# Patient Record
Sex: MALE | Race: BLACK OR AFRICAN AMERICAN | NOT HISPANIC OR LATINO | Employment: OTHER | URBAN - METROPOLITAN AREA
[De-identification: names, ages, dates, MRNs, and addresses within clinical notes are randomized per-mention and may not be internally consistent; named-entity substitution may affect disease eponyms.]

---

## 2017-01-16 ENCOUNTER — OFFICE VISIT (OUTPATIENT)
Dept: OPHTHALMOLOGY | Facility: CLINIC | Age: 82
End: 2017-01-16
Payer: MEDICARE

## 2017-01-16 DIAGNOSIS — H21.562 AFFERENT PUPILLARY DEFECT, LEFT: ICD-10-CM

## 2017-01-16 DIAGNOSIS — H25.13 SENILE NUCLEAR SCLEROSIS, BILATERAL: ICD-10-CM

## 2017-01-16 DIAGNOSIS — H40.1133 PRIMARY OPEN ANGLE GLAUCOMA OF BOTH EYES, SEVERE STAGE: Primary | ICD-10-CM

## 2017-01-16 DIAGNOSIS — H02.403 PTOSIS, BILATERAL: ICD-10-CM

## 2017-01-16 PROCEDURE — 92015 DETERMINE REFRACTIVE STATE: CPT | Mod: ,,, | Performed by: OPHTHALMOLOGY

## 2017-01-16 PROCEDURE — 92012 INTRM OPH EXAM EST PATIENT: CPT | Mod: S$PBB,,, | Performed by: OPHTHALMOLOGY

## 2017-01-16 PROCEDURE — 99212 OFFICE O/P EST SF 10 MIN: CPT | Mod: PBBFAC | Performed by: OPHTHALMOLOGY

## 2017-01-16 PROCEDURE — 99999 PR PBB SHADOW E&M-EST. PATIENT-LVL II: CPT | Mod: PBBFAC,,, | Performed by: OPHTHALMOLOGY

## 2017-01-16 RX ORDER — TIMOLOL MALEATE 5 MG/ML
1 SOLUTION/ DROPS OPHTHALMIC 2 TIMES DAILY
Qty: 10 ML | Refills: 12 | Status: SHIPPED | OUTPATIENT
Start: 2017-01-16 | End: 2017-09-18 | Stop reason: SDUPTHER

## 2017-01-16 NOTE — PROGRESS NOTES
Assessment /Plan     For exam results, see Encounter Report.    Primary open angle glaucoma of both eyes, severe stage    Afferent pupillary defect, left    Senile nuclear sclerosis, bilateral    Ptosis, bilateral      Pt is  using timolol bid ou - was intolerant to combigan - too dizzy     1. POAG - adv./severe stage OS>OD      Presented at [facility] 2012 - had been dx with glaucoma by outside doctor years ago       He stopped gtts on his own and never followed up      Excellent response to Rx with only one gtt - xalatan      First HVF 3/22/2012      First photos 3/22/2012           Family history    neg        Glaucoma meds    - timolol bid ou         H/O adverse rxn to glaucoma drops    C/O red/painful eye with latanoprost/travatan gtts /// too dizzy w/ combigan         LASERS    SLT OS 4/16/2015 // SLT od 5/14/2015 (good resp 19/19 --> 13/13)        GLAUCOMA SURGERIES    none        OTHER EYE SURGERIES    none        CDR    0.8/0.9        Tbase    23-26/26-32          Tmax    26/32            Ttarget    16/16             HVF    5 VF '12-'16 -  24-2ss od-gen red in sens- SNS/SAD  // 24-2 stimV os-dense central scotoma        Gonio    +3 ou        CCT    531/546        OCT    2 test 2010 - 2015 -  RNFL - OD:dec. T/I // OS:dec. S/T/I, bord N        HRT    3 test 2012 to 2016 -  MR -  nl od // high std dev os /// CDR 0.54 od // high std dev. os        Disc photos    2010, 2014, 2016  - OIS    - Ttoday  17/17  - Test done today   - IOP check     2.   APD os - 2/2 to #1    3.   Nuclear Sclerosis ou        VA 20/80 od // CF at 1' os - poor VA os 2/2 adv. Glaucoma/VF loss    4.  Ptosis ou -stable and not visually significant    5.   Dermatochalasis ou -stable    Plan  Adv POAG OS>OD  Good initial  resp to slt ou 5/2015 19/19--> 13/13    H/O poor compliance- only used lumigan travatan/latanoprost  half the time- states causes eye pain the following day, so on  visit 5/5/14 switched to timolol ou bid, pt still c/o  pain/buring and with poor compliance  Intolerant to combigan - dizzy   tolerating timolol bid ou (using only q day - asked him to use it bid) (1/16/2017)    Did have Excellent resp to xalatan and doing well 26/32 -> 15/14- will readdress in future if need to lower IOP more and out of options - but he stopped on his own - C/O burning ect     Long discussion with patient about DG and burning from glaucoma gtts     Discussed, at length,  the risk of blindness if the glaucoma is not controlled    Cont to use systane gtts 4 x day and before glaucoma gtts to see if the glaucoma gtts are better tolerated    Doubt CE os will help vision much - lrg central scotoma and APD  Hold off on CE od for now - BCVA 20/50 on refraction  1/16/2017     Rx for new glasses given 1/16/2017 // No real improvement - but lenses are badly scratched     If needs a trab or tube os consider CE at same time - but very limited potential OS  If needs surgery od - consider combined - this is the better eye     F/U  4 months with HVF - 24-2 ss od and 24-2 stim V os /OCT/DFE     I have seen and personally examined the patient.  I agree with the findings, assessment and plan of the resident and/or fellow.     Clau Carvalho MD

## 2017-01-18 ENCOUNTER — OFFICE VISIT (OUTPATIENT)
Dept: FAMILY MEDICINE | Facility: CLINIC | Age: 82
End: 2017-01-18
Payer: MEDICARE

## 2017-01-18 ENCOUNTER — HOSPITAL ENCOUNTER (OUTPATIENT)
Dept: RADIOLOGY | Facility: HOSPITAL | Age: 82
Discharge: HOME | End: 2017-01-18
Attending: FAMILY MEDICINE
Payer: MEDICARE

## 2017-01-18 VITALS
HEART RATE: 73 BPM | DIASTOLIC BLOOD PRESSURE: 78 MMHG | BODY MASS INDEX: 36.16 KG/M2 | WEIGHT: 290.81 LBS | RESPIRATION RATE: 18 BRPM | TEMPERATURE: 97 F | HEIGHT: 75 IN | SYSTOLIC BLOOD PRESSURE: 118 MMHG | OXYGEN SATURATION: 97 %

## 2017-01-18 DIAGNOSIS — R07.89 CHEST WALL PAIN: Primary | ICD-10-CM

## 2017-01-18 DIAGNOSIS — R49.0 HOARSENESS OF VOICE: ICD-10-CM

## 2017-01-18 DIAGNOSIS — R07.89 CHEST WALL PAIN: ICD-10-CM

## 2017-01-18 PROCEDURE — 71020 XR CHEST PA AND LATERAL: CPT | Mod: TC,PO

## 2017-01-18 PROCEDURE — 99999 PR PBB SHADOW E&M-EST. PATIENT-LVL IV: CPT | Mod: PBBFAC,,, | Performed by: FAMILY MEDICINE

## 2017-01-18 PROCEDURE — 99213 OFFICE O/P EST LOW 20 MIN: CPT | Mod: S$PBB,,, | Performed by: FAMILY MEDICINE

## 2017-01-18 PROCEDURE — 71020 XR CHEST PA AND LATERAL: CPT | Mod: 26,,, | Performed by: RADIOLOGY

## 2017-01-18 NOTE — PATIENT INSTRUCTIONS
Stop drinking apple cider vinegar at night   You can take tylenol arthritis for pain as needed   If the hoarseness is not better, let us know , we can get you to see ENT doctor

## 2017-01-18 NOTE — PROGRESS NOTES
"Subjective:       Patient ID: Dominic Cohen is a 81 y.o. male.    Chief Complaint: Mass and Hoarse      HPI   Mr. Cohen presents to clinic today for complaints of lump on his chest. He states he was here last time and felt his collar bone had a lump. He states he had a xray and was told it was his arthritis.   He now states that there is a lump on his chest and he feels that food gets stuck.   He also states his voice is hoarse.   He states the hoarse voice gets better throughout the day.   He states he really does not like taking medication and prefers natural methods.   He reports taking apple cider vinegar everyday.     Review of Systems   Constitutional: Negative for fever.   Respiratory: Negative for cough and shortness of breath.    Cardiovascular: Negative for chest pain.   Gastrointestinal: Negative for abdominal pain.       Medication List with Changes/Refills   Current Medications    ASCORBIC ACID (VITAMIN C) 60 MG LOZG    Take 1 tablet by mouth daily as needed.     ASPIRIN 81 MG CHEW    Take 1 tablet (81 mg total) by mouth once daily.    BLOOD SUGAR DIAGNOSTIC (BLOOD GLUCOSE TEST) STRP    1 strip by Misc.(Non-Drug; Combo Route) route 3 (three) times a week.    CYANOCOBALAMIN, VITAMIN B-12, (VITAMIN B-12) 50 MCG TABLET    Take 50 mcg by mouth once daily.    ERGOCALCIFEROL, VITAMIN D2, (VITAMIN D ORAL)    Take 1 tablet by mouth once daily.     MINERAL OIL (FLEET MINERAL OIL) ENEMA    Place 1 enema rectally once daily.    PAPAVERINE 30 MG/ML INJECTION    Inject 0.3 ml of trimix solution prn ED max once daily  Prepare 10ml of trimix solution containing:    Papaverine 30mg/ml    Phentolamine 1 mg/ml    Alprostadil 10mcg/ml  Dispense 10ml per refill  Qs syringes 1cc/30g/0.5" and alcohol swabs dispense as needed for Intracavernosal injection    POLYETHYLENE GLYCOL (GLYCOLAX) 17 GRAM/DOSE POWDER    Take 17 g by mouth once daily. In 8 ounces of water for up to 2 weeks.    RAMIPRIL (ALTACE) 2.5 MG " CAPSULE    Take 1 capsule (2.5 mg total) by mouth once daily.    TIMOLOL MALEATE 0.5% (TIMOPTIC) 0.5 % DROP    Place 1 drop into both eyes 2 (two) times daily.   Discontinued Medications    NAPROXEN (NAPROSYN) 375 MG TABLET    Take 1 tablet (375 mg total) by mouth 2 (two) times daily with meals.       Patient Active Problem List   Diagnosis    Dermatochalasis    Polyneuropathy in other diseases classified elsewhere    Arthritis    Hypertension    Venous insufficiency    Afferent pupillary defect - Left Eye    Senile nuclear sclerosis - Both Eyes    Ptosis - Both Eyes    Primary open-angle glaucoma, severe stage    B12 deficiency    H/O prostatectomy    Prediabetes    Atherosclerosis of abdominal aorta    Chronic constipation    Vertigo    Tinnitus    Anemia    Obesity (BMI 30-39.9)    Special screening for malignant neoplasms, colon    Fall    Dyslipidemia         Objective:     Physical Exam   Constitutional: He is oriented to person, place, and time. He appears well-developed and well-nourished. No distress.   HENT:   Head: Normocephalic and atraumatic.   Right Ear: External ear normal.   Left Ear: External ear normal.   Mouth/Throat: Oropharynx is clear and moist. No oropharyngeal exudate.   Eyes: EOM are normal. Right eye exhibits no discharge. Left eye exhibits no discharge.   Cardiovascular: Normal rate and regular rhythm.    Pulmonary/Chest: Effort normal. No respiratory distress. He has no wheezes.   Musculoskeletal: He exhibits no edema.   Neurological: He is alert and oriented to person, place, and time.   Skin: Skin is warm and dry. He is not diaphoretic. No erythema.   Raised appearance on anterior chest near sternum  Non mobile   Non tender   No erythema or warmth noted     Psychiatric: He has a normal mood and affect.   Vitals reviewed.    Vitals:    01/18/17 1120   BP: 118/78   Pulse: 73   Resp: 18   Temp: 96.9 °F (36.1 °C)       Assessment/  PLAN     Chest wall pain  -     X-Ray  Chest PA And Lateral; Future; Expected date: 1/18/17  - will follow up xray, likely arthritis    Hoarseness of voice  -- suggested he avoid apple cider vinegar at least in the evenings to see if this makes any difference  - offered PPI, but he refused, likely food stuck feeling is related to GERD           Soni Jeffery MD  [facility]

## 2017-01-18 NOTE — MR AVS SNAPSHOT
[facility]  [address] [place] [address]  Phone: [phone]                  Dominic Cohen   2017 11:20 AM   Office Visit    Description:  Male : 1935   Provider:  Soni Jeffery MD   Department:  [facility]           Reason for Visit     Mass     Hoarse           Diagnoses this Visit        Comments    Chest wall pain    -  Primary            To Do List           Future Appointments        Provider Department Dept Phone    3/14/2017 2:00 PM Sudhakar Liu MD [facility] [phone]    2017 1:00 PM NUNEZ, VISUAL PAYNE [facility] [phone]    2017 1:45 PM Clau Carvalho MD [facility] [phone]      Goals (5 Years of Data)     None      [facility]     [facility] On Call Nurse Care Line -  Assistance  Registered nurses in the [facility] On Call Center provide clinical advisement, health education, appointment booking, and other advisory services.  Call for this free service at [phone].             Medications           Message regarding Medications     Verify the changes and/or additions to your medication regime listed below are the same as discussed with your clinician today.  If any of these changes or additions are incorrect, please notify your healthcare provider.        STOP taking these medications     naproxen (NAPROSYN) 375 MG tablet Take 1 tablet (375 mg total) by mouth 2 (two) times daily with meals.           Verify that the below list of medications is an accurate representation of the medications you are currently taking.  If none reported, the list may be blank. If incorrect, please contact your healthcare provider. Carry this list with you in case of emergency.           Current Medications     ascorbic acid (VITAMIN C) 60 mg Lozg Take 1 tablet by mouth daily as needed.     aspirin 81 MG Chew Take 1 tablet (81 mg total) by  "mouth once daily.    blood sugar diagnostic (BLOOD GLUCOSE TEST) Strp 1 strip by Misc.(Non-Drug; Combo Route) route 3 (three) times a week.    cyanocobalamin, vitamin B-12, (VITAMIN B-12) 50 mcg tablet Take 50 mcg by mouth once daily.    ERGOCALCIFEROL, VITAMIN D2, (VITAMIN D ORAL) Take 1 tablet by mouth once daily.     mineral oil (FLEET MINERAL OIL) enema Place 1 enema rectally once daily.    papaverine 30 mg/mL injection Inject 0.3 ml of trimix solution prn ED max once daily  Prepare 10ml of trimix solution containing:    Papaverine 30mg/ml    Phentolamine 1 mg/ml    Alprostadil 10mcg/ml  Dispense 10ml per refill  Qs syringes 1cc/30g/0.5" and alcohol swabs dispense as needed for Intracavernosal injection    polyethylene glycol (GLYCOLAX) 17 gram/dose powder Take 17 g by mouth once daily. In 8 ounces of water for up to 2 weeks.    ramipril (ALTACE) 2.5 MG capsule Take 1 capsule (2.5 mg total) by mouth once daily.    timolol maleate 0.5% (TIMOPTIC) 0.5 % Drop Place 1 drop into both eyes 2 (two) times daily.           Clinical Reference Information           Vital Signs - Last Recorded  Most recent update: 1/18/2017 11:22 AM by Kaylee Frank MA    BP Pulse Temp Resp    118/78 (BP Location: Right arm, Patient Position: Sitting, BP Method: Manual) 73 96.9 °F (36.1 °C) (Tympanic) 18    Ht Wt SpO2 BMI    6' 3" (1.905 m) 131.9 kg (290 lb 12.6 oz) 97% 36.35 kg/m2      Blood Pressure          Most Recent Value    BP  118/78      Allergies as of 1/18/2017     Penicillins    Sulfa (Sulfonamide Antibiotics)    Protonix [Pantoprazole]      Immunizations Administered on Date of Encounter - 1/18/2017     None      Orders Placed During Today's Visit     Future Labs/Procedures Expected by Expires    X-Ray Chest PA And Lateral  1/18/2017 1/18/2018      [facility] Sign-Up     Activating your [facility] account is as easy as 1-2-3!     1) Visit [email or web address], select Sign Up Now, enter this activation code and your date of birth, " then select Next.  [email or web address]  Expires: 3/4/2017 11:51 AM      2) Create a username and password to use when you visit [facility] in the future and select a security question in case you lose your password and select Next.    3) Enter your e-mail address and click Sign Up!    Additional Information  If you have questions, please e-mail [email or web address] or call [phone] to talk to our [facility] staff. Remember, [facility] is NOT to be used for urgent needs. For medical emergencies, dial 911.         Instructions    Stop drinking apple cider vinegar at night   You can take tylenol arthritis for pain as needed   If the hoarseness is not better, let us know , we can get you to see ENT doctor

## 2017-03-14 ENCOUNTER — OFFICE VISIT (OUTPATIENT)
Dept: FAMILY MEDICINE | Facility: CLINIC | Age: 82
End: 2017-03-14
Payer: MEDICARE

## 2017-03-14 VITALS
BODY MASS INDEX: 36.84 KG/M2 | SYSTOLIC BLOOD PRESSURE: 116 MMHG | HEART RATE: 68 BPM | RESPIRATION RATE: 18 BRPM | DIASTOLIC BLOOD PRESSURE: 70 MMHG | WEIGHT: 296.31 LBS | TEMPERATURE: 97 F | OXYGEN SATURATION: 97 % | HEIGHT: 75 IN

## 2017-03-14 DIAGNOSIS — Z90.79 H/O PROSTATECTOMY: Chronic | ICD-10-CM

## 2017-03-14 DIAGNOSIS — E78.5 DYSLIPIDEMIA: ICD-10-CM

## 2017-03-14 DIAGNOSIS — D64.9 ANEMIA, UNSPECIFIED TYPE: ICD-10-CM

## 2017-03-14 DIAGNOSIS — E53.8 B12 DEFICIENCY: ICD-10-CM

## 2017-03-14 DIAGNOSIS — R49.0 HOARSENESS: ICD-10-CM

## 2017-03-14 DIAGNOSIS — K59.09 CHRONIC CONSTIPATION: ICD-10-CM

## 2017-03-14 DIAGNOSIS — I10 ESSENTIAL HYPERTENSION: Primary | ICD-10-CM

## 2017-03-14 DIAGNOSIS — R19.5 CHANGE IN STOOL: ICD-10-CM

## 2017-03-14 PROCEDURE — 99214 OFFICE O/P EST MOD 30 MIN: CPT | Mod: S$PBB,,, | Performed by: INTERNAL MEDICINE

## 2017-03-14 PROCEDURE — 99999 PR PBB SHADOW E&M-EST. PATIENT-LVL IV: CPT | Mod: PBBFAC,,, | Performed by: INTERNAL MEDICINE

## 2017-03-14 PROCEDURE — 99214 OFFICE O/P EST MOD 30 MIN: CPT | Mod: PBBFAC,PO | Performed by: INTERNAL MEDICINE

## 2017-03-14 NOTE — MR AVS SNAPSHOT
[facility]  [address] [address]  Phone: [phone]                  Dominic Cohen   3/14/2017 2:00 PM   Office Visit    Description:  Male : 1935   Provider:  Sudhakar Liu MD   Department:  [facility]           Reason for Visit     Follow-up     Hyperlipidemia     Hypertension     Anemia           Diagnoses this Visit        Comments    Essential hypertension    -  Primary     H/O prostatectomy         B12 deficiency         Anemia, unspecified type         Dyslipidemia         Chronic constipation         Hoarseness         Change in stool                To Do List           Future Appointments        Provider Department Dept Phone    3/16/2017 8:40 AM Jaimee Armenta NP [facility] [phone]    3/16/2017 10:20 AM Mary Bello PA-C [facility] - [phone]    3/17/2017 7:45 AM [facility] [phone]    2017 1:00 PM NUNEZ, VISUAL PAYNE [facility] [phone]    2017 1:45 PM Clau Carvalho MD [facility] [phone]      Goals (5 Years of Data)     None      Follow-Up and Disposition     Return in about 4 months (around 2017).      [facility]     [facility] Nurse Care [facility] - 24/7 Assistance  Registered nurses in the [facility] On Call Center provide clinical advisement, health education, appointment booking, and other advisory services.  Call for this free service at [phone].             Medications           Message regarding Medications     Verify the changes and/or additions to your medication regime listed below are the same as discussed with your clinician today.  If any of these changes or additions are incorrect, please notify your healthcare provider.             Verify that the below list of medications is an accurate representation of the medications you are currently taking.  If none  "reported, the list may be blank. If incorrect, please contact your healthcare provider. Carry this list with you in case of emergency.           Current Medications     ascorbic acid (VITAMIN C) 60 mg Lozg Take 1 tablet by mouth daily as needed.     aspirin 81 MG Chew Take 1 tablet (81 mg total) by mouth once daily.    blood sugar diagnostic (BLOOD GLUCOSE TEST) Strp 1 strip by Misc.(Non-Drug; Combo Route) route 3 (three) times a week.    cyanocobalamin, vitamin B-12, (VITAMIN B-12) 50 mcg tablet Take 50 mcg by mouth once daily.    ERGOCALCIFEROL, VITAMIN D2, (VITAMIN D ORAL) Take 1 tablet by mouth once daily.     mineral oil (FLEET MINERAL OIL) enema Place 1 enema rectally once daily.    papaverine 30 mg/mL injection Inject 0.3 ml of trimix solution prn ED max once daily  Prepare 10ml of trimix solution containing:    Papaverine 30mg/ml    Phentolamine 1 mg/ml    Alprostadil 10mcg/ml  Dispense 10ml per refill  Qs syringes 1cc/30g/0.5" and alcohol swabs dispense as needed for Intracavernosal injection    polyethylene glycol (GLYCOLAX) 17 gram/dose powder Take 17 g by mouth once daily. In 8 ounces of water for up to 2 weeks.    ramipril (ALTACE) 2.5 MG capsule Take 1 capsule (2.5 mg total) by mouth once daily.    timolol maleate 0.5% (TIMOPTIC) 0.5 % Drop Place 1 drop into both eyes 2 (two) times daily.           Clinical Reference Information           Your Vitals Were     BP Pulse Temp Resp    116/70 (BP Location: Left arm, Patient Position: Sitting, BP Method: Manual) 68 97 °F (36.1 °C) (Tympanic) 18    Height Weight SpO2 BMI    6' 3" (1.905 m) 134.4 kg (296 lb 4.8 oz) 97% 37.03 kg/m2      Blood Pressure          Most Recent Value    BP  116/70      Allergies as of 3/14/2017     Penicillins    Sulfa (Sulfonamide Antibiotics)    Protonix [Pantoprazole]      Immunizations Administered on Date of Encounter - 3/14/2017     None      Orders Placed During Today's Visit      Normal Orders This Visit    Ambulatory " referral to ENT     Ambulatory referral to Gastroenterology     Future Labs/Procedures Expected by Expires    US Abdomen Complete  3/14/2017 3/14/2018      [facility] Sign-Up     Activating your [facility] account is as easy as 1-2-3!     1) Visit [email or web address], select Sign Up Now, enter this activation code and your date of birth, then select Next.  [email or web address]  Expires: 4/28/2017  2:44 PM      2) Create a username and password to use when you visit [facility] in the future and select a security question in case you lose your password and select Next.    3) Enter your e-mail address and click Sign Up!    Additional Information  If you have questions, please e-mail [email or web address] or call [phone] to talk to our [facility] staff. Remember, [facility] is NOT to be used for urgent needs. For medical emergencies, dial 911.         Language Assistance Services     ATTENTION: Language assistance services are available, free of charge. Please call [phone].      ATENCIÓN: Si habla español, tiene a rice disposición servicios gratuitos de asistencia lingüística. Llame al [phone].     JAVIER Ý: N?u b?n nói Ti?ng Vi?t, có các d?ch v? h? tr? ngôn ng? mi?n phí dành cho b?n. G?i s? [phone].         [facility] complies with applicable Federal civil rights laws and does not discriminate on the basis of race, color, national origin, age, disability, or sex.

## 2017-03-15 ENCOUNTER — TELEPHONE (OUTPATIENT)
Dept: OTOLARYNGOLOGY | Facility: CLINIC | Age: 82
End: 2017-03-15

## 2017-03-15 NOTE — TELEPHONE ENCOUNTER
----- Message from Mary Bello PA-C sent at 3/15/2017  7:15 AM CDT -----  He's on my schedule for tomorrow for hoarseness.  He probably needs scope.  Please call him and see if he can see Wood on Friday.  Thanks

## 2017-03-15 NOTE — TELEPHONE ENCOUNTER
Spoke with patient to explain and reschedule appointment to Dr. Murrieta scheduled to Friday 3/15/17. Patient will arrive after his ultrasound at 7:45 am.

## 2017-03-16 ENCOUNTER — INITIAL CONSULT (OUTPATIENT)
Dept: GASTROENTEROLOGY | Facility: CLINIC | Age: 82
End: 2017-03-16
Payer: MEDICARE

## 2017-03-16 ENCOUNTER — TELEPHONE (OUTPATIENT)
Dept: RADIOLOGY | Facility: HOSPITAL | Age: 82
End: 2017-03-16

## 2017-03-16 VITALS
HEART RATE: 72 BPM | DIASTOLIC BLOOD PRESSURE: 88 MMHG | HEIGHT: 76 IN | BODY MASS INDEX: 36.11 KG/M2 | WEIGHT: 296.5 LBS | SYSTOLIC BLOOD PRESSURE: 156 MMHG

## 2017-03-16 DIAGNOSIS — R49.0 HOARSENESS: ICD-10-CM

## 2017-03-16 DIAGNOSIS — K21.9 GASTROESOPHAGEAL REFLUX DISEASE, ESOPHAGITIS PRESENCE NOT SPECIFIED: ICD-10-CM

## 2017-03-16 DIAGNOSIS — R14.0 BLOATING: ICD-10-CM

## 2017-03-16 DIAGNOSIS — K59.00 CONSTIPATION, UNSPECIFIED CONSTIPATION TYPE: Primary | ICD-10-CM

## 2017-03-16 DIAGNOSIS — R10.30 LOWER ABDOMINAL PAIN: ICD-10-CM

## 2017-03-16 DIAGNOSIS — R14.2 BELCHING: ICD-10-CM

## 2017-03-16 PROCEDURE — 99213 OFFICE O/P EST LOW 20 MIN: CPT | Mod: PBBFAC,PO | Performed by: NURSE PRACTITIONER

## 2017-03-16 PROCEDURE — 99214 OFFICE O/P EST MOD 30 MIN: CPT | Mod: S$PBB,,, | Performed by: NURSE PRACTITIONER

## 2017-03-16 PROCEDURE — 99999 PR PBB SHADOW E&M-EST. PATIENT-LVL III: CPT | Mod: PBBFAC,,, | Performed by: NURSE PRACTITIONER

## 2017-03-16 NOTE — PROGRESS NOTES
"Clinic Consult:  [facility] Consultation Note    Reason for Consult:  The primary encounter diagnosis was Constipation, unspecified constipation type. Diagnoses of Belching, Hoarseness, Lower abdominal pain, Bloating, and Gastroesophageal reflux disease, esophagitis presence not specified were also pertinent to this visit.    PCP: Sudhakar Liu   [address] / [place] [address]    HPI:  This is a 81 y.o. male here for evaluation of the above  Patient was referred to me by Dr. Liu for abdominal complaints. It was noted in his chart that he is also having some new onset hoarseness for the last couple of months. He is follow up with ENT for that as well. He complains of having some lower abdominal pain and bloating that worsens after eating. Patient also complains of constipation. He is having bowel movements about every 2-4 days. They are sometimes hard and "pellit-like" in nature. He denies any hematochezia or melena. The abdominal pian and bloating worsens after eating and he feels very full. He feels like his food is just sitting in his stomach. He has tried Miralax as well as other OTC preparations for constipation without any relief. He also reports an increase in belching and flatulence. He does not feel any burning sensation in his chest but does admit to having a bad taste in his mouth when he belches. He had a previous EGD less than a year ago in May 2016 which was completely normal. He reports having these symptoms at that time as well and does not admit to any significant upper GI symptom change since then. He has not been on any treatment for GERD. He has an allergy to PPIs. He denies any nausea or vomiting or weight loss.       Review of Systems   Constitutional: Negative for fever, malaise/fatigue and weight loss.   HENT: Negative for sore throat.    Respiratory: Negative for cough and wheezing.    Cardiovascular: Negative for chest pain and palpitations. "   Gastrointestinal: Positive for constipation. Negative for blood in stool, diarrhea, melena, nausea and vomiting. Abdominal pain: lower.        Belching   Musculoskeletal: Negative for back pain, joint pain, myalgias and neck pain.   Skin: Negative for itching and rash.   Neurological: Negative for dizziness, speech change, seizures, loss of consciousness and headaches.   Psychiatric/Behavioral: Negative for depression and substance abuse. The patient is not nervous/anxious.        Medical History:  has a past medical history of Arthritis; Atherosclerosis of abdominal aorta; Cataract; Chronic constipation; Glaucoma; History of anal fissures; Hypertension; Polyneuropathy in other diseases classified elsewhere; Prediabetes; Venous insufficiency; and Venous insufficiency.    Surgical History:  has a past surgical history that includes Knee surgery; Back surgery; ostate surgery; TONSILLECTOMY, ADENOIDECTOMY; Cataract extraction bilateral w/ anterior vitrectomy; and Colonoscopy (N/A, 5/13/2016).    Family History: family history includes No Known Problems in his daughter, son, and son. There is no history of Diabetes, Heart disease, Cancer, Celiac disease, Cirrhosis, Colon cancer, Colon polyps, Crohn's disease, Cystic fibrosis, Esophageal cancer, Hemochromatosis, Inflammatory bowel disease, Irritable bowel syndrome, Liver cancer, Liver disease, Rectal cancer, Stomach cancer, Ulcerative colitis, Zak's disease, Amblyopia, Blindness, Glaucoma, Hypertension, Macular degeneration, Retinal detachment, or Strabismus..     Social History:  reports that he quit smoking about 8 years ago. His smoking use included Cigarettes. He has a 15.60 pack-year smoking history. He has never used smokeless tobacco. He reports that he drinks alcohol. He reports that he does not use illicit drugs.    Allergies: Reviewed    Home Medications:   Current Outpatient Prescriptions on File Prior to Visit   Medication Sig Dispense Refill     "ascorbic acid (VITAMIN C) 60 mg Lozg Take 1 tablet by mouth daily as needed.       aspirin 81 MG Chew Take 1 tablet (81 mg total) by mouth once daily. (Patient taking differently: Take 81 mg by mouth daily as needed. )  0    blood sugar diagnostic (BLOOD GLUCOSE TEST) Strp 1 strip by Misc.(Non-Drug; Combo Route) route 3 (three) times a week. 32 strip 11    cyanocobalamin, vitamin B-12, (VITAMIN B-12) 50 mcg tablet Take 50 mcg by mouth once daily.      ERGOCALCIFEROL, VITAMIN D2, (VITAMIN D ORAL) Take 1 tablet by mouth once daily.       mineral oil (FLEET MINERAL OIL) enema Place 1 enema rectally once daily. 2 enema 0    papaverine 30 mg/mL injection Inject 0.3 ml of trimix solution prn ED max once daily  Prepare 10ml of trimix solution containing:    Papaverine 30mg/ml    Phentolamine 1 mg/ml    Alprostadil 10mcg/ml  Dispense 10ml per refill  Qs syringes 1cc/30g/0.5" and alcohol swabs dispense as needed for Intracavernosal injection 10 mL 5    polyethylene glycol (GLYCOLAX) 17 gram/dose powder Take 17 g by mouth once daily. In 8 ounces of water for up to 2 weeks. 119 g 0    ramipril (ALTACE) 2.5 MG capsule Take 1 capsule (2.5 mg total) by mouth once daily. 90 capsule 3    timolol maleate 0.5% (TIMOPTIC) 0.5 % Drop Place 1 drop into both eyes 2 (two) times daily. 10 mL 12     No current facility-administered medications on file prior to visit.        Physical Exam:  Vital Signs:  BP (!) 156/88  Pulse 72  Ht 6' 4" (1.93 m)  Wt 134.5 kg (296 lb 8.3 oz)  BMI 36.09 kg/m2  Body mass index is 36.09 kg/(m^2).  Physical Exam   Constitutional: He is oriented to person, place, and time and well-developed, well-nourished, and in no distress. No distress.   HENT:   Head: Normocephalic.   Eyes: Conjunctivae are normal. Pupils are equal, round, and reactive to light.   Cardiovascular: Normal rate, regular rhythm and normal heart sounds.    Pulmonary/Chest: Effort normal and breath sounds normal. No respiratory " distress.   Abdominal: Soft. Bowel sounds are normal. He exhibits no distension. There is no tenderness.   Neurological: He is alert and oriented to person, place, and time. No cranial nerve deficit.   Skin: Skin is warm and dry. No rash noted.   Psychiatric: Mood and affect normal.   Vitals reviewed.      Labs: Pertinent labs reviewed.    Endoscopy:  May 2016. Unremarkable    CRC Screening: Colonoscopy May 2016. Showed diverticulosis, otherwise unremarkable. No need for repeat colonoscopy for screening purposes.     Assessment:  1. Constipation, unspecified constipation type    2. Belching    3. Hoarseness    4. Lower abdominal pain    5. Bloating    6. Gastroesophageal reflux disease, esophagitis presence not specified           Recommendations:  Constipation, unspecified constipation type  Lower abdominal pain  Bloating  - Abdominal pain and hoarseness likely due to constipation.  - No relief with Miralax. Will start a trial of Linzess for constipation  - Discussed increasing water and dietary fiber intake.   -     linaclotide (LINZESS) 145 mcg Cap capsule; Take 1 capsule (145 mcg total) by mouth once daily.  Dispense: 30 capsule; Refill: 5    Hoarseness  Belching   Gastroesophageal reflux disease, esophagitis present not specified  - Pt reports bad taste in mouth when belching. Possible GERD.  - Pt has allergy to PPI. Will try a trial of Zantac for heartburn/GERD  - Constipation my contribute to belching and GERD symptoms  - Possible cause of hoarseness may be due to GERD. Patient will follow up with ENT tomorrow for further evaluation.   - If GERD symptoms persist despite improvement in constipation and taking Zantac, pt will need further evaluation with a repeat EGD to R/O esophagitis vs gastritis vs other etiologies  - If ENT unable to find cause of hoarseness, will need to repeat EGD for further evaluation R/O esophagitis vs gastritis vs other etiologies as a cause.      Return in about 4 weeks (around  4/13/2017).      Thank you so much for allowing me to participate in the care of MaconJAMILA Scott

## 2017-03-16 NOTE — LETTER
March 16, 2017      Sudhakar Liu MD  [address]  [place] [address]           [facility]  [address]  [place] [address]  Phone: [phone]  Fax: [phone]          Patient: Dominic Cohen   MR Number: [id number]   YOB: 1935   Date of Visit: 3/16/2017       Dear Dr. Sudhakar Liu:    Thank you for referring Dominic Cohen to me for evaluation. Attached you will find relevant portions of my assessment and plan of care.    If you have questions, please do not hesitate to call me. I look forward to following Dominic Cohen along with you.    Sincerely,    Jaimee Armenta, NP    Enclosure  CC:  No Recipients    If you would like to receive this communication electronically, please contact [email or web address] or [phone] to request more information on [facility] Link access.    For providers and/or their staff who would like to refer a patient to [facility], please contact us through our one-stop-shop provider referral line, [facility] , at [phone].    If you feel you have received this communication in error or would no longer like to receive these types of communications, please e-mail [email or web address]

## 2017-03-16 NOTE — MR AVS SNAPSHOT
[facility]  [address] [place] [address]  Phone: [phone]  Fax: [phone]                  Dominic Cohen   3/16/2017 8:40 AM   Initial consult    Description:  Male : 1935   Provider:  Jaimee Armenta NP   Department:  [facility]           Reason for Visit     Constipation     Gastroesophageal Reflux           Diagnoses this Visit        Comments    Constipation, unspecified constipation type    -  Primary     Lower abdominal pain         Bloating         Belching                To Do List           Future Appointments        Provider Department Dept Phone    3/17/2017 7:45 AM [facility] [phone]    3/17/2017 2:15 PM Jorge Sandhu MD [facility] [phone]    2017 8:20 AM Jaimee Armenta NP [facility] [phone]    2017 1:00 PM NUNEZ, VISUAL PAYNE [facility] [phone]    2017 1:45 PM Clau Carvalho MD [facility] [phone]      Goals (5 Years of Data)     None      Follow-Up and Disposition     Return in about 4 weeks (around 2017).       These Medications        Disp Refills Start End    linaclotide (LINZESS) 145 mcg Cap capsule 30 capsule 5 3/16/2017     Take 1 capsule (145 mcg total) by mouth once daily. - Oral    Pharmacy: [facility] [place] [place] - [address] [place] [address] #: [phone]         [facility]     [facility] On Call Nurse Care Line -  Assistance  Registered nurses in the [facility] provide clinical advisement, health education, appointment booking, and other advisory services.  Call for this free service at [phone].             Medications           Message regarding Medications     Verify the changes and/or additions to your medication regime listed below are the same as discussed with your clinician today.  If any of these changes or additions are incorrect,  "please notify your healthcare provider.        START taking these NEW medications        Refills    linaclotide (LINZESS) 145 mcg Cap capsule 5    Sig: Take 1 capsule (145 mcg total) by mouth once daily.    Class: Normal    Route: Oral           Verify that the below list of medications is an accurate representation of the medications you are currently taking.  If none reported, the list may be blank. If incorrect, please contact your healthcare provider. Carry this list with you in case of emergency.           Current Medications     ascorbic acid (VITAMIN C) 60 mg Lozg Take 1 tablet by mouth daily as needed.     aspirin 81 MG Chew Take 1 tablet (81 mg total) by mouth once daily.    blood sugar diagnostic (BLOOD GLUCOSE TEST) Strp 1 strip by Misc.(Non-Drug; Combo Route) route 3 (three) times a week.    cyanocobalamin, vitamin B-12, (VITAMIN B-12) 50 mcg tablet Take 50 mcg by mouth once daily.    ERGOCALCIFEROL, VITAMIN D2, (VITAMIN D ORAL) Take 1 tablet by mouth once daily.     linaclotide (LINZESS) 145 mcg Cap capsule Take 1 capsule (145 mcg total) by mouth once daily.    mineral oil (FLEET MINERAL OIL) enema Place 1 enema rectally once daily.    papaverine 30 mg/mL injection Inject 0.3 ml of trimix solution prn ED max once daily  Prepare 10ml of trimix solution containing:    Papaverine 30mg/ml    Phentolamine 1 mg/ml    Alprostadil 10mcg/ml  Dispense 10ml per refill  Qs syringes 1cc/30g/0.5" and alcohol swabs dispense as needed for Intracavernosal injection    polyethylene glycol (GLYCOLAX) 17 gram/dose powder Take 17 g by mouth once daily. In 8 ounces of water for up to 2 weeks.    ramipril (ALTACE) 2.5 MG capsule Take 1 capsule (2.5 mg total) by mouth once daily.    timolol maleate 0.5% (TIMOPTIC) 0.5 % Drop Place 1 drop into both eyes 2 (two) times daily.           Clinical Reference Information           Your Vitals Were     BP Pulse Height Weight BMI    156/88 72 6' 4" (1.93 m) 134.5 kg (296 lb 8.3 oz) " 36.09 kg/m2      Blood Pressure          Most Recent Value    BP  (!)  156/88      Allergies as of 3/16/2017     Penicillins    Sulfa (Sulfonamide Antibiotics)    Protonix [Pantoprazole]      Immunizations Administered on Date of Encounter - 3/16/2017     None      [facility] Sign-Up     Activating your [facility] account is as easy as 1-2-3!     1) Visit [email or web address], select Sign Up Now, enter this activation code and your date of birth, then select Next.  [email or web address]  Expires: 4/28/2017  2:44 PM      2) Create a username and password to use when you visit [facility] in the future and select a security question in case you lose your password and select Next.    3) Enter your e-mail address and click Sign Up!    Additional Information  If you have questions, please e-mail [email or web address] or call [phone] to talk to our [facility] staff. Remember, [facility] is NOT to be used for urgent needs. For medical emergencies, dial 911.         Instructions    Start taking Zantac 150mg up to twice a day as needed for heartburn.        Language Assistance Services     ATTENTION: Language assistance services are available, free of charge. Please call [phone].      ATENCIÓN: Si habla español, tiene a rice disposición servicios gratuitos de asistencia lingüística. Llame al [phone].     CHÚ Ý: N?u b?n nói Ti?ng Vi?t, có các d?ch v? h? tr? ngôn ng? mi?n phí dành cho b?n. G?i s? [phone].         [facility] complies with applicable Federal civil rights laws and does not discriminate on the basis of race, color, national origin, age, disability, or sex.

## 2017-03-17 ENCOUNTER — OFFICE VISIT (OUTPATIENT)
Dept: OTOLARYNGOLOGY | Facility: CLINIC | Age: 82
End: 2017-03-17
Payer: MEDICARE

## 2017-03-17 ENCOUNTER — HOSPITAL ENCOUNTER (OUTPATIENT)
Dept: RADIOLOGY | Facility: HOSPITAL | Age: 82
Discharge: HOME | End: 2017-03-17
Attending: INTERNAL MEDICINE | Admitting: OTOLARYNGOLOGY
Payer: MEDICARE

## 2017-03-17 VITALS
WEIGHT: 292.75 LBS | SYSTOLIC BLOOD PRESSURE: 151 MMHG | HEIGHT: 76 IN | HEART RATE: 62 BPM | BODY MASS INDEX: 35.65 KG/M2 | DIASTOLIC BLOOD PRESSURE: 89 MMHG | TEMPERATURE: 97 F | RESPIRATION RATE: 18 BRPM

## 2017-03-17 DIAGNOSIS — R19.5 CHANGE IN STOOL: ICD-10-CM

## 2017-03-17 DIAGNOSIS — J38.2 VOCAL NODULES IN ADULTS: ICD-10-CM

## 2017-03-17 DIAGNOSIS — K21.9 LPRD (LARYNGOPHARYNGEAL REFLUX DISEASE): ICD-10-CM

## 2017-03-17 DIAGNOSIS — R49.0 HOARSENESS: ICD-10-CM

## 2017-03-17 DIAGNOSIS — R49.0 DYSPHONIA: Primary | ICD-10-CM

## 2017-03-17 PROCEDURE — 99999 PR PBB SHADOW E&M-EST. PATIENT-LVL III: CPT | Mod: PBBFAC,,, | Performed by: OTOLARYNGOLOGY

## 2017-03-17 PROCEDURE — 76700 US EXAM ABDOM COMPLETE: CPT | Mod: 26,,, | Performed by: RADIOLOGY

## 2017-03-17 PROCEDURE — 31575 DIAGNOSTIC LARYNGOSCOPY: CPT | Mod: PBBFAC,PO | Performed by: OTOLARYNGOLOGY

## 2017-03-17 PROCEDURE — 99214 OFFICE O/P EST MOD 30 MIN: CPT | Mod: 25,S$PBB,, | Performed by: OTOLARYNGOLOGY

## 2017-03-17 PROCEDURE — 31575 DIAGNOSTIC LARYNGOSCOPY: CPT | Mod: S$PBB,,, | Performed by: OTOLARYNGOLOGY

## 2017-03-17 PROCEDURE — 99213 OFFICE O/P EST LOW 20 MIN: CPT | Mod: PBBFAC,PO | Performed by: OTOLARYNGOLOGY

## 2017-03-17 NOTE — PROGRESS NOTES
Referring Provider:    Sudhakar Liu Md  [address] [place] [address]  Subjective:   Patient: Dominic Cohen [id number], :1935   Visit date:3/17/2017 2:31 PM    Chief Complaint:  Other (hoarseness)    HPI:  Dominic is a 81 y.o. male who I was asked to see in consultation for evaluation of the following issue(s):    Dominic presents with changes in the voice.      Severity: moderate  Quality: hoarse, raspy  Duration: 6 month(s)  Modifying factors:  None  Associated signs and symptoms:  Chronic belching, bloating, epigastric discomfort.  Bad taste in the back of the mouth: No  Heartburn: Occasional  Number of cups of caffeinated beverages daily: 2  Smoking: No- stopped 5 years ago.  Smoked on and off since 17 years old.  He smoked up to 1/2 pack/day at his peak.   He recently started Linzess        Review of Systems:  Negative unless checked off.  Gen:  []fever   []fatigue  HENT:  []nosebleeds  []dental problem   Eyes:  []photophobia  []visual disturbance  Resp:  []chest tightness []wheezing  Card:  []chest pain  []leg swelling  GI:  []abdominal pain []blood in stool  :  []dysuria  []hematuria  Musc:  []joint swelling  []gait problem  Skin:  []color change  []pallor  Neuro:  []seizures  []numbness  Hem:  []bruise/bleed easily  Psych:  []hallucinations  []behavioral problems  Allergy/Imm: is allergic to penicillins; sulfa (sulfonamide antibiotics); and protonix [pantoprazole].    His meds, allergies, medical, surgical, social & family histories were reviewed & updated:  -     He has a current medication list which includes the following prescription(s): ascorbic acid (vitamin c), aspirin, blood sugar diagnostic, cyanocobalamin (vitamin b-12), ergocalciferol (vitamin d2), linaclotide, mineral oil, papaverine, polyethylene glycol, ramipril, and timolol maleate 0.5%.  -     He  has a past medical history of Arthritis; Atherosclerosis of abdominal aorta; Cataract; Chronic  "constipation; Glaucoma; History of anal fissures; Hypertension; Polyneuropathy in other diseases classified elsewhere; Prediabetes; Venous insufficiency; and Venous insufficiency.   -     He  does not have any pertinent problems on file.   -     He  has a past surgical history that includes Knee surgery; Back surgery; ostate surgery; TONSILLECTOMY, ADENOIDECTOMY; Cataract extraction bilateral w/ anterior vitrectomy; and Colonoscopy (N/A, 5/13/2016).  -     He  reports that he quit smoking about 8 years ago. His smoking use included Cigarettes. He has a 15.60 pack-year smoking history. He has never used smokeless tobacco. He reports that he drinks alcohol. He reports that he does not use illicit drugs.  -     His family history includes No Known Problems in his daughter, son, and son. There is no history of Diabetes, Heart disease, Cancer, Celiac disease, Cirrhosis, Colon cancer, Colon polyps, Crohn's disease, Cystic fibrosis, Esophageal cancer, Hemochromatosis, Inflammatory bowel disease, Irritable bowel syndrome, Liver cancer, Liver disease, Rectal cancer, Stomach cancer, Ulcerative colitis, Zak's disease, Amblyopia, Blindness, Glaucoma, Hypertension, Macular degeneration, Retinal detachment, or Strabismus.  -     He is allergic to penicillins; sulfa (sulfonamide antibiotics); and protonix [pantoprazole].    Objective:     Physical Exam:  Vitals:  BP (!) 151/89  Pulse 62  Temp 97.1 °F (36.2 °C) (Tympanic)   Resp 18  Ht 6' 4" (1.93 m)  Wt 132.8 kg (292 lb 12.3 oz)  BMI 35.64 kg/m2  General appearance:  Well developed, well nourished    Eyes:  Extraocular motions intact, PERRL    Communication:  no hoarseness, mild dysphonia    Ears:  Otoscopy of external auditory canals and tympanic membranes was normal, clinical speech reception thresholds grossly intact, no mass/lesion of auricle.  Nose:  No masses/lesions of external nose, nasal mucosa, septum, and turbinates were within normal limits.  Mouth:  No " mass/lesion of lips, teeth, gums, hard/soft palate, tongue, tonsils, or oropharynx.    Cardiovascular:  No pedal edema; Radial Pulses +2     Neck & Lymphatics:  No cervical lymphadenopathy, no neck mass/crepitus/ asymmetry, trachea is midline, no thyroid enlargement/tenderness/mass.    Psych: Oriented x3,  Alert with normal mood and affect.     Respiration/Chest:  Symmetric expansion during respiration, normal respiratory effort.    Skin:  Warm and intact. No ulcerations of face, scalp, neck.    Assessment & Plan:   Dominic was seen today for other.    Diagnoses and all orders for this visit:    Dysphonia    Hoarseness    LPRD (laryngopharyngeal reflux disease)    Vocal nodules in adults    -     Laryngopharyngeal reflux - Dominic has laryngopharyngeal reflux (LPR).  We recommend and discussed with him anti-reflux measures such as raising the head of the bed, avoiding tight clothing or belts, avoiding eating within 3 hours of laying down, and weight loss. Also he should avoid caffeine, peppermints, alcohol and tobacco. H2 blockers (ie. Pepcid) or antacids (ie. Tums) can help. However, for persisting chronic or daily symptoms, a trial of proton pump inhibitors (ie. Omeprazole) is recommended. Dominic should alert our office if there are persistent symptoms, dysphagia, weight loss or GI bleeding.          We discussed his medical conditions, treatments and plan.  Dominic should return to clinic if any issues arise (symptoms worsen or persist), otherwise we will see him back in the clinic In 2 months.    Thank you for allowing me to participate in the care of Dominic.    Jorge Sandhu MD        Patient: Dominic [place] [id number], :1935  Procedure date:3/17/2017  Patient's medications, allergies, past medical, surgical, social and family histories were reviewed and updated as appropriate.  Chief Complaint:  Other (hoarseness)    HPI:  Dominic is a 81 y.o. male with the history of present illness as  discussed in the clinic note from today.    Procedure: Risks, benefits, and alternatives of the procedure were discussed with the patient, and the patient consented to the fiberoptic examination.  We applied a topical nasal decongestant and analgesic.  After adequate anesthesia was obtained, the flexible fiberoptic scope was passed into each nostril independently.  Each nasal cavity, the entire pharynx (nasopharynx to hypopharynx) and the larynx were visualized. At the end of the examination, the scope was removed. The patient tolerated the procedure well with no complications.     Findings:  -     Laryngeal mucosa is normal  -     Posterior commissure has moderate hypertrophy  -     Lingual tonsils have mild hypertrophy  -     Adenoids have no  hypertrophy  -     Right vocal fold: normal mobility     mass/lesion: small, hemorrhagic nodule at ant 1/3  -     Left vocal fold: normal mobility     mass/lesion: small hemorrhagic nodule at ant 1/3  -     Other findings: none    Assessment & Plan:  - see today's clinic note

## 2017-03-17 NOTE — LETTER
March 19, 2017      Sudhakar Liu MD  [address] [place] [address]           [facility]  [address] [place] [address]  Phone: [phone]  Fax: [phone]          Patient: Dominic Cohen   MR Number: [id number]   YOB: 1935   Date of Visit: 3/17/2017       Dear Dr. Sudhakar Liu:    Thank you for referring Dominic Cohen to me for evaluation. Attached you will find relevant portions of my assessment and plan of care.    If you have questions, please do not hesitate to call me. I look forward to following Dominic Cohen along with you.    Sincerely,    Jorge Sandhu MD    Enclosure  CC:  No Recipients    If you would like to receive this communication electronically, please contact [email or web address] or [phone] to request more information on [address] Link access.    For providers and/or their staff who would like to refer a patient to [facility], please contact us through our one-stop-shop provider referral line, [facility] , at [phone].    If you feel you have received this communication in error or would no longer like to receive these types of communications, please e-mail [email or web address]

## 2017-03-17 NOTE — MR AVS SNAPSHOT
[facility] - ENT  [address] [place] [address]  Phone: [phone]  Fax: [phone]                  Dominic Cohen   3/17/2017 2:15 PM   Office Visit    Description:  Male : 1935   Provider:  Jorge Sandhu MD   Department:  [facility] - ENT           Reason for Visit     Other                To Do List           Future Appointments        Provider Department Dept Phone    3/17/2017 2:15 PM Jorge Sandhu MD [facility] [phone]    2017 8:20 AM Jaimee Armenta NP [facility] Gastroenterology [phone]    2017 1:00 PM NUNEZ, VISUAL PAYNE [facility] Ophthalmology [phone]    2017 1:45 PM MD [facility] Ophthalmology [phone]    2017 10:15 AM Jorge Sandhu MD [facility] [phone]      Goals (5 Years of Data)     None      [facility]     [facility] On Call Nurse [facility] - 24/7 Assistance  Registered nurses in the [facility] On Call Center provide clinical advisement, health education, appointment booking, and other advisory services.  Call for this free service at [phone].             Medications           Message regarding Medications     Verify the changes and/or additions to your medication regime listed below are the same as discussed with your clinician today.  If any of these changes or additions are incorrect, please notify your healthcare provider.             Verify that the below list of medications is an accurate representation of the medications you are currently taking.  If none reported, the list may be blank. If incorrect, please contact your healthcare provider. Carry this list with you in case of emergency.           Current Medications     ascorbic acid (VITAMIN C) 60 mg Lozg Take 1 tablet by mouth daily as needed.     aspirin 81 MG Chew Take 1 tablet (81 mg total) by mouth once daily.    blood sugar diagnostic (BLOOD GLUCOSE TEST) Strp 1 strip by Misc.(Non-Drug; Combo Route) route 3 (three) times a week.     "cyanocobalamin, vitamin B-12, (VITAMIN B-12) 50 mcg tablet Take 50 mcg by mouth once daily.    ERGOCALCIFEROL, VITAMIN D2, (VITAMIN D ORAL) Take 1 tablet by mouth once daily.     linaclotide (LINZESS) 145 mcg Cap capsule Take 1 capsule (145 mcg total) by mouth once daily.    mineral oil (FLEET MINERAL OIL) enema Place 1 enema rectally once daily.    papaverine 30 mg/mL injection Inject 0.3 ml of trimix solution prn ED max once daily  Prepare 10ml of trimix solution containing:    Papaverine 30mg/ml    Phentolamine 1 mg/ml    Alprostadil 10mcg/ml  Dispense 10ml per refill  Qs syringes 1cc/30g/0.5" and alcohol swabs dispense as needed for Intracavernosal injection    polyethylene glycol (GLYCOLAX) 17 gram/dose powder Take 17 g by mouth once daily. In 8 ounces of water for up to 2 weeks.    ramipril (ALTACE) 2.5 MG capsule Take 1 capsule (2.5 mg total) by mouth once daily.    timolol maleate 0.5% (TIMOPTIC) 0.5 % Drop Place 1 drop into both eyes 2 (two) times daily.           Clinical Reference Information           Your Vitals Were     BP Pulse Temp Resp Height Weight    151/89 62 97.1 °F (36.2 °C) (Tympanic) 18 6' 4" (1.93 m) 132.8 kg (292 lb 12.3 oz)    BMI                35.64 kg/m2          Blood Pressure          Most Recent Value    BP  (!)  151/89      Allergies as of 3/17/2017     Penicillins    Sulfa (Sulfonamide Antibiotics)    Protonix [Pantoprazole]      Immunizations Administered on Date of Encounter - 3/17/2017     None      [facility] Sign-Up     Activating your [facility] account is as easy as 1-2-3!     1) Visit [email or web address], select Sign Up Now, enter this activation code and your date of birth, then select Next.  [email or web address]  Expires: 4/28/2017  2:44 PM      2) Create a username and password to use when you visit [facility] in the future and select a security question in case you lose your password and select Next.    3) Enter your e-mail address and click Sign Up!    Additional Information  If " you have questions, please e-mail [email or web address] or call [phone] to talk to our [facility] staff. Remember, [facility] is NOT to be used for urgent needs. For medical emergencies, dial 911.         Language Assistance Services     ATTENTION: Language assistance services are available, free of charge. Please call [phone].      ATENCIÓN: Si habla español, tiene a rice disposición servicios gratuitos de asistencia lingüística. Llame al [phone].     [facility] Ý: N?u b?n nói Ti?ng Vi?t, có các d?ch v? h? tr? ngôn ng? mi?n phí dành cho b?n. G?i s? [phone].         [facility] complies with applicable Federal civil rights laws and does not discriminate on the basis of race, color, national origin, age, disability, or sex.

## 2017-04-13 ENCOUNTER — OFFICE VISIT (OUTPATIENT)
Dept: GASTROENTEROLOGY | Facility: CLINIC | Age: 82
End: 2017-04-13
Payer: MEDICARE

## 2017-04-13 VITALS
DIASTOLIC BLOOD PRESSURE: 80 MMHG | HEART RATE: 64 BPM | BODY MASS INDEX: 35.98 KG/M2 | HEIGHT: 76 IN | SYSTOLIC BLOOD PRESSURE: 140 MMHG | WEIGHT: 295.44 LBS

## 2017-04-13 DIAGNOSIS — K82.8 ADENOMYOSIS, GALLBLADDER: ICD-10-CM

## 2017-04-13 DIAGNOSIS — K21.9 GASTROESOPHAGEAL REFLUX DISEASE, ESOPHAGITIS PRESENCE NOT SPECIFIED: ICD-10-CM

## 2017-04-13 DIAGNOSIS — K76.89 LIVER CYST: Primary | ICD-10-CM

## 2017-04-13 DIAGNOSIS — K59.00 CONSTIPATION, UNSPECIFIED CONSTIPATION TYPE: ICD-10-CM

## 2017-04-13 PROCEDURE — 99999 PR PBB SHADOW E&M-EST. PATIENT-LVL III: CPT | Mod: PBBFAC,,, | Performed by: NURSE PRACTITIONER

## 2017-04-13 PROCEDURE — 99213 OFFICE O/P EST LOW 20 MIN: CPT | Mod: S$PBB,,, | Performed by: NURSE PRACTITIONER

## 2017-04-13 PROCEDURE — 99213 OFFICE O/P EST LOW 20 MIN: CPT | Mod: PBBFAC,PO | Performed by: NURSE PRACTITIONER

## 2017-04-13 NOTE — PROGRESS NOTES
Clinic Follow Up:  [facility] Follow Up Note    Reason for Follow Up:  The primary encounter diagnosis was Liver cyst. Diagnoses of Adenomyosis, gallbladder, Gastroesophageal reflux disease, esophagitis presence not specified, and Constipation, unspecified constipation type were also pertinent to this visit.    PCP: Sudhakar Liu       HPI:  This is a 81 y.o. male here for follow up of the above  Patient reports improvement of symptoms since our last visit. Constipation as well as associated abdominal pain and bloating have improved with Linzess. He is not taking it every day but every few days and is having good results. Patient does report occasional GERD symptoms but has not taken the Zantac as discussed during our last visit. Patient reports the GERD is not significant. He did have an abdominal ultrasound done after our last visit that was ordered by Dr. Liu. I reviewed the report with patient as he had not received the results from it. Ultrasound showed adenomyomatosis localized to the gallbladder fundus. Negative for gallstones. Also noted was a small 1.8 cm simple hepatic cyst. He denies any abdominal pain, nausea or vomiting, hematochezia, melena, or weight loss.     Review of Systems   Constitutional: Negative for activity change and appetite change.        As per interval history above   Respiratory: Negative for cough and shortness of breath.    Cardiovascular: Negative for chest pain.   Gastrointestinal: Negative for abdominal distention, abdominal pain, anal bleeding, blood in stool, constipation, diarrhea, nausea, rectal pain and vomiting.   Skin: Negative for color change and rash.   Neurological: Negative for dizziness.   Psychiatric/Behavioral: Negative for agitation and confusion.       Medical History:  Past Medical History:   Diagnosis Date    Arthritis     Atherosclerosis of abdominal aorta     Cataract     Chronic constipation     Glaucoma     History of  anal fissures     Hypertension     Polyneuropathy in other diseases classified elsewhere     due to folate deficiency    Prediabetes     Venous insufficiency     Venous insufficiency        Surgical History:   Past Surgical History:   Procedure Laterality Date    BACK SURGERY      CATARACT EXTRACTION BILATERAL W/ ANTERIOR VITRECTOMY      COLONOSCOPY N/A 5/13/2016    Procedure: COLONOSCOPY;  Surgeon: Presley Phillips MD;  Location: [address];  Service: Endoscopy;  Laterality: N/A;  EGD with Dr. Jeffery prior to Colonoscopy. EC    KNEE SURGERY      PROSTATE SURGERY      TONSILLECTOMY, ADENOIDECTOMY         Family History:   Family History   Problem Relation Age of Onset    No Known Problems Son     No Known Problems Daughter     No Known Problems Son     Diabetes Neg Hx     Heart disease Neg Hx     Cancer Neg Hx     Celiac disease Neg Hx     Cirrhosis Neg Hx     Colon cancer Neg Hx     Colon polyps Neg Hx     Crohn's disease Neg Hx     Cystic fibrosis Neg Hx     Esophageal cancer Neg Hx     Hemochromatosis Neg Hx     Inflammatory bowel disease Neg Hx     Irritable bowel syndrome Neg Hx     Liver cancer Neg Hx     Liver disease Neg Hx     Rectal cancer Neg Hx     Stomach cancer Neg Hx     Ulcerative colitis Neg Hx     Zak's disease Neg Hx     Amblyopia Neg Hx     Blindness Neg Hx     Glaucoma Neg Hx     Hypertension Neg Hx     Macular degeneration Neg Hx     Retinal detachment Neg Hx     Strabismus Neg Hx        Social History:   Social History   Substance Use Topics    Smoking status: Former Smoker     Packs/day: 0.30     Years: 52.00     Types: Cigarettes     Quit date: 11/13/2008    Smokeless tobacco: Never Used    Alcohol use Yes      Comment: Very rarely       Allergies:   Review of patient's allergies indicates:   Allergen Reactions    Penicillins      Other reaction(s): Hives  Other reaction(s): Swelling    Sulfa (sulfonamide antibiotics)      Other  "reaction(s): Hives    Protonix [pantoprazole] Rash       Home Medications:  Current Outpatient Prescriptions on File Prior to Visit   Medication Sig Dispense Refill    ascorbic acid (VITAMIN C) 60 mg Lozg Take 1 tablet by mouth daily as needed.       aspirin 81 MG Chew Take 1 tablet (81 mg total) by mouth once daily. (Patient taking differently: Take 81 mg by mouth daily as needed. )  0    blood sugar diagnostic (BLOOD GLUCOSE TEST) Strp 1 strip by Misc.(Non-Drug; Combo Route) route 3 (three) times a week. 32 strip 11    cyanocobalamin, vitamin B-12, (VITAMIN B-12) 50 mcg tablet Take 50 mcg by mouth once daily.      ERGOCALCIFEROL, VITAMIN D2, (VITAMIN D ORAL) Take 1 tablet by mouth once daily.       linaclotide (LINZESS) 145 mcg Cap capsule Take 1 capsule (145 mcg total) by mouth once daily. 30 capsule 5    mineral oil (FLEET MINERAL OIL) enema Place 1 enema rectally once daily. 2 enema 0    papaverine 30 mg/mL injection Inject 0.3 ml of trimix solution prn ED max once daily  Prepare 10ml of trimix solution containing:    Papaverine 30mg/ml    Phentolamine 1 mg/ml    Alprostadil 10mcg/ml  Dispense 10ml per refill  Qs syringes 1cc/30g/0.5" and alcohol swabs dispense as needed for Intracavernosal injection 10 mL 5    polyethylene glycol (GLYCOLAX) 17 gram/dose powder Take 17 g by mouth once daily. In 8 ounces of water for up to 2 weeks. 119 g 0    ramipril (ALTACE) 2.5 MG capsule Take 1 capsule (2.5 mg total) by mouth once daily. 90 capsule 3    timolol maleate 0.5% (TIMOPTIC) 0.5 % Drop Place 1 drop into both eyes 2 (two) times daily. 10 mL 12     No current facility-administered medications on file prior to visit.        Physical Exam:  Vital Signs:  BP (!) 140/80  Pulse 64  Ht 6' 4" (1.93 m)  Wt 134 kg (295 lb 6.7 oz)  BMI 35.96 kg/m2  Body mass index is 35.96 kg/(m^2).  Physical Exam   Constitutional: He is oriented to person, place, and time and well-developed, well-nourished, and in no distress. " No distress.   HENT:   Head: Normocephalic.   Eyes: Conjunctivae are normal. Pupils are equal, round, and reactive to light.   Cardiovascular: Normal rate, regular rhythm and normal heart sounds.    Pulmonary/Chest: Effort normal and breath sounds normal. No respiratory distress.   Abdominal: Soft. Bowel sounds are normal. He exhibits no distension. There is no tenderness.   Neurological: He is alert and oriented to person, place, and time. No cranial nerve deficit.   Skin: Skin is warm and dry. No rash noted.   Psychiatric: Mood and affect normal.   Vitals reviewed.      Labs: Pertinent labs reviewed.    Endoscopy:  EGD May 2016. Normal    CRC Screening: Up to date. Last colonoscopy was in May 2016. Does not need to repeat for screening purposes    Assessment:  1. Liver cyst    2. Adenomyosis, gallbladder    3. Gastroesophageal reflux disease, esophagitis presence not specified    4. Constipation, unspecified constipation type        Recommendations:  Liver cyst  Adenomyosis, gallbladder  - Adenomyosis of the gallbladder is unlikely concerning. Patient does not have any biliary colic symptoms. Surgery is not recommended for asymptomatic patients. Will monitor.  - Will schedule a repeat ultrasound for stability and if stable, will monitor yearly.  -     US Abdomen Complete; Future; Expected date: 4/13/17    Gastroesophageal reflux disease, esophagitis presence not specified  - Some improved  - Discussed with patient and wife that Zantac is a good option to take as needed    Constipation, unspecified constipation type  - Improved on Linzess. Continue current treatment.      Return to Clinic:  Return in about 3 months (around 7/13/2017).    Thank you for the opportunity to participate in the care of this patient.  JAMILA Cisneros

## 2017-04-13 NOTE — MR AVS SNAPSHOT
[facility] Gastroenterology  [address] [place] [place] [address]  Phone: [phone]  Fax: [phone]                  Dominic Cohen   2017 8:20 AM   Office Visit    Description:  Male : 1935   Provider:  Jaimee Armenta NP   Department:  [facility] - Gastroenterology           Reason for Visit     Follow-up           Diagnoses this Visit        Comments    Liver cyst    -  Primary     Adenomyosis, gallbladder                To Do List           Future Appointments        Provider Department Dept Phone    2017 3:30 PM [facility] [phone]    2017 4:15 PM Ghanshyam Gutierrez PA-C [facility] Orthopedics [phone]    2017 1:00 PM NUNEZ, VISUAL PAYNE [facility] Ophthalmology [phone]    2017 1:45 PM MD [facility] Ophthalmology [phone]    2017 10:15 AM Jorge Sandhu MD [facility] - [phone]      Goals (5 Years of Data)     None      Follow-Up and Disposition     Return in about 3 months (around 2017).      [facility]     [facility] Care [facility] -  Assistance  Unless otherwise directed by your provider, please contact [facility], our nurse care line that is available for  assistance.     Registered nurses in the [facility] Center provide: appointment scheduling, clinical advisement, health education, and other advisory services.  Call: [phone] (toll free)               Medications           Message regarding Medications     Verify the changes and/or additions to your medication regime listed below are the same as discussed with your clinician today.  If any of these changes or additions are incorrect, please notify your healthcare provider.             Verify that the below list of medications is an accurate representation of the medications you are currently taking.  If none reported, the list may be blank. If incorrect, please contact your healthcare provider. Carry  "this list with you in case of emergency.           Current Medications     ascorbic acid (VITAMIN C) 60 mg Lozg Take 1 tablet by mouth daily as needed.     aspirin 81 MG Chew Take 1 tablet (81 mg total) by mouth once daily.    blood sugar diagnostic (BLOOD GLUCOSE TEST) Strp 1 strip by Misc.(Non-Drug; Combo Route) route 3 (three) times a week.    cyanocobalamin, vitamin B-12, (VITAMIN B-12) 50 mcg tablet Take 50 mcg by mouth once daily.    ERGOCALCIFEROL, VITAMIN D2, (VITAMIN D ORAL) Take 1 tablet by mouth once daily.     linaclotide (LINZESS) 145 mcg Cap capsule Take 1 capsule (145 mcg total) by mouth once daily.    mineral oil (FLEET MINERAL OIL) enema Place 1 enema rectally once daily.    papaverine 30 mg/mL injection Inject 0.3 ml of trimix solution prn ED max once daily  Prepare 10ml of trimix solution containing:    Papaverine 30mg/ml    Phentolamine 1 mg/ml    Alprostadil 10mcg/ml  Dispense 10ml per refill  Qs syringes 1cc/30g/0.5" and alcohol swabs dispense as needed for Intracavernosal injection    polyethylene glycol (GLYCOLAX) 17 gram/dose powder Take 17 g by mouth once daily. In 8 ounces of water for up to 2 weeks.    ramipril (ALTACE) 2.5 MG capsule Take 1 capsule (2.5 mg total) by mouth once daily.    timolol maleate 0.5% (TIMOPTIC) 0.5 % Drop Place 1 drop into both eyes 2 (two) times daily.           Clinical Reference Information           Your Vitals Were     BP Pulse Height Weight BMI    140/80 64 6' 4" (1.93 m) 134 kg (295 lb 6.7 oz) 35.96 kg/m2      Blood Pressure          Most Recent Value    BP  (!)  140/80      Allergies as of 4/13/2017     Penicillins    Sulfa (Sulfonamide Antibiotics)    Protonix [Pantoprazole]      Immunizations Administered on Date of Encounter - 4/13/2017     None      Orders Placed During Today's Visit     Future Labs/Procedures Expected by Expires    US Abdomen Complete  4/13/2017 4/13/2018      [facility] Sign-Up     Activating your [facility] account is as easy as " 1-2-3!     1) Visit [email or web address], select Sign Up Now, enter this activation code and your date of birth, then select Next.  [email or web address]  Expires: 4/28/2017  2:44 PM      2) Create a username and password to use when you visit [facility] in the future and select a security question in case you lose your password and select Next.    3) Enter your e-mail address and click Sign Up!    Additional Information  If you have questions, please e-mail [email or web address] or call [phone] to talk to our [facility] staff. Remember, [facility] is NOT to be used for urgent needs. For medical emergencies, dial 911.         Language Assistance Services     ATTENTION: Language assistance services are available, free of charge. Please call [phone].      ATENCIÓN: Si habla español, tiene a rice disposición servicios gratuitos de asistencia lingüística. Llame al [phone].     CHÚ Ý: N?u b?n nói Ti?ng Vi?t, có các d?ch v? h? tr? ngôn ng? mi?n phí dành cho b?n. G?i s? [phone].         [facility] complies with applicable Federal civil rights laws and does not discriminate on the basis of race, color, national origin, age, disability, or sex.

## 2017-04-19 ENCOUNTER — TELEPHONE (OUTPATIENT)
Dept: ORTHOPEDICS | Facility: CLINIC | Age: 82
End: 2017-04-19

## 2017-04-19 ENCOUNTER — TELEPHONE (OUTPATIENT)
Dept: RADIOLOGY | Facility: HOSPITAL | Age: 82
End: 2017-04-19

## 2017-04-19 NOTE — TELEPHONE ENCOUNTER
Called patient to find out the laterality of the knee for appointment on tomorrow. Patient not available, left message.

## 2017-04-20 ENCOUNTER — OFFICE VISIT (OUTPATIENT)
Dept: ORTHOPEDICS | Facility: CLINIC | Age: 82
End: 2017-04-20
Payer: MEDICARE

## 2017-04-20 ENCOUNTER — HOSPITAL ENCOUNTER (OUTPATIENT)
Dept: RADIOLOGY | Facility: HOSPITAL | Age: 82
Discharge: HOME | End: 2017-04-20
Attending: ORTHOPAEDIC SURGERY
Payer: MEDICARE

## 2017-04-20 ENCOUNTER — HOSPITAL ENCOUNTER (OUTPATIENT)
Dept: RADIOLOGY | Facility: HOSPITAL | Age: 82
Discharge: HOME | End: 2017-04-20
Attending: NURSE PRACTITIONER
Payer: MEDICARE

## 2017-04-20 VITALS
DIASTOLIC BLOOD PRESSURE: 76 MMHG | HEIGHT: 76 IN | SYSTOLIC BLOOD PRESSURE: 141 MMHG | WEIGHT: 289.69 LBS | HEART RATE: 69 BPM | BODY MASS INDEX: 35.28 KG/M2

## 2017-04-20 DIAGNOSIS — M17.12 ARTHRITIS OF KNEE, LEFT: ICD-10-CM

## 2017-04-20 DIAGNOSIS — M25.562 LEFT KNEE PAIN, UNSPECIFIED CHRONICITY: Primary | ICD-10-CM

## 2017-04-20 DIAGNOSIS — K76.89 LIVER CYST: ICD-10-CM

## 2017-04-20 DIAGNOSIS — Z01.818 PRE-OP TESTING: Primary | ICD-10-CM

## 2017-04-20 DIAGNOSIS — T84.093A FAILED TOTAL LEFT KNEE REPLACEMENT, INITIAL ENCOUNTER: ICD-10-CM

## 2017-04-20 DIAGNOSIS — M25.562 LEFT KNEE PAIN, UNSPECIFIED CHRONICITY: ICD-10-CM

## 2017-04-20 DIAGNOSIS — K82.8 ADENOMYOSIS, GALLBLADDER: ICD-10-CM

## 2017-04-20 PROCEDURE — 73562 X-RAY EXAM OF KNEE 3: CPT | Mod: 26,LT,, | Performed by: RADIOLOGY

## 2017-04-20 PROCEDURE — 73560 X-RAY EXAM OF KNEE 1 OR 2: CPT | Mod: 26,59,RT, | Performed by: RADIOLOGY

## 2017-04-20 PROCEDURE — 99213 OFFICE O/P EST LOW 20 MIN: CPT | Mod: PBBFAC,PO | Performed by: PHYSICIAN ASSISTANT

## 2017-04-20 PROCEDURE — 99213 OFFICE O/P EST LOW 20 MIN: CPT | Mod: S$PBB,,, | Performed by: PHYSICIAN ASSISTANT

## 2017-04-20 PROCEDURE — 76700 US EXAM ABDOM COMPLETE: CPT | Mod: 26,,, | Performed by: RADIOLOGY

## 2017-04-20 PROCEDURE — 99999 PR PBB SHADOW E&M-EST. PATIENT-LVL III: CPT | Mod: PBBFAC,,, | Performed by: PHYSICIAN ASSISTANT

## 2017-04-20 NOTE — PROGRESS NOTES
CC: 81 year old male, left knee pain    This consultation has been requested by Dr. Sudhakar Liu .  Please make certain that  he gets a copy of this consultation report.      HPI:Left knee for several years but worst over the past year. S/P total knee 1990's. Knee constantly swells up, feels hot at times, feels loose, locks and catches on him. Pain constantly 7/10, increase with activity, little relief from resting.    PMH:    Past Medical History:   Diagnosis Date    Arthritis     Atherosclerosis of abdominal aorta     Cataract     Chronic constipation     Glaucoma     History of anal fissures     Hypertension     Polyneuropathy in other diseases classified elsewhere     due to folate deficiency    Prediabetes     Venous insufficiency     Venous insufficiency        PSH:    Past Surgical History:   Procedure Laterality Date    BACK SURGERY      CATARACT EXTRACTION BILATERAL W/ ANTERIOR VITRECTOMY      COLONOSCOPY N/A 5/13/2016    Procedure: COLONOSCOPY;  Surgeon: Presley Phillips MD;  Location: [address];  Service: Endoscopy;  Laterality: N/A;  EGD with Dr. Jeffery prior to Colonoscopy. EC    KNEE SURGERY      PROSTATE SURGERY      TONSILLECTOMY, ADENOIDECTOMY         Family Hx:    Family History   Problem Relation Age of Onset    No Known Problems Son     No Known Problems Daughter     No Known Problems Son     Diabetes Neg Hx     Heart disease Neg Hx     Cancer Neg Hx     Celiac disease Neg Hx     Cirrhosis Neg Hx     Colon cancer Neg Hx     Colon polyps Neg Hx     Crohn's disease Neg Hx     Cystic fibrosis Neg Hx     Esophageal cancer Neg Hx     Hemochromatosis Neg Hx     Inflammatory bowel disease Neg Hx     Irritable bowel syndrome Neg Hx     Liver cancer Neg Hx     Liver disease Neg Hx     Rectal cancer Neg Hx     Stomach cancer Neg Hx     Ulcerative colitis Neg Hx     Zak's disease Neg Hx     Amblyopia Neg Hx     Blindness Neg Hx     Glaucoma  Neg Hx     Hypertension Neg Hx     Macular degeneration Neg Hx     Retinal detachment Neg Hx     Strabismus Neg Hx        Allergy:    Review of patient's allergies indicates:   Allergen Reactions    Penicillins      Other reaction(s): Hives  Other reaction(s): Swelling    Sulfa (sulfonamide antibiotics)      Other reaction(s): Hives    Protonix [pantoprazole] Rash       Medication:    Current Outpatient Prescriptions:     ascorbic acid (VITAMIN C) 60 mg Lozg, Take 1 tablet by mouth daily as needed. , Disp: , Rfl:     aspirin 81 MG Chew, Take 1 tablet (81 mg total) by mouth once daily. (Patient taking differently: Take 81 mg by mouth daily as needed. ), Disp: , Rfl: 0    blood sugar diagnostic (BLOOD GLUCOSE TEST) Strp, 1 strip by Misc.(Non-Drug; Combo Route) route 3 (three) times a week., Disp: 32 strip, Rfl: 11    cyanocobalamin, vitamin B-12, (VITAMIN B-12) 50 mcg tablet, Take 50 mcg by mouth once daily., Disp: , Rfl:     ERGOCALCIFEROL, VITAMIN D2, (VITAMIN D ORAL), Take 1 tablet by mouth once daily. , Disp: , Rfl:     linaclotide (LINZESS) 145 mcg Cap capsule, Take 1 capsule (145 mcg total) by mouth once daily., Disp: 30 capsule, Rfl: 5    mineral oil (FLEET MINERAL OIL) enema, Place 1 enema rectally once daily., Disp: 2 enema, Rfl: 0    ramipril (ALTACE) 2.5 MG capsule, Take 1 capsule (2.5 mg total) by mouth once daily., Disp: 90 capsule, Rfl: 3    timolol maleate 0.5% (TIMOPTIC) 0.5 % Drop, Place 1 drop into both eyes 2 (two) times daily., Disp: 10 mL, Rfl: 12    Social History:  Retire from [employer] in [place]  Social History     Social History    Marital status:      Spouse name: N/A    Number of children: 3    Years of education: N/A     Occupational History    Retired      [place], [place]     Social History Main Topics    Smoking status: Former Smoker     Packs/day: 0.30     Years: 52.00     Types: Cigarettes     Quit date: 11/13/2008    Smokeless tobacco: Never Used     "Alcohol use Yes      Comment: Very rarely    Drug use: No    Sexual activity: Yes     Other Topics Concern    Not on file     Social History Narrative       Vitals:   BP (!) 141/76  Pulse 69  Ht 6' 4" (1.93 m)  Wt 131.4 kg (289 lb 11 oz)  BMI 35.26 kg/m2     ROS:  GENERAL: No fever, chills, fatigability or weight loss.  SKIN: No rashes, itching or changes in color or texture of skin.  HEAD: No headaches or recent head trauma.  EYES: Visual acuity fine. No photophobia, ocular pain or diplopia.  EARS: Denies ear pain, discharge or vertigo.  NOSE: No loss of smell, no epistaxis or postnasal drip.  MOUTH & THROAT: No hoarseness or change in voice. No excessive gum bleeding.  NODES: Denies swollen glands.  CHEST: Denies ADAMS, cyanosis, wheezing, cough and sputum production.  CARDIOVASCULAR: Denies chest pain, PND, orthopnea or reduced exercise tolerance.  ABDOMEN: Appetite fine. No weight loss. Denies diarrhea, abdominal pain, hematemesis or blood in stool.  URINARY: No flank pain, dysuria or hematuria.  PERIPHERAL VASCULAR: No claudication or cyanosis.  NEUROLOGIC: No history of seizures, paralysis, alteration of gait or coordination.  MUSCULOSKELETAL: See HPI    PE:  APPEARANCE: Well nourished, well developed, in no acute distress.   HEAD: Normocephalic, atraumatic.  NEUROLOGIC: Cranial Nerves: II-XII grossly intact, also see MUSCULOSKELETAL  MUSCULOSKELETAL: Knee-left  Knee Exam-abnormal  Gait-abnormal  Muscle Appearance:abnormal  Grooming:normal  Spine Alignment-normal  Muscle Atrophy-Positive  Deformities-Positive  Tenderness-Positive  Paresthesias-Positive  Range of Motion         Ext-abnormal         Flex-abnormal  Muscle Strength-abnormal  Sensation-abnormal  Reflexes-abnormal  Crepitus-Positive                                Swelling-Positive  Effusion- Negative                                Edema-Negative and Positive  Lachman-Negative                               Erythema-Negative  [facility]'s-Negative  "                           Apley Grind-Positive  Patellar Comp-Positive                        Alignment-normal/symmetric  Patellar Apprehension-Negative            Synovial fullness-Negative  Passive Patellar Tilt-abnormal  Patellar Tracking-abnormal   Patellar Glide-abnormal  Q-Angle at 90 degrees-normal  Patellar Grind-abnormal  T-Snin-Cnlbepyf  Fatigue-Negative                                     HS Tightness-Negative  Tests on Exam-abnormal  Neurovascular Status-normal+2 DP and PT artery pulses  Skin-normal  Mental Status-normal             Diagnosis:              1. Left knee failed implant   2. Left knee pain                   Diagnostic Studies  MRI-No  X-Ray-Yes agree with the findings of Findings: Patient is status post left total knee arthroplasty.  There is new joint space narrowing medially in the left knee with associated varus angulation. New circumscribed lucency noted in the medial tibial plateau adjacent to the prosthesis with mild associated periosteal reaction.  Findings suggest hardware failure with possible particle disease.  Infection not excluded, correlate clinically.  No definite joint effusion demonstrated on the left.  There is tricompartmental osteoarthritis on the right with moderate lateral tibiofemoral compartment joint space loss.  No acute fractures or dislocations visualized.  EMG/NCV-No  Arthrogram-No    Plan:                                                 1. PT-no                                                 2.OT-no                                          3.NSAID-no                                        4. Narcotics-no                                     5. Wound care-N/A                                 6. Rest-no                                           7. Surgery-yes  Revision, removal of current implant and total left knee arthroplasty.                                       8. TALAT Hose-no                                    9. Anticoagulation therapy-no               10.  Elevation-no                                     11. Crutches-no                                    12. Walker-no             13. Cane no                        14. Referral-no                                     15.Injection-no                            16. Splint   /    Cast   /   Cast Shoe-No              17. RICE            18. Follow up-  All of the patient's questions and concerns were answered by Dr Gill and myself. He accepts the risk and complications of the left knee revision and possibility of infection in his knee. He elects to proceed with a left knee revision for his currently  Failed implant.

## 2017-04-25 ENCOUNTER — TELEPHONE (OUTPATIENT)
Dept: ORTHOPEDICS | Facility: CLINIC | Age: 82
End: 2017-04-25

## 2017-04-25 DIAGNOSIS — Z01.818 PRE-OP TESTING: Primary | ICD-10-CM

## 2017-04-26 ENCOUNTER — TELEPHONE (OUTPATIENT)
Dept: ORTHOPEDICS | Facility: CLINIC | Age: 82
End: 2017-04-26

## 2017-04-26 NOTE — TELEPHONE ENCOUNTER
Returned pt phone call. Pt stated he preferred afternoon appts for his pre op visits. Pt asked for his surgery date. Pt was given 6/13. Pt stated that is an unlucky number and wants a different date. Pt was given 6/27. Pt accepted the date. Verified understanding.

## 2017-04-26 NOTE — TELEPHONE ENCOUNTER
----- Message from Eva Berman sent at 4/26/2017  8:19 AM CDT -----  Contact: wife  Spring, wife [phone], Returning nurse's call. Call to POD. Please advise. Thanks.

## 2017-05-16 ENCOUNTER — OFFICE VISIT (OUTPATIENT)
Dept: OPHTHALMOLOGY | Facility: CLINIC | Age: 82
End: 2017-05-16
Payer: MEDICARE

## 2017-05-16 ENCOUNTER — CLINICAL SUPPORT (OUTPATIENT)
Dept: OPHTHALMOLOGY | Facility: CLINIC | Age: 82
End: 2017-05-16
Payer: MEDICARE

## 2017-05-16 DIAGNOSIS — H02.403 PTOSIS, BILATERAL: ICD-10-CM

## 2017-05-16 DIAGNOSIS — H40.1133 PRIMARY OPEN ANGLE GLAUCOMA OF BOTH EYES, SEVERE STAGE: ICD-10-CM

## 2017-05-16 DIAGNOSIS — H25.13 SENILE NUCLEAR SCLEROSIS, BILATERAL: ICD-10-CM

## 2017-05-16 DIAGNOSIS — H40.1133 PRIMARY OPEN ANGLE GLAUCOMA OF BOTH EYES, SEVERE STAGE: Primary | ICD-10-CM

## 2017-05-16 DIAGNOSIS — H21.562 AFFERENT PUPILLARY DEFECT, LEFT: ICD-10-CM

## 2017-05-16 PROCEDURE — 92133 CPTRZD OPH DX IMG PST SGM ON: CPT | Mod: PBBFAC | Performed by: OPHTHALMOLOGY

## 2017-05-16 PROCEDURE — 92083 EXTENDED VISUAL FIELD XM: CPT | Mod: 26,S$PBB,, | Performed by: OPHTHALMOLOGY

## 2017-05-16 PROCEDURE — 99999 PR PBB SHADOW E&M-EST. PATIENT-LVL II: CPT | Mod: PBBFAC,,, | Performed by: OPHTHALMOLOGY

## 2017-05-16 PROCEDURE — 92014 COMPRE OPH EXAM EST PT 1/>: CPT | Mod: S$PBB,,, | Performed by: OPHTHALMOLOGY

## 2017-05-16 PROCEDURE — 99212 OFFICE O/P EST SF 10 MIN: CPT | Mod: PBBFAC | Performed by: OPHTHALMOLOGY

## 2017-05-16 NOTE — PROGRESS NOTES
HPI     Glaucoma    Additional comments: HVF and OCT review today           Comments   DLS: 1/16/17    1. POAG- OS>OD  2. APD OS  3. NS OU  4. Ptosis  5. Dermatochalasis    MEDS:  Timolol BID OU         Last edited by Maddy Elizondo MA on 5/16/2017  2:27 PM. (History)            Assessment /Plan     For exam results, see Encounter Report.    Afferent pupillary defect, left    Primary open angle glaucoma of both eyes, severe stage  -     Posterior Segment OCT Optic Nerve- Both eyes    Senile nuclear sclerosis, bilateral    Ptosis, bilateral        Pt is  using timolol bid ou - was intolerant to combigan - too dizzy     1. POAG - adv./severe stage OS>OD      Presented at [facility] 2012 - had been dx with glaucoma by outside doctor years ago       He stopped gtts on his own and never followed up      Excellent response to Rx with only one gtt - xalatan      First HVF 3/22/2012      First photos 3/22/2012           Family history    neg        Glaucoma meds    - timolol bid ou         H/O adverse rxn to glaucoma drops    C/O red/painful eye with latanoprost/travatan gtts /// too dizzy w/ combigan         LASERS    SLT OS 4/16/2015 // SLT od 5/14/2015 (good resp 19/19 --> 13/13)        GLAUCOMA SURGERIES    none        OTHER EYE SURGERIES    none        CDR    0.8/0.9        Tbase    23-26/26-32          Tmax    26/32            Ttarget    16/16             HVF    6 VF '12-'16 -  24-2ss od-gen red in sens- SNS/SAD  // 24-2 stimV os-dense central scotoma        Gonio    +3 ou        CCT    531/546        OCT   3 test 2010 - 2017 -  RNFL -  OD:dec T/I // OS:dec S/T/I        HRT    3 test 2012 to 2016 -  MR -  nl od // high std dev os /// CDR 0.54 od // high std dev. os        Disc photos    2010, 2014, 2016  - OIS    - Ttoday  16/16  - Test done today   - HVF/DFE/OCT    2.   APD os - 2/2 to #1    3.   Nuclear Sclerosis ou        VA 20/80 od // CF at 1' os - poor VA os 2/2 adv. Glaucoma/VF loss    4.  Ptosis ou -stable and not  visually significant    5.   Dermatochalasis ou -stable    Plan  Adv POAG OS>OD  Good initial  resp to slt ou 5/2015 19/19--> 13/13    H/O poor compliance- only used lumigan travatan/latanoprost  half the time- states causes eye pain the following day, so on  visit 5/5/14 switched to timolol ou bid, pt still c/o pain/buring and with poor compliance  Intolerant to combigan - dizzy   tolerating timolol bid ou (using only q day - asked him to use it bid) (1/16/2017)    Did have Excellent resp to xalatan and doing well 26/32 -> 15/14- will readdress in future if need to lower IOP more and out of options - but he stopped on his own - C/O burning ect     Long discussion with patient about DG and burning from glaucoma gtts     Discussed, at length,  the risk of blindness if the glaucoma is not controlled    Cont to use systane gtts 4 x day and before glaucoma gtts to see if the glaucoma gtts are better tolerated    Doubt CE os will help vision much - lrg central scotoma and APD  Hold off on CE od for now - BCVA 20/50 on refraction  1/16/2017     Rx for new glasses given 1/16/2017 // No real improvement - but lenses are badly scratched   Re-printed Rx for glasses 5/2017 - pt says the non [facility] shop made them wrong - but the want/need a new Rx printed     If needs a trab or tube os consider CE at same time - but very limited potential OS  If needs surgery od - consider combined - this is the better eye     F/U  4 months with HRT/Gonio     I have seen and personally examined the patient.  I agree with the findings, assessment and plan of the resident and/or fellow.     Clau Carvalho MD

## 2017-06-06 ENCOUNTER — HOSPITAL ENCOUNTER (OUTPATIENT)
Dept: RADIOLOGY | Facility: HOSPITAL | Age: 82
Discharge: HOME | End: 2017-06-06
Attending: ORTHOPAEDIC SURGERY
Payer: MEDICARE

## 2017-06-06 ENCOUNTER — CLINICAL SUPPORT (OUTPATIENT)
Dept: CARDIOLOGY | Facility: CLINIC | Age: 82
End: 2017-06-06
Payer: MEDICARE

## 2017-06-06 DIAGNOSIS — Z01.818 PRE-OP TESTING: ICD-10-CM

## 2017-06-06 DIAGNOSIS — Z01.818 PRE-OP TESTING: Primary | ICD-10-CM

## 2017-06-06 PROCEDURE — 71020 XR CHEST PA AND LATERAL PRE-OP: CPT | Mod: 26,,, | Performed by: RADIOLOGY

## 2017-06-06 PROCEDURE — 93010 ELECTROCARDIOGRAM REPORT: CPT | Mod: S$PBB,,, | Performed by: INTERNAL MEDICINE

## 2017-06-13 ENCOUNTER — OFFICE VISIT (OUTPATIENT)
Dept: CARDIOLOGY | Facility: CLINIC | Age: 82
End: 2017-06-13
Payer: MEDICARE

## 2017-06-13 VITALS
SYSTOLIC BLOOD PRESSURE: 124 MMHG | DIASTOLIC BLOOD PRESSURE: 70 MMHG | BODY MASS INDEX: 35.98 KG/M2 | HEART RATE: 64 BPM | HEIGHT: 76 IN | WEIGHT: 295.44 LBS

## 2017-06-13 DIAGNOSIS — R94.31 ABNORMAL ECG: ICD-10-CM

## 2017-06-13 DIAGNOSIS — I10 ESSENTIAL HYPERTENSION: ICD-10-CM

## 2017-06-13 DIAGNOSIS — Z01.810 PREOP CARDIOVASCULAR EXAM: Primary | ICD-10-CM

## 2017-06-13 DIAGNOSIS — I44.4 LAFB (LEFT ANTERIOR FASCICULAR BLOCK): ICD-10-CM

## 2017-06-13 PROCEDURE — 1159F MED LIST DOCD IN RCRD: CPT | Mod: ,,, | Performed by: INTERNAL MEDICINE

## 2017-06-13 PROCEDURE — 99999 PR PBB SHADOW E&M-EST. PATIENT-LVL III: CPT | Mod: PBBFAC,,, | Performed by: INTERNAL MEDICINE

## 2017-06-13 PROCEDURE — 99214 OFFICE O/P EST MOD 30 MIN: CPT | Mod: S$PBB,,, | Performed by: INTERNAL MEDICINE

## 2017-06-13 PROCEDURE — 99213 OFFICE O/P EST LOW 20 MIN: CPT | Mod: PBBFAC,PO | Performed by: INTERNAL MEDICINE

## 2017-06-13 NOTE — PROGRESS NOTES
"Subjective:    Patient ID:  Dominic Cohen is a 81 y.o. male who presents for evaluation of Pre-op Exam      HPI  Pt presents for preop eval.  Pt followed up with Dr. Espinosa, Cardiology, in past.  This is my first visit with pt.  Old chart reviewed in detail.  Due to have knee replacement surgery this month with Dr. Gill.  He states he has been active for most part.  No typical anginal sxs.  Does not seem bothered by dyspnea.  Cuts his grass.  No syncope.  No presyncope.  preop ECG this month showed NSR, chronic LAFB, incomplete RBBB.  No acute changes noted.  Echo 10/15 showed normal EF, DD.  Stress test 2012, no ischemia but did not achieve THR.    Review of Systems   Constitution: Negative.   HENT: Negative.    Eyes: Negative.    Cardiovascular: Negative.    Respiratory: Negative.    Endocrine: Negative.    Hematologic/Lymphatic: Negative.    Skin: Negative.    Musculoskeletal: Positive for arthritis and joint pain.   Gastrointestinal: Negative.    Genitourinary: Negative.    Neurological: Negative.    Psychiatric/Behavioral: Negative.    Allergic/Immunologic: Negative.        /70   Pulse 64   Ht 6' 4" (1.93 m)   Wt 134 kg (295 lb 6.7 oz)   BMI 35.96 kg/m²     Wt Readings from Last 3 Encounters:   06/13/17 134 kg (295 lb 6.7 oz)   04/20/17 131.4 kg (289 lb 11 oz)   04/13/17 134 kg (295 lb 6.7 oz)     Temp Readings from Last 3 Encounters:   03/17/17 97.1 °F (36.2 °C) (Tympanic)   03/14/17 97 °F (36.1 °C) (Tympanic)   01/18/17 96.9 °F (36.1 °C) (Tympanic)     BP Readings from Last 3 Encounters:   06/13/17 124/70   04/20/17 (!) 141/76   04/13/17 (!) 140/80     Pulse Readings from Last 3 Encounters:   06/13/17 64   04/20/17 69   04/13/17 64            Objective:    Physical Exam   Constitutional: He is oriented to person, place, and time. He appears well-developed and well-nourished.   HENT:   Head: Normocephalic.   Neck: Normal range of motion. Neck supple. Normal carotid pulses, no hepatojugular " reflux and no JVD present. Carotid bruit is not present. No thyromegaly present.   Cardiovascular: Normal rate, regular rhythm, S1 normal and S2 normal.  PMI is not displaced.  Exam reveals no S3, no S4, no distant heart sounds, no friction rub, no midsystolic click and no opening snap.    Murmur heard.   Medium-pitched midsystolic murmur is present with a grade of 1/6  at the upper left sternal border  Pulses:       Radial pulses are 2+ on the right side, and 2+ on the left side.   Pulmonary/Chest: Effort normal and breath sounds normal. He has no wheezes. He has no rales.   Abdominal: Soft. Bowel sounds are normal. He exhibits no distension, no abdominal bruit, no ascites and no mass. There is no tenderness.   Musculoskeletal: He exhibits edema.   Neurological: He is alert and oriented to person, place, and time.   Skin: Skin is warm.   Psychiatric: He has a normal mood and affect. His behavior is normal.   Nursing note and vitals reviewed.      I have reviewed all pertinent labs and cardiac studies.          Chemistry        Component Value Date/Time     06/06/2017 1005    K 4.5 06/06/2017 1005     06/06/2017 1005    CO2 28 06/06/2017 1005    BUN 18 06/06/2017 1005    CREATININE 1.3 06/06/2017 1005     (H) 06/06/2017 1005        Component Value Date/Time    CALCIUM 9.2 06/06/2017 1005    ALKPHOS 70 06/06/2017 1005    AST 19 06/06/2017 1005    ALT 20 06/06/2017 1005    BILITOT 0.7 06/06/2017 1005        Lab Results   Component Value Date    WBC 5.20 06/06/2017    HGB 12.8 (L) 06/06/2017    HCT 39.1 (L) 06/06/2017     (H) 06/06/2017     06/06/2017     Lab Results   Component Value Date    HGBA1C 5.7 03/25/2015     Lab Results   Component Value Date    CHOL 164 11/14/2016    CHOL 160 03/25/2015    CHOL 155 12/06/2013     Lab Results   Component Value Date    HDL 44 11/14/2016    HDL 36 (L) 03/25/2015    HDL 38 (L) 12/06/2013     Lab Results   Component Value Date    LDLCALC 97.2  11/14/2016    LDLCALC 92.8 03/25/2015    LDLCALC 98.2 12/06/2013     Lab Results   Component Value Date    TRIG 114 11/14/2016    TRIG 156 (H) 03/25/2015    TRIG 94 12/06/2013     Lab Results   Component Value Date    CHOLHDL 26.8 11/14/2016    CHOLHDL 22.5 03/25/2015    CHOLHDL 24.5 12/06/2013      55 - 65 55   Diastolic Dysfunction  Yes    Est. PA Systolic Pressure  25.66   Tricuspid Valve Regurgitation   TRIVIAL TO MILD   Resulting Agency  [facility]   Narrative         TEST DESCRIPTION   Technical Quality: This is a technically adequate study.     Aorta: The aortic root is normal in size, measuring 3.5 cm at sinotubular junction and 3.5 cm at Sinuses of Valsalva. The proximal ascending aorta is normal in size, measuring 3.5 cm across.     Left Atrium: The left atrial volume index is normal, measuring 27.65 cc/m2.     Left Ventricle: The left ventricle is normal in size, with an end-diastolic diameter of 5.1 cm, and an end-systolic diameter of 2.9 cm. LV wall thickness is normal, with the septum measuring 1.3 cm and the posterior wall measuring 1.1 cm across. Relative   wall thickness was normal at 0.43, and the LV mass index was 109.2 g/m2 consistent with normal left ventricular mass. Global left ventricular systolic function appears normal. Visually estimated ejection fraction is 55-60%. The LV Doppler derived stroke   volume equals 78.0 ccs.   Left atrial pressures are normal. The E/e'(lat) is 12, consistent with diastolic dysfunction secondary to relaxation abnormality.     Right Atrium: The right atrium is normal in size, measuring 5.1 cm in length and 4.5 cm in width in the apical view.     Right Ventricle: The right ventricle is normal in size measuring 4.4 cm at the base in the apical right ventricle-focused view. Global right ventricular systolic function appears normal. Tricuspid annular plane systolic excursion (TAPSE) is 2.3 cm. The   estimated PA systolic pressure is 26 mmHg.     Aortic Valve:  Aortic  valve is normal in structure with normal leaflet mobility.     Mitral Valve:  Mitral valve is normal in structure with normal leaflet mobility.     Tricuspid Valve:  Tricuspid valve is normal in structure with normal leaflet mobility. The tricuspid valve is normal in structure. There is trivial to mild tricuspid regurgitation.     Pulmonary Valve:  Pulmonary valve is normal in structure with normal leaflet mobility. The pulmonic valve is not well seen. There is trivial pulmonic regurgitation.     IVC: IVC is normal in size and collapses > 50% with a sniff, suggesting normal right atrial pressure of 3 mmHg.     Intracavitary: There is no evidence of pericardial effusion, intracavity mass, thrombi, or vegetation.         CONCLUSIONS     1 - Normal left ventricular systolic function (EF 55-60%).     2 - Left ventricular diastolic dysfunction.     3 - Normal right ventricular systolic function .     4 - The estimated PA systolic pressure is 26 mmHg.     5 - Trivial to mild tricuspid regurgitation.         This document has been electronically    SIGNED BY: Jose Alejandro Marshall MD On: 10/31/2015 08:41              Assessment:       1. Preop cardiovascular exam    2. Abnormal ECG    3. LAFB (left anterior fascicular block)    4. Essential hypertension         Plan:             PT MAY PROCEED WITH ORTHOPEDIC SURGERY AT LOW CV RISK.  HE HAS NO CARDIAC SYMPTOMS, REASONABLE EXERCISE CAPACITY AND CHRONIC STABLE ECG ABNORMALITIES.  CONTINUE CURRENT MEDICAL TX.  CONTINUE RISK FACTOR MODIFICATION WITH PCP.  F/U PRN.

## 2017-06-13 NOTE — LETTER
June 13, 2017      Malcolm Gill Sr., MD  [address] [facility] [place] [address]           [facility] - Cardiology  [address] [place] [address]  Phone: [phone]  Fax: [phone]          Patient: Dominic Cohen   MR Number: [id number]   YOB: 1935   Date of Visit: 6/13/2017       Dear Dr. Malcolm Gill Sr.:    Thank you for referring Dominic Cohen to me for evaluation. Attached you will find relevant portions of my assessment and plan of care.    If you have questions, please do not hesitate to call me. I look forward to following Dominic Cohen along with you.    Sincerely,    Flaquito Camp MD    Enclosure  CC:  No Recipients    If you would like to receive this communication electronically, please contact [email or web address] or [phone] to request more information on [employer] access.    For providers and/or their staff who would like to refer a patient to [facility], please contact us through our one-stop-shop provider referral line, [facility], at [phone].    If you feel you have received this communication in error or would no longer like to receive these types of communications, please e-mail [email or web address]

## 2017-06-21 ENCOUNTER — OFFICE VISIT (OUTPATIENT)
Dept: FAMILY MEDICINE | Facility: CLINIC | Age: 82
End: 2017-06-21
Payer: MEDICARE

## 2017-06-21 VITALS
DIASTOLIC BLOOD PRESSURE: 74 MMHG | OXYGEN SATURATION: 97 % | HEART RATE: 68 BPM | SYSTOLIC BLOOD PRESSURE: 128 MMHG | WEIGHT: 294.56 LBS | RESPIRATION RATE: 18 BRPM | BODY MASS INDEX: 35.87 KG/M2 | TEMPERATURE: 98 F | HEIGHT: 76 IN

## 2017-06-21 DIAGNOSIS — Z01.818 PRE-OP EXAMINATION: Primary | ICD-10-CM

## 2017-06-21 PROCEDURE — 1126F AMNT PAIN NOTED NONE PRSNT: CPT | Mod: ,,, | Performed by: INTERNAL MEDICINE

## 2017-06-21 PROCEDURE — 99213 OFFICE O/P EST LOW 20 MIN: CPT | Mod: S$PBB,,, | Performed by: INTERNAL MEDICINE

## 2017-06-21 PROCEDURE — 1159F MED LIST DOCD IN RCRD: CPT | Mod: ,,, | Performed by: INTERNAL MEDICINE

## 2017-06-21 PROCEDURE — 99999 PR PBB SHADOW E&M-EST. PATIENT-LVL IV: CPT | Mod: PBBFAC,,, | Performed by: INTERNAL MEDICINE

## 2017-06-21 PROCEDURE — 99214 OFFICE O/P EST MOD 30 MIN: CPT | Mod: PBBFAC,PO | Performed by: INTERNAL MEDICINE

## 2017-06-21 NOTE — PROGRESS NOTES
Subjective:       Patient ID: Dominic Cohen is a 81 y.o. male.    Chief Complaint: Pre-op Exam  -------------pre op dr kam---------left knee surgery---------------------no new symptoms today------------  HPI  Review of Systems   Constitutional: Negative for activity change, appetite change, chills, diaphoresis, fatigue, fever and unexpected weight change.   HENT: Negative for drooling, ear discharge, ear pain, facial swelling, mouth sores, nosebleeds, postnasal drip, rhinorrhea, sinus pressure, sneezing, sore throat, tinnitus, trouble swallowing and voice change.    Eyes: Negative for photophobia, redness and visual disturbance.   Respiratory: Negative for apnea, cough, choking, chest tightness, shortness of breath and wheezing.    Cardiovascular: Negative for chest pain and palpitations.   Gastrointestinal: Negative for abdominal distention, abdominal pain, anal bleeding, blood in stool, constipation, diarrhea, nausea and vomiting.   Endocrine: Negative for cold intolerance, heat intolerance, polydipsia, polyphagia and polyuria.   Genitourinary: Negative for difficulty urinating, dysuria, enuresis, flank pain, frequency, genital sores, hematuria and urgency.   Musculoskeletal: Negative for arthralgias, back pain, gait problem, joint swelling, myalgias, neck pain and neck stiffness.   Skin: Negative for color change, pallor, rash and wound.   Allergic/Immunologic: Negative for food allergies and immunocompromised state.   Neurological: Negative for dizziness, tremors, seizures, syncope, facial asymmetry, speech difficulty, weakness, light-headedness, numbness and headaches.   Hematological: Negative for adenopathy. Does not bruise/bleed easily.   Psychiatric/Behavioral: Negative for agitation, behavioral problems, confusion, decreased concentration, dysphoric mood, hallucinations, self-injury, sleep disturbance and suicidal ideas. The patient is not nervous/anxious and is not hyperactive.        Objective:       Physical Exam   Constitutional: He is oriented to person, place, and time. He appears well-developed and well-nourished. No distress.   HENT:   Head: Normocephalic and atraumatic.   Eyes: Pupils are equal, round, and reactive to light.   Neck: Normal range of motion. Neck supple. Carotid bruit is not present.   Cardiovascular: Normal rate, regular rhythm, normal heart sounds and intact distal pulses.    Pulmonary/Chest: Effort normal and breath sounds normal. No respiratory distress. He has no wheezes. He has no rales. He exhibits no tenderness.   Abdominal: Soft. Bowel sounds are normal. He exhibits no distension. There is no tenderness. There is no rebound and no guarding.   Musculoskeletal: Normal range of motion. He exhibits no edema or tenderness.   Neurological: He is alert and oriented to person, place, and time.   Skin: Skin is warm and dry. No rash noted. He is not diaphoretic. No erythema. No pallor.   Psychiatric: He has a normal mood and affect. His behavior is normal. Judgment and thought content normal.   Nursing note and vitals reviewed.      Assessment:       1. Pre-op examination        Plan:         notes/labs reviewed.                         Stable for surgery .                F/u 2 months.

## 2017-06-23 NOTE — PRE-PROCEDURE INSTRUCTIONS
Pre op instructions reviewed with patient per phone:    To confirm, Your surgeon has instructed you:  Surgery is scheduled 6/27/17 at 1115.      Please report to [facility] 1st floor main lobby by 0945 Pre admit nurse will call afternoon prior to surgery for final arrival time.      INSTRUCTIONS IMPORTANT!!!  ¨ Do not eat, drink, or smoke after 12 midnight-including water. OK to brush teeth, no gum, candy or mints!    ¨ Take only these medicines with a small swallow of water-morning of surgery.  Ramipril if Needed (pt states he takes it PRN)    ____  Do not wear makeup, including mascara.  ____  No powder, lotions or creams to surgical area.  ____  Please remove all jewelry, including piercings and leave at home.  ____  No money or valuables needed. Please leave at home.  ____  Please bring identification and insurance information to hospital.  ____  If going home the same day, arrange for a ride home. You will not be able to   drive if Anesthesia was used.  ____  Children, under 12 years old, must remain in the waiting room with an adult.  They are not allowed in patient areas.  ____  Wear loose fitting clothing. Allow for dressings, bandages.  ____  Stop Aspirin, Ibuprofen, Motrin and Aleve at least 5-7 days before surgery, unless otherwise instructed by your doctor, or the nurse.   You MAY use Tylenol/acetaminophen until day of surgery.  ____  If you take diabetic medication, do not take am of surgery unless instructed by   Doctor.  ____ Stop taking any Fish Oil supplement or any Vitamins that contain Vitamin E at least 5 days prior to surgery.          Bathing Instructions-- The night before surgery and the morning prior to coming to the hospital:   -Do not shave the surgical area.   -Shower and wash your hair and body as usual with anti-bacterial  soap and shampoo.   -Rinse your hair and body completely.   -Use one packet of hibiclens to wash the surgical site (using your hand) gently  for 5 minutes.  Do not scrub you skin too hard.   -Do not use hibiclens on your head, face, or genitals.   -Do not wash with anti-bacterial soap after you use the hibiclens.   -Rinse your body thoroughly.   -Dry with clean, soft towel.  Do not use lotion, cream, deodorant, or powders on   the surgical site.    Use antibacterial soap in place of hibiclens if your surgery is on the head, face or genitals.         Surgical Site Infection    Prevention of surgical site infections:     -Keep incisions clean and dry.   -Do not soak/submerge incisions in water until completely healed.   -Do not apply lotions, powders, creams, or deodorants to site.   -Always make sure hands are cleaned with antibacterial soap/ alcohol-based   prior to touching the surgical site.  (This includes doctors, nurses, staff, and yourself.)    Signs and symptoms:   -Redness and pain around the area where you had surgery   -Drainage of cloudy fluid from your surgical wound   -Fever over 100.4  I have read or had read and explained to me, and understand the above information.

## 2017-06-26 ENCOUNTER — ANESTHESIA EVENT (OUTPATIENT)
Dept: SURGERY | Facility: HOSPITAL | Age: 82
DRG: 467 | End: 2017-06-26
Payer: MEDICARE

## 2017-06-27 ENCOUNTER — SURGERY (OUTPATIENT)
Age: 82
End: 2017-06-27

## 2017-06-27 ENCOUNTER — ANESTHESIA (OUTPATIENT)
Dept: SURGERY | Facility: HOSPITAL | Age: 82
DRG: 467 | End: 2017-06-27
Payer: MEDICARE

## 2017-06-27 ENCOUNTER — HOSPITAL ENCOUNTER (INPATIENT)
Facility: HOSPITAL | Age: 82
LOS: 3 days | Discharge: HOME HEALTH | DRG: 467 | End: 2017-06-30
Attending: ORTHOPAEDIC SURGERY | Admitting: ORTHOPAEDIC SURGERY
Payer: MEDICARE

## 2017-06-27 DIAGNOSIS — E66.9 OBESITY (BMI 30-39.9): ICD-10-CM
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
## 2017-06-27 DIAGNOSIS — Z98.890 POST-OPERATIVE STATE: Primary | ICD-10-CM

## 2017-06-27 DIAGNOSIS — T84.093A FAILED TOTAL LEFT KNEE REPLACEMENT, INITIAL ENCOUNTER: ICD-10-CM

## 2017-06-27 DIAGNOSIS — M25.562 LEFT KNEE PAIN: Primary | ICD-10-CM

## 2017-06-27 DIAGNOSIS — M19.90 ARTHRITIS: ICD-10-CM

## 2017-06-27 LAB
CANCELLOUS CHIP 15CC: NORMAL
CANCELLOUS CHIP 30CC: NORMAL
GRAM STN SPEC: NORMAL
HCT VFR BLD AUTO: 36.5 %
HGB BLD-MCNC: 12.4 G/DL
POCT GLUCOSE: 162 MG/DL (ref 70–110)

## 2017-06-27 PROCEDURE — 87102 FUNGUS ISOLATION CULTURE: CPT

## 2017-06-27 PROCEDURE — 25000003 PHARM REV CODE 250: Performed by: ANESTHESIOLOGY

## 2017-06-27 PROCEDURE — C1776 JOINT DEVICE (IMPLANTABLE): HCPCS | Performed by: ORTHOPAEDIC SURGERY

## 2017-06-27 PROCEDURE — 25000003 PHARM REV CODE 250: Performed by: ORTHOPAEDIC SURGERY

## 2017-06-27 PROCEDURE — 0SPD0JZ REMOVAL OF SYNTHETIC SUBSTITUTE FROM LEFT KNEE JOINT, OPEN APPROACH: ICD-10-PCS | Performed by: ORTHOPAEDIC SURGERY

## 2017-06-27 PROCEDURE — 88311 DECALCIFY TISSUE: CPT | Mod: 26,,, | Performed by: PATHOLOGY

## 2017-06-27 PROCEDURE — 88305 TISSUE EXAM BY PATHOLOGIST: CPT | Mod: 26,,, | Performed by: PATHOLOGY

## 2017-06-27 PROCEDURE — 71000039 HC RECOVERY, EACH ADD'L HOUR: Performed by: ORTHOPAEDIC SURGERY

## 2017-06-27 PROCEDURE — 63600175 PHARM REV CODE 636 W HCPCS: Performed by: NURSE ANESTHETIST, CERTIFIED REGISTERED

## 2017-06-27 PROCEDURE — 87070 CULTURE OTHR SPECIMN AEROBIC: CPT

## 2017-06-27 PROCEDURE — 88304 TISSUE EXAM BY PATHOLOGIST: CPT | Mod: 26,,, | Performed by: PATHOLOGY

## 2017-06-27 PROCEDURE — 36000710: Performed by: ORTHOPAEDIC SURGERY

## 2017-06-27 PROCEDURE — 11000001 HC ACUTE MED/SURG PRIVATE ROOM

## 2017-06-27 PROCEDURE — 0QBH0ZZ EXCISION OF LEFT TIBIA, OPEN APPROACH: ICD-10-PCS | Performed by: ORTHOPAEDIC SURGERY

## 2017-06-27 PROCEDURE — 63600175 PHARM REV CODE 636 W HCPCS: Performed by: ORTHOPAEDIC SURGERY

## 2017-06-27 PROCEDURE — 27487 REVISE/REPLACE KNEE JOINT: CPT | Mod: 22,LT,, | Performed by: ORTHOPAEDIC SURGERY

## 2017-06-27 PROCEDURE — 27201423 OPTIME MED/SURG SUP & DEVICES STERILE SUPPLY: Performed by: ORTHOPAEDIC SURGERY

## 2017-06-27 PROCEDURE — 0QRH0JZ REPLACEMENT OF LEFT TIBIA WITH SYNTHETIC SUBSTITUTE, OPEN APPROACH: ICD-10-PCS | Performed by: ORTHOPAEDIC SURGERY

## 2017-06-27 PROCEDURE — 31500 INSERT EMERGENCY AIRWAY: CPT | Performed by: NURSE ANESTHETIST, CERTIFIED REGISTERED

## 2017-06-27 PROCEDURE — 88300 SURGICAL PATH GROSS: CPT | Mod: 26,,, | Performed by: PATHOLOGY

## 2017-06-27 PROCEDURE — 85018 HEMOGLOBIN: CPT

## 2017-06-27 PROCEDURE — 27487 REVISE/REPLACE KNEE JOINT: CPT | Mod: AS,22,LT, | Performed by: PHYSICIAN ASSISTANT

## 2017-06-27 PROCEDURE — 27800903 OPTIME MED/SURG SUP & DEVICES OTHER IMPLANTS: Performed by: ORTHOPAEDIC SURGERY

## 2017-06-27 PROCEDURE — 25000003 PHARM REV CODE 250: Performed by: NURSE ANESTHETIST, CERTIFIED REGISTERED

## 2017-06-27 PROCEDURE — 0SRD0J9 REPLACEMENT OF LEFT KNEE JOINT WITH SYNTHETIC SUBSTITUTE, CEMENTED, OPEN APPROACH: ICD-10-PCS | Performed by: ORTHOPAEDIC SURGERY

## 2017-06-27 PROCEDURE — 87116 MYCOBACTERIA CULTURE: CPT

## 2017-06-27 PROCEDURE — 87205 SMEAR GRAM STAIN: CPT

## 2017-06-27 PROCEDURE — 37000008 HC ANESTHESIA 1ST 15 MINUTES: Performed by: ORTHOPAEDIC SURGERY

## 2017-06-27 PROCEDURE — 82962 GLUCOSE BLOOD TEST: CPT | Performed by: ORTHOPAEDIC SURGERY

## 2017-06-27 PROCEDURE — 37000009 HC ANESTHESIA EA ADD 15 MINS: Performed by: ORTHOPAEDIC SURGERY

## 2017-06-27 PROCEDURE — C1729 CATH, DRAINAGE: HCPCS | Performed by: ORTHOPAEDIC SURGERY

## 2017-06-27 PROCEDURE — 36000711: Performed by: ORTHOPAEDIC SURGERY

## 2017-06-27 PROCEDURE — C1713 ANCHOR/SCREW BN/BN,TIS/BN: HCPCS | Performed by: ORTHOPAEDIC SURGERY

## 2017-06-27 PROCEDURE — 71000033 HC RECOVERY, INTIAL HOUR: Performed by: ORTHOPAEDIC SURGERY

## 2017-06-27 PROCEDURE — 87075 CULTR BACTERIA EXCEPT BLOOD: CPT

## 2017-06-27 PROCEDURE — 85014 HEMATOCRIT: CPT

## 2017-06-27 PROCEDURE — 88305 TISSUE EXAM BY PATHOLOGIST: CPT | Performed by: PATHOLOGY

## 2017-06-27 DEVICE — PUTTY DBX 5CC: Type: IMPLANTABLE DEVICE | Site: KNEE | Status: FUNCTIONAL

## 2017-06-27 DEVICE — PATELLA COMP 3-PEG OVAL D: Type: IMPLANTABLE DEVICE | Site: KNEE | Status: FUNCTIONAL

## 2017-06-27 DEVICE — IMPLANTABLE DEVICE: Type: IMPLANTABLE DEVICE | Site: KNEE | Status: FUNCTIONAL

## 2017-06-27 DEVICE — PUTTY DBX 10CC: Type: IMPLANTABLE DEVICE | Site: KNEE | Status: FUNCTIONAL

## 2017-06-27 DEVICE — CEMENT BONE SIMPLE P INDIVID: Type: IMPLANTABLE DEVICE | Site: KNEE | Status: FUNCTIONAL

## 2017-06-27 DEVICE — BONE CHIP CANC 1.7-10MM 15ML: Type: IMPLANTABLE DEVICE | Site: KNEE | Status: FUNCTIONAL

## 2017-06-27 DEVICE — CHIPS CANCELLOUS 30CC: Type: IMPLANTABLE DEVICE | Site: KNEE | Status: FUNCTIONAL

## 2017-06-27 RX ORDER — RAMIPRIL 1.25 MG/1
2.5 CAPSULE ORAL DAILY
Status: DISCONTINUED | OUTPATIENT
Start: 2017-06-28 | End: 2017-06-28

## 2017-06-27 RX ORDER — TIMOLOL MALEATE 5 MG/ML
1 SOLUTION/ DROPS OPHTHALMIC 2 TIMES DAILY
Status: DISCONTINUED | OUTPATIENT
Start: 2017-06-27 | End: 2017-06-30 | Stop reason: HOSPADM

## 2017-06-27 RX ORDER — ONDANSETRON 8 MG/1
8 TABLET, ORALLY DISINTEGRATING ORAL EVERY 8 HOURS PRN
Status: DISCONTINUED | OUTPATIENT
Start: 2017-06-27 | End: 2017-06-27

## 2017-06-27 RX ORDER — MUPIROCIN 20 MG/G
1 OINTMENT TOPICAL
Status: DISCONTINUED | OUTPATIENT
Start: 2017-06-27 | End: 2017-06-27

## 2017-06-27 RX ORDER — MEPERIDINE HYDROCHLORIDE 50 MG/ML
12.5 INJECTION INTRAMUSCULAR; INTRAVENOUS; SUBCUTANEOUS ONCE AS NEEDED
Status: DISCONTINUED | OUTPATIENT
Start: 2017-06-27 | End: 2017-06-27

## 2017-06-27 RX ORDER — DIPHENHYDRAMINE HYDROCHLORIDE 50 MG/ML
25 INJECTION INTRAMUSCULAR; INTRAVENOUS EVERY 6 HOURS PRN
Status: DISCONTINUED | OUTPATIENT
Start: 2017-06-27 | End: 2017-06-27

## 2017-06-27 RX ORDER — MIDAZOLAM HYDROCHLORIDE 1 MG/ML
INJECTION, SOLUTION INTRAMUSCULAR; INTRAVENOUS
Status: DISCONTINUED | OUTPATIENT
Start: 2017-06-27 | End: 2017-06-27

## 2017-06-27 RX ORDER — DEXAMETHASONE SODIUM PHOSPHATE 4 MG/ML
INJECTION, SOLUTION INTRA-ARTICULAR; INTRALESIONAL; INTRAMUSCULAR; INTRAVENOUS; SOFT TISSUE
Status: DISCONTINUED | OUTPATIENT
Start: 2017-06-27 | End: 2017-06-27

## 2017-06-27 RX ORDER — LABETALOL HCL 20 MG/4 ML
10 SYRINGE (ML) INTRAVENOUS ONCE
Status: COMPLETED | OUTPATIENT
Start: 2017-06-27 | End: 2017-06-27

## 2017-06-27 RX ORDER — SODIUM CHLORIDE, SODIUM LACTATE, POTASSIUM CHLORIDE, CALCIUM CHLORIDE 600; 310; 30; 20 MG/100ML; MG/100ML; MG/100ML; MG/100ML
INJECTION, SOLUTION INTRAVENOUS CONTINUOUS PRN
Status: DISCONTINUED | OUTPATIENT
Start: 2017-06-27 | End: 2017-06-27

## 2017-06-27 RX ORDER — PREGABALIN 25 MG/1
75 CAPSULE ORAL NIGHTLY
Status: DISCONTINUED | OUTPATIENT
Start: 2017-06-27 | End: 2017-06-30 | Stop reason: HOSPADM

## 2017-06-27 RX ORDER — ROCURONIUM BROMIDE 10 MG/ML
INJECTION, SOLUTION INTRAVENOUS
Status: DISCONTINUED | OUTPATIENT
Start: 2017-06-27 | End: 2017-06-27

## 2017-06-27 RX ORDER — IBUPROFEN 200 MG
24 TABLET ORAL
Status: DISCONTINUED | OUTPATIENT
Start: 2017-06-27 | End: 2017-06-30 | Stop reason: HOSPADM

## 2017-06-27 RX ORDER — INSULIN ASPART 100 [IU]/ML
0-5 INJECTION, SOLUTION INTRAVENOUS; SUBCUTANEOUS
Status: DISCONTINUED | OUTPATIENT
Start: 2017-06-27 | End: 2017-06-30 | Stop reason: HOSPADM

## 2017-06-27 RX ORDER — HYDROMORPHONE HYDROCHLORIDE 2 MG/ML
0.2 INJECTION, SOLUTION INTRAMUSCULAR; INTRAVENOUS; SUBCUTANEOUS EVERY 5 MIN PRN
Status: DISCONTINUED | OUTPATIENT
Start: 2017-06-27 | End: 2017-06-27

## 2017-06-27 RX ORDER — LIDOCAINE HCL/PF 100 MG/5ML
SYRINGE (ML) INTRAVENOUS
Status: DISCONTINUED | OUTPATIENT
Start: 2017-06-27 | End: 2017-06-27

## 2017-06-27 RX ORDER — MORPHINE SULFATE 10 MG/ML
2 INJECTION INTRAMUSCULAR; INTRAVENOUS; SUBCUTANEOUS EVERY 4 HOURS PRN
Status: DISCONTINUED | OUTPATIENT
Start: 2017-06-27 | End: 2017-06-28

## 2017-06-27 RX ORDER — FENTANYL CITRATE 50 UG/ML
25 INJECTION, SOLUTION INTRAMUSCULAR; INTRAVENOUS EVERY 5 MIN PRN
Status: DISCONTINUED | OUTPATIENT
Start: 2017-06-27 | End: 2017-06-27

## 2017-06-27 RX ORDER — NEOMYCIN AND POLYMYXIN B SULFATES 40; 200000 MG/ML; [USP'U]/ML
SOLUTION IRRIGATION
Status: DISCONTINUED | OUTPATIENT
Start: 2017-06-27 | End: 2017-06-27 | Stop reason: HOSPADM

## 2017-06-27 RX ORDER — ACETAMINOPHEN 10 MG/ML
1000 INJECTION, SOLUTION INTRAVENOUS EVERY 6 HOURS
Status: DISPENSED | OUTPATIENT
Start: 2017-06-27 | End: 2017-06-28

## 2017-06-27 RX ORDER — ONDANSETRON 2 MG/ML
4 INJECTION INTRAMUSCULAR; INTRAVENOUS DAILY PRN
Status: DISCONTINUED | OUTPATIENT
Start: 2017-06-27 | End: 2017-06-27

## 2017-06-27 RX ORDER — SODIUM CHLORIDE 9 MG/ML
INJECTION, SOLUTION INTRAVENOUS CONTINUOUS
Status: DISCONTINUED | OUTPATIENT
Start: 2017-06-27 | End: 2017-06-29

## 2017-06-27 RX ORDER — PROPOFOL 10 MG/ML
INJECTION, EMULSION INTRAVENOUS
Status: DISCONTINUED | OUTPATIENT
Start: 2017-06-27 | End: 2017-06-27

## 2017-06-27 RX ORDER — RAMELTEON 8 MG/1
8 TABLET ORAL NIGHTLY PRN
Status: DISCONTINUED | OUTPATIENT
Start: 2017-06-27 | End: 2017-06-30 | Stop reason: HOSPADM

## 2017-06-27 RX ORDER — IBUPROFEN 200 MG
16 TABLET ORAL
Status: DISCONTINUED | OUTPATIENT
Start: 2017-06-27 | End: 2017-06-30 | Stop reason: HOSPADM

## 2017-06-27 RX ORDER — IPRATROPIUM BROMIDE AND ALBUTEROL SULFATE 2.5; .5 MG/3ML; MG/3ML
3 SOLUTION RESPIRATORY (INHALATION) EVERY 4 HOURS PRN
Status: DISCONTINUED | OUTPATIENT
Start: 2017-06-27 | End: 2017-06-30 | Stop reason: HOSPADM

## 2017-06-27 RX ORDER — OXYCODONE AND ACETAMINOPHEN 10; 325 MG/1; MG/1
1 TABLET ORAL EVERY 4 HOURS PRN
Qty: 60 TABLET | Refills: 0 | Status: SHIPPED | OUTPATIENT
Start: 2017-06-27 | End: 2017-11-09

## 2017-06-27 RX ORDER — GLUCAGON 1 MG
1 KIT INJECTION
Status: DISCONTINUED | OUTPATIENT
Start: 2017-06-27 | End: 2017-06-30 | Stop reason: HOSPADM

## 2017-06-27 RX ORDER — ONDANSETRON 2 MG/ML
4 INJECTION INTRAMUSCULAR; INTRAVENOUS EVERY 8 HOURS PRN
Status: DISCONTINUED | OUTPATIENT
Start: 2017-06-27 | End: 2017-06-30 | Stop reason: HOSPADM

## 2017-06-27 RX ORDER — SODIUM CHLORIDE 0.9 % (FLUSH) 0.9 %
3 SYRINGE (ML) INJECTION EVERY 8 HOURS
Status: DISCONTINUED | OUTPATIENT
Start: 2017-06-27 | End: 2017-06-27

## 2017-06-27 RX ORDER — VANCOMYCIN HYDROCHLORIDE 1 G/20ML
INJECTION, POWDER, LYOPHILIZED, FOR SOLUTION INTRAVENOUS
Status: DISCONTINUED | OUTPATIENT
Start: 2017-06-27 | End: 2017-06-27 | Stop reason: HOSPADM

## 2017-06-27 RX ORDER — BACITRACIN 50000 [IU]/1
INJECTION, POWDER, FOR SOLUTION INTRAMUSCULAR
Status: DISCONTINUED | OUTPATIENT
Start: 2017-06-27 | End: 2017-06-27 | Stop reason: HOSPADM

## 2017-06-27 RX ORDER — SODIUM CHLORIDE 0.9 % (FLUSH) 0.9 %
3 SYRINGE (ML) INJECTION
Status: DISCONTINUED | OUTPATIENT
Start: 2017-06-27 | End: 2017-06-27

## 2017-06-27 RX ORDER — OXYCODONE HYDROCHLORIDE 5 MG/1
5 TABLET ORAL
Status: DISCONTINUED | OUTPATIENT
Start: 2017-06-27 | End: 2017-06-27

## 2017-06-27 RX ORDER — OXYCODONE HCL 10 MG/1
10 TABLET, FILM COATED, EXTENDED RELEASE ORAL EVERY 12 HOURS
Status: COMPLETED | OUTPATIENT
Start: 2017-06-27 | End: 2017-06-29

## 2017-06-27 RX ORDER — SODIUM CHLORIDE, SODIUM LACTATE, POTASSIUM CHLORIDE, CALCIUM CHLORIDE 600; 310; 30; 20 MG/100ML; MG/100ML; MG/100ML; MG/100ML
INJECTION, SOLUTION INTRAVENOUS CONTINUOUS
Status: DISCONTINUED | OUTPATIENT
Start: 2017-06-27 | End: 2017-06-27

## 2017-06-27 RX ORDER — ONDANSETRON 2 MG/ML
INJECTION INTRAMUSCULAR; INTRAVENOUS
Status: DISCONTINUED | OUTPATIENT
Start: 2017-06-27 | End: 2017-06-27

## 2017-06-27 RX ORDER — HYDRALAZINE HYDROCHLORIDE 20 MG/ML
10 INJECTION INTRAMUSCULAR; INTRAVENOUS EVERY 6 HOURS PRN
Status: DISCONTINUED | OUTPATIENT
Start: 2017-06-27 | End: 2017-06-30 | Stop reason: HOSPADM

## 2017-06-27 RX ORDER — FENTANYL CITRATE 50 UG/ML
INJECTION, SOLUTION INTRAMUSCULAR; INTRAVENOUS
Status: DISCONTINUED | OUTPATIENT
Start: 2017-06-27 | End: 2017-06-27

## 2017-06-27 RX ORDER — LORAZEPAM 2 MG/ML
0.25 INJECTION INTRAMUSCULAR ONCE AS NEEDED
Status: DISCONTINUED | OUTPATIENT
Start: 2017-06-27 | End: 2017-06-27

## 2017-06-27 RX ADMIN — Medication 1000 MG: at 12:06

## 2017-06-27 RX ADMIN — ONDANSETRON 4 MG: 2 INJECTION, SOLUTION INTRAMUSCULAR; INTRAVENOUS at 04:06

## 2017-06-27 RX ADMIN — BUPIVACAINE 266 MG: 13.3 INJECTION, SUSPENSION, LIPOSOMAL INFILTRATION at 01:06

## 2017-06-27 RX ADMIN — SODIUM CHLORIDE, SODIUM LACTATE, POTASSIUM CHLORIDE, AND CALCIUM CHLORIDE: 600; 310; 30; 20 INJECTION, SOLUTION INTRAVENOUS at 02:06

## 2017-06-27 RX ADMIN — FENTANYL CITRATE 100 MCG: 50 INJECTION, SOLUTION INTRAMUSCULAR; INTRAVENOUS at 03:06

## 2017-06-27 RX ADMIN — FENTANYL CITRATE 100 MCG: 50 INJECTION, SOLUTION INTRAMUSCULAR; INTRAVENOUS at 11:06

## 2017-06-27 RX ADMIN — BACITRACIN 50000 UNITS: 5000 INJECTION, POWDER, FOR SOLUTION INTRAMUSCULAR at 01:06

## 2017-06-27 RX ADMIN — PROPOFOL 200 MG: 10 INJECTION, EMULSION INTRAVENOUS at 11:06

## 2017-06-27 RX ADMIN — APIXABAN 2.5 MG: 2.5 TABLET, FILM COATED ORAL at 09:06

## 2017-06-27 RX ADMIN — ROCURONIUM BROMIDE 50 MG: 10 INJECTION, SOLUTION INTRAVENOUS at 11:06

## 2017-06-27 RX ADMIN — SODIUM CHLORIDE: 0.9 INJECTION, SOLUTION INTRAVENOUS at 06:06

## 2017-06-27 RX ADMIN — OXYCODONE HYDROCHLORIDE 10 MG: 10 TABLET, FILM COATED, EXTENDED RELEASE ORAL at 09:06

## 2017-06-27 RX ADMIN — FENTANYL CITRATE 50 MCG: 50 INJECTION, SOLUTION INTRAMUSCULAR; INTRAVENOUS at 12:06

## 2017-06-27 RX ADMIN — NEOMYCIN AND POLYMYXIN B SULFATES 1 ML: 40; 200000 IRRIGANT IRRIGATION at 01:06

## 2017-06-27 RX ADMIN — LABETALOL HYDROCHLORIDE 10 MG: 5 INJECTION, SOLUTION INTRAVENOUS at 05:06

## 2017-06-27 RX ADMIN — TIMOLOL MALEATE 1 DROP: 5 SOLUTION OPHTHALMIC at 09:06

## 2017-06-27 RX ADMIN — MIDAZOLAM HYDROCHLORIDE 2 MG: 1 INJECTION, SOLUTION INTRAMUSCULAR; INTRAVENOUS at 11:06

## 2017-06-27 RX ADMIN — LIDOCAINE HYDROCHLORIDE 80 MG: 20 INJECTION, SOLUTION INTRAVENOUS at 11:06

## 2017-06-27 RX ADMIN — SODIUM CHLORIDE, SODIUM LACTATE, POTASSIUM CHLORIDE, AND CALCIUM CHLORIDE: 600; 310; 30; 20 INJECTION, SOLUTION INTRAVENOUS at 11:06

## 2017-06-27 RX ADMIN — ACETAMINOPHEN 1000 MG: 10 INJECTION, SOLUTION INTRAVENOUS at 09:06

## 2017-06-27 RX ADMIN — FENTANYL CITRATE 50 MCG: 50 INJECTION, SOLUTION INTRAMUSCULAR; INTRAVENOUS at 01:06

## 2017-06-27 RX ADMIN — DEXAMETHASONE SODIUM PHOSPHATE 4 MG: 4 INJECTION, SOLUTION INTRA-ARTICULAR; INTRALESIONAL; INTRAMUSCULAR; INTRAVENOUS; SOFT TISSUE at 11:06

## 2017-06-27 RX ADMIN — VANCOMYCIN HYDROCHLORIDE 2 G: 1 INJECTION, POWDER, LYOPHILIZED, FOR SOLUTION INTRAVENOUS at 03:06

## 2017-06-27 RX ADMIN — PREGABALIN 75 MG: 25 CAPSULE ORAL at 09:06

## 2017-06-27 NOTE — H&P
CC: 81 year old male, left knee pain        HPI:Left knee for several years but worst over the past year. S/P total knee 1990's. Knee constantly swells up, feels hot at times, feels loose, locks and catches on him. Pain constantly 7/10, increase with activity, little relief from resting.     PMH:         Past Medical History:   Diagnosis Date    Arthritis      Atherosclerosis of abdominal aorta      Cataract      Chronic constipation      Glaucoma      History of anal fissures      Hypertension      Polyneuropathy in other diseases classified elsewhere       due to folate deficiency    Prediabetes      Venous insufficiency      Venous insufficiency           PSH:          Past Surgical History:   Procedure Laterality Date    BACK SURGERY        CATARACT EXTRACTION BILATERAL W/ ANTERIOR VITRECTOMY        COLONOSCOPY N/A 5/13/2016     Procedure: COLONOSCOPY;  Surgeon: Presley Phillips MD;  Location: [address]);  Service: Endoscopy;  Laterality: N/A;  EGD with Dr. Jeffery prior to Colonoscopy. EC    KNEE SURGERY        PROSTATE SURGERY        TONSILLECTOMY, ADENOIDECTOMY             Family Hx:          Family History   Problem Relation Age of Onset    No Known Problems Son      No Known Problems Daughter      No Known Problems Son      Diabetes Neg Hx      Heart disease Neg Hx      Cancer Neg Hx      Celiac disease Neg Hx      Cirrhosis Neg Hx      Colon cancer Neg Hx      Colon polyps Neg Hx      Crohn's disease Neg Hx      Cystic fibrosis Neg Hx      Esophageal cancer Neg Hx      Hemochromatosis Neg Hx      Inflammatory bowel disease Neg Hx      Irritable bowel syndrome Neg Hx      Liver cancer Neg Hx      Liver disease Neg Hx      Rectal cancer Neg Hx      Stomach cancer Neg Hx      Ulcerative colitis Neg Hx      Zak's disease Neg Hx      Amblyopia Neg Hx      Blindness Neg Hx      Glaucoma Neg Hx      Hypertension Neg Hx      Macular degeneration Neg Hx       Retinal detachment Neg Hx      Strabismus Neg Hx           Allergy:          Review of patient's allergies indicates:   Allergen Reactions    Penicillins         Other reaction(s): Hives  Other reaction(s): Swelling    Sulfa (sulfonamide antibiotics)         Other reaction(s): Hives    Protonix [pantoprazole] Rash         Medication:    Current Outpatient Prescriptions:     ascorbic acid (VITAMIN C) 60 mg Lozg, Take 1 tablet by mouth daily as needed. , Disp: , Rfl:     aspirin 81 MG Chew, Take 1 tablet (81 mg total) by mouth once daily. (Patient taking differently: Take 81 mg by mouth daily as needed. ), Disp: , Rfl: 0    blood sugar diagnostic (BLOOD GLUCOSE TEST) Strp, 1 strip by Misc.(Non-Drug; Combo Route) route 3 (three) times a week., Disp: 32 strip, Rfl: 11    cyanocobalamin, vitamin B-12, (VITAMIN B-12) 50 mcg tablet, Take 50 mcg by mouth once daily., Disp: , Rfl:     ERGOCALCIFEROL, VITAMIN D2, (VITAMIN D ORAL), Take 1 tablet by mouth once daily. , Disp: , Rfl:     linaclotide (LINZESS) 145 mcg Cap capsule, Take 1 capsule (145 mcg total) by mouth once daily., Disp: 30 capsule, Rfl: 5    mineral oil (FLEET MINERAL OIL) enema, Place 1 enema rectally once daily., Disp: 2 enema, Rfl: 0    ramipril (ALTACE) 2.5 MG capsule, Take 1 capsule (2.5 mg total) by mouth once daily., Disp: 90 capsule, Rfl: 3    timolol maleate 0.5% (TIMOPTIC) 0.5 % Drop, Place 1 drop into both eyes 2 (two) times daily., Disp: 10 mL, Rfl: 12     Social History:  Retire from [employer] in [place]  Social History   Social History            Social History    Marital status:        Spouse name: N/A    Number of children: 3    Years of education: N/A            Occupational History    Retired         [place], [place]              Social History Main Topics    Smoking status: Former Smoker       Packs/day: 0.30       Years: 52.00       Types: Cigarettes       Quit date: 11/13/2008    Smokeless tobacco: Never Used     "Alcohol use Yes          Comment: Very rarely    Drug use: No    Sexual activity: Yes           Other Topics Concern    Not on file      Social History Narrative         Ht 6' 4" (1.93 m)   Wt 133.4 kg (294 lb)   BMI 35.79 kg/m²         ROS:  GENERAL: No fever, chills, fatigability or weight loss.  SKIN: No rashes, itching or changes in color or texture of skin.  HEAD: No headaches or recent head trauma.  EYES: Visual acuity fine. No photophobia, ocular pain or diplopia.  EARS: Denies ear pain, discharge or vertigo.  NOSE: No loss of smell, no epistaxis or postnasal drip.  MOUTH & THROAT: No hoarseness or change in voice. No excessive gum bleeding.  NODES: Denies swollen glands.  CHEST: Denies ADAMS, cyanosis, wheezing, cough and sputum production.  CARDIOVASCULAR: Denies chest pain, PND, orthopnea or reduced exercise tolerance.  ABDOMEN: Appetite fine. No weight loss. Denies diarrhea, abdominal pain, hematemesis or blood in stool.  URINARY: No flank pain, dysuria or hematuria.  PERIPHERAL VASCULAR: No claudication or cyanosis.  NEUROLOGIC: No history of seizures, paralysis, alteration of gait or coordination.  MUSCULOSKELETAL: See HPI     PE:  APPEARANCE: Well nourished, well developed, in no acute distress.   HEAD: Normocephalic, atraumatic.  NEUROLOGIC: Cranial Nerves: II-XII grossly intact, also see MUSCULOSKELETAL  MUSCULOSKELETAL: Knee-left  Knee Exam-abnormal  Gait-abnormal  Muscle Appearance:abnormal  Grooming:normal  Spine Alignment-normal  Muscle Atrophy-Positive  Deformities-Positive  Tenderness-Positive  Paresthesias-Positive  Range of Motion         Ext-abnormal         Flex-abnormal  Muscle Strength-abnormal  Sensation-abnormal  Reflexes-abnormal  Crepitus-Positive                                Swelling-Positive  Effusion- Negative                                Edema-Negative and Positive  Lachman-Negative                               Erythema-Negative  [facility]'s-Negative                         "    Apley Grind-Positive  Patellar Comp-Positive                        Alignment-normal/symmetric  Patellar Apprehension-Negative            Synovial fullness-Negative  Passive Patellar Tilt-abnormal  Patellar Tracking-abnormal   Patellar Glide-abnormal  Q-Angle at 90 degrees-normal  Patellar Grind-abnormal  L-Rijg-Wucvoeru  Fatigue-Negative                                     HS Tightness-Negative  Tests on Exam-abnormal  Neurovascular Status-normal+2 DP and PT artery pulses  Skin-normal  Mental Status-normal               Diagnosis:              1. Left knee failed implant                        2. Left knee pain                    Diagnostic Studies  MRI-No  X-Ray-Yes agree with the findings of Findings: Patient is status post left total knee arthroplasty.  There is new joint space narrowing medially in the left knee with associated varus angulation. New circumscribed lucency noted in the medial tibial plateau adjacent to the prosthesis with mild associated periosteal reaction.  Findings suggest hardware failure with possible particle disease.  Infection not excluded, correlate clinically.  No definite joint effusion demonstrated on the left.  There is tricompartmental osteoarthritis on the right with moderate lateral tibiofemoral compartment joint space loss.  No acute fractures or dislocations visualized.  EMG/NCV-No  Arthrogram-No     Plan:                                                  1. PT-no                                                 2.OT-no                                          3.NSAID-no                                        4. Narcotics-no                                     5. Wound care-N/A                                 6. Rest-no                                           7. Surgery-yes  Revision, removal of current implant and total left knee arthroplasty.                                       8. TALAT Hose-no                                    9. Anticoagulation therapy-no                10. Elevation-no                                     11. Crutches-no                                    12. Walker-no             13. Cane no                        14. Referral-no                                     15.Injection-no                            16. Splint   /    Cast   /   Cast Shoe-No              17. RICE            18. Follow up-  All of the patient's questions and concerns were answered by Dr Gill and myself. He accepts the risk and complications of the left knee revision and possibility of infection in his knee. He elects to proceed with a left knee revision for his currently  Failed implant.

## 2017-06-27 NOTE — ANESTHESIA RELEASE NOTE
"Anesthesia Release from PACU Note    Patient: Dominic Cohen    Procedure(s) Performed: Procedure(s) (LRB):  ARTHROPLASTY-KNEE, left knee- with left knee Revision (Left)  REMOVAL-HARDWARE-left knee (Left)    Anesthesia type: general    Post pain: Adequate analgesia    Post assessment: no apparent anesthetic complications, tolerated procedure well and no evidence of recall    Last Vitals:   Visit Vitals  BP (!) 184/93   Pulse 81   Temp 36.4 °C (97.5 °F) (Temporal)   Resp 13   Ht 6' 4" (1.93 m)   Wt 133.3 kg (293 lb 15.7 oz)   SpO2 (!) 93%   BMI 35.78 kg/m²       Post vital signs: stable    Level of consciousness: awake, alert  and oriented    Nausea/Vomiting: no nausea/no vomiting    Complications: none    Airway Patency: patent    Respiratory: unassisted, spontaneous ventilation, room air    Cardiovascular: stable and blood pressure at baseline    Hydration: euvolemic  "

## 2017-06-27 NOTE — PLAN OF CARE
Pt resting on stretcher s/p lt total knee revision performed under general anesthesia by Dr. Gill. Pt denies pain at present. Respirations even and unlabored on 98% face tent with O2 sats of 96%, will cont to monitor. See flow sheet for detailed assessment. VSS. Neurovascular checks remain intact. Lt knee dsg remains c/d/i. Will cont to monitor.

## 2017-06-27 NOTE — TRANSFER OF CARE
"Anesthesia Transfer of Care Note    Patient: Dominic Cohen    Procedure(s) Performed: Procedure(s) (LRB):  ARTHROPLASTY-KNEE, left knee- with left knee Revision (Left)  REMOVAL-HARDWARE-left knee (Left)    Patient location: PACU    Anesthesia Type: general    Transport from OR: Transported from OR on room air with adequate spontaneous ventilation    Post pain: adequate analgesia    Post assessment: no apparent anesthetic complications and tolerated procedure well    Post vital signs: stable    Level of consciousness: awake and responds to stimulation    Nausea/Vomiting: no nausea/vomiting    Complications: none    Transfer of care protocol was followed      Last vitals:   Visit Vitals  BP (!) 180/107   Pulse 62   Temp 37.2 °C (99 °F) (Tympanic)   Ht 6' 4" (1.93 m)   Wt 133.3 kg (293 lb 15.7 oz)   SpO2 95%   BMI 35.78 kg/m²     "

## 2017-06-27 NOTE — ANESTHESIA PREPROCEDURE EVALUATION
06/27/2017  Dominic Cohen is a 81 y.o., male.    Anesthesia Evaluation    I have reviewed the Patient Summary Reports.        Review of Systems  Cardiovascular:   Hypertension Dysrhythmias ECG has been reviewed. Normal left ventricular systolic function (EF 55-60%).     2 - Left ventricular diastolic dysfunction.     3 - Normal right ventricular systolic function .     4 - The estimated PA systolic pressure is 26 mmHg.     5 - Trivial to mild tricuspid regurgitation.    Musculoskeletal:   H/o lumbago s/p lumbar fusion   Neurological:   Peripheral Neuropathy        Physical Exam  General:  Well nourished    Airway/Jaw/Neck:  Airway Findings: Mouth Opening: Normal Mallampati: II      Dental:  Dental Findings: Edentulous   Chest/Lungs:  Chest/Lungs Findings: Clear to auscultation     Heart/Vascular:  Heart Findings: Rate: Normal             Anesthesia Plan  Type of Anesthesia, risks & benefits discussed:  Anesthesia Type:  general  Patient's Preference:   Intra-op Monitoring Plan:   Intra-op Monitoring Plan Comments:   Post Op Pain Control Plan: IV/PO Opioids PRN  Post Op Pain Control Plan Comments:   Induction:    Beta Blocker:  Patient is not currently on a Beta-Blocker (No further documentation required).       Informed Consent: Patient understands risks and agrees with Anesthesia plan.  Questions answered. Anesthesia consent signed with patient.  ASA Score: 3     Day of Surgery Review of History & Physical:  There are no significant changes.          Ready For Surgery From Anesthesia Perspective.     WBC 3.90 - 12.70 K/uL 5.20   RBC 4.60 - 6.20 M/uL 3.83    Hemoglobin 14.0 - 18.0 g/dL 12.8    Hematocrit 40.0 - 54.0 % 39.1    MCV 82 - 98 fL 102    MCH 27.0 - 31.0 pg 33.4    MCHC 32.0 - 36.0 % 32.7   RDW 11.5 - 14.5 % 12.7   Platelets 150 - 350 K/uL 185     Sodium 136 - 145 mmol/L 142   Potassium 3.5 -  5.1 mmol/L 4.5   Chloride 95 - 110 mmol/L 107   CO2 23 - 29 mmol/L 28   Glucose 70 - 110 mg/dL 121    BUN, Bld 8 - 23 mg/dL 18   Creatinine 0.5 - 1.4 mg/dL 1.3

## 2017-06-27 NOTE — OP NOTE
OPERATIVE DICTATION:    DATE OF SERVICE:06/27/2017      Preoperative Diagnosis:  Left knee orthopedic implant failure, bone cyst    Postoperative Diagnosis:  Left knee orthopedic implant failure, bone defect and bone cyst, synovitis     Procedure: total synovectomy, total knee revision, removal of orthopedic implant, major bone graft of bone cyst    Indication for surgery: left knee pain     Anesthesia:  General     Complications:  None.  The degree of difficulty of this operation was 100% greater than normal due to the complexity of the procedure, the contracture of the knee joint, the arthrofibrosis.  The time required to perform the procedure was twice the normal time required for a total knee replacement.  The assistance of 3 people throughout the procedure was necessary.    Surgeon: Malcolm Gill M.D.     Specimen:bone, implant and synovium    Assistant:MIGDALIA Lu.  His assistance was critical and necessary with positioning of the extremity throughout the surgical procedure. The physician assistant allowed me to access areas of the  that could not be possible without the assistance of a trained orthopedic physician assistant.  His assistance was critical to allowing me to provide the highest level of care to the patient.      Operative procedure:  total synovectomy, total knee revision, removal of orthopedic implant, major bone graft of bone cyst     The patient was brought to operating room after appropriate consent and placed under general anesthesia with intubation.  The left lower extremity was prepped alcohol chlorhexidine and sterilely sheet sterilely draped.  The timeout was performed and the correct extremity identified.  The Esmarch was used for exsanguination and the tourniquet inflated to 330 mmHg pressure.  The patient's old incision was reopened.  Dissection carried through subcutaneous tissue.  They've the Y plasty incision was made over the quadriceps tendon and the patella was  mobilized distally.  The patient noted to have a significant amount of arthrofibrosis tissue surrounding the knee joint.  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  A total synovectomy was performed.  The osteotome was used to remove the polyethylene.  The saw and osteotome was used to loosen the femoral component.  The femoral component was removed with most of the cement attached to the prosthesis.  The osteotome and saw was used to loosen the tibial component as well.  The tibial component was removed.  The patient noted to have a large cyst over the medial tibial plateau involving the proximal metaphysis.  The curette was used to remove the tissue lining the cyst.  The tissue was submitted for pathological specimen.  There was no gross evidence of infection.  The synovial tissue and cyst consistency was more in line with a polyethylene synovitis and inflammatory response.  The osteotome and curettes were used to remove the cement from the tibial and femoral surfaces.  The PCL was noted to be intact.  The synovectomy was performed performed in the popliteal area as well.  The tourniquet was deflated for 15 minutes after the initial 2 hours.  The Esmarch was used for exsanguination and the tourniquet was reinflated to 330 mmHg pressure.  The patella was reamed with a 41 mm reamer.  Drill holes made and the patella button tried.  Vancomycin was mixed with the cement and the cement was placed on the tibial and femoral surfaces.  The cement was also placed on the undersurface of the tibial and femoral components.  The size 41 patella 5 tibia 6 femur were tried seeing a 14 mm x 150 mm tibial stem.  The trial prosthesis noted to fit quite well.  Following the trial the permanent King size 41 patella 5 tibia 6 femur and tibial stem measuring 14 x 150 mm was placed.  The actual compression was applied and the excess  cement was removed.  Prior to placing the bone cement the bone cyst and lateral femur were bone grafted with cancellus bone chips along with bone morphogenic protein.  The cement was allowed to cure and the knee was reexplored.  All excess cement was removed.  Pulse lavage was thoroughly performed.  Solution was used to irrigate the knee prior to placement of the bone cement.  The quadriceps tendon was repaired using interrupted #1 Vicryl sutures and reinforced with #5 Tycron sutures.  A deep drain was placed.  A superficial drain was placed.  The subcutaneous tennis tissue opposed with #1 Vicryl sutures as well as a subcuticular 3-0 Prolene suture.  The overlying skin was reinforced with staples.   The Esmarch was used to re-exsanguinate the knee following the removal of the cement.  Betadine gauze and an Ace wrap was applied to the skin and the tourniquet was deflated.  The patient tolerated the procedure well and left operating room in good condition.                     Malcolm Gill M.D.

## 2017-06-27 NOTE — PLAN OF CARE
X Ray techs x 2 at bedside obtaining post op Xrays. Pt tolerating well. Neurovascular checks remain intact. VSS. See flow sheet for detailed assessment.

## 2017-06-27 NOTE — ANESTHESIA PROCEDURE NOTES
Intubation    Staffing  Resident/CRNA: DANIELLE GRIER  Anesthesiologist was present at the time of the procedure.  Intubation  Indication: airway protection, surgery  Pre-oxygenation. Induction: intravenous, mask ventilation: easy with oral airway.  Intubation: postinduction, laryngoscopy direct, Carrillo 2.  Endotracheal Tube: oral, 7.5 mm ID, cuffed (inflated to minimal occlusive pressure)  Attempts: 1, Grade I - full view of cords  Complicating Factors: none  Tube secured at 22 cm at the lips.  Findings post-intubation: bilateral breath sounds  Position Confirmation: auscultation  Eye Care: taped closed

## 2017-06-28 PROBLEM — D62 ACUTE BLOOD LOSS ANEMIA: Status: ACTIVE | Noted: 2017-06-28

## 2017-06-28 LAB
ANION GAP SERPL CALC-SCNC: 7 MMOL/L
BASOPHILS # BLD AUTO: 0.01 K/UL
BASOPHILS # BLD AUTO: 0.01 K/UL
BASOPHILS # BLD AUTO: 0.02 K/UL
BASOPHILS NFR BLD: 0.1 %
BASOPHILS NFR BLD: 0.1 %
BASOPHILS NFR BLD: 0.2 %
BUN SERPL-MCNC: 16 MG/DL
CALCIUM SERPL-MCNC: 8.1 MG/DL
CHLORIDE SERPL-SCNC: 106 MMOL/L
CO2 SERPL-SCNC: 27 MMOL/L
CREAT SERPL-MCNC: 1.1 MG/DL
DIFFERENTIAL METHOD: ABNORMAL
EOSINOPHIL # BLD AUTO: 0 K/UL
EOSINOPHIL # BLD AUTO: 0 K/UL
EOSINOPHIL # BLD AUTO: 0.1 K/UL
EOSINOPHIL NFR BLD: 0.1 %
EOSINOPHIL NFR BLD: 0.5 %
EOSINOPHIL NFR BLD: 1.2 %
ERYTHROCYTE [DISTWIDTH] IN BLOOD BY AUTOMATED COUNT: 13.2 %
ERYTHROCYTE [DISTWIDTH] IN BLOOD BY AUTOMATED COUNT: 13.4 %
ERYTHROCYTE [DISTWIDTH] IN BLOOD BY AUTOMATED COUNT: 13.7 %
EST. GFR  (AFRICAN AMERICAN): >60 ML/MIN/1.73 M^2
EST. GFR  (NON AFRICAN AMERICAN): >60 ML/MIN/1.73 M^2
GLUCOSE SERPL-MCNC: 123 MG/DL
HCT VFR BLD AUTO: 27.4 %
HCT VFR BLD AUTO: 29.3 %
HCT VFR BLD AUTO: 29.3 %
HCT VFR BLD AUTO: 32.2 %
HGB BLD-MCNC: 10.6 G/DL
HGB BLD-MCNC: 9 G/DL
HGB BLD-MCNC: 9.8 G/DL
HGB BLD-MCNC: 9.8 G/DL
LYMPHOCYTES # BLD AUTO: 1.1 K/UL
LYMPHOCYTES # BLD AUTO: 1.3 K/UL
LYMPHOCYTES # BLD AUTO: 1.6 K/UL
LYMPHOCYTES NFR BLD: 12.6 %
LYMPHOCYTES NFR BLD: 18.1 %
LYMPHOCYTES NFR BLD: 18.3 %
MCH RBC QN AUTO: 33 PG
MCH RBC QN AUTO: 33.3 PG
MCH RBC QN AUTO: 33.4 PG
MCHC RBC AUTO-ENTMCNC: 32.8 %
MCHC RBC AUTO-ENTMCNC: 32.9 %
MCHC RBC AUTO-ENTMCNC: 33.4 %
MCV RBC AUTO: 100 FL
MCV RBC AUTO: 100 FL
MCV RBC AUTO: 102 FL
MONOCYTES # BLD AUTO: 0.9 K/UL
MONOCYTES # BLD AUTO: 1 K/UL
MONOCYTES # BLD AUTO: 1.1 K/UL
MONOCYTES NFR BLD: 10.4 %
MONOCYTES NFR BLD: 12.4 %
MONOCYTES NFR BLD: 12.8 %
NEUTROPHILS # BLD AUTO: 5.1 K/UL
NEUTROPHILS # BLD AUTO: 5.9 K/UL
NEUTROPHILS # BLD AUTO: 6.4 K/UL
NEUTROPHILS NFR BLD: 67.9 %
NEUTROPHILS NFR BLD: 68.5 %
NEUTROPHILS NFR BLD: 76.8 %
PLATELET # BLD AUTO: 117 K/UL
PLATELET # BLD AUTO: 153 K/UL
PLATELET # BLD AUTO: 157 K/UL
PMV BLD AUTO: 11.3 FL
PMV BLD AUTO: 11.4 FL
PMV BLD AUTO: 11.5 FL
POCT GLUCOSE: 124 MG/DL (ref 70–110)
POCT GLUCOSE: 149 MG/DL (ref 70–110)
POTASSIUM SERPL-SCNC: 4.1 MMOL/L
RBC # BLD AUTO: 2.7 M/UL
RBC # BLD AUTO: 2.93 M/UL
RBC # BLD AUTO: 3.21 M/UL
SODIUM SERPL-SCNC: 140 MMOL/L
WBC # BLD AUTO: 7.42 K/UL
WBC # BLD AUTO: 8.36 K/UL
WBC # BLD AUTO: 8.69 K/UL

## 2017-06-28 PROCEDURE — 99900038 HC OT GENERIC THERAPY SCREENING (STAT)

## 2017-06-28 PROCEDURE — G8979 MOBILITY GOAL STATUS: HCPCS | Mod: CJ

## 2017-06-28 PROCEDURE — 80048 BASIC METABOLIC PNL TOTAL CA: CPT

## 2017-06-28 PROCEDURE — 97162 PT EVAL MOD COMPLEX 30 MIN: CPT

## 2017-06-28 PROCEDURE — 97116 GAIT TRAINING THERAPY: CPT

## 2017-06-28 PROCEDURE — 85025 COMPLETE CBC W/AUTO DIFF WBC: CPT | Mod: 91

## 2017-06-28 PROCEDURE — 25000003 PHARM REV CODE 250: Performed by: ORTHOPAEDIC SURGERY

## 2017-06-28 PROCEDURE — G8978 MOBILITY CURRENT STATUS: HCPCS | Mod: CK

## 2017-06-28 PROCEDURE — 85014 HEMATOCRIT: CPT

## 2017-06-28 PROCEDURE — 85018 HEMOGLOBIN: CPT

## 2017-06-28 PROCEDURE — 36415 COLL VENOUS BLD VENIPUNCTURE: CPT

## 2017-06-28 PROCEDURE — 11000001 HC ACUTE MED/SURG PRIVATE ROOM

## 2017-06-28 RX ADMIN — OXYCODONE HYDROCHLORIDE 10 MG: 10 TABLET, FILM COATED, EXTENDED RELEASE ORAL at 08:06

## 2017-06-28 RX ADMIN — APIXABAN 2.5 MG: 2.5 TABLET, FILM COATED ORAL at 08:06

## 2017-06-28 RX ADMIN — SODIUM CHLORIDE: 0.9 INJECTION, SOLUTION INTRAVENOUS at 03:06

## 2017-06-28 RX ADMIN — TIMOLOL MALEATE 1 DROP: 5 SOLUTION OPHTHALMIC at 09:06

## 2017-06-28 RX ADMIN — TIMOLOL MALEATE 1 DROP: 5 SOLUTION OPHTHALMIC at 08:06

## 2017-06-28 RX ADMIN — OXYCODONE HYDROCHLORIDE 10 MG: 10 TABLET, FILM COATED, EXTENDED RELEASE ORAL at 09:06

## 2017-06-28 RX ADMIN — PREGABALIN 75 MG: 25 CAPSULE ORAL at 09:06

## 2017-06-28 NOTE — PT/OT/SLP PROGRESS
Physical Therapy      Dominicdeondre Cohen  MRN: [id number]    11:15 PT MET IN  WITH C/O DIZZINESS. PT REPORTED UNABLE TO LULY TX AT THIS TIME. PT REFUSED TE AS WELL.     Little [place]ak, PT 6/28/2017

## 2017-06-28 NOTE — PLAN OF CARE
Problem: Patient Care Overview  Goal: Plan of Care Review  Pt verbalized understanding of plan of care. Fall precautions maintained.  Bed locked and low.  Call light and personal items within reach. SR up x 2. CPM in place pt encouraged to shift positions. Hemovac output 1400 cc from [id number]

## 2017-06-28 NOTE — ANESTHESIA POSTPROCEDURE EVALUATION
"Anesthesia Post Evaluation    Patient: Dominic Cohen    Procedure(s) Performed: Procedure(s) (LRB):  ARTHROPLASTY-KNEE, left knee- with left knee Revision (Left)  REMOVAL-HARDWARE-left knee (Left)    Final Anesthesia Type: general  Patient location during evaluation: PACU  Patient participation: Yes- Able to Participate  Level of consciousness: awake and alert  Post-procedure vital signs: reviewed and stable  Pain management: adequate  Airway patency: patent  PONV status at discharge: No PONV  Anesthetic complications: no      Cardiovascular status: blood pressure returned to baseline and hemodynamically stable  Respiratory status: unassisted  Hydration status: euvolemic  Follow-up not needed.        Visit Vitals  /63 (BP Location: Left arm, Patient Position: Lying, BP Method: Automatic)   Pulse 69   Temp 36.9 °C (98.4 °F) (Oral)   Resp 18   Ht 6' 4" (1.93 m)   Wt 133.3 kg (293 lb 15.7 oz)   SpO2 (!) 92%   BMI 35.78 kg/m²       Pain/Dutch Score: Pain Assessment Performed: Yes (6/27/2017  6:50 PM)  Presence of Pain: denies (6/27/2017  6:50 PM)  Pain Rating Prior to Med Admin: 1 (6/27/2017  9:20 PM)  Pain Rating Post Med Admin: 0 (6/27/2017 10:00 PM)  Dutch Score: 9 (6/27/2017  5:43 PM)      "

## 2017-06-28 NOTE — SUBJECTIVE & OBJECTIVE
Past Medical History:   Diagnosis Date    Arthritis     Atherosclerosis of abdominal aorta     Cataract     Chronic constipation     Glaucoma     History of anal fissures     Hypertension     Polyneuropathy in other diseases classified elsewhere     due to folate deficiency    Prediabetes     Venous insufficiency     Venous insufficiency        Past Surgical History:   Procedure Laterality Date    BACK SURGERY      CATARACT EXTRACTION BILATERAL W/ ANTERIOR VITRECTOMY      COLONOSCOPY N/A 5/13/2016    Procedure: COLONOSCOPY;  Surgeon: Presley Phillips MD;  Location: [address];  Service: Endoscopy;  Laterality: N/A;  EGD with Dr. Jeffery prior to Colonoscopy. EC    KNEE SURGERY Left     PROSTATE SURGERY      TONSILLECTOMY, ADENOIDECTOMY         Review of patient's allergies indicates:   Allergen Reactions    Penicillins Hives and Swelling    Sulfa (sulfonamide antibiotics) Hives    Protonix [pantoprazole] Rash       No current facility-administered medications on file prior to encounter.      Current Outpatient Prescriptions on File Prior to Encounter   Medication Sig    ascorbic acid (VITAMIN C) 60 mg Lozg Take 1 tablet by mouth daily as needed.     cyanocobalamin, vitamin B-12, (VITAMIN B-12) 50 mcg tablet Take 50 mcg by mouth once daily.    ERGOCALCIFEROL, VITAMIN D2, (VITAMIN D ORAL) Take 1 tablet by mouth once daily.     linaclotide (LINZESS) 145 mcg Cap capsule Take 1 capsule (145 mcg total) by mouth once daily.    mineral oil (FLEET MINERAL OIL) enema Place 1 enema rectally once daily.    ramipril (ALTACE) 2.5 MG capsule Take 1 capsule (2.5 mg total) by mouth once daily. (Patient taking differently: Take 1.25 mg by mouth once daily. )    timolol maleate 0.5% (TIMOPTIC) 0.5 % Drop Place 1 drop into both eyes 2 (two) times daily.    blood sugar diagnostic (BLOOD GLUCOSE TEST) Strp 1 strip by Misc.(Non-Drug; Combo Route) route 3 (three) times a week.    [DISCONTINUED]  aspirin 81 MG Chew Take 1 tablet (81 mg total) by mouth once daily. (Patient taking differently: Take 81 mg by mouth daily as needed. )     Family History     Problem Relation (Age of Onset)    No Known Problems  Reviewed and Not Pertinent Son, Daughter, Son        Social History Main Topics    Smoking status: Former Smoker     Packs/day: 0.30     Years: 52.00     Types: Cigarettes     Quit date: 11/13/2000    Smokeless tobacco: Never Used    Alcohol use Yes      Comment: Very rarely    Drug use: No    Sexual activity: Yes     Review of Systems   Constitutional: Negative.  Negative for appetite change, fatigue and fever.   HENT: Negative.  Negative for congestion, nosebleeds and sore throat.    Eyes: Negative.  Negative for photophobia, redness and visual disturbance.   Respiratory: Negative.  Negative for cough, shortness of breath and wheezing.    Cardiovascular: Negative.  Negative for chest pain, palpitations and leg swelling.   Gastrointestinal: Negative.  Negative for abdominal pain, constipation, diarrhea, nausea and vomiting.   Endocrine: Negative.  Negative for polydipsia, polyphagia and polyuria.   Genitourinary: Negative.  Negative for dysuria, flank pain, frequency and urgency.   Musculoskeletal: Positive for arthralgias (left knee pain, s/p surgery today). Negative for back pain and joint swelling.   Skin: Negative.  Negative for color change, pallor and rash.   Allergic/Immunologic: Negative.  Negative for environmental allergies, food allergies and immunocompromised state.   Neurological: Negative.  Negative for dizziness, syncope, weakness, light-headedness, numbness and headaches.   Hematological: Negative.    Psychiatric/Behavioral: Negative.  Negative for confusion and hallucinations. The patient is not nervous/anxious.    All other systems reviewed and are negative.    Objective:     Vital Signs (Most Recent):  Temp: 98.2 °F (36.8 °C) (06/27/17 2051)  Pulse: 85 (06/27/17 2051)  Resp: 16  (06/27/17 2051)  BP: 132/83 (06/27/17 2051)  SpO2: (!) 94 % (06/27/17 2051) Vital Signs (24h Range):  Temp:  [97.5 °F (36.4 °C)-99 °F (37.2 °C)] 98.2 °F (36.8 °C)  Pulse:  [62-87] 85  Resp:  [13-16] 16  SpO2:  [92 %-96 %] 94 %  BP: (132-203)/() 132/83     Weight: 133.3 kg (293 lb 15.7 oz)  Body mass index is 35.78 kg/m².    Physical Exam   Constitutional: He is oriented to person, place, and time. He appears well-developed and well-nourished. No distress.   HENT:   Head: Normocephalic and atraumatic.   Eyes: Conjunctivae and EOM are normal. Pupils are equal, round, and reactive to light. No scleral icterus.   Neck: Normal range of motion. Neck supple. No thyromegaly present.   Cardiovascular: Normal rate, regular rhythm and normal heart sounds.    No murmur heard.  Pulmonary/Chest: Effort normal and breath sounds normal. No respiratory distress. He has no wheezes. He exhibits no tenderness.   Abdominal: Soft. Bowel sounds are normal. There is no tenderness.   Musculoskeletal: He exhibits no edema or tenderness.   Left knee in ace wrap, with drain in place   Lymphadenopathy:     He has no cervical adenopathy.   Neurological: He is alert and oriented to person, place, and time. No cranial nerve deficit. He exhibits normal muscle tone. Coordination normal.   Skin: Skin is warm and dry. He is not diaphoretic.   Psychiatric: He has a normal mood and affect. His behavior is normal. Thought content normal.   Nursing note and vitals reviewed.      Significant Labs:   BMP: No results for input(s): GLU, NA, K, CL, CO2, BUN, CREATININE, CALCIUM, MG in the last 48 hours.  CBC:   Recent Labs  Lab 06/27/17  1657   HGB 12.4*   HCT 36.5*     CMP: No results for input(s): NA, K, CL, CO2, GLU, BUN, CREATININE, CALCIUM, PROT, ALBUMIN, BILITOT, ALKPHOS, AST, ALT, ANIONGAP, EGFRNONAA in the last 48 hours.    Invalid input(s): ESTYUEAFDEDRA  All pertinent labs within the past 24 hours have been reviewed.    Significant Imaging: I  have reviewed and interpreted all pertinent imaging results/findings within the past 24 hours.     Imaging Results          X-Ray Knee 1 or 2 View Left (Final result)  Result time 06/27/17 17:46:30    Final result by Jerrica Mai III, MD (06/27/17 17:46:30)                 Impression:     Satisfactory postoperative appearance of the left TKA without evidence of acute complication.      Electronically signed by: JERRICA MAI MD  Date:     06/27/17  Time:    17:46              Narrative:    XR KNEE 1 OR 2 VIEW LEFT    Indication: Left total knee arthroplasty    Findings: The total knee arthroplasty is intact and projects in expected position without evidence of periprosthetic lucency, fracture or hardware failure. Joint alignment is within normal limits. Soft tissue gas in the anterior knee is within normal early postop limits. No abnormally retained surgical instruments are identified.  A percutaneous surgical drain is in place.

## 2017-06-28 NOTE — HOSPITAL COURSE
Pt admitted to Medical Surgical Unit post left total knee replacement per Dr. Gill (Orthopedic Surgery).  Hospital Medicine consulted for medical management.  Antihypertensive medications held due to episodes of hypotension.  Increased drain output noted. H/H stable post blood transfusion of 2 units PRBCs.  Pt tolerating PT.  Vital signs stable post IV hydration.  Case management consulted for discharge planning.

## 2017-06-28 NOTE — HPI
Mr. Cohen is a 82 y/o AA male with h/o HTN, left knee arthroplasty, was electively admitted to 's service earlier today and patient successfully underwent total synovectomy, left total knee revision, removal of orthopedic implant, major bone graft of bone cyst. [facility] was consulted for medical management. Patient reports pain is tolerable at this time. Denies any other complaints.

## 2017-06-28 NOTE — NURSING
Left message for Dr Gill on his cell. 1600 vitals 100/53  Gave 500 ml bolus reassessed BP was 90/52. Will give another 250 ml bolus per Melony Fraga NP. Called lab for STAT H&H.

## 2017-06-28 NOTE — ASSESSMENT & PLAN NOTE
-s/p TKR procedure per Orthopedic Surgery on 6/27/17  -H/H stable  -supplemental oxygen prn to keep O2 sat > 92%  -will monitor and transfuse patient if needed

## 2017-06-28 NOTE — SUBJECTIVE & OBJECTIVE
Interval History: pt reports dizziness and left knee pain at surgical site.  Moderate amount of bloody drainage noted.  H/H decline post op with repeat CBC pending.  Antihypertensive medications held due to hypotension.  IVF bolus given.  Will transfuse pt if needed.      Review of Systems   Constitutional: Negative.  Negative for appetite change, fatigue and fever.   HENT: Negative.  Negative for congestion, nosebleeds and sore throat.    Eyes: Negative.  Negative for photophobia, redness and visual disturbance.   Respiratory: Negative.  Negative for cough, shortness of breath and wheezing.    Cardiovascular: Negative.  Negative for chest pain, palpitations and leg swelling.   Gastrointestinal: Negative.  Negative for abdominal pain, constipation, diarrhea, nausea and vomiting.   Endocrine: Negative.  Negative for polydipsia, polyphagia and polyuria.   Genitourinary: Negative.  Negative for dysuria, flank pain, frequency and urgency.   Musculoskeletal: Positive for arthralgias (left knee pain, s/p surgery today). Negative for back pain and joint swelling.   Skin: Negative.  Negative for color change, pallor and rash.   Allergic/Immunologic: Negative.  Negative for environmental allergies, food allergies and immunocompromised state.   Neurological: Negative.  Negative for dizziness, syncope, weakness, light-headedness, numbness and headaches.   Hematological: Negative.    Psychiatric/Behavioral: Negative.  Negative for confusion and hallucinations. The patient is not nervous/anxious.    All other systems reviewed and are negative.    Objective:     Vital Signs (Most Recent):  Temp: 98.7 °F (37.1 °C) (06/28/17 1600)  Pulse: 80 (06/28/17 1600)  Resp: 20 (06/28/17 1600)  BP: (!) 100/55 (06/28/17 1600)  SpO2: (!) 79 % (06/28/17 1600) Vital Signs (24h Range):  Temp:  [97.7 °F (36.5 °C)-98.7 °F (37.1 °C)] 98.7 °F (37.1 °C)  Pulse:  [68-85] 80  Resp:  [15-20] 20  SpO2:  [79 %-96 %] 79 %  BP: (100-189)/() 100/55      Weight: 133.3 kg (293 lb 15.7 oz)  Body mass index is 35.78 kg/m².    Intake/Output Summary (Last 24 hours) at 06/28/17 1653  Last data filed at 06/28/17 1625   Gross per 24 hour   Intake           956.25 ml   Output             3005 ml   Net         -2048.75 ml      Physical Exam   Constitutional: He is oriented to person, place, and time. He appears well-developed and well-nourished. No distress.   HENT:   Head: Normocephalic and atraumatic.   Eyes: Conjunctivae and EOM are normal. Pupils are equal, round, and reactive to light. No scleral icterus.   Neck: Normal range of motion. Neck supple. No thyromegaly present.   Cardiovascular: Normal rate, regular rhythm and normal heart sounds.    No murmur heard.  Pulmonary/Chest: Effort normal and breath sounds normal. No respiratory distress. He has no wheezes. He exhibits no tenderness.   Abdominal: Soft. Bowel sounds are normal. There is no tenderness.   Musculoskeletal: He exhibits no edema or tenderness.   Left knee in ace wrap, with drain in place  Moderate amount of bloody drainage noted.     Lymphadenopathy:     He has no cervical adenopathy.   Neurological: He is alert and oriented to person, place, and time. No cranial nerve deficit. He exhibits normal muscle tone. Coordination normal.   Skin: Skin is warm and dry. Capillary refill takes 2 to 3 seconds. He is not diaphoretic.   Psychiatric: He has a normal mood and affect. His behavior is normal. Thought content normal.   Nursing note and vitals reviewed.      Significant Labs:   CBC:   Recent Labs  Lab 06/27/17  1657 06/28/17  0015 06/28/17  0716   WBC  --  8.36 7.42   HGB 12.4* 10.6* 9.8*  9.8*   HCT 36.5* 32.2* 29.3*  29.3*   PLT  --  157 153     CMP:   Recent Labs  Lab 06/28/17  0716      K 4.1      CO2 27   *   BUN 16   CREATININE 1.1   CALCIUM 8.1*   ANIONGAP 7*   EGFRNONAA >60       Significant Imaging:   Imaging Results          X-Ray Knee 1 or 2 View Left (Final result)  Result  time 06/27/17 17:46:30    Final result by Jerrica Mai III, MD (06/27/17 17:46:30)                 Impression:     Satisfactory postoperative appearance of the left TKA without evidence of acute complication.      Electronically signed by: JERRICA MAI MD  Date:     06/27/17  Time:    17:46              Narrative:    XR KNEE 1 OR 2 VIEW LEFT    Indication: Left total knee arthroplasty    Findings: The total knee arthroplasty is intact and projects in expected position without evidence of periprosthetic lucency, fracture or hardware failure. Joint alignment is within normal limits. Soft tissue gas in the anterior knee is within normal early postop limits. No abnormally retained surgical instruments are identified.  A percutaneous surgical drain is in place.

## 2017-06-28 NOTE — PT/OT/SLP PROGRESS
Occupational Therapy      Dominic Washington  MRN: [id number]    Eval initiated via chart review. Pt c/o light headness. Dr reported low blood count. Pt requested to continue eval on later date.  Erin Adams OT   11:00  6/28/2017

## 2017-06-28 NOTE — PROGRESS NOTES
SUBJECTIVE:  Patient is status post Left total knee revision.  The patient is alert and oriented ×3.  He is in no acute distress.  He does complain of lightheadedness with ointment from a supine to erect position.  Patient complains of 6 /10 pain that is better with the  pain meds and aggravated with movement.               Past Medical History:   Diagnosis Date    Arthritis     Atherosclerosis of abdominal aorta     Cataract     Chronic constipation     Glaucoma     History of anal fissures     Hypertension     Polyneuropathy in other diseases classified elsewhere     due to folate deficiency    Prediabetes     Venous insufficiency     Venous insufficiency      Past Surgical History:   Procedure Laterality Date    BACK SURGERY      CATARACT EXTRACTION BILATERAL W/ ANTERIOR VITRECTOMY      COLONOSCOPY N/A 5/13/2016    Procedure: COLONOSCOPY;  Surgeon: Presley Phillips MD;  Location: [facility] ([address] [place] [address]);  Service: Endoscopy;  Laterality: N/A;  EGD with Dr. Jeffery prior to Colonoscopy. EC    KNEE SURGERY Left     PROSTATE SURGERY      TONSILLECTOMY, ADENOIDECTOMY       Review of patient's allergies indicates:   Allergen Reactions    Penicillins Hives and Swelling    Sulfa (sulfonamide antibiotics) Hives    Protonix [pantoprazole] Rash     No current facility-administered medications on file prior to encounter.      Current Outpatient Prescriptions on File Prior to Encounter   Medication Sig Dispense Refill    ascorbic acid (VITAMIN C) 60 mg Lozg Take 1 tablet by mouth daily as needed.       cyanocobalamin, vitamin B-12, (VITAMIN B-12) 50 mcg tablet Take 50 mcg by mouth once daily.      ERGOCALCIFEROL, VITAMIN D2, (VITAMIN D ORAL) Take 1 tablet by mouth once daily.       linaclotide (LINZESS) 145 mcg Cap capsule Take 1 capsule (145 mcg total) by mouth once daily. 30 capsule 5    mineral oil (FLEET MINERAL OIL) enema Place 1 enema rectally once daily. 2 enema 0    ramipril (ALTACE) 2.5 MG  "capsule Take 1 capsule (2.5 mg total) by mouth once daily. (Patient taking differently: Take 1.25 mg by mouth once daily. ) 90 capsule 3    timolol maleate 0.5% (TIMOPTIC) 0.5 % Drop Place 1 drop into both eyes 2 (two) times daily. 10 mL 12    blood sugar diagnostic (BLOOD GLUCOSE TEST) Strp 1 strip by Misc.(Non-Drug; Combo Route) route 3 (three) times a week. 32 strip 11     BP (!) 105/50 (BP Method: Manual)   Pulse 70   Temp 97.9 °F (36.6 °C) (Oral)   Resp 18   Ht 6' 4" (1.93 m)   Wt 133.3 kg (293 lb 15.7 oz)   SpO2 (!) 94%   BMI 35.78 kg/m²    ROS:  GENERAL: No fever, chills, fatigability or weight loss.  SKIN: No rashes, itching or changes in color or texture of skin.  HEAD: No headaches or recent head trauma.  EYES: Visual acuity fine. No photophobia, ocular pain or diplopia.  EARS: Denies ear pain, discharge or vertigo.  NOSE: No loss of smell, no epistaxis or postnasal drip.  MOUTH & THROAT: No hoarseness or change in voice. No excessive gum bleeding.  NODES: Denies swollen glands.  CHEST: Denies ADAMS, cyanosis, wheezing, cough and sputum production.  CARDIOVASCULAR: Denies chest pain, PND, orthopnea or reduced exercise tolerance.  ABDOMEN: Appetite fine. No weight loss. Denies diarrhea, abdominal pain, hematemesis or blood in stool.  URINARY: No flank pain, dysuria or hematuria.  PERIPHERAL VASCULAR: No claudication or cyanosis.  NEUROLOGIC: No history of seizures, paralysis, alteration of gait or coordination.  MUSCULOSKELETAL: See HPI    PE:  APPEARANCE: Well nourished, well developed, in no acute distress.   HEAD: Normocephalic, atraumatic.  EYES: PERRL. EOMI.   EARS: TM's intact. Light reflex normal. No retraction or perforation.   NOSE: Mucosa pink. Airway clear.  MOUTH & THROAT: No tonsillar enlargement. No pharyngeal erythema or exudate. No stridor.  NECK: Supple.   NODES: No cervical, axillary or inguinal lymph node enlargement.  CHEST: Lungs clear to auscultation.  CARDIOVASCULAR: Normal S1, " "S2. No rubs, murmurs or gallops.  ABDOMEN: Bowel sounds normal. Not distended. Soft. No tenderness or masses.  NEUROLOGIC: Cranial Nerves: II-XII grossly intact, also see MUSCULOSKELETAL  MUSCULOSKELETAL:        Left Knee -dressing intact, 2 plus dorsalis pedis and posterior tibial artery pulses, light touch intact Left lower extremity.  All digits are warm. No erythema, no warmth, no drainage, mild swelling,mild tenderness.  Less than 2 seconds capillary refill all digits.    Intake/Output - Last 3 Shifts       06/26 0700 - 06/27 0659 06/27 0700 - 06/28 0659 06/28 0700 - 06/29 0659    I.V. (mL/kg)  2856.3 (21.4)     IV Piggyback  100     Total Intake(mL/kg)  2956.3 (22.2)     Urine (mL/kg/hr)  1325     Drains  1750     Blood  20     Total Output   3095      Net   -138.8                   BP (!) 105/50 (BP Method: Manual)   Pulse 70   Temp 97.9 °F (36.6 °C) (Oral)   Resp 18   Ht 6' 4" (1.93 m)   Wt 133.3 kg (293 lb 15.7 oz)   SpO2 (!) 94%   BMI 35.78 kg/m²      Hb-9.8    Hct-29.3        ASSESSMENT:    The patient is stable and improving.  The dizziness is likely secondary to orthostatic hypotension.      PLAN:    Continue pain medication  Continue wound care  Continue physical therapy    "

## 2017-06-28 NOTE — PT/OT/SLP EVAL
Physical Therapy  Evaluation    Dominic Cohen   MRN: [id number]   Admitting Diagnosis: Left knee pain    PT Received On: 06/28/17  PT Start Time: 0755     PT Stop Time: 0820    PT Total Time (min): 25 min       Billable Minutes:  Evaluation 15 and Gait [id number]    Diagnosis: Left knee pain  P.T. DX: GT INSTABILITY     Past Medical History:   Diagnosis Date    Arthritis     Atherosclerosis of abdominal aorta     Cataract     Chronic constipation     Glaucoma     History of anal fissures     Hypertension     Polyneuropathy in other diseases classified elsewhere     due to folate deficiency    Prediabetes     Venous insufficiency     Venous insufficiency       Past Surgical History:   Procedure Laterality Date    BACK SURGERY      CATARACT EXTRACTION BILATERAL W/ ANTERIOR VITRECTOMY      COLONOSCOPY N/A 5/13/2016    Procedure: COLONOSCOPY;  Surgeon: Presley Phillips MD;  Location: [facility] ([address] [place] [address]);  Service: Endoscopy;  Laterality: N/A;  EGD with Dr. Jeffery prior to Colonoscopy. EC    KNEE SURGERY Left     PROSTATE SURGERY      TONSILLECTOMY, ADENOIDECTOMY         Referring physician: WATSON  Date referred to PT: 6/28/2017      General Precautions: Standard, fall  Orthopedic Precautions: LLE weight bearing as tolerated   Braces:              Patient History:  Lives With:  (WITH GIRLFRIEND)  Living Arrangements: house  Home Layout: Able to live on 1st floor  Stair Railings at Home: none  Transportation Available: family or friend will provide  Living Environment Comment: PT LIVES AT HOME IND AND IS RETIRED. PT REPORTED HAVING HANDICAPPED ACCESSIBLE  BATH PUT IN ON MONDAY  Equipment Currently Used at Home: none  DME owned (not currently used): none    Previous Level of Function:  Ambulation Skills: independent  Transfer Skills: independent  ADL Skills: independent  Work/Leisure Activity: independent    Subjective:  Communicated with NURSE HARTLEY AND [facility] CHART REVIEW  prior to session.   PT  AGREED TO EVAL AND TX   Chief Complaint: PAIN  Patient goals: WALK    Pain/Comfort  Pain Rating 1: 5/10  Location - Side 1: Left  Location 1: knee  Pain Addressed 1: Reposition      Objective:   Patient found with: peripheral IV, hemovac, lee catheter     Cognitive Exam:  Oriented to: Person, Place, Time and Situation    Follows Commands/attention: Follows multistep  commands  Communication: clear/fluent  Safety awareness/insight to disability: intact    Physical Exam:  Postural examination/scapula alignment: No postural abnormalities identified    Skin integrity: Visible skin intact  Edema: Mild L LE      Sensation:   Intact      Lower Extremity Range of Motion:  Right Lower Extremity:WFL   Left Lower Extremity: LIMITED    Lower Extremity Strength:  Right Lower Extremity: WFL  Left Lower Extremity: LIMITED'      Functional Mobility:  Bed Mobility:  Rolling/Turning Right: Minimum assistance  Supine to Sit: Minimum Assistance    Transfers:  Sit <> Stand Assistance: Minimum Assistance  Sit <> Stand Assistive Device: Rolling Walker  Bed <> Chair Technique: Stand Pivot  Bed <> Chair Assistance: Minimum Assistance  Bed <> Chair Assistive Device: Rolling Walker    Gait:   Gait Distance: PT GT TRAINED WITH RW X 8' WITH UNSTEADY GT AND REPORTS OF DIZZINESS  Assistance 1: Minimum assistance  Gait Assistive Device: Rolling walker  Gait Pattern: swing-to gait  Gait Deviation(s): decreased donta, decreased weight-shifting ability    Therapeutic Activities and Exercises:  PT GT TRAINED AND RETURNED SEATED IN CHAIR FOR REST. PT WITH C/O DIZZINESS AND BP TAKEN AFTER SEATED. PT BP 77/42 AND THEN RECOVERED /49 AFTER 3 MIN. PT LEFT SEATED AS PT REPORTED DIZZINESS PASSING. PT NURSE ODILIA TOBIN AND PT WITH GIRLFRIEND PRESENT IN  EATING BREAKFAST.     AM-PAC 6 CLICK MOBILITY  How much help from another person does this patient currently need?   1 = Unable, Total/Dependent Assistance  2 = A lot, Maximum/Moderate  Assistance  3 = A little, Minimum/Contact Guard/Supervision  4 = None, Modified [place]/Independent    Turning over in bed (including adjusting bedclothes, sheets and blankets)?: 3  Sitting down on and standing up from a chair with arms (e.g., wheelchair, bedside commode, etc.): 3  Moving from lying on back to sitting on the side of the bed?: 3  Moving to and from a bed to a chair (including a wheelchair)?: 3  Need to walk in hospital room?: 3  Climbing 3-5 steps with a railing?: 1  Total Score: 16     AM-PAC Raw Score CMS G-Code Modifier Level of Impairment Assistance   6 % Total / Unable   7 - 9 CM 80 - 100% Maximal Assist   10 - 14 CL 60 - 80% Moderate Assist   15 - 19 CK 40 - 60% Moderate Assist   20 - 22 CJ 20 - 40% Minimal Assist   23 CI 1-20% SBA / CGA   24 CH 0% Independent/ Mod I     Patient left up in chair with call button in reach.    Assessment:   Dominic Cohen is a 81 y.o. male with a medical diagnosis of Left knee pain and presents with S/P TKR AND GT IN STABILITY AND WILL BENEFIT FROM P.T. TO ADDRESS IMPAIRMENTS LISTED. .    Rehab identified problem list/impairments: Rehab identified problem list/impairments: weakness, impaired endurance, impaired self care skills, gait instability, impaired functional mobilty, impaired balance, decreased lower extremity function, pain, decreased ROM    Rehab potential is excellent.    Activity tolerance: Fair    Discharge recommendations: Discharge Facility/Level Of Care Needs: home health PT     Barriers to discharge: Barriers to Discharge: None    Equipment recommendations: Equipment Needed After Discharge: walker, rolling     GOALS:    Physical Therapy Goals        Problem: Physical Therapy Goal    Goal Priority Disciplines Outcome Goal Variances Interventions   Physical Therapy Goal     PT/OT, PT      Description:  PT WILL BE SEEN FOR P.T. FOR A MIN OF 5 OUT OF 7 DAYS A WEEK  LT17  1. PT WILL COMPLETE BED MOBILITY IND  2. PT WILL T/F  TO CHAIR WITH RW AND S.  3. PT WILL GT TRAIN X 150' WITH RW AND S.  4. PT WILL COMPLETE B LE TE X 20 REPS FOR STRENGTHENING.                      PLAN:    Patient to be seen   to address the above listed problems via gait training, therapeutic activities, therapeutic exercises  Plan of Care expires: 07/05/17  Plan of Care reviewed with: patient    Functional Assessment Tool Used: alicja colvin pac  Score: CK  Functional Limitation: Mobility: Walking and moving around  Mobility: Walking and Moving Around Current Status (): CK  Mobility: Walking and Moving Around Goal Status (): AMBROSIO Nowak, PT  06/28/2017

## 2017-06-28 NOTE — NURSING
980 cc hemovac drainage from 2100 - 2340. Marked moderate amount of bloody drainage on ace wrap. Secure chat message sent to Dr. Gill, awaiting response.    1204 STAT CBC complete as ordered, Neurovascular checks increased to q 2 hours. Will continue to monitor.    no

## 2017-06-28 NOTE — NURSING
Messaged Dr. Gill. Pt complained of dizziness with PT and upon standing. BP at 0825 with PT was 101/49. Rechecked at 0840 101/41 and manually 105/50.

## 2017-06-28 NOTE — ASSESSMENT & PLAN NOTE
- s/p left total knee revision today  - Orthopedic Surgery primary team  - Further management per orthopedics  - Pain control

## 2017-06-28 NOTE — PROGRESS NOTES
[facility] Hospital Medicine  Progress Note    Patient Name: Dominic Cohen  MRN: [id number]  Patient Class: IP- Inpatient   Admission Date: 6/27/2017  Length of Stay: 1 days  Attending Physician: Malcolm Gill Sr., MD  Primary Care Provider: Sudhakar Liu MD        Subjective:     Principal Problem:Left knee pain    HPI:  Mr. Cohen is a 82 y/o AA male with h/o HTN, left knee arthroplasty, was electively admitted to 's service earlier today and patient successfully underwent total synovectomy, left total knee revision, removal of orthopedic implant, major bone graft of bone cyst. [facility] was consulted for medical management. Patient reports pain is tolerable at this time. Denies any other complaints.    Hospital Course:  Pt admitted to Medical Surgical Unit post left total knee replacement per Dr. Gill (Orthopedic Surgery).  Hospital Medicine consulted for medical management.  Antihypertensive medications held due to episodes of hypotension.  Increased drain output noted.     Interval History: pt reports dizziness and left knee pain at surgical site.  Moderate amount of bloody drainage noted.  H/H decline post op with repeat CBC pending.  Antihypertensive medications held due to hypotension.  IVF bolus given.  Will transfuse pt if needed.      Review of Systems   Constitutional: Negative.  Negative for appetite change, fatigue and fever.   HENT: Negative.  Negative for congestion, nosebleeds and sore throat.    Eyes: Negative.  Negative for photophobia, redness and visual disturbance.   Respiratory: Negative.  Negative for cough, shortness of breath and wheezing.    Cardiovascular: Negative.  Negative for chest pain, palpitations and leg swelling.   Gastrointestinal: Negative.  Negative for abdominal pain, constipation, diarrhea, nausea and vomiting.   Endocrine: Negative.  Negative for polydipsia, polyphagia and polyuria.   Genitourinary: Negative.  Negative for dysuria,  flank pain, frequency and urgency.   Musculoskeletal: Positive for arthralgias (left knee pain, s/p surgery today). Negative for back pain and joint swelling.   Skin: Negative.  Negative for color change, pallor and rash.   Allergic/Immunologic: Negative.  Negative for environmental allergies, food allergies and immunocompromised state.   Neurological: Negative.  Negative for dizziness, syncope, weakness, light-headedness, numbness and headaches.   Hematological: Negative.    Psychiatric/Behavioral: Negative.  Negative for confusion and hallucinations. The patient is not nervous/anxious.    All other systems reviewed and are negative.    Objective:     Vital Signs (Most Recent):  Temp: 98.7 °F (37.1 °C) (06/28/17 1600)  Pulse: 80 (06/28/17 1600)  Resp: 20 (06/28/17 1600)  BP: (!) 100/55 (06/28/17 1600)  SpO2: (!) 79 % (06/28/17 1600) Vital Signs (24h Range):  Temp:  [97.7 °F (36.5 °C)-98.7 °F (37.1 °C)] 98.7 °F (37.1 °C)  Pulse:  [68-85] 80  Resp:  [15-20] 20  SpO2:  [79 %-96 %] 79 %  BP: (100-189)/() 100/55     Weight: 133.3 kg (293 lb 15.7 oz)  Body mass index is 35.78 kg/m².    Intake/Output Summary (Last 24 hours) at 06/28/17 1653  Last data filed at 06/28/17 1625   Gross per 24 hour   Intake           956.25 ml   Output             3005 ml   Net         -2048.75 ml      Physical Exam   Constitutional: He is oriented to person, place, and time. He appears well-developed and well-nourished. No distress.   HENT:   Head: Normocephalic and atraumatic.   Eyes: Conjunctivae and EOM are normal. Pupils are equal, round, and reactive to light. No scleral icterus.   Neck: Normal range of motion. Neck supple. No thyromegaly present.   Cardiovascular: Normal rate, regular rhythm and normal heart sounds.    No murmur heard.  Pulmonary/Chest: Effort normal and breath sounds normal. No respiratory distress. He has no wheezes. He exhibits no tenderness.   Abdominal: Soft. Bowel sounds are normal. There is no tenderness.    Musculoskeletal: He exhibits no edema or tenderness.   Left knee in ace wrap, with drain in place  Moderate amount of bloody drainage noted.     Lymphadenopathy:     He has no cervical adenopathy.   Neurological: He is alert and oriented to person, place, and time. No cranial nerve deficit. He exhibits normal muscle tone. Coordination normal.   Skin: Skin is warm and dry. Capillary refill takes 2 to 3 seconds. He is not diaphoretic.   Psychiatric: He has a normal mood and affect. His behavior is normal. Thought content normal.   Nursing note and vitals reviewed.      Significant Labs:   CBC:   Recent Labs  Lab 06/27/17  1657 06/28/17  0015 06/28/17  0716   WBC  --  8.36 7.42   HGB 12.4* 10.6* 9.8*  9.8*   HCT 36.5* 32.2* 29.3*  29.3*   PLT  --  157 153     CMP:   Recent Labs  Lab 06/28/17  0716      K 4.1      CO2 27   *   BUN 16   CREATININE 1.1   CALCIUM 8.1*   ANIONGAP 7*   EGFRNONAA >60       Significant Imaging:   Imaging Results          X-Ray Knee 1 or 2 View Left (Final result)  Result time 06/27/17 17:46:30    Final result by Jerrica Mai III, MD (06/27/17 17:46:30)                 Impression:     Satisfactory postoperative appearance of the left TKA without evidence of acute complication.      Electronically signed by: JERRICA MAI MD  Date:     06/27/17  Time:    17:46              Narrative:    XR KNEE 1 OR 2 VIEW LEFT    Indication: Left total knee arthroplasty    Findings: The total knee arthroplasty is intact and projects in expected position without evidence of periprosthetic lucency, fracture or hardware failure. Joint alignment is within normal limits. Soft tissue gas in the anterior knee is within normal early postop limits. No abnormally retained surgical instruments are identified.  A percutaneous surgical drain is in place.                            Assessment/Plan:      Acute blood loss anemia    -s/p TKR procedure per Orthopedic Surgery on 6/27/17  -H/H  stable  -supplemental oxygen prn to keep O2 sat > 92%  -will monitor and transfuse patient if needed          Prediabetes    - Accuchecks q6  - Low dose ISS  - Monitor          Hypertension    - Ramipril held due to hypotension  - Neuro checks  - Hydralazine prn for SBP > 170  - Monitor           * Left knee pain    - s/p left total knee revision today  - Orthopedic Surgery primary team  - Further management per orthopedics  - Pain control            VTE Risk Mitigation         Ordered     apixaban tablet 2.5 mg  2 times daily     Route:  Oral        06/27/17 1749     Place TALAT hose  Until discontinued      06/27/17 1750     Place sequential compression device  Until discontinued      06/27/17 1750     Medium Risk of VTE  Once      06/27/17 1749          Melony Fraga NP  Department of Hospital Medicine   [facility]

## 2017-06-28 NOTE — PLAN OF CARE
Initial assessment completed. Met with patient and family member. Patient stated that he has very good support at home and would like to go home with home health therapy. Sr Resource Guide provided with area listing of home health agencies. Preference letter presented, signed and placed in chart. Natasha Pierce  with [facility] notified of referral. Referral paperwork faxed via [facility] to [facility].Transitional Care Folder, [facility] pamph[facility], Discharge Planning Begins on Admission pamphlet, Advance Directive information along with Arelis Bateman RN Care Manager's contact information given to the patient. Instructed patient to call with any questions or concerns. Patient will need a walker upon discharge. Will secure an order. Update to Arel[facility] MILLER Case Mgr.       06/28/17 5450   Discharge Assessment   Assessment Type Discharge Planning Assessment   Confirmed/corrected address and phone number on facesheet? Yes   Assessment information obtained from? Patient;Caregiver;Medical Record   Expected Length of Stay (days) (TBD)   Communicated expected length of stay with patient/caregiver yes   Type of Healthcare Directive Received Living will;Advance Directive  (information given)   Prior to hospitilization cognitive status: Alert/Oriented   Prior to hospitalization functional status: Assistive Equipment   Current cognitive status: Alert/Oriented   Current Functional Status: Assistive Equipment;Needs Assistance   Arrived From home or self-care   Lives With significant other   Able to Return to Prior Arrangements unable to determine at this time (comments)   Is patient able to care for self after discharge? Unable to determine at this time (comments)   How many people do you have in your home that can help with your care after discharge? 1   Who are your caregiver(s) and their phone number(s)? Cammie Bello ( primary caregiver) [phone]   Patient's perception of discharge  disposition home health   Readmission Within The Last 30 Days no previous admission in last 30 days   Patient currently being followed by outpatient case management? No   Patient currently receives home health services? No   Does the patient currently use HME? Yes   Patient currently receives private duty nursing? No   Patient currently receives any other outside agency services? No   Equipment Currently Used at Home cane, straight   Do you have any problems affording any of your prescribed medications? No   Is the patient taking medications as prescribed? yes   Do you have any financial concerns preventing you from receiving the healthcare you need? No   Does the patient have transportation to healthcare appointments? Yes   Transportation Available car;family or friend will provide   On Dialysis? No   Does the patient receive services at the [facility]mad[facility] Clinic? No   Are there any open cases? No   Discharge Plan A Home;Home with family;Home Health   Patient/Family In Agreement With Plan yes

## 2017-06-28 NOTE — CONSULTS
[facility] Hospital Medicine  Consult Note    Patient Name: Dominic Cohen  MRN: [id number]  Admission Date: 6/27/2017  Hospital Length of Stay: 0 days  Attending Physician: Malcolm Gill Sr., MD   Primary Care Provider: Sudhakar Liu MD           Patient information was obtained from patient, relative(s) and ER records.     Consults  Subjective:     Principal Problem: Left knee pain    Chief Complaint: No chief complaint on file.       HPI: Mr. Cohen is a 82 y/o AA male with h/o HTN, left knee arthroplasty, was electively admitted to 's service earlier today and patient successfully underwent total synovectomy, left total knee revision, removal of orthopedic implant, major bone graft of bone cyst. [facility] was consulted for medical management. Patient reports pain is tolerable at this time. Denies any other complaints.    Past Medical History:   Diagnosis Date    Arthritis     Atherosclerosis of abdominal aorta     Cataract     Chronic constipation     Glaucoma     History of anal fissures     Hypertension     Polyneuropathy in other diseases classified elsewhere     due to folate deficiency    Prediabetes     Venous insufficiency     Venous insufficiency        Past Surgical History:   Procedure Laterality Date    BACK SURGERY      CATARACT EXTRACTION BILATERAL W/ ANTERIOR VITRECTOMY      COLONOSCOPY N/A 5/13/2016    Procedure: COLONOSCOPY;  Surgeon: Presley Phillips MD;  Location: [address]);  Service: Endoscopy;  Laterality: N/A;  EGD with Dr. Jeffery prior to Colonoscopy. EC    KNEE SURGERY Left     PROSTATE SURGERY      TONSILLECTOMY, ADENOIDECTOMY         Review of patient's allergies indicates:   Allergen Reactions    Penicillins Hives and Swelling    Sulfa (sulfonamide antibiotics) Hives    Protonix [pantoprazole] Rash       No current facility-administered medications on file prior to encounter.      Current Outpatient Prescriptions on  File Prior to Encounter   Medication Sig    ascorbic acid (VITAMIN C) 60 mg Lozg Take 1 tablet by mouth daily as needed.     cyanocobalamin, vitamin B-12, (VITAMIN B-12) 50 mcg tablet Take 50 mcg by mouth once daily.    ERGOCALCIFEROL, VITAMIN D2, (VITAMIN D ORAL) Take 1 tablet by mouth once daily.     linaclotide (LINZESS) 145 mcg Cap capsule Take 1 capsule (145 mcg total) by mouth once daily.    mineral oil (FLEET MINERAL OIL) enema Place 1 enema rectally once daily.    ramipril (ALTACE) 2.5 MG capsule Take 1 capsule (2.5 mg total) by mouth once daily. (Patient taking differently: Take 1.25 mg by mouth once daily. )    timolol maleate 0.5% (TIMOPTIC) 0.5 % Drop Place 1 drop into both eyes 2 (two) times daily.    blood sugar diagnostic (BLOOD GLUCOSE TEST) Strp 1 strip by Misc.(Non-Drug; Combo Route) route 3 (three) times a week.    [DISCONTINUED] aspirin 81 MG Chew Take 1 tablet (81 mg total) by mouth once daily. (Patient taking differently: Take 81 mg by mouth daily as needed. )     Family History     Problem Relation (Age of Onset)    No Known Problems  Reviewed and Not Pertinent Son, Daughter, Son        Social History Main Topics    Smoking status: Former Smoker     Packs/day: 0.30     Years: 52.00     Types: Cigarettes     Quit date: 11/13/2000    Smokeless tobacco: Never Used    Alcohol use Yes      Comment: Very rarely    Drug use: No    Sexual activity: Yes     Review of Systems   Constitutional: Negative.  Negative for appetite change, fatigue and fever.   HENT: Negative.  Negative for congestion, nosebleeds and sore throat.    Eyes: Negative.  Negative for photophobia, redness and visual disturbance.   Respiratory: Negative.  Negative for cough, shortness of breath and wheezing.    Cardiovascular: Negative.  Negative for chest pain, palpitations and leg swelling.   Gastrointestinal: Negative.  Negative for abdominal pain, constipation, diarrhea, nausea and vomiting.   Endocrine: Negative.   Negative for polydipsia, polyphagia and polyuria.   Genitourinary: Negative.  Negative for dysuria, flank pain, frequency and urgency.   Musculoskeletal: Positive for arthralgias (left knee pain, s/p surgery today). Negative for back pain and joint swelling.   Skin: Negative.  Negative for color change, pallor and rash.   Allergic/Immunologic: Negative.  Negative for environmental allergies, food allergies and immunocompromised state.   Neurological: Negative.  Negative for dizziness, syncope, weakness, light-headedness, numbness and headaches.   Hematological: Negative.    Psychiatric/Behavioral: Negative.  Negative for confusion and hallucinations. The patient is not nervous/anxious.    All other systems reviewed and are negative.    Objective:     Vital Signs (Most Recent):  Temp: 98.2 °F (36.8 °C) (06/27/17 2051)  Pulse: 85 (06/27/17 2051)  Resp: 16 (06/27/17 2051)  BP: 132/83 (06/27/17 2051)  SpO2: (!) 94 % (06/27/17 2051) Vital Signs (24h Range):  Temp:  [97.5 °F (36.4 °C)-99 °F (37.2 °C)] 98.2 °F (36.8 °C)  Pulse:  [62-87] 85  Resp:  [13-16] 16  SpO2:  [92 %-96 %] 94 %  BP: (132-203)/() 132/83     Weight: 133.3 kg (293 lb 15.7 oz)  Body mass index is 35.78 kg/m².    Physical Exam   Constitutional: He is oriented to person, place, and time. He appears well-developed and well-nourished. No distress.   HENT:   Head: Normocephalic and atraumatic.   Eyes: Conjunctivae and EOM are normal. Pupils are equal, round, and reactive to light. No scleral icterus.   Neck: Normal range of motion. Neck supple. No thyromegaly present.   Cardiovascular: Normal rate, regular rhythm and normal heart sounds.    No murmur heard.  Pulmonary/Chest: Effort normal and breath sounds normal. No respiratory distress. He has no wheezes. He exhibits no tenderness.   Abdominal: Soft. Bowel sounds are normal. There is no tenderness.   Musculoskeletal: He exhibits no edema or tenderness.   Left knee in ace wrap, with drain in place    Lymphadenopathy:     He has no cervical adenopathy.   Neurological: He is alert and oriented to person, place, and time. No cranial nerve deficit. He exhibits normal muscle tone. Coordination normal.   Skin: Skin is warm and dry. He is not diaphoretic.   Psychiatric: He has a normal mood and affect. His behavior is normal. Thought content normal.   Nursing note and vitals reviewed.      Significant Labs:   BMP: No results for input(s): GLU, NA, K, CL, CO2, BUN, CREATININE, CALCIUM, MG in the last 48 hours.  CBC:   Recent Labs  Lab 06/27/17  1657   HGB 12.4*   HCT 36.5*     CMP: No results for input(s): NA, K, CL, CO2, GLU, BUN, CREATININE, CALCIUM, PROT, ALBUMIN, BILITOT, ALKPHOS, AST, ALT, ANIONGAP, EGFRNONAA in the last 48 hours.    Invalid input(s): ESTGFAFR[place]  All pertinent labs within the past 24 hours have been reviewed.    Significant Imaging: I have reviewed and interpreted all pertinent imaging results/findings within the past 24 hours.     Imaging Results          X-Ray Knee 1 or 2 View Left (Final result)  Result time 06/27/17 17:46:30    Final result by Jerrica Mai III, MD (06/27/17 17:46:30)                 Impression:     Satisfactory postoperative appearance of the left TKA without evidence of acute complication.      Electronically signed by: JERRICA MAI MD  Date:     06/27/17  Time:    17:46              Narrative:    XR KNEE 1 OR 2 VIEW LEFT    Indication: Left total knee arthroplasty    Findings: The total knee arthroplasty is intact and projects in expected position without evidence of periprosthetic lucency, fracture or hardware failure. Joint alignment is within normal limits. Soft tissue gas in the anterior knee is within normal early postop limits. No abnormally retained surgical instruments are identified.  A percutaneous surgical drain is in place.                                Assessment/Plan:     * Left knee pain    - s/p left total knee revision today  - Further management per  orthopedics  - Pain control          Hypertension    - Ramipril resumed, monitor  - Hydralazine prn  - Monitor and will adjust meds as needed          Prediabetes    - Accuchecks q6  - Low dose ISS  - Monitor            VTE Risk Mitigation         Ordered     apixaban tablet 2.5 mg  2 times daily     Route:  Oral        06/27/17 1749     Place TALAT hose  Until discontinued      06/27/17 1750     Place sequential compression device  Until discontinued      06/27/17 1750     Medium Risk of VTE  Once      06/27/17 1749        Thank you for your consult. I will follow-up with patient. Please contact us if you have any additional questions.    John Thapa MD  Department of Hospital Medicine   [facility]

## 2017-06-28 NOTE — PLAN OF CARE
Problem: Patient Care Overview  Goal: Plan of Care Review  PT REQUIRES MIN A WITH RW FOR GT X 8'   Outcome: Ongoing (interventions implemented as appropriate)    Reviewed plan of care, including indications and possible side effects of administered medications. Remains free from injury at this time. Respirations even and unlabored. Bed locked and in lowest position. Call light within reach. Side rails up x2.  Patient verbalized understanding and teach back.       12 hour chart check complete.

## 2017-06-29 LAB
ABO + RH BLD: NORMAL
ALBUMIN SERPL BCP-MCNC: 2.7 G/DL
ALP SERPL-CCNC: 41 U/L
ALT SERPL W/O P-5'-P-CCNC: 16 U/L
ANION GAP SERPL CALC-SCNC: 6 MMOL/L
AST SERPL-CCNC: 15 U/L
BASOPHILS # BLD AUTO: 0.02 K/UL
BASOPHILS NFR BLD: 0.2 %
BILIRUB SERPL-MCNC: 0.7 MG/DL
BLD GP AB SCN CELLS X3 SERPL QL: NORMAL
BUN SERPL-MCNC: 16 MG/DL
CALCIUM SERPL-MCNC: 7.9 MG/DL
CHLORIDE SERPL-SCNC: 106 MMOL/L
CO2 SERPL-SCNC: 27 MMOL/L
CREAT SERPL-MCNC: 1.2 MG/DL
DIFFERENTIAL METHOD: ABNORMAL
EOSINOPHIL # BLD AUTO: 0.2 K/UL
EOSINOPHIL NFR BLD: 1.9 %
ERYTHROCYTE [DISTWIDTH] IN BLOOD BY AUTOMATED COUNT: 13.6 %
EST. GFR  (AFRICAN AMERICAN): >60 ML/MIN/1.73 M^2
EST. GFR  (NON AFRICAN AMERICAN): 57 ML/MIN/1.73 M^2
GLUCOSE SERPL-MCNC: 123 MG/DL
HCT VFR BLD AUTO: 24.4 %
HCT VFR BLD AUTO: 24.4 %
HCT VFR BLD AUTO: 25.3 %
HGB BLD-MCNC: 8.1 G/DL
HGB BLD-MCNC: 8.1 G/DL
HGB BLD-MCNC: 8.5 G/DL
LYMPHOCYTES # BLD AUTO: 1.4 K/UL
LYMPHOCYTES NFR BLD: 16.1 %
MCH RBC QN AUTO: 33.5 PG
MCHC RBC AUTO-ENTMCNC: 33.2 %
MCV RBC AUTO: 101 FL
MONOCYTES # BLD AUTO: 1.4 K/UL
MONOCYTES NFR BLD: 15.4 %
NEUTROPHILS # BLD AUTO: 6 K/UL
NEUTROPHILS NFR BLD: 66.4 %
PLATELET # BLD AUTO: 124 K/UL
PMV BLD AUTO: 11.6 FL
POCT GLUCOSE: 142 MG/DL (ref 70–110)
POCT GLUCOSE: 149 MG/DL (ref 70–110)
POCT GLUCOSE: 155 MG/DL (ref 70–110)
POCT GLUCOSE: 158 MG/DL (ref 70–110)
POCT GLUCOSE: 184 MG/DL (ref 70–110)
POTASSIUM SERPL-SCNC: 3.9 MMOL/L
PROT SERPL-MCNC: 5.8 G/DL
RBC # BLD AUTO: 2.42 M/UL
SODIUM SERPL-SCNC: 139 MMOL/L
WBC # BLD AUTO: 8.96 K/UL

## 2017-06-29 PROCEDURE — 36430 TRANSFUSION BLD/BLD COMPNT: CPT

## 2017-06-29 PROCEDURE — 86900 BLOOD TYPING SEROLOGIC ABO: CPT

## 2017-06-29 PROCEDURE — 86901 BLOOD TYPING SEROLOGIC RH(D): CPT

## 2017-06-29 PROCEDURE — 97116 GAIT TRAINING THERAPY: CPT

## 2017-06-29 PROCEDURE — 25000003 PHARM REV CODE 250: Performed by: NURSE PRACTITIONER

## 2017-06-29 PROCEDURE — 11000001 HC ACUTE MED/SURG PRIVATE ROOM

## 2017-06-29 PROCEDURE — 97530 THERAPEUTIC ACTIVITIES: CPT

## 2017-06-29 PROCEDURE — 97166 OT EVAL MOD COMPLEX 45 MIN: CPT

## 2017-06-29 PROCEDURE — 86920 COMPATIBILITY TEST SPIN: CPT

## 2017-06-29 PROCEDURE — 80053 COMPREHEN METABOLIC PANEL: CPT

## 2017-06-29 PROCEDURE — 85025 COMPLETE CBC W/AUTO DIFF WBC: CPT

## 2017-06-29 PROCEDURE — 97110 THERAPEUTIC EXERCISES: CPT

## 2017-06-29 PROCEDURE — 25000003 PHARM REV CODE 250: Performed by: ORTHOPAEDIC SURGERY

## 2017-06-29 PROCEDURE — G8987 SELF CARE CURRENT STATUS: HCPCS | Mod: CL

## 2017-06-29 PROCEDURE — P9016 RBC LEUKOCYTES REDUCED: HCPCS

## 2017-06-29 PROCEDURE — G8988 SELF CARE GOAL STATUS: HCPCS | Mod: CJ

## 2017-06-29 PROCEDURE — 36415 COLL VENOUS BLD VENIPUNCTURE: CPT

## 2017-06-29 RX ORDER — RAMIPRIL 1.25 MG/1
2.5 CAPSULE ORAL DAILY
Status: DISCONTINUED | OUTPATIENT
Start: 2017-06-30 | End: 2017-06-30 | Stop reason: HOSPADM

## 2017-06-29 RX ORDER — FUROSEMIDE 10 MG/ML
20 INJECTION INTRAMUSCULAR; INTRAVENOUS 2 TIMES DAILY PRN
Status: DISCONTINUED | OUTPATIENT
Start: 2017-06-29 | End: 2017-06-30 | Stop reason: HOSPADM

## 2017-06-29 RX ORDER — HYDROCODONE BITARTRATE AND ACETAMINOPHEN 500; 5 MG/1; MG/1
TABLET ORAL
Status: DISCONTINUED | OUTPATIENT
Start: 2017-06-29 | End: 2017-06-30 | Stop reason: HOSPADM

## 2017-06-29 RX ORDER — ACETAMINOPHEN 325 MG/1
650 TABLET ORAL EVERY 6 HOURS PRN
Status: DISCONTINUED | OUTPATIENT
Start: 2017-06-29 | End: 2017-06-30 | Stop reason: HOSPADM

## 2017-06-29 RX ADMIN — APIXABAN 2.5 MG: 2.5 TABLET, FILM COATED ORAL at 04:06

## 2017-06-29 RX ADMIN — TIMOLOL MALEATE 1 DROP: 5 SOLUTION OPHTHALMIC at 10:06

## 2017-06-29 RX ADMIN — ACETAMINOPHEN 650 MG: 325 TABLET ORAL at 06:06

## 2017-06-29 RX ADMIN — SODIUM CHLORIDE: 0.9 INJECTION, SOLUTION INTRAVENOUS at 11:06

## 2017-06-29 RX ADMIN — PREGABALIN 75 MG: 25 CAPSULE ORAL at 10:06

## 2017-06-29 RX ADMIN — OXYCODONE HYDROCHLORIDE 10 MG: 10 TABLET, FILM COATED, EXTENDED RELEASE ORAL at 11:06

## 2017-06-29 NOTE — SUBJECTIVE & OBJECTIVE
Interval History: pt stable overnight.  Pt denies dizziness after IV hydration.  Pt tolerating PT.  H/H trended downward.  2 units of PRBCs given.  Drainage to left knee noted.  Wound vac removed per primary. BP normalized with Eliquis and Ramipril resumed.       Review of Systems   Constitutional: Negative.  Negative for appetite change, fatigue and fever.   HENT: Negative.  Negative for congestion, nosebleeds and sore throat.    Eyes: Negative.  Negative for photophobia, redness and visual disturbance.   Respiratory: Negative.  Negative for cough, shortness of breath and wheezing.    Cardiovascular: Negative.  Negative for chest pain, palpitations and leg swelling.   Gastrointestinal: Negative.  Negative for abdominal pain, constipation, diarrhea, nausea and vomiting.   Endocrine: Negative.  Negative for polydipsia, polyphagia and polyuria.   Genitourinary: Negative.  Negative for dysuria, flank pain, frequency and urgency.   Musculoskeletal: Positive for arthralgias (left knee pain, s/p surgery). Negative for back pain and joint swelling.   Skin: Negative.  Negative for color change, pallor and rash.   Allergic/Immunologic: Negative.  Negative for environmental allergies, food allergies and immunocompromised state.   Neurological: Negative.  Negative for dizziness, syncope, weakness, light-headedness, numbness and headaches.   Hematological: Negative.    Psychiatric/Behavioral: Negative.  Negative for confusion and hallucinations. The patient is not nervous/anxious.    All other systems reviewed and are negative.    Objective:     Vital Signs (Most Recent):  Temp: (!) 100.6 °F (38.1 °C) (06/29/17 1200)  Pulse: 85 (06/29/17 1200)  Resp: 20 (06/29/17 1200)  BP: (!) 148/70 (06/29/17 1200)  SpO2: (!) 90 % (06/29/17 1200) Vital Signs (24h Range):  Temp:  [98.7 °F (37.1 °C)-100.7 °F (38.2 °C)] 100.6 °F (38.1 °C)  Pulse:  [76-85] 85  Resp:  [18-20] 20  SpO2:  [79 %-95 %] 90 %  BP: (100-148)/(55-73) 148/70     Weight:  133.3 kg (293 lb 15.7 oz)  Body mass index is 35.78 kg/m².    Intake/Output Summary (Last 24 hours) at 06/29/17 1431  Last data filed at 06/29/17 0602   Gross per 24 hour   Intake             1820 ml   Output             2230 ml   Net             -410 ml      Physical Exam   Constitutional: He is oriented to person, place, and time. He appears well-developed and well-nourished. No distress.   HENT:   Head: Normocephalic and atraumatic.   Eyes: Conjunctivae and EOM are normal. Pupils are equal, round, and reactive to light. No scleral icterus.   Neck: Normal range of motion. Neck supple. No thyromegaly present.   Cardiovascular: Normal rate, regular rhythm and normal heart sounds.    No murmur heard.  Pulmonary/Chest: Effort normal and breath sounds normal. No respiratory distress. He has no wheezes. He exhibits no tenderness.   Abdominal: Soft. Bowel sounds are normal. There is no tenderness.   Musculoskeletal: He exhibits no edema or tenderness.   Left knee with ace wrap and drainage marked     Lymphadenopathy:     He has no cervical adenopathy.   Neurological: He is alert and oriented to person, place, and time. No cranial nerve deficit. He exhibits normal muscle tone. Coordination normal.   Skin: Skin is warm and dry. Capillary refill takes 2 to 3 seconds. He is not diaphoretic.   Psychiatric: He has a normal mood and affect. His behavior is normal. Thought content normal.   Nursing note and vitals reviewed.      Significant Labs:   CBC:   Recent Labs  Lab 06/28/17  0716 06/28/17  1708 06/28/17  2347 06/29/17  0620   WBC 7.42 8.69  --  8.96   HGB 9.8*  9.8* 9.0* 8.5* 8.1*  8.1*   HCT 29.3*  29.3* 27.4* 25.3* 24.4*  24.4*    117*  --  124*     CMP:   Recent Labs  Lab 06/28/17  0716 06/29/17  0619    139   K 4.1 3.9    106   CO2 27 27   * 123*   BUN 16 16   CREATININE 1.1 1.2   CALCIUM 8.1* 7.9*   PROT  --  5.8*   ALBUMIN  --  2.7*   BILITOT  --  0.7   ALKPHOS  --  41*   AST  --  15    ALT  --  16   ANIONGAP 7* 6*   EGFRNONAA >60 57*       Significant Imaging:   Imaging Results          X-Ray Knee 1 or 2 View Left (Final result)  Result time 06/27/17 17:46:30    Final result by Jerrica aMi III, MD (06/27/17 17:46:30)                 Impression:     Satisfactory postoperative appearance of the left TKA without evidence of acute complication.      Electronically signed by: JERRICA MAI MD  Date:     06/27/17  Time:    17:46              Narrative:    XR KNEE 1 OR 2 VIEW LEFT    Indication: Left total knee arthroplasty    Findings: The total knee arthroplasty is intact and projects in expected position without evidence of periprosthetic lucency, fracture or hardware failure. Joint alignment is within normal limits. Soft tissue gas in the anterior knee is within normal early postop limits. No abnormally retained surgical instruments are identified.  A percutaneous surgical drain is in place.

## 2017-06-29 NOTE — PROGRESS NOTES
Physical therapy working with patient in [facility]. Patient became dizzy and was seated in chair. Pulse ox low and BP 88/34 and 96/54. Patient placed on oxygen and recheck BP within 2 minutes of seating /51 and pulse ox 94 percent. No distress noted. WIll monitor. Will inform Melony Fraga NP

## 2017-06-29 NOTE — PROGRESS NOTES
[facility] Hospital Medicine  Progress Note    Patient Name: Dominic Cohen  MRN: [id number]  Patient Class: IP- Inpatient   Admission Date: 6/27/2017  Length of Stay: 2 days  Attending Physician: Malcolm Gill Sr., MD  Primary Care Provider: Sudhakar Liu MD        Subjective:     Principal Problem:Left knee pain    HPI:  Mr. Cohen is a 80 y/o AA male with h/o HTN, left knee arthroplasty, was electively admitted to 's service earlier today and patient successfully underwent total synovectomy, left total knee revision, removal of orthopedic implant, major bone graft of bone cyst. [facility] was consulted for medical management. Patient reports pain is tolerable at this time. Denies any other complaints.    Hospital Course:  Pt admitted to Medical Surgical Unit post left total knee replacement per Dr. Gill (Orthopedic Surgery).  Hospital Medicine consulted for medical management.  Antihypertensive medications held due to episodes of hypotension.  Increased drain output noted. H/H stable post blood transfusion of 2 units PRBCs.  Pt tolerating PT.  Vital signs stable post IV hydration.      Interval History: pt stable overnight.  Pt denies dizziness after IV hydration.  Pt tolerating PT.  H/H trended downward.  2 units of PRBCs given.  Drainage to left knee noted.  Wound vac removed per primary. BP normalized with Eliquis and Ramipril resumed.       Review of Systems   Constitutional: Negative.  Negative for appetite change, fatigue and fever.   HENT: Negative.  Negative for congestion, nosebleeds and sore throat.    Eyes: Negative.  Negative for photophobia, redness and visual disturbance.   Respiratory: Negative.  Negative for cough, shortness of breath and wheezing.    Cardiovascular: Negative.  Negative for chest pain, palpitations and leg swelling.   Gastrointestinal: Negative.  Negative for abdominal pain, constipation, diarrhea, nausea and vomiting.   Endocrine: Negative.   Negative for polydipsia, polyphagia and polyuria.   Genitourinary: Negative.  Negative for dysuria, flank pain, frequency and urgency.   Musculoskeletal: Positive for arthralgias (left knee pain, s/p surgery). Negative for back pain and joint swelling.   Skin: Negative.  Negative for color change, pallor and rash.   Allergic/Immunologic: Negative.  Negative for environmental allergies, food allergies and immunocompromised state.   Neurological: Negative.  Negative for dizziness, syncope, weakness, light-headedness, numbness and headaches.   Hematological: Negative.    Psychiatric/Behavioral: Negative.  Negative for confusion and hallucinations. The patient is not nervous/anxious.    All other systems reviewed and are negative.    Objective:     Vital Signs (Most Recent):  Temp: (!) 100.6 °F (38.1 °C) (06/29/17 1200)  Pulse: 85 (06/29/17 1200)  Resp: 20 (06/29/17 1200)  BP: (!) 148/70 (06/29/17 1200)  SpO2: (!) 90 % (06/29/17 1200) Vital Signs (24h Range):  Temp:  [98.7 °F (37.1 °C)-100.7 °F (38.2 °C)] 100.6 °F (38.1 °C)  Pulse:  [76-85] 85  Resp:  [18-20] 20  SpO2:  [79 %-95 %] 90 %  BP: (100-148)/(55-73) 148/70     Weight: 133.3 kg (293 lb 15.7 oz)  Body mass index is 35.78 kg/m².    Intake/Output Summary (Last 24 hours) at 06/29/17 1431  Last data filed at 06/29/17 0602   Gross per 24 hour   Intake             1820 ml   Output             2230 ml   Net             -410 ml      Physical Exam   Constitutional: He is oriented to person, place, and time. He appears well-developed and well-nourished. No distress.   HENT:   Head: Normocephalic and atraumatic.   Eyes: Conjunctivae and EOM are normal. Pupils are equal, round, and reactive to light. No scleral icterus.   Neck: Normal range of motion. Neck supple. No thyromegaly present.   Cardiovascular: Normal rate, regular rhythm and normal heart sounds.    No murmur heard.  Pulmonary/Chest: Effort normal and breath sounds normal. No respiratory distress. He has no  wheezes. He exhibits no tenderness.   Abdominal: Soft. Bowel sounds are normal. There is no tenderness.   Musculoskeletal: He exhibits no edema or tenderness.   Left knee with ace wrap and drainage marked     Lymphadenopathy:     He has no cervical adenopathy.   Neurological: He is alert and oriented to person, place, and time. No cranial nerve deficit. He exhibits normal muscle tone. Coordination normal.   Skin: Skin is warm and dry. Capillary refill takes 2 to 3 seconds. He is not diaphoretic.   Psychiatric: He has a normal mood and affect. His behavior is normal. Thought content normal.   Nursing note and vitals reviewed.      Significant Labs:   CBC:   Recent Labs  Lab 06/28/17  0716 06/28/17  1708 06/28/17  2347 06/29/17  0620   WBC 7.42 8.69  --  8.96   HGB 9.8*  9.8* 9.0* 8.5* 8.1*  8.1*   HCT 29.3*  29.3* 27.4* 25.3* 24.4*  24.4*    117*  --  124*     CMP:   Recent Labs  Lab 06/28/17  0716 06/29/17  0619    139   K 4.1 3.9    106   CO2 27 27   * 123*   BUN 16 16   CREATININE 1.1 1.2   CALCIUM 8.1* 7.9*   PROT  --  5.8*   ALBUMIN  --  2.7*   BILITOT  --  0.7   ALKPHOS  --  41*   AST  --  15   ALT  --  16   ANIONGAP 7* 6*   EGFRNONAA >60 57*       Significant Imaging:   Imaging Results          X-Ray Knee 1 or 2 View Left (Final result)  Result time 06/27/17 17:46:30    Final result by Jerrica Mai III, MD (06/27/17 17:46:30)                 Impression:     Satisfactory postoperative appearance of the left TKA without evidence of acute complication.      Electronically signed by: JERRICA MAI MD  Date:     06/27/17  Time:    17:46              Narrative:    XR KNEE 1 OR 2 VIEW LEFT    Indication: Left total knee arthroplasty    Findings: The total knee arthroplasty is intact and projects in expected position without evidence of periprosthetic lucency, fracture or hardware failure. Joint alignment is within normal limits. Soft tissue gas in the anterior knee is within normal  early postop limits. No abnormally retained surgical instruments are identified.  A percutaneous surgical drain is in place.                            Assessment/Plan:      Acute blood loss anemia    -s/p TKR procedure per Orthopedic Surgery on 6/27/17  -H/H trending downward  -2 units of PRBCs transfused  -supplemental oxygen prn to keep O2 sat > 92%  -will monitor           Prediabetes    - Accuchecks q6  - Low dose ISS  - Monitor          Hypertension    - Ramipril resumed  -BP stable post hydration   - Neuro checks  - Hydralazine prn for SBP > 170  - Monitor           * Left knee pain    - s/p left total knee revision today  - Orthopedic Surgery primary team  - Further management per orthopedics  - Pain control  -PT/OT            VTE Risk Mitigation         Ordered     apixaban tablet 2.5 mg  2 times daily     Route:  Oral        06/29/17 1311     Place TALAT hose  Until discontinued      06/27/17 1750     Place sequential compression device  Until discontinued      06/27/17 1750     Medium Risk of VTE  Once      06/27/17 1749          Melony Fraga NP  Department of Hospital Medicine   [facility]

## 2017-06-29 NOTE — PROGRESS NOTES
SUBJECTIVE:  Patient is status post Left total knee revision.  The patient is alert and oriented ×3.  He is in no acute distress.  He does complain of lightheadedness with ointment from a supine to erect position.  Patient complains of 6 /10 pain that is better with the  pain meds and aggravated with movement.         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 Jeffery prior to Colonoscopy. EC    KNEE SURGERY Left      PROSTATE SURGERY        TONSILLECTOMY, ADENOIDECTOMY               Review of patient's allergies indicates:   Allergen Reactions    Penicillins Hives and Swelling    Sulfa (sulfonamide antibiotics) Hives    Protonix [pantoprazole] Rash      No current facility-administered medications on file prior to encounter.              Current Outpatient Prescriptions on File Prior to Encounter   Medication Sig Dispense Refill    ascorbic acid (VITAMIN C) 60 mg Lozg Take 1 tablet by mouth daily as needed.         cyanocobalamin, vitamin B-12, (VITAMIN B-12) 50 mcg tablet Take 50 mcg by mouth once daily.        ERGOCALCIFEROL, VITAMIN D2, (VITAMIN D ORAL) Take 1 tablet by mouth once daily.         linaclotide (LINZESS) 145 mcg Cap capsule Take 1 capsule (145 mcg total) by mouth once daily. 30 capsule 5    mineral oil (FLEET MINERAL OIL) enema Place 1 enema rectally once  "daily. 2 enema 0    ramipril (ALTACE) 2.5 MG capsule Take 1 capsule (2.5 mg total) by mouth once daily. (Patient taking differently: Take 1.25 mg by mouth once daily. ) 90 capsule 3    timolol maleate 0.5% (TIMOPTIC) 0.5 % Drop Place 1 drop into both eyes 2 (two) times daily. 10 mL 12    blood sugar diagnostic (BLOOD GLUCOSE TEST) Strp 1 strip by Misc.(Non-Drug; Combo Route) route 3 (three) times a week. 32 strip 11      BP (!) 105/50 (BP Method: Manual)   Pulse 70   Temp 97.9 °F (36.6 °C) (Oral)   Resp 18   Ht 6' 4" (1.93 m)   Wt 133.3 kg (293 lb 15.7 oz)   SpO2 (!) 94%   BMI 35.78 kg/m²    ROS:  GENERAL: No fever, chills, fatigability or weight loss.  SKIN: No rashes, itching or changes in color or texture of skin.  HEAD: No headaches or recent head trauma.  EYES: Visual acuity fine. No photophobia, ocular pain or diplopia.  EARS: Denies ear pain, discharge or vertigo.  NOSE: No loss of smell, no epistaxis or postnasal drip.  MOUTH & THROAT: No hoarseness or change in voice. No excessive gum bleeding.  NODES: Denies swollen glands.  CHEST: Denies ADAMS, cyanosis, wheezing, cough and sputum production.  CARDIOVASCULAR: Denies chest pain, PND, orthopnea or reduced exercise tolerance.  ABDOMEN: Appetite fine. No weight loss. Denies diarrhea, abdominal pain, hematemesis or blood in stool.  URINARY: No flank pain, dysuria or hematuria.  PERIPHERAL VASCULAR: No claudication or cyanosis.  NEUROLOGIC: No history of seizures, paralysis, alteration of gait or coordination.  MUSCULOSKELETAL: See HPI     PE:  APPEARANCE: Well nourished, well developed, in no acute distress.   HEAD: Normocephalic, atraumatic.  EYES: PERRL. EOMI.   EARS: TM's intact. Light reflex normal. No retraction or perforation.   NOSE: Mucosa pink. Airway clear.  MOUTH & THROAT: No tonsillar enlargement. No pharyngeal erythema or exudate. No stridor.  NECK: Supple.   NODES: No cervical, axillary or inguinal lymph node enlargement.  CHEST: Lungs " "clear to auscultation.  CARDIOVASCULAR: Normal S1, S2. No rubs, murmurs or gallops.  ABDOMEN: Bowel sounds normal. Not distended. Soft. No tenderness or masses.  NEUROLOGIC: Cranial Nerves: II-XII grossly intact, also see MUSCULOSKELETAL  MUSCULOSKELETAL:         Left Knee -dressing intact, 2 plus dorsalis pedis and posterior tibial artery pulses, light touch intact Left lower extremity.  All digits are warm. No erythema, no warmth, no drainage, mild swelling,mild tenderness.  Less than 2 seconds capillary refill all digits.        BP (!) 143/64 (BP Location: Right arm, Patient Position: Lying, BP Method: Automatic)   Pulse 76   Temp 100 °F (37.8 °C) (Oral)   Resp 18   Ht 6' 4" (1.93 m)   Wt 133.3 kg (293 lb 15.7 oz)   SpO2 95%   BMI 35.78 kg/m²     Hb-8.1     Hct-24.4           ASSESSMENT:     The patient is stable and improving.  The dizziness is likely secondary to orthostatic hypotension.        PLAN:   walker assisted ambulation FWB left lower extremity  Continue pain medication  Continue wound care  Continue physical therapy    "

## 2017-06-29 NOTE — PT/OT/SLP PROGRESS
Physical Therapy  Treatment    Dominic Cohen   MRN: [id number]   Admitting Diagnosis: Left knee pain    PT Received On: 06/29/17  PT Start Time: 0816     PT Stop Time: 0900    PT Total Time (min): 44 min       Billable Minutes:  Gait [id number], Therapeutic Activity 15 and Therapeutic Exercise 15    Treatment Type: Treatment  PT/PTA: PT             General Precautions: Standard, fall  Orthopedic Precautions: LLE weight bearing as tolerated   Braces:           Subjective:  Communicated with NURSE AND EPIC CHART REVIEW prior to session.   PT AGREED TO TX     Pain/Comfort  Pain Rating 1: 8/10  Location - Side 1: Left  Location 1: knee  Pain Addressed 1: Nurse notified    Objective:   Patient found with: peripheral IV    Functional Mobility:  Bed Mobility:   Rolling/Turning Right: Minimum assistance  Scooting/Bridging: Minimum Assistance  Supine to Sit: Minimum Assistance    Transfers:  Sit <> Stand Assistance: Minimum Assistance  Sit <> Stand Assistive Device: Rolling Walker  Bed <> Chair Technique: Stand Pivot  Bed <> Chair Assistance: Minimum Assistance  Bed <> Chair Assistive Device: Rolling Walker    Gait:   Gait Distance: PT GT TRAINED X 20' WITH RW AND UNSTEADY GT WITH STEP TO GT   Assistance 1: Minimum assistance  Gait Assistive Device: Rolling walker  Gait Pattern: swing-to gait  Gait Deviation(s): decreased donta, decreased weight-shifting ability    Therapeutic Activities and Exercises:  PT SEATED EOB WITH MIN A AND COMPLETED SEATED AP, TKE, AND STOOD WITH RW FOR GT . PT GT TRAINED WITH STEP TO GT AND CUES FOR SWING PHASE OF GT OF L LE. PT REPORTED DIZZINESS AND T/F TO CHAIR WITH MIN A. PT BP @ 88/34 AND O2 SATS 84% ON RM AIR. PT BLOOD PRESSURE @ 104/51 AFTER RESTING. PT T/F TO WC AND WHEELED TO RM. PT LEFT SEATED IN CHAIR FOR BREAKFAST WITH ALL NEEDS MET .    AM-PAC 6 CLICK MOBILITY  How much help from another person does this patient currently need?   1 = Unable, Total/Dependent Assistance  2 = A lot,  Maximum/Moderate Assistance  3 = A little, Minimum/Contact Guard/Supervision  4 = None, Modified [place]/Independent    Turning over in bed (including adjusting bedclothes, sheets and blankets)?: 3  Sitting down on and standing up from a chair with arms (e.g., wheelchair, bedside commode, etc.): 3  Moving from lying on back to sitting on the side of the bed?: 3  Moving to and from a bed to a chair (including a wheelchair)?: 3  Need to walk in hospital room?: 3  Climbing 3-5 steps with a railing?: 1  Total Score: 16    AM-PAC Raw Score CMS G-Code Modifier Level of Impairment Assistance   6 % Total / Unable   7 - 9 CM 80 - 100% Maximal Assist   10 - 14 CL 60 - 80% Moderate Assist   15 - 19 CK 40 - 60% Moderate Assist   20 - 22 CJ 20 - 40% Minimal Assist   23 CI 1-20% SBA / CGA   24 CH 0% Independent/ Mod I     Patient left up in chair with call button in reach.    Assessment:  PT PROGRESSING WITH GT HOWEVER LIMITED TO DIZZINESS AND LOW BP.     Rehab identified problem list/impairments: Rehab identified problem list/impairments: weakness, impaired endurance, gait instability, decreased lower extremity function, pain, impaired balance, decreased ROM, impaired self care skills, impaired functional mobilty, edema    Rehab potential is good.    Activity tolerance: Fair    Discharge recommendations: Discharge Facility/Level Of Care Needs: home health PT     Barriers to discharge: Barriers to Discharge: None    Equipment recommendations: Equipment Needed After Discharge: walker, rolling     GOALS:    Physical Therapy Goals        Problem: Physical Therapy Goal    Goal Priority Disciplines Outcome Goal Variances Interventions   Physical Therapy Goal     PT/OT, PT      Description:  PT WILL BE SEEN FOR P.T. FOR A MIN OF 5 OUT OF 7 DAYS A WEEK  LT17  1. PT WILL COMPLETE BED MOBILITY IND  2. PT WILL T/F TO CHAIR WITH RW AND S.  3. PT WILL GT TRAIN X 150' WITH RW AND S.  4. PT WILL COMPLETE B LE TE X 20 REPS  FOR STRENGTHENING.                      PLAN:    Patient to be seen    to address the above listed problems via gait training, therapeutic activities, therapeutic exercises  Plan of Care expires: 07/05/17  Plan of Care reviewed with: patient         Little Eliu, PT  06/29/2017

## 2017-06-29 NOTE — ASSESSMENT & PLAN NOTE
- Ramipril resumed  -BP stable post hydration   - Neuro checks  - Hydralazine prn for SBP > 170  - Monitor

## 2017-06-29 NOTE — PLAN OF CARE
Problem: Patient Care Overview  Goal: Plan of Care Review  PT REQUIRES MIN A WITH RW FOR GT X 8'   Outcome: Ongoing (interventions implemented as appropriate)  Free from falls and injuries this shift. Pain controlled. Dressing to the left knee changed per DR Gill today. Hemovac also discontinued per MD this am. Patient to get 2 units of PRBC today. Possible discharge tomorrow. 12 hour chart check completed.

## 2017-06-29 NOTE — PLAN OF CARE
Problem: Patient Care Overview  Goal: Plan of Care Review  PT REQUIRES MIN A WITH RW FOR GT X 8'   Outcome: Ongoing (interventions implemented as appropriate)  Pt remains free of injury,  no s/s of distress. 24 hour chart check completed. Will continue to monitor.

## 2017-06-29 NOTE — PT/OT/SLP EVAL
Occupational Therapy  Evaluation    Dominic Cohen   MRN: [id number]   Admitting Diagnosis: Left knee pain    OT Date of Treatment: 06/29/17   OT Start Time: 1050  OT Stop Time: 1115  OT Total Time (min): 25 min    Billable Minutes:  Evaluation 10 MINUTES  Therapeutic Activity 15 MINUTES    Diagnosis: Left knee pain   IMPAIRED ADL'S SKILLS, IMPAIRED FUNCTIONAL MOBILITY, IMPAIRED T/F'S, DECREASE B UE STRENGTH/ENDURANCE    Past Medical History:   Diagnosis Date    Arthritis     Atherosclerosis of abdominal aorta     Cataract     Chronic constipation     Glaucoma     History of anal fissures     Hypertension     Polyneuropathy in other diseases classified elsewhere     due to folate deficiency    Prediabetes     Venous insufficiency     Venous insufficiency       Past Surgical History:   Procedure Laterality Date    BACK SURGERY      CATARACT EXTRACTION BILATERAL W/ ANTERIOR VITRECTOMY      COLONOSCOPY N/A 5/13/2016    Procedure: COLONOSCOPY;  Surgeon: Presley Phillips MD;  Location: [address];  Service: Endoscopy;  Laterality: N/A;  EGD with Dr. Jeffery prior to Colonoscopy. EC    KNEE SURGERY Left     PROSTATE SURGERY      TONSILLECTOMY, ADENOIDECTOMY         Referring physician: DR. CHAUDHARI  Date referred to OT: 6-27-17    General Precautions: Standard, fall  Orthopedic Precautions: LLE weight bearing as tolerated  Braces:            Patient History:  Living Environment  Lives With: significant other  Living Arrangements: house  Living Environment Comment: PT REPORTS PLOF (I) AND ACCESSIBLE BATH ON MONDAY    Prior level of function:   Bed Mobility/Transfers: independent  Grooming: independent  Bathing: independent  Upper Body Dressing: independent  Lower Body Dressing: independent  Toileting: independent  Driving License: No     Dominant hand: right    Subjective:  Communicated with NURSE MOURA AND EPIC CHART REVIEW prior to session.    Chief Complaint: IMPAIRED ADL'S, IMPAIRED  FUNCTIONAL MOBILITY , IMPAIRED T/F'S, DECREASE B UE STRENGTH/ENDURANCE  Patient/Family stated goals:     Pain/Comfort  Pain Rating 1: 5/10  Location - Side 1: Left  Location - Orientation 1: generalized  Location 1: knee    Objective:  Patient found with: hemovac, lee catheter, peripheral IV    Cognitive Exam:  Oriented to: Person, Place, Time and Situation  Follows Commands/attention: Follows one-step commands  Communication: MUMBLED DUE TO SORE THROAT  Memory:  No Deficits noted  Safety awareness/insight to disability: intact  Coping skills/emotional control: Appropriate to situation    Visual/perceptual:  Intact    Physical Exam:  Postural examination/scapula alignment: Rounded shoulder and Head forward MILDLY  Skin integrity: Visible skin intact  Edema: MOD  L KNEE    Sensation:   Intact    Upper Extremity Range of Motion:  Right Upper Extremity: WFL  Left Upper Extremity: WFL    Upper Extremity Strength:  Right Upper Extremity: MMT: 4/5 GROSSLY  Left Upper Extremity: MMT: 4/5 GROSSLY   Strength: MMT: 4/5 GROSSLY    Fine motor coordination:   Intact    Gross motor coordination: LIMITED L KNEE    Functional Mobility:  Bed Mobility:  Rolling/Turning to Left: Maximum assistance  Rolling/Turning Right: Maximum assistance  Scooting/Bridging: Maximum Assistance  Supine to Sit: Maximum Assistance    Transfers:  Sit <> Stand Assistance: Maximum Assistance  Sit <> Stand Assistive Device: Rolling Walker  Bed <> Chair Technique: Stand Pivot  Bed <> Chair Assistive Device: Rolling Walker  Chair <>Mat Technique: Stand Pivot    Functional Ambulation: PT AMBULATE 72 FEET WITH DAMON RW, SLOW PACE AND CUES FOR POSTURE AND TECHNIQUE    Activities of Daily Living:     Feeding adaptive equipment: NA  UE Dressing Level of Assistance: Minimum assistance  UE adaptive equipment: NA  LE Dressing Level of Assistance: Total assistance  LE adaptive equipment: NA        Toileting Where Assessed: Bed level  Toileting Level of  "Assistance: Total assistance        Bathing adaptive equipment: NA    Balance:   Static Sit: GOOD: Takes MODERATE challenges from all directions  Dynamic Sit: FAIR: Cannot move trunk without losing balance  Static Stand: POOR+: Needs MINIMAL assist to maintain  Dynamic stand: POOR+    Therapeutic Activities and Exercises:      AM-PAC 6 CLICK ADL  How much help from another person does this patient currently need?  1 = Unable, Total/Dependent Assistance  2 = A lot, Maximum/Moderate Assistance  3 = A little, Minimum/Contact Guard/Supervision  4 = None, Modified [place]/Independent    Putting on and taking off regular lower body clothing? : 1  Bathing (including washing, rinsing, drying)?: 2  Toileting, which includes using toilet, bedpan, or urinal? : 2  Putting on and taking off regular upper body clothing?: 2  Taking care of personal grooming such as brushing teeth?: 3  Eating meals?: 3  Total Score: 13    AM-PAC Raw Score CMS "G-Code Modifier Level of Impairment Assistance   6 % Total / Unable   7 - 9 CM 80 - 100% Maximal Assist   10-14 CL 60 - 80% Moderate Assist   15 - 19 CK 40 - 60% Moderate Assist   20 - 22 CJ 20 - 40% Minimal Assist   23 CI 1-20% SBA / CGA   24 CH 0% Independent/ Mod I       Patient left up in chair with all lines intact, call button in reach, NURSE MARTELL notified and GIRLFRIEND present    Assessment:  Dominic Cohen is a 81 y.o. male with a medical diagnosis of Left knee pain and presents with IMPAIRED ADL'S,  IMPAIRED T/F'S, IMPAIRED FUNCTIONAL MOBILITY, DEFICITS WITH B UE STRENGTH/ENDURANCE. PT DISCHARGED FROM SKILLED O.T.    Rehab identified problem list/impairments: Rehab identified problem list/impairments: weakness, impaired self care skills, impaired balance, decreased safety awareness, decreased ROM, impaired endurance, impaired functional mobilty, gait instability, decreased lower extremity function    Rehab potential is good.    Activity tolerance: " Good    Discharge recommendations:       Barriers to discharge:      Equipment recommendations:       GOALS:    Occupational Therapy Goals        Problem: Occupational Therapy Goal    Goal Priority Disciplines Outcome Interventions   Occupational Therapy Goal     OT, PT/OT     Description:  OT GOALS TO BE 7-6-17  1. PT WILL TOLERATE 1 SET X 15 REPS B UE ROM EXERCISE WITH MIN RESISTANCE  2. MIN A WITH LE DRESSING  3. S WITH UE DRESSING  4. S WITH TOILET T/F'S                    PLAN:  Patient to be seen 3 x/week to address the above listed problems via self-care/home management, therapeutic exercises, therapeutic activities  Plan of Care expires: 07/06/17  Plan of Care reviewed with: patient, significant other         Erin Adams OT  06/29/2017

## 2017-06-29 NOTE — PROGRESS NOTES
Patient encouraged to cough and deep breath. I/S given to patient and he repeated 10 times at 1500.

## 2017-06-29 NOTE — PLAN OF CARE
Problem: Patient Care Overview  Goal: Plan of Care Review  PT REQUIRES MIN A WITH RW FOR GT X 8'   Outcome: Ongoing (interventions implemented as appropriate)  PT REQUIRES MIN A FOR GT X 20' WITH RW

## 2017-06-29 NOTE — ASSESSMENT & PLAN NOTE
- s/p left total knee revision today  - Orthopedic Surgery primary team  - Further management per orthopedics  - Pain control  -PT/OT

## 2017-06-29 NOTE — ASSESSMENT & PLAN NOTE
-s/p TKR procedure per Orthopedic Surgery on 6/27/17  -H/H trending downward  -2 units of PRBCs transfused  -supplemental oxygen prn to keep O2 sat > 92%  -will monitor

## 2017-06-29 NOTE — PT/OT/SLP PROGRESS
Physical Therapy  Treatment    Dominic Cohen   MRN: [id number]   Admitting Diagnosis: Left knee pain    PT Received On: 06/29/17  PT Start Time: 1131     PT Stop Time: 1154    PT Total Time (min): 23 min       Billable Minutes:  Gait [id number] and Therapeutic Activity 10    Treatment Type: Treatment  PT/PTA: PT             General Precautions: Standard, fall  Orthopedic Precautions: LLE weight bearing as tolerated   Braces:           Subjective:  Communicated with NURSE MARTELL AND EPIC CHART REVIEW  prior to session.   PT AGREED TX    Pain/Comfort  Pain Rating 1: 8/10  Location - Side 1: Left  Location 1: knee  Pain Addressed 1: Nurse notified  Pain Rating Post-Intervention 1: 8/10    Objective:   Patient found with: peripheral IV    Functional Mobility:  Bed Mobility:   Rolling/Turning Right: Minimum assistance  Scooting/Bridging: Minimum Assistance  Supine to Sit: Minimum Assistance    Transfers:  Sit <> Stand Assistance: Minimum Assistance  Sit <> Stand Assistive Device: Rolling Walker  Bed <> Chair Technique: Stand Pivot  Bed <> Chair Assistance: Minimum Assistance  Bed <> Chair Assistive Device: Rolling Walker    Gait:   Gait Distance: PT GT TRAINED WITH RW X 72 ' WITH WC IN TOW. STEP TO GT  Assistance 1: Minimum assistance  Gait Assistive Device: Rolling walker  Gait Pattern: swing-to gait  Gait Deviation(s): decreased donta, decreased weight-shifting ability    Therapeutic Activities and Exercises:  PT SEATED EOB ANS ASSESSED BP @ 134/58 SEATED. PT STOOD FOR GT AND RETURNED TO RM T/F TO CHAIR WITH RW AND MIN A. PT LEFT SEATED WITH ALL NEEDS MET      AM-PAC 6 CLICK MOBILITY  How much help from another person does this patient currently need?   1 = Unable, Total/Dependent Assistance  2 = A lot, Maximum/Moderate Assistance  3 = A little, Minimum/Contact Guard/Supervision  4 = None, Modified [place]/Independent    Turning over in bed (including adjusting bedclothes, sheets and blankets)?: 3  Sitting  down on and standing up from a chair with arms (e.g., wheelchair, bedside commode, etc.): 3  Moving from lying on back to sitting on the side of the bed?: 3  Moving to and from a bed to a chair (including a wheelchair)?: 3  Need to walk in hospital room?: 3  Climbing 3-5 steps with a railing?: 1  Total Score: 16    AM-PAC Raw Score CMS G-Code Modifier Level of Impairment Assistance   6 % Total / Unable   7 - 9 CM 80 - 100% Maximal Assist   10 - 14 CL 60 - 80% Moderate Assist   15 - 19 CK 40 - 60% Moderate Assist   20 - 22 CJ 20 - 40% Minimal Assist   23 CI 1-20% SBA / CGA   24 CH 0% Independent/ Mod I     Patient left up in chair with call button in reach.    Assessment:  PT PROGRESSING WITH GT.     Rehab identified problem list/impairments: Rehab identified problem list/impairments: weakness, impaired endurance, decreased lower extremity function, impaired balance, gait instability, impaired functional mobilty, pain, decreased ROM    Rehab potential is good.    Activity tolerance: Fair    Discharge recommendations: Discharge Facility/Level Of Care Needs: home health PT     Barriers to discharge: Barriers to Discharge: None    Equipment recommendations: Equipment Needed After Discharge: walker, rolling     GOALS:    Physical Therapy Goals        Problem: Physical Therapy Goal    Goal Priority Disciplines Outcome Goal Variances Interventions   Physical Therapy Goal     PT/OT, PT      Description:  PT WILL BE SEEN FOR P.T. FOR A MIN OF 5 OUT OF 7 DAYS A WEEK  LT17  1. PT WILL COMPLETE BED MOBILITY IND  2. PT WILL T/F TO CHAIR WITH RW AND S.  3. PT WILL GT TRAIN X 150' WITH RW AND S.  4. PT WILL COMPLETE B LE TE X 20 REPS FOR STRENGTHENING.                      PLAN:    Patient to be seen    to address the above listed problems via gait training, therapeutic activities, therapeutic exercises  Plan of Care expires: 17  Plan of Care reviewed with: patient         Little Nowak, PT  2017

## 2017-06-30 VITALS
WEIGHT: 294 LBS | HEART RATE: 76 BPM | OXYGEN SATURATION: 94 % | DIASTOLIC BLOOD PRESSURE: 52 MMHG | RESPIRATION RATE: 16 BRPM | HEIGHT: 76 IN | SYSTOLIC BLOOD PRESSURE: 107 MMHG | TEMPERATURE: 98 F | BODY MASS INDEX: 35.8 KG/M2

## 2017-06-30 LAB
ANION GAP SERPL CALC-SCNC: 6 MMOL/L
BASOPHILS # BLD AUTO: 0.02 K/UL
BASOPHILS NFR BLD: 0.2 %
BLD PROD TYP BPU: NORMAL
BLD PROD TYP BPU: NORMAL
BLOOD UNIT EXPIRATION DATE: NORMAL
BLOOD UNIT EXPIRATION DATE: NORMAL
BLOOD UNIT TYPE CODE: 5100
BLOOD UNIT TYPE CODE: 5100
BLOOD UNIT TYPE: NORMAL
BLOOD UNIT TYPE: NORMAL
BUN SERPL-MCNC: 12 MG/DL
CALCIUM SERPL-MCNC: 8.2 MG/DL
CHLORIDE SERPL-SCNC: 104 MMOL/L
CO2 SERPL-SCNC: 27 MMOL/L
CODING SYSTEM: NORMAL
CODING SYSTEM: NORMAL
CREAT SERPL-MCNC: 1.1 MG/DL
DIFFERENTIAL METHOD: ABNORMAL
DISPENSE STATUS: NORMAL
DISPENSE STATUS: NORMAL
EOSINOPHIL # BLD AUTO: 0.2 K/UL
EOSINOPHIL NFR BLD: 1.8 %
ERYTHROCYTE [DISTWIDTH] IN BLOOD BY AUTOMATED COUNT: 16.3 %
EST. GFR  (AFRICAN AMERICAN): >60 ML/MIN/1.73 M^2
EST. GFR  (NON AFRICAN AMERICAN): >60 ML/MIN/1.73 M^2
GLUCOSE SERPL-MCNC: 173 MG/DL
HCT VFR BLD AUTO: 26.4 %
HGB BLD-MCNC: 8.8 G/DL
LYMPHOCYTES # BLD AUTO: 1.4 K/UL
LYMPHOCYTES NFR BLD: 15.6 %
MCH RBC QN AUTO: 32.4 PG
MCHC RBC AUTO-ENTMCNC: 33.3 %
MCV RBC AUTO: 97 FL
MONOCYTES # BLD AUTO: 1 K/UL
MONOCYTES NFR BLD: 11.4 %
NEUTROPHILS # BLD AUTO: 6.3 K/UL
NEUTROPHILS NFR BLD: 71 %
NUM UNITS TRANS PACKED RBC: NORMAL
NUM UNITS TRANS PACKED RBC: NORMAL
PLATELET # BLD AUTO: 135 K/UL
PMV BLD AUTO: 10.4 FL
POCT GLUCOSE: 140 MG/DL (ref 70–110)
POCT GLUCOSE: 150 MG/DL (ref 70–110)
POTASSIUM SERPL-SCNC: 3.8 MMOL/L
RBC # BLD AUTO: 2.72 M/UL
SODIUM SERPL-SCNC: 137 MMOL/L
WBC # BLD AUTO: 8.85 K/UL

## 2017-06-30 PROCEDURE — 25000003 PHARM REV CODE 250: Performed by: NURSE PRACTITIONER

## 2017-06-30 PROCEDURE — 97530 THERAPEUTIC ACTIVITIES: CPT

## 2017-06-30 PROCEDURE — 36415 COLL VENOUS BLD VENIPUNCTURE: CPT

## 2017-06-30 PROCEDURE — 80048 BASIC METABOLIC PNL TOTAL CA: CPT

## 2017-06-30 PROCEDURE — 86985 SPLIT BLOOD OR PRODUCTS: CPT

## 2017-06-30 PROCEDURE — 85025 COMPLETE CBC W/AUTO DIFF WBC: CPT

## 2017-06-30 PROCEDURE — 36430 TRANSFUSION BLD/BLD COMPNT: CPT

## 2017-06-30 PROCEDURE — P9011 BLOOD SPLIT UNIT: HCPCS

## 2017-06-30 PROCEDURE — 97116 GAIT TRAINING THERAPY: CPT

## 2017-06-30 RX ORDER — THIAMINE HCL 100 MG
100 TABLET ORAL DAILY
Status: DISCONTINUED | OUTPATIENT
Start: 2017-07-01 | End: 2017-06-30 | Stop reason: HOSPADM

## 2017-06-30 RX ORDER — FERROUS SULFATE 325(65) MG
325 TABLET, DELAYED RELEASE (ENTERIC COATED) ORAL DAILY
Status: DISCONTINUED | OUTPATIENT
Start: 2017-07-01 | End: 2017-06-30 | Stop reason: HOSPADM

## 2017-06-30 RX ADMIN — APIXABAN 2.5 MG: 2.5 TABLET, FILM COATED ORAL at 10:06

## 2017-06-30 RX ADMIN — TIMOLOL MALEATE 1 DROP: 5 SOLUTION OPHTHALMIC at 10:06

## 2017-06-30 RX ADMIN — RAMIPRIL 2.5 MG: 1.25 CAPSULE ORAL at 10:06

## 2017-06-30 NOTE — PROGRESS NOTES
[facility] Hospital Medicine  Progress Note    Patient Name: Dominic Cohen  MRN: [id number]  Patient Class: IP- Inpatient   Admission Date: 6/27/2017  Length of Stay: 3 days  Attending Physician: Malcolm Gill Sr., MD  Primary Care Provider: Sudhakar Liu MD        Subjective:     Principal Problem:Left knee pain    HPI:  Mr. Cohen is a 80 y/o AA male with h/o HTN, left knee arthroplasty, was electively admitted to 's service earlier today and patient successfully underwent total synovectomy, left total knee revision, removal of orthopedic implant, major bone graft of bone cyst. [facility] was consulted for medical management. Patient reports pain is tolerable at this time. Denies any other complaints.    Hospital Course:  Pt admitted to Medical Surgical Unit post left total knee replacement per Dr. Gill (Orthopedic Surgery).  Hospital Medicine consulted for medical management.  Antihypertensive medications held due to episodes of hypotension.  Increased drain output noted. H/H stable post blood transfusion of 2 units PRBCs.  Pt tolerating PT.  Vital signs stable post IV hydration.  Case management consulted for discharge planning.      Interval History: pt stable overnight. Pt states that he is feeling much better today and denies dizziness.  Pt tolerated PT/OT with home health recommended.  Case management consulted with ambulatory referral placed.  H/H stable post transfusion of PRBCs x 2 units.  Orthopedic Surgery primary team.      Review of Systems   Constitutional: Negative.  Negative for appetite change, fatigue and fever.   HENT: Negative.  Negative for congestion, nosebleeds and sore throat.    Eyes: Negative.  Negative for photophobia, redness and visual disturbance.   Respiratory: Negative.  Negative for cough, shortness of breath and wheezing.    Cardiovascular: Negative.  Negative for chest pain, palpitations and leg swelling.   Gastrointestinal: Negative.   Negative for abdominal pain, constipation, diarrhea, nausea and vomiting.   Endocrine: Negative.  Negative for polydipsia, polyphagia and polyuria.   Genitourinary: Negative.  Negative for dysuria, flank pain, frequency and urgency.   Musculoskeletal: Positive for arthralgias (left knee pain, s/p surgery). Negative for back pain and joint swelling.   Skin: Negative.  Negative for color change, pallor and rash.   Allergic/Immunologic: Negative.  Negative for environmental allergies, food allergies and immunocompromised state.   Neurological: Negative.  Negative for dizziness, syncope, weakness, light-headedness, numbness and headaches.   Hematological: Negative.    Psychiatric/Behavioral: Negative.  Negative for confusion and hallucinations. The patient is not nervous/anxious.    All other systems reviewed and are negative.    Objective:     Vital Signs (Most Recent):  Temp: 98.4 °F (36.9 °C) (06/30/17 0900)  Pulse: 76 (06/30/17 0900)  Resp: 16 (06/30/17 0900)  BP: (!) 107/52 (06/30/17 0900)  SpO2: (!) 94 % (06/30/17 0900) Vital Signs (24h Range):  Temp:  [97.8 °F (36.6 °C)-99 °F (37.2 °C)] 98.4 °F (36.9 °C)  Pulse:  [72-78] 76  Resp:  [16-20] 16  SpO2:  [92 %-99 %] 94 %  BP: (102-138)/(52-90) 107/52     Weight: 133.3 kg (293 lb 15.7 oz)  Body mass index is 35.78 kg/m².    Intake/Output Summary (Last 24 hours) at 06/30/17 1502  Last data filed at 06/30/17 1257   Gross per 24 hour   Intake          1954.59 ml   Output                0 ml   Net          1954.59 ml      Physical Exam   Constitutional: He is oriented to person, place, and time. He appears well-developed and well-nourished. No distress.   HENT:   Head: Normocephalic and atraumatic.   Eyes: Conjunctivae and EOM are normal. Pupils are equal, round, and reactive to light. No scleral icterus.   Neck: Normal range of motion. Neck supple. No thyromegaly present.   Cardiovascular: Normal rate, regular rhythm and normal heart sounds.    No murmur  heard.  Pulmonary/Chest: Effort normal and breath sounds normal. No respiratory distress. He has no wheezes. He exhibits no tenderness.   Abdominal: Soft. Bowel sounds are normal. There is no tenderness.   Musculoskeletal: He exhibits no edema or tenderness.   Left knee with ace wrap and drainage marked     Lymphadenopathy:     He has no cervical adenopathy.   Neurological: He is alert and oriented to person, place, and time. No cranial nerve deficit. He exhibits normal muscle tone. Coordination normal.   Skin: Skin is warm and dry. Capillary refill takes 2 to 3 seconds. He is not diaphoretic.   Psychiatric: He has a normal mood and affect. His behavior is normal. Thought content normal.   Nursing note and vitals reviewed.      Significant Labs:   CBC:   Recent Labs  Lab 06/28/17  1708 06/28/17  2347 06/29/17  0620 06/30/17  0921   WBC 8.69  --  8.96 8.85   HGB 9.0* 8.5* 8.1*  8.1* 8.8*   HCT 27.4* 25.3* 24.4*  24.4* 26.4*   *  --  124* 135*     CMP:   Recent Labs  Lab 06/29/17  0619 06/30/17  0921    137   K 3.9 3.8    104   CO2 27 27   * 173*   BUN 16 12   CREATININE 1.2 1.1   CALCIUM 7.9* 8.2*   PROT 5.8*  --    ALBUMIN 2.7*  --    BILITOT 0.7  --    ALKPHOS 41*  --    AST 15  --    ALT 16  --    ANIONGAP 6* 6*   EGFRNONAA 57* >60       Significant Imaging:   Imaging Results          X-Ray Knee 1 or 2 View Left (Final result)  Result time 06/27/17 17:46:30    Final result by Jerrica Mai III, MD (06/27/17 17:46:30)                 Impression:     Satisfactory postoperative appearance of the left TKA without evidence of acute complication.      Electronically signed by: JERRICA MAI MD  Date:     06/27/17  Time:    17:46              Narrative:    XR KNEE 1 OR 2 VIEW LEFT    Indication: Left total knee arthroplasty    Findings: The total knee arthroplasty is intact and projects in expected position without evidence of periprosthetic lucency, fracture or hardware failure. Joint  alignment is within normal limits. Soft tissue gas in the anterior knee is within normal early postop limits. No abnormally retained surgical instruments are identified.  A percutaneous surgical drain is in place.                            Assessment/Plan:      Acute blood loss anemia    -s/p TKR procedure per Orthopedic Surgery on 6/27/17  -H/H stable post transfusion  -2 units of PRBCs transfused on 6/30/2017  -supplemental oxygen prn to keep O2 sat > 92%  -will monitor           Prediabetes    - Accuchecks q6  - Low dose ISS  - Monitor          Hypertension    - Ramipril resumed  -BP stable post hydration   - Neuro checks  - Hydralazine prn for SBP > 170  - Monitor           * Left knee pain    - s/p left total knee revision today  - Orthopedic Surgery primary team  - Further management per orthopedics  - Pain control  -PT/OT            VTE Risk Mitigation         Ordered     apixaban tablet 2.5 mg  2 times daily     Route:  Oral        06/29/17 1311     Place TALAT hose  Until discontinued      06/27/17 1750     Place sequential compression device  Until discontinued      06/27/17 1750     Medium Risk of VTE  Once      06/27/17 1749          Melony Fraga NP  Department of Hospital Medicine   [facility]

## 2017-06-30 NOTE — PLAN OF CARE
Problem: Patient Care Overview  Goal: Plan of Care Review  PT REQUIRES MIN A WITH RW FOR GT X 8'   Outcome: Ongoing (interventions implemented as appropriate)  Pt remains free of injury, pain managed adequately, no s/s of distress. 24 hour chart check completed. Will continue to monitor.

## 2017-06-30 NOTE — SUBJECTIVE & OBJECTIVE
Interval History: pt stable overnight. Pt states that he is feeling much better today and denies dizziness.  Pt tolerated PT/OT with home health recommended.  Case management consulted with ambulatory referral placed.  H/H stable post transfusion of PRBCs x 2 units.  Orthopedic Surgery primary team.      Review of Systems   Constitutional: Negative.  Negative for appetite change, fatigue and fever.   HENT: Negative.  Negative for congestion, nosebleeds and sore throat.    Eyes: Negative.  Negative for photophobia, redness and visual disturbance.   Respiratory: Negative.  Negative for cough, shortness of breath and wheezing.    Cardiovascular: Negative.  Negative for chest pain, palpitations and leg swelling.   Gastrointestinal: Negative.  Negative for abdominal pain, constipation, diarrhea, nausea and vomiting.   Endocrine: Negative.  Negative for polydipsia, polyphagia and polyuria.   Genitourinary: Negative.  Negative for dysuria, flank pain, frequency and urgency.   Musculoskeletal: Positive for arthralgias (left knee pain, s/p surgery). Negative for back pain and joint swelling.   Skin: Negative.  Negative for color change, pallor and rash.   Allergic/Immunologic: Negative.  Negative for environmental allergies, food allergies and immunocompromised state.   Neurological: Negative.  Negative for dizziness, syncope, weakness, light-headedness, numbness and headaches.   Hematological: Negative.    Psychiatric/Behavioral: Negative.  Negative for confusion and hallucinations. The patient is not nervous/anxious.    All other systems reviewed and are negative.    Objective:     Vital Signs (Most Recent):  Temp: 98.4 °F (36.9 °C) (06/30/17 0900)  Pulse: 76 (06/30/17 0900)  Resp: 16 (06/30/17 0900)  BP: (!) 107/52 (06/30/17 0900)  SpO2: (!) 94 % (06/30/17 0900) Vital Signs (24h Range):  Temp:  [97.8 °F (36.6 °C)-99 °F (37.2 °C)] 98.4 °F (36.9 °C)  Pulse:  [72-78] 76  Resp:  [16-20] 16  SpO2:  [92 %-99 %] 94 %  BP:  (102-138)/(52-90) 107/52     Weight: 133.3 kg (293 lb 15.7 oz)  Body mass index is 35.78 kg/m².    Intake/Output Summary (Last 24 hours) at 06/30/17 1502  Last data filed at 06/30/17 1257   Gross per 24 hour   Intake          1954.59 ml   Output                0 ml   Net          1954.59 ml      Physical Exam   Constitutional: He is oriented to person, place, and time. He appears well-developed and well-nourished. No distress.   HENT:   Head: Normocephalic and atraumatic.   Eyes: Conjunctivae and EOM are normal. Pupils are equal, round, and reactive to light. No scleral icterus.   Neck: Normal range of motion. Neck supple. No thyromegaly present.   Cardiovascular: Normal rate, regular rhythm and normal heart sounds.    No murmur heard.  Pulmonary/Chest: Effort normal and breath sounds normal. No respiratory distress. He has no wheezes. He exhibits no tenderness.   Abdominal: Soft. Bowel sounds are normal. There is no tenderness.   Musculoskeletal: He exhibits no edema or tenderness.   Left knee with ace wrap and drainage marked     Lymphadenopathy:     He has no cervical adenopathy.   Neurological: He is alert and oriented to person, place, and time. No cranial nerve deficit. He exhibits normal muscle tone. Coordination normal.   Skin: Skin is warm and dry. Capillary refill takes 2 to 3 seconds. He is not diaphoretic.   Psychiatric: He has a normal mood and affect. His behavior is normal. Thought content normal.   Nursing note and vitals reviewed.      Significant Labs:   CBC:   Recent Labs  Lab 06/28/17  1708 06/28/17  2347 06/29/17  0620 06/30/17  0921   WBC 8.69  --  8.96 8.85   HGB 9.0* 8.5* 8.1*  8.1* 8.8*   HCT 27.4* 25.3* 24.4*  24.4* 26.4*   *  --  124* 135*     CMP:   Recent Labs  Lab 06/29/17  0619 06/30/17  0921    137   K 3.9 3.8    104   CO2 27 27   * 173*   BUN 16 12   CREATININE 1.2 1.1   CALCIUM 7.9* 8.2*   PROT 5.8*  --    ALBUMIN 2.7*  --    BILITOT 0.7  --    ALKPHOS  41*  --    AST 15  --    ALT 16  --    ANIONGAP 6* 6*   EGFRNONAA 57* >60       Significant Imaging:   Imaging Results          X-Ray Knee 1 or 2 View Left (Final result)  Result time 06/27/17 17:46:30    Final result by Jerrica Mai III, MD (06/27/17 17:46:30)                 Impression:     Satisfactory postoperative appearance of the left TKA without evidence of acute complication.      Electronically signed by: JERRICA MAI MD  Date:     06/27/17  Time:    17:46              Narrative:    XR KNEE 1 OR 2 VIEW LEFT    Indication: Left total knee arthroplasty    Findings: The total knee arthroplasty is intact and projects in expected position without evidence of periprosthetic lucency, fracture or hardware failure. Joint alignment is within normal limits. Soft tissue gas in the anterior knee is within normal early postop limits. No abnormally retained surgical instruments are identified.  A percutaneous surgical drain is in place.

## 2017-06-30 NOTE — PLAN OF CARE
Problem: Patient Care Overview  Goal: Plan of Care Review  PT REQUIRES MIN A WITH RW FOR GT X 8'   Outcome: Ongoing (interventions implemented as appropriate)  PT REQUIRES CGA>SBA FOR GT X 120' WITH RW

## 2017-06-30 NOTE — PT/OT/SLP PROGRESS
Physical Therapy  Treatment    Dominic Cohen   MRN: [id number]   Admitting Diagnosis: Left knee pain    PT Received On: 06/30/17  PT Start Time: 1046     PT Stop Time: 1110    PT Total Time (min): 24 min       Billable Minutes:  Gait Cctdrygx17 and Therapeutic Activity 10    Treatment Type: Treatment  PT/PTA: PT             General Precautions: Standard, fall  Orthopedic Precautions: LLE weight bearing as tolerated   Braces:           Subjective:  Communicated with NURSE AND EPIC CHART REVIEW prior to session.  PT AGREED TO TX.     Pain/Comfort  Pain Rating 1: 5/10  Location - Side 1: Left  Location 1: knee  Pain Addressed 1: Pre-medicate for activity    Objective:   Patient found with: peripheral IV    Functional Mobility:  Bed Mobility:   Supine to Sit: Minimum Assistance    Transfers:  Sit <> Stand Assistance: Stand By Assistance  Sit <> Stand Assistive Device: Rolling Walker  Bed <> Chair Technique: Stand Pivot  Bed <> Chair Assistance: Stand By Assistance  Bed <> Chair Assistive Device: Rolling Walker    Gait:   Gait Distance: PT GT TRAINED  130' WITH STEP TO GT AND CLOSE SBA   Assistance 1: Stand by Assistance  Gait Assistive Device: Rolling walker  Gait Pattern: swing-to gait  Gait Deviation(s): decreased donta, decreased weight-shifting ability    Therapeutic Activities and Exercises:  PT MET IN RM SEATED IN CHAIR PT STOOD WITH SBA AND GT TRAINED. PT RETURNED TO RM T/F TO EOB FOR REST. PT SCOOTED TO HOB WITH SBA. PT SUP IN BED WITH MIN A OF L LE. PT LEFT WITH ALL NEEDS MET AND CALL BELL IN REACH     AM-PAC 6 CLICK MOBILITY  How much help from another person does this patient currently need?   1 = Unable, Total/Dependent Assistance  2 = A lot, Maximum/Moderate Assistance  3 = A little, Minimum/Contact Guard/Supervision  4 = None, Modified [place]/Independent    Turning over in bed (including adjusting bedclothes, sheets and blankets)?: 3  Sitting down on and standing up from a chair with arms  (e.g., wheelchair, bedside commode, etc.): 4  Moving from lying on back to sitting on the side of the bed?: 3  Moving to and from a bed to a chair (including a wheelchair)?: 4  Need to walk in hospital room?: 4  Climbing 3-5 steps with a railing?: 1 (NT)  Total Score: 19    AM-PAC Raw Score CMS G-Code Modifier Level of Impairment Assistance   6 % Total / Unable   7 - 9 CM 80 - 100% Maximal Assist   10 - 14 CL 60 - 80% Moderate Assist   15 - 19 CK 40 - 60% Moderate Assist   20 - 22 CJ 20 - 40% Minimal Assist   23 CI 1-20% SBA / CGA   24 CH 0% Independent/ Mod I     Patient left supine with call button in reach.    Assessment:  PT PROGRESSING WITH GT TRAINING    Rehab identified problem list/impairments: Rehab identified problem list/impairments: weakness, impaired endurance, impaired functional mobilty, impaired balance, decreased lower extremity function, gait instability, decreased ROM, pain    Rehab potential is good.    Activity tolerance: Good    Discharge recommendations: Discharge Facility/Level Of Care Needs: home health PT     Barriers to discharge: Barriers to Discharge: None    Equipment recommendations: Equipment Needed After Discharge: walker, rolling     GOALS:    Physical Therapy Goals        Problem: Physical Therapy Goal    Goal Priority Disciplines Outcome Goal Variances Interventions   Physical Therapy Goal     PT/OT, PT      Description:  PT WILL BE SEEN FOR P.T. FOR A MIN OF 5 OUT OF 7 DAYS A WEEK  LT17  1. PT WILL COMPLETE BED MOBILITY IND  2. PT WILL T/F TO CHAIR WITH RW AND S.  3. PT WILL GT TRAIN X 150' WITH RW AND S.  4. PT WILL COMPLETE B LE TE X 20 REPS FOR STRENGTHENING.                      PLAN:    Patient to be seen    to address the above listed problems via gait training, therapeutic activities, therapeutic exercises  Plan of Care expires: 17  Plan of Care reviewed with: patient         Little [place]ak, PT  2017

## 2017-06-30 NOTE — PLAN OF CARE
Problem: Patient Care Overview  Goal: Plan of Care Review  PT REQUIRES MIN A WITH RW FOR GT X 8'   Outcome: Ongoing (interventions implemented as appropriate)  Reviewed plan of care with patient.  Patient verbalized understanding and teachback.      12h chart check completed.

## 2017-06-30 NOTE — PT/OT/SLP PROGRESS
Physical Therapy  Treatment    Dominic Cohen   MRN: [id number]   Admitting Diagnosis: Left knee pain    PT Received On: 06/30/17  PT Start Time: 0841     PT Stop Time: 0905    PT Total Time (min): 24 min       Billable Minutes:  Gait [id number] and Therapeutic Activity 10    Treatment Type: Treatment  PT/PTA: PT             General Precautions: Standard, fall  Orthopedic Precautions: LLE weight bearing as tolerated   Braces:           Subjective:  Communicated with NURSE ALDO AND EPIC CHART REVIEW  prior to session.   PT AGREED TO TX     Pain/Comfort  Pain Rating 1: 6/10  Location - Side 1: Left  Location 1: knee  Pain Addressed 1: Pre-medicate for activity    Objective:   Patient found with: peripheral IV    Functional Mobility:  Bed Mobility:   Supine to Sit: Minimum Assistance    Transfers:  Sit <> Stand Assistance: Contact Guard Assistance  Sit <> Stand Assistive Device: Rolling Walker  Bed <> Chair Technique: Stand Pivot  Bed <> Chair Assistance: Contact Guard Assistance  Bed <> Chair Assistive Device: Rolling Walker    Gait:   Gait Distance: PT GT TRAINED X 120' WITH STEP TO GT AND CUES FOR POSTURE WITH DEC WT SHIFT TO  L LE  Assistance 1: Contact Guard Assistance, Stand by Assistance  Gait Assistive Device: Rolling walker  Gait Pattern: swing-to gait  Gait Deviation(s): decreased donta, decreased weight-shifting ability    Therapeutic Activities and Exercises:  PT MET IN RM SUP IN BED. CPM DOFFED AND ASSISTED TO EOB WITH MIN A. PT SEATED FOR B LE AP HOWEVER REPORTED HE COULDN'T CONT TE D/T CRAMPING. PT STOOD WITH RW AND CGA. PT GT TRAINED WITH CGA >SBA X 120' WITH STEP TO GT AND RETURNED TO RM T/F TO CHAIR WITH SBA. PT LEFT SEATED WITH ICE PACK AND INSTRUCTIONS FOR 10-15 MIN ON L LE. PT LEFT WITH ALL NEEDS MET      AM-PAC 6 CLICK MOBILITY  How much help from another person does this patient currently need?   1 = Unable, Total/Dependent Assistance  2 = A lot, Maximum/Moderate Assistance  3 = A  little, Minimum/Contact Guard/Supervision  4 = None, Modified [place]/Independent    Turning over in bed (including adjusting bedclothes, sheets and blankets)?: 3  Sitting down on and standing up from a chair with arms (e.g., wheelchair, bedside commode, etc.): 4  Moving from lying on back to sitting on the side of the bed?: 3  Moving to and from a bed to a chair (including a wheelchair)?: 4  Need to walk in hospital room?: 3  Climbing 3-5 steps with a railing?: 1 (NT)  Total Score: 18    AM-PAC Raw Score CMS G-Code Modifier Level of Impairment Assistance   6 % Total / Unable   7 - 9 CM 80 - 100% Maximal Assist   10 - 14 CL 60 - 80% Moderate Assist   15 - 19 CK 40 - 60% Moderate Assist   20 - 22 CJ 20 - 40% Minimal Assist   23 CI 1-20% SBA / CGA   24 CH 0% Independent/ Mod I     Patient left up in chair with call button in reach.    Assessment:  PT PROGRESSING WITH GT TRAINING HOWEVER CONT TO REQUIRES CUES TO INC ROM     Rehab identified problem list/impairments: Rehab identified problem list/impairments: weakness, impaired endurance, impaired functional mobilty, impaired self care skills, impaired balance, gait instability, decreased lower extremity function, pain, decreased ROM    Rehab potential is good.    Activity tolerance: Good    Discharge recommendations: Discharge Facility/Level Of Care Needs: home health PT     Barriers to discharge: Barriers to Discharge: None    Equipment recommendations: Equipment Needed After Discharge: walker, rolling     GOALS:    Physical Therapy Goals        Problem: Physical Therapy Goal    Goal Priority Disciplines Outcome Goal Variances Interventions   Physical Therapy Goal     PT/OT, PT      Description:  PT WILL BE SEEN FOR P.T. FOR A MIN OF 5 OUT OF 7 DAYS A WEEK  LT17  1. PT WILL COMPLETE BED MOBILITY IND  2. PT WILL T/F TO CHAIR WITH RW AND S.  3. PT WILL GT TRAIN X 150' WITH RW AND S.  4. PT WILL COMPLETE B LE TE X 20 REPS FOR STRENGTHENING.                       PLAN:    Patient to be seen    to address the above listed problems via gait training, therapeutic activities, therapeutic exercises  Plan of Care expires: 07/05/17  Plan of Care reviewed with: patient         Little Eliu, PT  06/30/2017

## 2017-06-30 NOTE — PROGRESS NOTES
SUBJECTIVE:  Patient is status post Left total knee revision.  The patient is alert and oriented ×3.  The patient states that the lightheadedness has resolved.  He feels confident ambulating with a walker.  Patient complains of 4 /10 pain that is better with the  pain meds and aggravated with movement.             Past Medical History:   Diagnosis Date    Arthritis      Atherosclerosis of abdominal aorta      Cataract      Chronic constipation      Glaucoma      History of anal fissures      Hypertension      Polyneuropathy in other diseases classified elsewhere       due to folate deficiency    Prediabetes      Venous insufficiency      Venous insufficiency                  Past Surgical History:   Procedure Laterality Date    BACK SURGERY        CATARACT EXTRACTION BILATERAL W/ ANTERIOR VITRECTOMY        COLONOSCOPY N/A 5/13/2016     Procedure: COLONOSCOPY;  Surgeon: Presley Phillips MD;  Location: [address]);  Service: Endoscopy;  Laterality: N/A;  EGD with Dr. Jeffery prior to Colonoscopy. EC    KNEE SURGERY Left      PROSTATE SURGERY        TONSILLECTOMY, ADENOIDECTOMY                  Review of patient's allergies indicates:   Allergen Reactions    Penicillins Hives and Swelling    Sulfa (sulfonamide antibiotics) Hives    Protonix [pantoprazole] Rash      No current facility-administered medications on file prior to encounter.                   Current Outpatient Prescriptions on File Prior to Encounter   Medication Sig Dispense Refill    ascorbic acid (VITAMIN C) 60 mg Lozg Take 1 tablet by mouth daily as needed.         cyanocobalamin, vitamin B-12, (VITAMIN B-12) 50 mcg tablet Take 50 mcg by mouth once daily.        ERGOCALCIFEROL, VITAMIN D2, (VITAMIN D ORAL) Take 1 tablet by mouth once daily.         linaclotide (LINZESS) 145 mcg Cap capsule Take 1 capsule (145 mcg total) by mouth once daily. 30 capsule 5    mineral oil (FLEET MINERAL OIL) enema Place 1 enema rectally  once daily. 2 enema 0    ramipril (ALTACE) 2.5 MG capsule Take 1 capsule (2.5 mg total) by mouth once daily. (Patient taking differently: Take 1.25 mg by mouth once daily. ) 90 capsule 3    timolol maleate 0.5% (TIMOPTIC) 0.5 % Drop Place 1 drop into both eyes 2 (two) times daily. 10 mL 12    blood sugar diagnostic (BLOOD GLUCOSE TEST) Strp 1 strip by Misc.(Non-Drug; Combo Route) route 3 (three) times a week. 32 strip 11         ROS:  GENERAL: No fever, chills, fatigability or weight loss.  SKIN: No rashes, itching or changes in color or texture of skin.  HEAD: No headaches or recent head trauma.  EYES: Visual acuity fine. No photophobia, ocular pain or diplopia.  EARS: Denies ear pain, discharge or vertigo.  NOSE: No loss of smell, no epistaxis or postnasal drip.  MOUTH & THROAT: No hoarseness or change in voice. No excessive gum bleeding.  NODES: Denies swollen glands.  CHEST: Denies ADAMS, cyanosis, wheezing, cough and sputum production.  CARDIOVASCULAR: Denies chest pain, PND, orthopnea or reduced exercise tolerance.  ABDOMEN: Appetite fine. No weight loss. Denies diarrhea, abdominal pain, hematemesis or blood in stool.  URINARY: No flank pain, dysuria or hematuria.  PERIPHERAL VASCULAR: No claudication or cyanosis.  NEUROLOGIC: No history of seizures, paralysis, alteration of gait or coordination.  MUSCULOSKELETAL: See HPI     PE:  APPEARANCE: Well nourished, well developed, in no acute distress.   HEAD: Normocephalic, atraumatic.  EYES: PERRL. EOMI.   EARS: TM's intact. Light reflex normal. No retraction or perforation.   NOSE: Mucosa pink. Airway clear.  MOUTH & THROAT: No tonsillar enlargement. No pharyngeal erythema or exudate. No stridor.  NECK: Supple.   NODES: No cervical, axillary or inguinal lymph node enlargement.  CHEST: Lungs clear to auscultation.  CARDIOVASCULAR: Normal S1, S2. No rubs, murmurs or gallops.  ABDOMEN: Bowel sounds normal. Not distended. Soft. No tenderness or masses.  NEUROLOGIC:  "Cranial Nerves: II-XII grossly intact, also see MUSCULOSKELETAL  MUSCULOSKELETAL:         Left Knee -dressing intact, 2 plus dorsalis pedis and posterior tibial artery pulses, light touch intact Left lower extremity.  All digits are warm. No erythema, no warmth, no drainage, mild swelling,mild tenderness.  Less than 2 seconds capillary refill all digits.        BP (!) 107/52 (BP Location: Left arm, Patient Position: Lying, BP Method: Automatic)   Pulse 76   Temp 98.4 °F (36.9 °C) (Oral)   Resp 16   Ht 6' 4" (1.93 m)   Wt 133.3 kg (293 lb 15.7 oz)   SpO2 (!) 94%   BMI 35.78 kg/m²        Hb-8.8     Hct-26.4    Intake/Output - Last 3 Shifts       06/28 0700 - 06/29 0659 06/29 0700 - 06/30 0659 06/30 0700 - 07/01 0659    P.O.  480 560    I.V. (mL/kg) 1820 (13.7) 696.7 (5.2)     Blood  697.9     IV Piggyback       Total Intake(mL/kg) 1820 (13.7) 1874.6 (14.1) 560 (4.2)    Urine (mL/kg/hr) 1650 (0.5)      Drains 580 (0.2)      Stool 0 (0)      Blood       Total Output 2230        Net -410 +1874.6 +560           Urine Occurrence 0 x 2 x 1 x    Stool Occurrence 0 x                     ASSESSMENT:     The patient is stable and improving.  The dizziness is likely secondary to orthostatic hypotension.        PLAN:   walker assisted ambulation FWB left lower extremity  Continue pain medication  Continue wound care  Continue physical therapy  Discharge to home.    "

## 2017-06-30 NOTE — DISCHARGE SUMMARY
[facility]  Orthopedics  Discharge Summary      Patient Name: Dominic Cohen  MRN: [id number]  Admission Date: 6/27/2017  Hospital Length of Stay: 3 days  Discharge Date and Time:  06/30/2017 4:13 PM  Attending Physician: Malcolm Gill Sr., MD   Discharging Provider: Malcolm Gill Sr, MD  Primary Care Provider: Sudhakar Liu MD    HPI: This is a 81-year-old man was admitted and underwent a left total knee revision.    Procedure(s) (LRB):  REVISION-ARTHROPLASTY-KNEE (Left)  REMOVAL-IMPLANT (Left)  SYNOVECTOMY-KNEE (Left)  BONE GRAFT (Left)      Hospital Course: The patient admitted and underwent a left total knee revision.  Postoperatively the patient developed anemia that was treated.  On discharge patient is alert and oriented ×3.  He is able to ambulate fairly well and is prepared to and has requested a discharge to home.    Consults         Status Ordering Provider     Inpatient consult to Hospitalist  Once     Provider:  MD Mattie Caldwell THEODORE SR          Significant Diagnostic Studies: Radiology     Pending Diagnostic Studies:     None        Final Active Diagnoses:    Diagnosis Date Noted POA    PRINCIPAL PROBLEM:  Left knee pain [M25.562] 06/27/2017 Yes    Acute blood loss anemia [D62] 06/28/2017 Unknown    Prediabetes [R73.03] 12/06/2013 Yes    Hypertension [I10]  Yes      Problems Resolved During this Admission:    Diagnosis Date Noted Date Resolved POA      Discharged Condition: good    Disposition:  discharge home, resume regular diet, follow-up in 13 days.  Walk assisted ambulation full weight-bear left lower extremity.    Follow Up: Follow-up in 13 days.  Follow-up Information     [facility].    Specialty:  Home Health Services  Contact information:  [address]  [facility] [address]  [phone]             [facility].    Specialty:  Home Health Services  Why:  Walker with wheels  Contact  "information:  [address]  [place] [address]  [phone]             Malcolm Gill Sr, MD.    Specialty:  Orthopedic Surgery  Why:  As needed, If symptoms worsen, For suture removal, For wound re-check  Contact information:  [address] [address]  [phone]                 Patient Instructions:     WALKER FOR HOME USE   Order Specific Question Answer Comments   Type of Walker: Adult (5'4"-6'6")    With wheels? Yes    Height: 6' 4" (1.93 m)    Weight: 133.3 kg (293 lb 15.7 oz)    Length of need (1-99 months): 99    Does patient have medical equipment at home? cane, straight    Please check all that apply: Patient's condition impairs ambulation.    Please check all that apply: Patient is unable to safely ambulate without equipment.    Please check all that apply: Patient needs help to get in and out of chair.    Please check all that apply: Walker will be used for gait training.      Ambulatory referral to Home Health   Referral Priority: Routine Referral Type: Home Health   Referral Reason: Specialty Services Required    Requested Specialty: Home Health Services    Number of Visits Requested: 1      Referral to Home health   Referral Priority: Routine Referral Type: Home Health Care   Referral Reason: Specialty Services Required    Requested Specialty: Home Health Services    Number of Visits Requested: 1      Diet general     Call MD for:  temperature >100.4     Call MD for:  persistent nausea and vomiting     Call MD for:  severe uncontrolled pain     Call MD for:  difficulty breathing, headache or visual disturbances     Call MD for:  redness, tenderness, or signs of infection (pain, swelling, redness, odor or green/yellow discharge around incision site)     Call MD for:  hives     Call MD for:  persistent dizziness or light-headedness     Call MD for:  extreme fatigue     Weight bearing as tolerated   Order Comments: Walk assisted ambulation full weight-bear left lower extremity. "     Remove dressing in 24 hours   Order Comments: Apply Betadine, gauze and an Ace wrap daily.       Medications:  Reconciled Home Medications:   Current Discharge Medication List      CONTINUE these medications which have NOT CHANGED    Details   ascorbic acid (VITAMIN C) 60 mg Lozg Take 1 tablet by mouth daily as needed.       cyanocobalamin, vitamin B-12, (VITAMIN B-12) 50 mcg tablet Take 50 mcg by mouth once daily.      ERGOCALCIFEROL, VITAMIN D2, (VITAMIN D ORAL) Take 1 tablet by mouth once daily.       linaclotide (LINZESS) 145 mcg Cap capsule Take 1 capsule (145 mcg total) by mouth once daily.  Qty: 30 capsule, Refills: 5    Associated Diagnoses: Constipation, unspecified constipation type; Lower abdominal pain; Bloating; Belching      mineral oil (FLEET MINERAL OIL) enema Place 1 enema rectally once daily.  Qty: 2 enema, Refills: 0    Associated Diagnoses: Constipation, unspecified constipation type      ramipril (ALTACE) 2.5 MG capsule Take 1 capsule (2.5 mg total) by mouth once daily.  Qty: 90 capsule, Refills: 3      timolol maleate 0.5% (TIMOPTIC) 0.5 % Drop Place 1 drop into both eyes 2 (two) times daily.  Qty: 10 mL, Refills: 12    Associated Diagnoses: Primary open angle glaucoma of both eyes, severe stage      apixaban (ELIQUIS) 2.5 mg Tab Take 1 tablet (2.5 mg total) by mouth 2 (two) times daily.  Qty: 80 tablet, Refills: 0      blood sugar diagnostic (BLOOD GLUCOSE TEST) Strp 1 strip by Misc.(Non-Drug; Combo Route) route 3 (three) times a week.  Qty: 32 strip, Refills: 11    Associated Diagnoses: Prediabetes      oxycodone-acetaminophen (PERCOCET)  mg per tablet Take 1 tablet by mouth every 4 (four) hours as needed for Pain.  Qty: 60 tablet, Refills: 0         STOP taking these medications       aspirin 81 MG Chew Comments:   Reason for Stopping:               Malcolm Gill Sr, MD  Orthopedics  [facility]

## 2017-06-30 NOTE — PT/OT/SLP PROGRESS
Occupational Therapy  Treatment    Dominic Cohen   MRN: [id number]   Admitting Diagnosis: Left knee pain    OT Date of Treatment: 06/30/17   OT Start Time: 1015  OT Stop Time: 1040  OT Total Time (min): 25 min    Billable Minutes:  Therapeutic Activity 25 minutes    General Precautions: Standard, fall  Orthopedic Precautions: LLE weight bearing as tolerated  Braces:           Subjective:  Communicated with nurse more and epic chart review prior to session.    Pain/Comfort  Pain Rating 1: 5/10  Location - Side 1: Left  Location - Orientation 1: generalized    Objective:  Patient found with: peripheral IV     Functional Mobility:  Bed Mobility:  Rolling/Turning to Left: Minimum assistance  Rolling/Turning Right: Minimum assistance  Scooting/Bridging: Minimum Assistance  Sit to Supine: Minimum Assistance    Transfers:   Sit <> Stand Assistance: Minimum Assistance  Sit <> Stand Assistive Device: Rolling Walker  Bed <> Chair Technique: Stand Pivot  Bed <> Chair Transfer Assistance: Minimum Assistance  Bed <> Chair Assistive Device: Rolling Walker    Functional Ambulation: pt ambulated 130 feet with rw and slow pace    Activities of Daily Living:     Feeding adaptive equipment: na     UE adaptive equipment: na  LE Dressing Level of Assistance: Maximum assistance  LE adaptive equipment: na                    Bathing adaptive equipment: na    Balance:   Static Sit: GOOD+: Takes MAXIMAL challenges from all directions.    Dynamic Sit: GOOD+: Maintains balance through MAXIMAL excursions of active trunk motion  Static Stand: GOOD-: Takes MODERATE challenges from all directions inconsistently  Dynamic stand: GOOD-: Needs SUPERVISION only during gait and able to self right with moderate     Therapeutic Activities and Exercises:      AM-PAC 6 CLICK ADL   How much help from another person does this patient currently need?   1 = Unable, Total/Dependent Assistance  2 = A lot, Maximum/Moderate Assistance  3 = A little,  "Minimum/Contact Guard/Supervision  4 = None, Modified [place]/Independent    Putting on and taking off regular lower body clothing? : 2  Bathing (including washing, rinsing, drying)?: 2  Toileting, which includes using toilet, bedpan, or urinal? : 1  Putting on and taking off regular upper body clothing?: 3  Taking care of personal grooming such as brushing teeth?: 3  Eating meals?: 4  Total Score: 15     AM-PAC Raw Score CMS "G-Code Modifier Level of Impairment Assistance   6 % Total / Unable   7 - 8 CM 80 - 100% Maximal Assist   9-13 CL 60 - 80% Moderate Assist   14 - 19 CK 40 - 60% Moderate Assist   20 - 22 CJ 20 - 40% Minimal Assist   23 CI 1-20% SBA / CGA   24 CH 0% Independent/ Mod I       Patient left HOB elevated with all lines intact, call button in reach, nurse notified and girlfriend present    ASSESSMENT:  Dominic Cohen is a 81 y.o. male with a medical diagnosis of Left knee pain and presents with debility and generalized weakness. Pt will continue to benefit from skilled o.t..    Rehab identified problem list/impairments: Rehab identified problem list/impairments: weakness, impaired self care skills, impaired balance, decreased safety awareness, decreased ROM, impaired skin, impaired endurance, impaired functional mobilty, decreased upper extremity function, pain, gait instability, decreased lower extremity function    Rehab potential is good.    Activity tolerance: Good    Discharge recommendations: Discharge Facility/Level Of Care Needs: home health OT     Barriers to discharge: Barriers to Discharge: None    Equipment recommendations: walker, rolling     GOALS:    Occupational Therapy Goals        Problem: Occupational Therapy Goal    Goal Priority Disciplines Outcome Interventions   Occupational Therapy Goal     OT, PT/OT Ongoing (interventions implemented as appropriate)    Description:  OT GOALS TO BE 7-6-17  1. PT WILL TOLERATE 1 SET X 15 REPS B UE ROM EXERCISE WITH MIN " RESISTANCE  2. MIN A WITH LE DRESSING  3. S WITH UE DRESSING  4. S WITH TOILET T/F'S                    Plan:  Patient to be seen 3 x/week to address the above listed problems via self-care/home management, therapeutic activities, therapeutic exercises  Plan of Care expires: 07/06/17  Plan of Care reviewed with: patient, significant other         Erin Adams, OT  06/30/2017

## 2017-06-30 NOTE — ASSESSMENT & PLAN NOTE
-s/p TKR procedure per Orthopedic Surgery on 6/27/17  -H/H stable post transfusion  -2 units of PRBCs transfused on 6/30/2017  -supplemental oxygen prn to keep O2 sat > 92%  -will monitor

## 2017-07-01 LAB — BACTERIA SPEC AEROBE CULT: NO GROWTH

## 2017-07-03 ENCOUNTER — PATIENT OUTREACH (OUTPATIENT)
Dept: ADMINISTRATIVE | Facility: CLINIC | Age: 82
End: 2017-07-03

## 2017-07-03 NOTE — PLAN OF CARE
7-3-2017 0725 Patient discharged home self care     07/03/17 0725   Final Note   Assessment Type Final Discharge Note   Discharge Disposition Home-Health   Right Care Referral Info   Post Acute Recommendation Home-care   Facility Name [facility]

## 2017-07-03 NOTE — PATIENT INSTRUCTIONS
Fall Prevention  Falls often occur due to slipping, tripping or losing your balance. Millions of people fall every year and injure themselves. Here are ways to reduce your risk of falling again.  · Think about your fall, was there anything that caused your fall that can be fixed, removed, or replaced?  · Make your home safe by keeping walkways clear of objects you may trip over.  · Use non-slip pads under rugs. Do not use area rugs or small throw rugs.  · Use non-slip mats in bathtubs and showers.  · Install handrails and lights on staircases.  · Do not walk in poorly lit areas.  · Do not stand on chairs or wobbly ladders.  · Use caution when reaching overhead or looking upward. This position can cause a loss of balance.  · Be sure your shoes fit properly, have non-slip bottoms and are in good condition.   · Wear shoes both inside and out. Avoid going barefoot or wearing slippers.  · Be cautious when going up and down stairs, curbs, and when walking on uneven sidewalks.  · If your balance is poor, consider using a cane or walker.  · If your fall was related to alcohol use, stop or limit alcohol intake.   · If your fall was related to use of sleeping medicines, talk to your doctor about this. You may need to reduce your dosage at bedtime if you awaken during the night to go to the bathroom.    · To reduce the need for nighttime bathroom trips:  ¨ Avoid drinking fluids for several hours before going to bed  ¨ Empty your bladder before going to bed  ¨ Men can keep a urinal at the bedside  · Stay as active as you can. Balance, flexibility, strength, and endurance all come from exercise. They all play a role in preventing falls. Ask your healthcare provider which types of activity are right for you.  · Get your vision checked on a regular basis.  · If you have pets, know where they are before you stand up or walk so you don't trip over them.  · Use night lights.  Date Last Reviewed: 11/5/2015  © 6547-7631 [employer]. [address] [place] [address], [place] [address]. All rights reserved. This information is not intended as a substitute for professional medical care. Always follow your healthcare professional's instructions.

## 2017-07-05 ENCOUNTER — TELEPHONE (OUTPATIENT)
Dept: ORTHOPEDICS | Facility: CLINIC | Age: 82
End: 2017-07-05

## 2017-07-05 LAB — BACTERIA SPEC ANAEROBE CULT: NORMAL

## 2017-07-05 NOTE — TELEPHONE ENCOUNTER
----- Message from Ghanshyam Gutierrez PA-C sent at 7/5/2017  7:00 AM CDT -----  Please call and have him take a baby ASA every day.  Thanks

## 2017-07-10 DIAGNOSIS — M25.562 ACUTE PAIN OF LEFT KNEE: Primary | ICD-10-CM

## 2017-07-11 ENCOUNTER — OFFICE VISIT (OUTPATIENT)
Dept: ORTHOPEDICS | Facility: CLINIC | Age: 82
End: 2017-07-11
Payer: MEDICARE

## 2017-07-11 ENCOUNTER — HOSPITAL ENCOUNTER (OUTPATIENT)
Dept: RADIOLOGY | Facility: HOSPITAL | Age: 82
Discharge: HOME | End: 2017-07-11
Attending: ORTHOPAEDIC SURGERY
Payer: MEDICARE

## 2017-07-11 VITALS
HEIGHT: 76 IN | BODY MASS INDEX: 34.79 KG/M2 | WEIGHT: 285.69 LBS | SYSTOLIC BLOOD PRESSURE: 116 MMHG | HEART RATE: 83 BPM | DIASTOLIC BLOOD PRESSURE: 72 MMHG

## 2017-07-11 DIAGNOSIS — Z98.890 POSTOPERATIVE STATE: Primary | ICD-10-CM

## 2017-07-11 DIAGNOSIS — M25.562 ACUTE PAIN OF LEFT KNEE: ICD-10-CM

## 2017-07-11 DIAGNOSIS — Z98.890 POST-OPERATIVE STATE: Primary | ICD-10-CM

## 2017-07-11 PROCEDURE — 73560 X-RAY EXAM OF KNEE 1 OR 2: CPT | Mod: 26,59,RT, | Performed by: RADIOLOGY

## 2017-07-11 PROCEDURE — 99024 POSTOP FOLLOW-UP VISIT: CPT | Mod: ,,, | Performed by: ORTHOPAEDIC SURGERY

## 2017-07-11 PROCEDURE — 99999 PR PBB SHADOW E&M-EST. PATIENT-LVL III: CPT | Mod: PBBFAC,,, | Performed by: ORTHOPAEDIC SURGERY

## 2017-07-11 PROCEDURE — 99213 OFFICE O/P EST LOW 20 MIN: CPT | Mod: PBBFAC,25,PO | Performed by: ORTHOPAEDIC SURGERY

## 2017-07-11 PROCEDURE — 73562 X-RAY EXAM OF KNEE 3: CPT | Mod: 26,LT,, | Performed by: RADIOLOGY

## 2017-07-11 NOTE — PATIENT INSTRUCTIONS
ACE Wrap  Minor muscle or joint injuries are often treated with an elastic bandage. The bandage provides support and compression to the injured area. An elastic bandage is a stretchy, rolled bandage. Elastic bandages range in width from 2 to 6 inches. They can be used for a variety of injuries. The bandages are often called ACE bandages, after the most common brand name.  If used correctly, elastic bandages help control swelling and ease pain. An elastic bandage is also a good reminder not to overuse the injured area. However, elastic bandages do not provide a lot of support and will not prevent reinjury.  Home care    To apply an elastic bandage:  · Check the skin before wrapping the injury. It should be clean, dry, and free of drainage.  · Start wrapping below the injury and work your way toward the body. For an ankle sprain, start wrapping around the foot and work up toward the calf. This will help control swelling.  · Overlap the edges of the bandage so it stays snuggly in place.  · Wrap the bandage firmly, but not too tightly. A tight bandage can increase swelling on either end of the bandage. Make sure the bandage is wrinkle free.  · Leave fingers and toes exposed.  · Secure ends of the bandage (even self-sticking ones) with clips or tape.  · Check frequently to ensure adequate circulation, especially in the fingers and toes. Loosen the bandage if there is local swelling, numbness, tingling, discomfort, coldness, or discoloration (skin pale or bluish in color).  · Rewrap the bandage as needed during the day. Reroll the bandage as you unwind it.  Continue using the elastic bandage until the pain and swelling are gone or as your healthcare provider advises.  If you have been told to ice the area, the ice can be secured in place with the elastic bandage. Wrap the ice pack with a thin towel to protect the skin. Do not put ice or an ice pack directly on the skin.  Ice the area for no more than 20 minutes at a  time.    Follow-up care  Follow up with your healthcare provider, as advised.  When to seek medical advice  Call your healthcare provider for any of the following:  · Pain and swelling that doesn't get better or gets worse  · Trouble moving injured area  · Skin discoloration, numbness, or tingling that doesnt go away after bandage is removed  Date Last Reviewed: 9/13/2015  © 4181-9687 [employer]. [address] [place] [address], [place] [address]. All rights reserved. This information is not intended as a substitute for professional medical care. Always follow your healthcare professional's instructions.

## 2017-07-11 NOTE — PROGRESS NOTES
SUBJECTIVE:  Patient is status post Left total knee revision.  Patient complains of 0 /10 pain.             Past Medical History:   Diagnosis Date    Arthritis      Atherosclerosis of abdominal aorta      Cataract      Chronic constipation      Glaucoma      History of anal fissures      Hypertension      Polyneuropathy in other diseases classified elsewhere       due to folate deficiency    Prediabetes      Venous insufficiency      Venous insufficiency                  Past Surgical History:   Procedure Laterality Date    BACK SURGERY        CATARACT EXTRACTION BILATERAL W/ ANTERIOR VITRECTOMY        COLONOSCOPY N/A 5/13/2016     Procedure: COLONOSCOPY;  Surgeon: Presley Phillips MD;  Location: [address];  Service: Endoscopy;  Laterality: N/A;  EGD with Dr. Jeffery prior to Colonoscopy. EC    KNEE SURGERY Left      PROSTATE SURGERY        TONSILLECTOMY, ADENOIDECTOMY                  Review of patient's allergies indicates:   Allergen Reactions    Penicillins Hives and Swelling    Sulfa (sulfonamide antibiotics) Hives    Protonix [pantoprazole] Rash      No current facility-administered medications on file prior to encounter.                   Current Outpatient Prescriptions on File Prior to Encounter   Medication Sig Dispense Refill    ascorbic acid (VITAMIN C) 60 mg Lozg Take 1 tablet by mouth daily as needed.         cyanocobalamin, vitamin B-12, (VITAMIN B-12) 50 mcg tablet Take 50 mcg by mouth once daily.        ERGOCALCIFEROL, VITAMIN D2, (VITAMIN D ORAL) Take 1 tablet by mouth once daily.         linaclotide (LINZESS) 145 mcg Cap capsule Take 1 capsule (145 mcg total) by mouth once daily. 30 capsule 5    mineral oil (FLEET MINERAL OIL) enema Place 1 enema rectally once daily. 2 enema 0    ramipril (ALTACE) 2.5 MG capsule Take 1 capsule (2.5 mg total) by mouth once daily. (Patient taking differently: Take 1.25 mg by mouth once daily. ) 90 capsule 3    timolol maleate  0.5% (TIMOPTIC) 0.5 % Drop Place 1 drop into both eyes 2 (two) times daily. 10 mL 12    blood sugar diagnostic (BLOOD GLUCOSE TEST) Strp 1 strip by Misc.(Non-Drug; Combo Route) route 3 (three) times a week. 32 strip 11          ROS:  GENERAL: No fever, chills, fatigability or weight loss.  SKIN: No rashes, itching or changes in color or texture of skin.  HEAD: No headaches or recent head trauma.  EYES: Visual acuity fine. No photophobia, ocular pain or diplopia.  EARS: Denies ear pain, discharge or vertigo.  NOSE: No loss of smell, no epistaxis or postnasal drip.  MOUTH & THROAT: No hoarseness or change in voice. No excessive gum bleeding.  NODES: Denies swollen glands.  CHEST: Denies ADAMS, cyanosis, wheezing, cough and sputum production.  CARDIOVASCULAR: Denies chest pain, PND, orthopnea or reduced exercise tolerance.  ABDOMEN: Appetite fine. No weight loss. Denies diarrhea, abdominal pain, hematemesis or blood in stool.  URINARY: No flank pain, dysuria or hematuria.  PERIPHERAL VASCULAR: No claudication or cyanosis.  NEUROLOGIC: No history of seizures, paralysis, alteration of gait or coordination.  MUSCULOSKELETAL: See HPI     PE:  APPEARANCE: Well nourished, well developed, in no acute distress.   HEAD: Normocephalic, atraumatic.  EYES: PERRL. EOMI.   EARS: TM's intact. Light reflex normal. No retraction or perforation.   NOSE: Mucosa pink. Airway clear.  MOUTH & THROAT: No tonsillar enlargement. No pharyngeal erythema or exudate. No stridor.  NECK: Supple.   NODES: No cervical, axillary or inguinal lymph node enlargement.  CHEST: Lungs clear to auscultation.  CARDIOVASCULAR: Normal S1, S2. No rubs, murmurs or gallops.  ABDOMEN: Bowel sounds normal. Not distended. Soft. No tenderness or masses.  NEUROLOGIC: Cranial Nerves: II-XII grossly intact, also see MUSCULOSKELETAL  MUSCULOSKELETAL:         Left Knee -dressing intact, 2 plus dorsalis pedis and posterior tibial artery pulses, light touch intact Left lower  "extremity.  All digits are warm. No erythema, no warmth, no drainage,Moderate swelling,mild tenderness.  Less than 2 seconds capillary refill all digits. Mild ecchymosis     /72   Pulse 83   Ht 6' 4" (1.93 m)   Wt 129.6 kg (285 lb 11.5 oz)   BMI 34.78 kg/m²        ASSESSMENT:     The patient is stable and improving.  The dizziness is likely secondary to orthostatic hypotension.        PLAN:  Hold the Eliquis   walker assisted ambulation FWB left lower extremity  Elevate the left lower extremity to 5 cm above the level of the heart  Discontinue the aggressive range of motion.  Follow-up in one week or when necessary for any problems  Continue pain medication  Continue wound care  Continue physical therapy  Discharge to home.       "

## 2017-07-17 DIAGNOSIS — M25.562 ACUTE PAIN OF LEFT KNEE: Primary | ICD-10-CM

## 2017-07-18 ENCOUNTER — OFFICE VISIT (OUTPATIENT)
Dept: ORTHOPEDICS | Facility: CLINIC | Age: 82
End: 2017-07-18
Payer: MEDICARE

## 2017-07-18 ENCOUNTER — HOSPITAL ENCOUNTER (OUTPATIENT)
Dept: RADIOLOGY | Facility: HOSPITAL | Age: 82
Discharge: HOME | End: 2017-07-18
Attending: ORTHOPAEDIC SURGERY
Payer: MEDICARE

## 2017-07-18 VITALS
HEIGHT: 76 IN | BODY MASS INDEX: 34.9 KG/M2 | HEART RATE: 75 BPM | WEIGHT: 286.63 LBS | DIASTOLIC BLOOD PRESSURE: 59 MMHG | SYSTOLIC BLOOD PRESSURE: 114 MMHG | RESPIRATION RATE: 12 BRPM

## 2017-07-18 DIAGNOSIS — M25.562 ACUTE PAIN OF LEFT KNEE: Primary | ICD-10-CM

## 2017-07-18 DIAGNOSIS — M25.562 ACUTE PAIN OF LEFT KNEE: ICD-10-CM

## 2017-07-18 DIAGNOSIS — Z98.890 POSTOPERATIVE STATE: Primary | ICD-10-CM

## 2017-07-18 PROCEDURE — 99213 OFFICE O/P EST LOW 20 MIN: CPT | Mod: PBBFAC,25,PO | Performed by: ORTHOPAEDIC SURGERY

## 2017-07-18 PROCEDURE — 73562 X-RAY EXAM OF KNEE 3: CPT | Mod: 26,LT,, | Performed by: RADIOLOGY

## 2017-07-18 PROCEDURE — 73560 X-RAY EXAM OF KNEE 1 OR 2: CPT | Mod: 26,59,RT, | Performed by: RADIOLOGY

## 2017-07-18 PROCEDURE — 99999 PR PBB SHADOW E&M-EST. PATIENT-LVL III: CPT | Mod: PBBFAC,,, | Performed by: ORTHOPAEDIC SURGERY

## 2017-07-18 PROCEDURE — 99024 POSTOP FOLLOW-UP VISIT: CPT | Mod: ,,, | Performed by: ORTHOPAEDIC SURGERY

## 2017-07-18 NOTE — PATIENT INSTRUCTIONS
ACE Wrap  Minor muscle or joint injuries are often treated with an elastic bandage. The bandage provides support and compression to the injured area. An elastic bandage is a stretchy, rolled bandage. Elastic bandages range in width from 2 to 6 inches. They can be used for a variety of injuries. The bandages are often called ACE bandages, after the most common brand name.  If used correctly, elastic bandages help control swelling and ease pain. An elastic bandage is also a good reminder not to overuse the injured area. However, elastic bandages do not provide a lot of support and will not prevent reinjury.  Home care    To apply an elastic bandage:  · Check the skin before wrapping the injury. It should be clean, dry, and free of drainage.  · Start wrapping below the injury and work your way toward the body. For an ankle sprain, start wrapping around the foot and work up toward the calf. This will help control swelling.  · Overlap the edges of the bandage so it stays snuggly in place.  · Wrap the bandage firmly, but not too tightly. A tight bandage can increase swelling on either end of the bandage. Make sure the bandage is wrinkle free.  · Leave fingers and toes exposed.  · Secure ends of the bandage (even self-sticking ones) with clips or tape.  · Check frequently to ensure adequate circulation, especially in the fingers and toes. Loosen the bandage if there is local swelling, numbness, tingling, discomfort, coldness, or discoloration (skin pale or bluish in color).  · Rewrap the bandage as needed during the day. Reroll the bandage as you unwind it.  Continue using the elastic bandage until the pain and swelling are gone or as your healthcare provider advises.  If you have been told to ice the area, the ice can be secured in place with the elastic bandage. Wrap the ice pack with a thin towel to protect the skin. Do not put ice or an ice pack directly on the skin.  Ice the area for no more than 20 minutes at a  time.    Follow-up care  Follow up with your healthcare provider, as advised.  When to seek medical advice  Call your healthcare provider for any of the following:  · Pain and swelling that doesn't get better or gets worse  · Trouble moving injured area  · Skin discoloration, numbness, or tingling that doesnt go away after bandage is removed  Date Last Reviewed: 9/13/2015  © 0627-5852 [employer]. [address] [place] [address], [place] [address]. All rights reserved. This information is not intended as a substitute for professional medical care. Always follow your healthcare professional's instructions.

## 2017-07-18 NOTE — PROGRESS NOTES
SUBJECTIVE:  Patient is status post Left total knee revision.  Patient complains of 0 /10 pain.             Past Medical History:   Diagnosis Date    Arthritis      Atherosclerosis of abdominal aorta      Cataract      Chronic constipation      Glaucoma      History of anal fissures      Hypertension      Polyneuropathy in other diseases classified elsewhere       due to folate deficiency    Prediabetes      Venous insufficiency      Venous insufficiency                  Past Surgical History:   Procedure Laterality Date    BACK SURGERY        CATARACT EXTRACTION BILATERAL W/ ANTERIOR VITRECTOMY        COLONOSCOPY N/A 5/13/2016     Procedure: COLONOSCOPY;  Surgeon: Presley Phillips MD;  Location: [address];  Service: Endoscopy;  Laterality: N/A;  EGD with Dr. Jeffery prior to Colonoscopy. EC    KNEE SURGERY Left      PROSTATE SURGERY        TONSILLECTOMY, ADENOIDECTOMY                  Review of patient's allergies indicates:   Allergen Reactions    Penicillins Hives and Swelling    Sulfa (sulfonamide antibiotics) Hives    Protonix [pantoprazole] Rash      No current facility-administered medications on file prior to encounter.                   Current Outpatient Prescriptions on File Prior to Encounter   Medication Sig Dispense Refill    ascorbic acid (VITAMIN C) 60 mg Lozg Take 1 tablet by mouth daily as needed.         cyanocobalamin, vitamin B-12, (VITAMIN B-12) 50 mcg tablet Take 50 mcg by mouth once daily.        ERGOCALCIFEROL, VITAMIN D2, (VITAMIN D ORAL) Take 1 tablet by mouth once daily.         linaclotide (LINZESS) 145 mcg Cap capsule Take 1 capsule (145 mcg total) by mouth once daily. 30 capsule 5    mineral oil (FLEET MINERAL OIL) enema Place 1 enema rectally once daily. 2 enema 0    ramipril (ALTACE) 2.5 MG capsule Take 1 capsule (2.5 mg total) by mouth once daily. (Patient taking differently: Take 1.25 mg by mouth once daily. ) 90 capsule 3    timolol maleate  0.5% (TIMOPTIC) 0.5 % Drop Place 1 drop into both eyes 2 (two) times daily. 10 mL 12    blood sugar diagnostic (BLOOD GLUCOSE TEST) Strp 1 strip by Misc.(Non-Drug; Combo Route) route 3 (three) times a week. 32 strip 11          ROS:  GENERAL: No fever, chills, fatigability or weight loss.  SKIN: No rashes, itching or changes in color or texture of skin.  HEAD: No headaches or recent head trauma.  EYES: Visual acuity fine. No photophobia, ocular pain or diplopia.  EARS: Denies ear pain, discharge or vertigo.  NOSE: No loss of smell, no epistaxis or postnasal drip.  MOUTH & THROAT: No hoarseness or change in voice. No excessive gum bleeding.  NODES: Denies swollen glands.  CHEST: Denies ADAMS, cyanosis, wheezing, cough and sputum production.  CARDIOVASCULAR: Denies chest pain, PND, orthopnea or reduced exercise tolerance.  ABDOMEN: Appetite fine. No weight loss. Denies diarrhea, abdominal pain, hematemesis or blood in stool.  URINARY: No flank pain, dysuria or hematuria.  PERIPHERAL VASCULAR: No claudication or cyanosis.  NEUROLOGIC: No history of seizures, paralysis, alteration of gait or coordination.  MUSCULOSKELETAL: See HPI     PE:  APPEARANCE: Well nourished, well developed, in no acute distress.   HEAD: Normocephalic, atraumatic.  EYES: PERRL. EOMI.   EARS: TM's intact. Light reflex normal. No retraction or perforation.   NOSE: Mucosa pink. Airway clear.  MOUTH & THROAT: No tonsillar enlargement. No pharyngeal erythema or exudate. No stridor.  NECK: Supple.   NODES: No cervical, axillary or inguinal lymph node enlargement.  CHEST: Lungs clear to auscultation.  CARDIOVASCULAR: Normal S1, S2. No rubs, murmurs or gallops.  ABDOMEN: Bowel sounds normal. Not distended. Soft. No tenderness or masses.  NEUROLOGIC: Cranial Nerves: II-XII grossly intact, also see MUSCULOSKELETAL  MUSCULOSKELETAL:         Left Knee -dressing intact, 2 plus dorsalis pedis and posterior tibial artery pulses, light touch intact Left lower  "extremity.  All digits are warm. No erythema, no warmth, no drainage,Moderate swelling,mild tenderness.  Less than 2 seconds capillary refill all digits. Mild ecchymosis The swelling has decreased     BP (!) 114/59   Pulse 75   Resp 12   Ht 6' 4" (1.93 m)   Wt 130 kg (286 lb 9.6 oz)   BMI 34.89 kg/m²        ASSESSMENT:     The patient is stable and improving.  The dizziness is likely secondary to orthostatic hypotension.        PLAN:  Remove sutures and staples  Hold the Eliquis   walker assisted ambulation FWB left lower extremity  Elevate the left lower extremity to 5 cm above the level of the heart  Discontinue the aggressive range of motion.  Follow-up in one week or when necessary for any problems  Continue pain medication  Continue wound care  Continue physical therapy  Follow-up in one week.       "

## 2017-07-20 ENCOUNTER — HOSPITAL ENCOUNTER (EMERGENCY)
Facility: HOSPITAL | Age: 82
Discharge: HOME | End: 2017-07-20
Payer: MEDICARE

## 2017-07-20 ENCOUNTER — OFFICE VISIT (OUTPATIENT)
Dept: FAMILY MEDICINE | Facility: CLINIC | Age: 82
End: 2017-07-20
Payer: MEDICARE

## 2017-07-20 VITALS
SYSTOLIC BLOOD PRESSURE: 124 MMHG | HEIGHT: 76 IN | RESPIRATION RATE: 18 BRPM | OXYGEN SATURATION: 96 % | BODY MASS INDEX: 35.17 KG/M2 | DIASTOLIC BLOOD PRESSURE: 64 MMHG | WEIGHT: 288.81 LBS | HEART RATE: 89 BPM

## 2017-07-20 VITALS
HEART RATE: 80 BPM | HEIGHT: 76 IN | BODY MASS INDEX: 35.07 KG/M2 | OXYGEN SATURATION: 100 % | RESPIRATION RATE: 20 BRPM | DIASTOLIC BLOOD PRESSURE: 71 MMHG | SYSTOLIC BLOOD PRESSURE: 146 MMHG | WEIGHT: 288 LBS | TEMPERATURE: 98 F

## 2017-07-20 DIAGNOSIS — M79.89 SWELLING OF LEFT HAND: ICD-10-CM

## 2017-07-20 DIAGNOSIS — H53.8 BLURRED VISION, LEFT EYE: ICD-10-CM

## 2017-07-20 DIAGNOSIS — R20.2 PARESTHESIAS: Primary | ICD-10-CM

## 2017-07-20 DIAGNOSIS — M79.672 LEFT FOOT PAIN: ICD-10-CM

## 2017-07-20 DIAGNOSIS — M79.642 PAIN OF LEFT HAND: Primary | ICD-10-CM

## 2017-07-20 DIAGNOSIS — M79.89 LEG SWELLING: ICD-10-CM

## 2017-07-20 DIAGNOSIS — Z98.890 S/P LEFT KNEE SURGERY: ICD-10-CM

## 2017-07-20 PROCEDURE — 99283 EMERGENCY DEPT VISIT LOW MDM: CPT | Mod: 27

## 2017-07-20 PROCEDURE — 99999 PR PBB SHADOW E&M-EST. PATIENT-LVL III: CPT | Mod: PBBFAC,,, | Performed by: INTERNAL MEDICINE

## 2017-07-20 PROCEDURE — 1159F MED LIST DOCD IN RCRD: CPT | Mod: ,,, | Performed by: INTERNAL MEDICINE

## 2017-07-20 PROCEDURE — 1126F AMNT PAIN NOTED NONE PRSNT: CPT | Mod: ,,, | Performed by: INTERNAL MEDICINE

## 2017-07-20 PROCEDURE — 99213 OFFICE O/P EST LOW 20 MIN: CPT | Mod: S$PBB,,, | Performed by: INTERNAL MEDICINE

## 2017-07-20 RX ORDER — MELOXICAM 7.5 MG/1
7.5 TABLET ORAL DAILY
Qty: 14 TABLET | Refills: 0 | Status: SHIPPED | OUTPATIENT
Start: 2017-07-20 | End: 2017-11-09

## 2017-07-20 NOTE — ED PROVIDER NOTES
"Encounter Date: 7/20/2017       History     Chief Complaint   Patient presents with    Numbness     Pt was sent from Dr. Liu for an ultrasound of his left hand "I had surgery on my left knee in June and ever since then my left HAND has been numb". ??     Patient c/o left hand swelling and numbness since knee surgery in June 2017. Patient reports that since waking up from surgery he has been feeling numbness in left hand.       The history is provided by the patient.   Hand Injury    The incident occurred several weeks ago. There was no injury mechanism. The pain is present in the left fingers. Pertinent negatives include no fever. He has tried nothing for the symptoms.     Review of patient's allergies indicates:   Allergen Reactions    Penicillins Hives and Swelling    Sulfa (sulfonamide antibiotics) Hives    Protonix [pantoprazole] Rash     Past Medical History:   Diagnosis Date    Arthritis     Atherosclerosis of abdominal aorta     Cataract     Chronic constipation     Glaucoma     History of anal fissures     Hypertension     Polyneuropathy in other diseases classified elsewhere     due to folate deficiency    Prediabetes     Venous insufficiency     Venous insufficiency      Past Surgical History:   Procedure Laterality Date    BACK SURGERY      CATARACT EXTRACTION BILATERAL W/ ANTERIOR VITRECTOMY      COLONOSCOPY N/A 5/13/2016    Procedure: COLONOSCOPY;  Surgeon: Presley Phillips MD;  Location: [address]);  Service: Endoscopy;  Laterality: N/A;  EGD with Dr. Jeffery prior to Colonoscopy. EC    JOINT REPLACEMENT Left     x 2    KNEE SURGERY Left     x2. revision 06/27/17    PROSTATE SURGERY      TONSILLECTOMY, ADENOIDECTOMY       Family History   Problem Relation Age of Onset    No Known Problems Son     No Known Problems Daughter     No Known Problems Son     Diabetes Neg Hx     Heart disease Neg Hx     Cancer Neg Hx     Celiac disease Neg Hx     Cirrhosis Neg Hx  "    Colon cancer Neg Hx     Colon polyps Neg Hx     Crohn's disease Neg Hx     Cystic fibrosis Neg Hx     Esophageal cancer Neg Hx     Hemochromatosis Neg Hx     Inflammatory bowel disease Neg Hx     Irritable bowel syndrome Neg Hx     Liver cancer Neg Hx     Liver disease Neg Hx     Rectal cancer Neg Hx     Stomach cancer Neg Hx     Ulcerative colitis Neg Hx     Zak's disease Neg Hx     Amblyopia Neg Hx     Blindness Neg Hx     Glaucoma Neg Hx     Hypertension Neg Hx     Macular degeneration Neg Hx     Retinal detachment Neg Hx     Strabismus Neg Hx      Social History   Substance Use Topics    Smoking status: Former Smoker     Packs/day: 0.30     Years: 52.00     Types: Cigarettes     Quit date: 11/13/2000    Smokeless tobacco: Never Used    Alcohol use Yes      Comment: Very rarely     Review of Systems   Constitutional: Negative for diaphoresis and fever.   HENT: Negative for sore throat.    Eyes: Negative for photophobia and redness.   Respiratory: Negative for shortness of breath.    Cardiovascular: Negative for chest pain.   Gastrointestinal: Negative for abdominal pain, constipation, diarrhea, nausea and vomiting.   Endocrine: Negative for polydipsia and polyphagia.   Genitourinary: Negative for dysuria and frequency.   Musculoskeletal: Positive for arthralgias and joint swelling. Negative for back pain.   Skin: Negative for rash.   Neurological: Positive for numbness. Negative for weakness.   Hematological: Does not bruise/bleed easily.   Psychiatric/Behavioral: The patient is not nervous/anxious.        Physical Exam     Initial Vitals [07/20/17 1110]   BP Pulse Resp Temp SpO2   (!) 146/71 80 20 98.4 °F (36.9 °C) 100 %      MAP       96         Physical Exam    Nursing note and vitals reviewed.  Constitutional: He appears well-developed and well-nourished.   HENT:   Head: Normocephalic and atraumatic.   Right Ear: External ear normal.   Left Ear: External ear normal.   Nose: Nose  normal.   Mouth/Throat: Oropharynx is clear and moist.   Eyes: Conjunctivae and EOM are normal. Pupils are equal, round, and reactive to light.   Neck: Normal range of motion. Neck supple.   Cardiovascular: Normal rate, regular rhythm, normal heart sounds and intact distal pulses.   Pulmonary/Chest: Breath sounds normal. No respiratory distress. He has no wheezes. He has no rhonchi. He has no rales.   Abdominal: Soft. Bowel sounds are normal. He exhibits no distension. There is no tenderness. There is no rebound and no guarding.   Musculoskeletal: Normal range of motion. He exhibits no edema.   Neurological: He is alert and oriented to person, place, and time. He has normal strength.   Skin: Skin is warm and dry. Capillary refill takes less than 2 seconds. No erythema.   No swelling, no erythema, no tenderness, no pain with passive or active extension/flexion of fingers. No evidence of felon, paronychia, or flexor tenosynovitis. Distal pulses intact.    Psychiatric: He has a normal mood and affect. His behavior is normal. Judgment and thought content normal.         ED Course   Procedures  Labs Reviewed - No data to display                            ED Course     Clinical Impression:   The encounter diagnosis was Paresthesias.                           DILSHAD Crabtree  07/20/17 7309

## 2017-07-20 NOTE — ED NOTES
Pt examined by Reagan YUNG  without RN, educated on prescriptions, given discharge instructions and discharged to [facility]. See provider notes for exam.

## 2017-07-25 ENCOUNTER — HOSPITAL ENCOUNTER (OUTPATIENT)
Dept: RADIOLOGY | Facility: HOSPITAL | Age: 82
Discharge: HOME | End: 2017-07-25
Attending: ORTHOPAEDIC SURGERY
Payer: MEDICARE

## 2017-07-25 ENCOUNTER — OFFICE VISIT (OUTPATIENT)
Dept: ORTHOPEDICS | Facility: CLINIC | Age: 82
End: 2017-07-25
Payer: MEDICARE

## 2017-07-25 VITALS
HEART RATE: 90 BPM | SYSTOLIC BLOOD PRESSURE: 83 MMHG | HEIGHT: 76 IN | DIASTOLIC BLOOD PRESSURE: 47 MMHG | BODY MASS INDEX: 34.96 KG/M2 | WEIGHT: 287.06 LBS

## 2017-07-25 DIAGNOSIS — R60.0 LOCALIZED EDEMA: Primary | ICD-10-CM

## 2017-07-25 DIAGNOSIS — G56.02 CARPAL TUNNEL SYNDROME OF LEFT WRIST: ICD-10-CM

## 2017-07-25 DIAGNOSIS — R20.0 BILATERAL HAND NUMBNESS: Primary | ICD-10-CM

## 2017-07-25 DIAGNOSIS — M25.562 ACUTE PAIN OF LEFT KNEE: ICD-10-CM

## 2017-07-25 PROCEDURE — 73560 X-RAY EXAM OF KNEE 1 OR 2: CPT | Mod: 26,59,RT, | Performed by: RADIOLOGY

## 2017-07-25 PROCEDURE — 1159F MED LIST DOCD IN RCRD: CPT | Mod: ,,, | Performed by: ORTHOPAEDIC SURGERY

## 2017-07-25 PROCEDURE — 99213 OFFICE O/P EST LOW 20 MIN: CPT | Mod: PBBFAC,25,PO | Performed by: ORTHOPAEDIC SURGERY

## 2017-07-25 PROCEDURE — 99214 OFFICE O/P EST MOD 30 MIN: CPT | Mod: 24,S$PBB,, | Performed by: ORTHOPAEDIC SURGERY

## 2017-07-25 PROCEDURE — 1125F AMNT PAIN NOTED PAIN PRSNT: CPT | Mod: ,,, | Performed by: ORTHOPAEDIC SURGERY

## 2017-07-25 PROCEDURE — 99999 PR PBB SHADOW E&M-EST. PATIENT-LVL III: CPT | Mod: PBBFAC,,, | Performed by: ORTHOPAEDIC SURGERY

## 2017-07-25 PROCEDURE — 73562 X-RAY EXAM OF KNEE 3: CPT | Mod: 26,LT,, | Performed by: RADIOLOGY

## 2017-07-25 RX ORDER — ASPIRIN 81 MG/1
81 TABLET ORAL
COMMUNITY
End: 2020-02-27 | Stop reason: ALTCHOICE

## 2017-07-25 NOTE — PATIENT INSTRUCTIONS
Lymphedema  The lymphatic system is made up of lymph vessels and lymph nodes, which carry a fluid called lymph. Lymph consists of waste from the cells. This fluid drains through lymph vessels under the skin to nearby lymph nodes. Lymph nodes filter waste products from the cells and kill any bacteria present before returning the lymph fluid to your blood circulation.  When the lymph vessels are damaged, lymph fluid cannot drain from tissues. This causes the lymph fluid to back up leading to swelling. This most often affects the arms or legs. Signs of lymphedema include heaviness, stiffness, or aching in an arm or leg. The limb may swell. The skin might look red. Shoes and rings may feel tight. Ankles and wrists might become less flexible.  The most common cause of damage to the lymph system is surgery or radiation for breast or testicular cancer. Other causes include repeated skin infections (cellulitis), burns, or injury to the arms or legs. It can take many years for symptoms of lymphedema to appear. Once present, lymphedema can become a chronic condition. This means the problem can be managed but not cured.   Treatment often includes use of compression garments, massage, and special exercises. Talk to your healthcare provider about these therapies and best treatment plan for you.  Home care  You can help keep the condition from getting worse. Follow all instructions you have been given. Do your exercises and wear your compression garments as recommended. Also, care for yourself as instructed.   · Small skin injuries like a cut, burn, or insect bite are more likely to cause a skin infection. Take special care to avoid injury. If you have any signs of infection, call your healthcare provider right away.  · Take care of your skin and nails. Moisturize dry skin. Wear protective gloves when doing chores such as gardening.   · Don't wear tight clothing or jewelry on the affected arm or leg. Avoid carrying bags or  other weight on the affected arm.  · Save with an electric razor instead of a razor blade.  · If at all possible, dont have blood pressure taken, get injections, or have blood drawn in the affected arm.  · If a leg is involved, dont cross your legs when sitting. Don't go barefoot.  · Avoid hot tubs, steam rooms, and saunas.  If you are at risk for lymphedema but have not developed it, these tips can help also help prevent it. Follow your healthcare provider's instructions.  Follow-up care  Follow up with your healthcare provider, or as advised.  Lymphedema can change the appearance of your body. This can be emotionally difficult to adjust to. You may benefit from a support group where practical advice and emotional support is offered. Individual counseling is another option.  When to seek medical advice  Call your healthcare provider right away if any of these occur:  · Swelling worsens  · Rash, blistering, or other skin changes on the affected limb  · Area of skin becomes red, painful, or warm to the touch  · A wound increases in pain, becomes warm, drains pus, or radiates red streaks  · Fever of 100.4°F (38°C) or higher, or as directed by your healthcare provider  Date Last Reviewed: 10/1/2016  © 6045-0782 [employer]. [address] [place] [address], [place] [address]. All rights reserved. This information is not intended as a substitute for professional medical care. Always follow your healthcare professional's instructions.

## 2017-07-25 NOTE — PROGRESS NOTES
SUBJECTIVE:  Patient is status post Left total knee revision.  The patient complains of left hand numbness and tingling.  Patient complains of 0 /10 pain.  The patient denies any groin pain chest pain, shortness of breath, dizziness or headaches.             Past Medical History:   Diagnosis Date    Arthritis      Atherosclerosis of abdominal aorta      Cataract      Chronic constipation      Glaucoma      History of anal fissures      Hypertension      Polyneuropathy in other diseases classified elsewhere       due to folate deficiency    Prediabetes      Venous insufficiency      Venous insufficiency                  Past Surgical History:   Procedure Laterality Date    BACK SURGERY        CATARACT EXTRACTION BILATERAL W/ ANTERIOR VITRECTOMY        COLONOSCOPY N/A 5/13/2016     Procedure: COLONOSCOPY;  Surgeon: Presley Phillips MD;  Location: [address]);  Service: Endoscopy;  Laterality: N/A;  EGD with Dr. Jeffery prior to Colonoscopy. EC    KNEE SURGERY Left      PROSTATE SURGERY        TONSILLECTOMY, ADENOIDECTOMY                  Review of patient's allergies indicates:   Allergen Reactions    Penicillins Hives and Swelling    Sulfa (sulfonamide antibiotics) Hives    Protonix [pantoprazole] Rash      No current facility-administered medications on file prior to encounter.                   Current Outpatient Prescriptions on File Prior to Encounter   Medication Sig Dispense Refill    ascorbic acid (VITAMIN C) 60 mg Lozg Take 1 tablet by mouth daily as needed.         cyanocobalamin, vitamin B-12, (VITAMIN B-12) 50 mcg tablet Take 50 mcg by mouth once daily.        ERGOCALCIFEROL, VITAMIN D2, (VITAMIN D ORAL) Take 1 tablet by mouth once daily.         linaclotide (LINZESS) 145 mcg Cap capsule Take 1 capsule (145 mcg total) by mouth once daily. 30 capsule 5    mineral oil (FLEET MINERAL OIL) enema Place 1 enema rectally once daily. 2 enema 0    ramipril (ALTACE) 2.5 MG capsule  Take 1 capsule (2.5 mg total) by mouth once daily. (Patient taking differently: Take 1.25 mg by mouth once daily. ) 90 capsule 3    timolol maleate 0.5% (TIMOPTIC) 0.5 % Drop Place 1 drop into both eyes 2 (two) times daily. 10 mL 12    blood sugar diagnostic (BLOOD GLUCOSE TEST) Strp 1 strip by Misc.(Non-Drug; Combo Route) route 3 (three) times a week. 32 strip 11          ROS:  GENERAL: No fever, chills, fatigability or weight loss.  SKIN: No rashes, itching or changes in color or texture of skin.  HEAD: No headaches or recent head trauma.  EYES: Visual acuity fine. No photophobia, ocular pain or diplopia.  EARS: Denies ear pain, discharge or vertigo.  NOSE: No loss of smell, no epistaxis or postnasal drip.  MOUTH & THROAT: No hoarseness or change in voice. No excessive gum bleeding.  NODES: Denies swollen glands.  CHEST: Denies ADAMS, cyanosis, wheezing, cough and sputum production.  CARDIOVASCULAR: Denies chest pain, PND, orthopnea or reduced exercise tolerance.  ABDOMEN: Appetite fine. No weight loss. Denies diarrhea, abdominal pain, hematemesis or blood in stool.  URINARY: No flank pain, dysuria or hematuria.  PERIPHERAL VASCULAR: No claudication or cyanosis.  NEUROLOGIC: No history of seizures, paralysis, alteration of gait or coordination.  MUSCULOSKELETAL: See HPI     PE:  APPEARANCE: Well nourished, well developed, in no acute distress.   HEAD: Normocephalic, atraumatic.  EYES: PERRL. EOMI.   EARS: TM's intact. Light reflex normal. No retraction or perforation.   NOSE: Mucosa pink. Airway clear.  MOUTH & THROAT: No tonsillar enlargement. No pharyngeal erythema or exudate. No stridor.  NECK: Supple.   NODES: No cervical, axillary or inguinal lymph node enlargement.  CHEST: Lungs clear to auscultation.  CARDIOVASCULAR: Normal S1, S2. No rubs, murmurs or gallops.  ABDOMEN: Bowel sounds normal. Not distended. Soft. No tenderness or masses.  NEUROLOGIC: Cranial Nerves: II-XII grossly intact, also see  MUSCULOSKELETAL  MUSCULOSKELETAL:         Right   wrist/hand-      - Positive- Tinel's over the Median nerve  Positive- Phalen maneuver   Positive- Thenar atrophy   Negative- hypothenar atrophy   Positive- Median Nerve Compression test less than 30 seconds   Negative- Tinel's over Guyon's Canal   Negative- Tinel's over Cubital Tunnel Negative- Tinels over the Median Nerve overlying the antecubital  Fossa   Negative- Tinel's over Radial groove  Negative- Tinel's over sensory branch of Radial nerve at the wrist  Negative- Tinel's over the PIN   Negative- pain in forearm with resistance to             FDP flexion and resisted pronation   Positive- boggy synovium of the wrist   normal- less than 2 seconds capillary all digits  Positive- light touch intact in Median nerve distribution Positive- light touch in the Radial Nerve distribution  Positive- light touch intact in the Ulnar sensory nerve distribution    Positive- Thumb opposition strength symmetrical  Negative- bruit(aneurysm)    Positive- skin color intact(claudication/Raynaud's)  Negative- cold digits(claudication/Raynaud's)  normal - Palomo Test(Vascular Exam)  normal- +2 Radial and Ulnar artery pulses  Negative- anatomical snuff box tenderness(Dequervain's Synovitis)  Negative- finger or thumb triggering(Trigger Thumb or Finger)  Negative- Lipoma  Negative- Ganglion cyst      Sensory exam-C5,C6,C7,C8,T1- normal    Wrist extension for radial nerve- +5/5  Wrist Flexion-+5/5  Wrist Ulnar Deviation-+5/5  Wrist Radial Deviation- +5/5  Resisted opposition of the thumb for the median nerve- +5/5  Digit abduction for the ulnar nerve- +5/5                   Left Knee -  2 plus dorsalis pedis and posterior tibial artery pulses, light touch intact Left lower extremity.  All digits are warm. No erythema, no warmth, no drainage, mild swelling,mild tenderness.  positive edema left lower extremity Less than 2 seconds capillary refill all digits. Mild ecchymosis The swelling  "has decreased significantly compared to the last visit.     BP (!) 83/47   Pulse 90   Ht 6' 4" (1.93 m)   Wt 130.2 kg (287 lb 0.6 oz)   BMI 34.94 kg/m²        ASSESSMENT:     The patient is stable and improving.      diagnosis:  1.  Left lower extremity edema   2.  Left carpal tunnel syndrome    PLAN:    take Eliquis   walker assisted ambulation FWB left lower extremity  Elevate the left lower extremity to 5 cm above the level of the heart  Discontinue the aggressive range of motion.  Follow-up in 6 week or when necessary for any problems  Continue pain medication  Continue physical therapy  Follow-up in one week.  EMG nerve conduction study left upper extremity       "

## 2017-07-27 LAB — FUNGUS SPEC CULT: NORMAL

## 2017-08-02 ENCOUNTER — TELEPHONE (OUTPATIENT)
Dept: ORTHOPEDICS | Facility: CLINIC | Age: 82
End: 2017-08-02

## 2017-08-02 NOTE — TELEPHONE ENCOUNTER
Returned pt phone call. Libia wanted to know if patient should continue inpatient therapy and home health. Advised Libia I would ask provider and get back with her.

## 2017-08-02 NOTE — TELEPHONE ENCOUNTER
----- Message from Kateryna Lombardi sent at 8/2/2017 11:42 AM CDT -----  Contact: [facility] [phone]  Pt needs new home health orders for therapy. Has questions about ending treatment or continuing treatment. Please call back at [phone]. x. lj

## 2017-08-03 DIAGNOSIS — M25.562 ACUTE PAIN OF LEFT KNEE: Primary | ICD-10-CM

## 2017-08-03 RX ORDER — DOXYCYCLINE HYCLATE 100 MG
100 TABLET ORAL EVERY 12 HOURS
Qty: 40 TABLET | Refills: 0 | Status: SHIPPED | OUTPATIENT
Start: 2017-08-03 | End: 2017-11-21

## 2017-08-04 ENCOUNTER — HOSPITAL ENCOUNTER (OUTPATIENT)
Dept: RADIOLOGY | Facility: HOSPITAL | Age: 82
Discharge: HOME | End: 2017-08-04
Attending: ORTHOPAEDIC SURGERY
Payer: MEDICARE

## 2017-08-04 ENCOUNTER — OFFICE VISIT (OUTPATIENT)
Dept: ORTHOPEDICS | Facility: CLINIC | Age: 82
End: 2017-08-04
Payer: MEDICARE

## 2017-08-04 VITALS
WEIGHT: 287.06 LBS | HEART RATE: 76 BPM | TEMPERATURE: 98 F | SYSTOLIC BLOOD PRESSURE: 108 MMHG | BODY MASS INDEX: 34.96 KG/M2 | HEIGHT: 76 IN | DIASTOLIC BLOOD PRESSURE: 66 MMHG

## 2017-08-04 DIAGNOSIS — Z98.890 POSTOPERATIVE STATE: Primary | ICD-10-CM

## 2017-08-04 DIAGNOSIS — M25.562 ACUTE PAIN OF LEFT KNEE: ICD-10-CM

## 2017-08-04 PROCEDURE — 99999 PR PBB SHADOW E&M-EST. PATIENT-LVL IV: CPT | Mod: PBBFAC,,, | Performed by: PHYSICIAN ASSISTANT

## 2017-08-04 PROCEDURE — 73560 X-RAY EXAM OF KNEE 1 OR 2: CPT | Mod: 26,59,RT, | Performed by: RADIOLOGY

## 2017-08-04 PROCEDURE — 99214 OFFICE O/P EST MOD 30 MIN: CPT | Mod: PBBFAC,25,PO | Performed by: PHYSICIAN ASSISTANT

## 2017-08-04 PROCEDURE — 99024 POSTOP FOLLOW-UP VISIT: CPT | Mod: ,,, | Performed by: PHYSICIAN ASSISTANT

## 2017-08-04 PROCEDURE — 73562 X-RAY EXAM OF KNEE 3: CPT | Mod: 26,LT,, | Performed by: RADIOLOGY

## 2017-08-04 RX ORDER — RIFAMPIN 150 MG/1
150 CAPSULE ORAL EVERY 12 HOURS
Qty: 20 CAPSULE | Refills: 0 | Status: SHIPPED | OUTPATIENT
Start: 2017-08-04 | End: 2017-08-14

## 2017-08-04 NOTE — PATIENT INSTRUCTIONS
After Knee Replacement: Right After Surgery  Your healthcare team will monitor your progress after your surgery. You may receive general anesthesia (being put to sleep), an epidural nerve block in your spinal canal (numb from the waist down), or a femoral nerve block in your leg (entire leg is numb). Sometimes the femoral nerve block is added to another anesthesia for better pain control. Your team will use support equipment to help you recover. Be sure to let them know how you feel and how well your pain is controlled. You may also receive medicines, such as antibiotics and blood thinners.    Support equipment  Special tubes and machines help you recover after surgery. They may include:  · An intravenous (IV) line to provide needed fluids and medicines.  · A catheter tube to help drain your bladder.  · A drainage tube in your leg to release excess fluid and reduce swelling.  · An ice machine or ice pack to reduce inflammation. The ice machine tube carries cold water to the knee joint.  · A sequential compression machine (SCM) to prevent blood clots by gently squeezing and then releasing your foot or calf.  · A continual passive motion machine (CPM) to increase flexibility by gently moving your knee.  Managing pain  When pain is controlled, youll walk sooner and recover faster. So be honest about how much pain you feel. And dont be afraid to ask for pain medicine when you need it. Your nurse may give you IV or oral pain medicine. Or, you may have a patient-controlled analgesia (PCA) machine. This machine lets you push a button to give yourself a measured dose of pain medicine. Tell your nurse if the medicines dont reduce pain or if you suddenly feel worse.  Date Last Reviewed: 10/1/2015  © 2534-4022 [employer]. [address] [place] [address]. All rights reserved. This information is not intended as a substitute for professional medical care. Always follow your healthcare professional's  instructions.

## 2017-08-09 ENCOUNTER — OFFICE VISIT (OUTPATIENT)
Dept: PHYSICAL MEDICINE AND REHAB | Facility: CLINIC | Age: 82
End: 2017-08-09
Payer: MEDICARE

## 2017-08-09 VITALS
DIASTOLIC BLOOD PRESSURE: 86 MMHG | WEIGHT: 287 LBS | HEIGHT: 76 IN | RESPIRATION RATE: 15 BRPM | HEART RATE: 81 BPM | BODY MASS INDEX: 34.95 KG/M2 | SYSTOLIC BLOOD PRESSURE: 163 MMHG

## 2017-08-09 DIAGNOSIS — G56.03 CARPAL TUNNEL SYNDROME, BILATERAL: Primary | ICD-10-CM

## 2017-08-09 DIAGNOSIS — M54.12 CERVICAL RADICULOPATHY: ICD-10-CM

## 2017-08-09 PROCEDURE — 3077F SYST BP >= 140 MM HG: CPT | Mod: ,,, | Performed by: PHYSICAL MEDICINE & REHABILITATION

## 2017-08-09 PROCEDURE — 99999 PR PBB SHADOW E&M-EST. PATIENT-LVL III: CPT | Mod: PBBFAC,,, | Performed by: PHYSICAL MEDICINE & REHABILITATION

## 2017-08-09 PROCEDURE — 95911 NRV CNDJ TEST 9-10 STUDIES: CPT | Mod: PBBFAC,PO | Performed by: PHYSICAL MEDICINE & REHABILITATION

## 2017-08-09 PROCEDURE — 1159F MED LIST DOCD IN RCRD: CPT | Mod: ,,, | Performed by: PHYSICAL MEDICINE & REHABILITATION

## 2017-08-09 PROCEDURE — 3079F DIAST BP 80-89 MM HG: CPT | Mod: ,,, | Performed by: PHYSICAL MEDICINE & REHABILITATION

## 2017-08-09 PROCEDURE — 99213 OFFICE O/P EST LOW 20 MIN: CPT | Mod: PBBFAC,PO | Performed by: PHYSICAL MEDICINE & REHABILITATION

## 2017-08-09 PROCEDURE — 99204 OFFICE O/P NEW MOD 45 MIN: CPT | Mod: 25,S$PBB,, | Performed by: PHYSICAL MEDICINE & REHABILITATION

## 2017-08-09 PROCEDURE — 95911 NRV CNDJ TEST 9-10 STUDIES: CPT | Mod: 26,S$PBB,, | Performed by: PHYSICAL MEDICINE & REHABILITATION

## 2017-08-09 PROCEDURE — 1125F AMNT PAIN NOTED PAIN PRSNT: CPT | Mod: ,,, | Performed by: PHYSICAL MEDICINE & REHABILITATION

## 2017-08-09 NOTE — PATIENT INSTRUCTIONS
Carpal Tunnel Syndrome    Carpal tunnel syndrome is a painful condition of the wrist and arm. It is caused by pressure on the median nerve.  The median nerve is one of the nerves that give feeling and movement to the hand. It passes through a tunnel in the wrist called the carpal tunnel. This tunnel is made up of bones and ligaments. Narrowing of this tunnel or swelling of the tissues inside the tunnel puts pressure on the median nerve. This causes numbness, pins and needles, or electric shooting pains in your hand and forearm. Often the pain is worse at night and may wake you when you are asleep.  Carpal tunnel syndrome may occur during pregnancy and with use of birth control pills. It is more common in workers who must often bend their wrists. It is also common in people who work with power tools that cause strong vibrations.  Home care  · Rest the painful wrist. Avoid repeated bending of the wrist back and forth. This puts pressure on the median nerve. Avoid using power tools with strong vibrations.  · If you were given a splint, wear it at night while you sleep. You may also wear it during the day for comfort.  · Move your fingers and wrists often to avoid stiffness.  · Elevate your arms on pillows when you lie down.  · Try using the unaffected hand more.  · Try not to hold your wrists in a bent, downward position.  · Sometimes changes in the work place may ease symptoms. If you type most of the day, it may help to change the position of your keyboard or add a wrist support. Your wrist should be in a neutral position and not bent back when typing.  · You may use over-the-counter pain medicine to treat pain and inflammation, unless another medicine was prescribed. Anti-inflammatory pain medicines, such as ibuprofen or naproxen may be more effective than acetaminophen, which treats pain, but not inflammation. If you have chronic liver or kidney disease or ever had a stomach ulcer or GI bleeding, talk with your  doctor before using these medicines.  · Opioid pain medicine will only give temporary relief and does not treat the problem. If pain continues, you may need a shot of a steroid drug into your wrist.  · If the above methods fail, you may need surgery. This will open the carpal tunnel and release the pressure on the trapped nerve.  Follow-up care  Follow up with your healthcare provider, or as advised, if the pain doesnt begin to improve within the next week.  If X-rays were taken, you will be notified of any new findings that may affect your care.  When to seek medical advice  Call your healthcare provider right away if any of these occur:  · Pain not improving with the above treatment  · Fingers or hand become cold, blue, numb, or tingly  · Your whole arm becomes swollen or weak  Date Last Reviewed: 11/23/2015  © 6867-1592 [employer]. [address] [place] [address]. All rights reserved. This information is not intended as a substitute for professional medical care. Always follow your healthcare professional's instructions.        Carpal Tunnel Syndrome Prevention Tips  Some repetitive hand activities put you at higher risk for carpal tunnel syndrome (CTS). But you can reduce your risk. Learn how to change the way you use your hands. Below are tips for at home and on the job. Be sure to also follow the hand and wrist safety policies at your workplace.      Keep your wrist in a neutral (straight) position when exercising.      Keep your wrist in neutral  Keep a neutral (straight) wrist position as often as you can. Dont use your wrist in a bent (flexed) position for long periods of time. This includes extended or twisted positions.  Watch your   Dont just use your thumb and index finger to grasp or lift. This can put stress on your wrist. When you can, use your whole hand and all its fingers to grasp an object.  Minimize repetition  Dont move your arms or hands or hold an object in  the same way for long periods of time. Even simple, light tasks can cause injury this way. Instead, alternate tasks or switch hands.  Rest your hands  Give your hands a break from time to time with a rest. Even a few minutes once an hour can help.  Reduce speed and force  Slow down the speed in which you do a forceful, repetitive motion. This gives your wrist time to recover from the effort. Use power tools to help reduce the force.  Strengthen the muscles  Weak muscles may lead to a poor wrist or arm position. Exercises will make your hand and arm muscles stronger. This can help you keep a better position.  Date Last Reviewed: 9/11/2015 © 2000-2016 [employer]. [address] [place] [address], [place] [address]. All rights reserved. This information is not intended as a substitute for professional medical care. Always follow your healthcare professional's instructions.        Understanding Carpal Tunnel Syndrome    The carpal tunnel is a narrow space inside the wrist. It is ringed by bone and a band of tough tissue called the transverse carpal ligament. A major nerve called the median nerve runs from the forearm into the hand through the carpal tunnel. Tendons also run through the carpal tunnel.  With carpal tunnel syndrome, the tendons or nearby tissues within the carpal tunnel may swell or thicken. Or the transverse carpal ligament may harden and shorten. This narrows the space in the carpal tunnel and puts pressure on the median nerve. This pressure leads to tingling and numbness of the hand and wrist. In time, the condition can make even simple tasks hard to do.  What causes carpal tunnel syndrome?  Doctors arent entirely clear why the condition occurs. Certain things may make a person more likely to have it. These include:  · Being female  · Being pregnant  · Being overweight  · Having diabetes or rheumatoid arthritis  Symptoms of carpal tunnel syndrome  Symptoms often come and go. At first, symptoms  may occur mainly at night. Later, they may be noticed during the day as well. They may get worse with activities such as driving, reading, typing, or holding a phone. Symptoms can include:  · Tingling and numbness in the hand or wrist  · Sharp pain that shoots up the arm or down to the fingers  · Hand stiffness or cramping, especially in the morning  · Trouble making a fist  · Hand weakness and clumsiness  Treatment for carpal tunnel syndrome  Certain treatments help reduce the pressure on the median nerve and relieve symptoms. Choices for treatment may include one or more of the following:  · Wrist splint. This involves wearing a special brace on the wrist and hand. The splint holds the wrist straight, in a neutral position. This helps keep the carpal tunnel as open as possible.  · Cortisone shots. Cortisone is a medicine that helps reduce swelling. It is injected directly into the wrist. It helps shrink tissues inside the carpal tunnel. This relieves symptoms for a time.  · Pain medicines. You may take over-the-counter or prescription medicines to help reduce swelling and relieve symptoms.  · Surgery. If the condition doesnt respond to other treatments and doesnt go away on its own, you may need surgery. During surgery, the surgeon cuts the transverse carpal ligament to relieve pressure on the median nerve.     When to call your healthcare provider  Call your healthcare provider right away if you have any of these:  · Fever of 100.4°F (38°C) or higher, or as directed  · Symptoms that dont get better, or get worse  · New symptoms   Date Last Reviewed: 3/10/2016  © 0658-9292 [employer]. [address] [place] [address], [place] [address]. All rights reserved. This information is not intended as a substitute for professional medical care. Always follow your healthcare professional's instructions.        Understanding Cervical Radiculopathy    Cervical radiculopathy is irritation or inflammation of a nerve  root in the neck. It causes neck pain and other symptoms that may spread into the chest or down the arm. To understand this condition, it helps to understand the parts of the spine:  · Vertebrae. These are bones that stack to form the spine. The cervical spine contains the 7 vertebrae in the neck.  · Disks. These are soft pads of tissue between the vertebrae. They act as shock absorbers for the spine.  · The spinal canal. This is a tunnel formed within the stacked vertebrae. The spinal cord runs through this canal.  · Nerves. These branch off the spinal cord. As they leave the spinal canal, nerves pass through openings between the vertebrae. The nerve root is the part of the nerve that is closest to the spinal cord.   With cervical radiculopathy, nerve roots in the neck become irritated. This leads to pain and symptoms that can travel to the nerves that go from the spinal cord down the arms and into the torso.  What causes cervical radiculopathy?  Aging, injury, poor posture, and other issues can lead to problems in the neck. These problems may then irritate nerve roots. These include:  · Damage to a disk in the cervical spine. The damaged disk may then press on nearby nerve roots.  · Degeneration from wear and tear, and aging. This can lead to narrowing (stenosis) of the openings between the vertebrae. The narrowed openings press on nerve roots as they leave the spinal canal.  · An unstable spine. This is when a vertebra slips forward. It can then press on a nerve root.  There are other, less common causes of pressure on nerves in the neck. These include infection, cysts, and tumors.  Symptoms of cervical radiculopathy  These include:  · Neck pain  · Pain, numbness, tingling, or weakness that travels down the arm  · Loss of neck movement  · Muscle spasms  Treatment for cervical radiculopathy  In most cases, your healthcare provider will first try treatments that help relieve symptoms. These may  include:  · Prescription or over-the-counter pain medicines. These help relieve pain and swelling.  · Cold packs. These help reduce pain.  · Resting. This involves avoiding positions and activities that increase pain.  · Neck brace (cervical collar). This can help relieve inflammation and pain.  · Physical therapy, including exercises and stretches. This can help decrease pain and increase movement and function.  · Shots of medicinesaround the nerve roots. This is done to help relieve symptoms for a time.  In some cases, your healthcare provider may advise surgery to fix the underlying problem. This depends on the cause, the symptoms, and how long the pain has lasted.  Possible complications  Over time, an irritated and inflamed nerve may become damaged. This may lead to long-lasting (permanent) numbness or weakness. If symptoms change suddenly or get worse, be sure to let your healthcare provider know.     When to call your healthcare provider  Call your healthcare provider right away if you have any of these:  · New pain or pain that gets worse  · New or increasing weakness, numbness, or tingling in your arm or hand  · Bowel or bladder changes   Date Last Reviewed: 3/10/2016  © 2933-8941 [employer]. [address] [place] [address]. All rights reserved. This information is not intended as a substitute for professional medical care. Always follow your healthcare professional's instructions.        Nonsurgical Treatment of Cervical Spine Problems  Cervical spine problems can often be treated without surgery. Choices include rest, medicines, or injections. Your healthcare provider may also suggest physical therapy or certain exercises. These may all help to relieve your symptoms. These treatments are often successful. But if your symptoms dont subside, talk to your healthcare provider. He or she may tell you that surgery is your best choice.  Relieving your symptoms  Your healthcare provider  may recommend:  · Medicines. These help to reduce pain and inflammation.  · Epidural steroid injections. These are injections into the spinal canal near the spinal cord. They may relieve severe pain and reduce inflammation.  · Restricting aggravating activities. This can help to give your cervical spine time to heal.  · A soft cervical collar. This can help to support your head while keeping your cervical spine aligned.  · Traction. This may help relieve pressure on the irritated nerves.  Restoring mobility and strength  Your healthcare provider may recommend that you work with a physical therapist. This can help you regain mobility and strength in your neck. Physical therapy may last for 4 to 8 weeks. It may include:  · Exercises to improve your necks range of motion and strength.  · Evaluation and correction of posture and body movements. This can correct problems that affect your cervical spine.  · Heat, massage, and traction to help relieve your symptoms.  Self-care  Youll take an active role in your own therapy. To protect your neck from further injury:  · Follow any exercise program given to you by your healthcare provider or physical therapist.  · Practice good posture while sitting, standing, or moving.  · Have your workspace evaluated. Rearrange it if needed.  · When lying down, support your neck. You can use a special cervical pillow or rolled-up towel.   Date Last Reviewed: 10/5/2015  © 5316-9084 [employer]. [address] [place] [address], [place] [address]. All rights reserved. This information is not intended as a substitute for professional medical care. Always follow your healthcare professional's instructions.

## 2017-08-09 NOTE — LETTER
August 9, 2017      Malcolm Gill Sr., MD  [address] [facility] [address] [place] [address]           [facility] - Physiatry  [address] [place] [address]  Phone: [phone]  Fax: [phone]          Patient: Dominic Cohen   MR Number: [id number]   YOB: 1935   Date of Visit: 8/9/2017       Dear Dr. Malcolm Gill Sr.:    Thank you for referring Dominic Cohen to me for evaluation. Attached you will find relevant portions of my assessment and plan of care.    If you have questions, please do not hesitate to call me. I look forward to following Dominic Cohen along with you.    Sincerely,    Monique Navarro MD    Enclosure  CC:  No Recipients    If you would like to receive this communication electronically, please contact [email or web address] or [phone] to request more information on [employer] access.    For providers and/or their staff who would like to refer a patient to [facility], please contact us through our one-stop-shop provider referral line, [facility], at [phone].    If you feel you have received this communication in error or would no longer like to receive these types of communications, please e-mail [email or web address]

## 2017-08-09 NOTE — PROGRESS NOTES
[facility] [address] [place] [address]  Phone: [phone]          Full Name: kathleen pickens YOB: 1935  Patient ID: [id number]      Visit Date: 8/9/2017 09:53  Age: 81 Years 7 Months Old  Examining Physician: Monique Navarro M.D.  Referring Physician: madelaine  Reason for Referral: ue numbness        Chief Complaint   Patient presents with    Hand Pain     numbness in left hand       HPI: This is a 81 y.o.  male being seen in clinic today for evaluation of left hand numbness/tingling that began once he woke up from knee surgery 6/27/17.  He has significant discomfort and hypersensitivity in his 1st-4th fingertips.  He has difficulty with fine motor activity and  strength.  Running warm water over his hand provides some relief.     History obtained from patient    Past family, medical, social, and surgical history reviewed in chart    Review of Systems:     General- denies lethargy, weight change, fever, chills  Head/neck- denies swallowing difficulties  ENT- denies hearing changes  Cardiovascular-denies chest pain  Pulmonary- denies shortness of breath  GI- denies constipation or bowel incontinence  - denies bladder incontinence  Skin- denies wounds or rashes  Musculoskeletal- +weakness, +pain  Neurologic- +numbness and tingling  Psychiatric- denies depressive or psychotic features, denies anxiety  Lymphatic-denies swelling  Endocrine- denies hypoglycemic symptoms/DM history  All other pertinent systems negative     Physical Examination:  General: Well developed, well nourished male, NAD  HEENT:NCAT EOMI bilaterally   Pulmonary:Normal respirations    Spinal Examination: CERVICAL  Active ROM is within normal limits.  Inspection: No deformity of spinal alignment.    Spinal Examination: LUMBAR or THORACIC  Active ROM is within normal limits.  Inspection: No deformity of spinal alignment.      Musculoskeletal Tests:  Phalen:   Elbow compression (ulnar):    Tinels at wrist: + at left    Bilateral Upper and Lower Extremities:  Pulses are 2+ at radial bilaterally.  Shoulder/Elbow/Wrist/Hand ROM wnl  Hip/Knee/Ankle ROM   Bilateral Extremities show normal capillary refill.  No signs of cyanosis, rubor, edema, skin changes, or dysvascular changes of appendages.  Nails appear intact.    Neurological Exam:  Cranial Nerves:  II-XII grossly intact    Manual Muscle Testing: (Motor 5=normal)  5/5 strength bilateral upper/lower extremities    No focal atrophy is noted of either upper or lower extremity.    Bilateral Reflexes:1+bic tric br  Victoria's response is absent bilaterally.    Sensation: tested to light touch  - intact in arms except dec at 1st-4th fingertips  Gait: antalgic, using one axillary crutch    Entire procedure explained to patient prior to proceeding.  Verbal consent obtained      SNC      Nerve / Sites Rec. Site Onset Lat Peak Lat Amp Segments Distance Velocity     ms ms µV  mm m/s   L Median - Digit II (Antidromic)      Wrist Dig II NR NR NR Wrist - Dig  NR   R Median - Digit II (Antidromic)      Wrist Dig II 6.3 7.7 3.9 Wrist - Dig  22   L Ulnar - Digit V (Antidromic)      Wrist Dig V 2.8 3.7 5.3 Wrist - Dig V 140 50   R Ulnar - Digit V (Antidromic)      Wrist Dig V 3.5 4.4 5.9 Wrist - Dig V 140 40   L Radial - Anatomical snuff box (Forearm)      Forearm Wrist 1.0 1.4 11.5 Forearm - Wrist 100 96   R Radial - Anatomical snuff box (Forearm)      Forearm Wrist 2.4 3.1 5.5 Forearm - Wrist 100 42       MNC      Nerve / Sites Muscle Latency Amplitude Duration Rel Amp Segments Distance Lat Diff Velocity     ms mV ms %  mm ms m/s   L Median - APB      Wrist APB 5.7 2.8 6.6 100 Wrist - APB 80        Elbow APB 13.2 2.4 6.5 83.7 Elbow - Wrist 300 7.5 40   R Median - APB      Wrist APB 4.6 6.5 6.0 100 Wrist - APB 80        Elbow APB 11.4 5.5 5.7 85.2 Elbow - Wrist 300 6.8 44   L Ulnar - ADM      Wrist ADM 3.5 6.3 6.3 100 Wrist - ADM 80        B.Elbow ADM 9.6  5.9 7.1 93.3 B.Elbow - Wrist 300 6.0 50      A.Elbow ADM 11.8 5.9 7.7 99.6 A.Elbow - B.Elbow 110 2.2 50         A.Elbow - Wrist  8.2    R Ulnar - ADM      Wrist ADM 3.2 4.8 7.0 100 Wrist - ADM 80        B.Elbow ADM 9.4 5.0 7.1 103 B.Elbow - Wrist 310 6.2 50      A.Elbow ADM 12.0 4.4  88.1 A.Elbow - B.Elbow 110 2.6 42         A.Elbow - Wrist  8.8                                     INTERPRETATION  -Bilateral median motor nerve conduction study showed prolonged latency, dec amplitude on left, and dec conduction velocity bilateral   -Left median sensory nerve conduction study showed no response to stimulation  -Right median sensory nerve conduction study showed prolonged peak latency and dec amplitude   -Bilateral ulnar motor nerve conduction study showed prolonged latency on the right, borderline dec amplitude on the right, and dec conduction velocity on the right  -Bilateral ulnar sensory nerve conduction study showed prolonged peak latency on the right and normal amplitude  -Bilateral radial sensory nerve conduction study showed prolonged peak latency on the right and dec amplitude on the right   *Pt declined needle EMG examination.  He had difficulty/discomfort with NCS    IMPRESSION   1. ABNORMAL study  2. 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  There is preliminary evidence of an overlying cervical radiculopathy-multi-level.  However due to patient discomfort, needle EMG testing not performed for definitive diagnoses     PLAN  1. Follow up with referring provider: Dr. Malcolm Gill Sr./ERIS Gutierrez  2. Handouts on CTS and cervical issues provided to patient  3. This study is good for one year. If symptoms worsen or do not improve, please re-consult.    Monique Navarro M.D.  Physical Medicine and Rehab

## 2017-08-11 NOTE — PROGRESS NOTES
SUBJECTIVE:  Patient is status post Left total knee revision.  The patient complains of increase redness and warmth of the left knee.  Also he has noticed leakage at the surgical site.  Patient complains of 5/10 pain.  The patient denies any groin pain chest pain, shortness of breath, dizziness or headaches.    Date of surgery: 6/27/2017-left knee total revision    History of present illness             Past Medical History:   Diagnosis Date    Arthritis      Atherosclerosis of abdominal aorta      Cataract      Chronic constipation      Glaucoma      History of anal fissures      Hypertension      Polyneuropathy in other diseases classified elsewhere       due to folate deficiency    Prediabetes      Venous insufficiency      Venous insufficiency                  Past Surgical History:   Procedure Laterality Date    BACK SURGERY        CATARACT EXTRACTION BILATERAL W/ ANTERIOR VITRECTOMY        COLONOSCOPY N/A 5/13/2016     Procedure: COLONOSCOPY;  Surgeon: Presley Phillips MD;  Location: [address]);  Service: Endoscopy;  Laterality: N/A;  EGD with Dr. Jeffery prior to Colonoscopy. EC    KNEE SURGERY Left      PROSTATE SURGERY        TONSILLECTOMY, ADENOIDECTOMY                  Review of patient's allergies indicates:   Allergen Reactions    Penicillins Hives and Swelling    Sulfa (sulfonamide antibiotics) Hives    Protonix [pantoprazole] Rash      No current facility-administered medications on file prior to encounter.                   Current Outpatient Prescriptions on File Prior to Encounter   Medication Sig Dispense Refill    ascorbic acid (VITAMIN C) 60 mg Lozg Take 1 tablet by mouth daily as needed.         cyanocobalamin, vitamin B-12, (VITAMIN B-12) 50 mcg tablet Take 50 mcg by mouth once daily.        ERGOCALCIFEROL, VITAMIN D2, (VITAMIN D ORAL) Take 1 tablet by mouth once daily.         linaclotide (LINZESS) 145 mcg Cap capsule Take 1 capsule (145 mcg total) by mouth  once daily. 30 capsule 5    mineral oil (FLEET MINERAL OIL) enema Place 1 enema rectally once daily. 2 enema 0    ramipril (ALTACE) 2.5 MG capsule Take 1 capsule (2.5 mg total) by mouth once daily. (Patient taking differently: Take 1.25 mg by mouth once daily. ) 90 capsule 3    timolol maleate 0.5% (TIMOPTIC) 0.5 % Drop Place 1 drop into both eyes 2 (two) times daily. 10 mL 12    blood sugar diagnostic (BLOOD GLUCOSE TEST) Strp 1 strip by Misc.(Non-Drug; Combo Route) route 3 (three) times a week. 32 strip 11          ROS:  GENERAL: No fever, chills, fatigability or weight loss.  SKIN: No rashes, itching or changes in color or texture of skin.  HEAD: No headaches or recent head trauma.  EYES: Visual acuity fine. No photophobia, ocular pain or diplopia.  EARS: Denies ear pain, discharge or vertigo.  NOSE: No loss of smell, no epistaxis or postnasal drip.  MOUTH & THROAT: No hoarseness or change in voice. No excessive gum bleeding.  NODES: Denies swollen glands.  CHEST: Denies ADAMS, cyanosis, wheezing, cough and sputum production.  CARDIOVASCULAR: Denies chest pain, PND, orthopnea or reduced exercise tolerance.  ABDOMEN: Appetite fine. No weight loss. Denies diarrhea, abdominal pain, hematemesis or blood in stool.  URINARY: No flank pain, dysuria or hematuria.  PERIPHERAL VASCULAR: No claudication or cyanosis.  NEUROLOGIC: No history of seizures, paralysis, alteration of gait or coordination.  MUSCULOSKELETAL: See HPI     PE:  APPEARANCE: Well nourished, well developed, in no acute distress.   HEAD: Normocephalic, atraumatic.  EYES: PERRL. EOMI.   EARS: TM's intact. Light reflex normal. No retraction or perforation.   NOSE: Mucosa pink. Airway clear.  MOUTH & THROAT: No tonsillar enlargement. No pharyngeal erythema or exudate. No stridor.  NECK: Supple.   NODES: No cervical, axillary or inguinal lymph node enlargement.  CHEST: Lungs clear to auscultation.  CARDIOVASCULAR: Normal S1, S2. No rubs, murmurs or  "gallops.  ABDOMEN: Bowel sounds normal. Not distended. Soft. No tenderness or masses.  NEUROLOGIC: Cranial Nerves: II-XII grossly intact, also see MUSCULOSKELETAL  MUSCULOSKELETAL:            Left Knee -  2 plus dorsalis pedis and posterior tibial artery pulses, light touch intact Left lower extremity.  All digits are warm.  Mild erythema, mild warmth, scant drainage, mild swelling,mild tenderness.  positive edema left lower extremity Less than 2 seconds capillary refill all digits. Mild ecchymosis The swelling has decreased significantly compared to the last visit.     /66   Pulse 76   Temp 97.7 °F (36.5 °C)   Ht 6' 4" (1.93 m)   Wt 130.2 kg (287 lb 0.6 oz)   BMI 34.94 kg/m²        ASSESSMENT:     The patient is stable and improving.      diagnosis:  1.  Left lower extremity edema   2.  Status post left knee revision limitation    PLAN:    take Eliquis   walker assisted ambulation FWB left lower extremity  Elevate the left lower extremity to 5 cm above the level of the heart  Discontinue the aggressive range of motion.  Follow-up in 6 week or when necessary for any problems  Continue pain medication  Continue physical therapy  Follow-up in one week.  Doxycycline and will add rifampin       "

## 2017-08-14 ENCOUNTER — TELEPHONE (OUTPATIENT)
Dept: ORTHOPEDICS | Facility: CLINIC | Age: 82
End: 2017-08-14

## 2017-08-14 NOTE — TELEPHONE ENCOUNTER
----- Message from Tracy Bello sent at 8/14/2017 11:48 AM CDT -----  Patient states he have questions/concerns regarding physical therapy. Please adv/call [phone].//thanks. cw

## 2017-08-22 ENCOUNTER — LAB VISIT (OUTPATIENT)
Dept: LAB | Facility: HOSPITAL | Age: 82
End: 2017-08-22
Payer: MEDICARE

## 2017-08-22 ENCOUNTER — OFFICE VISIT (OUTPATIENT)
Dept: FAMILY MEDICINE | Facility: CLINIC | Age: 82
End: 2017-08-22
Payer: MEDICARE

## 2017-08-22 VITALS
BODY MASS INDEX: 35.7 KG/M2 | DIASTOLIC BLOOD PRESSURE: 80 MMHG | TEMPERATURE: 96 F | HEIGHT: 76 IN | SYSTOLIC BLOOD PRESSURE: 124 MMHG | WEIGHT: 293.19 LBS | OXYGEN SATURATION: 95 % | HEART RATE: 82 BPM | RESPIRATION RATE: 16 BRPM

## 2017-08-22 DIAGNOSIS — E78.5 DYSLIPIDEMIA: ICD-10-CM

## 2017-08-22 DIAGNOSIS — L97.929 LEG ULCER, LEFT, WITH UNSPECIFIED SEVERITY: ICD-10-CM

## 2017-08-22 DIAGNOSIS — G56.02 CARPAL TUNNEL SYNDROME OF LEFT WRIST: ICD-10-CM

## 2017-08-22 DIAGNOSIS — Z98.890 POSTOPERATIVE STATE: ICD-10-CM

## 2017-08-22 DIAGNOSIS — D64.9 ANEMIA, UNSPECIFIED TYPE: ICD-10-CM

## 2017-08-22 DIAGNOSIS — I10 ESSENTIAL HYPERTENSION: ICD-10-CM

## 2017-08-22 DIAGNOSIS — I87.2 VENOUS INSUFFICIENCY: Primary | ICD-10-CM

## 2017-08-22 DIAGNOSIS — R73.03 PREDIABETES: ICD-10-CM

## 2017-08-22 DIAGNOSIS — M25.562 LEFT KNEE PAIN, UNSPECIFIED CHRONICITY: ICD-10-CM

## 2017-08-22 DIAGNOSIS — Z90.79 H/O PROSTATECTOMY: Chronic | ICD-10-CM

## 2017-08-22 DIAGNOSIS — H02.839 DERMATOCHALASIS, UNSPECIFIED LATERALITY: ICD-10-CM

## 2017-08-22 DIAGNOSIS — R60.0 LOCALIZED EDEMA: ICD-10-CM

## 2017-08-22 DIAGNOSIS — E53.8 B12 DEFICIENCY: ICD-10-CM

## 2017-08-22 DIAGNOSIS — R49.0 HOARSENESS: ICD-10-CM

## 2017-08-22 LAB
ALBUMIN SERPL BCP-MCNC: 3.4 G/DL
ALP SERPL-CCNC: 65 U/L
ALT SERPL W/O P-5'-P-CCNC: 19 U/L
ANION GAP SERPL CALC-SCNC: 9 MMOL/L
AST SERPL-CCNC: 21 U/L
BASOPHILS # BLD AUTO: 0.02 K/UL
BASOPHILS NFR BLD: 0.3 %
BILIRUB SERPL-MCNC: 0.6 MG/DL
BUN SERPL-MCNC: 14 MG/DL
CALCIUM SERPL-MCNC: 9.4 MG/DL
CHLORIDE SERPL-SCNC: 105 MMOL/L
CO2 SERPL-SCNC: 28 MMOL/L
CREAT SERPL-MCNC: 1.4 MG/DL
DIFFERENTIAL METHOD: ABNORMAL
EOSINOPHIL # BLD AUTO: 0.4 K/UL
EOSINOPHIL NFR BLD: 6.4 %
ERYTHROCYTE [DISTWIDTH] IN BLOOD BY AUTOMATED COUNT: 15.5 %
EST. GFR  (AFRICAN AMERICAN): 54.9 ML/MIN/1.73 M^2
EST. GFR  (NON AFRICAN AMERICAN): 47.4 ML/MIN/1.73 M^2
GLUCOSE SERPL-MCNC: 117 MG/DL
HCT VFR BLD AUTO: 36 %
HGB BLD-MCNC: 11.5 G/DL
LYMPHOCYTES # BLD AUTO: 1.4 K/UL
LYMPHOCYTES NFR BLD: 22.9 %
MCH RBC QN AUTO: 30.5 PG
MCHC RBC AUTO-ENTMCNC: 31.9 G/DL
MCV RBC AUTO: 96 FL
MONOCYTES # BLD AUTO: 0.7 K/UL
MONOCYTES NFR BLD: 11.4 %
NEUTROPHILS # BLD AUTO: 3.5 K/UL
NEUTROPHILS NFR BLD: 58.8 %
PLATELET # BLD AUTO: 188 K/UL
PMV BLD AUTO: 12 FL
POTASSIUM SERPL-SCNC: 4.2 MMOL/L
PROT SERPL-MCNC: 8.1 G/DL
RBC # BLD AUTO: 3.77 M/UL
SODIUM SERPL-SCNC: 142 MMOL/L
WBC # BLD AUTO: 5.98 K/UL

## 2017-08-22 PROCEDURE — 99215 OFFICE O/P EST HI 40 MIN: CPT | Mod: S$PBB,,, | Performed by: INTERNAL MEDICINE

## 2017-08-22 PROCEDURE — 85025 COMPLETE CBC W/AUTO DIFF WBC: CPT

## 2017-08-22 PROCEDURE — 36415 COLL VENOUS BLD VENIPUNCTURE: CPT | Mod: PO

## 2017-08-22 PROCEDURE — 3074F SYST BP LT 130 MM HG: CPT | Mod: ,,, | Performed by: INTERNAL MEDICINE

## 2017-08-22 PROCEDURE — 1126F AMNT PAIN NOTED NONE PRSNT: CPT | Mod: ,,, | Performed by: INTERNAL MEDICINE

## 2017-08-22 PROCEDURE — 99999 PR PBB SHADOW E&M-EST. PATIENT-LVL V: CPT | Mod: PBBFAC,,, | Performed by: INTERNAL MEDICINE

## 2017-08-22 PROCEDURE — 3079F DIAST BP 80-89 MM HG: CPT | Mod: ,,, | Performed by: INTERNAL MEDICINE

## 2017-08-22 PROCEDURE — 80053 COMPREHEN METABOLIC PANEL: CPT

## 2017-08-22 PROCEDURE — 83036 HEMOGLOBIN GLYCOSYLATED A1C: CPT

## 2017-08-22 PROCEDURE — 1159F MED LIST DOCD IN RCRD: CPT | Mod: ,,, | Performed by: INTERNAL MEDICINE

## 2017-08-22 RX ORDER — TRIAMCINOLONE ACETONIDE 1 MG/G
CREAM TOPICAL 2 TIMES DAILY PRN
Qty: 454 G | Refills: 1 | Status: SHIPPED | OUTPATIENT
Start: 2017-08-22 | End: 2017-11-21

## 2017-08-22 NOTE — PROGRESS NOTES
Subjective:       Patient ID: Dominic Cohen is a 81 y.o. male.    Chief Complaint: Follow-up; Hypertension; Hyperlipidemia; Anemia; Carpal Tunnel; and Insulin Resistance    Hypertension   The problem is controlled. Pertinent negatives include no chest pain, headaches, neck pain, palpitations or shortness of breath. Past treatments include lifestyle changes.   Hyperlipidemia   Associated symptoms include myalgias. Pertinent negatives include no chest pain or shortness of breath. Current antihyperlipidemic treatment includes diet change and exercise.   Anemia   Presents for follow-up visit. There has been no abdominal pain, bruising/bleeding easily, confusion, fever, light-headedness, pallor or palpitations.   Carpal Tunnel   Associated symptoms include arthralgias and myalgias. Pertinent negatives include no abdominal pain, chest pain, chills, coughing, diaphoresis, fatigue, fever, headaches, joint swelling, nausea, neck pain, numbness, rash, sore throat, vomiting or weakness.     Review of Systems   Constitutional: Negative for activity change, appetite change, chills, diaphoresis, fatigue, fever and unexpected weight change.   HENT: Negative for drooling, ear discharge, ear pain, facial swelling, hearing loss, mouth sores, nosebleeds, postnasal drip, rhinorrhea, sinus pressure, sneezing, sore throat, tinnitus, trouble swallowing and voice change.    Eyes: Negative for photophobia, redness and visual disturbance.   Respiratory: Negative for apnea, cough, choking, chest tightness, shortness of breath and wheezing.    Cardiovascular: Positive for leg swelling. Negative for chest pain and palpitations.   Gastrointestinal: Negative for abdominal distention, abdominal pain, anal bleeding, blood in stool, constipation, diarrhea, nausea and vomiting.   Endocrine: Negative for cold intolerance, heat intolerance, polydipsia, polyphagia and polyuria.   Genitourinary: Negative for difficulty urinating, dysuria,  enuresis, flank pain, frequency, hematuria and urgency.   Musculoskeletal: Positive for arthralgias, gait problem and myalgias. Negative for back pain, joint swelling, neck pain and neck stiffness.   Skin: Negative for color change, pallor, rash and wound.        Oozing skin lesion left of left knee   Allergic/Immunologic: Negative for food allergies and immunocompromised state.   Neurological: Negative for dizziness, tremors, seizures, syncope, facial asymmetry, speech difficulty, weakness, light-headedness, numbness and headaches.   Hematological: Negative for adenopathy. Does not bruise/bleed easily.   Psychiatric/Behavioral: Negative for agitation, behavioral problems, confusion, decreased concentration, dysphoric mood, hallucinations, self-injury, sleep disturbance and suicidal ideas. The patient is not nervous/anxious and is not hyperactive.        Objective:      Physical Exam   Constitutional: He is oriented to person, place, and time. He appears well-developed and well-nourished. No distress.   HENT:   Head: Normocephalic and atraumatic.   Eyes: Pupils are equal, round, and reactive to light.   Neck: Normal range of motion. Neck supple. No JVD present. Carotid bruit is not present. No tracheal deviation present. No thyromegaly present.   Cardiovascular: Normal rate, regular rhythm, normal heart sounds and intact distal pulses.    Pulmonary/Chest: Effort normal and breath sounds normal. No respiratory distress. He has no wheezes. He has no rales. He exhibits no tenderness.   Abdominal: Soft. Bowel sounds are normal. He exhibits no distension. There is no tenderness. There is no rebound and no guarding.   Musculoskeletal: Normal range of motion. He exhibits edema. He exhibits no tenderness.   Lymphadenopathy:     He has no cervical adenopathy.   Neurological: He is alert and oriented to person, place, and time.   Skin: Skin is warm and dry. No rash noted. He is not diaphoretic. No erythema. No pallor.   Skin  ulcer left leg   Psychiatric: He has a normal mood and affect. His behavior is normal. Judgment and thought content normal.   Nursing note and vitals reviewed.      Assessment:       1. Venous insufficiency    2. Prediabetes    3. Postoperative state    4. Localized edema    5. Left knee pain, unspecified chronicity    6. Essential hypertension    7. Hoarseness    8. H/O prostatectomy    9. Dyslipidemia    10. Dermatochalasis, unspecified laterality    11. Carpal tunnel syndrome of left wrist    12. B12 deficiency    13. Anemia, unspecified type    14. Leg ulcer, left, with unspecified severity        Plan:        notes/labs reviewed.              Declined wound care clinic.         --------will see derm consult------           Check cbc,cmp,hga1c.            F/u 1 month.

## 2017-08-23 LAB
ESTIMATED AVG GLUCOSE: 103 MG/DL
HBA1C MFR BLD HPLC: 5.2 %

## 2017-08-24 ENCOUNTER — OFFICE VISIT (OUTPATIENT)
Dept: ORTHOPEDICS | Facility: CLINIC | Age: 82
End: 2017-08-24
Payer: MEDICARE

## 2017-08-24 ENCOUNTER — INITIAL CONSULT (OUTPATIENT)
Dept: DERMATOLOGY | Facility: CLINIC | Age: 82
End: 2017-08-24
Payer: MEDICARE

## 2017-08-24 ENCOUNTER — HOSPITAL ENCOUNTER (OUTPATIENT)
Dept: RADIOLOGY | Facility: HOSPITAL | Age: 82
Discharge: HOME | End: 2017-08-24
Attending: ORTHOPAEDIC SURGERY
Payer: MEDICARE

## 2017-08-24 VITALS
TEMPERATURE: 98 F | SYSTOLIC BLOOD PRESSURE: 133 MMHG | BODY MASS INDEX: 35.7 KG/M2 | DIASTOLIC BLOOD PRESSURE: 64 MMHG | RESPIRATION RATE: 12 BRPM | HEART RATE: 69 BPM | HEIGHT: 76 IN | WEIGHT: 293.19 LBS

## 2017-08-24 DIAGNOSIS — M79.89 LEG SWELLING: Primary | ICD-10-CM

## 2017-08-24 DIAGNOSIS — M25.562 ACUTE PAIN OF LEFT KNEE: ICD-10-CM

## 2017-08-24 DIAGNOSIS — M25.562 ACUTE PAIN OF LEFT KNEE: Primary | ICD-10-CM

## 2017-08-24 DIAGNOSIS — G56.02 LEFT CARPAL TUNNEL SYNDROME: Primary | ICD-10-CM

## 2017-08-24 PROCEDURE — 99213 OFFICE O/P EST LOW 20 MIN: CPT | Mod: PBBFAC,25,27,PO | Performed by: ORTHOPAEDIC SURGERY

## 2017-08-24 PROCEDURE — 3075F SYST BP GE 130 - 139MM HG: CPT | Mod: ,,, | Performed by: ORTHOPAEDIC SURGERY

## 2017-08-24 PROCEDURE — 1126F AMNT PAIN NOTED NONE PRSNT: CPT | Mod: ,,, | Performed by: ORTHOPAEDIC SURGERY

## 2017-08-24 PROCEDURE — 1159F MED LIST DOCD IN RCRD: CPT | Mod: ,,,

## 2017-08-24 PROCEDURE — 3078F DIAST BP <80 MM HG: CPT | Mod: ,,, | Performed by: ORTHOPAEDIC SURGERY

## 2017-08-24 PROCEDURE — 99213 OFFICE O/P EST LOW 20 MIN: CPT | Mod: S$PBB,,,

## 2017-08-24 PROCEDURE — 3074F SYST BP LT 130 MM HG: CPT | Mod: ,,,

## 2017-08-24 PROCEDURE — 3078F DIAST BP <80 MM HG: CPT | Mod: ,,,

## 2017-08-24 PROCEDURE — 99999 PR PBB SHADOW E&M-EST. PATIENT-LVL III: CPT | Mod: PBBFAC,,, | Performed by: ORTHOPAEDIC SURGERY

## 2017-08-24 PROCEDURE — 73560 X-RAY EXAM OF KNEE 1 OR 2: CPT | Mod: 26,59,RT, | Performed by: RADIOLOGY

## 2017-08-24 PROCEDURE — 99999 PR PBB SHADOW E&M-EST. PATIENT-LVL IV: CPT | Mod: PBBFAC,,,

## 2017-08-24 PROCEDURE — 1159F MED LIST DOCD IN RCRD: CPT | Mod: ,,, | Performed by: ORTHOPAEDIC SURGERY

## 2017-08-24 PROCEDURE — 99213 OFFICE O/P EST LOW 20 MIN: CPT | Mod: 24,S$PBB,, | Performed by: ORTHOPAEDIC SURGERY

## 2017-08-24 PROCEDURE — 73562 X-RAY EXAM OF KNEE 3: CPT | Mod: 26,LT,, | Performed by: RADIOLOGY

## 2017-08-24 PROCEDURE — 1126F AMNT PAIN NOTED NONE PRSNT: CPT | Mod: ,,,

## 2017-08-24 RX ORDER — MUPIROCIN 20 MG/G
OINTMENT TOPICAL
Qty: 30 G | Refills: 2 | Status: SHIPPED | OUTPATIENT
Start: 2017-08-24 | End: 2017-11-21

## 2017-08-24 NOTE — PATIENT INSTRUCTIONS
Carpal Tunnel Release Surgery  Surgery may be done if your carpal tunnel syndrome (CTS) symptoms become severe. Or, you may have surgery if no other treatment brings relief. There are 2 types of CTS procedures. You will be told about the one you will have. Youll also be instructed how to prepare for it.      The goals of surgery  Two types of surgery--open and endoscopic--are used to treat CTS.  · With open surgery, your surgeon makes one incision in your palm. Standard surgical tools are used.  · With endoscopic surgery, one or two small incisions may be made in your hand. A scope (with a very small camera attached) and tools are inserted under the carpal ligament. The surgeon then operates while watching images on a video screen. No matter which one you have, the goal remains the same: Your surgeon will relieve pressure on the median nerve. To do this, the transverse carpal ligament is cut (released).  After surgery  If youve had carpal tunnel surgery, you will spend a few hours resting before you go home. The nerve sensation and circulation in your hand will be checked at this time. For the safest healing, keep the following in mind.  · Keep your hand raised above heart level. This will help reduce swelling.  · Limit hand and wrist use as instructed. A wrist brace may be required.  · Take any pain medication as directed.  · Do hand exercises as directed by your surgeon or therapist.  When to call the surgeon  Call your surgeon if you notice any of the following:  · White or pale-blue hand or nails (If you pinch your skin or nail and the color doesnt return)  · Pain that is not relieved by prescribed medicine  · Loss of sensation or excess swelling in hand or fingers  · Fever over 100.4°F (38°C)   Date Last Reviewed: 9/11/2015  © 9501-0843 [employer]. [address] [place] [address], [place] [address]. All rights reserved. This information is not intended as a substitute for professional medical care.  Always follow your healthcare professional's instructions.

## 2017-08-24 NOTE — PROGRESS NOTES
Subjective:       Patient ID:  Dominic Cohen is a 81 y.o. male who presents for   Chief Complaint   Patient presents with    Other     c/o breakout to left leg x several months,,dry, tx with TAC cream     82 yo with complaint of swelling and lesion to left lower leg/knee x 2 months. Pt is s/p knee replacement (6/27/17) and reports symptoms began shortly after surgery. He reports lesion has significantly improved over the past 2 days with the application of TAC 0.1% cream. He reports small amount of drainage but this has recently resolved.         Review of Systems   Constitutional: Negative for fever, chills, weight loss, weight gain and fatigue.   Hematologic/Lymphatic: Does not bruise/bleed easily.        Objective:    Physical Exam   Constitutional: He appears well-developed and well-nourished. No distress.   Neurological: He is alert and oriented to person, place, and time. He is not disoriented.   Psychiatric: He has a normal mood and affect.   Skin:   Areas Examined (abnormalities noted in diagram):   RLE Inspected  LLE Inspection Performed              Diagram Legend     Erythematous scaling macule/papule c/w actinic keratosis       Vascular papule c/w angioma      Pigmented verrucoid papule/plaque c/w seborrheic keratosis      Yellow umbilicated papule c/w sebaceous hyperplasia      Irregularly shaped tan macule c/w lentigo     1-2 mm smooth white papules consistent with Milia      Movable subcutaneous cyst with punctum c/w epidermal inclusion cyst      Subcutaneous movable cyst c/w pilar cyst      Firm pink to brown papule c/w dermatofibroma      Pedunculated fleshy papule(s) c/w skin tag(s)      Evenly pigmented macule c/w junctional nevus     Mildly variegated pigmented, slightly irregular-bordered macule c/w mildly atypical nevus      Flesh colored to evenly pigmented papule c/w intradermal nevus       Pink pearly papule/plaque c/w basal cell carcinoma      Erythematous hyperkeratotic cursted  plaque c/w SCC      Surgical scar with no sign of skin cancer recurrence      Open and closed comedones      Inflammatory papules and pustules      Verrucoid papule consistent consistent with wart     Erythematous eczematous patches and plaques     Dystrophic onycholytic nail with subungual debris c/w onychomycosis     Umbilicated papule    Erythematous-base heme-crusted tan verrucoid plaque consistent with inflamed seborrheic keratosis     Erythematous Silvery Scaling Plaque c/w Psoriasis     See annotation      Assessment / Plan:        Leg swelling  Will refer to orthopedics to evaluate swelling s/p knee replacement.  As rash has significantly improved with TAC 0.1% cream, rash is possibly 2/2 eczematous dermatitis or ACD. Will culture rash to rule out infection and begin mupirocin 2% ointment twice daily. F/u in 4 weeks or sooner if symptoms do not continue to improve. Pt verbalized understanding.   -     mupirocin (BACTROBAN) 2 % ointment; AAA bid  Dispense: 30 g; Refill: 2           Return in about 4 weeks (around 9/21/2017).

## 2017-08-24 NOTE — PROGRESS NOTES
SUBJECTIVE:  Patient is status post Left total knee revision.  The patient complains of increase redness and warmth of the left knee.  Also he has noticed leakage at the surgical site.  Patient complains of 0/10 pain.  The patient denies any groin pain chest pain, shortness of breath, dizziness or headaches.    Date of surgery: 6/27/2017-left knee total revision    History of present illness             Past Medical History:   Diagnosis Date    Arthritis      Atherosclerosis of abdominal aorta      Cataract      Chronic constipation      Glaucoma      History of anal fissures      Hypertension      Polyneuropathy in other diseases classified elsewhere       due to folate deficiency    Prediabetes      Venous insufficiency      Venous insufficiency                  Past Surgical History:   Procedure Laterality Date    BACK SURGERY        CATARACT EXTRACTION BILATERAL W/ ANTERIOR VITRECTOMY        COLONOSCOPY N/A 5/13/2016     Procedure: COLONOSCOPY;  Surgeon: Presley Phillips MD;  Location: [address]);  Service: Endoscopy;  Laterality: N/A;  EGD with Dr. Jeffery prior to Colonoscopy. EC    KNEE SURGERY Left      PROSTATE SURGERY        TONSILLECTOMY, ADENOIDECTOMY                  Review of patient's allergies indicates:   Allergen Reactions    Penicillins Hives and Swelling    Sulfa (sulfonamide antibiotics) Hives    Protonix [pantoprazole] Rash      No current facility-administered medications on file prior to encounter.                   Current Outpatient Prescriptions on File Prior to Encounter   Medication Sig Dispense Refill    ascorbic acid (VITAMIN C) 60 mg Lozg Take 1 tablet by mouth daily as needed.         cyanocobalamin, vitamin B-12, (VITAMIN B-12) 50 mcg tablet Take 50 mcg by mouth once daily.        ERGOCALCIFEROL, VITAMIN D2, (VITAMIN D ORAL) Take 1 tablet by mouth once daily.         linaclotide (LINZESS) 145 mcg Cap capsule Take 1 capsule (145 mcg total) by mouth  once daily. 30 capsule 5    mineral oil (FLEET MINERAL OIL) enema Place 1 enema rectally once daily. 2 enema 0    ramipril (ALTACE) 2.5 MG capsule Take 1 capsule (2.5 mg total) by mouth once daily. (Patient taking differently: Take 1.25 mg by mouth once daily. ) 90 capsule 3    timolol maleate 0.5% (TIMOPTIC) 0.5 % Drop Place 1 drop into both eyes 2 (two) times daily. 10 mL 12    blood sugar diagnostic (BLOOD GLUCOSE TEST) Strp 1 strip by Misc.(Non-Drug; Combo Route) route 3 (three) times a week. 32 strip 11          ROS:  GENERAL: No fever, chills, fatigability or weight loss.  SKIN: No rashes, itching or changes in color or texture of skin.  HEAD: No headaches or recent head trauma.  EYES: Visual acuity fine. No photophobia, ocular pain or diplopia.  EARS: Denies ear pain, discharge or vertigo.  NOSE: No loss of smell, no epistaxis or postnasal drip.  MOUTH & THROAT: No hoarseness or change in voice. No excessive gum bleeding.  NODES: Denies swollen glands.  CHEST: Denies ADAMS, cyanosis, wheezing, cough and sputum production.  CARDIOVASCULAR: Denies chest pain, PND, orthopnea or reduced exercise tolerance.  ABDOMEN: Appetite fine. No weight loss. Denies diarrhea, abdominal pain, hematemesis or blood in stool.  URINARY: No flank pain, dysuria or hematuria.  PERIPHERAL VASCULAR: No claudication or cyanosis.  NEUROLOGIC: No history of seizures, paralysis, alteration of gait or coordination.  MUSCULOSKELETAL: See HPI     PE:  APPEARANCE: Well nourished, well developed, in no acute distress.   HEAD: Normocephalic, atraumatic.  EYES: PERRL. EOMI.   EARS: TM's intact. Light reflex normal. No retraction or perforation.   NOSE: Mucosa pink. Airway clear.  MOUTH & THROAT: No tonsillar enlargement. No pharyngeal erythema or exudate. No stridor.  NECK: Supple.   NODES: No cervical, axillary or inguinal lymph node enlargement.  CHEST: Lungs clear to auscultation.  CARDIOVASCULAR: Normal S1, S2. No rubs, murmurs or  gallops.  ABDOMEN: Bowel sounds normal. Not distended. Soft. No tenderness or masses.  NEUROLOGIC: Cranial Nerves: II-XII grossly intact, also see MUSCULOSKELETAL  MUSCULOSKELETAL:          Right / Left wrist/hand-      - Positive- Tinel's over the Median nerve  Positive- Phalen maneuver   Positive- Thenar atrophy   Negative- hypothenar atrophy   Positive- Median Nerve Compression test less than 30 seconds   Negative- Tinel's over Guyon's Canal   Negative- Tinel's over Cubital Tunnel Negative- Tinels over the Median Nerve overlying the antecubital  Fossa   Negative- Tinel's over Radial groove  Negative- Tinel's over sensory branch of Radial nerve at the wrist  Negative- Tinel's over the PIN   Negative- pain in forearm with resistance to             FDP flexion and resisted pronation   Positive- boggy synovium of the wrist   normal- less than 2 seconds capillary all digits  Positive- light touch intact in Median nerve distribution Positive- light touch in the Radial Nerve distribution  Positive- light touch intact in the Ulnar sensory nerve distribution    Positive- Thumb opposition strength symmetrical  Negative- bruit(aneurysm)    Positive- skin color intact(claudication/Raynaud's)  Negative- cold digits(claudication/Raynaud's)  normal - Palomo Test(Vascular Exam)  normal- +2 Radial and Ulnar artery pulses  Negative- anatomical snuff box tenderness(Dequervain's Synovitis)  Negative- finger or thumb triggering(Trigger Thumb or Finger)  Negative- Lipoma  Negative- Ganglion cyst      Sensory exam-C5,C6,C7,C8,T1- normal    Wrist extension for radial nerve- +5/5  Wrist Flexion-+5/5  Wrist Ulnar Deviation-+5/5  Wrist Radial Deviation- +5/5  Resisted opposition of the thumb for the median nerve- +5/5  Digit abduction for the ulnar nerve- +5/5                      Left Knee -  2 plus dorsalis pedis and posterior tibial artery pulses, light touch intact Left lower extremity.  All digits are warm.  Mild erythema, mild warmth,  "scant drainage, mild swelling,mild tenderness.  positive edema left lower extremity Less than 2 seconds capillary refill all digits. Mild ecchymosis The swelling has decreased significantly compared to the last visit.     /64   Pulse 69   Temp 97.6 °F (36.4 °C)   Resp 12   Ht 6' 4" (1.93 m)   Wt 133 kg (293 lb 3.4 oz)   BMI 35.69 kg/m²        ASSESSMENT:     The patient is stable and improving.      diagnosis:  1.  Left lower extremity edema   2.  Status post left knee revision limitation  3. Left carpal tunnel syndrome    PLAN:  Left carpal tunnel release  take Eliquis   walker assisted ambulation FWB left lower extremity  Elevate the left lower extremity to 5 cm above the level of the heart  Discontinue the aggressive range of motion.  Follow-up in 6 week or when necessary for any problems  Continue pain medication  Continue physical therapy  Follow-up in one week.  Doxycycline and will add rifampin       "

## 2017-08-24 NOTE — LETTER
August 24, 2017      Sudhakar Liu MD  [address]  [place] [address]           [facility]  [address]  [place] [address]  Phone: [phone]  Fax: [phone]          Patient: Dominic Cohen   MR Number: [id number]   YOB: 1935   Date of Visit: 8/24/2017       Dear Dr. Sudhakar Liu:    Thank you for referring Dominic Cohen to me for evaluation. Attached you will find relevant portions of my assessment and plan of care.    If you have questions, please do not hesitate to call me. I look forward to following Dominic Cohen along with you.    Sincerely,    Traci Anna, TOÑO    Enclosure  CC:  No Recipients    If you would like to receive this communication electronically, please contact [email or web address] or [phone] to request more information on [email or web address] Link access.    For providers and/or their staff who would like to refer a patient to [facility], please contact us through our one-stop-shop provider referral line, [facility] , at [phone].    If you feel you have received this communication in error or would no longer like to receive these types of communications, please e-mail [email or web address]

## 2017-08-25 ENCOUNTER — TELEPHONE (OUTPATIENT)
Dept: ORTHOPEDICS | Facility: CLINIC | Age: 82
End: 2017-08-25

## 2017-08-25 DIAGNOSIS — G89.29 CHRONIC PAIN OF LEFT KNEE: Primary | ICD-10-CM

## 2017-08-25 DIAGNOSIS — G56.02 CARPAL TUNNEL SYNDROME OF LEFT WRIST: Primary | ICD-10-CM

## 2017-08-25 DIAGNOSIS — M25.562 CHRONIC PAIN OF LEFT KNEE: Primary | ICD-10-CM

## 2017-08-25 NOTE — TELEPHONE ENCOUNTER
Called Physical Therapy department about pt requesting a call to schedule therapy. Spoke with Tania who stated pt will be called Monday. Verified understadning

## 2017-08-28 DIAGNOSIS — Z01.818 PRE-OP TESTING: Primary | ICD-10-CM

## 2017-08-28 LAB — BACTERIA SPEC AEROBE CULT: NORMAL

## 2017-08-30 LAB
ACID FAST MOD KINY STN SPEC: NORMAL
MYCOBACTERIUM SPEC QL CULT: NORMAL

## 2017-09-01 DIAGNOSIS — M25.562 ACUTE PAIN OF LEFT KNEE: Primary | ICD-10-CM

## 2017-09-05 ENCOUNTER — TELEPHONE (OUTPATIENT)
Dept: ORTHOPEDICS | Facility: CLINIC | Age: 82
End: 2017-09-05

## 2017-09-05 NOTE — TELEPHONE ENCOUNTER
left message on voicemail to call back at earliest convenience to reschedule appt with Dr. Gill today, 9/5/17 as Dr. Gill has an emergent add on surgery case.//lp

## 2017-09-05 NOTE — TELEPHONE ENCOUNTER
Pt contacted to reschedule appointment. Pt did not answer phone call. Message was left stating intention of call.

## 2017-09-07 ENCOUNTER — TELEPHONE (OUTPATIENT)
Dept: ORTHOPEDICS | Facility: CLINIC | Age: 82
End: 2017-09-07

## 2017-09-07 NOTE — TELEPHONE ENCOUNTER
----- Message from Lorenza Melendez sent at 9/7/2017 10:17 AM CDT -----  Contact: Cammie - wife  She would like to reschedule patient's PO appointment which was schedule for September 5 and was canceled.   Call her at [phone] and leave a message.  They will be at [facility] on September 11 at 10:00 for another doctor's appointment and would like to see Dr Gill on that day after that appointment.

## 2017-09-11 ENCOUNTER — HOSPITAL ENCOUNTER (OUTPATIENT)
Dept: RADIOLOGY | Facility: HOSPITAL | Age: 82
Discharge: HOME | End: 2017-09-11
Attending: PODIATRIST
Payer: MEDICARE

## 2017-09-11 ENCOUNTER — OFFICE VISIT (OUTPATIENT)
Dept: PODIATRY | Facility: CLINIC | Age: 82
End: 2017-09-11
Payer: MEDICARE

## 2017-09-11 ENCOUNTER — TELEPHONE (OUTPATIENT)
Dept: PODIATRY | Facility: CLINIC | Age: 82
End: 2017-09-11

## 2017-09-11 VITALS
HEIGHT: 76 IN | HEART RATE: 71 BPM | DIASTOLIC BLOOD PRESSURE: 82 MMHG | SYSTOLIC BLOOD PRESSURE: 145 MMHG | WEIGHT: 295.63 LBS | BODY MASS INDEX: 36 KG/M2

## 2017-09-11 DIAGNOSIS — M25.572 LEFT ANKLE PAIN, UNSPECIFIED CHRONICITY: ICD-10-CM

## 2017-09-11 DIAGNOSIS — G63 POLYNEUROPATHY IN OTHER DISEASES CLASSIFIED ELSEWHERE: ICD-10-CM

## 2017-09-11 DIAGNOSIS — M72.2 PLANTAR FASCIITIS: ICD-10-CM

## 2017-09-11 DIAGNOSIS — E53.8 B12 DEFICIENCY: ICD-10-CM

## 2017-09-11 DIAGNOSIS — M62.461 GASTROCNEMIUS EQUINUS, RIGHT: ICD-10-CM

## 2017-09-11 DIAGNOSIS — I87.2 VENOUS INSUFFICIENCY: ICD-10-CM

## 2017-09-11 DIAGNOSIS — M72.2 PLANTAR FASCIITIS: Primary | ICD-10-CM

## 2017-09-11 DIAGNOSIS — M79.671 ARCH PAIN OF RIGHT FOOT: ICD-10-CM

## 2017-09-11 DIAGNOSIS — K59.09 CHRONIC CONSTIPATION: ICD-10-CM

## 2017-09-11 PROCEDURE — 99213 OFFICE O/P EST LOW 20 MIN: CPT | Mod: PBBFAC,25,PO | Performed by: PODIATRIST

## 2017-09-11 PROCEDURE — 99999 PR PBB SHADOW E&M-EST. PATIENT-LVL III: CPT | Mod: PBBFAC,,, | Performed by: PODIATRIST

## 2017-09-11 PROCEDURE — 11721 DEBRIDE NAIL 6 OR MORE: CPT | Mod: Q8,S$PBB,, | Performed by: PODIATRIST

## 2017-09-11 PROCEDURE — 73630 X-RAY EXAM OF FOOT: CPT | Mod: TC,PO,RT

## 2017-09-11 PROCEDURE — 1125F AMNT PAIN NOTED PAIN PRSNT: CPT | Mod: ,,, | Performed by: PODIATRIST

## 2017-09-11 PROCEDURE — 73630 X-RAY EXAM OF FOOT: CPT | Mod: 26,RT,, | Performed by: RADIOLOGY

## 2017-09-11 PROCEDURE — 3077F SYST BP >= 140 MM HG: CPT | Mod: ,,, | Performed by: PODIATRIST

## 2017-09-11 PROCEDURE — 99204 OFFICE O/P NEW MOD 45 MIN: CPT | Mod: 25,S$PBB,, | Performed by: PODIATRIST

## 2017-09-11 PROCEDURE — 73610 X-RAY EXAM OF ANKLE: CPT | Mod: 26,LT,, | Performed by: RADIOLOGY

## 2017-09-11 PROCEDURE — 3079F DIAST BP 80-89 MM HG: CPT | Mod: ,,, | Performed by: PODIATRIST

## 2017-09-11 PROCEDURE — 11721 DEBRIDE NAIL 6 OR MORE: CPT | Mod: Q8,PBBFAC,PO | Performed by: PODIATRIST

## 2017-09-11 PROCEDURE — 1159F MED LIST DOCD IN RCRD: CPT | Mod: ,,, | Performed by: PODIATRIST

## 2017-09-11 PROCEDURE — 73610 X-RAY EXAM OF ANKLE: CPT | Mod: TC,PO,LT

## 2017-09-11 RX ORDER — UREA 40 %
CREAM (GRAM) TOPICAL 2 TIMES DAILY
Qty: 1 TUBE | Refills: 3 | Status: SHIPPED | OUTPATIENT
Start: 2017-09-11 | End: 2018-06-07 | Stop reason: ALTCHOICE

## 2017-09-11 NOTE — PROGRESS NOTES
[facility]  PODIATRIC MEDICINE AND SURGERY  PROGRESS NOTE  9/11/2017    PODIATRY NOTE  PCP: Dr. Sudhakar Liu MD    CHIEF COMPLAINT   Chief Complaint   Patient presents with    Foot Problem     bilateral top of foot swelling and bilateral arch pulling sensation       HPI  Dominic Cohen is a 81 y.o. male who has a past medical history of Arthritis; Atherosclerosis of abdominal aorta; Cataract; Chronic constipation; Glaucoma; History of anal fissures; Hypertension; Polyneuropathy in other diseases classified elsewhere; Prediabetes; Venous insufficiency; and Venous insufficiency.   Dominic presents to clinic today complaining of arch pain to RIGHT foot.    Patient describes pain as:   Location: plantar aspect right foot   Quality: pulling sensation  Severity:2/10  Duration: several years   Modifying Factors (Aggravating): pt states pain is triggered weight bearing and non weight bearing  Modifying Factors (Alleviating): no treatment    He also complains about painful elongated toenails. He states he is unable to trim nails. He requests nail care. He inquires about calluses on bottom of feet. He has not tried any treatment. He admits to history of leg swelling with LEFT worse than RIGHT after recent knee surgery.  Patient denies other pedal complaints at this time.      PMH  Past Medical History:   Diagnosis Date    Arthritis     Atherosclerosis of abdominal aorta     Cataract     Chronic constipation     Glaucoma     History of anal fissures     Hypertension     Polyneuropathy in other diseases classified elsewhere     due to folate deficiency    Prediabetes     Venous insufficiency     Venous insufficiency        PROBLEM LIST  Patient Active Problem List    Diagnosis Date Noted    Left carpal tunnel syndrome 08/24/2017    Localized edema 07/25/2017    Carpal tunnel syndrome of left wrist 07/25/2017    Postoperative state 07/11/2017    Acute blood loss anemia  06/28/2017    Left knee pain 06/27/2017    Abnormal ECG 06/13/2017    LAFB (left anterior fascicular block) 06/13/2017    Preop cardiovascular exam 06/13/2017    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    Hoarseness 03/14/2017    Change in stool 03/14/2017    Dyslipidemia 11/14/2016    Fall 07/11/2016    Special screening for malignant neoplasms, colon 05/13/2016    Obesity (BMI 30-39.9) 09/28/2015    Anemia 05/27/2015    Vertigo 04/09/2015    Tinnitus 04/09/2015    Chronic constipation 09/21/2014    Prediabetes 12/06/2013    H/O prostatectomy 11/20/2013    Atherosclerosis of abdominal aorta 08/09/2013    Afferent pupillary defect - Left Eye 01/07/2013    Senile nuclear sclerosis - Both Eyes 01/07/2013    Ptosis - Both Eyes 01/07/2013    Primary open-angle glaucoma, severe stage 01/07/2013    Arthritis     Hypertension     Venous insufficiency     Polyneuropathy in other diseases classified elsewhere 07/09/2012    Dermatochalasis 03/21/2012    B12 deficiency 05/20/2011       MEDS  Current Outpatient Prescriptions on File Prior to Visit   Medication Sig Dispense Refill    ascorbic acid (VITAMIN C) 60 mg Lozg Take 1 tablet by mouth daily as needed.       aspirin (ECOTRIN) 81 MG EC tablet Take 81 mg by mouth once daily.      blood sugar diagnostic (BLOOD GLUCOSE TEST) Strp 1 strip by Misc.(Non-Drug; Combo Route) route 3 (three) times a week. 32 strip 11    cyanocobalamin, vitamin B-12, (VITAMIN B-12) 50 mcg tablet Take 50 mcg by mouth once daily.      doxycycline (VIBRA-TABS) 100 MG tablet Take 1 tablet (100 mg total) by mouth every 12 (twelve) hours. 40 tablet 0    ERGOCALCIFEROL, VITAMIN D2, (VITAMIN D ORAL) Take 1 tablet by mouth once daily.       linaclotide (LINZESS) 145 mcg Cap capsule Take 1 capsule (145 mcg total) by mouth once daily. 30 capsule 5    meloxicam (MOBIC) 7.5 MG tablet Take 1 tablet (7.5 mg total) by mouth once daily. For inflammation 14 tablet 0     mineral oil (FLEET MINERAL OIL) enema Place 1 enema rectally once daily. 2 enema 0    mupirocin (BACTROBAN) 2 % ointment AAA bid 30 g 2    oxycodone-acetaminophen (PERCOCET)  mg per tablet Take 1 tablet by mouth every 4 (four) hours as needed for Pain. 60 tablet 0    timolol maleate 0.5% (TIMOPTIC) 0.5 % Drop Place 1 drop into both eyes 2 (two) times daily. 10 mL 12    triamcinolone acetonide 0.1% (KENALOG) 0.1 % cream Apply topically 2 (two) times daily as needed. 454 g 1     No current facility-administered medications on file prior to visit.        Medication List with Changes/Refills   New Medications    UREA (CARMOL) 40 % CREA    Apply topically 2 (two) times daily. Apply to thickened areas of skin   Current Medications    ASCORBIC ACID (VITAMIN C) 60 MG LOZG    Take 1 tablet by mouth daily as needed.     ASPIRIN (ECOTRIN) 81 MG EC TABLET    Take 81 mg by mouth once daily.    BLOOD SUGAR DIAGNOSTIC (BLOOD GLUCOSE TEST) STRP    1 strip by Misc.(Non-Drug; Combo Route) route 3 (three) times a week.    CYANOCOBALAMIN, VITAMIN B-12, (VITAMIN B-12) 50 MCG TABLET    Take 50 mcg by mouth once daily.    DOXYCYCLINE (VIBRA-TABS) 100 MG TABLET    Take 1 tablet (100 mg total) by mouth every 12 (twelve) hours.    ERGOCALCIFEROL, VITAMIN D2, (VITAMIN D ORAL)    Take 1 tablet by mouth once daily.     LINACLOTIDE (LINZESS) 145 MCG CAP CAPSULE    Take 1 capsule (145 mcg total) by mouth once daily.    MELOXICAM (MOBIC) 7.5 MG TABLET    Take 1 tablet (7.5 mg total) by mouth once daily. For inflammation    MINERAL OIL (FLEET MINERAL OIL) ENEMA    Place 1 enema rectally once daily.    MUPIROCIN (BACTROBAN) 2 % OINTMENT    AAA bid    OXYCODONE-ACETAMINOPHEN (PERCOCET)  MG PER TABLET    Take 1 tablet by mouth every 4 (four) hours as needed for Pain.    TIMOLOL MALEATE 0.5% (TIMOPTIC) 0.5 % DROP    Place 1 drop into both eyes 2 (two) times daily.    TRIAMCINOLONE ACETONIDE 0.1% (KENALOG) 0.1 % CREAM    Apply topically  2 (two) times daily as needed.       PSH     Past Surgical History:   Procedure Laterality Date    BACK SURGERY      CATARACT EXTRACTION BILATERAL W/ ANTERIOR VITRECTOMY      COLONOSCOPY N/A 5/13/2016    Procedure: COLONOSCOPY;  Surgeon: Presley Phillips MD;  Location: [facility] ([address] [place] [address]);  Service: Endoscopy;  Laterality: N/A;  EGD with Dr. Jeffery prior to Colonoscopy. EC    JOINT REPLACEMENT Left     x 2    KNEE SURGERY Left     x2. revision 06/27/17    PROSTATE SURGERY      TONSILLECTOMY, ADENOIDECTOMY          ALL  Review of patient's allergies indicates:   Allergen Reactions    Penicillins Hives and Swelling    Sulfa (sulfonamide antibiotics) Hives    Protonix [pantoprazole] Rash       SOC     Social History   Substance Use Topics    Smoking status: Former Smoker     Packs/day: 0.30     Years: 52.00     Types: Cigarettes     Quit date: 11/13/2000    Smokeless tobacco: Never Used    Alcohol use No       FAMILY HX    Family History   Problem Relation Age of Onset    No Known Problems Son     No Known Problems Daughter     No Known Problems Son     Diabetes Neg Hx     Heart disease Neg Hx     Cancer Neg Hx     Celiac disease Neg Hx     Cirrhosis Neg Hx     Colon cancer Neg Hx     Colon polyps Neg Hx     Crohn's disease Neg Hx     Cystic fibrosis Neg Hx     Esophageal cancer Neg Hx     Hemochromatosis Neg Hx     Inflammatory bowel disease Neg Hx     Irritable bowel syndrome Neg Hx     Liver cancer Neg Hx     Liver disease Neg Hx     Rectal cancer Neg Hx     Stomach cancer Neg Hx     Ulcerative colitis Neg Hx     Zak's disease Neg Hx     Amblyopia Neg Hx     Blindness Neg Hx     Glaucoma Neg Hx     Hypertension Neg Hx     Macular degeneration Neg Hx     Retinal detachment Neg Hx     Strabismus Neg Hx             REVIEW OF SYSTEMS  Constitutional: Negative for chills and fever.   Respiratory: Negative for shortness of breath.    Cardiovascular: Negative for chest pain,  "palpitations, orthopnea, claudication, positive for leg swelling  Gastrointestinal: Negative for diarrhea, nausea and vomiting.   Musculoskeletal: Negative for joint pain. Positive for tendon pain plantar foot RIGHT  Skin: Negative for rash.   Neurological: Negative for dizziness, tingling, sensory change, focal weakness and weakness.   Psychiatric/Behavioral: Negative.    PHYSICAL EXAM  Vitals:    09/11/17 0959   BP: (!) 145/82   Pulse: 71   Weight: 134.1 kg (295 lb 10.2 oz)   Height: 6' 4" (1.93 m)   PainSc:   2   PainLoc: Foot       General: This patient is well-developed, well-nourished and appears stated age, well-oriented to person, place and time, and cooperative and pleasant on today's visit      LOWER EXTREMITY  Vascular exam:   · Dorsalis pedis 0/4 b/l and posterior tibial pulses palpable 1/4 bilaterally.   · Capillary refill time immediate to the toes.   · Feet are warm to the touch. Skin temperature warm to warm from proximally to distally   · There are varicosities, telangiectasias noted to bilateral foot and ankle regions.   · There are no ecchymoses noted to bilateral foot and ankle regions.   · There is gross lower extremity edema.    Dermatologic exam:   · Skin moist with healthy texture and turgor.  · There are no open ulcerations, lacerations, or fissures to bilateral foot and ankle regions. There are no signs of infection as there is no erythema, no proximal-extending lymphangiitis, no fluctuance, or crepitus noted on palpation to bilateral foot and ankle regions.   · There is no interdigital maceration.   · There are hyperkeratotic lesions noted to feet. Nails are thickened, discolored, well-trimmed.    Neurologic exam:  · Epicritic sensation is intact as the patient is able to sense light touch to bilateral foot and ankle regions.   · Achilles and patellar deep tendon reflexes intact  · Babinski reflex absent    Musculoskeletal/Orthopedic exam:   · No symptomatic structural abnormalities " noted  · Muscle strength AT/EHL/EDL/PT: 5/5; Achilles/Gastroc/Soleus: 5/5; PB/PL: 5/5 Muscle tone is normal.  · Ankle joint ROM  B/L LIMITED DF/PF, non-crepitus  · STJ ROM supple inv/ev, non crepitus   · There is mild TTP to medial band plantar fascia RIGHT foot    IMAGING  xrays ordered      ASSESSMENT  Encounter Diagnoses   Name Primary?    Arch pain of right foot Yes    Gastrocnemius equinus, right     Plantar fasciitis - Right Foot     Left ankle pain, unspecified chronicity     Polyneuropathy in other diseases classified elsewhere     Venous insufficiency     Chronic constipation     B12 deficiency          PLAN  1. Patient was educated about clinical and imaging findings, and verbalizes understanding of above.     Diagnoses and all orders for this visit:  Arch pain of right foot    Gastrocnemius equinus, right  -     Ambulatory Referral to Physical/Occupational Therapy  -     X-Ray Foot Complete Right; Future; Expected date: 09/11/2017    Plantar fasciitis - Right Foot  -     Ambulatory Referral to Physical/Occupational Therapy  -     X-Ray Foot Complete Right; Future; Expected date: 09/11/2017    Left ankle pain, unspecified chronicity  -     X-Ray Ankle Complete Left; Future; Expected date: 09/11/2017    Polyneuropathy in other diseases classified elsewhere    Venous insufficiency    Chronic constipation    B12 deficiency    Other orders  -     urea (CARMOL) 40 % Crea; Apply topically 2 (two) times daily. Apply to thickened areas of skin  Dispense: 1 Tube; Refill: 3      2. Treatment plan:      -I Discussed different treatment options for heel pain.   -I gave written and verbal instructions on heel cord stretching and this was demonstrated for the patient. Recommendations were given for plantar fasciitis treatment including icing, stretching, arch supports, avoidance of barefoot walking, appropriate shoe wear, and strict compliance. Discussed importance of patient to perform stretching exercises. PT  referral placed for  Outpatient rehab    -continue with compression stockings for  Edema control    -Patient instructed on proper foot hygeine. We discussed wearing proper shoe gear, daily foot inspections, never walking without protective shoe gear, never putting sharp instruments to feet, UREA rx for calluses plantar foot    - With patient's permission, nails were aggressively reduced and debrided x 10 to their soft tissue attachment mechanically and with electric , removing all offending nail and debris. Patient relates relief following the procedure.     The total visit time face to face with the patient was over 30 minutes. Time spent in counseling, discussion and coordination of care with the patient was over 15 minutes. Topics discussed as noted above.    3. RTC  for follow up/evaluation PRN      Future Appointments  Date Time Provider Department Center   9/14/2017 11:00 AM Malcolm Gill Sr., MD [facility] ORTHO [place]   9/18/2017 1:45 PM LABORATORY, [facility] LAB [facility]   9/18/2017 2:00 PM PERIMETRY, LOLLY [facility] O[facility]THAL [facility]   9/18/2017 2:15 PM Clau Carvalho MD [facility] OPHTHAL [facility]   9/19/2017 9:00 AM Sudhakar Liu MD [facility]   9/20/2017 2:00 PM JANNY Bedoya [facility] CARDIO [place]   9/25/2017 9:30 AM ORTHOPEDICS NURSE, [facility] ORTHO [place]   10/19/2017 9:00 AM Jaimee Armenta NP [facility] GASTRO [place]   10/19/2017 1:00 PM Malcolm Gill Sr., MD [facility] ORTHO [place]       Report Electronically Signed By:  Suzanne De Santiago DPM   Podiatric Medicine & Surgery  [facility]  9/11/2017

## 2017-09-11 NOTE — TELEPHONE ENCOUNTER
Contacted patientm, pt's caregiver answered and requested to take message and will relay to patient. Informed Cammie that xray results show arthritis in foot with no other abnormalities. Informed Cammie physical therapy referral was placed and they should receive a call to schedule for treatment after insurance sends approval. Cammie expressed understanding to all information given, call ended pleasantly.  Yina Paz MA  Office of Dr. Suzanne De Santiago DPM   Podiatry Department, [facility]

## 2017-09-11 NOTE — TELEPHONE ENCOUNTER
----- Message from Suzanne De Santiago DPM sent at 9/11/2017  4:40 PM CDT -----  Xray results consistent with arthritis in foot, no further abnormalities noted. He will get a call from physical therapy to schedule for treatment.

## 2017-09-13 DIAGNOSIS — M25.562 ACUTE PAIN OF LEFT KNEE: Primary | ICD-10-CM

## 2017-09-14 ENCOUNTER — HOSPITAL ENCOUNTER (OUTPATIENT)
Dept: RADIOLOGY | Facility: HOSPITAL | Age: 82
Discharge: HOME | End: 2017-09-14
Attending: ORTHOPAEDIC SURGERY
Payer: MEDICARE

## 2017-09-14 ENCOUNTER — OFFICE VISIT (OUTPATIENT)
Dept: ORTHOPEDICS | Facility: CLINIC | Age: 82
End: 2017-09-14
Payer: MEDICARE

## 2017-09-14 VITALS
DIASTOLIC BLOOD PRESSURE: 78 MMHG | HEART RATE: 72 BPM | BODY MASS INDEX: 35.48 KG/M2 | SYSTOLIC BLOOD PRESSURE: 145 MMHG | WEIGHT: 291.44 LBS

## 2017-09-14 DIAGNOSIS — Z98.890 POSTOPERATIVE STATE: Primary | ICD-10-CM

## 2017-09-14 DIAGNOSIS — M25.562 ACUTE PAIN OF LEFT KNEE: ICD-10-CM

## 2017-09-14 PROCEDURE — 73562 X-RAY EXAM OF KNEE 3: CPT | Mod: 26,LT,, | Performed by: RADIOLOGY

## 2017-09-14 PROCEDURE — 99213 OFFICE O/P EST LOW 20 MIN: CPT | Mod: PBBFAC,25,PO | Performed by: ORTHOPAEDIC SURGERY

## 2017-09-14 PROCEDURE — 73560 X-RAY EXAM OF KNEE 1 OR 2: CPT | Mod: 26,59,RT, | Performed by: RADIOLOGY

## 2017-09-14 PROCEDURE — 73560 X-RAY EXAM OF KNEE 1 OR 2: CPT | Mod: TC,PO,RT

## 2017-09-14 PROCEDURE — 99999 PR PBB SHADOW E&M-EST. PATIENT-LVL III: CPT | Mod: PBBFAC,,, | Performed by: ORTHOPAEDIC SURGERY

## 2017-09-14 PROCEDURE — 99024 POSTOP FOLLOW-UP VISIT: CPT | Mod: ,,, | Performed by: ORTHOPAEDIC SURGERY

## 2017-09-14 NOTE — PROGRESS NOTES
SUBJECTIVE:  Patient is status post Left total knee revision.  The patient complains of increase redness and warmth of the left knee.  Also he has noticed leakage at the surgical site.  Patient complains of 0/10 pain.  The patient denies any groin pain chest pain, shortness of breath, dizziness or headaches.    Date of surgery: 6/27/2017-left knee total revision    History of present illness             Past Medical History:   Diagnosis Date    Arthritis      Atherosclerosis of abdominal aorta      Cataract      Chronic constipation      Glaucoma      History of anal fissures      Hypertension      Polyneuropathy in other diseases classified elsewhere       due to folate deficiency    Prediabetes      Venous insufficiency      Venous insufficiency                  Past Surgical History:   Procedure Laterality Date    BACK SURGERY        CATARACT EXTRACTION BILATERAL W/ ANTERIOR VITRECTOMY        COLONOSCOPY N/A 5/13/2016     Procedure: COLONOSCOPY;  Surgeon: Presley Phillips MD;  Location: [address]);  Service: Endoscopy;  Laterality: N/A;  EGD with Dr. Jeffery prior to Colonoscopy. EC    KNEE SURGERY Left      PROSTATE SURGERY        TONSILLECTOMY, ADENOIDECTOMY                  Review of patient's allergies indicates:   Allergen Reactions    Penicillins Hives and Swelling    Sulfa (sulfonamide antibiotics) Hives    Protonix [pantoprazole] Rash      No current facility-administered medications on file prior to encounter.                   Current Outpatient Prescriptions on File Prior to Encounter   Medication Sig Dispense Refill    ascorbic acid (VITAMIN C) 60 mg Lozg Take 1 tablet by mouth daily as needed.         cyanocobalamin, vitamin B-12, (VITAMIN B-12) 50 mcg tablet Take 50 mcg by mouth once daily.        ERGOCALCIFEROL, VITAMIN D2, (VITAMIN D ORAL) Take 1 tablet by mouth once daily.         linaclotide (LINZESS) 145 mcg Cap capsule Take 1 capsule (145 mcg total) by mouth  once daily. 30 capsule 5    mineral oil (FLEET MINERAL OIL) enema Place 1 enema rectally once daily. 2 enema 0    ramipril (ALTACE) 2.5 MG capsule Take 1 capsule (2.5 mg total) by mouth once daily. (Patient taking differently: Take 1.25 mg by mouth once daily. ) 90 capsule 3    timolol maleate 0.5% (TIMOPTIC) 0.5 % Drop Place 1 drop into both eyes 2 (two) times daily. 10 mL 12    blood sugar diagnostic (BLOOD GLUCOSE TEST) Strp 1 strip by Misc.(Non-Drug; Combo Route) route 3 (three) times a week. 32 strip 11          ROS:  GENERAL: No fever, chills, fatigability or weight loss.  SKIN: No rashes, itching or changes in color or texture of skin.  HEAD: No headaches or recent head trauma.  EYES: Visual acuity fine. No photophobia, ocular pain or diplopia.  EARS: Denies ear pain, discharge or vertigo.  NOSE: No loss of smell, no epistaxis or postnasal drip.  MOUTH & THROAT: No hoarseness or change in voice. No excessive gum bleeding.  NODES: Denies swollen glands.  CHEST: Denies ADAMS, cyanosis, wheezing, cough and sputum production.  CARDIOVASCULAR: Denies chest pain, PND, orthopnea or reduced exercise tolerance.  ABDOMEN: Appetite fine. No weight loss. Denies diarrhea, abdominal pain, hematemesis or blood in stool.  URINARY: No flank pain, dysuria or hematuria.  PERIPHERAL VASCULAR: No claudication or cyanosis.  NEUROLOGIC: No history of seizures, paralysis, alteration of gait or coordination.  MUSCULOSKELETAL: See HPI     PE:  APPEARANCE: Well nourished, well developed, in no acute distress.   HEAD: Normocephalic, atraumatic.  EYES: PERRL. EOMI.   EARS: TM's intact. Light reflex normal. No retraction or perforation.   NOSE: Mucosa pink. Airway clear.  MOUTH & THROAT: No tonsillar enlargement. No pharyngeal erythema or exudate. No stridor.  NECK: Supple.   NODES: No cervical, axillary or inguinal lymph node enlargement.  CHEST: Lungs clear to auscultation.  CARDIOVASCULAR: Normal S1, S2. No rubs, murmurs or  gallops.  ABDOMEN: Bowel sounds normal. Not distended. Soft. No tenderness or masses.  NEUROLOGIC: Cranial Nerves: II-XII grossly intact, also see MUSCULOSKELETAL  MUSCULOSKELETAL:          Right / Left wrist/hand-      - Positive- Tinel's over the Median nerve  Positive- Phalen maneuver   Positive- Thenar atrophy   Negative- hypothenar atrophy   Positive- Median Nerve Compression test less than 30 seconds   Negative- Tinel's over Guyon's Canal   Negative- Tinel's over Cubital Tunnel Negative- Tinels over the Median Nerve overlying the antecubital  Fossa   Negative- Tinel's over Radial groove  Negative- Tinel's over sensory branch of Radial nerve at the wrist  Negative- Tinel's over the PIN   Negative- pain in forearm with resistance to             FDP flexion and resisted pronation   Positive- boggy synovium of the wrist   normal- less than 2 seconds capillary all digits  Positive- light touch intact in Median nerve distribution Positive- light touch in the Radial Nerve distribution  Positive- light touch intact in the Ulnar sensory nerve distribution    Positive- Thumb opposition strength symmetrical  Negative- bruit(aneurysm)    Positive- skin color intact(claudication/Raynaud's)  Negative- cold digits(claudication/Raynaud's)  normal - Palomo Test(Vascular Exam)  normal- +2 Radial and Ulnar artery pulses  Negative- anatomical snuff box tenderness(Dequervain's Synovitis)  Negative- finger or thumb triggering(Trigger Thumb or Finger)  Negative- Lipoma  Negative- Ganglion cyst      Sensory exam-C5,C6,C7,C8,T1- normal    Wrist extension for radial nerve- +5/5  Wrist Flexion-+5/5  Wrist Ulnar Deviation-+5/5  Wrist Radial Deviation- +5/5  Resisted opposition of the thumb for the median nerve- +5/5  Digit abduction for the ulnar nerve- +5/5                      Left Knee -  2 plus dorsalis pedis and posterior tibial artery pulses, light touch intact Left lower extremity.  All digits are warm.  Mild erythema, mild warmth,  scant drainage, mild swelling,mild tenderness.  positive edema left lower extremity Less than 2 seconds capillary refill all digits. Mild ecchymosis The swelling has decreased significantly compared to the last visit.     BP (!) 145/78   Pulse 72   Wt 132.2 kg (291 lb 7.2 oz)   BMI 35.48 kg/m²        ASSESSMENT:     The patient is stable and improving.      diagnosis:  1.  Left lower extremity edema   2.  Status post left knee revision limitation  3. Left carpal tunnel syndrome    PLAN:  Cancel Left carpal tunnel release  Follow-up at 1 October for an injection into the left wrist.  take Eliquis   walker assisted ambulation FWB left lower extremity  Elevate the left lower extremity to 5 cm above the level of the heart    aggressive range of motion Of the left knee.  Continue pain medication  Continue physical therapy  Discontinue antibiotics

## 2017-09-14 NOTE — PATIENT INSTRUCTIONS

## 2017-09-18 ENCOUNTER — OFFICE VISIT (OUTPATIENT)
Dept: OPHTHALMOLOGY | Facility: CLINIC | Age: 82
End: 2017-09-18
Payer: MEDICARE

## 2017-09-18 ENCOUNTER — CLINICAL SUPPORT (OUTPATIENT)
Dept: OPHTHALMOLOGY | Facility: CLINIC | Age: 82
End: 2017-09-18
Payer: MEDICARE

## 2017-09-18 DIAGNOSIS — H25.13 SENILE NUCLEAR SCLEROSIS, BILATERAL: ICD-10-CM

## 2017-09-18 DIAGNOSIS — H40.1133 PRIMARY OPEN ANGLE GLAUCOMA OF BOTH EYES, SEVERE STAGE: Primary | ICD-10-CM

## 2017-09-18 DIAGNOSIS — H40.1133 PRIMARY OPEN ANGLE GLAUCOMA OF BOTH EYES, SEVERE STAGE: ICD-10-CM

## 2017-09-18 DIAGNOSIS — H21.562 AFFERENT PUPILLARY DEFECT, LEFT: ICD-10-CM

## 2017-09-18 DIAGNOSIS — H02.403 PTOSIS, BILATERAL: ICD-10-CM

## 2017-09-18 PROCEDURE — 92020 GONIOSCOPY: CPT | Mod: PBBFAC | Performed by: OPHTHALMOLOGY

## 2017-09-18 PROCEDURE — 99212 OFFICE O/P EST SF 10 MIN: CPT | Mod: PBBFAC | Performed by: OPHTHALMOLOGY

## 2017-09-18 PROCEDURE — 92012 INTRM OPH EXAM EST PATIENT: CPT | Mod: S$PBB,,, | Performed by: OPHTHALMOLOGY

## 2017-09-18 PROCEDURE — 92020 GONIOSCOPY: CPT | Mod: S$PBB,,, | Performed by: OPHTHALMOLOGY

## 2017-09-18 PROCEDURE — 99999 PR PBB SHADOW E&M-EST. PATIENT-LVL II: CPT | Mod: PBBFAC,,, | Performed by: OPHTHALMOLOGY

## 2017-09-18 PROCEDURE — 92134 CPTRZ OPH DX IMG PST SGM RTA: CPT | Mod: 50,PBBFAC

## 2017-09-18 PROCEDURE — 92134 CPTRZ OPH DX IMG PST SGM RTA: CPT | Mod: 26,S$PBB,, | Performed by: OPHTHALMOLOGY

## 2017-09-18 RX ORDER — TIMOLOL MALEATE 5 MG/ML
1 SOLUTION/ DROPS OPHTHALMIC 2 TIMES DAILY
Qty: 10 ML | Refills: 12 | Status: SHIPPED | OUTPATIENT
Start: 2017-09-18 | End: 2018-11-27 | Stop reason: SDUPTHER

## 2017-09-18 NOTE — PROGRESS NOTES
HPI     DLS: 5/16/17    Pt here for HRT review;    Meds: Timolol bid ou    1. Afferent pupillary defect, left  2. Primary open angle glaucoma of both eyes, severe stage  3. Senile nuclear sclerosis, bilateral  4. Ptosis, bilateral    Last edited by Cris Caban on 9/18/2017  2:10 PM. (History)            Assessment /Plan     For exam results, see Encounter Report.    Primary open angle glaucoma of both eyes, severe stage    Afferent pupillary defect, left    Senile nuclear sclerosis, bilateral    Ptosis, bilateral          Pt is  using timolol bid ou - was intolerant to combigan - too dizzy     1. POAG - adv./severe stage OS>OD      Presented at [facility] 2012 - had been dx with glaucoma by outside doctor years ago       He stopped gtts on his own and never followed up      Excellent response to Rx with only one gtt - xalatan      First HVF 3/22/2012      First photos 3/22/2012           Family history    neg        Glaucoma meds    - timolol bid ou         H/O adverse rxn to glaucoma drops    C/O red/painful eye with latanoprost/travatan gtts /// too dizzy w/ combigan         LASERS    SLT OS 4/16/2015 // SLT od 5/14/2015 (good resp 19/19 --> 13/13)        GLAUCOMA SURGERIES    none        OTHER EYE SURGERIES    none        CDR    0.8/0.9        Tbase    23-26/26-32          Tmax    26/32            Ttarget    16/16             HVF    6 VF '12-'16 -  24-2ss od-gen red in sens- SNS/SAD  // 24-2 stimV os-dense central scotoma        Gonio    +3 ou        CCT    531/546        OCT   3 test 2010 - 2017 -  RNFL -  OD:dec T/I // OS:dec S/T/I        HRT    4 test 2012 to 2017 -  MR - [place] off od // no image os         Disc photos    2010, 2014, 2016  - OIS    - Ttoday  16/16  - Test done today   - HRT/GONIO    2.   APD os - 2/2 to #1    3.   Nuclear Sclerosis ou        VA 20/80 od // CF at 1' os - poor VA os 2/2 adv. Glaucoma/VF loss    4.  Ptosis ou -stable and not visually significant    5.   Dermatochalasis ou  -stable    6. S/P a 2nd knee replacement on left June 2017    Doing well - on crutches     Plan  Adv POAG OS>OD  Good initial  resp to slt ou 5/2015 19/19--> 13/13    tolerating timolol bid ou   Intol to all PG's - pain   Intol to combigan - dizzy     Did have Excellent resp to xalatan and doing well 26/32 -> 15/14- will readdress in future if need to lower IOP more and out of options - but he stopped on his own - C/O burning ect - can restart Xalatan if needed    Long discussion in past with patient about DG and burning from glaucoma gtts   Discussed, at length,  the risk of blindness if the glaucoma is not controlled  ATs prn    Doubt CE OS will help vision much - lrg central scotoma and APD  Hold off on CE od for now - BCVA 20/50 on refraction  1/16/2017     Rx for new glasses given 1/16/2017 // No real improvement - but lenses are badly scratched   Re-printed Rx for glasses 5/2017 - pt says the [facility] made them wrong - but the want/need a new Rx printed     If needs a trab or tube os consider CE at same time - but very limited potential OS  If needs surgery od - consider combined - this is the better eye     F/U  4 months with IOP check all other testing up to date     I have seen and personally examined the patient.  I agree with the findings, assessment and plan of the resident and/or fellow.     Clau Carvalho MD

## 2017-10-05 ENCOUNTER — OFFICE VISIT (OUTPATIENT)
Dept: ORTHOPEDICS | Facility: CLINIC | Age: 82
End: 2017-10-05
Payer: MEDICARE

## 2017-10-05 VITALS
HEIGHT: 76 IN | DIASTOLIC BLOOD PRESSURE: 60 MMHG | BODY MASS INDEX: 35.49 KG/M2 | HEART RATE: 72 BPM | WEIGHT: 291.44 LBS | SYSTOLIC BLOOD PRESSURE: 124 MMHG

## 2017-10-05 DIAGNOSIS — G56.02 LEFT CARPAL TUNNEL SYNDROME: Primary | ICD-10-CM

## 2017-10-05 PROCEDURE — 99999 PR PBB SHADOW E&M-EST. PATIENT-LVL III: CPT | Mod: PBBFAC,,, | Performed by: ORTHOPAEDIC SURGERY

## 2017-10-05 PROCEDURE — 20526 THER INJECTION CARP TUNNEL: CPT | Mod: S$PBB,LT,, | Performed by: ORTHOPAEDIC SURGERY

## 2017-10-05 PROCEDURE — 99213 OFFICE O/P EST LOW 20 MIN: CPT | Mod: 25,S$PBB,, | Performed by: ORTHOPAEDIC SURGERY

## 2017-10-05 PROCEDURE — 20550 NJX 1 TENDON SHEATH/LIGAMENT: CPT | Mod: PBBFAC,PO | Performed by: ORTHOPAEDIC SURGERY

## 2017-10-05 PROCEDURE — 99213 OFFICE O/P EST LOW 20 MIN: CPT | Mod: PBBFAC,PO | Performed by: ORTHOPAEDIC SURGERY

## 2017-10-05 RX ADMIN — METHYLPREDNISOLONE ACETATE 80 MG: 80 INJECTION, SUSPENSION INTRALESIONAL; INTRAMUSCULAR; INTRASYNOVIAL; SOFT TISSUE at 05:10

## 2017-10-06 RX ORDER — METHYLPREDNISOLONE ACETATE 80 MG/ML
80 INJECTION, SUSPENSION INTRA-ARTICULAR; INTRALESIONAL; INTRAMUSCULAR; SOFT TISSUE
Status: COMPLETED | OUTPATIENT
Start: 2017-10-06 | End: 2017-10-05

## 2017-10-06 NOTE — PROGRESS NOTES
SUBJECTIVE:  Patient is status post Left total knee revision.  The patient complains of increase redness and warmth of the left knee.  Also he has noticed leakage at the surgical site.  Patient complains of 0/10 pain.  The patient denies any groin pain chest pain, shortness of breath, dizziness or headaches.    Date of surgery: 6/27/2017-left knee total revision    History of present illness             Past Medical History:   Diagnosis Date    Arthritis      Atherosclerosis of abdominal aorta      Cataract      Chronic constipation      Glaucoma      History of anal fissures      Hypertension      Polyneuropathy in other diseases classified elsewhere       due to folate deficiency    Prediabetes      Venous insufficiency      Venous insufficiency                  Past Surgical History:   Procedure Laterality Date    BACK SURGERY        CATARACT EXTRACTION BILATERAL W/ ANTERIOR VITRECTOMY        COLONOSCOPY N/A 5/13/2016     Procedure: COLONOSCOPY;  Surgeon: Presley Phillips MD;  Location: [address]);  Service: Endoscopy;  Laterality: N/A;  EGD with Dr. Jeffery prior to Colonoscopy. EC    KNEE SURGERY Left      PROSTATE SURGERY        TONSILLECTOMY, ADENOIDECTOMY                  Review of patient's allergies indicates:   Allergen Reactions    Penicillins Hives and Swelling    Sulfa (sulfonamide antibiotics) Hives    Protonix [pantoprazole] Rash      No current facility-administered medications on file prior to encounter.                   Current Outpatient Prescriptions on File Prior to Encounter   Medication Sig Dispense Refill    ascorbic acid (VITAMIN C) 60 mg Lozg Take 1 tablet by mouth daily as needed.         cyanocobalamin, vitamin B-12, (VITAMIN B-12) 50 mcg tablet Take 50 mcg by mouth once daily.        ERGOCALCIFEROL, VITAMIN D2, (VITAMIN D ORAL) Take 1 tablet by mouth once daily.         linaclotide (LINZESS) 145 mcg Cap capsule Take 1 capsule (145 mcg total) by mouth  once daily. 30 capsule 5    mineral oil (FLEET MINERAL OIL) enema Place 1 enema rectally once daily. 2 enema 0    ramipril (ALTACE) 2.5 MG capsule Take 1 capsule (2.5 mg total) by mouth once daily. (Patient taking differently: Take 1.25 mg by mouth once daily. ) 90 capsule 3    timolol maleate 0.5% (TIMOPTIC) 0.5 % Drop Place 1 drop into both eyes 2 (two) times daily. 10 mL 12    blood sugar diagnostic (BLOOD GLUCOSE TEST) Strp 1 strip by Misc.(Non-Drug; Combo Route) route 3 (three) times a week. 32 strip 11          ROS:  GENERAL: No fever, chills, fatigability or weight loss.  SKIN: No rashes, itching or changes in color or texture of skin.  HEAD: No headaches or recent head trauma.  EYES: Visual acuity fine. No photophobia, ocular pain or diplopia.  EARS: Denies ear pain, discharge or vertigo.  NOSE: No loss of smell, no epistaxis or postnasal drip.  MOUTH & THROAT: No hoarseness or change in voice. No excessive gum bleeding.  NODES: Denies swollen glands.  CHEST: Denies ADAMS, cyanosis, wheezing, cough and sputum production.  CARDIOVASCULAR: Denies chest pain, PND, orthopnea or reduced exercise tolerance.  ABDOMEN: Appetite fine. No weight loss. Denies diarrhea, abdominal pain, hematemesis or blood in stool.  URINARY: No flank pain, dysuria or hematuria.  PERIPHERAL VASCULAR: No claudication or cyanosis.  NEUROLOGIC: No history of seizures, paralysis, alteration of gait or coordination.  MUSCULOSKELETAL: See HPI     PE:  APPEARANCE: Well nourished, well developed, in no acute distress.   HEAD: Normocephalic, atraumatic.  EYES: PERRL. EOMI.   EARS: TM's intact. Light reflex normal. No retraction or perforation.   NOSE: Mucosa pink. Airway clear.  MOUTH & THROAT: No tonsillar enlargement. No pharyngeal erythema or exudate. No stridor.  NECK: Supple.   NODES: No cervical, axillary or inguinal lymph node enlargement.  CHEST: Lungs clear to auscultation.  CARDIOVASCULAR: Normal S1, S2. No rubs, murmurs or  gallops.  ABDOMEN: Bowel sounds normal. Not distended. Soft. No tenderness or masses.  NEUROLOGIC: Cranial Nerves: II-XII grossly intact, also see MUSCULOSKELETAL  MUSCULOSKELETAL:          Right / Left wrist/hand-      - Positive- Tinel's over the Median nerve  Positive- Phalen maneuver   Positive- Thenar atrophy   Negative- hypothenar atrophy   Positive- Median Nerve Compression test less than 30 seconds   Negative- Tinel's over Guyon's Canal   Negative- Tinel's over Cubital Tunnel Negative- Tinels over the Median Nerve overlying the antecubital  Fossa   Negative- Tinel's over Radial groove  Negative- Tinel's over sensory branch of Radial nerve at the wrist  Negative- Tinel's over the PIN   Negative- pain in forearm with resistance to             FDP flexion and resisted pronation   Positive- boggy synovium of the wrist   normal- less than 2 seconds capillary all digits  Positive- light touch intact in Median nerve distribution Positive- light touch in the Radial Nerve distribution  Positive- light touch intact in the Ulnar sensory nerve distribution    Positive- Thumb opposition strength symmetrical  Negative- bruit(aneurysm)    Positive- skin color intact(claudication/Raynaud's)  Negative- cold digits(claudication/Raynaud's)  normal - Palomo Test(Vascular Exam)  normal- +2 Radial and Ulnar artery pulses  Negative- anatomical snuff box tenderness(Dequervain's Synovitis)  Negative- finger or thumb triggering(Trigger Thumb or Finger)  Negative- Lipoma  Negative- Ganglion cyst      Sensory exam-C5,C6,C7,C8,T1- normal    Wrist extension for radial nerve- +5/5  Wrist Flexion-+5/5  Wrist Ulnar Deviation-+5/5  Wrist Radial Deviation- +5/5  Resisted opposition of the thumb for the median nerve- +5/5  Digit abduction for the ulnar nerve- +5/5                      Left Knee -  2 plus dorsalis pedis and posterior tibial artery pulses, light touch intact Left lower extremity.  All digits are warm.  Mild erythema, mild warmth,  "scant drainage, mild swelling,mild tenderness.  positive edema left lower extremity Less than 2 seconds capillary refill all digits. Mild ecchymosis The swelling has decreased significantly compared to the last visit.     /60   Pulse 72   Ht 6' 4" (1.93 m)   Wt 132.2 kg (291 lb 7.2 oz)   BMI 35.48 kg/m²        ASSESSMENT:     The patient is stable and improving.      diagnosis:  1.  Left lower extremity edema   2.  Status post left knee revision limitation  3. Left carpal tunnel syndrome    PLAN:  Cancel Left carpal tunnel release  Follow-up at 1 October for an injection into the left wrist.  take Eliquis   walker assisted ambulation FWB left lower extremity  Elevate the left lower extremity to 5 cm above the level of the heart    aggressive range of motion Of the left knee.  Continue pain medication  Continue physical therapy  Discontinue antibiotics     Carpal canal injection    Injection:  The patient was placed in the supine position with   the left wrist near   the end of the bed facing me.  The left wrist   was placed over a nonsterile pad.The wrist was   prepped, sterily, with Alcohol and Betadine.  A  Refrigerant  And coolant was used to locally anesthetize the skin   over the carpal canal.  A one and   a half inch, 27 gauge needle attached to   A 5 cc synringe was placed into the carpal canal.   A negative pressure was placed on the needle to   ensure the aspiration was placed into the canal  And not into a blood vessel.  1 cc of a 1%  Lidocaine   was mixed with 1 cc of a 80 mg solution of Depo-Medrol injected.   The patient tolerated the injection well.  A Bandage   was placed over the injection site.    "

## 2017-10-06 NOTE — PATIENT INSTRUCTIONS
Carpal Tunnel Syndrome    Carpal tunnel syndrome is a painful condition of the wrist and arm. It is caused by pressure on the median nerve.  The median nerve is one of the nerves that give feeling and movement to the hand. It passes through a tunnel in the wrist called the carpal tunnel. This tunnel is made up of bones and ligaments. Narrowing of this tunnel or swelling of the tissues inside the tunnel puts pressure on the median nerve. This causes numbness, pins and needles, or electric shooting pains in your hand and forearm. Often the pain is worse at night and may wake you when you are asleep.  Carpal tunnel syndrome may occur during pregnancy and with use of birth control pills. It is more common in workers who must often bend their wrists. It is also common in people who work with power tools that cause strong vibrations.  Home care  · Rest the painful wrist. Avoid repeated bending of the wrist back and forth. This puts pressure on the median nerve. Avoid using power tools with strong vibrations.  · If you were given a splint, wear it at night while you sleep. You may also wear it during the day for comfort.  · Move your fingers and wrists often to avoid stiffness.  · Elevate your arms on pillows when you lie down.  · Try using the unaffected hand more.  · Try not to hold your wrists in a bent, downward position.  · Sometimes changes in the work place may ease symptoms. If you type most of the day, it may help to change the position of your keyboard or add a wrist support. Your wrist should be in a neutral position and not bent back when typing.  · You may use over-the-counter pain medicine to treat pain and inflammation, unless another medicine was prescribed. Anti-inflammatory pain medicines, such as ibuprofen or naproxen may be more effective than acetaminophen, which treats pain, but not inflammation. If you have chronic liver or kidney disease or ever had a stomach ulcer or GI bleeding, talk with your  doctor before using these medicines.  · Opioid pain medicine will only give temporary relief and does not treat the problem. If pain continues, you may need a shot of a steroid drug into your wrist.  · If the above methods fail, you may need surgery. This will open the carpal tunnel and release the pressure on the trapped nerve.  Follow-up care  Follow up with your healthcare provider, or as advised, if the pain doesnt begin to improve within the next week.  If X-rays were taken, you will be notified of any new findings that may affect your care.  When to seek medical advice  Call your healthcare provider right away if any of these occur:  · Pain not improving with the above treatment  · Fingers or hand become cold, blue, numb, or tingly  · Your whole arm becomes swollen or weak  Date Last Reviewed: 11/23/2015  © 1496-7833 [employer] [address] [place] [address], [place] [address]. All rights reserved. This information is not intended as a substitute for professional medical care. Always follow your healthcare professional's instructions.

## 2017-10-19 ENCOUNTER — OFFICE VISIT (OUTPATIENT)
Dept: GASTROENTEROLOGY | Facility: CLINIC | Age: 82
End: 2017-10-19
Payer: MEDICARE

## 2017-10-19 VITALS
HEIGHT: 76 IN | DIASTOLIC BLOOD PRESSURE: 84 MMHG | HEART RATE: 60 BPM | BODY MASS INDEX: 35.63 KG/M2 | SYSTOLIC BLOOD PRESSURE: 140 MMHG | WEIGHT: 292.56 LBS

## 2017-10-19 DIAGNOSIS — K21.9 GASTROESOPHAGEAL REFLUX DISEASE WITHOUT ESOPHAGITIS: Primary | ICD-10-CM

## 2017-10-19 DIAGNOSIS — R49.0 HOARSENESS: ICD-10-CM

## 2017-10-19 DIAGNOSIS — J30.9 CHRONIC ALLERGIC RHINITIS, UNSPECIFIED SEASONALITY, UNSPECIFIED TRIGGER: ICD-10-CM

## 2017-10-19 PROCEDURE — 99213 OFFICE O/P EST LOW 20 MIN: CPT | Mod: PBBFAC,PO | Performed by: NURSE PRACTITIONER

## 2017-10-19 PROCEDURE — 99214 OFFICE O/P EST MOD 30 MIN: CPT | Mod: S$PBB,,, | Performed by: NURSE PRACTITIONER

## 2017-10-19 PROCEDURE — 99999 PR PBB SHADOW E&M-EST. PATIENT-LVL III: CPT | Mod: PBBFAC,,, | Performed by: NURSE PRACTITIONER

## 2017-10-19 NOTE — PROGRESS NOTES
Clinic Follow Up:  [facility] Follow Up Note    Reason for Follow Up:  The primary encounter diagnosis was Gastroesophageal reflux disease without esophagitis. Diagnoses of Hoarseness and Chronic allergic rhinitis, unspecified seasonality, unspecified trigger were also pertinent to this visit.    PCP: Sudhakar Liu       HPI:  This is a 81 y.o. male here for follow up of the above issues. He reports improvement in constipation. He does not take Linzess every day. No hematochezia or melena. He is still experiencing some reflux as well as hoarseness. At his last visit he was started on Zantac BID since there is an allergy to PPI. He did not start medicaiton. Will attempt to start Zantac again. He does report having chronic sinus drip. He has been evaluated by ENT who believes hoarseness is more likely caused from un-controlled reflux.     Review of Systems   Constitutional: Negative for activity change and appetite change.        As per interval history above   HENT: Positive for postnasal drip.         Hoarseness   Respiratory: Negative for cough and shortness of breath.    Cardiovascular: Negative for chest pain.   Gastrointestinal: Positive for constipation. Negative for abdominal pain, anal bleeding, blood in stool, diarrhea, nausea, rectal pain and vomiting.   Skin: Negative for color change and rash.       Medical History:  Past Medical History:   Diagnosis Date    Arthritis     Atherosclerosis of abdominal aorta     Cataract     Chronic constipation     Glaucoma     History of anal fissures     Hypertension     Polyneuropathy in other diseases classified elsewhere     due to folate deficiency    Prediabetes     Venous insufficiency     Venous insufficiency        Surgical History:   Past Surgical History:   Procedure Laterality Date    BACK SURGERY      CATARACT EXTRACTION BILATERAL W/ ANTERIOR VITRECTOMY      COLONOSCOPY N/A 5/13/2016    Procedure: COLONOSCOPY;   Surgeon: Presley Phillips MD;  Location: [facility] ([address] [place] [address]);  Service: Endoscopy;  Laterality: N/A;  EGD with Dr. Jeffery prior to Colonoscopy. EC    JOINT REPLACEMENT Left     x 2    KNEE SURGERY Left     x2. revision 06/27/17    PROSTATE SURGERY      TONSILLECTOMY, ADENOIDECTOMY         Family History:   Family History   Problem Relation Age of Onset    No Known Problems Son     No Known Problems Daughter     No Known Problems Son     Diabetes Neg Hx     Heart disease Neg Hx     Cancer Neg Hx     Celiac disease Neg Hx     Cirrhosis Neg Hx     Colon cancer Neg Hx     Colon polyps Neg Hx     Crohn's disease Neg Hx     Cystic fibrosis Neg Hx     Esophageal cancer Neg Hx     Hemochromatosis Neg Hx     Inflammatory bowel disease Neg Hx     Irritable bowel syndrome Neg Hx     Liver cancer Neg Hx     Liver disease Neg Hx     Rectal cancer Neg Hx     Stomach cancer Neg Hx     Ulcerative colitis Neg Hx     Zak's disease Neg Hx     Amblyopia Neg Hx     Blindness Neg Hx     Glaucoma Neg Hx     Hypertension Neg Hx     Macular degeneration Neg Hx     Retinal detachment Neg Hx     Strabismus Neg Hx        Social History:   Social History   Substance Use Topics    Smoking status: Former Smoker     Packs/day: 0.30     Years: 52.00     Types: Cigarettes     Quit date: 11/13/2000    Smokeless tobacco: Never Used    Alcohol use No       Allergies:   Review of patient's allergies indicates:   Allergen Reactions    Penicillins Hives and Swelling    Sulfa (sulfonamide antibiotics) Hives    Protonix [pantoprazole] Rash       Home Medications:  Current Outpatient Prescriptions on File Prior to Visit   Medication Sig Dispense Refill    ascorbic acid (VITAMIN C) 60 mg Lozg Take 1 tablet by mouth daily as needed.       aspirin (ECOTRIN) 81 MG EC tablet Take 81 mg by mouth once daily.      blood sugar diagnostic (BLOOD GLUCOSE TEST) Strp 1 strip by Misc.(Non-Drug; Combo Route) route 3 (three)  "times a week. 32 strip 11    cyanocobalamin, vitamin B-12, (VITAMIN B-12) 50 mcg tablet Take 50 mcg by mouth once daily.      doxycycline (VIBRA-TABS) 100 MG tablet Take 1 tablet (100 mg total) by mouth every 12 (twelve) hours. 40 tablet 0    ERGOCALCIFEROL, VITAMIN D2, (VITAMIN D ORAL) Take 1 tablet by mouth once daily.       linaclotide (LINZESS) 145 mcg Cap capsule Take 1 capsule (145 mcg total) by mouth once daily. 30 capsule 5    meloxicam (MOBIC) 7.5 MG tablet Take 1 tablet (7.5 mg total) by mouth once daily. For inflammation 14 tablet 0    mineral oil (FLEET MINERAL OIL) enema Place 1 enema rectally once daily. 2 enema 0    mupirocin (BACTROBAN) 2 % ointment AAA bid 30 g 2    oxycodone-acetaminophen (PERCOCET)  mg per tablet Take 1 tablet by mouth every 4 (four) hours as needed for Pain. 60 tablet 0    timolol maleate 0.5% (TIMOPTIC) 0.5 % Drop Place 1 drop into both eyes 2 (two) times daily. 10 mL 12    urea (CARMOL) 40 % Crea Apply topically 2 (two) times daily. Apply to thickened areas of skin 1 Tube 3    triamcinolone acetonide 0.1% (KENALOG) 0.1 % cream Apply topically 2 (two) times daily as needed. 454 g 1     No current facility-administered medications on file prior to visit.        Physical Exam:  Vital Signs:  BP (!) 140/84   Pulse 60   Ht 6' 4" (1.93 m)   Wt 132.7 kg (292 lb 8.8 oz)   BMI 35.61 kg/m²   Body mass index is 35.61 kg/m².  Physical Exam   Constitutional: He is oriented to person, place, and time and well-developed, well-nourished, and in no distress. No distress.   HENT:   Head: Normocephalic.   Eyes: Conjunctivae are normal. Pupils are equal, round, and reactive to light.   Cardiovascular: Normal rate, regular rhythm and normal heart sounds.    Pulmonary/Chest: Effort normal and breath sounds normal. No respiratory distress.   Abdominal: Soft. Bowel sounds are normal. He exhibits no distension. There is no tenderness.   Neurological: He is alert and oriented to " person, place, and time. No cranial nerve deficit.   Skin: Skin is warm and dry. No rash noted.   Psychiatric: Mood and affect normal.       Labs: Pertinent labs reviewed.    Assessment:  1. Gastroesophageal reflux disease without esophagitis    2. Hoarseness    3. Chronic allergic rhinitis, unspecified seasonality, unspecified trigger        Recommendations:  Discussed with patient and wife that I would like him to take Zantac twice a day. Will follow up in 3 months. If still symptomatic, will plan for repeat EGD. Discussed tips to control reflux.   -     ranitidine (ZANTAC) 150 MG tablet; Take 1 tablet (150 mg total) by mouth 2 (two) times daily.  Dispense: 60 tablet; Refill: 5    Return to Clinic:  Return in about 3 months (around 1/19/2018).    Thank you for the opportunity to participate in the care of this patient.  JAMILA Cisneros

## 2017-10-19 NOTE — PATIENT INSTRUCTIONS
Tips to Control Acid Reflux    To control acid reflux, youll need to make some basic diet and lifestyle changes. The simple steps outlined below may be all youll need to ease discomfort.  Watch what you eat  · Avoid fatty foods and spicy foods.  · Eat fewer acidic foods, such as citrus and tomato-based foods. These can increase symptoms.  · Limit drinking alcohol, caffeine, and fizzy beverages. All increase acid reflux.  · Try limiting chocolate, peppermint, and spearmint. These can worsen acid reflux in some people.  Watch when you eat  · Avoid lying down for 3 hours after eating.  · Do not snack before going to bed.  Raise your head  Raising your head and upper body by 4 to 6 inches helps limit reflux when youre lying down. Put blocks under the head of your bed frame to raise it.  Other changes  · Lose weight, if you need to  · Dont exercise near bedtime  · Avoid tight-fitting clothes  · Limit aspirin and ibuprofen  · Stop smoking   Date Last Reviewed: 7/1/2016  © 6252-0132 [employer]. [address] [place] [address], [place] [address]. All rights reserved. This information is not intended as a substitute for professional medical care. Always follow your healthcare professional's instructions.

## 2017-11-09 ENCOUNTER — LAB VISIT (OUTPATIENT)
Dept: LAB | Facility: HOSPITAL | Age: 82
End: 2017-11-09
Payer: MEDICARE

## 2017-11-09 ENCOUNTER — OFFICE VISIT (OUTPATIENT)
Dept: FAMILY MEDICINE | Facility: CLINIC | Age: 82
End: 2017-11-09
Payer: MEDICARE

## 2017-11-09 VITALS
OXYGEN SATURATION: 96 % | RESPIRATION RATE: 16 BRPM | WEIGHT: 289.88 LBS | HEART RATE: 69 BPM | BODY MASS INDEX: 35.3 KG/M2 | SYSTOLIC BLOOD PRESSURE: 118 MMHG | DIASTOLIC BLOOD PRESSURE: 78 MMHG | TEMPERATURE: 96 F | HEIGHT: 76 IN

## 2017-11-09 DIAGNOSIS — R49.0 HOARSENESS: ICD-10-CM

## 2017-11-09 DIAGNOSIS — E78.5 DYSLIPIDEMIA: ICD-10-CM

## 2017-11-09 DIAGNOSIS — R73.03 PREDIABETES: Primary | ICD-10-CM

## 2017-11-09 DIAGNOSIS — Z12.5 ENCOUNTER FOR PROSTATE CANCER SCREENING: ICD-10-CM

## 2017-11-09 DIAGNOSIS — G56.02 LEFT CARPAL TUNNEL SYNDROME: ICD-10-CM

## 2017-11-09 DIAGNOSIS — D64.9 ANEMIA, UNSPECIFIED TYPE: ICD-10-CM

## 2017-11-09 DIAGNOSIS — K59.09 CHRONIC CONSTIPATION: ICD-10-CM

## 2017-11-09 DIAGNOSIS — Z90.79 H/O PROSTATECTOMY: Chronic | ICD-10-CM

## 2017-11-09 DIAGNOSIS — R73.03 PREDIABETES: ICD-10-CM

## 2017-11-09 LAB
ALBUMIN SERPL BCP-MCNC: 3.5 G/DL
ALP SERPL-CCNC: 76 U/L
ALT SERPL W/O P-5'-P-CCNC: 20 U/L
ANION GAP SERPL CALC-SCNC: 8 MMOL/L
AST SERPL-CCNC: 20 U/L
BASOPHILS # BLD AUTO: 0.05 K/UL
BASOPHILS NFR BLD: 1 %
BILIRUB SERPL-MCNC: 0.7 MG/DL
BUN SERPL-MCNC: 23 MG/DL
CALCIUM SERPL-MCNC: 9.3 MG/DL
CHLORIDE SERPL-SCNC: 107 MMOL/L
CHOLEST SERPL-MCNC: 158 MG/DL
CHOLEST/HDLC SERPL: 3.3 {RATIO}
CO2 SERPL-SCNC: 25 MMOL/L
COMPLEXED PSA SERPL-MCNC: <0.01 NG/ML
CREAT SERPL-MCNC: 1.4 MG/DL
DIFFERENTIAL METHOD: ABNORMAL
EOSINOPHIL # BLD AUTO: 0.2 K/UL
EOSINOPHIL NFR BLD: 3 %
ERYTHROCYTE [DISTWIDTH] IN BLOOD BY AUTOMATED COUNT: 14.4 %
ERYTHROCYTE [SEDIMENTATION RATE] IN BLOOD BY WESTERGREN METHOD: 51 MM/HR
EST. GFR  (AFRICAN AMERICAN): 54.9 ML/MIN/1.73 M^2
EST. GFR  (NON AFRICAN AMERICAN): 47.4 ML/MIN/1.73 M^2
ESTIMATED AVG GLUCOSE: 103 MG/DL
GLUCOSE SERPL-MCNC: 131 MG/DL
HBA1C MFR BLD HPLC: 5.2 %
HCT VFR BLD AUTO: 37.6 %
HDLC SERPL-MCNC: 48 MG/DL
HDLC SERPL: 30.4 %
HGB BLD-MCNC: 12.1 G/DL
IMM GRANULOCYTES # BLD AUTO: 0.01 K/UL
IMM GRANULOCYTES NFR BLD AUTO: 0.2 %
LDLC SERPL CALC-MCNC: 86.4 MG/DL
LYMPHOCYTES # BLD AUTO: 1.6 K/UL
LYMPHOCYTES NFR BLD: 31.7 %
MCH RBC QN AUTO: 32.4 PG
MCHC RBC AUTO-ENTMCNC: 32.2 G/DL
MCV RBC AUTO: 101 FL
MONOCYTES # BLD AUTO: 0.7 K/UL
MONOCYTES NFR BLD: 13 %
NEUTROPHILS # BLD AUTO: 2.6 K/UL
NEUTROPHILS NFR BLD: 51.1 %
NONHDLC SERPL-MCNC: 110 MG/DL
NRBC BLD-RTO: 0 /100 WBC
PLATELET # BLD AUTO: 81 K/UL
PMV BLD AUTO: 13 FL
POTASSIUM SERPL-SCNC: 4.3 MMOL/L
PROT SERPL-MCNC: 8.1 G/DL
RBC # BLD AUTO: 3.74 M/UL
SODIUM SERPL-SCNC: 140 MMOL/L
TRIGL SERPL-MCNC: 118 MG/DL
WBC # BLD AUTO: 5.01 K/UL

## 2017-11-09 PROCEDURE — 84153 ASSAY OF PSA TOTAL: CPT

## 2017-11-09 PROCEDURE — 80061 LIPID PANEL: CPT

## 2017-11-09 PROCEDURE — 85025 COMPLETE CBC W/AUTO DIFF WBC: CPT

## 2017-11-09 PROCEDURE — 99215 OFFICE O/P EST HI 40 MIN: CPT | Mod: S$PBB,,, | Performed by: INTERNAL MEDICINE

## 2017-11-09 PROCEDURE — 85651 RBC SED RATE NONAUTOMATED: CPT

## 2017-11-09 PROCEDURE — 36415 COLL VENOUS BLD VENIPUNCTURE: CPT | Mod: PO

## 2017-11-09 PROCEDURE — 83036 HEMOGLOBIN GLYCOSYLATED A1C: CPT

## 2017-11-09 PROCEDURE — 83525 ASSAY OF INSULIN: CPT

## 2017-11-09 PROCEDURE — 80053 COMPREHEN METABOLIC PANEL: CPT

## 2017-11-09 PROCEDURE — 99999 PR PBB SHADOW E&M-EST. PATIENT-LVL V: CPT | Mod: PBBFAC,,, | Performed by: INTERNAL MEDICINE

## 2017-11-09 PROCEDURE — 99215 OFFICE O/P EST HI 40 MIN: CPT | Mod: PBBFAC,PO | Performed by: INTERNAL MEDICINE

## 2017-11-09 NOTE — PROGRESS NOTES
Subjective:       Patient ID: Dominic Cohen is a 81 y.o. male.    Chief Complaint: Hyperglycemia; Anemia; Constipation; and Insulin Resistance    Anemia   Presents for follow-up visit. There has been no abdominal pain, bruising/bleeding easily, confusion, fever, light-headedness, pallor or palpitations.   Constipation   Pertinent negatives include no abdominal pain, back pain, diarrhea, difficulty urinating, fever, nausea or vomiting.     Review of Systems   Constitutional: Negative for activity change, appetite change, chills, diaphoresis, fatigue, fever and unexpected weight change.   HENT: Negative for drooling, ear discharge, ear pain, facial swelling, hearing loss, mouth sores, nosebleeds, postnasal drip, rhinorrhea, sinus pressure, sneezing, sore throat, tinnitus, trouble swallowing and voice change.    Eyes: Negative for photophobia, redness and visual disturbance.   Respiratory: Negative for apnea, cough, choking, chest tightness, shortness of breath and wheezing.    Cardiovascular: Negative for chest pain, palpitations and leg swelling.   Gastrointestinal: Positive for constipation. Negative for abdominal distention, abdominal pain, anal bleeding, blood in stool, diarrhea, nausea and vomiting.   Endocrine: Negative for cold intolerance, heat intolerance, polydipsia, polyphagia and polyuria.   Genitourinary: Negative for difficulty urinating, dysuria, enuresis, flank pain, frequency, genital sores, hematuria and urgency.   Musculoskeletal: Negative for arthralgias, back pain, gait problem, joint swelling, myalgias, neck pain and neck stiffness.   Skin: Negative for color change, pallor, rash and wound.   Allergic/Immunologic: Negative for food allergies and immunocompromised state.   Neurological: Negative for dizziness, tremors, seizures, syncope, facial asymmetry, speech difficulty, weakness, light-headedness, numbness and headaches.   Hematological: Negative for adenopathy. Does not bruise/bleed  easily.   Psychiatric/Behavioral: Negative for agitation, behavioral problems, confusion, decreased concentration, dysphoric mood, hallucinations, self-injury, sleep disturbance and suicidal ideas. The patient is not nervous/anxious and is not hyperactive.        Objective:      Physical Exam   Constitutional: He is oriented to person, place, and time. He appears well-developed and well-nourished. No distress.   HENT:   Head: Normocephalic and atraumatic.   Eyes: Pupils are equal, round, and reactive to light.   Neck: Normal range of motion. Neck supple. No JVD present. Carotid bruit is not present. No tracheal deviation present. No thyromegaly present.   Cardiovascular: Normal rate, regular rhythm, normal heart sounds and intact distal pulses.    Pulmonary/Chest: Effort normal and breath sounds normal. No respiratory distress. He has no wheezes. He has no rales. He exhibits no tenderness.   Abdominal: Soft. Bowel sounds are normal. He exhibits no distension. There is no tenderness. There is no rebound and no guarding.   Musculoskeletal: Normal range of motion. He exhibits no edema or tenderness.   Lymphadenopathy:     He has no cervical adenopathy.   Neurological: He is alert and oriented to person, place, and time.   Skin: Skin is warm and dry. No rash noted. He is not diaphoretic. No erythema. No pallor.   Psychiatric: He has a normal mood and affect. His behavior is normal. Judgment and thought content normal.   Nursing note and vitals reviewed.      Assessment:       1. Prediabetes    2. Left carpal tunnel syndrome    3. Hoarseness    4. H/O prostatectomy    5. Dyslipidemia    6. Chronic constipation    7. Anemia, unspecified type    8. Encounter for prostate cancer screening        Plan:        stable-------------notes/labs reviewed.              Check cbc,cmp,esr,lipids,hga1c,insulin,psa.          F/u prn or 3 months.

## 2017-11-10 ENCOUNTER — TELEPHONE (OUTPATIENT)
Dept: FAMILY MEDICINE | Facility: CLINIC | Age: 82
End: 2017-11-10

## 2017-11-10 DIAGNOSIS — D69.6 THROMBOCYTOPENIA: Primary | ICD-10-CM

## 2017-11-10 LAB
INSULIN COLLECTION INTERVAL: ABNORMAL
INSULIN SERPL-ACNC: 51.6 UU/ML

## 2017-11-21 ENCOUNTER — INITIAL CONSULT (OUTPATIENT)
Dept: HEMATOLOGY/ONCOLOGY | Facility: CLINIC | Age: 82
End: 2017-11-21
Payer: MEDICARE

## 2017-11-21 ENCOUNTER — LAB VISIT (OUTPATIENT)
Dept: LAB | Facility: HOSPITAL | Age: 82
End: 2017-11-21
Attending: NURSE PRACTITIONER
Payer: MEDICARE

## 2017-11-21 VITALS
OXYGEN SATURATION: 97 % | RESPIRATION RATE: 18 BRPM | BODY MASS INDEX: 35.54 KG/M2 | WEIGHT: 291.88 LBS | DIASTOLIC BLOOD PRESSURE: 78 MMHG | HEART RATE: 61 BPM | SYSTOLIC BLOOD PRESSURE: 138 MMHG | HEIGHT: 76 IN

## 2017-11-21 DIAGNOSIS — D53.9 MACROCYTIC ANEMIA: ICD-10-CM

## 2017-11-21 DIAGNOSIS — D69.6 THROMBOCYTOPENIA: ICD-10-CM

## 2017-11-21 DIAGNOSIS — R53.83 FATIGUE, UNSPECIFIED TYPE: ICD-10-CM

## 2017-11-21 DIAGNOSIS — D69.6 THROMBOCYTOPENIA: Primary | ICD-10-CM

## 2017-11-21 LAB
FERRITIN SERPL-MCNC: 226 NG/ML
FOLATE SERPL-MCNC: 5.9 NG/ML
IRON SERPL-MCNC: 64 UG/DL
MPV, BLUE TOP: 10.7 FL
PLATELET, BLUE TOP: 176 K/UL
SATURATED IRON: 21 %
TOTAL IRON BINDING CAPACITY: 311 UG/DL
TRANSFERRIN SERPL-MCNC: 210 MG/DL
TSH SERPL DL<=0.005 MIU/L-ACNC: 0.59 UIU/ML
VIT B12 SERPL-MCNC: 526 PG/ML

## 2017-11-21 PROCEDURE — 84165 PROTEIN E-PHORESIS SERUM: CPT

## 2017-11-21 PROCEDURE — 87340 HEPATITIS B SURFACE AG IA: CPT

## 2017-11-21 PROCEDURE — 99214 OFFICE O/P EST MOD 30 MIN: CPT | Mod: PBBFAC,PO,25 | Performed by: NURSE PRACTITIONER

## 2017-11-21 PROCEDURE — 82746 ASSAY OF FOLIC ACID SERUM: CPT

## 2017-11-21 PROCEDURE — 99999 PR PBB SHADOW E&M-EST. PATIENT-LVL IV: CPT | Mod: PBBFAC,,, | Performed by: NURSE PRACTITIONER

## 2017-11-21 PROCEDURE — 86334 IMMUNOFIX E-PHORESIS SERUM: CPT | Mod: 26,,, | Performed by: PATHOLOGY

## 2017-11-21 PROCEDURE — 82607 VITAMIN B-12: CPT

## 2017-11-21 PROCEDURE — 36415 COLL VENOUS BLD VENIPUNCTURE: CPT | Mod: PO

## 2017-11-21 PROCEDURE — 85049 AUTOMATED PLATELET COUNT: CPT

## 2017-11-21 PROCEDURE — 83540 ASSAY OF IRON: CPT

## 2017-11-21 PROCEDURE — 99215 OFFICE O/P EST HI 40 MIN: CPT | Mod: S$PBB,,, | Performed by: NURSE PRACTITIONER

## 2017-11-21 PROCEDURE — 86334 IMMUNOFIX E-PHORESIS SERUM: CPT

## 2017-11-21 PROCEDURE — 84443 ASSAY THYROID STIM HORMONE: CPT

## 2017-11-21 PROCEDURE — 86803 HEPATITIS C AB TEST: CPT

## 2017-11-21 PROCEDURE — G0008 ADMIN INFLUENZA VIRUS VAC: HCPCS | Mod: PBBFAC,PO

## 2017-11-21 PROCEDURE — 82728 ASSAY OF FERRITIN: CPT

## 2017-11-21 PROCEDURE — 84165 PROTEIN E-PHORESIS SERUM: CPT | Mod: 26,,, | Performed by: PATHOLOGY

## 2017-11-21 RX ORDER — TRIAMCINOLONE ACETONIDE 1 MG/G
CREAM TOPICAL 2 TIMES DAILY
COMMUNITY
End: 2018-06-07 | Stop reason: ALTCHOICE

## 2017-11-21 NOTE — LETTER
November 21, 2017      Sudhakar Liu MD  [address]  [place] [address]           [facility]  [address]  [place] [address]  Phone: [phone]  Fax: [phone]          Patient: Dominic Cohen   MR Number: [id number]   YOB: 1935   Date of Visit: 11/21/2017       Dear Dr. Sudhakar Liu:    Thank you for referring Dominic Cohen to me for evaluation. Attached you will find relevant portions of my assessment and plan of care.    If you have questions, please do not hesitate to call me. I look forward to following Dominic Cohen along with you.    Sincerely,    Sarah Schaeffer NP    Enclosure  CC:  No Recipients    If you would like to receive this communication electronically, please contact [email or web address] or [phone] to request more information on [facility] Link access.    For providers and/or their staff who would like to refer a patient to [facility], please contact us through our one-stop-shop provider referral line, [facility] , at [phone].    If you feel you have received this communication in error or would no longer like to receive these types of communications, please e-mail [email or web address]

## 2017-11-21 NOTE — PROGRESS NOTES
Subjective:       Patient ID: Dominic Cohen is a 81 y.o. male.    Chief Complaint: Abnormal Labs (thrombocytopenia)    HPI Patient presents for the evaluation of an abnormal platelet count. Referred by his PCP Dr. Ender Liu.    Reviewed epic records dated back to 2/2/11.     Ultrasound of the abdomen revealed normal appearing spleen.     Patient has had intermittent slight drops in his hgb and mild drops in his platelet count. 2016 pt had one drop to 145 in his platelets. The drops this year in his platelets occurred after his knee replacement re-do surgery that occurred 6/27/17.   Patient relates having to have 2 units of blood after his surgery in June.    States that since his surgery he has been a bit more fatigued than usual. Still remains active- stays awake until late at night and then has to nap a little in the day.    Denies any bleeding or unusual bruising. No gingival bleeding, no nose bleeds, no hematuria, no hematochezia- has occasional blood with passing a hard stool on toilet paper only. No night sweats or fevers. Has lost about 20 # since his knee surgery. Watching what he eats as well. Appetite is good.     FH: no cancers    NSAID use- aspirin daily.    No gastric or GI surgeries in the past    No alcohol use    Last blood donation 2013    Over the counter supplements- magnesium, multivitamin and calcium      Review of Systems   Constitutional: Positive for appetite change and unexpected weight change.   Eyes: Positive for visual disturbance.   Respiratory: Negative for cough and shortness of breath.    Cardiovascular: Negative for chest pain.   Gastrointestinal: Negative for abdominal pain and diarrhea.   Genitourinary: Positive for frequency.   Musculoskeletal: Positive for back pain.   Skin: Negative for rash.   Neurological: Negative for headaches.   Hematological: Negative for adenopathy.   Psychiatric/Behavioral: The patient is not nervous/anxious.       Objective:      Physical  Exam   Constitutional: He is oriented to person, place, and time. He appears well-developed and well-nourished. No distress.   HENT:   Head: Normocephalic and atraumatic.   Right Ear: External ear normal.   Left Ear: External ear normal.   Nose: Nose normal.   Mouth/Throat: Oropharynx is clear and moist.   Eyes: Conjunctivae and EOM are normal. Pupils are equal, round, and reactive to light. Right eye exhibits no discharge. Left eye exhibits no discharge. No scleral icterus.   Neck: Normal range of motion. Neck supple. No thyromegaly present.   Cardiovascular: Normal rate, regular rhythm and normal heart sounds.  Exam reveals no gallop and no friction rub.    No murmur heard.  Pulmonary/Chest: Effort normal and breath sounds normal. No respiratory distress. He has no wheezes. He has no rales.   Abdominal: Soft. Bowel sounds are normal. He exhibits no distension and no mass. There is no tenderness. There is no rebound and no guarding.   Musculoskeletal: Normal range of motion. He exhibits edema.   Lymphadenopathy:     He has no cervical adenopathy.   Neurological: He is alert and oriented to person, place, and time. He has normal strength. Gait normal.   Skin: Skin is warm and dry. No lesion and no rash noted.   Psychiatric: He has a normal mood and affect. His behavior is normal. Judgment and thought content normal.      Assessment:       1. Thrombocytopenia    2. Fatigue, unspecified type    3. Macrocytic anemia        Plan:   1.     Explained lab results to pt and function of platelets.  2.     Discussed potential causes for decreased platelets and slight decrease in hgb  3.     Will obtain platelet count in blue top tube, tsh, iron studies, b12, folate, spe, hep B surface antigen and Hep C antibody. If labs unrevealing will either follow with another cbc in  A few weeks to see if dropping. If continuing to decrease would recommend bone marrow biopsy to rule out MDS or MGUS which was explained in detail. Pt  verbalized understanding. Will do labs today and will call pr with results and recommendations for follow-up. [phone].  4.     Pt can continue taking his oral b12 and iron.   5.    Reviewed symptoms of low platelets to watch for and report- pt verbalized understanding-     One hour was spent with pt and wife reviewing/updating his history, labs, imaging, examination and discussing his results and recommendations for workup and potential outcomes and solutions.

## 2017-11-22 LAB
ALBUMIN SERPL ELPH-MCNC: 4.05 G/DL
ALPHA1 GLOB SERPL ELPH-MCNC: 0.29 G/DL
ALPHA2 GLOB SERPL ELPH-MCNC: 0.82 G/DL
B-GLOBULIN SERPL ELPH-MCNC: 0.72 G/DL
GAMMA GLOB SERPL ELPH-MCNC: 1.82 G/DL
HBV SURFACE AG SERPL QL IA: NEGATIVE
HCV AB SERPL QL IA: NEGATIVE
PATHOLOGIST INTERPRETATION SPE: NORMAL
PROT SERPL-MCNC: 7.7 G/DL

## 2017-11-24 LAB
INTERPRETATION SERPL IFE-IMP: NORMAL
PATHOLOGIST INTERPRETATION IFE: NORMAL

## 2017-11-28 DIAGNOSIS — D69.6 THROMBOCYTOPENIA: Primary | ICD-10-CM

## 2017-11-28 DIAGNOSIS — D53.9 MACROCYTIC ANEMIA: Primary | ICD-10-CM

## 2017-11-28 DIAGNOSIS — D69.6 THROMBOCYTOPENIA: ICD-10-CM

## 2017-11-28 DIAGNOSIS — R77.8 ABNORMAL SERUM PROTEIN ELECTROPHORESIS: ICD-10-CM

## 2017-11-28 NOTE — PROGRESS NOTES
Notified pt of test results- all wnl- reviewed spe results and all labs with Dr. Thomas- he recommends 3 mon f/u to follow his anemia- platelet count was normal in blue top- indicates clumping. Explained results to pt- recommend 3 mon f/u labs and see me one week later for results. Pt verbalized understanding- continue to take b12 and iron.

## 2018-01-05 DIAGNOSIS — M25.562 ACUTE PAIN OF LEFT KNEE: Primary | ICD-10-CM

## 2018-01-11 ENCOUNTER — OFFICE VISIT (OUTPATIENT)
Dept: ORTHOPEDICS | Facility: CLINIC | Age: 83
End: 2018-01-11
Payer: MEDICARE

## 2018-01-11 ENCOUNTER — HOSPITAL ENCOUNTER (OUTPATIENT)
Dept: RADIOLOGY | Facility: HOSPITAL | Age: 83
Discharge: HOME | End: 2018-01-11
Attending: ORTHOPAEDIC SURGERY
Payer: MEDICARE

## 2018-01-11 VITALS
HEART RATE: 69 BPM | SYSTOLIC BLOOD PRESSURE: 151 MMHG | DIASTOLIC BLOOD PRESSURE: 80 MMHG | WEIGHT: 291.88 LBS | HEIGHT: 76 IN | BODY MASS INDEX: 35.54 KG/M2

## 2018-01-11 DIAGNOSIS — M25.562 ACUTE PAIN OF LEFT KNEE: ICD-10-CM

## 2018-01-11 DIAGNOSIS — M65.30 TRIGGER FINGER OF LEFT HAND, UNSPECIFIED FINGER: Primary | ICD-10-CM

## 2018-01-11 PROCEDURE — 20550 NJX 1 TENDON SHEATH/LIGAMENT: CPT | Mod: PBBFAC,PO | Performed by: ORTHOPAEDIC SURGERY

## 2018-01-11 PROCEDURE — 99999 PR PBB SHADOW E&M-EST. PATIENT-LVL III: CPT | Mod: PBBFAC,,, | Performed by: ORTHOPAEDIC SURGERY

## 2018-01-11 PROCEDURE — 73562 X-RAY EXAM OF KNEE 3: CPT | Mod: 26,LT,, | Performed by: RADIOLOGY

## 2018-01-11 PROCEDURE — 73560 X-RAY EXAM OF KNEE 1 OR 2: CPT | Mod: 26,59,RT, | Performed by: RADIOLOGY

## 2018-01-11 PROCEDURE — 20550 NJX 1 TENDON SHEATH/LIGAMENT: CPT | Mod: F1,S$PBB,, | Performed by: ORTHOPAEDIC SURGERY

## 2018-01-11 PROCEDURE — 73560 X-RAY EXAM OF KNEE 1 OR 2: CPT | Mod: TC,FY,PO,RT

## 2018-01-11 PROCEDURE — 99214 OFFICE O/P EST MOD 30 MIN: CPT | Mod: 25,S$PBB,, | Performed by: ORTHOPAEDIC SURGERY

## 2018-01-11 PROCEDURE — 99213 OFFICE O/P EST LOW 20 MIN: CPT | Mod: PBBFAC,25,PO | Performed by: ORTHOPAEDIC SURGERY

## 2018-01-11 NOTE — PATIENT INSTRUCTIONS
Treating Trigger Finger     The tendon sheath is opened to release the tendon. Once the tendon can move freely again, the finger can bend and straighten more normally.     Trigger finger occurs when the tissue inside your finger or thumb becomes inflamed. Mild cases can be treated without surgery. If the problem is severe, surgery may be needed. Your doctor will discuss your options with you.  Nonsurgical treatment  For mild symptoms, your doctor may have you rest the finger or thumb. You may also be told to take anti-inflammatory medicines. These include ibuprofen or aspirin. You may be given an injection of medicine in the base of the finger or thumb. This typically is a steroid, such as cortisone.  Surgery  If nonsurgical treatments dont ease your symptoms, surgery may be recommended. A tendon is a cordlike fiber that attaches muscle to bone and allows joints to bend. The tendon is surrounded by a protective cover called a sheath. During surgery, the sheath in your finger or thumb is opened to enlarge the space and release the swollen tendon. This allows the finger or thumb to bend and straighten normally. Surgery takes about 20 minutes. It can often be done using a local anesthetic. You may be able to go home the same day. Your hand will be wrapped in a soft bandage. You may need to wear a plaster splint for a short time to keep the finger or thumb still as it heals. The stitches will be removed in about 2 weeks. Your doctor can discuss the risks and benefits of surgery with you.  Date Last Reviewed: 9/21/2015 © 2000-2017 [employer]. [address] [place] [address], [place] [address]. All rights reserved. This information is not intended as a substitute for professional medical care. Always follow your healthcare professional's instructions.

## 2018-01-11 NOTE — PROGRESS NOTES
SUBJECTIVE:  Patient is status post Left total knee revision.  The patient complains of increase redness and warmth of the left knee.  Also he has noticed leakage at the surgical site.  Patient complains of 0/10 pain.  The patient denies any groin pain chest pain, shortness of breath, dizziness or headaches.    Date of surgery: 6/27/2017-left knee total revision    History of present illness             Past Medical History:   Diagnosis Date    Arthritis      Atherosclerosis of abdominal aorta      Cataract      Chronic constipation      Glaucoma      History of anal fissures      Hypertension      Polyneuropathy in other diseases classified elsewhere       due to folate deficiency    Prediabetes      Venous insufficiency      Venous insufficiency                  Past Surgical History:   Procedure Laterality Date    BACK SURGERY        CATARACT EXTRACTION BILATERAL W/ ANTERIOR VITRECTOMY        COLONOSCOPY N/A 5/13/2016     Procedure: COLONOSCOPY;  Surgeon: Presley Phillips MD;  Location: [address]);  Service: Endoscopy;  Laterality: N/A;  EGD with Dr. Jeffery prior to Colonoscopy. EC    KNEE SURGERY Left      PROSTATE SURGERY        TONSILLECTOMY, ADENOIDECTOMY                  Review of patient's allergies indicates:   Allergen Reactions    Penicillins Hives and Swelling    Sulfa (sulfonamide antibiotics) Hives    Protonix [pantoprazole] Rash      No current facility-administered medications on file prior to encounter.                   Current Outpatient Prescriptions on File Prior to Encounter   Medication Sig Dispense Refill    ascorbic acid (VITAMIN C) 60 mg Lozg Take 1 tablet by mouth daily as needed.         cyanocobalamin, vitamin B-12, (VITAMIN B-12) 50 mcg tablet Take 50 mcg by mouth once daily.        ERGOCALCIFEROL, VITAMIN D2, (VITAMIN D ORAL) Take 1 tablet by mouth once daily.         linaclotide (LINZESS) 145 mcg Cap capsule Take 1 capsule (145 mcg total) by mouth  once daily. 30 capsule 5    mineral oil (FLEET MINERAL OIL) enema Place 1 enema rectally once daily. 2 enema 0    ramipril (ALTACE) 2.5 MG capsule Take 1 capsule (2.5 mg total) by mouth once daily. (Patient taking differently: Take 1.25 mg by mouth once daily. ) 90 capsule 3    timolol maleate 0.5% (TIMOPTIC) 0.5 % Drop Place 1 drop into both eyes 2 (two) times daily. 10 mL 12    blood sugar diagnostic (BLOOD GLUCOSE TEST) Strp 1 strip by Misc.(Non-Drug; Combo Route) route 3 (three) times a week. 32 strip 11          ROS:  GENERAL: No fever, chills, fatigability or weight loss.  SKIN: No rashes, itching or changes in color or texture of skin.  HEAD: No headaches or recent head trauma.  EYES: Visual acuity fine. No photophobia, ocular pain or diplopia.  EARS: Denies ear pain, discharge or vertigo.  NOSE: No loss of smell, no epistaxis or postnasal drip.  MOUTH & THROAT: No hoarseness or change in voice. No excessive gum bleeding.  NODES: Denies swollen glands.  CHEST: Denies ADAMS, cyanosis, wheezing, cough and sputum production.  CARDIOVASCULAR: Denies chest pain, PND, orthopnea or reduced exercise tolerance.  ABDOMEN: Appetite fine. No weight loss. Denies diarrhea, abdominal pain, hematemesis or blood in stool.  URINARY: No flank pain, dysuria or hematuria.  PERIPHERAL VASCULAR: No claudication or cyanosis.  NEUROLOGIC: No history of seizures, paralysis, alteration of gait or coordination.  MUSCULOSKELETAL: See HPI     PE:  APPEARANCE: Well nourished, well developed, in no acute distress.   HEAD: Normocephalic, atraumatic.  EYES: PERRL. EOMI.   EARS: TM's intact. Light reflex normal. No retraction or perforation.   NOSE: Mucosa pink. Airway clear.  MOUTH & THROAT: No tonsillar enlargement. No pharyngeal erythema or exudate. No stridor.  NECK: Supple.   NODES: No cervical, axillary or inguinal lymph node enlargement.  CHEST: Lungs clear to auscultation.  CARDIOVASCULAR: Normal S1, S2. No rubs, murmurs or  gallops.  ABDOMEN: Bowel sounds normal. Not distended. Soft. No tenderness or masses.  NEUROLOGIC: Cranial Nerves: II-XII grossly intact, also see MUSCULOSKELETAL  MUSCULOSKELETAL:          Right / Left wrist/hand-      - Positive- Tinel's over the Median nerve  Positive- Phalen maneuver   Positive- Thenar atrophy   Negative- hypothenar atrophy   Positive- Median Nerve Compression test less than 30 seconds   Negative- Tinel's over Guyon's Canal   Negative- Tinel's over Cubital Tunnel Negative- Tinels over the Median Nerve overlying the antecubital  Fossa   Negative- Tinel's over Radial groove  Negative- Tinel's over sensory branch of Radial nerve at the wrist  Negative- Tinel's over the PIN   Negative- pain in forearm with resistance to             FDP flexion and resisted pronation   Positive- boggy synovium of the wrist   normal- less than 2 seconds capillary all digits  Positive- light touch intact in Median nerve distribution Positive- light touch in the Radial Nerve distribution  Positive- light touch intact in the Ulnar sensory nerve distribution    Positive- Thumb opposition strength symmetrical  Negative- bruit(aneurysm)    Positive- skin color intact(claudication/Raynaud's)  Negative- cold digits(claudication/Raynaud's)  normal - Palomo Test(Vascular Exam)  normal- +2 Radial and Ulnar artery pulses  Negative- anatomical snuff box tenderness(Dequervain's Synovitis)  Negative- finger or thumb triggering(Trigger Thumb or Finger)  Negative- Lipoma  Negative- Ganglion cyst  Decreased range of motion of the MCP joint of the index middle and ring finger.      Sensory exam-C5,C6,C7,C8,T1- normal    Wrist extension for radial nerve- +5/5  Wrist Flexion-+5/5  Wrist Ulnar Deviation-+5/5  Wrist Radial Deviation- +5/5  Resisted opposition of the thumb for the median nerve- +5/5  Digit abduction for the ulnar nerve- +5/5                      Left Knee -  2 plus dorsalis pedis and posterior tibial artery pulses, light  "touch intact Left lower extremity.  All digits are warm.  Mild erythema, mild warmth, scant drainage, mild swelling,mild tenderness.  positive edema left lower extremity Less than 2 seconds capillary refill all digits. Mild ecchymosis The swelling has decreased significantly compared to the last visit.     BP (!) 151/80   Pulse 69   Ht 6' 4" (1.93 m)   Wt 132.4 kg (291 lb 14.2 oz)   BMI 35.53 kg/m²        ASSESSMENT:     The patient is stable and improving.      diagnosis:  1.  Left lower extremity edema   2.  Status post left knee revision limitation  3. Left carpal tunnel syndrome  4.  Left index middle and long trigger finger    PLAN:  Cancel Left carpal tunnel release  Follow-up at 1 October for an injection into the left wrist.  take Eliquis   walker assisted ambulation FWB left lower extremity  Elevate the left lower extremity to 5 cm above the level of the heart    aggressive range of motion Of the left knee.  Continue pain medication  Continue physical therapy  Discontinue antibiotics      Injection:  The patient was placed in the supine position with   the left index middle and ring fingers near   the end of the bed facing me.  The left index  Middle and ring fingers    was placed over a nonsterile pad.The wrist was   prepped, sterily, with Alcohol and Betadine.  A  Refrigerant  And coolant was used to locally anesthetize the skin   over the carpal canal.  A one and   a half inch, 27 gauge needle attached to   A 5 cc synringe was placed into the flexor tendon sheath  Over the index middle and ring finger A1 pulley.   A negative pressure was placed on the needle to   ensure the aspiration was placed into the tendon sheath.  And not into a blood vessel.  1/3 cc of a 1%  Lidocaine   was mixed with 1/3 cc of a 80 mg solution of Depo-Medrol injected  Into each A1 pulley.   The patient tolerated the injection well.  A Bandage   was placed over the injection site.     "

## 2018-01-15 RX ORDER — RAMIPRIL 2.5 MG/1
CAPSULE ORAL
Qty: 90 CAPSULE | Refills: 3 | Status: SHIPPED | OUTPATIENT
Start: 2018-01-15 | End: 2018-11-14

## 2018-02-05 ENCOUNTER — OFFICE VISIT (OUTPATIENT)
Dept: GASTROENTEROLOGY | Facility: CLINIC | Age: 83
End: 2018-02-05
Payer: MEDICARE

## 2018-02-05 ENCOUNTER — HOSPITAL ENCOUNTER (OUTPATIENT)
Dept: RADIOLOGY | Facility: HOSPITAL | Age: 83
Discharge: HOME | End: 2018-02-05
Attending: NURSE PRACTITIONER
Payer: MEDICARE

## 2018-02-05 VITALS
HEIGHT: 76 IN | BODY MASS INDEX: 35.44 KG/M2 | DIASTOLIC BLOOD PRESSURE: 74 MMHG | HEART RATE: 78 BPM | SYSTOLIC BLOOD PRESSURE: 118 MMHG | WEIGHT: 291 LBS

## 2018-02-05 DIAGNOSIS — K59.00 CONSTIPATION, UNSPECIFIED CONSTIPATION TYPE: ICD-10-CM

## 2018-02-05 DIAGNOSIS — D13.5 ADENOMYOMATOSIS OF GALLBLADDER: ICD-10-CM

## 2018-02-05 DIAGNOSIS — K21.9 GASTROESOPHAGEAL REFLUX DISEASE WITHOUT ESOPHAGITIS: Primary | ICD-10-CM

## 2018-02-05 PROCEDURE — 99213 OFFICE O/P EST LOW 20 MIN: CPT | Mod: PBBFAC | Performed by: NURSE PRACTITIONER

## 2018-02-05 PROCEDURE — 74019 RADEX ABDOMEN 2 VIEWS: CPT | Mod: 26,,, | Performed by: RADIOLOGY

## 2018-02-05 PROCEDURE — 1125F AMNT PAIN NOTED PAIN PRSNT: CPT | Mod: ,,, | Performed by: NURSE PRACTITIONER

## 2018-02-05 PROCEDURE — 99999 PR PBB SHADOW E&M-EST. PATIENT-LVL III: CPT | Mod: PBBFAC,,, | Performed by: NURSE PRACTITIONER

## 2018-02-05 PROCEDURE — 99214 OFFICE O/P EST MOD 30 MIN: CPT | Mod: S$PBB,,, | Performed by: NURSE PRACTITIONER

## 2018-02-05 PROCEDURE — 74019 RADEX ABDOMEN 2 VIEWS: CPT | Mod: TC

## 2018-02-05 PROCEDURE — 1159F MED LIST DOCD IN RCRD: CPT | Mod: ,,, | Performed by: NURSE PRACTITIONER

## 2018-02-05 NOTE — PATIENT INSTRUCTIONS
Start Miralax twice a day. Increase dietary fiber and water intake. Zantac (ranitidine) prescription sent to pharmacy. This is to be taken twice a day.

## 2018-02-05 NOTE — PROGRESS NOTES
Clinic Follow Up:  [facility] Follow Up Note    Reason for Follow Up:  The primary encounter diagnosis was Gastroesophageal reflux disease without esophagitis. Diagnoses of Constipation, unspecified constipation type and Adenomyomatosis of gallbladder were also pertinent to this visit.    PCP: Sudhakar Liu       HPI:  This is a 82 y.o. male here for follow up of the above. He presents for his 3 month follow up. He was instructed on numerous occasions to take Zantac twice a day. He has never followed through with taking the medication. He is unable to take PPI secondary to allergy. He continues with hoarseness. This has been present for about 1.5 years. He reports having a chronic post nasal drip and believes that is the cause. He continues with constipation. Was previously on Linzess but reports side effect of diarrhea. Takes OTC laxatives as needed. Gallbladder adenomyomatosis noted on prior ultrasound. Previously recommended repeat in 6 months.     Review of Systems   Constitutional: Negative for activity change and appetite change.        As per interval history above   Respiratory: Negative for cough and shortness of breath.    Cardiovascular: Negative for chest pain.   Gastrointestinal: Positive for constipation. Negative for abdominal distention, abdominal pain, anal bleeding, blood in stool, diarrhea, nausea, rectal pain and vomiting.        Hoarseness    Skin: Negative for color change and rash.       Medical History:  Past Medical History:   Diagnosis Date    Arthritis     Atherosclerosis of abdominal aorta     Cataract     Chronic constipation     Glaucoma     History of anal fissures     Hypertension     Polyneuropathy in other diseases classified elsewhere     due to folate deficiency    Prediabetes     Venous insufficiency     Venous insufficiency        Surgical History:   Past Surgical History:   Procedure Laterality Date    BACK SURGERY      CATARACT  EXTRACTION BILATERAL W/ ANTERIOR VITRECTOMY      COLONOSCOPY N/A 5/13/2016    Procedure: COLONOSCOPY;  Surgeon: Presley Phillips MD;  Location: [facility] ([address] [place] [address]);  Service: Endoscopy;  Laterality: N/A;  EGD with Dr. Jeffery prior to Colonoscopy. EC    JOINT REPLACEMENT Left     x 2    KNEE SURGERY Left     x2. revision 06/27/17    PROSTATE SURGERY      TONSILLECTOMY, ADENOIDECTOMY         Family History:   Family History   Problem Relation Age of Onset    No Known Problems Son     No Known Problems Daughter     No Known Problems Son     Diabetes Neg Hx     Heart disease Neg Hx     Cancer Neg Hx     Celiac disease Neg Hx     Cirrhosis Neg Hx     Colon cancer Neg Hx     Colon polyps Neg Hx     Crohn's disease Neg Hx     Cystic fibrosis Neg Hx     Esophageal cancer Neg Hx     Hemochromatosis Neg Hx     Inflammatory bowel disease Neg Hx     Irritable bowel syndrome Neg Hx     Liver cancer Neg Hx     Liver disease Neg Hx     Rectal cancer Neg Hx     Stomach cancer Neg Hx     Ulcerative colitis Neg Hx     Zak's disease Neg Hx     Amblyopia Neg Hx     Blindness Neg Hx     Glaucoma Neg Hx     Hypertension Neg Hx     Macular degeneration Neg Hx     Retinal detachment Neg Hx     Strabismus Neg Hx        Social History:   Social History   Substance Use Topics    Smoking status: Former Smoker     Packs/day: 0.30     Years: 52.00     Types: Cigarettes     Quit date: 11/13/2000    Smokeless tobacco: Never Used    Alcohol use No       Allergies:   Review of patient's allergies indicates:   Allergen Reactions    Penicillins Hives and Swelling    Sulfa (sulfonamide antibiotics) Hives    Protonix [pantoprazole] Rash       Home Medications:  Current Outpatient Prescriptions on File Prior to Visit   Medication Sig Dispense Refill    ascorbic acid (VITAMIN C) 60 mg Lozg Take 1 tablet by mouth daily as needed.       aspirin (ECOTRIN) 81 MG EC tablet Take 81 mg by mouth once daily.       "blood sugar diagnostic (BLOOD GLUCOSE TEST) Strp 1 strip by Misc.(Non-Drug; Combo Route) route 3 (three) times a week. 32 strip 11    cyanocobalamin, vitamin B-12, (VITAMIN B-12) 50 mcg tablet Take 50 mcg by mouth once daily.      ERGOCALCIFEROL, VITAMIN D2, (VITAMIN D ORAL) Take 1 tablet by mouth once daily.       linaclotide (LINZESS) 145 mcg Cap capsule Take 1 capsule (145 mcg total) by mouth once daily. (Patient taking differently: Take 145 mcg by mouth as needed. ) 30 capsule 5    mineral oil (FLEET MINERAL OIL) enema Place 1 enema rectally once daily. 2 enema 0    ramipril (ALTACE) 2.5 MG capsule TAKE 1 CAPSULE BY MOUTH ONCE A DAY 90 capsule 3    timolol maleate 0.5% (TIMOPTIC) 0.5 % Drop Place 1 drop into both eyes 2 (two) times daily. 10 mL 12    triamcinolone acetonide 0.1% (KENALOG) 0.1 % cream Apply topically 2 (two) times daily.      urea (CARMOL) 40 % Crea Apply topically 2 (two) times daily. Apply to thickened areas of skin 1 Tube 3     No current facility-administered medications on file prior to visit.        Physical Exam:  Vital Signs:  /74   Pulse 78   Ht 6' 4" (1.93 m)   Wt 132 kg (291 lb 0.1 oz)   BMI 35.42 kg/m²   Body mass index is 35.42 kg/m².  Physical Exam   Constitutional: He is oriented to person, place, and time and well-developed, well-nourished, and in no distress. No distress.   HENT:   Head: Normocephalic.   Eyes: Conjunctivae are normal. Pupils are equal, round, and reactive to light.   Cardiovascular: Normal rate, regular rhythm and normal heart sounds.    Pulmonary/Chest: Effort normal and breath sounds normal. No respiratory distress.   Abdominal: Soft. Bowel sounds are normal. He exhibits no distension. There is no tenderness.   Neurological: He is alert and oriented to person, place, and time. No cranial nerve deficit.   Skin: Skin is warm and dry. No rash noted.   Psychiatric: Mood and affect normal.       Labs: Pertinent labs reviewed.    Assessment:  1. " Gastroesophageal reflux disease without esophagitis    2. Constipation, unspecified constipation type    3. Adenomyomatosis of gallbladder        Recommendations:  Gastroesophageal reflux disease without esophagitis  - non-compliant  - was instructed on multiple occasions to start Zantac twice a day to see if hoarseness improves   - discussed this again in detail with patient and wife and stressed the importance of medication compliance.   - they both agree to start Zantac. Sent as prescription  - follow up in 3 months and if no improvement, recommend EGD  -     ranitidine (ZANTAC) 150 MG tablet; Take 1 tablet (150 mg total) by mouth 2 (two) times daily.  Dispense: 60 tablet; Refill: 5    Constipation, unspecified constipation type  - get xray to assess extent of constipation.   - unable to tolerate Linzess. May try Miralax twice a day or Amitiza, depending on xray  - recommendations to follow.   -     X-Ray Abdomen Flat And Erect; Future; Expected date: 02/05/2018    Return to Clinic:  Follow-up in about 3 months (around 5/5/2018).    Thank you for the opportunity to participate in the care of this patient.  JAMILA Cisneros

## 2018-02-06 ENCOUNTER — TELEPHONE (OUTPATIENT)
Dept: GASTROENTEROLOGY | Facility: CLINIC | Age: 83
End: 2018-02-06

## 2018-02-06 NOTE — TELEPHONE ENCOUNTER
----- Message from Cassidy Klein sent at 2/6/2018 10:04 AM CST -----  Contact: spouse   Returning call regarding patient. Please call back at [phone].      Thanks,  Cassidy Klein

## 2018-02-09 ENCOUNTER — LAB VISIT (OUTPATIENT)
Dept: LAB | Facility: HOSPITAL | Age: 83
End: 2018-02-09
Payer: MEDICARE

## 2018-02-09 ENCOUNTER — OFFICE VISIT (OUTPATIENT)
Dept: FAMILY MEDICINE | Facility: CLINIC | Age: 83
End: 2018-02-09
Payer: MEDICARE

## 2018-02-09 VITALS
TEMPERATURE: 99 F | HEIGHT: 76 IN | OXYGEN SATURATION: 96 % | HEART RATE: 61 BPM | WEIGHT: 294.56 LBS | SYSTOLIC BLOOD PRESSURE: 126 MMHG | BODY MASS INDEX: 35.87 KG/M2 | RESPIRATION RATE: 16 BRPM | DIASTOLIC BLOOD PRESSURE: 80 MMHG

## 2018-02-09 DIAGNOSIS — M10.9 GOUT, UNSPECIFIED CAUSE, UNSPECIFIED CHRONICITY, UNSPECIFIED SITE: ICD-10-CM

## 2018-02-09 DIAGNOSIS — I10 ESSENTIAL HYPERTENSION: ICD-10-CM

## 2018-02-09 DIAGNOSIS — N18.2 STAGE 2 CHRONIC KIDNEY DISEASE: ICD-10-CM

## 2018-02-09 DIAGNOSIS — E78.5 DYSLIPIDEMIA: ICD-10-CM

## 2018-02-09 DIAGNOSIS — Z90.79 H/O PROSTATECTOMY: Primary | Chronic | ICD-10-CM

## 2018-02-09 DIAGNOSIS — D64.9 ANEMIA, UNSPECIFIED TYPE: ICD-10-CM

## 2018-02-09 DIAGNOSIS — E53.8 B12 DEFICIENCY: ICD-10-CM

## 2018-02-09 DIAGNOSIS — R73.03 PREDIABETES: ICD-10-CM

## 2018-02-09 LAB — URATE SERPL-MCNC: 6.6 MG/DL

## 2018-02-09 PROCEDURE — 99999 PR PBB SHADOW E&M-EST. PATIENT-LVL V: CPT | Mod: PBBFAC,,, | Performed by: INTERNAL MEDICINE

## 2018-02-09 PROCEDURE — 84550 ASSAY OF BLOOD/URIC ACID: CPT

## 2018-02-09 PROCEDURE — 36415 COLL VENOUS BLD VENIPUNCTURE: CPT | Mod: PO

## 2018-02-09 PROCEDURE — 1159F MED LIST DOCD IN RCRD: CPT | Mod: ,,, | Performed by: INTERNAL MEDICINE

## 2018-02-09 PROCEDURE — 1126F AMNT PAIN NOTED NONE PRSNT: CPT | Mod: ,,, | Performed by: INTERNAL MEDICINE

## 2018-02-09 PROCEDURE — 99214 OFFICE O/P EST MOD 30 MIN: CPT | Mod: S$PBB,,, | Performed by: INTERNAL MEDICINE

## 2018-02-09 PROCEDURE — 99215 OFFICE O/P EST HI 40 MIN: CPT | Mod: PBBFAC,PO | Performed by: INTERNAL MEDICINE

## 2018-02-09 RX ORDER — HYDROCODONE BITARTRATE AND ACETAMINOPHEN 5; 325 MG/1; MG/1
TABLET ORAL
COMMUNITY
Start: 2018-02-06 | End: 2018-02-09

## 2018-02-09 NOTE — PROGRESS NOTES
Subjective:       Patient ID: Dominic Cohen is a 82 y.o. male.    Chief Complaint: Follow-up; Anemia; Hypertension; Gout; and Chronic Kidney Disease    Anemia   Presents for follow-up visit. There has been no abdominal pain, bruising/bleeding easily, confusion, fever, light-headedness, pallor or palpitations.   Hypertension   Pertinent negatives include no chest pain, headaches, neck pain, palpitations or shortness of breath.     Review of Systems   Constitutional: Negative for activity change, appetite change, chills, diaphoresis, fatigue, fever and unexpected weight change.   HENT: Negative for drooling, ear discharge, ear pain, facial swelling, hearing loss, mouth sores, nosebleeds, postnasal drip, rhinorrhea, sinus pressure, sneezing, sore throat, tinnitus and voice change.    Eyes: Negative for photophobia, redness and visual disturbance.   Respiratory: Negative for apnea, cough, choking, chest tightness, shortness of breath and wheezing.    Cardiovascular: Negative for chest pain, palpitations and leg swelling.   Gastrointestinal: Negative for abdominal distention, abdominal pain, anal bleeding, blood in stool, constipation, diarrhea, nausea and vomiting.   Endocrine: Negative for cold intolerance, heat intolerance, polydipsia, polyphagia and polyuria.   Genitourinary: Negative for difficulty urinating, dysuria, enuresis, flank pain, frequency, genital sores, hematuria and urgency.   Musculoskeletal: Negative for arthralgias, back pain, gait problem, joint swelling, myalgias, neck pain and neck stiffness.   Skin: Negative for color change, pallor, rash and wound.   Allergic/Immunologic: Negative for food allergies and immunocompromised state.   Neurological: Negative for dizziness, tremors, seizures, syncope, facial asymmetry, speech difficulty, weakness, light-headedness, numbness and headaches.   Hematological: Negative for adenopathy. Does not bruise/bleed easily.   Psychiatric/Behavioral: Negative  for agitation, behavioral problems, confusion, decreased concentration, dysphoric mood, hallucinations, self-injury, sleep disturbance and suicidal ideas. The patient is not nervous/anxious and is not hyperactive.        Objective:      Physical Exam   Constitutional: He is oriented to person, place, and time. He appears well-developed and well-nourished. No distress.   HENT:   Head: Normocephalic and atraumatic.   Eyes: Pupils are equal, round, and reactive to light.   Neck: Normal range of motion. Neck supple. No JVD present. Carotid bruit is not present. No tracheal deviation present. No thyromegaly present.   Cardiovascular: Normal rate, regular rhythm, normal heart sounds and intact distal pulses.    Pulmonary/Chest: Effort normal and breath sounds normal. No respiratory distress. He has no wheezes. He has no rales. He exhibits no tenderness.   Abdominal: Soft. Bowel sounds are normal. He exhibits no distension. There is no tenderness. There is no rebound and no guarding.   Musculoskeletal: Normal range of motion. He exhibits no edema or tenderness.   Lymphadenopathy:     He has no cervical adenopathy.   Neurological: He is alert and oriented to person, place, and time.   Skin: Skin is warm and dry. No rash noted. He is not diaphoretic. No erythema. No pallor.   Psychiatric: He has a normal mood and affect. His behavior is normal. Judgment and thought content normal.   Nursing note and vitals reviewed.      Assessment:       1. H/O prostatectomy    2. Essential hypertension    3. B12 deficiency    4. Prediabetes    5. Anemia, unspecified type    6. Dyslipidemia    7. Stage 2 chronic kidney disease    8. Gout, unspecified cause, unspecified chronicity, unspecified site        Plan:        stable---continue meds.             Notes/labs reviewed.                      Check uric acid with other labs from hematology--------                    F/u 6 months,.

## 2018-02-11 ENCOUNTER — TELEPHONE (OUTPATIENT)
Dept: FAMILY MEDICINE | Facility: CLINIC | Age: 83
End: 2018-02-11

## 2018-02-13 NOTE — PROGRESS NOTES
Subjective:       Patient ID: Dominic Cohen is a 82 y.o. male.    Chief Complaint: Anemia and Results (labs)    HPI Patient presents for 3 mon f/u evaluation of an abnormal platelet count. Referred by his PCP Dr. Ender Liu.    Reviewed epic records dated back to 2/2/11.     Ultrasound of the abdomen revealed normal appearing spleen.     Patient has had intermittent slight drops in his hgb and mild drops in his platelet count. 2016 pt had one drop to 145 in his platelets. The drops in 2017 in his platelets occurred after his knee replacement re-do surgery that occurred 6/27/17.     Patient relates having to have 2 units of blood after his surgery in June.    Stated that since his surgery he had been a bit more fatigued than usual. That is improving. Still remains active- stays awake until late at night and then has to nap a little in the day.    Denies any bleeding or unusual bruising. No gingival bleeding, no nose bleeds, no hematuria, no hematochezia- has occasional blood with passing a hard stool on toilet paper only. No night sweats or fevers. Has lost about 20 # since his knee surgery. Watching what he eats as well. Appetite is good.     Taking oral iron and b12 daily with no adverse effects    FH: no cancers    NSAID use- aspirin daily.    No gastric or GI surgeries in the past    No alcohol use    Last blood donation 2013    Over the counter supplements- magnesium, multivitamin and calcium      Review of Systems   Constitutional: Positive for unexpected weight change (pt trying). Negative for appetite change.   Eyes: Positive for visual disturbance.   Respiratory: Negative for cough and shortness of breath.    Cardiovascular: Negative for chest pain.   Gastrointestinal: Negative for abdominal pain and diarrhea.   Genitourinary: Positive for frequency.   Musculoskeletal: Positive for back pain.   Skin: Negative for rash.   Neurological: Negative for headaches.   Hematological: Negative for  adenopathy.   Psychiatric/Behavioral: The patient is not nervous/anxious.       Objective:      Physical Exam   Constitutional: He is oriented to person, place, and time. He appears well-developed and well-nourished. No distress.   HENT:   Head: Normocephalic and atraumatic.   Right Ear: External ear normal.   Left Ear: External ear normal.   Nose: Nose normal.   Mouth/Throat: Oropharynx is clear and moist.   Eyes: Conjunctivae and EOM are normal. Pupils are equal, round, and reactive to light. Right eye exhibits no discharge. Left eye exhibits no discharge. No scleral icterus.   Neck: Normal range of motion. Neck supple. No thyromegaly present.   Cardiovascular: Normal rate, regular rhythm and normal heart sounds.  Exam reveals no gallop and no friction rub.    No murmur heard.  Pulmonary/Chest: Effort normal and breath sounds normal. No respiratory distress. He has no wheezes. He has no rales.   Abdominal: Soft. Bowel sounds are normal. He exhibits no distension and no mass. There is no tenderness. There is no rebound and no guarding.   Musculoskeletal: Normal range of motion. He exhibits edema.   Lymphadenopathy:     He has no cervical adenopathy.   Neurological: He is alert and oriented to person, place, and time. He has normal strength. Gait normal.   Skin: Skin is warm and dry. No lesion and no rash noted.   Psychiatric: He has a normal mood and affect. His behavior is normal. Judgment and thought content normal.        Results for CANDELARIA CASILLAS (MRN [id number]) as of 2/13/2018 17:35   Ref. Range 11/9/2017 08:38 11/21/2017 10:39 1/11/2018 12:00 2/5/2018 11:54 2/9/2018 08:57   WBC Latest Ref Range: 3.90 - 12.70 K/uL 5.01    7.50   RBC Latest Ref Range: 4.60 - 6.20 M/uL 3.74 (L)    3.93 (L)   Hemoglobin Latest Ref Range: 14.0 - 18.0 g/dL 12.1 (L)    12.9 (L)   Hematocrit Latest Ref Range: 40.0 - 54.0 % 37.6 (L)    40.4   MCV Latest Ref Range: 82 - 98 fL 101 (H)    103 (H)   MCH Latest Ref Range: 27.0 - 31.0  pg 32.4 (H)    32.8 (H)   MCHC Latest Ref Range: 32.0 - 36.0 g/dL 32.2    31.9 (L)   RDW Latest Ref Range: 11.5 - 14.5 % 14.4    12.6   Platelets Latest Ref Range: 150 - 350 K/uL 81 (L)    214   Platelet Count, Blue Top Latest Ref Range: 150 - 350 K/uL  176      MPV Latest Ref Range: 9.2 - 12.9 fL 13.0 (H)    12.4   MPV, Blue Top Latest Ref Range: 9.2 - 12.9 fL  10.7      Gran% Latest Ref Range: 38.0 - 73.0 % 51.1    69.2   Gran # (ANC) Latest Ref Range: 1.8 - 7.7 K/uL 2.6    5.2   Immature Granulocytes Latest Ref Range: 0.0 - 0.5 % 0.2    0.9 (H)   Immature Grans (Abs) Latest Ref Range: 0.00 - 0.04 K/uL 0.01    0.07 (H)   Lymph% Latest Ref Range: 18.0 - 48.0 % 31.7    20.8   Lymph # Latest Ref Range: 1.0 - 4.8 K/uL 1.6    1.6   Mono% Latest Ref Range: 4.0 - 15.0 % 13.0    7.9   Mono # Latest Ref Range: 0.3 - 1.0 K/uL 0.7    0.6   Eosinophil% Latest Ref Range: 0.0 - 8.0 % 3.0    0.5   Eos # Latest Ref Range: 0.0 - 0.5 K/uL 0.2    0.0   Basophil% Latest Ref Range: 0.0 - 1.9 % 1.0    0.7   Baso # Latest Ref Range: 0.00 - 0.20 K/uL 0.05    0.05   nRBC Latest Ref Range: 0 /100 WBC 0    0   Platelet Estimate Unknown     Appears normal   Iron Latest Ref Range: 45 - 160 ug/dL  64   99   TIBC Latest Ref Range: 250 - 450 ug/dL  311   287   Saturated Iron Latest Ref Range: 20 - 50 %  21   34   Transferrin Latest Ref Range: 200 - 375 mg/dL  210   194 (L)   Ferritin Latest Ref Range: 20.0 - 300.0 ng/mL  226   325 (H)   Folate Latest Ref Range: 4.0 - 24.0 ng/mL  5.9   5.8   Vitamin B-12 Latest Ref Range: 210 - 950 pg/mL  526   375   Sed Rate Latest Ref Range: 0 - 10 mm/Hr 51 (H)       Sodium Latest Ref Range: 136 - 145 mmol/L 140    141   Potassium Latest Ref Range: 3.5 - 5.1 mmol/L 4.3    4.5   Chloride Latest Ref Range: 95 - 110 mmol/L 107    104   CO2 Latest Ref Range: 23 - 29 mmol/L 25    26   Anion Gap Latest Ref Range: 8 - 16 mmol/L 8    11   BUN, Bld Latest Ref Range: 8 - 23 mg/dL 23    19   Creatinine Latest Ref Range:  0.5 - 1.4 mg/dL 1.4    1.2   eGFR if non African American Latest Ref Range: >60 mL/min/1.73 m^2 47.4 (A)    56.8 (A)   eGFR if African American Latest Ref Range: >60 mL/min/1.73 m^2 54.9 (A)    >60.0   Glucose Latest Ref Range: 70 - 110 mg/dL 131 (H)    127 (H)   Calcium Latest Ref Range: 8.7 - 10.5 mg/dL 9.3    9.4   Alkaline Phosphatase Latest Ref Range: 55 - 135 U/L 76    69   Total Protein Latest Ref Range: 6.0 - 8.4 g/dL 8.1    7.7   Protein, Serum Latest Ref Range: 6.0 - 8.4 g/dL  7.7   7.4   Albumin Latest Ref Range: 3.5 - 5.2 g/dL 3.5    3.2 (L)   Uric Acid Latest Ref Range: 3.4 - 7.0 mg/dL     6.6   Total Bilirubin Latest Ref Range: 0.1 - 1.0 mg/dL 0.7    0.5   AST Latest Ref Range: 10 - 40 U/L 20    24   ALT Latest Ref Range: 10 - 44 U/L 20    27       platelet count normal in blue top, tsh, iron studies, b12, folate, spe, hep B surface antigen and Hep C antibody were all wnl in Nov 2017    Assessment:       1. B12 deficiency    2. Thrombocytopenia        Plan:   1.     Iron and b12 levels wnl  2.     Platelets normal  3.     RTC in 4 months with cbc, cmp, iron and b12 levels  4.     Continue taking iron and b12

## 2018-02-14 ENCOUNTER — OFFICE VISIT (OUTPATIENT)
Dept: HEMATOLOGY/ONCOLOGY | Facility: CLINIC | Age: 83
End: 2018-02-14
Payer: MEDICARE

## 2018-02-14 VITALS
BODY MASS INDEX: 36.19 KG/M2 | DIASTOLIC BLOOD PRESSURE: 80 MMHG | SYSTOLIC BLOOD PRESSURE: 142 MMHG | HEIGHT: 76 IN | RESPIRATION RATE: 18 BRPM | HEART RATE: 72 BPM | WEIGHT: 297.19 LBS

## 2018-02-14 DIAGNOSIS — D69.6 THROMBOCYTOPENIA: Primary | ICD-10-CM

## 2018-02-14 DIAGNOSIS — D69.6 THROMBOCYTOPENIA: ICD-10-CM

## 2018-02-14 DIAGNOSIS — E53.8 B12 DEFICIENCY: Primary | ICD-10-CM

## 2018-02-14 PROCEDURE — 1159F MED LIST DOCD IN RCRD: CPT | Mod: ,,, | Performed by: NURSE PRACTITIONER

## 2018-02-14 PROCEDURE — 1126F AMNT PAIN NOTED NONE PRSNT: CPT | Mod: ,,, | Performed by: NURSE PRACTITIONER

## 2018-02-14 PROCEDURE — 99213 OFFICE O/P EST LOW 20 MIN: CPT | Mod: PBBFAC,PO | Performed by: NURSE PRACTITIONER

## 2018-02-14 PROCEDURE — 99999 PR PBB SHADOW E&M-EST. PATIENT-LVL III: CPT | Mod: PBBFAC,,, | Performed by: NURSE PRACTITIONER

## 2018-02-14 PROCEDURE — 99214 OFFICE O/P EST MOD 30 MIN: CPT | Mod: S$PBB,,, | Performed by: NURSE PRACTITIONER

## 2018-02-22 ENCOUNTER — OFFICE VISIT (OUTPATIENT)
Dept: OPHTHALMOLOGY | Facility: CLINIC | Age: 83
End: 2018-02-22
Payer: MEDICARE

## 2018-02-22 DIAGNOSIS — H25.13 SENILE NUCLEAR SCLEROSIS, BILATERAL: ICD-10-CM

## 2018-02-22 DIAGNOSIS — H40.1133 PRIMARY OPEN ANGLE GLAUCOMA OF BOTH EYES, SEVERE STAGE: Primary | ICD-10-CM

## 2018-02-22 DIAGNOSIS — H02.403 PTOSIS, BILATERAL: ICD-10-CM

## 2018-02-22 DIAGNOSIS — H21.562 AFFERENT PUPILLARY DEFECT, LEFT: ICD-10-CM

## 2018-02-22 PROCEDURE — 99211 OFF/OP EST MAY X REQ PHY/QHP: CPT | Mod: PBBFAC | Performed by: OPHTHALMOLOGY

## 2018-02-22 PROCEDURE — 99999 PR PBB SHADOW E&M-EST. PATIENT-LVL I: CPT | Mod: PBBFAC,,, | Performed by: OPHTHALMOLOGY

## 2018-02-22 PROCEDURE — 92012 INTRM OPH EXAM EST PATIENT: CPT | Mod: S$PBB,,, | Performed by: OPHTHALMOLOGY

## 2018-02-22 NOTE — Clinical Note
This is a glaucoma patient of mine. He wants to see an ENT doctor at [facility] for hearing and tinitus issues. My colleague Dr. Casas suggested you. I gave him you name and a # to call for an appt.

## 2018-02-22 NOTE — PROGRESS NOTES
HPI     Glaucoma    Additional comments: 4 month ck           Comments   DLS: 9/18/17    1. POAG- OS>OD  2. APD OS  3. NS OU  4. Ptosis  5. Dermatochalasis    MEDS:  Timolol BID OU         Last edited by Maddy Elizondo MA on 2/22/2018  1:12 PM. (History)            Assessment /Plan     For exam results, see Encounter Report.    Primary open angle glaucoma of both eyes, severe stage  -     Frausto Visual Field - OU - Extended - Both Eyes; Future  -     Color Fundus Photography - OU - Both Eyes; Future    Afferent pupillary defect, left    Senile nuclear sclerosis, bilateral    Ptosis, bilateral          Pt is  using timolol bid ou - was intolerant to combigan - too dizzy     1. POAG - adv./severe stage OS>OD      Presented at [facility] 2012 - had been dx with glaucoma by outside doctor years ago       He stopped gtts on his own and never followed up      Excellent response to Rx with only one gtt - xalatan      First HVF 3/22/2012      First photos 3/22/2012           Family history    neg        Glaucoma meds    - timolol bid ou         H/O adverse rxn to glaucoma drops    C/O red/painful eye with latanoprost/travatan gtts /// too dizzy w/ combigan         LASERS    SLT OS 4/16/2015 // SLT od 5/14/2015 (good resp 19/19 --> 13/13)        GLAUCOMA SURGERIES    none        OTHER EYE SURGERIES    none        CDR    0.8/0.9        Tbase    23-26/26-32          Tmax    26/32            Ttarget    16/16             HVF    6 VF '12-'16 -  24-2ss od-gen red in sens- SNS/SAD  // 24-2 stimV os-dense central scotoma        Gonio    +3 ou        CCT    531/546        OCT   3 test 2010 - 2017 -  RNFL -  OD:dec T/I // OS:dec S/T/I        HRT    4 test 2012 to 2017 -  MR - [place] off od // no image os         Disc photos    2010, 2014, 2016  - OIS    - Ttoday  15/15  - Test done today   - IOP / glaucoma     2.   APD os - 2/2 to #1    3.   Nuclear Sclerosis ou        VA 20/80 od // CF at 1' os - poor VA os 2/2 adv. Glaucoma/VF  loss    4.  Ptosis ou -stable and not visually significant    5.   Dermatochalasis ou -stable    6. S/P a 2nd knee replacement on left June 2017    Doing well - on crutches     Plan  Adv POAG OS>OD  Good initial  resp to slt ou 5/2015 19/19--> 13/13    tolerating timolol bid ou   Intol to all PG's - pain   Intol to combigan - dizzy     Did have Excellent resp to xalatan and doing well 26/32 -> 15/14- will re-address in future if need to lower IOP more and out of options - but he stopped on his own - C/O burning ect - can restart Xalatan if needed    Long discussion in past with patient about DG and burning from glaucoma gtts   Discussed, at length,  the risk of blindness if the glaucoma is not controlled  ATs prn    Doubt CE OS will help vision much - lrg central scotoma and APD  Hold off on CE od for now - BCVA 20/50 on refraction  1/16/2017     Rx for new glasses given 1/16/2017 // No real improvement - but lenses are badly scratched   Re-printed Rx for glasses 5/2017 - pt says the non [facility] made them wrong - but the want/need a new Rx printed     If needs a trab or tube os consider CE at same time - but very limited potential OS  If needs surgery od - consider combined - this is the better eye     F/U  4 months with HVF 24-2 ss od and 24-2 stim V os // DFE // photos     Pt wants to see an ENT doctor - saw one in [place] a year or so ago, but wants to see someone else . C/O tinitus - given dr Sher Angelo's name     I have seen and personally examined the patient.  I agree with the findings, assessment and plan of the resident and/or fellow.     Clau Carvalho MD

## 2018-03-19 ENCOUNTER — CLINICAL SUPPORT (OUTPATIENT)
Dept: AUDIOLOGY | Facility: CLINIC | Age: 83
End: 2018-03-19
Payer: MEDICARE

## 2018-03-19 ENCOUNTER — OFFICE VISIT (OUTPATIENT)
Dept: OTOLARYNGOLOGY | Facility: CLINIC | Age: 83
End: 2018-03-19
Payer: MEDICARE

## 2018-03-19 VITALS — BODY MASS INDEX: 36.19 KG/M2 | WEIGHT: 297.19 LBS | HEIGHT: 76 IN

## 2018-03-19 DIAGNOSIS — H90.3 SENSORINEURAL HEARING LOSS, BILATERAL: Primary | ICD-10-CM

## 2018-03-19 DIAGNOSIS — H90.3 SENSORINEURAL HEARING LOSS (SNHL) OF BOTH EARS: Primary | ICD-10-CM

## 2018-03-19 PROCEDURE — 99213 OFFICE O/P EST LOW 20 MIN: CPT | Mod: PBBFAC | Performed by: OTOLARYNGOLOGY

## 2018-03-19 PROCEDURE — 92557 COMPREHENSIVE HEARING TEST: CPT | Mod: PBBFAC | Performed by: AUDIOLOGIST-HEARING AID FITTER

## 2018-03-19 PROCEDURE — 99214 OFFICE O/P EST MOD 30 MIN: CPT | Mod: S$PBB,,, | Performed by: OTOLARYNGOLOGY

## 2018-03-19 PROCEDURE — 92567 TYMPANOMETRY: CPT | Mod: PBBFAC | Performed by: AUDIOLOGIST-HEARING AID FITTER

## 2018-03-19 PROCEDURE — 99999 PR PBB SHADOW E&M-EST. PATIENT-LVL III: CPT | Mod: PBBFAC,,, | Performed by: OTOLARYNGOLOGY

## 2018-03-19 NOTE — PROGRESS NOTES
Subjective:       Patient ID: Dominic Cohen is a 82 y.o. male.    Chief Complaint: Tinnitus    Ringing in Ears:   Chronicity:  Chronic  Onset:  More than 1 month ago  Progression since onset:  Unchanged  Frequency:  Every few days  Severity:  Moderate  Duration:  Off/on all day  Ringing in ear characteristics:  Stable and moderate   Associated symptoms: tinnitus.  No dizziness, no ear pain, no fever, no headaches and no rhinorrhea.  Aggravated by:  Lying down  Risk factors for lung disease:  Noise exposureNo dizziness.      Past Medical History: Patient has a past medical history of Arthritis; Atherosclerosis of abdominal aorta; Cataract; Chronic constipation; Glaucoma; History of anal fissures; Hypertension; Polyneuropathy in other diseases classified elsewhere; Prediabetes; Venous insufficiency; and Venous insufficiency.    Past Surgical History: Patient has a past surgical history that includes Back surgery; Prostate surgery; TONSILLECTOMY, ADENOIDECTOMY; Cataract extraction bilateral w/ anterior vitrectomy; Colonoscopy (N/A, 5/13/2016); Knee surgery (Left); and Joint replacement (Left).    Social History: Patient reports that he quit smoking about 17 years ago. His smoking use included Cigarettes. He has a 15.60 pack-year smoking history. He has never used smokeless tobacco. He reports that he does not drink alcohol or use drugs.    Family History: family history includes No Known Problems in his daughter, son, and son.    Medications:   Current Outpatient Prescriptions   Medication Sig    ascorbic acid (VITAMIN C) 60 mg Lozg Take 1 tablet by mouth daily as needed.     aspirin (ECOTRIN) 81 MG EC tablet Take 81 mg by mouth once daily.    blood sugar diagnostic (BLOOD GLUCOSE TEST) Strp 1 strip by Misc.(Non-Drug; Combo Route) route 3 (three) times a week.    cyanocobalamin, vitamin B-12, (VITAMIN B-12) 50 mcg tablet Take 50 mcg by mouth once daily.    ERGOCALCIFEROL, VITAMIN D2, (VITAMIN D ORAL) Take 1  tablet by mouth once daily.     linaclotide (LINZESS) 145 mcg Cap capsule Take 1 capsule (145 mcg total) by mouth once daily. (Patient taking differently: Take 145 mcg by mouth as needed. )    ramipril (ALTACE) 2.5 MG capsule TAKE 1 CAPSULE BY MOUTH ONCE A DAY (Patient taking differently: TAKE 1 CAPSULE BY MOUTH 0.5MG EVER OTHER DAY)    ranitidine (ZANTAC) 150 MG tablet Take 1 tablet (150 mg total) by mouth 2 (two) times daily.    timolol maleate 0.5% (TIMOPTIC) 0.5 % Drop Place 1 drop into both eyes 2 (two) times daily.    triamcinolone acetonide 0.1% (KENALOG) 0.1 % cream Apply topically 2 (two) times daily.    urea (CARMOL) 40 % Crea Apply topically 2 (two) times daily. Apply to thickened areas of skin     No current facility-administered medications for this visit.        Allergies: Patient is allergic to penicillins; protonix [pantoprazole]; and sulfa (sulfonamide antibiotics).    Review of Systems   Constitutional: Negative for activity change, appetite change, chills, diaphoresis, fatigue, fever and unexpected weight change.   HENT: Positive for hearing loss and tinnitus. Negative for congestion, ear discharge, ear pain, facial swelling, nosebleeds, postnasal drip, rhinorrhea, sinus pressure, sneezing, sore throat, trouble swallowing and voice change.    Eyes: Negative for photophobia, pain, discharge, redness and visual disturbance.   Respiratory: Negative for cough, chest tightness, shortness of breath and wheezing.    Cardiovascular: Negative for chest pain and palpitations.   Gastrointestinal: Negative for abdominal pain, constipation, diarrhea and nausea.   Genitourinary: Negative for dysuria and frequency.   Musculoskeletal: Negative for arthralgias, back pain, gait problem, joint swelling, myalgias, neck pain and neck stiffness.   Skin: Negative for color change, pallor and rash.   Neurological: Negative for dizziness, tremors, seizures, syncope, facial asymmetry, speech difficulty, weakness,  light-headedness, numbness and headaches.   Hematological: Negative for adenopathy. Does not bruise/bleed easily.   Psychiatric/Behavioral: Negative for agitation, confusion, decreased concentration, dysphoric mood and sleep disturbance. The patient is not nervous/anxious and is not hyperactive.        Objective:      Physical Exam   Constitutional: He is oriented to person, place, and time. He appears well-developed and well-nourished. He is cooperative.  Non-toxic appearance. He does not have a sickly appearance. He does not appear ill. No distress.   HENT:   Head: Normocephalic and atraumatic. Not macrocephalic and not microcephalic. Head is without raccoon's eyes, without Camacho's sign, without abrasion, without contusion, without laceration, without right periorbital erythema and without left periorbital erythema. Hair is normal.   Right Ear: Ear canal normal. No lacerations. No drainage, swelling or tenderness. No foreign bodies. No mastoid tenderness. Tympanic membrane is not injected, not scarred, not perforated, not erythematous, not retracted and not bulging. Tympanic membrane mobility is normal. No middle ear effusion. No hemotympanum. No decreased hearing is noted.   Left Ear: Ear canal normal. No lacerations. No drainage, swelling or tenderness. No foreign bodies. No mastoid tenderness. Tympanic membrane is not injected, not scarred, not perforated, not erythematous, not retracted and not bulging. Tympanic membrane mobility is normal.  No middle ear effusion. No hemotympanum. No decreased hearing is noted.   Nose: No mucosal edema, rhinorrhea, nose lacerations, sinus tenderness, nasal deformity, septal deviation or nasal septal hematoma. No epistaxis.  No foreign bodies. Right sinus exhibits no maxillary sinus tenderness. Left sinus exhibits no maxillary sinus tenderness.   Eyes: Conjunctivae, EOM and lids are normal. Pupils are equal, round, and reactive to light.   Neck: Normal range of motion. Neck  supple. No JVD present. No tracheal tenderness and no muscular tenderness present. No neck rigidity. No tracheal deviation, no edema, no erythema and normal range of motion present. No thyroid mass and no thyromegaly present.   Cardiovascular: Normal rate and regular rhythm.    Pulmonary/Chest: Effort normal. No accessory muscle usage or stridor. No apnea, no tachypnea and no bradypnea. No respiratory distress.   Abdominal: Soft. Normal appearance.   Musculoskeletal: Normal range of motion.   Lymphadenopathy:        Head (right side): No submental, no submandibular, no tonsillar, no preauricular and no posterior auricular adenopathy present.        Head (left side): No submental, no submandibular, no tonsillar, no preauricular and no posterior auricular adenopathy present.     He has no cervical adenopathy.        Right cervical: No superficial cervical, no deep cervical and no posterior cervical adenopathy present.       Left cervical: No superficial cervical, no deep cervical and no posterior cervical adenopathy present.   Neurological: He is alert and oriented to person, place, and time. He displays no atrophy and no tremor. No cranial nerve deficit or sensory deficit. He exhibits normal muscle tone. He displays no seizure activity.   Skin: Skin is warm, dry and intact. No abrasion, no bruising, no burn, no ecchymosis, no laceration, no lesion and no rash noted. He is not diaphoretic. No erythema. No pallor.   Psychiatric: He has a normal mood and affect. His behavior is normal. Judgment and thought content normal. His speech is not rapid and/or pressured and not slurred. Cognition and memory are normal. He is communicative.   Nursing note and vitals reviewed.              Assessment:       1. Sensorineural hearing loss (SNHL) of both ears      2. Tinnitus/ subjective  Plan:         Patient to see Audiology for hearing aid evaluation.    Discussed with patient multiple tinnitus management strategies including the  use of maskers, instruments, background sound enrichment and other means. All questions answered and appropriate references given.

## 2018-03-19 NOTE — PROGRESS NOTES
Dominic Cohen was seen in the clinic today for a hearing evaluation. Mr. Alfaro reported bilateral tinnitus and hearing loss. He has a history of noise exposure.    Audiological testing revealed a mild to moderately severe sensorineural hearing loss in the right ear and a mild to severe sensorineural hearing loss in the left ear. A speech reception threshold was obtained at 20 dBHL in the right ear and  25 dBHL in the left ear. Speech discrimination was 96% in the right ear and 88% in the left ear.    Tympanometry was attempted but a seal could not be obtained in either ear.    Recommendations:  1. Otologic evaluation  2. Annual hearing evaluation  3. Hearing Aid Consult

## 2018-05-11 DIAGNOSIS — D50.0 ANEMIA DUE TO CHRONIC BLOOD LOSS: Primary | ICD-10-CM

## 2018-05-11 DIAGNOSIS — E53.8 VITAMIN B 12 DEFICIENCY: ICD-10-CM

## 2018-05-29 ENCOUNTER — OFFICE VISIT (OUTPATIENT)
Dept: GASTROENTEROLOGY | Facility: CLINIC | Age: 83
End: 2018-05-29
Payer: MEDICARE

## 2018-05-29 VITALS
HEART RATE: 74 BPM | WEIGHT: 296.5 LBS | HEIGHT: 77 IN | BODY MASS INDEX: 35.01 KG/M2 | SYSTOLIC BLOOD PRESSURE: 146 MMHG | DIASTOLIC BLOOD PRESSURE: 84 MMHG

## 2018-05-29 DIAGNOSIS — R14.2 BELCHING: ICD-10-CM

## 2018-05-29 DIAGNOSIS — R49.0 HOARSENESS: Primary | ICD-10-CM

## 2018-05-29 PROCEDURE — 99213 OFFICE O/P EST LOW 20 MIN: CPT | Mod: PBBFAC | Performed by: NURSE PRACTITIONER

## 2018-05-29 PROCEDURE — 99214 OFFICE O/P EST MOD 30 MIN: CPT | Mod: S$PBB,,, | Performed by: NURSE PRACTITIONER

## 2018-05-29 PROCEDURE — 99999 PR PBB SHADOW E&M-EST. PATIENT-LVL III: CPT | Mod: PBBFAC,,, | Performed by: NURSE PRACTITIONER

## 2018-05-30 ENCOUNTER — LAB VISIT (OUTPATIENT)
Dept: LAB | Facility: HOSPITAL | Age: 83
End: 2018-05-30
Attending: NURSE PRACTITIONER
Payer: MEDICARE

## 2018-05-30 DIAGNOSIS — D69.6 THROMBOCYTOPENIA: ICD-10-CM

## 2018-05-30 DIAGNOSIS — E53.8 B12 DEFICIENCY: ICD-10-CM

## 2018-05-30 LAB
ALBUMIN SERPL BCP-MCNC: 3.6 G/DL
ALP SERPL-CCNC: 67 U/L
ALT SERPL W/O P-5'-P-CCNC: 22 U/L
ANION GAP SERPL CALC-SCNC: 8 MMOL/L
AST SERPL-CCNC: 23 U/L
BASOPHILS # BLD AUTO: 0.03 K/UL
BASOPHILS NFR BLD: 0.7 %
BILIRUB SERPL-MCNC: 0.9 MG/DL
BUN SERPL-MCNC: 13 MG/DL
CALCIUM SERPL-MCNC: 9.5 MG/DL
CHLORIDE SERPL-SCNC: 105 MMOL/L
CO2 SERPL-SCNC: 28 MMOL/L
CREAT SERPL-MCNC: 1.2 MG/DL
DIFFERENTIAL METHOD: ABNORMAL
EOSINOPHIL # BLD AUTO: 0.1 K/UL
EOSINOPHIL NFR BLD: 3.1 %
ERYTHROCYTE [DISTWIDTH] IN BLOOD BY AUTOMATED COUNT: 12.3 %
EST. GFR  (AFRICAN AMERICAN): >60 ML/MIN/1.73 M^2
EST. GFR  (NON AFRICAN AMERICAN): 56.8 ML/MIN/1.73 M^2
FERRITIN SERPL-MCNC: 261 NG/ML
GLUCOSE SERPL-MCNC: 161 MG/DL
HCT VFR BLD AUTO: 39.5 %
HGB BLD-MCNC: 12.5 G/DL
IMM GRANULOCYTES # BLD AUTO: 0.02 K/UL
IMM GRANULOCYTES NFR BLD AUTO: 0.5 %
IRON SERPL-MCNC: 90 UG/DL
LYMPHOCYTES # BLD AUTO: 1.2 K/UL
LYMPHOCYTES NFR BLD: 29.6 %
MCH RBC QN AUTO: 32.9 PG
MCHC RBC AUTO-ENTMCNC: 31.6 G/DL
MCV RBC AUTO: 104 FL
MONOCYTES # BLD AUTO: 0.5 K/UL
MONOCYTES NFR BLD: 12 %
NEUTROPHILS # BLD AUTO: 2.2 K/UL
NEUTROPHILS NFR BLD: 54.1 %
NRBC BLD-RTO: 0 /100 WBC
PLATELET # BLD AUTO: 70 K/UL
PMV BLD AUTO: 12.7 FL
POTASSIUM SERPL-SCNC: 4.7 MMOL/L
PROT SERPL-MCNC: 7.6 G/DL
RBC # BLD AUTO: 3.8 M/UL
SATURATED IRON: 30 %
SODIUM SERPL-SCNC: 141 MMOL/L
TOTAL IRON BINDING CAPACITY: 300 UG/DL
TRANSFERRIN SERPL-MCNC: 203 MG/DL
VIT B12 SERPL-MCNC: 455 PG/ML
WBC # BLD AUTO: 4.15 K/UL

## 2018-05-30 PROCEDURE — 85025 COMPLETE CBC W/AUTO DIFF WBC: CPT

## 2018-05-30 PROCEDURE — 83540 ASSAY OF IRON: CPT

## 2018-05-30 PROCEDURE — 83520 IMMUNOASSAY QUANT NOS NONAB: CPT

## 2018-05-30 PROCEDURE — 80053 COMPREHEN METABOLIC PANEL: CPT

## 2018-05-30 PROCEDURE — 36415 COLL VENOUS BLD VENIPUNCTURE: CPT | Mod: PO

## 2018-05-30 PROCEDURE — 84165 PROTEIN E-PHORESIS SERUM: CPT

## 2018-05-30 PROCEDURE — 82728 ASSAY OF FERRITIN: CPT

## 2018-05-30 PROCEDURE — 86334 IMMUNOFIX E-PHORESIS SERUM: CPT

## 2018-05-30 PROCEDURE — 84165 PROTEIN E-PHORESIS SERUM: CPT | Mod: 26,,, | Performed by: PATHOLOGY

## 2018-05-30 PROCEDURE — 86334 IMMUNOFIX E-PHORESIS SERUM: CPT | Mod: 26,,, | Performed by: PATHOLOGY

## 2018-05-30 PROCEDURE — 82607 VITAMIN B-12: CPT

## 2018-05-30 NOTE — PROGRESS NOTES
Clinic Consult:  [facility] Consultation Note    Reason for Consult:  The primary encounter diagnosis was Hoarseness. A diagnosis of Belching was also pertinent to this visit.    PCP: Sudhakar Liu   No address on file    HPI:  This is a 82 y.o. male here for evaluation of the above. He has been taking Zantac twice a day and still complains of hoarseness.  He has associated chronic PND. Has some belching.     Review of Systems   Constitutional: Negative for fever, malaise/fatigue and weight loss.   HENT: Negative for sore throat.         Hoarseness    Respiratory: Negative for cough and wheezing.    Cardiovascular: Negative for chest pain and palpitations.   Gastrointestinal: Negative for abdominal pain, blood in stool, constipation, diarrhea, heartburn, melena, nausea and vomiting.   Genitourinary: Negative for dysuria and frequency.   Musculoskeletal: Negative for back pain, joint pain, myalgias and neck pain.   Skin: Negative for itching and rash.   Neurological: Negative for dizziness, speech change, seizures, loss of consciousness and headaches.   Psychiatric/Behavioral: Negative for depression and substance abuse. The patient is not nervous/anxious.        Medical History:  has a past medical history of Arthritis; Atherosclerosis of abdominal aorta; Cataract; Chronic constipation; Glaucoma; History of anal fissures; Hypertension; Polyneuropathy in other diseases classified elsewhere; Prediabetes; Venous insufficiency; and Venous insufficiency.    Surgical History:  has a past surgical history that includes Back surgery; Prostate surgery; TONSILLECTOMY, ADENOIDECTOMY; Cataract extraction bilateral w/ anterior vitrectomy; Colonoscopy (N/A, 5/13/2016); Knee surgery (Left); and Joint replacement (Left).    Family History: family history includes No Known Problems in his daughter, son, and son..     Social History:  reports that he quit smoking about 17 years ago. His smoking use included  "Cigarettes. He has a 15.60 pack-year smoking history. He has never used smokeless tobacco. He reports that he does not drink alcohol or use drugs.    Allergies: Reviewed    Home Medications:   Current Outpatient Prescriptions on File Prior to Visit   Medication Sig Dispense Refill    ascorbic acid (VITAMIN C) 60 mg Lozg Take 1 tablet by mouth daily as needed.       aspirin (ECOTRIN) 81 MG EC tablet Take 81 mg by mouth once daily.      blood sugar diagnostic (BLOOD GLUCOSE TEST) Strp 1 strip by Misc.(Non-Drug; Combo Route) route 3 (three) times a week. 32 strip 11    cyanocobalamin, vitamin B-12, (VITAMIN B-12) 50 mcg tablet Take 50 mcg by mouth once daily.      ERGOCALCIFEROL, VITAMIN D2, (VITAMIN D ORAL) Take 1 tablet by mouth once daily.       linaclotide (LINZESS) 145 mcg Cap capsule Take 1 capsule (145 mcg total) by mouth once daily. (Patient taking differently: Take 145 mcg by mouth as needed. ) 30 capsule 5    ramipril (ALTACE) 2.5 MG capsule TAKE 1 CAPSULE BY MOUTH ONCE A DAY (Patient taking differently: TAKE 1 CAPSULE BY MOUTH 0.5MG EVER OTHER DAY) 90 capsule 3    ranitidine (ZANTAC) 150 MG tablet Take 1 tablet (150 mg total) by mouth 2 (two) times daily. 60 tablet 5    timolol maleate 0.5% (TIMOPTIC) 0.5 % Drop Place 1 drop into both eyes 2 (two) times daily. 10 mL 12    triamcinolone acetonide 0.1% (KENALOG) 0.1 % cream Apply topically 2 (two) times daily.      urea (CARMOL) 40 % Crea Apply topically 2 (two) times daily. Apply to thickened areas of skin 1 Tube 3     No current facility-administered medications on file prior to visit.        Physical Exam:  BP (!) 146/84   Pulse 74   Ht 6' 4.8" (1.951 m)   Wt 134.5 kg (296 lb 8.3 oz)   BMI 35.35 kg/m²   Body mass index is 35.35 kg/m².  Physical Exam   Constitutional: He is oriented to person, place, and time and well-developed, well-nourished, and in no distress. No distress.   HENT:   Head: Normocephalic.   Eyes: Conjunctivae are normal. " Pupils are equal, round, and reactive to light.   Cardiovascular: Normal rate, regular rhythm and normal heart sounds.    Pulmonary/Chest: Effort normal and breath sounds normal. No respiratory distress.   Abdominal: Soft. Bowel sounds are normal. He exhibits no distension. There is no tenderness.   Neurological: He is alert and oriented to person, place, and time. No cranial nerve deficit.   Skin: Skin is warm and dry. No rash noted.   Psychiatric: Mood and affect normal.       Labs: Pertinent labs reviewed.    Assessment:  1. Hoarseness    2. Belching         Recommendations:  - will get EGD for symptoms but feel hoarseness is secondary to PND  - recommend follow up with ENT  -     Case request GI: ESOPHAGOGASTRODUODENOSCOPY (EGD)    Follow up to be determined after procedure.    Thank you so much for allowing me to participate in the care of Orofino JAMILA Allen

## 2018-05-31 LAB
ALBUMIN SERPL ELPH-MCNC: 3.83 G/DL
ALPHA1 GLOB SERPL ELPH-MCNC: 0.28 G/DL
ALPHA2 GLOB SERPL ELPH-MCNC: 0.85 G/DL
B-GLOBULIN SERPL ELPH-MCNC: 0.68 G/DL
GAMMA GLOB SERPL ELPH-MCNC: 1.67 G/DL
INTERPRETATION SERPL IFE-IMP: NORMAL
KAPPA LC SER QL IA: 4.26 MG/DL
KAPPA LC/LAMBDA SER IA: 1.57
LAMBDA LC SER QL IA: 2.71 MG/DL
PATHOLOGIST INTERPRETATION SPE: NORMAL
PROT SERPL-MCNC: 7.3 G/DL

## 2018-06-01 LAB — PATHOLOGIST INTERPRETATION IFE: NORMAL

## 2018-06-05 NOTE — PROGRESS NOTES
Subjective:       Patient ID: Dominic Cohen is a 82 y.o. male.    Chief Complaint: thrombocytopenia and abnormal blood protein    HPI Patient presents for 3 mon f/u evaluation of an abnormal platelet count. Referred by his PCP Dr. Ender Liu.    Reviewed epic records dated back to 2/2/11.     Ultrasound of the abdomen revealed normal appearing spleen 4/20/17.     Patient has had intermittent slight drops in his hgb and mild drops in his platelet count. 2016 pt had one drop to 145 in his platelets. The drops in 2017 in his platelets occurred after his knee replacement re-do surgery that occurred 6/27/17.     Patient relates having to have 2 units of blood after his surgery in June.    Stated that since his surgery he had been a bit more fatigued than usual. That is improving. Still remains active- stays awake until late at night and then has to nap a little in the day.    Denies any bleeding or unusual bruising. No gingival bleeding, no nose bleeds, no hematuria, no hematochezia- has occasional blood with passing a hard stool on toilet paper only. No night sweats or fevers. Has lost about 20 # since his knee surgery. Watching what he eats as well. Appetite is good.     Taking oral iron and b12 daily with no adverse effects    FH: no cancers    NSAID use- aspirin daily.    No gastric or GI surgeries in the past    No alcohol use    Last blood donation 2013    Over the counter supplements- magnesium, multivitamin and calcium      Review of Systems   Constitutional: Positive for unexpected weight change (pt trying). Negative for appetite change.   Eyes: Positive for visual disturbance.   Respiratory: Negative for cough and shortness of breath.    Cardiovascular: Negative for chest pain.   Gastrointestinal: Negative for abdominal pain and diarrhea.   Genitourinary: Positive for frequency.   Musculoskeletal: Positive for back pain.   Skin: Negative for rash.   Neurological: Negative for headaches.    Hematological: Negative for adenopathy.   Psychiatric/Behavioral: The patient is not nervous/anxious.       Objective:      Physical Exam   Constitutional: He is oriented to person, place, and time. He appears well-developed and well-nourished. No distress.   HENT:   Head: Normocephalic and atraumatic.   Right Ear: External ear normal.   Left Ear: External ear normal.   Nose: Nose normal.   Mouth/Throat: Oropharynx is clear and moist.   Eyes: Conjunctivae and EOM are normal. Pupils are equal, round, and reactive to light. Right eye exhibits no discharge. Left eye exhibits no discharge. No scleral icterus.   Neck: Normal range of motion. Neck supple. No thyromegaly present.   Cardiovascular: Normal rate, regular rhythm and normal heart sounds.  Exam reveals no gallop and no friction rub.    No murmur heard.  Pulmonary/Chest: Effort normal and breath sounds normal. No respiratory distress. He has no wheezes. He has no rales.   Abdominal: Soft. Bowel sounds are normal. He exhibits no distension and no mass. There is no tenderness. There is no rebound and no guarding.   Musculoskeletal: Normal range of motion. He exhibits edema.   Lymphadenopathy:     He has no cervical adenopathy.   Neurological: He is alert and oriented to person, place, and time. He has normal strength. Gait normal.   Skin: Skin is warm and dry. No lesion and no rash noted.   Psychiatric: He has a normal mood and affect. His behavior is normal. Judgment and thought content normal.        LABS:  Results for CANDELARIA CASILLAS (MRN [id number]) as of 6/5/2018 09:35   Ref. Range 11/9/2017 08:38 11/21/2017 10:39 2/9/2018 08:57 5/30/2018 10:15   WBC Latest Ref Range: 3.90 - 12.70 K/uL 5.01  7.50 4.15   RBC Latest Ref Range: 4.60 - 6.20 M/uL 3.74 (L)  3.93 (L) 3.80 (L)   Hemoglobin Latest Ref Range: 14.0 - 18.0 g/dL 12.1 (L)  12.9 (L) 12.5 (L)   Hematocrit Latest Ref Range: 40.0 - 54.0 % 37.6 (L)  40.4 39.5 (L)   MCV Latest Ref Range: 82 - 98 fL 101 (H)   103 (H) 104 (H)   MCH Latest Ref Range: 27.0 - 31.0 pg 32.4 (H)  32.8 (H) 32.9 (H)   MCHC Latest Ref Range: 32.0 - 36.0 g/dL 32.2  31.9 (L) 31.6 (L)   RDW Latest Ref Range: 11.5 - 14.5 % 14.4  12.6 12.3   Platelets Latest Ref Range: 150 - 350 K/uL 81 (L)  214 70 (L)   Platelet Count, Blue Top Latest Ref Range: 150 - 350 K/uL  176     MPV Latest Ref Range: 9.2 - 12.9 fL 13.0 (H)  12.4 12.7   MPV, Blue Top Latest Ref Range: 9.2 - 12.9 fL  10.7     Gran% Latest Ref Range: 38.0 - 73.0 % 51.1  69.2 54.1   Gran # (ANC) Latest Ref Range: 1.8 - 7.7 K/uL 2.6  5.2 2.2   Immature Granulocytes Latest Ref Range: 0.0 - 0.5 % 0.2  0.9 (H) 0.5   Immature Grans (Abs) Latest Ref Range: 0.00 - 0.04 K/uL 0.01  0.07 (H) 0.02   Lymph% Latest Ref Range: 18.0 - 48.0 % 31.7  20.8 29.6   Lymph # Latest Ref Range: 1.0 - 4.8 K/uL 1.6  1.6 1.2   Mono% Latest Ref Range: 4.0 - 15.0 % 13.0  7.9 12.0   Mono # Latest Ref Range: 0.3 - 1.0 K/uL 0.7  0.6 0.5   Eosinophil% Latest Ref Range: 0.0 - 8.0 % 3.0  0.5 3.1   Eos # Latest Ref Range: 0.0 - 0.5 K/uL 0.2  0.0 0.1   Basophil% Latest Ref Range: 0.0 - 1.9 % 1.0  0.7 0.7   Baso # Latest Ref Range: 0.00 - 0.20 K/uL 0.05  0.05 0.03   nRBC Latest Ref Range: 0 /100 WBC 0  0 0   Platelet Estimate Unknown   Appears normal    Iron Latest Ref Range: 45 - 160 ug/dL  64 99 90   TIBC Latest Ref Range: 250 - 450 ug/dL  311 287 300   Saturated Iron Latest Ref Range: 20 - 50 %  21 34 30   Transferrin Latest Ref Range: 200 - 375 mg/dL  210 194 (L) 203   Ferritin Latest Ref Range: 20.0 - 300.0 ng/mL  226 325 (H) 261   Folate Latest Ref Range: 4.0 - 24.0 ng/mL  5.9 5.8    Vitamin B-12 Latest Ref Range: 210 - 950 pg/mL  526 375 455   Sed Rate Latest Ref Range: 0 - 10 mm/Hr 51 (H)          Results for CANDELARIA CASILLAS (MRN [id number]) as of 6/5/2018 09:35   Ref. Range 2/9/2018 08:57 5/30/2018 10:15   Sodium Latest Ref Range: 136 - 145 mmol/L 141 141   Potassium Latest Ref Range: 3.5 - 5.1 mmol/L 4.5 4.7   Chloride  Latest Ref Range: 95 - 110 mmol/L 104 105   CO2 Latest Ref Range: 23 - 29 mmol/L 26 28   Anion Gap Latest Ref Range: 8 - 16 mmol/L 11 8   BUN, Bld Latest Ref Range: 8 - 23 mg/dL 19 13   Creatinine Latest Ref Range: 0.5 - 1.4 mg/dL 1.2 1.2   eGFR if non African American Latest Ref Range: >60 mL/min/1.73 m^2 56.8 (A) 56.8 (A)   eGFR if African American Latest Ref Range: >60 mL/min/1.73 m^2 >60.0 >60.0   Glucose Latest Ref Range: 70 - 110 mg/dL 127 (H) 161 (H)   Calcium Latest Ref Range: 8.7 - 10.5 mg/dL 9.4 9.5   Alkaline Phosphatase Latest Ref Range: 55 - 135 U/L 69 67   Total Protein Latest Ref Range: 6.0 - 8.4 g/dL 7.7 7.6   Protein, Serum Latest Ref Range: 6.0 - 8.4 g/dL 7.4 7.3   Albumin Latest Ref Range: 3.5 - 5.2 g/dL 3.2 (L) 3.6   Uric Acid Latest Ref Range: 3.4 - 7.0 mg/dL 6.6    Total Bilirubin Latest Ref Range: 0.1 - 1.0 mg/dL 0.5 0.9   AST Latest Ref Range: 10 - 40 U/L 24 23   ALT Latest Ref Range: 10 - 44 U/L 27 22   Vitamin B-12 Latest Ref Range: 210 - 950 pg/mL 375 455     platelet count normal in blue top in past- low on recent cbc-  tsh, iron studies, b12, folate, spe, hep B surface antigen and Hep C antibody were all wnl in Nov 2017    MIKKI abnormal- oligoclonal banding- stable    Renal function stable  Iron normal  b12 normal      Assessment:       1. Thrombocytopenia    2. Abnormal immunological finding in serum    3. Macrocytic anemia        Plan:   1.     Iron and b12 levels wnl  2.     Platelets 70,000 - needs blue top platelet count today due to history of clumping with red top cbc  3.     RTC in 3 months with cbc, cmp, iron, mikki, spe, light chains, blue top platelet count and b12 levels  4.     Continue taking iron and b12  5.     All labs reviewed with Dr. Thomas who recommends 3 mon f/u

## 2018-06-06 ENCOUNTER — OFFICE VISIT (OUTPATIENT)
Dept: HEMATOLOGY/ONCOLOGY | Facility: CLINIC | Age: 83
End: 2018-06-06
Payer: MEDICARE

## 2018-06-06 ENCOUNTER — LAB VISIT (OUTPATIENT)
Dept: LAB | Facility: HOSPITAL | Age: 83
End: 2018-06-06
Attending: NURSE PRACTITIONER
Payer: MEDICARE

## 2018-06-06 VITALS
HEART RATE: 60 BPM | HEIGHT: 76 IN | DIASTOLIC BLOOD PRESSURE: 72 MMHG | BODY MASS INDEX: 36.16 KG/M2 | WEIGHT: 296.94 LBS | OXYGEN SATURATION: 96 % | SYSTOLIC BLOOD PRESSURE: 142 MMHG

## 2018-06-06 DIAGNOSIS — D69.6 THROMBOCYTOPENIA: Primary | ICD-10-CM

## 2018-06-06 DIAGNOSIS — R76.9 ABNORMAL IMMUNOLOGICAL FINDING IN SERUM: ICD-10-CM

## 2018-06-06 DIAGNOSIS — D53.9 MACROCYTIC ANEMIA: ICD-10-CM

## 2018-06-06 DIAGNOSIS — D69.6 THROMBOCYTOPENIA: ICD-10-CM

## 2018-06-06 LAB
MPV, BLUE TOP: 10.5 FL
PLATELET, BLUE TOP: 174 K/UL

## 2018-06-06 PROCEDURE — 99214 OFFICE O/P EST MOD 30 MIN: CPT | Mod: S$PBB,,, | Performed by: NURSE PRACTITIONER

## 2018-06-06 PROCEDURE — 99999 PR PBB SHADOW E&M-EST. PATIENT-LVL III: CPT | Mod: PBBFAC,,, | Performed by: NURSE PRACTITIONER

## 2018-06-06 PROCEDURE — 36415 COLL VENOUS BLD VENIPUNCTURE: CPT | Mod: PO

## 2018-06-06 PROCEDURE — 85049 AUTOMATED PLATELET COUNT: CPT

## 2018-06-06 PROCEDURE — 99213 OFFICE O/P EST LOW 20 MIN: CPT | Mod: PBBFAC,PO | Performed by: NURSE PRACTITIONER

## 2018-06-07 ENCOUNTER — OFFICE VISIT (OUTPATIENT)
Dept: FAMILY MEDICINE | Facility: CLINIC | Age: 83
End: 2018-06-07
Payer: MEDICARE

## 2018-06-07 ENCOUNTER — LAB VISIT (OUTPATIENT)
Dept: LAB | Facility: HOSPITAL | Age: 83
End: 2018-06-07
Attending: INTERNAL MEDICINE
Payer: MEDICARE

## 2018-06-07 VITALS
SYSTOLIC BLOOD PRESSURE: 116 MMHG | HEART RATE: 60 BPM | BODY MASS INDEX: 35.92 KG/M2 | OXYGEN SATURATION: 96 % | DIASTOLIC BLOOD PRESSURE: 62 MMHG | TEMPERATURE: 99 F | HEIGHT: 76 IN | RESPIRATION RATE: 16 BRPM | WEIGHT: 295 LBS

## 2018-06-07 DIAGNOSIS — E78.5 DYSLIPIDEMIA: ICD-10-CM

## 2018-06-07 DIAGNOSIS — K59.09 CHRONIC CONSTIPATION: ICD-10-CM

## 2018-06-07 DIAGNOSIS — R49.0 HOARSENESS: ICD-10-CM

## 2018-06-07 DIAGNOSIS — R73.03 PREDIABETES: ICD-10-CM

## 2018-06-07 DIAGNOSIS — H93.12 TINNITUS OF LEFT EAR: ICD-10-CM

## 2018-06-07 DIAGNOSIS — R60.0 LOCALIZED EDEMA: ICD-10-CM

## 2018-06-07 DIAGNOSIS — N18.2 STAGE 2 CHRONIC KIDNEY DISEASE: ICD-10-CM

## 2018-06-07 DIAGNOSIS — M10.9 GOUT, UNSPECIFIED CAUSE, UNSPECIFIED CHRONICITY, UNSPECIFIED SITE: ICD-10-CM

## 2018-06-07 DIAGNOSIS — I10 ESSENTIAL HYPERTENSION: ICD-10-CM

## 2018-06-07 DIAGNOSIS — D64.9 ANEMIA, UNSPECIFIED TYPE: Primary | ICD-10-CM

## 2018-06-07 DIAGNOSIS — Z90.79 H/O PROSTATECTOMY: Chronic | ICD-10-CM

## 2018-06-07 LAB
ESTIMATED AVG GLUCOSE: 111 MG/DL
HBA1C MFR BLD HPLC: 5.5 %
TESTOST SERPL-MCNC: 390 NG/DL
URATE SERPL-MCNC: 7.4 MG/DL

## 2018-06-07 PROCEDURE — 99999 PR PBB SHADOW E&M-EST. PATIENT-LVL IV: CPT | Mod: PBBFAC,,, | Performed by: INTERNAL MEDICINE

## 2018-06-07 PROCEDURE — 84550 ASSAY OF BLOOD/URIC ACID: CPT

## 2018-06-07 PROCEDURE — 36415 COLL VENOUS BLD VENIPUNCTURE: CPT | Mod: PO

## 2018-06-07 PROCEDURE — 99214 OFFICE O/P EST MOD 30 MIN: CPT | Mod: PBBFAC,PO | Performed by: INTERNAL MEDICINE

## 2018-06-07 PROCEDURE — 84403 ASSAY OF TOTAL TESTOSTERONE: CPT

## 2018-06-07 PROCEDURE — 83036 HEMOGLOBIN GLYCOSYLATED A1C: CPT

## 2018-06-07 PROCEDURE — 99215 OFFICE O/P EST HI 40 MIN: CPT | Mod: S$PBB,,, | Performed by: INTERNAL MEDICINE

## 2018-06-07 NOTE — PROGRESS NOTES
Subjective:       Patient ID: Dominic Cohen is a 82 y.o. male.    Chief Complaint: Neck Pain; Back Pain; Hypertension; Hyperlipidemia; Insulin Resistance; and Anemia    Neck Pain    This is a chronic problem. Pertinent negatives include no chest pain, fever, headaches, numbness, photophobia or trouble swallowing.   Back Pain   This is a chronic problem. Pertinent negatives include no abdominal pain, chest pain, dysuria, fever, headaches or numbness.   Hypertension   The problem is controlled. Associated symptoms include neck pain. Pertinent negatives include no chest pain, headaches, palpitations or shortness of breath.   Hyperlipidemia   Associated symptoms include myalgias. Pertinent negatives include no chest pain or shortness of breath. Current antihyperlipidemic treatment includes diet change and exercise.   Anemia   There has been no abdominal pain, bruising/bleeding easily, confusion, fever, light-headedness, pallor or palpitations.     Past Medical History:   Diagnosis Date    Arthritis     Atherosclerosis of abdominal aorta     Cataract     Chronic constipation     Glaucoma     History of anal fissures     Hypertension     Polyneuropathy in other diseases classified elsewhere     due to folate deficiency    Prediabetes     Venous insufficiency     Venous insufficiency      Past Surgical History:   Procedure Laterality Date    BACK SURGERY      CATARACT EXTRACTION BILATERAL W/ ANTERIOR VITRECTOMY      COLONOSCOPY N/A 5/13/2016    Procedure: COLONOSCOPY;  Surgeon: Presley Phillips MD;  Location: [facility] ([address] [place] [address]);  Service: Endoscopy;  Laterality: N/A;  EGD with Dr. Jeffery prior to Colonoscopy. EC    JOINT REPLACEMENT Left     x 2    KNEE SURGERY Left     x2. revision 06/27/17    PROSTATE SURGERY      TONSILLECTOMY, ADENOIDECTOMY       Family History   Problem Relation Age of Onset    No Known Problems Son     No Known Problems Daughter     No Known Problems Son     Diabetes  Neg Hx     Heart disease Neg Hx     Cancer Neg Hx     Celiac disease Neg Hx     Cirrhosis Neg Hx     Colon cancer Neg Hx     Colon polyps Neg Hx     Crohn's disease Neg Hx     Cystic fibrosis Neg Hx     Esophageal cancer Neg Hx     Hemochromatosis Neg Hx     Inflammatory bowel disease Neg Hx     Irritable bowel syndrome Neg Hx     Liver cancer Neg Hx     Liver disease Neg Hx     Rectal cancer Neg Hx     Stomach cancer Neg Hx     Ulcerative colitis Neg Hx     Zak's disease Neg Hx     Amblyopia Neg Hx     Blindness Neg Hx     Glaucoma Neg Hx     Hypertension Neg Hx     Macular degeneration Neg Hx     Retinal detachment Neg Hx     Strabismus Neg Hx      Social History     Social History    Marital status:      Spouse name: N/A    Number of children: 3    Years of education: N/A     Occupational History    Retired      [place], [place]     Social History Main Topics    Smoking status: Former Smoker     Packs/day: 0.30     Years: 52.00     Types: Cigarettes     Quit date: 11/13/2000    Smokeless tobacco: Never Used    Alcohol use No    Drug use: No    Sexual activity: Yes     Other Topics Concern    Not on file     Social History Narrative    No narrative on file     Review of Systems   Constitutional: Positive for fatigue. Negative for activity change, appetite change, chills, diaphoresis, fever and unexpected weight change.   HENT: Positive for hearing loss and tinnitus. Negative for drooling, ear discharge, ear pain, facial swelling, mouth sores, nosebleeds, postnasal drip, rhinorrhea, sinus pressure, sneezing, sore throat, trouble swallowing and voice change.    Eyes: Negative for photophobia, redness and visual disturbance.   Respiratory: Negative for apnea, cough, choking, chest tightness, shortness of breath and wheezing.    Cardiovascular: Positive for leg swelling. Negative for chest pain and palpitations.   Gastrointestinal: Positive for constipation. Negative for  abdominal distention, abdominal pain, anal bleeding, blood in stool, diarrhea, nausea and vomiting.   Endocrine: Negative for cold intolerance, heat intolerance, polydipsia, polyphagia and polyuria.   Genitourinary: Negative for difficulty urinating, dysuria, enuresis, flank pain, frequency, genital sores, hematuria and urgency.   Musculoskeletal: Positive for arthralgias, back pain, myalgias and neck pain. Negative for gait problem, joint swelling and neck stiffness.   Skin: Negative for color change, pallor, rash and wound.   Allergic/Immunologic: Negative for food allergies and immunocompromised state.   Neurological: Negative for dizziness, tremors, seizures, syncope, facial asymmetry, speech difficulty, light-headedness, numbness and headaches.   Hematological: Negative for adenopathy. Does not bruise/bleed easily.   Psychiatric/Behavioral: Negative for agitation, behavioral problems, confusion, decreased concentration, dysphoric mood, hallucinations, self-injury, sleep disturbance and suicidal ideas. The patient is not nervous/anxious and is not hyperactive.        Objective:      Physical Exam   Constitutional: He is oriented to person, place, and time. He appears well-developed and well-nourished. No distress.   HENT:   Head: Normocephalic and atraumatic.   Eyes: Pupils are equal, round, and reactive to light.   Neck: Normal range of motion. Neck supple. No JVD present. Carotid bruit is not present. No tracheal deviation present. No thyromegaly present.   Cardiovascular: Normal rate, regular rhythm, normal heart sounds and intact distal pulses.    Pulmonary/Chest: Effort normal and breath sounds normal. No respiratory distress. He has no wheezes. He has no rales. He exhibits no tenderness.   Abdominal: Soft. Bowel sounds are normal. He exhibits no distension. There is no tenderness. There is no rebound and no guarding.   Musculoskeletal: Normal range of motion. He exhibits no edema or tenderness.    Lymphadenopathy:     He has no cervical adenopathy.   Neurological: He is alert and oriented to person, place, and time.   Skin: Skin is warm and dry. No rash noted. He is not diaphoretic. No erythema. No pallor.   Psychiatric: He has a normal mood and affect. His behavior is normal. Judgment and thought content normal.   Nursing note and vitals reviewed.      CMP  Sodium   Date Value Ref Range Status   05/30/2018 141 136 - 145 mmol/L Final     Potassium   Date Value Ref Range Status   05/30/2018 4.7 3.5 - 5.1 mmol/L Final     Chloride   Date Value Ref Range Status   05/30/2018 105 95 - 110 mmol/L Final     CO2   Date Value Ref Range Status   05/30/2018 28 23 - 29 mmol/L Final     Glucose   Date Value Ref Range Status   05/30/2018 161 (H) 70 - 110 mg/dL Final     BUN, Bld   Date Value Ref Range Status   05/30/2018 13 8 - 23 mg/dL Final     Creatinine   Date Value Ref Range Status   05/30/2018 1.2 0.5 - 1.4 mg/dL Final     Calcium   Date Value Ref Range Status   05/30/2018 9.5 8.7 - 10.5 mg/dL Final     Total Protein   Date Value Ref Range Status   05/30/2018 7.6 6.0 - 8.4 g/dL Final     Albumin   Date Value Ref Range Status   05/30/2018 3.6 3.5 - 5.2 g/dL Final     Total Bilirubin   Date Value Ref Range Status   05/30/2018 0.9 0.1 - 1.0 mg/dL Final     Comment:     For infants and newborns, interpretation of results should be based  on gestational age, weight and in agreement with clinical  observations.  Premature Infant recommended reference ranges:  Up to 24 hours.............<8.0 mg/dL  Up to 48 hours............<12.0 mg/dL  3-5 days..................<15.0 mg/dL  6-29 days.................<15.0 mg/dL       Alkaline Phosphatase   Date Value Ref Range Status   05/30/2018 67 55 - 135 U/L Final     AST   Date Value Ref Range Status   05/30/2018 23 10 - 40 U/L Final     ALT   Date Value Ref Range Status   05/30/2018 22 10 - 44 U/L Final     Anion Gap   Date Value Ref Range Status   05/30/2018 8 8 - 16 mmol/L Final      eGFR if    Date Value Ref Range Status   05/30/2018 >60.0 >60 mL/min/1.73 m^2 Final     eGFR if non    Date Value Ref Range Status   05/30/2018 56.8 (A) >60 mL/min/1.73 m^2 Final     Comment:     Calculation used to obtain the estimated glomerular filtration  rate (eGFR) is the CKD-EPI equation.        Lab Results   Component Value Date    WBC 4.15 05/30/2018    HGB 12.5 (L) 05/30/2018    HCT 39.5 (L) 05/30/2018     (H) 05/30/2018    PLT 70 (L) 05/30/2018     Lab Results   Component Value Date    CHOL 158 11/09/2017     Lab Results   Component Value Date    HDL 48 11/09/2017     Lab Results   Component Value Date    LDLCALC 86.4 11/09/2017     Lab Results   Component Value Date    TRIG 118 11/09/2017     Lab Results   Component Value Date    CHOLHDL 30.4 11/09/2017     Lab Results   Component Value Date    TSH 0.587 11/21/2017     Lab Results   Component Value Date    HGBA1C 5.2 11/09/2017     Assessment:       1. Anemia, unspecified type    2. Chronic constipation    3. Dyslipidemia    4. Gout, unspecified cause, unspecified chronicity, unspecified site    5. H/O prostatectomy    6. Hoarseness    7. Essential hypertension    8. Localized edema    9. Prediabetes    10. Stage 2 chronic kidney disease    11. Tinnitus of left ear        Plan:   Anemia, unspecified type-----------stable.    Chronic constipation--------------linzess.    Dyslipidemia    Gout, unspecified cause, unspecified chronicity, unspecified site  -     Uric acid; Future; Expected date: 06/07/2018    H/O prostatectomy--------------------sees urology dr watson.    Hoarseness--------------------egd.    Essential hypertension----------------------controlled.    Localized edema    Prediabetes  -     Hemoglobin A1c; Future; Expected date: 06/07/2018    Stage 2 chronic kidney disease  -     Testosterone; Future; Expected date: 06/07/2018    Tinnitus of left ear-----------------seen by ent.            F/u 4  months.

## 2018-06-25 ENCOUNTER — CLINICAL SUPPORT (OUTPATIENT)
Dept: OPHTHALMOLOGY | Facility: CLINIC | Age: 83
End: 2018-06-25
Payer: MEDICARE

## 2018-06-25 ENCOUNTER — OFFICE VISIT (OUTPATIENT)
Dept: OPHTHALMOLOGY | Facility: CLINIC | Age: 83
End: 2018-06-25
Payer: MEDICARE

## 2018-06-25 DIAGNOSIS — H25.13 SENILE NUCLEAR SCLEROSIS, BILATERAL: ICD-10-CM

## 2018-06-25 DIAGNOSIS — H02.403 PTOSIS, BILATERAL: ICD-10-CM

## 2018-06-25 DIAGNOSIS — H21.562 AFFERENT PUPILLARY DEFECT, LEFT: ICD-10-CM

## 2018-06-25 DIAGNOSIS — H40.1133 PRIMARY OPEN ANGLE GLAUCOMA OF BOTH EYES, SEVERE STAGE: Primary | ICD-10-CM

## 2018-06-25 DIAGNOSIS — H40.1133 PRIMARY OPEN ANGLE GLAUCOMA OF BOTH EYES, SEVERE STAGE: ICD-10-CM

## 2018-06-25 PROCEDURE — 92083 EXTENDED VISUAL FIELD XM: CPT | Mod: PBBFAC

## 2018-06-25 PROCEDURE — 92250 FUNDUS PHOTOGRAPHY W/I&R: CPT | Mod: PBBFAC | Performed by: OPHTHALMOLOGY

## 2018-06-25 PROCEDURE — 92014 COMPRE OPH EXAM EST PT 1/>: CPT | Mod: S$PBB,,, | Performed by: OPHTHALMOLOGY

## 2018-06-25 PROCEDURE — 99212 OFFICE O/P EST SF 10 MIN: CPT | Mod: PBBFAC,25 | Performed by: OPHTHALMOLOGY

## 2018-06-25 PROCEDURE — 92083 EXTENDED VISUAL FIELD XM: CPT | Mod: 26,S$PBB,, | Performed by: OPHTHALMOLOGY

## 2018-06-25 PROCEDURE — 99999 PR PBB SHADOW E&M-EST. PATIENT-LVL II: CPT | Mod: PBBFAC,,, | Performed by: OPHTHALMOLOGY

## 2018-06-25 NOTE — PROGRESS NOTES
Assessment /Plan     For exam results, see Encounter Report.    Primary open angle glaucoma of both eyes, severe stage    Afferent pupillary defect, left    Senile nuclear sclerosis, bilateral    Ptosis, bilateral        Pt is  using timolol bid ou - was intolerant to combigan - too dizzy     1. POAG - adv./severe stage OS>OD      Presented at [facility] 2012 - had been dx with glaucoma by outside doctor years ago       He stopped gtts on his own and never followed up      Excellent response to Rx with only one gtt - xalatan      First HVF 3/22/2012      First photos 3/22/2012           Family history    neg        Glaucoma meds    - timolol bid ou         H/O adverse rxn to glaucoma drops    C/O red/painful eye with latanoprost/travatan gtts /// too dizzy w/ combigan         LASERS    SLT OS 4/16/2015 // SLT od 5/14/2015 (good resp 19/19 --> 13/13)        GLAUCOMA SURGERIES    none        OTHER EYE SURGERIES    none        CDR    0.8/0.9        Tbase    23-26/26-32          Tmax    26/32            Ttarget    17/17             HVF    7 VF '12-'18 -  24-2ss od-gen red in sens- SNS/SAD  // 24-2 stimV os-dense central scotoma        Gonio    +3 ou        CCT    531/546        OCT   3 test 2010 - 2017 -  RNFL -  OD:dec T/I // OS:dec S/T/I        HRT    4 test 2012 to 2017 -  MR - [place] off od // no image os         Disc photos    2010, 2014, 2016, 2018   - OIS    - Ttoday 16- 17/ 16-17  - Test done today   -  HVF / DFE / photos     2.   APD os - 2/2 to #1    3.   Nuclear Sclerosis ou        VA 20/80 od // CF at 1' os - poor VA os 2/2 adv. Glaucoma/VF loss    4.  Ptosis ou -stable and not visually significant    5.   Dermatochalasis ou -stable    6. S/P a 2nd knee replacement on left June 2017    Doing well - on crutches     Plan  Adv POAG OS>OD  Good initial  resp to slt ou 5/2015 19/19--> 13/13    tolerating timolol bid ou   Intol to all PG's - pain   Intol to combigan - dizzy     Did have Excellent resp to  xalatan and doing well 26/32 -> 15/14- will re-address in future if need to lower IOP more and out of options - but he stopped on his own - C/O burning ect - can restart Xalatan if needed    Long discussion in past with patient about DG and burning from glaucoma gtts   Discussed, at length,  the risk of blindness if the glaucoma is not controlled  ATs prn    Doubt CE OS will help vision much - lrg central scotoma and APD  Hold off on CE od for now - BCVA 20/50 on refraction  1/16/2017     Rx for new glasses given 1/16/2017 // No real improvement - but lenses are badly scratched   Re-printed Rx for glasses 5/2017 - pt says the [facility] made them wrong - but the want/need a new Rx printed     If needs a trab or tube os consider CE at same time - but very limited potential OS  If needs surgery od - consider combined - this is the better eye     F/U  4 months with HRT     Pt wants to see an ENT doctor - saw one in [place] a year or so ago, but wants to see someone else . C/O tinitus - given dr Sher Angelo's name     I have seen and personally examined the patient.  I agree with the findings, assessment and plan of the resident and/or fellow.     Clau Carvalho MD

## 2018-07-05 ENCOUNTER — SURGERY (OUTPATIENT)
Age: 83
End: 2018-07-05

## 2018-07-05 ENCOUNTER — ANESTHESIA EVENT (OUTPATIENT)
Dept: ENDOSCOPY | Facility: HOSPITAL | Age: 83
End: 2018-07-05
Payer: MEDICARE

## 2018-07-05 ENCOUNTER — HOSPITAL ENCOUNTER (OUTPATIENT)
Facility: HOSPITAL | Age: 83
Discharge: HOME | End: 2018-07-05
Attending: INTERNAL MEDICINE | Admitting: INTERNAL MEDICINE
Payer: MEDICARE

## 2018-07-05 ENCOUNTER — ANESTHESIA (OUTPATIENT)
Dept: ENDOSCOPY | Facility: HOSPITAL | Age: 83
End: 2018-07-05
Payer: MEDICARE

## 2018-07-05 VITALS
TEMPERATURE: 98 F | DIASTOLIC BLOOD PRESSURE: 70 MMHG | WEIGHT: 296 LBS | OXYGEN SATURATION: 97 % | SYSTOLIC BLOOD PRESSURE: 138 MMHG | BODY MASS INDEX: 36.04 KG/M2 | HEART RATE: 60 BPM | RESPIRATION RATE: 18 BRPM | HEIGHT: 76 IN

## 2018-07-05 DIAGNOSIS — K20.90 ESOPHAGITIS: ICD-10-CM

## 2018-07-05 DIAGNOSIS — K29.30 CHRONIC SUPERFICIAL GASTRITIS WITHOUT BLEEDING: ICD-10-CM

## 2018-07-05 DIAGNOSIS — R49.0 HOARSENESS OF VOICE: ICD-10-CM

## 2018-07-05 DIAGNOSIS — R49.0 HOARSENESS: Primary | ICD-10-CM

## 2018-07-05 PROCEDURE — 43239 EGD BIOPSY SINGLE/MULTIPLE: CPT | Performed by: INTERNAL MEDICINE

## 2018-07-05 PROCEDURE — 25000003 PHARM REV CODE 250: Performed by: INTERNAL MEDICINE

## 2018-07-05 PROCEDURE — 37000008 HC ANESTHESIA 1ST 15 MINUTES: Performed by: INTERNAL MEDICINE

## 2018-07-05 PROCEDURE — 37000009 HC ANESTHESIA EA ADD 15 MINS: Performed by: INTERNAL MEDICINE

## 2018-07-05 PROCEDURE — 88305 TISSUE EXAM BY PATHOLOGIST: CPT | Mod: 59 | Performed by: PATHOLOGY

## 2018-07-05 PROCEDURE — 43239 EGD BIOPSY SINGLE/MULTIPLE: CPT | Mod: ,,, | Performed by: INTERNAL MEDICINE

## 2018-07-05 PROCEDURE — 63600175 PHARM REV CODE 636 W HCPCS: Performed by: NURSE ANESTHETIST, CERTIFIED REGISTERED

## 2018-07-05 PROCEDURE — 27201012 HC FORCEPS, HOT/COLD, DISP: Performed by: INTERNAL MEDICINE

## 2018-07-05 PROCEDURE — 88305 TISSUE EXAM BY PATHOLOGIST: CPT | Mod: 26,,, | Performed by: PATHOLOGY

## 2018-07-05 RX ORDER — LIDOCAINE HCL/PF 100 MG/5ML
SYRINGE (ML) INTRAVENOUS
Status: DISCONTINUED | OUTPATIENT
Start: 2018-07-05 | End: 2018-07-05

## 2018-07-05 RX ORDER — PROPOFOL 10 MG/ML
INJECTION, EMULSION INTRAVENOUS
Status: DISCONTINUED | OUTPATIENT
Start: 2018-07-05 | End: 2018-07-05

## 2018-07-05 RX ORDER — SODIUM CHLORIDE, SODIUM LACTATE, POTASSIUM CHLORIDE, CALCIUM CHLORIDE 600; 310; 30; 20 MG/100ML; MG/100ML; MG/100ML; MG/100ML
INJECTION, SOLUTION INTRAVENOUS CONTINUOUS
Status: DISCONTINUED | OUTPATIENT
Start: 2018-07-05 | End: 2018-07-05 | Stop reason: HOSPADM

## 2018-07-05 RX ADMIN — PROPOFOL 40 MG: 10 INJECTION, EMULSION INTRAVENOUS at 11:07

## 2018-07-05 RX ADMIN — PROPOFOL 60 MG: 10 INJECTION, EMULSION INTRAVENOUS at 11:07

## 2018-07-05 RX ADMIN — LIDOCAINE HYDROCHLORIDE 100 MG: 20 INJECTION, SOLUTION INTRAVENOUS at 11:07

## 2018-07-05 RX ADMIN — SODIUM CHLORIDE, SODIUM LACTATE, POTASSIUM CHLORIDE, AND CALCIUM CHLORIDE: 600; 310; 30; 20 INJECTION, SOLUTION INTRAVENOUS at 10:07

## 2018-07-05 RX ADMIN — PROPOFOL 140 MG: 10 INJECTION, EMULSION INTRAVENOUS at 11:07

## 2018-07-05 RX ADMIN — SODIUM CHLORIDE, SODIUM LACTATE, POTASSIUM CHLORIDE, AND CALCIUM CHLORIDE: 600; 310; 30; 20 INJECTION, SOLUTION INTRAVENOUS at 11:07

## 2018-07-05 NOTE — DISCHARGE INSTRUCTIONS
Esophagitis     With esophagitis, the lining of the esophagus is inflamed.   Do you often have burning pain in your chest? You may have esophagitis. This is when the lining of the esophagus becomes red and swollen (inflamed). The esophagus is the tube that connects your throat to your stomach. This sheet tells you more about esophagitis. It also explains your treatment options.  Main types of esophagitis  Reflux esophagitis. This is the more common type. It is caused by GERD (gastroesophageal reflux disease). Stomach contents with stomach acid flow back up into the esophagus. This happens over and over. It leads to inflammation. Risk factors can include:  · Being overweight  · Asthma  · Smoking  · Pregnancy  · Frequent vomiting  · Certain medicines (such as aspirin and other anti-inflammatories)  · Hiatal hernia  Infectious esophagitis. This is caused by an infection. You are more at risk for this if you have a weakened immune system and poor nutrition. Antibiotic use can also be a factor. The infection is often due to the following:  · A type of fungus (typically candida)  · A virus, such as herpes simplex virus 1 (HSV-1) or cytomegalovirus (CMV)  Eosinophilic esophagitis. Foods or other things around you can give you an allergic reaction. This triggers an immune response and leads to esophagitis.  Pill-induced esophagitis. Certain types of medicines can cause inflammation and ulcers in the esophagus. These include doxycycline, aspirin, NSAIDs, alendronate, potassium, quinidine, iron.  Symptoms of esophagitis  The following symptoms can occur with esophagitis:  · Pain when swallowing, or trouble swallowing  · Pain behind your breastbone (heartburn)  · Acid regurgitation  · Chronic sore throat  · Gum Inflammation  · Cavities  · Bad breath  · Nausea  · Pain in your upper belly (abdomen)  · Bleeding (indicated by bright red vomit or black, tarry stool)  These symptoms occur more often with reflux  esophagitis:  · Coughing, wheezing, or asthma  · Hoarseness  Diagnosis of esophagitis  Your healthcare provider will ask about your health history and symptoms. Youll also be examined. Sometimes certain tests are needed. These may include:  · Upper endoscopy. A thin, flexible tube with a tiny light and camera is used. It is inserted through the mouth down into the esophagus. This lets the provider look for damage. A small sample of tissue (biopsy) may also be removed. The sample is sent to a lab for testing.  · Upper GI X-ray with barium. An X-ray is done after you drink a substance called barium. Barium may make problems in the esophagus easier to see on an x-ray.  · Esophageal pH. A soft, thin tube is passed into the esophagus through the nose or mouth for 24 hours. It measures the acid level in the esophagus.  · Esophageal manometry. A soft, thin tube is passed into the esophagus through the nose or mouth. It measures muscle contractions in the esophagus.  Treatment of esophagitis  Medicines. Different medicines can help treat esophagitis. The medicine used will depend on the type of esophagitis you have. Talk with your healthcare provider.  Lifestyle changes. Making the following changes can help reduce irritation and ease your symptoms:  · Avoid spicy foods (pepper, chili powder, zamora). Also avoid hard foods (nuts, crackers, raw vegetables) and acidic foods and drinks (tomatoes, citrus fruits and juices). Other problem foods include chocolate, peppermint, nutmeg, and foods high in fat.  · Until you can swallow without pain, follow a combined liquid and soft diet. Try foods such as cooked cereals, mashed potatoes, and soups.  · Take small bites and chew your food thoroughly.  · Avoid large meals and heavy evening meals. Don't lie down within 2 to 3 hours of eating.  · Get to or stay at a healthy weight.  · Avoid alcohol, caffeine, and smoking or tobacco products.  · Brush and floss your teeth  · Raise your  upper body by 4 to 6 inches when lying in bed. This can be done using a foam wedge. Or put blocks under the legs at the head of your bed.  Surgery. This may be needed for severe reflux esophagitis. Other noninvasive procedures to treat GERD and esophagitis are being studied. Your provider can tell you more.  Why treatment Is important  Without treatment, esophagitis can get worse. This is especially true with severe reflux esophagitis. For instance, continued symptoms can cause scarring of the esophagus. Over time, this can cause a narrowing the esophagus (stricture). This can make it hard to pass food down to the stomach. As symptoms go on they can also cause changes in the lining of the esophagus. These changes can put you at a slightly higher risk of cancer of the esophagus.   Date Last Reviewed: 7/1/2016 © 2000-2017 [employer]. [address] [place] [id number]. All rights reserved. This information is not intended as a substitute for professional medical care. Always follow your healthcare professional's instructions.        Gastritis (Adult)    Gastritis is inflammation and irritation of the stomach lining. It can be present for a short time (acute) or be long lasting (chronic). Gastritis is often caused by infection with bacteria called H pylori. More than a third of people in the [place] have this bacteria in their bodies. In many cases, H pylori causes no problems or symptoms. In some people, though, the infection irritates the stomach lining and causes gastritis. Other causes of stomach irritation include drinking alcohol or taking pain-relieving medicines called NSAIDs (such as aspirin or ibuprofen).   Symptoms of gastritis can include:  · Abdominal pain or bloating  · Loss of appetite  · Nausea or vomiting  · Vomiting blood or having black stools  · Feeling more tired than usual  An inflamed and irritated stomach lining is more likely to develop a sore called an ulcer. To help prevent  this, gastritis should be treated.  Home care  If needed, medicines may be prescribed. If you have H pylori infection, treating it will likely relieve your symptoms. Other changes can help reduce stomach irritation and help it heal.  · If you have been prescribed medicines for H pylori infection, take them as directed. Take all of the medicine until it is finished or your healthcare provider tells you to stop, even if you feel better.  · Your healthcare provider may recommend avoiding NSAIDs. If you take daily aspirin for your heart or other medical reasons, do not stop without talking to your healthcare provider first.  · Avoid drinking alcohol.  · Stop smoking. Smoking can irritate the stomach and delay healing. As much as possible, stay away from second hand smoke.  Follow-up care  Follow up with your healthcare provider, or as advised by our staff. Testing may be needed to check for inflammation or an ulcer.  When to seek medical advice  Call your healthcare provider for any of the following:  · Stomach pain that gets worse or moves to the lower right abdomen (appendix area)  · Chest pain that appears or gets worse, or spreads to the back, neck, shoulder, or arm  · Frequent vomiting (cant keep down liquids)  · Blood in the stool or vomit (red or black in color)  · Feeling weak or dizzy  · Fever of 100.4ºF (38ºC) or higher, or as directed by your healthcare provider  Date Last Reviewed: 6/22/2015  © 4148-7645 [employer]. [address] [place] [address], [place] [address]. All rights reserved. This information is not intended as a substitute for professional medical care. Always follow your healthcare professional's instructions.

## 2018-07-05 NOTE — TRANSFER OF CARE
"Anesthesia Transfer of Care Note    Patient: Dominic Cohen    Procedure(s) Performed: Procedure(s) (LRB):  ESOPHAGOGASTRODUODENOSCOPY (EGD) (N/A)    Patient location: PACU    Anesthesia Type: MAC    Transport from OR: Transported from OR on room air with adequate spontaneous ventilation    Post pain: adequate analgesia    Post assessment: no apparent anesthetic complications    Post vital signs: stable    Level of consciousness: sedated    Nausea/Vomiting: no nausea/vomiting    Complications: none    Transfer of care protocol was followed      Last vitals:   Visit Vitals  /62 (BP Location: Left arm, Patient Position: Lying)   Pulse (!) 56   Temp 36.8 °C (98.2 °F) (Oral)   Resp 14   Ht 6' 4" (1.93 m)   Wt 134.3 kg (296 lb)   SpO2 95%   BMI 36.03 kg/m²     "

## 2018-07-05 NOTE — [employer] PATIENT INSTRUCTIONS
Discharge Summary/Instructions after an Endoscopic Procedure  Patient Name: Dominic Cohen  Patient MRN: [id number]  Patient YOB: 1935 Thursday, July 05, 2018 Khanh Asencio III, MD  RESTRICTIONS:  During your procedure today, you received medications for sedation.  These   medications may affect your judgment, balance and coordination.  Therefore,   for 24 hours, you have the following restrictions:   - DO NOT drive a car, operate machinery, make legal/financial decisions,   sign important papers or drink alcohol.    ACTIVITY:  Today: no heavy lifting, straining or running due to procedural   sedation/anesthesia.  The following day: return to full activity including work.  DIET:  Eat and drink normally unless instructed otherwise.     TREATMENT FOR COMMON SIDE EFFECTS:  - Mild abdominal pain, nausea, belching, bloating or excessive gas:  rest,   eat lightly and use a heating pad.  - Sore Throat: treat with throat lozenges and/or gargle with warm salt   water.  - Because air was used during the procedure, expelling large amounts of air   from your rectum or belching is normal.  - If a bowel prep was taken, you may not have a bowel movement for 1-3 days.    This is normal.  SYMPTOMS TO WATCH FOR AND REPORT TO YOUR PHYSICIAN:  1. Abdominal pain or bloating, other than gas cramps.  2. Chest pain.  3. Back pain.  4. Signs of infection such as: chills or fever occurring within 24 hours   after the procedure.  5. Rectal bleeding, which would show as bright red, maroon, or black stools.   (A tablespoon of blood from the rectum is not serious, especially if   hemorrhoids are present.)  6. Vomiting.  7. Weakness or dizziness.  GO DIRECTLY TO THE NEAREST EMERGENCY ROOM IF YOU HAVE ANY OF THE FOLLOWING:      Difficulty breathing              Chills and/or fever over 101 F   Persistent vomiting and/or vomiting blood   Severe abdominal pain   Severe chest pain   Black, tarry stools   Bleeding- more than one  tablespoon   Any other symptom or condition that you feel may need urgent attention  Your doctor recommends these additional instructions:  If any biopsies were taken, your doctors clinic will contact you in 1 to 2   weeks with any results.  - Discharge patient to home (via wheelchair).   - Resume previous diet.   - Continue present medications.   - Await pathology results.   - Return to GI clinic as previously scheduled.   - Refer to an ENT specialist at appointment to be scheduled.  For questions, problems or results please call your physician Khanh Asencio III, MD at Work:  [phone]  If you have any questions about the above instructions, call the GI   department at [phone] or call the endoscopy unit at [phone]   from 7am until 3 pm.  [facility], EMERGENCY ROOM PHONE NUMBER:   [phone]  IF A COMPLICATION OR EMERGENCY SITUATION ARISES AND YOU ARE UNABLE TO REACH   YOUR PHYSICIAN - GO DIRECTLY TO THE EMERGENCY ROOM.  I have read or have had read to me these discharge instructions for my   procedure and have received a written copy.  I understand these   instructions and will follow-up with my physician if I have any questions.     __________________________________       _____________________________________  Nurse Signature                                          Patient/Designated   Responsible Party Signature  Khanh Asencio III, MD  7/5/2018 12:06:31 PM  This report has been verified and signed electronically.  [employer]

## 2018-07-05 NOTE — H&P
Short Stay Endoscopy History and Physical    PCP - Sudhakar Liu MD    Procedure - EGD  ASA - 2  Mallampati - per anesthesia  History of Anesthesia problems - no  Family history Anesthesia problems -  no     HPI:  This is a 82 y.o.male here for evaluation of : Hoarseness; Rule out GERD as cause.    Reflux - no  Dysphagia - no  Abdominal pain - no  Diarrhea - no  Anemia - no  GI bleeding - no  Nausea and vomiting-no  Early satiety-no  aversion to sight or smell of food-no    ROS:  Constitutional: No fevers, chills, No weight loss  ENT: No allergies  CV: No chest pain  Pulm: No cough, No shortness of breath  Ophtho: No vision changes  GI: see HPI  Derm: No rash  Heme: No lymphadenopathy, No bruising  MSK: No arthritis  : No dysuria, No hematuria  Endo: No hot or cold intolerance  Neuro: No syncope, No seizure  Psych: No anxiety, No depression    Medical History:  Past Medical History:   Diagnosis Date    Arthritis     Atherosclerosis of abdominal aorta     Cataract     Chronic constipation     Glaucoma     History of anal fissures     Hypertension     Polyneuropathy in other diseases classified elsewhere     due to folate deficiency    Prediabetes     Venous insufficiency     Venous insufficiency        Surgical History:  Past Surgical History:   Procedure Laterality Date    BACK SURGERY      CATARACT EXTRACTION BILATERAL W/ ANTERIOR VITRECTOMY      COLONOSCOPY N/A 5/13/2016    Procedure: COLONOSCOPY;  Surgeon: Presley Phillips MD;  Location: [address];  Service: Endoscopy;  Laterality: N/A;  EGD with Dr. Jeffery prior to Colonoscopy. EC    JOINT REPLACEMENT Left     x 2    KNEE SURGERY Left     x2. revision 06/27/17    PROSTATE SURGERY      TONSILLECTOMY, ADENOIDECTOMY         Family History:  Family History   Problem Relation Age of Onset    No Known Problems Son     No Known Problems Daughter     No Known Problems Son     Diabetes Neg Hx     Heart disease Neg Hx      Cancer Neg Hx     Celiac disease Neg Hx     Cirrhosis Neg Hx     Colon cancer Neg Hx     Colon polyps Neg Hx     Crohn's disease Neg Hx     Cystic fibrosis Neg Hx     Esophageal cancer Neg Hx     Hemochromatosis Neg Hx     Inflammatory bowel disease Neg Hx     Irritable bowel syndrome Neg Hx     Liver cancer Neg Hx     Liver disease Neg Hx     Rectal cancer Neg Hx     Stomach cancer Neg Hx     Ulcerative colitis Neg Hx     Zak's disease Neg Hx     Amblyopia Neg Hx     Blindness Neg Hx     Glaucoma Neg Hx     Hypertension Neg Hx     Macular degeneration Neg Hx     Retinal detachment Neg Hx     Strabismus Neg Hx        Social History:  Social History     Social History    Marital status:      Spouse name: N/A    Number of children: 3    Years of education: N/A     Occupational History    Retired      [place], [place]     Social History Main Topics    Smoking status: Former Smoker     Packs/day: 0.30     Years: 52.00     Types: Cigarettes     Quit date: 11/13/2000    Smokeless tobacco: Never Used    Alcohol use No    Drug use: No    Sexual activity: Yes     Other Topics Concern    Not on file     Social History Narrative    No narrative on file       Allergies:   Review of patient's allergies indicates:   Allergen Reactions    Penicillins Hives and Swelling    Protonix [pantoprazole] Rash    Sulfa (sulfonamide antibiotics) Hives       Medications:   No current facility-administered medications on file prior to encounter.      Current Outpatient Prescriptions on File Prior to Encounter   Medication Sig Dispense Refill    cyanocobalamin, vitamin B-12, (VITAMIN B-12) 50 mcg tablet Take 50 mcg by mouth once daily.      ramipril (ALTACE) 2.5 MG capsule TAKE 1 CAPSULE BY MOUTH ONCE A DAY (Patient taking differently: TAKE 1 CAPSULE BY MOUTH 0.5MG EVER OTHER DAY) 90 capsule 3    timolol maleate 0.5% (TIMOPTIC) 0.5 % Drop Place 1 drop into both eyes 2 (two) times daily. 10 mL 12     ascorbic acid (VITAMIN C) 60 mg Lozg Take 1 tablet by mouth daily as needed.       aspirin (ECOTRIN) 81 MG EC tablet Take 81 mg by mouth once daily.      blood sugar diagnostic (BLOOD GLUCOSE TEST) Strp 1 strip by Misc.(Non-Drug; Combo Route) route 3 (three) times a week. 32 strip 11    ERGOCALCIFEROL, VITAMIN D2, (VITAMIN D ORAL) Take 1 tablet by mouth once daily.       linaclotide (LINZESS) 145 mcg Cap capsule Take 1 capsule (145 mcg total) by mouth once daily. (Patient taking differently: Take 145 mcg by mouth as needed. ) 30 capsule 5    ranitidine (ZANTAC) 150 MG tablet Take 1 tablet (150 mg total) by mouth 2 (two) times daily. 60 tablet 5       Objective Findings:    Vital Signs:  Vitals:    07/05/18 0953   BP: 137/74   Pulse: 60   Resp: 18   Temp: 98.3 °F (36.8 °C)           Physical Exam:  General Appearance: Well appearing in no acute distress  Eyes:    No scleral icterus  ENT: Neck supple, Lips, mucosa, and tongue normal; teeth and gums normal  Lungs: CTA bilaterally in anterior and posterior fields, no wheezes, no crackles.  Heart:  Regular rate, S1, S2 normal, no murmurs heard.  Abdomen: Soft, non tender, non distended with normal bowel sounds. No hepatosplenomegaly, ascites, or mass.  Extremities: No clubbing, cyanosis or edema  Skin: No rash    Labs:  Reviewed    Plan:EGD  I have explained the risks and benefits of endoscopy procedures to the patient including but not limited to bleeding, perforation, infection, and death. The patient wishes to proceed.

## 2018-07-05 NOTE — DISCHARGE SUMMARY
[facility] -   Brief Operative Note     SUMMARY     Surgery Date: 7/5/2018     Surgeon(s) and Role:     * Khanh Asencio III, MD - Primary    Assisting Surgeon: None    Pre-op Diagnosis:  Hoarseness [R49.0]  Belching [R14.2]    Post-op Diagnosis:  Post-Op Diagnosis Codes:     * Hoarseness [R49.0]     * Belching [R14.2]      - Esophagitis      - Gastritis  Procedure(s) (LRB):  ESOPHAGOGASTRODUODENOSCOPY (EGD) (N/A)    Anesthesia: Monitor Anesthesia Care    Description of the findings of the procedure: Procedures completed. See Procedure note for full details.    Findings/Key Components: Procedures completed. See Procedure note for full details.    Prosthetics/Devices: None    Estimated Blood Loss: * No values recorded between 7/5/2018 12:00 AM and 7/5/2018 12:10 PM *         Specimens:   Specimen (12h ago through future)    Start     Ordered    07/05/18 1134  Specimen to Pathology - Surgery  Once     Comments:  1. Antrum bx- R/O H.Pylori2. GE junction bx- esophagitis      07/05/18 1138          Discharge Note    SUMMARY     Admit Date: 7/5/2018    Discharge Date and Time: 7/5/2018    Hospital Course (synopsis of major diagnoses, care, treatment, and services provided during the course of the hospital stay):  Procedures completed. See Procedure note for full details. Discharge patient when discharge criteria met.    Final Diagnosis: Post-Op Diagnosis Codes:     * Hoarseness [R49.0]     * Belching [R14.2]     - Gastritis     - Esophagitis  Disposition: Discharge patient when discharge criteria met.    Follow Up/Patient Instructions:       Medications:  Reconciled Home Medications: Current Discharge Medication List      CONTINUE these medications which have NOT CHANGED    Details   cyanocobalamin, vitamin B-12, (VITAMIN B-12) 50 mcg tablet Take 50 mcg by mouth once daily.      ramipril (ALTACE) 2.5 MG capsule TAKE 1 CAPSULE BY MOUTH ONCE A DAY  Qty: 90 capsule, Refills: 3      timolol maleate 0.5%  (TIMOPTIC) 0.5 % Drop Place 1 drop into both eyes 2 (two) times daily.  Qty: 10 mL, Refills: 12    Associated Diagnoses: Primary open angle glaucoma of both eyes, severe stage      ascorbic acid (VITAMIN C) 60 mg Lozg Take 1 tablet by mouth daily as needed.       aspirin (ECOTRIN) 81 MG EC tablet Take 81 mg by mouth once daily.      blood sugar diagnostic (BLOOD GLUCOSE TEST) Strp 1 strip by Misc.(Non-Drug; Combo Route) route 3 (three) times a week.  Qty: 32 strip, Refills: 11    Associated Diagnoses: Prediabetes      ERGOCALCIFEROL, VITAMIN D2, (VITAMIN D ORAL) Take 1 tablet by mouth once daily.       linaclotide (LINZESS) 145 mcg Cap capsule Take 1 capsule (145 mcg total) by mouth once daily.  Qty: 30 capsule, Refills: 5    Associated Diagnoses: Constipation, unspecified constipation type; Lower abdominal pain; Bloating; Belching      ranitidine (ZANTAC) 150 MG tablet Take 1 tablet (150 mg total) by mouth 2 (two) times daily.  Qty: 60 tablet, Refills: 5    Associated Diagnoses: Gastroesophageal reflux disease without esophagitis              Discharge Procedure Orders  Diet general     Activity as tolerated

## 2018-07-05 NOTE — ANESTHESIA POSTPROCEDURE EVALUATION
"Anesthesia Post Evaluation    Patient: Dominic Cohen    Procedure(s) Performed: Procedure(s) (LRB):  ESOPHAGOGASTRODUODENOSCOPY (EGD) (N/A)    Final Anesthesia Type: MAC  Patient location during evaluation: PACU  Patient participation: Yes- Able to Participate  Level of consciousness: awake and alert and oriented  Post-procedure vital signs: reviewed and stable  Pain management: adequate  Airway patency: patent  PONV status at discharge: No PONV  Anesthetic complications: no      Cardiovascular status: blood pressure returned to baseline  Respiratory status: unassisted, spontaneous ventilation and room air  Hydration status: euvolemic  Follow-up not needed.        Visit Vitals  /62 (BP Location: Left arm, Patient Position: Lying)   Pulse (!) 56   Temp 36.8 °C (98.2 °F) (Oral)   Resp 14   Ht 6' 4" (1.93 m)   Wt 134.3 kg (296 lb)   SpO2 95%   BMI 36.03 kg/m²       Pain/Dutch Score: Pain Assessment Performed: Yes (7/5/2018  9:49 AM)  Presence of Pain: denies (7/5/2018  9:49 AM)  Dutch Score: 9 (7/5/2018 11:43 AM)      "

## 2018-07-05 NOTE — PLAN OF CARE
Dr Asencio came to bedside and discussed findings. NO N/V,  no abdominal pain, no GI bleeding, and vitals stable.  Pt discharged from unit.

## 2018-07-05 NOTE — ANESTHESIA PREPROCEDURE EVALUATION
07/05/2018  Dominic Cohen is a 82 y.o., male.    Anesthesia Evaluation    I have reviewed the Patient Summary Reports.    I have reviewed the Nursing Notes.   I have reviewed the Medications.     Review of Systems  Anesthesia Hx:  No problems with previous Anesthesia    Social:  Non-Smoker, No Alcohol Use    Hematology/Oncology:  Hematology Normal   Oncology Normal     EENT/Dental:EENT/Dental Normal   Cardiovascular:   Exercise tolerance: poor Hypertension, well controlled Dysrhythmias ECG has been reviewed. CONCLUSIONS     1 - Normal left ventricular systolic function (EF 55-60%).     2 - Left ventricular diastolic dysfunction.     3 - Normal right ventricular systolic function .     4 - The estimated PA systolic pressure is 26 mmHg.     5 - Trivial to mild tricuspid regurgitation.     Normal sinus rhythm  Incomplete right bundle branch block  Left anterior fascicular block  Abnormal ECG  When compared with ECG of 02-JUL-2016 08:44,  No significant change was found  Confirmed by CORINNE MCAK MD ([id number]) on 6/6/2017 7:51:46 PM   Pulmonary:   Shortness of breath snore   Renal/:   Chronic Renal Disease    Hepatic/GI:   GERD, poorly controlled 0000 last drink of fluid.  2140 last meal.   Musculoskeletal:  Musculoskeletal Normal    Neurological:   Neuromuscular Disease,    Endocrine:  Endocrine Normal    Dermatological:  Skin Normal    Psych:  Psychiatric Normal           Physical Exam  General:  Well nourished, Obesity    Airway/Jaw/Neck:  Airway Findings: Mallampati: IV     Dental:  Dental Findings: Upper Dentures, Lower Dentures             Anesthesia Plan  Type of Anesthesia, risks & benefits discussed:  Anesthesia Type:  MAC  Patient's Preference:   Intra-op Monitoring Plan:   Intra-op Monitoring Plan Comments:   Post Op Pain Control Plan:   Post Op Pain Control Plan Comments:   Induction:   IV  Beta  Blocker:  Patient is not currently on a Beta-Blocker (No further documentation required).       Informed Consent: Patient understands risks and agrees with Anesthesia plan.  Questions answered. Anesthesia consent signed with patient.  ASA Score: 2     Day of Surgery Review of History & Physical: I have interviewed and examined the patient. I have reviewed the patient's H&P dated: 07/05/18. There are no significant changes.  H&P update referred to the provider.         Ready For Surgery From Anesthesia Perspective.

## 2018-07-05 NOTE — ANESTHESIA RELEASE NOTE
"Anesthesia Release from PACU Note    Patient: Dominic Cohen    Procedure(s) Performed: Procedure(s) (LRB):  ESOPHAGOGASTRODUODENOSCOPY (EGD) (N/A)    Anesthesia type: MAC    Post pain: Adequate analgesia    Post assessment: no apparent anesthetic complications, tolerated procedure well and no evidence of recall    Last Vitals:   Visit Vitals  /62 (BP Location: Left arm, Patient Position: Lying)   Pulse (!) 56   Temp 36.8 °C (98.2 °F) (Oral)   Resp 14   Ht 6' 4" (1.93 m)   Wt 134.3 kg (296 lb)   SpO2 95%   BMI 36.03 kg/m²       Post vital signs: stable    Level of consciousness: awake    Nausea/Vomiting: no nausea/no vomiting    Complications: none    Airway Patency: patent    Respiratory: unassisted, spontaneous ventilation, room air    Cardiovascular: stable    Hydration: euvolemic  "

## 2018-07-10 DIAGNOSIS — M79.642 PAIN OF LEFT HAND: Primary | ICD-10-CM

## 2018-07-11 ENCOUNTER — TELEPHONE (OUTPATIENT)
Dept: GASTROENTEROLOGY | Facility: CLINIC | Age: 83
End: 2018-07-11

## 2018-07-11 NOTE — TELEPHONE ENCOUNTER
----- Message from Vanessa Henao sent at 7/11/2018  9:40 AM CDT -----  Contact: wife(Cammie)[phone]  Returning call, please call back at [phone]. Thanks/ar

## 2018-07-26 ENCOUNTER — PATIENT OUTREACH (OUTPATIENT)
Dept: ADMINISTRATIVE | Facility: HOSPITAL | Age: 83
End: 2018-07-26

## 2018-08-23 RX ORDER — BLOOD-GLUCOSE CONTROL, NORMAL
1 EACH MISCELLANEOUS 2 TIMES DAILY
Qty: 100 EACH | Refills: 11 | Status: SHIPPED | OUTPATIENT
Start: 2018-08-23 | End: 2020-03-12

## 2018-10-11 ENCOUNTER — OFFICE VISIT (OUTPATIENT)
Dept: FAMILY MEDICINE | Facility: CLINIC | Age: 83
End: 2018-10-11
Payer: MEDICARE

## 2018-10-11 ENCOUNTER — HOSPITAL ENCOUNTER (OUTPATIENT)
Dept: RADIOLOGY | Facility: HOSPITAL | Age: 83
Discharge: HOME | End: 2018-10-11
Attending: INTERNAL MEDICINE
Payer: MEDICARE

## 2018-10-11 VITALS
BODY MASS INDEX: 36.05 KG/M2 | WEIGHT: 296.06 LBS | SYSTOLIC BLOOD PRESSURE: 138 MMHG | DIASTOLIC BLOOD PRESSURE: 70 MMHG | OXYGEN SATURATION: 94 % | TEMPERATURE: 99 F | HEIGHT: 76 IN | HEART RATE: 64 BPM | RESPIRATION RATE: 18 BRPM

## 2018-10-11 DIAGNOSIS — R73.03 PREDIABETES: ICD-10-CM

## 2018-10-11 DIAGNOSIS — Z90.79 H/O PROSTATECTOMY: Chronic | ICD-10-CM

## 2018-10-11 DIAGNOSIS — M54.2 NECK PAIN: ICD-10-CM

## 2018-10-11 DIAGNOSIS — I10 ESSENTIAL HYPERTENSION: ICD-10-CM

## 2018-10-11 DIAGNOSIS — T84.093A FAILED TOTAL LEFT KNEE REPLACEMENT, INITIAL ENCOUNTER: ICD-10-CM

## 2018-10-11 DIAGNOSIS — R49.0 HOARSENESS: ICD-10-CM

## 2018-10-11 DIAGNOSIS — Z12.5 ENCOUNTER FOR PROSTATE CANCER SCREENING: ICD-10-CM

## 2018-10-11 DIAGNOSIS — E78.5 DYSLIPIDEMIA: ICD-10-CM

## 2018-10-11 DIAGNOSIS — R09.81 SINUS CONGESTION: ICD-10-CM

## 2018-10-11 DIAGNOSIS — I87.2 VENOUS INSUFFICIENCY: ICD-10-CM

## 2018-10-11 DIAGNOSIS — D64.9 ANEMIA, UNSPECIFIED TYPE: Primary | ICD-10-CM

## 2018-10-11 DIAGNOSIS — I70.0 ATHEROSCLEROSIS OF ABDOMINAL AORTA: Chronic | ICD-10-CM

## 2018-10-11 DIAGNOSIS — N18.2 STAGE 2 CHRONIC KIDNEY DISEASE: ICD-10-CM

## 2018-10-11 DIAGNOSIS — R60.0 LOCALIZED EDEMA: ICD-10-CM

## 2018-10-11 DIAGNOSIS — E66.01 CLASS 2 SEVERE OBESITY WITH SERIOUS COMORBIDITY IN ADULT, UNSPECIFIED BMI, UNSPECIFIED OBESITY TYPE: ICD-10-CM

## 2018-10-11 DIAGNOSIS — R42 VERTIGO: ICD-10-CM

## 2018-10-11 PROCEDURE — 99215 OFFICE O/P EST HI 40 MIN: CPT | Mod: S$PBB,,, | Performed by: INTERNAL MEDICINE

## 2018-10-11 PROCEDURE — 70210 X-RAY EXAM OF SINUSES: CPT | Mod: TC,FY,PO

## 2018-10-11 PROCEDURE — 90662 IIV NO PRSV INCREASED AG IM: CPT | Mod: PBBFAC,PO

## 2018-10-11 PROCEDURE — 70210 X-RAY EXAM OF SINUSES: CPT | Mod: 26,,, | Performed by: RADIOLOGY

## 2018-10-11 PROCEDURE — 72040 X-RAY EXAM NECK SPINE 2-3 VW: CPT | Mod: TC,FY,PO

## 2018-10-11 PROCEDURE — 72040 X-RAY EXAM NECK SPINE 2-3 VW: CPT | Mod: 26,,, | Performed by: RADIOLOGY

## 2018-10-11 PROCEDURE — 99215 OFFICE O/P EST HI 40 MIN: CPT | Mod: PBBFAC,PO | Performed by: INTERNAL MEDICINE

## 2018-10-11 PROCEDURE — 99999 PR PBB SHADOW E&M-EST. PATIENT-LVL V: CPT | Mod: PBBFAC,,, | Performed by: INTERNAL MEDICINE

## 2018-10-11 NOTE — PROGRESS NOTES
Subjective:       Patient ID: Dominic Cohen is a 82 y.o. male.    Chief Complaint: Follow-up (4 month); Hypertension; Hyperlipidemia; Anemia; and Chronic Kidney Disease    Hypertension   This is a chronic problem. The problem is controlled. Associated symptoms include neck pain. Pertinent negatives include no chest pain, headaches, palpitations or shortness of breath.   Hyperlipidemia   Associated symptoms include myalgias. Pertinent negatives include no chest pain or shortness of breath. Current antihyperlipidemic treatment includes diet change.   Anemia   There has been no abdominal pain, bruising/bleeding easily, confusion, fever, light-headedness, pallor or palpitations.     Past Medical History:   Diagnosis Date    Arthritis     Atherosclerosis of abdominal aorta     Cataract     Chronic constipation     Glaucoma     History of anal fissures     Hypertension     Polyneuropathy in other diseases classified elsewhere     due to folate deficiency    Prediabetes     Venous insufficiency     Venous insufficiency      Past Surgical History:   Procedure Laterality Date    BACK SURGERY      BONE GRAFT Left 6/27/2017    Performed by Malcolm Gill Sr., MD at [facility] OR    CATARACT EXTRACTION BILATERAL W/ ANTERIOR VITRECTOMY      COLONOSCOPY N/A 5/13/2016    Procedure: COLONOSCOPY;  Surgeon: Presley Phillips MD;  Location: [address]);  Service: Endoscopy;  Laterality: N/A;  EGD with Dr. Jeffery prior to Colonoscopy. EC    COLONOSCOPY N/A 5/13/2016    Performed by Presley Phillips MD at [facility] ([address] [place] [address])    COLONOSCOPY N/A 11/13/2012    Performed by Presley Phillips MD at [facility] ([address] [place] [address])    ESOPHAGOGASTRODUODENOSCOPY N/A 7/5/2018    Procedure: ESOPHAGOGASTRODUODENOSCOPY (EGD);  Surgeon: Khanh Asencio III, MD;  Location: [facility];  Service: Endoscopy;  Laterality: N/A;    ESOPHAGOGASTRODUODENOSCOPY (EGD) N/A 7/5/2018    Performed by Khanh Asencio III, MD at [facility] ENDO     ESOPHAGOGASTRODUODENOSCOPY (EGD) N/A 5/13/2016    Performed by Kendall Jeffery [facility] at [facility] ENDO (4TH FLR)    JOINT REPLACEMENT Left     x 2    KNEE SURGERY Left     x2. revision 06/27/17    PROSTATE SURGERY      REMOVAL-IMPLANT Left 6/27/2017    [facility]    REVISION-ARTHROPLASTY-KNEE Left 6/27/2017    [facility]    SYNOVECTOMY-KNEE Left 6/27/2017    [facility] OR    TONSILLECTOMY, ADENOIDECTOMY       Family History   Problem Relation Age of Onset    No Known Problems Son     No Known Problems Daughter     No Known Problems Son     Diabetes Neg Hx     Heart disease Neg Hx     Cancer Neg Hx     Celiac disease Neg Hx     Cirrhosis Neg Hx     Colon cancer Neg Hx     Colon polyps Neg Hx     Crohn's disease Neg Hx     Cystic fibrosis Neg Hx     Esophageal cancer Neg Hx     Hemochromatosis Neg Hx     Inflammatory bowel disease Neg Hx     Irritable bowel syndrome Neg Hx     Liver cancer Neg Hx     Liver disease Neg Hx     Rectal cancer Neg Hx     Stomach cancer Neg Hx     Ulcerative colitis Neg Hx     Zak's disease Neg Hx     Amblyopia Neg Hx     Blindness Neg Hx     Glaucoma Neg Hx     Hypertension Neg Hx     Macular degeneration Neg Hx     Retinal detachment Neg Hx     Strabismus Neg Hx      Social History     Socioeconomic History    Marital status:      Spouse name: Not on file    Number of children: 3    Years of education: Not on file    Highest education level: Not on file   Social Needs    Financial resource strain: Not on file    Food insecurity - worry: Not on file    Food insecurity - inability: Not on file    Transportation needs - medical: Not on file    Transportation needs - non-medical: Not on file   Occupational History    Occupation: Retired     Comment: [place], [place]   Tobacco Use    Smoking status: Former Smoker     Packs/day: 0.30      Years: 52.00     Pack years: 15.60     Types: Cigarettes     Last attempt to quit: 2000     Years since quittin.9    Smokeless tobacco: Never Used   Substance and Sexual Activity    Alcohol use: No    Drug use: No    Sexual activity: Yes   Other Topics Concern    Not on file   Social History Narrative    Not on file     Review of Systems   Constitutional: Positive for activity change. Negative for appetite change, chills, diaphoresis, fatigue, fever and unexpected weight change.   HENT: Positive for congestion. Negative for drooling, ear discharge, ear pain, facial swelling, hearing loss, mouth sores, nosebleeds, postnasal drip, rhinorrhea, sinus pressure, sneezing, sore throat, tinnitus, trouble swallowing and voice change.    Eyes: Negative for photophobia, redness and visual disturbance.   Respiratory: Negative for apnea, cough, choking, chest tightness, shortness of breath and wheezing.    Cardiovascular: Positive for leg swelling. Negative for chest pain and palpitations.   Gastrointestinal: Negative for abdominal distention, abdominal pain, anal bleeding, blood in stool, constipation, diarrhea, nausea and vomiting.   Endocrine: Negative for cold intolerance, heat intolerance, polydipsia, polyphagia and polyuria.   Genitourinary: Negative for difficulty urinating, dysuria, enuresis, flank pain, frequency, genital sores, hematuria and urgency.   Musculoskeletal: Positive for arthralgias, gait problem, myalgias and neck pain. Negative for back pain, joint swelling and neck stiffness.   Skin: Negative for color change, pallor, rash and wound.   Allergic/Immunologic: Negative for food allergies and immunocompromised state.   Neurological: Positive for dizziness and weakness. Negative for tremors, seizures, syncope, facial asymmetry, speech difficulty, light-headedness, numbness and headaches.   Hematological: Negative for adenopathy. Does not bruise/bleed easily.   Psychiatric/Behavioral: Negative  for agitation, behavioral problems, confusion, decreased concentration, dysphoric mood, hallucinations, self-injury, sleep disturbance and suicidal ideas. The patient is not nervous/anxious and is not hyperactive.        Objective:      Physical Exam   Constitutional: He is oriented to person, place, and time. He appears well-developed and well-nourished. No distress.   HENT:   Head: Normocephalic and atraumatic.   Eyes: Pupils are equal, round, and reactive to light.   Neck: Normal range of motion. Neck supple. No JVD present. Carotid bruit is not present. No tracheal deviation present. No thyromegaly present.   Cardiovascular: Normal rate, regular rhythm, normal heart sounds and intact distal pulses.   Pulmonary/Chest: Effort normal and breath sounds normal. No respiratory distress. He has no wheezes. He has no rales. He exhibits no tenderness.   Abdominal: Soft. Bowel sounds are normal. He exhibits no distension. There is no tenderness. There is no rebound and no guarding.   Musculoskeletal: Normal range of motion. He exhibits edema. He exhibits no tenderness.   Lymphadenopathy:     He has no cervical adenopathy.   Neurological: He is alert and oriented to person, place, and time.   Skin: Skin is warm and dry. No rash noted. He is not diaphoretic. No erythema. No pallor.   Psychiatric: He has a normal mood and affect. His behavior is normal. Judgment and thought content normal.   Nursing note and vitals reviewed.      CMP  Sodium   Date Value Ref Range Status   05/30/2018 141 136 - 145 mmol/L Final     Potassium   Date Value Ref Range Status   05/30/2018 4.7 3.5 - 5.1 mmol/L Final     Chloride   Date Value Ref Range Status   05/30/2018 105 95 - 110 mmol/L Final     CO2   Date Value Ref Range Status   05/30/2018 28 23 - 29 mmol/L Final     Glucose   Date Value Ref Range Status   05/30/2018 161 (H) 70 - 110 mg/dL Final     BUN, Bld   Date Value Ref Range Status   05/30/2018 13 8 - 23 mg/dL Final     Creatinine    Date Value Ref Range Status   05/30/2018 1.2 0.5 - 1.4 mg/dL Final     Calcium   Date Value Ref Range Status   05/30/2018 9.5 8.7 - 10.5 mg/dL Final     Total Protein   Date Value Ref Range Status   05/30/2018 7.6 6.0 - 8.4 g/dL Final     Albumin   Date Value Ref Range Status   05/30/2018 3.6 3.5 - 5.2 g/dL Final     Total Bilirubin   Date Value Ref Range Status   05/30/2018 0.9 0.1 - 1.0 mg/dL Final     Comment:     For infants and newborns, interpretation of results should be based  on gestational age, weight and in agreement with clinical  observations.  Premature Infant recommended reference ranges:  Up to 24 hours.............<8.0 mg/dL  Up to 48 hours............<12.0 mg/dL  3-5 days..................<15.0 mg/dL  6-29 days.................<15.0 mg/dL       Alkaline Phosphatase   Date Value Ref Range Status   05/30/2018 67 55 - 135 U/L Final     AST   Date Value Ref Range Status   05/30/2018 23 10 - 40 U/L Final     ALT   Date Value Ref Range Status   05/30/2018 22 10 - 44 U/L Final     Anion Gap   Date Value Ref Range Status   05/30/2018 8 8 - 16 mmol/L Final     eGFR if    Date Value Ref Range Status   05/30/2018 >60.0 >60 mL/min/1.73 m^2 Final     eGFR if non    Date Value Ref Range Status   05/30/2018 56.8 (A) >60 mL/min/1.73 m^2 Final     Comment:     Calculation used to obtain the estimated glomerular filtration  rate (eGFR) is the CKD-EPI equation.        Lab Results   Component Value Date    WBC 4.15 05/30/2018    HGB 12.5 (L) 05/30/2018    HCT 39.5 (L) 05/30/2018     (H) 05/30/2018    PLT 70 (L) 05/30/2018     Lab Results   Component Value Date    CHOL 158 11/09/2017     Lab Results   Component Value Date    HDL 48 11/09/2017     Lab Results   Component Value Date    LDLCALC 86.4 11/09/2017     Lab Results   Component Value Date    TRIG 118 11/09/2017     Lab Results   Component Value Date    CHOLHDL 30.4 11/09/2017     Lab Results   Component Value Date    TSH  0.587 11/21/2017     Lab Results   Component Value Date    HGBA1C 5.5 06/07/2018     Assessment:       1. Anemia, unspecified type    2. Dyslipidemia    3. Failed total left knee replacement, initial encounter    4. H/O prostatectomy    5. Hoarseness    6. Essential hypertension    7. Localized edema    8. Stage 2 chronic kidney disease    9. Prediabetes    10. Venous insufficiency    11. Vertigo    12. Class 2 severe obesity with serious comorbidity in adult, unspecified BMI, unspecified obesity type    13. Atherosclerosis of abdominal aorta    14. Encounter for prostate cancer screening    15. Neck pain    16. Sinus congestion        Plan:   Anemia, unspecified type-stable.    Dyslipidemia  -     Lipid panel; Future; Expected date: 10/11/2018    Failed total left knee replacement, initial encounter    H/O prostatectomy    Hoarseness    Essential hypertension-stable.    Localized edema    Stage 2 chronic kidney disease-stable.    Prediabetes    Venous insufficiency-stable.    Vertigo    Class 2 severe obesity with serious comorbidity in adult, unspecified BMI, unspecified obesity type    Atherosclerosis of abdominal aorta    Encounter for prostate cancer screening  -     PSA, Screening; Future; Expected date: 10/11/2018    Neck pain  -     X-Ray Cervical Spine AP And Lateral; Future; Expected date: 10/11/2018    Sinus congestion  -     X-Ray Sinuses Ltd Less Than 3 Views; Future; Expected date: 10/11/2018    f/u 6 months.,

## 2018-10-15 ENCOUNTER — TELEPHONE (OUTPATIENT)
Dept: FAMILY MEDICINE | Facility: CLINIC | Age: 83
End: 2018-10-15

## 2018-10-15 NOTE — TELEPHONE ENCOUNTER
----- Message from Timur Alexis sent at 10/15/2018  1:39 PM CDT -----  Contact: pt's spouse  She's calling in regards to X-ray results, [phone] (home)

## 2018-10-16 NOTE — PROGRESS NOTES
Subjective:       Patient ID: Dominic Cohen is a 82 y.o. male.    Chief Complaint: thrombocytopenia (follow-up)    HPI Patient presents for 3 mon f/u evaluation of an abnormal platelet count. Referred by his PCP Dr. Ender Liu.    Reviewed epic records dated back to 2/2/11.     Ultrasound of the abdomen revealed normal appearing spleen 4/20/17.     Patient has had intermittent slight drops in his hgb and mild drops in his platelet count. 2016 pt had one drop to 145 in his platelets. The drops in 2017 in his platelets occurred after his knee replacement re-do surgery that occurred 6/27/17.     Patient relates having to have 2 units of blood after his surgery in June.    Stated that since his surgery he had been a bit more fatigued than usual. That is improving. Still remains active- stays awake until late at night and then has to nap a little in the day.    Denies any bleeding or unusual bruising. No gingival bleeding, no nose bleeds, no hematuria, no hematochezia- has occasional blood with passing a hard stool on toilet paper only. No night sweats or fevers. Has lost about 20 # since his knee surgery. Watching what he eats as well. Appetite is good.     Taking oral iron and b12 daily with no adverse effects    FH: no cancers    NSAID use- aspirin daily.    No gastric or GI surgeries in the past    No alcohol use    Last blood donation 2013    Over the counter supplements- magnesium, multivitamin and calcium      Review of Systems   Constitutional: Positive for unexpected weight change (pt trying). Negative for appetite change.   Eyes: Positive for visual disturbance.   Respiratory: Negative for cough and shortness of breath.    Cardiovascular: Negative for chest pain.   Gastrointestinal: Negative for abdominal pain and diarrhea.   Genitourinary: Positive for frequency.   Musculoskeletal: Positive for back pain.   Skin: Negative for rash.   Neurological: Negative for headaches.   Hematological: Negative  for adenopathy.   Psychiatric/Behavioral: The patient is not nervous/anxious.       Objective:      Physical Exam   Constitutional: He is oriented to person, place, and time. He appears well-developed and well-nourished. No distress.   HENT:   Head: Normocephalic and atraumatic.   Right Ear: External ear normal.   Left Ear: External ear normal.   Nose: Nose normal.   Mouth/Throat: Oropharynx is clear and moist.   Eyes: Conjunctivae and EOM are normal. Pupils are equal, round, and reactive to light. Right eye exhibits no discharge. Left eye exhibits no discharge. No scleral icterus.   Neck: Normal range of motion. Neck supple. No thyromegaly present.   Cardiovascular: Normal rate, regular rhythm and normal heart sounds. Exam reveals no gallop and no friction rub.   No murmur heard.  Pulmonary/Chest: Effort normal and breath sounds normal. No respiratory distress. He has no wheezes. He has no rales.   Abdominal: Soft. Bowel sounds are normal. He exhibits no distension and no mass. There is no tenderness. There is no rebound and no guarding.   Musculoskeletal: Normal range of motion. He exhibits edema.   Lymphadenopathy:     He has no cervical adenopathy.   Neurological: He is alert and oriented to person, place, and time. He has normal strength. Gait normal.   Skin: Skin is warm and dry. No lesion and no rash noted.   Psychiatric: He has a normal mood and affect. His behavior is normal. Judgment and thought content normal.        LABS:  Results for CANDELARIA CASILLAS (MRN [id number]) as of 10/16/2018 14:02   Ref. Range 5/30/2018 10:15 6/6/2018 10:03 10/11/2018 10:33   WBC Latest Ref Range: 3.90 - 12.70 K/uL 4.15  4.89   RBC Latest Ref Range: 4.60 - 6.20 M/uL 3.80 (L)  3.74 (L)   Hemoglobin Latest Ref Range: 14.0 - 18.0 g/dL 12.5 (L)  12.5 (L)   Hematocrit Latest Ref Range: 40.0 - 54.0 % 39.5 (L)  39.7 (L)   MCV Latest Ref Range: 82 - 98 fL 104 (H)  106 (H)   MCH Latest Ref Range: 27.0 - 31.0 pg 32.9 (H)  33.4 (H)    MCHC Latest Ref Range: 32.0 - 36.0 g/dL 31.6 (L)  31.5 (L)   RDW Latest Ref Range: 11.5 - 14.5 % 12.3  13.0   Platelets Latest Ref Range: 150 - 350 K/uL 70 (L)  83 (L)   Platelet Count, Blue Top Latest Ref Range: 150 - 350 K/uL  174 133 (L)   MPV Latest Ref Range: 9.2 - 12.9 fL 12.7  13.6 (H)   MPV, Blue Top Latest Ref Range: 9.2 - 12.9 fL  10.5 10.7   Gran% Latest Ref Range: 38.0 - 73.0 % 54.1  54.0   Gran # (ANC) Latest Ref Range: 1.8 - 7.7 K/uL 2.2  2.6   Immature Granulocytes Latest Ref Range: 0.0 - 0.5 % 0.5  0.2   Immature Grans (Abs) Latest Ref Range: 0.00 - 0.04 K/uL 0.02  0.01   Lymph% Latest Ref Range: 18.0 - 48.0 % 29.6  29.2   Lymph # Latest Ref Range: 1.0 - 4.8 K/uL 1.2  1.4   Mono% Latest Ref Range: 4.0 - 15.0 % 12.0  12.5   Mono # Latest Ref Range: 0.3 - 1.0 K/uL 0.5  0.6   Eosinophil% Latest Ref Range: 0.0 - 8.0 % 3.1  3.3   Eos # Latest Ref Range: 0.0 - 0.5 K/uL 0.1  0.2   Basophil% Latest Ref Range: 0.0 - 1.9 % 0.7  0.8   Baso # Latest Ref Range: 0.00 - 0.20 K/uL 0.03  0.04   nRBC Latest Ref Range: 0 /100 WBC 0  0   Iron Latest Ref Range: 45 - 160 ug/dL 90  64   TIBC Latest Ref Range: 250 - 450 ug/dL 300  296   Saturated Iron Latest Ref Range: 20 - 50 % 30  22   Transferrin Latest Ref Range: 200 - 375 mg/dL 203  200   Ferritin Latest Ref Range: 20.0 - 300.0 ng/mL 261  254   Vitamin B-12 Latest Ref Range: 210 - 950 pg/mL 455       platelet count normal in blue top in past- low on recent cbc-  tsh, iron studies, b12, folate, spe, hep B surface antigen and Hep C antibody were all wnl in Nov 2017    Results for CANDELARIA CASILLAS (MRN [id number]) as of 10/16/2018 14:02   Ref. Range 6/7/2018 11:04 10/11/2018 10:33   Sodium Latest Ref Range: 136 - 145 mmol/L  142   Potassium Latest Ref Range: 3.5 - 5.1 mmol/L  4.2   Chloride Latest Ref Range: 95 - 110 mmol/L  106   CO2 Latest Ref Range: 23 - 29 mmol/L  26   Anion Gap Latest Ref Range: 8 - 16 mmol/L  10   BUN, Bld Latest Ref Range: 8 - 23 mg/dL  14    Creatinine Latest Ref Range: 0.5 - 1.4 mg/dL  1.3   eGFR if non African American Latest Ref Range: >60 mL/min/1.73 m^2  51.5 (A)   eGFR if African American Latest Ref Range: >60 mL/min/1.73 m^2  59.6 (A)   Glucose Latest Ref Range: 70 - 110 mg/dL  101   Calcium Latest Ref Range: 8.7 - 10.5 mg/dL  9.4   Alkaline Phosphatase Latest Ref Range: 55 - 135 U/L  64   Total Protein Latest Ref Range: 6.0 - 8.4 g/dL  7.7   Protein, Serum Latest Ref Range: 6.0 - 8.4 g/dL  7.4   Albumin Latest Ref Range: 3.5 - 5.2 g/dL  3.7   Uric Acid Latest Ref Range: 3.4 - 7.0 mg/dL 7.4 (H)    Total Bilirubin Latest Ref Range: 0.1 - 1.0 mg/dL  0.9   AST Latest Ref Range: 10 - 40 U/L  25   ALT Latest Ref Range: 10 - 44 U/L  19   Triglycerides Latest Ref Range: 30 - 150 mg/dL  114     Pathologist Interpretation SPE REVIEWED    Comment: Electronically reviewed and signed by:   Butch Ahumada M.D.   Signed on 10/12/18 at 22:09   Normal total protein.   There is a faint linear irregularity in near-gamma measuring 0.19   g/dL.      Pathologist Interpretation RAFAL REVIEWED    Comment: Electronically reviewed and signed by:   Butch Ahumada M.D.   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        Renal function stable- creatinine and BUN- normal- GFR decreased  Calcium normal  Iron normal  b12 normal      Assessment:       1. Thrombocytopenia    2. Acute blood loss anemia    3. Stage 2 chronic kidney disease        Plan:   1.     Iron and b12 levels wnl  2.     Platelets 83,000 -  blue top platelet count 133,000 - history of clumping with red top cbc  3.     RTC in 4 months with cbc, cmp, iron, rafal, spe, light chains, blue top platelet count and b12 levels  4.     Continue taking iron and b12  5.     All labs reviewed with Dr. Barrios who recommends 4 mon f/u

## 2018-10-17 ENCOUNTER — OFFICE VISIT (OUTPATIENT)
Dept: HEMATOLOGY/ONCOLOGY | Facility: CLINIC | Age: 83
End: 2018-10-17
Payer: MEDICARE

## 2018-10-17 VITALS
DIASTOLIC BLOOD PRESSURE: 72 MMHG | WEIGHT: 299.19 LBS | RESPIRATION RATE: 18 BRPM | OXYGEN SATURATION: 96 % | HEIGHT: 76 IN | BODY MASS INDEX: 36.43 KG/M2 | HEART RATE: 64 BPM | TEMPERATURE: 97 F | SYSTOLIC BLOOD PRESSURE: 148 MMHG

## 2018-10-17 DIAGNOSIS — N18.2 STAGE 2 CHRONIC KIDNEY DISEASE: ICD-10-CM

## 2018-10-17 DIAGNOSIS — D69.6 THROMBOCYTOPENIA: Primary | ICD-10-CM

## 2018-10-17 DIAGNOSIS — D62 ACUTE BLOOD LOSS ANEMIA: ICD-10-CM

## 2018-10-17 PROCEDURE — 99999 PR PBB SHADOW E&M-EST. PATIENT-LVL IV: CPT | Mod: PBBFAC,,, | Performed by: NURSE PRACTITIONER

## 2018-10-17 PROCEDURE — 99214 OFFICE O/P EST MOD 30 MIN: CPT | Mod: PBBFAC,PO | Performed by: NURSE PRACTITIONER

## 2018-10-17 PROCEDURE — 99213 OFFICE O/P EST LOW 20 MIN: CPT | Mod: S$PBB,,, | Performed by: NURSE PRACTITIONER

## 2018-10-21 NOTE — PROGRESS NOTES
Assessment /Plan     For exam results, see Encounter Report.    Primary open angle glaucoma of both eyes, severe stage    Afferent pupillary defect, left    Senile nuclear sclerosis, bilateral    Ptosis, bilateral          Pt is  using timolol bid ou - was intolerant to combigan - too dizzy     1. POAG - adv./severe stage OS>OD      Presented at [facility] 2012 - had been dx with glaucoma by outside doctor years ago       He stopped gtts on his own and never followed up      Excellent response to Rx with only one gtt - xalatan      First HVF 3/22/2012      First photos 3/22/2012           Family history    neg        Glaucoma meds    - timolol bid ou         H/O adverse rxn to glaucoma drops    C/O red/painful eye with latanoprost/travatan gtts /// too dizzy w/ combigan         LASERS    SLT OS 4/16/2015 // SLT od 5/14/2015 (good resp 19/19 --> 13/13)        GLAUCOMA SURGERIES    none        OTHER EYE SURGERIES    none        CDR    0.8/0.9        Tbase    23-26/26-32          Tmax    26/32            Ttarget    17/17             HVF    7 VF '12-'18 -  24-2ss od-gen red in sens- SNS/SAD  // 24-2 stimV os-dense central scotoma        Gonio    +3 ou        CCT    531/546        OCT   3 test 2010 - 2017 -  RNFL -  OD:dec T/I // OS:dec S/T/I        HRT    4 test 2012 to 2017 -  MR - Ewiiaapaayp off od // no image os         Disc photos    2010, 2014, 2016, 2018   - OIS    - Ttoday 14/14  - Test done today   -  HRT od - high std dev - NO MORE HRT's unless has CE od     2.   APD os - 2/2 to #1    3.   Nuclear Sclerosis ou        VA 20/80 od // CF at 1' os - poor VA os 2/2 adv. Glaucoma/VF loss    4.  Ptosis ou -stable and not visually significant    5.   Dermatochalasis ou -stable    6. S/P a 2nd knee replacement on left June 2017    Doing well - on crutches     Plan  Adv POAG OS>OD  Good initial  resp to slt ou 5/2015 19/19--> 13/13    tolerating timolol bid ou   Intol to all PG's - pain   Intol to combigan - dizzy      Did have Excellent resp to xalatan and doing well 26/32 -> 15/14- will re-address in future if need to lower IOP more and out of options - but he stopped on his own - C/O burning ect - can restart Xalatan if needed    Long discussion in past with patient about DG and burning from glaucoma gtts   Discussed, at length,  the risk of blindness if the glaucoma is not controlled  ATs prn    Doubt CE OS will help vision much - lrg central scotoma and APD  Hold off on CE od for now - BCVA 20/60 on refraction  10/2018 - pt does NOT want sx on his better eye at this time     Rx for new glasses given 1/16/2017 // No real improvement - but lenses are badly scratched   Re-printed Rx for glasses 5/2017 - pt says the non [facility] made them wrong - but the want/need a new Rx printed     If needs a trab or tube os consider CE at same time - but very limited potential OS  If needs surgery od - consider combined - this is the better eye     F/U  4 months IOP // gonio     I have seen and personally examined the patient.  I agree with the findings, assessment and plan of the resident and/or fellow.     Clau Carvalho MD

## 2018-10-22 ENCOUNTER — OFFICE VISIT (OUTPATIENT)
Dept: OPHTHALMOLOGY | Facility: CLINIC | Age: 83
End: 2018-10-22
Payer: MEDICARE

## 2018-10-22 ENCOUNTER — CLINICAL SUPPORT (OUTPATIENT)
Dept: OPHTHALMOLOGY | Facility: CLINIC | Age: 83
End: 2018-10-22
Payer: MEDICARE

## 2018-10-22 DIAGNOSIS — H25.13 SENILE NUCLEAR SCLEROSIS, BILATERAL: ICD-10-CM

## 2018-10-22 DIAGNOSIS — H21.562 AFFERENT PUPILLARY DEFECT, LEFT: ICD-10-CM

## 2018-10-22 DIAGNOSIS — H40.1133 PRIMARY OPEN ANGLE GLAUCOMA OF BOTH EYES, SEVERE STAGE: ICD-10-CM

## 2018-10-22 DIAGNOSIS — H40.1133 PRIMARY OPEN ANGLE GLAUCOMA OF BOTH EYES, SEVERE STAGE: Primary | ICD-10-CM

## 2018-10-22 DIAGNOSIS — H02.403 PTOSIS, BILATERAL: ICD-10-CM

## 2018-10-22 PROCEDURE — 92012 INTRM OPH EXAM EST PATIENT: CPT | Mod: S$PBB,,, | Performed by: OPHTHALMOLOGY

## 2018-10-22 PROCEDURE — 92133 CPTRZD OPH DX IMG PST SGM ON: CPT | Mod: PBBFAC

## 2018-10-22 PROCEDURE — 92133 CPTRZD OPH DX IMG PST SGM ON: CPT | Mod: 26,S$PBB,, | Performed by: OPHTHALMOLOGY

## 2018-10-22 PROCEDURE — 99212 OFFICE O/P EST SF 10 MIN: CPT | Mod: PBBFAC,25 | Performed by: OPHTHALMOLOGY

## 2018-10-22 PROCEDURE — 99999 PR PBB SHADOW E&M-EST. PATIENT-LVL II: CPT | Mod: PBBFAC,,, | Performed by: OPHTHALMOLOGY

## 2018-11-13 ENCOUNTER — HOSPITAL ENCOUNTER (EMERGENCY)
Facility: HOSPITAL | Age: 83
Discharge: HOME | End: 2018-11-13
Attending: FAMILY MEDICINE
Payer: MEDICARE

## 2018-11-13 VITALS
HEART RATE: 62 BPM | SYSTOLIC BLOOD PRESSURE: 150 MMHG | TEMPERATURE: 98 F | RESPIRATION RATE: 18 BRPM | WEIGHT: 298.75 LBS | DIASTOLIC BLOOD PRESSURE: 66 MMHG | BODY MASS INDEX: 36.38 KG/M2 | HEIGHT: 76 IN | OXYGEN SATURATION: 97 %

## 2018-11-13 DIAGNOSIS — I10 HYPERTENSION, UNSPECIFIED TYPE: Primary | ICD-10-CM

## 2018-11-13 LAB
ALBUMIN SERPL BCP-MCNC: 3.8 G/DL
ALP SERPL-CCNC: 64 U/L
ALT SERPL W/O P-5'-P-CCNC: 19 U/L
ANION GAP SERPL CALC-SCNC: 9 MMOL/L
AST SERPL-CCNC: 27 U/L
BASOPHILS # BLD AUTO: 0.03 K/UL
BASOPHILS NFR BLD: 0.6 %
BILIRUB SERPL-MCNC: 0.6 MG/DL
BILIRUB UR QL STRIP: NEGATIVE
BNP SERPL-MCNC: 28 PG/ML
BUN SERPL-MCNC: 14 MG/DL
CALCIUM SERPL-MCNC: 9.3 MG/DL
CHLORIDE SERPL-SCNC: 108 MMOL/L
CLARITY UR: CLEAR
CO2 SERPL-SCNC: 25 MMOL/L
COLOR UR: YELLOW
CREAT SERPL-MCNC: 1.2 MG/DL
DIFFERENTIAL METHOD: ABNORMAL
EOSINOPHIL # BLD AUTO: 0.2 K/UL
EOSINOPHIL NFR BLD: 3.5 %
ERYTHROCYTE [DISTWIDTH] IN BLOOD BY AUTOMATED COUNT: 13 %
EST. GFR  (AFRICAN AMERICAN): >60 ML/MIN/1.73 M^2
EST. GFR  (NON AFRICAN AMERICAN): 57 ML/MIN/1.73 M^2
GLUCOSE SERPL-MCNC: 114 MG/DL
GLUCOSE UR QL STRIP: NEGATIVE
HCT VFR BLD AUTO: 38.5 %
HGB BLD-MCNC: 12.9 G/DL
HGB UR QL STRIP: NEGATIVE
KETONES UR QL STRIP: NEGATIVE
LEUKOCYTE ESTERASE UR QL STRIP: ABNORMAL
LYMPHOCYTES # BLD AUTO: 1.5 K/UL
LYMPHOCYTES NFR BLD: 28.1 %
MCH RBC QN AUTO: 33.6 PG
MCHC RBC AUTO-ENTMCNC: 33.5 G/DL
MCV RBC AUTO: 100 FL
MICROSCOPIC COMMENT: NORMAL
MONOCYTES # BLD AUTO: 0.7 K/UL
MONOCYTES NFR BLD: 13.2 %
NEUTROPHILS # BLD AUTO: 2.9 K/UL
NEUTROPHILS NFR BLD: 54.6 %
NITRITE UR QL STRIP: NEGATIVE
PH UR STRIP: 6 [PH] (ref 5–8)
PLATELET # BLD AUTO: 112 K/UL
PMV BLD AUTO: 13.5 FL
POTASSIUM SERPL-SCNC: 4 MMOL/L
PROT SERPL-MCNC: 8 G/DL
PROT UR QL STRIP: NEGATIVE
RBC # BLD AUTO: 3.84 M/UL
SODIUM SERPL-SCNC: 142 MMOL/L
SP GR UR STRIP: 1.01 (ref 1–1.03)
TROPONIN I SERPL DL<=0.01 NG/ML-MCNC: <0.006 NG/ML
URN SPEC COLLECT METH UR: ABNORMAL
UROBILINOGEN UR STRIP-ACNC: NEGATIVE EU/DL
WBC # BLD AUTO: 5.38 K/UL
WBC #/AREA URNS HPF: 2 /HPF (ref 0–5)

## 2018-11-13 PROCEDURE — 25000003 PHARM REV CODE 250: Performed by: FAMILY MEDICINE

## 2018-11-13 PROCEDURE — 85025 COMPLETE CBC W/AUTO DIFF WBC: CPT

## 2018-11-13 PROCEDURE — 96374 THER/PROPH/DIAG INJ IV PUSH: CPT

## 2018-11-13 PROCEDURE — 63600175 PHARM REV CODE 636 W HCPCS: Performed by: FAMILY MEDICINE

## 2018-11-13 PROCEDURE — 99284 EMERGENCY DEPT VISIT MOD MDM: CPT | Mod: 25

## 2018-11-13 PROCEDURE — 81000 URINALYSIS NONAUTO W/SCOPE: CPT

## 2018-11-13 PROCEDURE — 80053 COMPREHEN METABOLIC PANEL: CPT

## 2018-11-13 PROCEDURE — 83880 ASSAY OF NATRIURETIC PEPTIDE: CPT

## 2018-11-13 PROCEDURE — 84484 ASSAY OF TROPONIN QUANT: CPT

## 2018-11-13 RX ORDER — RAMIPRIL 1.25 MG/1
0.5 CAPSULE ORAL
COMMUNITY
End: 2018-11-14

## 2018-11-13 RX ORDER — CLONIDINE HYDROCHLORIDE 0.2 MG/1
0.2 TABLET ORAL
Status: COMPLETED | OUTPATIENT
Start: 2018-11-13 | End: 2018-11-13

## 2018-11-13 RX ORDER — HYDRALAZINE HYDROCHLORIDE 20 MG/ML
10 INJECTION INTRAMUSCULAR; INTRAVENOUS
Status: COMPLETED | OUTPATIENT
Start: 2018-11-13 | End: 2018-11-13

## 2018-11-13 RX ADMIN — HYDRALAZINE HYDROCHLORIDE 10 MG: 20 INJECTION INTRAMUSCULAR; INTRAVENOUS at 05:11

## 2018-11-13 RX ADMIN — CLONIDINE HYDROCHLORIDE 0.2 MG: 0.2 TABLET ORAL at 03:11

## 2018-11-13 NOTE — ED PROVIDER NOTES
SCRIBE #1 NOTE: I, Kathleen Tay, am scribing for, and in the presence of, Magali Vasquez MD. I have scribed the entire note.       History     Chief Complaint   Patient presents with    Hypertension     Pt reporting dizziness with tingling to the left arm and right fingers     Review of patient's allergies indicates:   Allergen Reactions    Penicillins Hives and Swelling    Protonix [pantoprazole] Rash    Sulfa (sulfonamide antibiotics) Hives         History of Present Illness     HPI    11/13/2018, 2:02 AM  History obtained from the patient      History of Present Illness: Dominic Cohen is a 82 y.o. male patient with a PMHx of HTN who presents to the Emergency Department for evaluation of elevated BP which onset gradually today. Pt's BP was 172/91 in triage and 142/82 in ED room. Symptoms are constant and moderate in severity.  No mitigating or exacerbating factors reported. Associated sxs include dizziness and tingling to R arm and fingers. Patient denies any SOB, CP, cough, abd pain, weakness, fever, chills, and all other sxs at this time. Pt reports he is compliant with his HTN medication. No further complaints or concerns at this time.         Arrival mode: Personal vehicle    PCP: Sudhakar Liu MD        Past Medical History:  Past Medical History:   Diagnosis Date    Arthritis     Atherosclerosis of abdominal aorta     Cataract     Chronic constipation     Glaucoma     History of anal fissures     Hypertension     Polyneuropathy in other diseases classified elsewhere     due to folate deficiency    Prediabetes     Venous insufficiency     Venous insufficiency        Past Surgical History:  Past Surgical History:   Procedure Laterality Date    BACK SURGERY      BONE GRAFT Left 6/27/2017    [facility] OR    CATARACT EXTRACTION BILATERAL W/ ANTERIOR VITRECTOMY      COLONOSCOPY N/A 5/13/2016    Procedure: COLONOSCOPY;  Surgeon: Presley LYMAN  MD Alan;  Location: [facility] (4TH FLR);  Service: Endoscopy;  Laterality: N/A;  EGD with Dr. Jeffery prior to Colonoscopy. EC    COLONOSCOPY N/A 5/13/2016    Performed by Presley Phillips MD at [facility] (4TH FLR)    COLONOSCOPY N/A 11/13/2012    Performed by Presley Phillips MD at [facility] (4TH FLR)    ESOPHAGOGASTRODUODENOSCOPY N/A 7/5/2018    Procedure: ESOPHAGOGASTRODUODENOSCOPY (EGD);  Surgeon: Khanh Asencio III, MD;  Location: [facility];  Service: Endoscopy;  Laterality: N/A;    ESOPHAGOGASTRODUODENOSCOPY (EGD) N/A 7/5/2018    Performed by Khanh Asencio III, MD at [facility]    ESOPHAGOGASTRODUODENOSCOPY (EGD) N/A 5/13/2016    Performed by Kendall Jeffery MD at [facility] (4TH FLR)    JOINT REPLACEMENT Left     x 2    KNEE SURGERY Left     x2. revision 06/27/17    PROSTATE SURGERY      REMOVAL-IMPLANT Left 6/27/2017    Performed by [facility] at [facility] OR    REVISION-ARTHROPLASTY-KNEE Left 6/27/2017    Performed by [facility] Sr[facility] at [facility] OR    SYNOVECTOMY-KNEE Left 6/27/2017    Performed by [facility] Sr[facility] at [facility] OR    TONSILLECTOMY, ADENOIDECTOMY           Family History:  Family History   Problem Relation Age of Onset    No Known Problems Son     No Known Problems Daughter     No Known Problems Son     Diabetes Neg Hx     Heart disease Neg Hx     Cancer Neg Hx     Celiac disease Neg Hx     Cirrhosis Neg Hx     Colon cancer Neg Hx     Colon polyps Neg Hx     Crohn's disease Neg Hx     Cystic fibrosis Neg Hx     Esophageal cancer Neg Hx     Hemochromatosis Neg Hx     Inflammatory bowel disease Neg Hx     Irritable bowel syndrome Neg Hx     Liver cancer Neg Hx     Liver disease Neg Hx     Rectal cancer Neg Hx     Stomach cancer Neg Hx     Ulcerative colitis Neg Hx     Zak's disease Neg Hx     Amblyopia Neg Hx     Blindness Neg Hx     Glaucoma Neg Hx     Hypertension Neg Hx     Macular degeneration Neg Hx     Retinal detachment  Neg Hx     Strabismus Neg Hx        Social History:  Social History     Tobacco Use    Smoking status: Former Smoker     Packs/day: 0.30     Years: 52.00     Pack years: 15.60     Types: Cigarettes     Last attempt to quit: 2000     Years since quittin.0    Smokeless tobacco: Never Used   Substance and Sexual Activity    Alcohol use: No    Drug use: No    Sexual activity: Yes        Review of Systems     Review of Systems   Constitutional: Negative for chills and fever.        (+) elevated BP   HENT: Negative for sore throat.    Respiratory: Negative for cough and shortness of breath.    Cardiovascular: Negative for chest pain.   Gastrointestinal: Negative for abdominal pain and nausea.   Genitourinary: Negative for dysuria.   Musculoskeletal: Negative for back pain.   Skin: Negative for rash.   Neurological: Positive for dizziness. Negative for weakness.        (+) tingling to R arm and fingers   Hematological: Does not bruise/bleed easily.   All other systems reviewed and are negative.       Physical Exam     Initial Vitals [18 0140]   BP Pulse Resp Temp SpO2   (!) 172/91 61 20 98.1 °F (36.7 °C) (!) 93 %      MAP       --          Physical Exam  Nursing Notes and Vital Signs Reviewed.  Constitutional: Patient is in no acute distress. Well-developed and well-nourished.  Head: Atraumatic. Normocephalic.  Eyes: PERRL. EOM intact. Conjunctivae are not pale. No scleral icterus.  ENT: Mucous membranes are moist. Oropharynx is clear and symmetric.    Neck: Supple. Full ROM. No lymphadenopathy.  Cardiovascular: Regular rate. Regular rhythm. No murmurs, rubs, or gallops. Distal pulses are 2+ and symmetric.  Pulmonary/Chest: No respiratory distress. Clear to auscultation bilaterally. No wheezing or rales.  Abdominal: Soft and non-distended.  There is no tenderness.  No rebound, guarding, or rigidity. Good bowel sounds.  Genitourinary: No CVA tenderness  Musculoskeletal: Moves all extremities. No  "obvious deformities. BLE 2+ edema. No calf tenderness.  Skin: Warm and dry.  Neurological:  Alert, awake, and appropriate.  Normal speech.  No acute focal neurological deficits are appreciated.  Psychiatric: Normal affect. Good eye contact. Appropriate in content.     ED Course   Procedures  ED Vital Signs:  Vitals:    11/13/18 0140 11/13/18 0141 11/13/18 0150 11/13/18 0155   BP: (!) 172/91 (!) 172/91     Pulse: 61 62 (!) 55 (!) 56   Resp: 20 20     Temp: 98.1 °F (36.7 °C) 98.1 °F (36.7 °C)     TempSrc: Oral Oral     SpO2: (!) 93% (!) 93%     Weight:  135.5 kg (298 lb 11.6 oz)     Height:  6' 4" (1.93 m)      11/13/18 0200 11/13/18 0232 11/13/18 0234 11/13/18 0302   BP: (!) 178/98 (!) 145/82  (!) 189/89   Pulse: (!) 54  (!) 53 (!) 51   Resp: 19  17 14   Temp:       TempSrc:       SpO2: 97% 98% 98% 97%   Weight:       Height:        11/13/18 0332 11/13/18 0345 11/13/18 0432 11/13/18 0503   BP: (!) 196/91 (!) 196/91 (!) 212/96 (!) 173/74   Pulse: (!) 53  (!) 58 (!) 56   Resp: 16  19 16   Temp:       TempSrc:       SpO2: 95%  97% 96%   Weight:       Height:        11/13/18 0517   BP: (!) 150/66   Pulse: 62   Resp: 18   Temp:    TempSrc:    SpO2: 97%   Weight:    Height:        Abnormal Lab Results:  Labs Reviewed   CBC W/ AUTO DIFFERENTIAL - Abnormal; Notable for the following components:       Result Value    RBC 3.84 (*)     Hemoglobin 12.9 (*)     Hematocrit 38.5 (*)      (*)     MCH 33.6 (*)     Platelets 112 (*)     MPV 13.5 (*)     All other components within normal limits   COMPREHENSIVE METABOLIC PANEL - Abnormal; Notable for the following components:    Glucose 114 (*)     eGFR if non  57 (*)     All other components within normal limits   URINALYSIS - Abnormal; Notable for the following components:    Leukocytes, UA 1+ (*)     All other components within normal limits   TROPONIN I   B-TYPE NATRIURETIC PEPTIDE   URINALYSIS MICROSCOPIC        All Lab Results:  Results for orders placed or " performed during the hospital encounter of 11/13/18   CBC auto differential   Result Value Ref Range    WBC 5.38 3.90 - 12.70 K/uL    RBC 3.84 (L) 4.60 - 6.20 M/uL    Hemoglobin 12.9 (L) 14.0 - 18.0 g/dL    Hematocrit 38.5 (L) 40.0 - 54.0 %     (H) 82 - 98 fL    MCH 33.6 (H) 27.0 - 31.0 pg    MCHC 33.5 32.0 - 36.0 g/dL    RDW 13.0 11.5 - 14.5 %    Platelets 112 (L) 150 - 350 K/uL    MPV 13.5 (H) 9.2 - 12.9 fL    Gran # (ANC) 2.9 1.8 - 7.7 K/uL    Lymph # 1.5 1.0 - 4.8 K/uL    Mono # 0.7 0.3 - 1.0 K/uL    Eos # 0.2 0.0 - 0.5 K/uL    Baso # 0.03 0.00 - 0.20 K/uL    Gran% 54.6 38.0 - 73.0 %    Lymph% 28.1 18.0 - 48.0 %    Mono% 13.2 4.0 - 15.0 %    Eosinophil% 3.5 0.0 - 8.0 %    Basophil% 0.6 0.0 - 1.9 %    Differential Method Automated    Comprehensive metabolic panel   Result Value Ref Range    Sodium 142 136 - 145 mmol/L    Potassium 4.0 3.5 - 5.1 mmol/L    Chloride 108 95 - 110 mmol/L    CO2 25 23 - 29 mmol/L    Glucose 114 (H) 70 - 110 mg/dL    BUN, Bld 14 8 - 23 mg/dL    Creatinine 1.2 0.5 - 1.4 mg/dL    Calcium 9.3 8.7 - 10.5 mg/dL    Total Protein 8.0 6.0 - 8.4 g/dL    Albumin 3.8 3.5 - 5.2 g/dL    Total Bilirubin 0.6 0.1 - 1.0 mg/dL    Alkaline Phosphatase 64 55 - 135 U/L    AST 27 10 - 40 U/L    ALT 19 10 - 44 U/L    Anion Gap 9 8 - 16 mmol/L    eGFR if African American >60 >60 mL/min/1.73 m^2    eGFR if non African American 57 (A) >60 mL/min/1.73 m^2   Urinalysis - Clean Catch   Result Value Ref Range    Specimen UA Urine, Clean Catch     Color, UA Yellow Yellow, Straw, Bonny    Appearance, UA Clear Clear    pH, UA 6.0 5.0 - 8.0    Specific Gravity, UA 1.010 1.005 - 1.030    Protein, UA Negative Negative    Glucose, UA Negative Negative    Ketones, UA Negative Negative    Bilirubin (UA) Negative Negative    Occult Blood UA Negative Negative    Nitrite, UA Negative Negative    Urobilinogen, UA Negative <2.0 EU/dL    Leukocytes, UA 1+ (A) Negative   Troponin I   Result Value Ref Range    Troponin I  <0.006 0.000 - 0.026 ng/mL   B-Type natriuretic peptide (BNP)   Result Value Ref Range    BNP 28 0 - 99 pg/mL   Urinalysis Microscopic   Result Value Ref Range    WBC, UA 2 0 - 5 /hpf    Microscopic Comment SEE COMMENT          Imaging Results:  Imaging Results          CT Head Without Contrast (In process)                Per STAT radiology, pt's CT results moderate chronic small vessel ischemic change. Old right posterior limb of the internal capsule. Lacunar infarct versus perivascular space. No acute intracranial abnormality.      The EKG was ordered, reviewed, and independently interpreted by the ED provider.  Interpretation time: 0159  Rate: 57 BPM  Rhythm: sinus bradycardia  Interpretation: Incomplete RBBB. Left anterior fascicular block. Minimal voltage criteria for LVH, may be normal variant. No STEMI.             The Emergency Provider reviewed the vital signs and test results, which are outlined above.     ED Discussion     5:32 AM: Reassessed pt at this time. Discussed with pt all pertinent ED information and results. Discussed pt dx and plan of tx. Gave pt all f/u and return to the ED instructions. All questions and concerns were addressed at this time. Pt expresses understanding of information and instructions, and is comfortable with plan to discharge. Pt is stable for discharge.    I discussed with patient and/or family/caretaker that evaluation in the ED does not suggest any emergent or life threatening medical conditions requiring immediate intervention beyond what was provided in the ED, and I believe patient is safe for discharge.  Regardless, an unremarkable evaluation in the ED does not preclude the development or presence of a serious of life threatening condition. As such, patient was instructed to return immediately for any worsening or change in current symptoms.      ED Medication(s):  Medications   cloNIDine tablet 0.2 mg (0.2 mg Oral Given 11/13/18 0345)   hydrALAZINE injection 10 mg (10  mg Intravenous Given 11/13/18 0500)     Current Discharge Medication List            Medication List      ASK your doctor about these medications    aspirin 81 MG EC tablet  Commonly known as:  ECOTRIN     * blood sugar diagnostic Strp  Commonly known as:  BLOOD GLUCOSE TEST  1 strip by Misc.(Non-Drug; Combo Route) route 3 (three) times a week.     * blood sugar diagnostic Strp  1 strip by Misc.(Non-Drug; Combo Route) route 2 (two) times daily.     lancets 30 gauge Misc  1 lancet by Misc.(Non-Drug; Combo Route) route 2 (two) times daily.     linaclotide 145 mcg Cap capsule  Commonly known as:  LINZESS  Take 1 capsule (145 mcg total) by mouth once daily.     * ramipril 1.25 MG capsule  Commonly known as:  ALTACE     * ramipril 2.5 MG capsule  Commonly known as:  ALTACE  TAKE 1 CAPSULE BY MOUTH ONCE A DAY     ranitidine 150 MG tablet  Commonly known as:  ZANTAC  Take 1 tablet (150 mg total) by mouth 2 (two) times daily.     timolol maleate 0.5% 0.5 % Drop  Commonly known as:  TIMOPTIC  Place 1 drop into both eyes 2 (two) times daily.     VITAMIN B-12 50 mcg tablet  Generic drug:  cyanocobalamin (vitamin B-12)     VITAMIN C 60 mg Lozg  Generic drug:  ascorbic acid (vitamin C)     VITAMIN D ORAL         * This list has 4 medication(s) that are the same as other medications prescribed for you. Read the directions carefully, and ask your doctor or other care provider to review them with you.                  Follow-up Information     Schedule an appointment as soon as possible for a visit  with Sudhakar Liu MD.    Specialty:  Internal Medicine  Why:  As needed  Contact information:  [id number]  [place] [address] [phone]                    Medical Decision Making:   Clinical Tests:   Lab Tests: Ordered and Reviewed  Radiological Study: Ordered and Reviewed  Medical Tests: Ordered and Reviewed             Scribe Attestation:   Scribe #1: I performed the above scribed service and the documentation  accurately describes the services I performed. I attest to the accuracy of the note.     Attending:   Physician Attestation Statement for Scribe #1: I, Magali Vasquez MD, personally performed the services described in this documentation, as scribed by Kathleen Tay, in my presence, and it is both accurate and complete.           Clinical Impression       ICD-10-CM ICD-9-CM   1. Hypertension, unspecified type I10 401.9       Disposition:   Disposition: Discharged  Condition: Stable         Magali Vasquez MD  11/15/18 7577

## 2018-11-14 ENCOUNTER — OFFICE VISIT (OUTPATIENT)
Dept: FAMILY MEDICINE | Facility: CLINIC | Age: 83
End: 2018-11-14
Payer: MEDICARE

## 2018-11-14 VITALS
RESPIRATION RATE: 16 BRPM | HEART RATE: 68 BPM | TEMPERATURE: 99 F | DIASTOLIC BLOOD PRESSURE: 70 MMHG | SYSTOLIC BLOOD PRESSURE: 152 MMHG | HEIGHT: 76 IN | OXYGEN SATURATION: 94 % | WEIGHT: 297.31 LBS | BODY MASS INDEX: 36.2 KG/M2

## 2018-11-14 DIAGNOSIS — I10 ESSENTIAL HYPERTENSION: Primary | ICD-10-CM

## 2018-11-14 DIAGNOSIS — R00.1 BRADYCARDIA, SEVERE SINUS: ICD-10-CM

## 2018-11-14 PROCEDURE — 99999 PR PBB SHADOW E&M-EST. PATIENT-LVL IV: CPT | Mod: PBBFAC,,, | Performed by: INTERNAL MEDICINE

## 2018-11-14 PROCEDURE — 99214 OFFICE O/P EST MOD 30 MIN: CPT | Mod: PBBFAC,PO | Performed by: INTERNAL MEDICINE

## 2018-11-14 PROCEDURE — 99214 OFFICE O/P EST MOD 30 MIN: CPT | Mod: S$PBB,,, | Performed by: INTERNAL MEDICINE

## 2018-11-14 RX ORDER — AMLODIPINE BESYLATE 5 MG/1
5 TABLET ORAL DAILY
Qty: 30 TABLET | Refills: 11 | Status: SHIPPED | OUTPATIENT
Start: 2018-11-14 | End: 2018-11-27 | Stop reason: SINTOL

## 2018-11-14 NOTE — PROGRESS NOTES
Subjective:       Patient ID: Dominic Cohen is a 82 y.o. male.    Chief Complaint: Hospital Follow Up    [facility] f/u for high blood pressure---------wants to change from altace 2.5 mg  Day--------no new symptoms today---------      Past Medical History:   Diagnosis Date    Arthritis     Atherosclerosis of abdominal aorta     Cataract     Chronic constipation     Glaucoma     History of anal fissures     Hypertension     Polyneuropathy in other diseases classified elsewhere     due to folate deficiency    Prediabetes     Venous insufficiency     Venous insufficiency      Past Surgical History:   Procedure Laterality Date    BACK SURGERY      BONE GRAFT Left 6/27/2017    Performed by Malcolm Gill Sr., MD at [facility] OR    CATARACT EXTRACTION BILATERAL W/ ANTERIOR VITRECTOMY      COLONOSCOPY N/A 5/13/2016    Procedure: COLONOSCOPY;  Surgeon: Presley Phillips MD;  [address]  Service: Endoscopy;  Laterality: N/A;  EGD with Dr. Jeffery prior to Colonoscopy. EC    COLONOSCOPY N/A 5/13/2016    Performed by [facility] at [facility] ([facility])    COLONOSCOPY N/A 11/13/2012    Performed by Presley Phillips [facility] at [facility] ([facility]LR)    ESOPHAGOGASTRODUODENOSCOPY N/A 7/5/2018    Procedure: ESOPHAGOGASTRODUODENOSCOPY (EGD);  Surgeon: Khanh Asencio III, MD;  Location: [facility];  Service: Endoscopy;  Laterality: N/A;    ESOPHAGOGASTRODUODENOSCOPY (EGD) N/A 7/5/2018    Performed by Khanh Asencio III, MD at [facility]    ESOPHAGOGASTRODUODENOSCOPY (EGD) N/A 5/13/2016    Performed by Kendall Jeffery MD at [facility] ([facility] FLR)    JOINT REPLACEMENT Left     x 2    KNEE SURGERY Left     x2. revision 06/27/17    PROSTATE SURGERY      REMOVAL-IMPLANT Left 6/27/2017    Performed by Malcolm [facility] Sr., [facility] at [facility] OR    REVISION-ARTHROPLASTY-KNEE Left 6/27/2017    Performed by Malcolm iGll Sr., MD at [facility] OR    SYNOVECTOMY-KNEE Left 6/27/2017    Performed by Malcolm  Jairo Canchola MD at [facility] OR    TONSILLECTOMY, ADENOIDECTOMY       Family History   Problem Relation Age of Onset    No Known Problems Son     No Known Problems Daughter     No Known Problems Son     Diabetes Neg Hx     Heart disease Neg Hx     Cancer Neg Hx     Celiac disease Neg Hx     Cirrhosis Neg Hx     Colon cancer Neg Hx     Colon polyps Neg Hx     Crohn's disease Neg Hx     Cystic fibrosis Neg Hx     Esophageal cancer Neg Hx     Hemochromatosis Neg Hx     Inflammatory bowel disease Neg Hx     Irritable bowel syndrome Neg Hx     Liver cancer Neg Hx     Liver disease Neg Hx     Rectal cancer Neg Hx     Stomach cancer Neg Hx     Ulcerative colitis Neg Hx     Zak's disease Neg Hx     Amblyopia Neg Hx     Blindness Neg Hx     Glaucoma Neg Hx     Hypertension Neg Hx     Macular degeneration Neg Hx     Retinal detachment Neg Hx     Strabismus Neg Hx      Social History     Socioeconomic History    Marital status:      Spouse name: Not on file    Number of children: 3    Years of education: Not on file    Highest education level: Not on file   Social Needs    Financial resource strain: Not on file    Food insecurity - worry: Not on file    Food insecurity - inability: Not on file    Transportation needs - medical: Not on file    Transportation needs - non-medical: Not on file   Occupational History    Occupation: Retired     Comment: [place], [place]   Tobacco Use    Smoking status: Former Smoker     Packs/day: 0.30     Years: 52.00     Pack years: 15.60     Types: Cigarettes     Last attempt to quit: 2000     Years since quittin.0    Smokeless tobacco: Never Used   Substance and Sexual Activity    Alcohol use: No    Drug use: No    Sexual activity: Yes   Other Topics Concern    Not on file   Social History Narrative    Not on file     Review of Systems   Constitutional: Negative for chills and fever.   HENT: Negative.    Respiratory: Negative for apnea,  cough, choking, chest tightness, shortness of breath, wheezing and stridor.    Cardiovascular: Negative for chest pain and palpitations.   Gastrointestinal: Negative for abdominal pain, nausea and vomiting.   Genitourinary: Negative.    Neurological: Negative for dizziness, tremors, syncope, speech difficulty, weakness, light-headedness, numbness and headaches.   Psychiatric/Behavioral: Negative for agitation, behavioral problems and confusion.       Objective:      Physical Exam   Constitutional: He is oriented to person, place, and time. He appears well-developed and well-nourished. No distress.   HENT:   Head: Normocephalic and atraumatic.   Eyes: Pupils are equal, round, and reactive to light.   Neck: Normal range of motion. Neck supple. No JVD present. Carotid bruit is not present. No tracheal deviation present. No thyromegaly present.   Cardiovascular: Normal rate, regular rhythm, normal heart sounds and intact distal pulses.   Pulmonary/Chest: Effort normal and breath sounds normal. No respiratory distress. He has no wheezes. He has no rales. He exhibits no tenderness.   Abdominal: Soft. Bowel sounds are normal.   Musculoskeletal: Normal range of motion. He exhibits no edema or tenderness.   Lymphadenopathy:     He has no cervical adenopathy.   Neurological: He is alert and oriented to person, place, and time.   Skin: Skin is warm and dry. No rash noted. He is not diaphoretic. No erythema. No pallor.   Psychiatric: He has a normal mood and affect. His behavior is normal. Judgment and thought content normal.   Nursing note and vitals reviewed.      CMP  Sodium   Date Value Ref Range Status   11/13/2018 142 136 - 145 mmol/L Final     Potassium   Date Value Ref Range Status   11/13/2018 4.0 3.5 - 5.1 mmol/L Final     Chloride   Date Value Ref Range Status   11/13/2018 108 95 - 110 mmol/L Final     CO2   Date Value Ref Range Status   11/13/2018 25 23 - 29 mmol/L Final     Glucose   Date Value Ref Range Status    11/13/2018 114 (H) 70 - 110 mg/dL Final     BUN, Bld   Date Value Ref Range Status   11/13/2018 14 8 - 23 mg/dL Final     Creatinine   Date Value Ref Range Status   11/13/2018 1.2 0.5 - 1.4 mg/dL Final     Calcium   Date Value Ref Range Status   11/13/2018 9.3 8.7 - 10.5 mg/dL Final     Total Protein   Date Value Ref Range Status   11/13/2018 8.0 6.0 - 8.4 g/dL Final     Albumin   Date Value Ref Range Status   11/13/2018 3.8 3.5 - 5.2 g/dL Final     Total Bilirubin   Date Value Ref Range Status   11/13/2018 0.6 0.1 - 1.0 mg/dL Final     Comment:     For infants and newborns, interpretation of results should be based  on gestational age, weight and in agreement with clinical  observations.  Premature Infant recommended reference ranges:  Up to 24 hours.............<8.0 mg/dL  Up to 48 hours............<12.0 mg/dL  3-5 days..................<15.0 mg/dL  6-29 days.................<15.0 mg/dL       Alkaline Phosphatase   Date Value Ref Range Status   11/13/2018 64 55 - 135 U/L Final     AST   Date Value Ref Range Status   11/13/2018 27 10 - 40 U/L Final     ALT   Date Value Ref Range Status   11/13/2018 19 10 - 44 U/L Final     Anion Gap   Date Value Ref Range Status   11/13/2018 9 8 - 16 mmol/L Final     eGFR if    Date Value Ref Range Status   11/13/2018 >60 >60 mL/min/1.73 m^2 Final     eGFR if non    Date Value Ref Range Status   11/13/2018 57 (A) >60 mL/min/1.73 m^2 Final     Comment:     Calculation used to obtain the estimated glomerular filtration  rate (eGFR) is the CKD-EPI equation.        Lab Results   Component Value Date    WBC 5.38 11/13/2018    HGB 12.9 (L) 11/13/2018    HCT 38.5 (L) 11/13/2018     (H) 11/13/2018     (L) 11/13/2018     Lab Results   Component Value Date    CHOL 169 10/11/2018     Lab Results   Component Value Date    HDL 45 10/11/2018     Lab Results   Component Value Date    LDLCALC 101.2 10/11/2018     Lab Results   Component Value Date     TRIG 114 10/11/2018     Lab Results   Component Value Date    CHOLHDL 26.6 10/11/2018     Lab Results   Component Value Date    TSH 0.587 11/21/2017     Lab Results   Component Value Date    HGBA1C 5.5 06/07/2018     Assessment:       1. Essential hypertension    2. Bradycardia, severe sinus        Plan:   Essential hypertension-----change from altace to norvasc 5 mg a day------follow.  -     Ambulatory referral to Cardiology  -     amLODIPine (NORVASC) 5 MG tablet; Take 1 tablet (5 mg total) by mouth once daily.  Dispense: 30 tablet; Refill: 11    Bradycardia, severe sinus  -     Ambulatory referral to Cardiology    F/u 1 month.

## 2018-11-27 ENCOUNTER — CLINICAL SUPPORT (OUTPATIENT)
Dept: CARDIOLOGY | Facility: CLINIC | Age: 83
End: 2018-11-27
Payer: MEDICARE

## 2018-11-27 ENCOUNTER — OFFICE VISIT (OUTPATIENT)
Dept: CARDIOLOGY | Facility: CLINIC | Age: 83
End: 2018-11-27
Payer: MEDICARE

## 2018-11-27 ENCOUNTER — LAB VISIT (OUTPATIENT)
Dept: LAB | Facility: HOSPITAL | Age: 83
End: 2018-11-27
Attending: INTERNAL MEDICINE
Payer: MEDICARE

## 2018-11-27 VITALS
WEIGHT: 296.5 LBS | BODY MASS INDEX: 36.11 KG/M2 | HEIGHT: 76 IN | DIASTOLIC BLOOD PRESSURE: 76 MMHG | HEART RATE: 68 BPM | SYSTOLIC BLOOD PRESSURE: 142 MMHG

## 2018-11-27 DIAGNOSIS — H40.1133 PRIMARY OPEN ANGLE GLAUCOMA OF BOTH EYES, SEVERE STAGE: ICD-10-CM

## 2018-11-27 DIAGNOSIS — M79.89 LEG SWELLING: ICD-10-CM

## 2018-11-27 DIAGNOSIS — I10 ESSENTIAL HYPERTENSION: ICD-10-CM

## 2018-11-27 DIAGNOSIS — R94.31 ABNORMAL ECG: ICD-10-CM

## 2018-11-27 DIAGNOSIS — I50.32 DIASTOLIC DYSFUNCTION WITH CHRONIC HEART FAILURE: ICD-10-CM

## 2018-11-27 DIAGNOSIS — E78.5 DYSLIPIDEMIA: ICD-10-CM

## 2018-11-27 DIAGNOSIS — I10 ESSENTIAL HYPERTENSION: Primary | ICD-10-CM

## 2018-11-27 LAB — TSH SERPL DL<=0.005 MIU/L-ACNC: 1.04 UIU/ML

## 2018-11-27 PROCEDURE — 36415 COLL VENOUS BLD VENIPUNCTURE: CPT | Mod: PO

## 2018-11-27 PROCEDURE — 99999 PR PBB SHADOW E&M-EST. PATIENT-LVL III: CPT | Mod: PBBFAC,,, | Performed by: INTERNAL MEDICINE

## 2018-11-27 PROCEDURE — 99214 OFFICE O/P EST MOD 30 MIN: CPT | Mod: S$PBB,,, | Performed by: INTERNAL MEDICINE

## 2018-11-27 PROCEDURE — 99213 OFFICE O/P EST LOW 20 MIN: CPT | Mod: PBBFAC,PO | Performed by: INTERNAL MEDICINE

## 2018-11-27 PROCEDURE — 84443 ASSAY THYROID STIM HORMONE: CPT

## 2018-11-27 RX ORDER — HYDROCHLOROTHIAZIDE 25 MG/1
25 TABLET ORAL DAILY
Qty: 30 TABLET | Refills: 11 | Status: SHIPPED | OUTPATIENT
Start: 2018-11-27 | End: 2019-04-11

## 2018-11-27 RX ORDER — HYDROCHLOROTHIAZIDE 12.5 MG/1
12.5 TABLET ORAL DAILY
Qty: 30 TABLET | Refills: 11 | Status: SHIPPED | OUTPATIENT
Start: 2018-11-27 | End: 2018-11-27 | Stop reason: SDUPTHER

## 2018-11-27 RX ORDER — TIMOLOL MALEATE 5 MG/ML
1 SOLUTION/ DROPS OPHTHALMIC 2 TIMES DAILY
Qty: 10 ML | Refills: 12 | Status: SHIPPED | OUTPATIENT
Start: 2018-11-27 | End: 2019-09-09 | Stop reason: SDUPTHER

## 2018-11-27 NOTE — LETTER
November 27, 2018      Sudhakar Liu MD  [address] [place] [address]           [facility]  [address] [place] [address]  Phone: [phone]  Fax: [phone]          Patient: Dominic Cohen   MR Number: [id number]   YOB: 1935   Date of Visit: 11/27/2018       Dear Dr. Sudhakar iLu:    Thank you for referring Dominic Cohen to me for evaluation. Attached you will find relevant portions of my assessment and plan of care.    If you have questions, please do not hesitate to call me. I look forward to following Dominic Cohen along with you.    Sincerely,    Jose Alejandro Marshall MD    Enclosure  CC:  No Recipients    If you would like to receive this communication electronically, please contact [email or web address] or [phone] to request more information on [facility] Link access.    For providers and/or their staff who would like to refer a patient to [facility], please contact us through our one-stop-shop provider referral line, [facility], at [phone].    If you feel you have received this communication in error or would no longer like to receive these types of communications, please e-mail [email or web address]

## 2018-11-27 NOTE — PROGRESS NOTES
Subjective:   Patient ID:  Dominic Cohen is a 82 y.o. male who presents for follow up of Follow-up and Hypertension      81 yo male, came in for HTN. F/u at cardiology service.  PMH HTN.No smoking/drinking.  No h/o heart attack, stroke and DM.  C/o ADAMS now while walking from parking. Sleeps with 3 pillows.  No chest pain, dizziness and faint  Occasional skipping beats  Leg swelling  EKG on 11/13 sinus and HR at 57 bpm.  ECHO in 2015 normal EF and DD.  HGB and BNP wnl        Past Medical History:   Diagnosis Date    Arthritis     Atherosclerosis of abdominal aorta     Cataract     Chronic constipation     Glaucoma     History of anal fissures     Hypertension     Polyneuropathy in other diseases classified elsewhere     due to folate deficiency    Prediabetes     Venous insufficiency     Venous insufficiency        Past Surgical History:   Procedure Laterality Date    BACK SURGERY      BONE GRAFT Left 6/27/2017    Per[facility] by [facility] Sr[facility] at [facility] OR    CATARACT EXTRACTION BILATERAL W/ ANTERIOR VITRECTOMY      COLONOSCOPY N/A 5/13/2016    Procedure: COLONOSCOPY;  Surgeon: Presley Phillips MD;  [facility]  Service: Endoscopy;  Laterality: N/A;  EGD with Dr. Jeffery prior to Colonoscopy. EC    COLONOSCOPY N/A 5/13/2016    Performed by Presley Phillips MD at [facility] ([facility])    COLONOSCOPY N/A 11/13/2012    Performed by Presley Phillips MD at [facility] ([facility])    ESOPHAGOGASTRODUODENOSCOPY N/A 7/5/2018    Procedure: ESOPHAGOGASTRODUODENOSCOPY (EGD);  Surgeon: Khanh Asencio III, MD;  Location: [facility];  Service: Endoscopy;  Laterality: N/A;    ESOPHAGOGASTRODUODENOSCOPY (EGD) N/A 7/5/2018    Performed by Khanh Asencio III, MD at [facility]    ESOPHAGOGASTRODUODENOSCOPY (EGD) N/A 5/13/2016    [facility]    JOINT REPLACEMENT Left     x 2    KNEE SURGERY Left     x2. revision 06/27/17    PROSTATE  SURGERY      REMOVAL-IMPLANT Left 2017    Performed by Malcolm [facility] Sr., MD at [facility] OR    REVISION-ARTHROPLASTY-KNEE Left 2017    Performed by Malcolm [facility] Sr., MD at [facility] OR    SYNOVECTOMY-KNEE Left 2017    Performed by Malcolm Gill Sr., MD at [facility] OR    TONSILLECTOMY, ADENOIDECTOMY         Social History     Tobacco Use    Smoking status: Former Smoker     Packs/day: 0.30     Years: 52.00     Pack years: 15.60     Types: Cigarettes     Last attempt to quit: 2000     Years since quittin.0    Smokeless tobacco: Never Used   Substance Use Topics    Alcohol use: No    Drug use: No       Family History   Problem Relation Age of Onset    No Known Problems Son     No Known Problems Daughter     No Known Problems Son     Diabetes Neg Hx     Heart disease Neg Hx     Cancer Neg Hx     Celiac disease Neg Hx     Cirrhosis Neg Hx     Colon cancer Neg Hx     Colon polyps Neg Hx     Crohn's disease Neg Hx     Cystic fibrosis Neg Hx     Esophageal cancer Neg Hx     Hemochromatosis Neg Hx     Inflammatory bowel disease Neg Hx     Irritable bowel syndrome Neg Hx     Liver cancer Neg Hx     Liver disease Neg Hx     Rectal cancer Neg Hx     Stomach cancer Neg Hx     Ulcerative colitis Neg Hx     Zak's disease Neg Hx     Amblyopia Neg Hx     Blindness Neg Hx     Glaucoma Neg Hx     Hypertension Neg Hx     Macular degeneration Neg Hx     Retinal detachment Neg Hx     Strabismus Neg Hx          Review of Systems   Constitution: Negative for decreased appetite, diaphoresis, fever, weakness, malaise/fatigue and night sweats.   HENT: Negative for nosebleeds.    Eyes: Negative for blurred vision and double vision.   Cardiovascular: Positive for dyspnea on exertion and leg swelling. Negative for chest pain, claudication, irregular heartbeat, near-syncope, orthopnea, palpitations, paroxysmal nocturnal dyspnea and syncope.   Respiratory: Negative for cough, shortness of  breath, sleep disturbances due to breathing, snoring, sputum production and wheezing.    Endocrine: Negative for cold intolerance and polyuria.   Hematologic/Lymphatic: Does not bruise/bleed easily.   Skin: Negative for rash.   Musculoskeletal: Negative for back pain, falls, joint pain, joint swelling and neck pain.   Gastrointestinal: Negative for abdominal pain, heartburn, nausea and vomiting.   Genitourinary: Negative for dysuria, frequency and hematuria.   Neurological: Negative for difficulty with concentration, dizziness, focal weakness, headaches, light-headedness, numbness and seizures.   Psychiatric/Behavioral: Negative for depression, memory loss and substance abuse. The patient does not have insomnia.    Allergic/Immunologic: Negative for HIV exposure and hives.       Objective:   Physical Exam   Constitutional: He is oriented to person, place, and time. He appears well-nourished.   HENT:   Head: Normocephalic.   Eyes: Pupils are equal, round, and reactive to light.   Neck: Normal carotid pulses and no JVD present. Carotid bruit is not present. No thyromegaly present.   Cardiovascular: Normal rate, regular rhythm, normal heart sounds and normal pulses.  No extrasystoles are present. PMI is not displaced. Exam reveals no gallop and no S3.   No murmur heard.  Pulmonary/Chest: Breath sounds normal. No stridor. No respiratory distress.   Abdominal: Soft. Bowel sounds are normal. There is no tenderness. There is no rebound.   Musculoskeletal: Normal range of motion. He exhibits edema.   1+ BLE   Neurological: He is alert and oriented to person, place, and time.   Skin: Skin is intact. No rash noted.   Psychiatric: His behavior is normal.       Lab Results   Component Value Date    CHOL 169 10/11/2018    CHOL 158 11/09/2017    CHOL 164 11/14/2016     Lab Results   Component Value Date    HDL 45 10/11/2018    HDL 48 11/09/2017    HDL 44 11/14/2016     Lab Results   Component Value Date    LDLCALC 101.2  10/11/2018    LDLCALC 86.4 11/09/2017    LDLCALC 97.2 11/14/2016     Lab Results   Component Value Date    TRIG 114 10/11/2018    TRIG 118 11/09/2017    TRIG 114 11/14/2016     Lab Results   Component Value Date    CHOLHDL 26.6 10/11/2018    CHOLHDL 30.4 11/09/2017    CHOLHDL 26.8 11/14/2016       Chemistry        Component Value Date/Time     11/13/2018 0223    K 4.0 11/13/2018 0223     11/13/2018 0223    CO2 25 11/13/2018 0223    BUN 14 11/13/2018 0223    CREATININE 1.2 11/13/2018 0223     (H) 11/13/2018 0223        Component Value Date/Time    CALCIUM 9.3 11/13/2018 0223    ALKPHOS 64 11/13/2018 0223    AST 27 11/13/2018 0223    ALT 19 11/13/2018 0223    BILITOT 0.6 11/13/2018 0223    ESTGFRAFRICA >60 11/13/2018 0223    EGFRNONAA 57 (A) 11/13/2018 0223          Lab Results   Component Value Date    HGBA1C 5.5 06/07/2018     Lab Results   Component Value Date    TSH 0.587 11/21/2017     Lab Results   Component Value Date    INR 1.1 04/08/2015     Lab Results   Component Value Date    WBC 5.38 11/13/2018    HGB 12.9 (L) 11/13/2018    HCT 38.5 (L) 11/13/2018     (H) 11/13/2018     (L) 11/13/2018     BMP  Sodium   Date Value Ref Range Status   11/13/2018 142 136 - 145 mmol/L Final     Potassium   Date Value Ref Range Status   11/13/2018 4.0 3.5 - 5.1 mmol/L Final     Chloride   Date Value Ref Range Status   11/13/2018 108 95 - 110 mmol/L Final     CO2   Date Value Ref Range Status   11/13/2018 25 23 - 29 mmol/L Final     BUN, Bld   Date Value Ref Range Status   11/13/2018 14 8 - 23 mg/dL Final     Creatinine   Date Value Ref Range Status   11/13/2018 1.2 0.5 - 1.4 mg/dL Final     Calcium   Date Value Ref Range Status   11/13/2018 9.3 8.7 - 10.5 mg/dL Final     Anion Gap   Date Value Ref Range Status   11/13/2018 9 8 - 16 mmol/L Final     eGFR if    Date Value Ref Range Status   11/13/2018 >60 >60 mL/min/1.73 m^2 Final     eGFR if non    Date Value Ref  Range Status   11/13/2018 57 (A) >60 mL/min/1.73 m^2 Final     Comment:     Calculation used to obtain the estimated glomerular filtration  rate (eGFR) is the CKD-EPI equation.        BNP  @LABRCNTIP(BNP,BNPTRIAGEBLO)@  @LABRCNTIP(troponini)@  CrCl cannot be calculated (Patient's most recent lab result is older than the maximum 7 days allowed.).  No results found in the last 24 hours.  No results found in the last 24 hours.  No results found in the last 24 hours.    Assessment:      1. Essential hypertension    2. Dyslipidemia    3. Abnormal ECG    4. Leg swelling    5. Diastolic dysfunction with chronic heart failure      New ADAMS  Leg swelling  High BP  LDL and A1C wnl  Plan:   Add HCTZ 25 mg  D/c amlodipine due to leg swelling  Check ECho and TSH  DASH  RTC in 3 months

## 2018-11-28 ENCOUNTER — TELEPHONE (OUTPATIENT)
Dept: CARDIOLOGY | Facility: CLINIC | Age: 83
End: 2018-11-28

## 2018-11-28 NOTE — TELEPHONE ENCOUNTER
Attempted without success to contact pt with test results.  Left vm to return call.    ----- Message from Jose Alejandro Marshall MD sent at 11/28/2018  1:09 PM CST -----  TSH normal

## 2019-01-29 RX ORDER — TRIAMCINOLONE ACETONIDE 1 MG/G
CREAM TOPICAL
Qty: 454 G | Refills: 1 | Status: SHIPPED | OUTPATIENT
Start: 2019-01-29 | End: 2020-02-03

## 2019-02-19 ENCOUNTER — TELEPHONE (OUTPATIENT)
Dept: HEMATOLOGY/ONCOLOGY | Facility: CLINIC | Age: 84
End: 2019-02-19

## 2019-02-19 NOTE — TELEPHONE ENCOUNTER
Left msg for pt that he has an appt with Sarah Schaeffer, NP for tomorrow 2/20/19 at 11:30pm but that I do not see where he did his prior labs needed for the appt. So he nay want to reschedule the lab and appt with Sarah by calling the appt desk at [phone].

## 2019-03-08 ENCOUNTER — PES CALL (OUTPATIENT)
Dept: ADMINISTRATIVE | Facility: CLINIC | Age: 84
End: 2019-03-08

## 2019-03-18 ENCOUNTER — TELEPHONE (OUTPATIENT)
Dept: HEMATOLOGY/ONCOLOGY | Facility: CLINIC | Age: 84
End: 2019-03-18

## 2019-03-18 NOTE — PROGRESS NOTES
Subjective:       Patient ID: Dominic Cohen is a 83 y.o. male.    Chief Complaint: Thrombocytopenia    HPI Patient presents for 4 mon f/u evaluation of an abnormal platelet count. Referred by his PCP Dr. Ender Liu.    Reviewed epic records dated back to 2/2/11.     Ultrasound of the abdomen revealed normal appearing spleen 4/20/17.     Patient has had intermittent slight drops in his hgb and mild drops in his platelet count. 2016 pt had one drop to 145 in his platelets. The drops in 2017 in his platelets occurred after his knee replacement re-do surgery that occurred 6/27/17.     Patient relates having to have 2 units of blood after his surgery in June 2017.    Stated that since his surgery he had been a bit more fatigued than usual. That is improving. Still remains active- stays awake until late at night and then has to nap a little in the day.    Denies any bleeding or unusual bruising. No gingival bleeding, no nose bleeds, no hematuria, no hematochezia- has occasional blood with passing a hard stool on toilet paper only. No night sweats or fevers. Has lost about 20 # since his knee surgery. Watching what he eats as well. Appetite is good.     Taking oral iron and b12 daily with no adverse effects    FH: no cancers    NSAID use- aspirin daily.    No gastric or GI surgeries in the past    No alcohol use    Last blood donation 2013    Over the counter supplements- magnesium, multivitamin and calcium      Review of Systems   Constitutional: Negative for appetite change and unexpected weight change.   HENT: Positive for tinnitus (chronic- follwed by ent at [facility]).    Eyes: Positive for visual disturbance.   Respiratory: Negative for cough and shortness of breath.    Cardiovascular: Negative for chest pain.   Gastrointestinal: Negative for abdominal pain and diarrhea.   Genitourinary: Positive for frequency.   Musculoskeletal: Positive for back pain.   Skin: Negative for rash.   Neurological: Negative for  headaches.   Hematological: Negative for adenopathy.   Psychiatric/Behavioral: The patient is not nervous/anxious.       Objective:      Physical Exam   Constitutional: He is oriented to person, place, and time. He appears well-developed and well-nourished. No distress.   HENT:   Head: Normocephalic and atraumatic.   Right Ear: External ear normal.   Left Ear: External ear normal.   Nose: Nose normal.   Mouth/Throat: Oropharynx is clear and moist.   Eyes: Conjunctivae and EOM are normal. Pupils are equal, round, and reactive to light. Right eye exhibits no discharge. Left eye exhibits no discharge. No scleral icterus.   Neck: Normal range of motion. Neck supple. No thyromegaly present.   Cardiovascular: Normal rate, regular rhythm and normal heart sounds. Exam reveals no gallop and no friction rub.   No murmur heard.  Pulmonary/Chest: Effort normal and breath sounds normal. No respiratory distress. He has no wheezes. He has no rales.   Abdominal: Soft. Bowel sounds are normal. He exhibits no distension and no mass. There is no tenderness. There is no rebound and no guarding.   Musculoskeletal: Normal range of motion. He exhibits edema.   Lymphadenopathy:     He has no cervical adenopathy.   Neurological: He is alert and oriented to person, place, and time. He has normal strength. Gait normal.   Skin: Skin is warm and dry. No lesion and no rash noted.   Psychiatric: He has a normal mood and affect. His behavior is normal. Judgment and thought content normal.        LABS:  Results for CANDELARIA CASILLAS (MRN [id number]) as of 3/19/2019 13:28   Ref. Range 10/11/2018 10:33 11/13/2018 02:23 3/19/2019 13:05   Platelets Latest Ref Range: 150 - 350 K/uL 83 (L) 112 (L) 188   Platelet Count, Blue Top Latest Ref Range: 150 - 350 K/uL 133 (L)     MPV Latest Ref Range: 9.2 - 12.9 fL 13.6 (H) 13.5 (H) 10.5   MPV, Blue Top Latest Ref Range: 9.2 - 12.9 fL 10.7     Gran% Latest Ref Range: 38.0 - 73.0 % 54.0 54.6 47.1   Gran # (ANC)  Latest Ref Range: 1.8 - 7.7 K/uL 2.6 2.9 2.2   Immature Grans (Abs) Latest Ref Range: 0.00 - 0.04 K/uL 0.01  0.00   Lymph% Latest Ref Range: 18.0 - 48.0 % 29.2 28.1 33.6   Lymph # Latest Ref Range: 1.0 - 4.8 K/uL 1.4 1.5 1.6   Mono% Latest Ref Range: 4.0 - 15.0 % 12.5 13.2 14.6   Mono # Latest Ref Range: 0.3 - 1.0 K/uL 0.6 0.7 0.7   Eosinophil% Latest Ref Range: 0.0 - 8.0 % 3.3 3.5 4.1   Eos # Latest Ref Range: 0.0 - 0.5 K/uL 0.2 0.2 0.2   Basophil% Latest Ref Range: 0.0 - 1.9 % 0.8 0.6 0.6   Baso # Latest Ref Range: 0.00 - 0.20 K/uL 0.04 0.03 0.03   nRBC Latest Ref Range: 0 /100 WBC 0  0   Differential Method Unknown Automated Automated Automated   Immature Granulocytes Latest Ref Range: 0.0 - 0.5 % 0.2  0.0   Iron Latest Ref Range: 45 - 160 ug/dL 64     TIBC Latest Ref Range: 250 - 450 ug/dL 296     Saturated Iron Latest Ref Range: 20 - 50 % 22     Transferrin Latest Ref Range: 200 - 375 mg/dL 200     Ferritin Latest Ref Range: 20.0 - 300.0 ng/mL 254           Renal function stable- creatinine and BUN- normal- GFR decreased  Calcium normal  Iron - pending        Assessment:       1. B12 deficiency    2. Thrombocytopenia    3. Acute blood loss anemia    4. Stage 2 chronic kidney disease        Plan:   1.     Iron and b12 levels pending  2.     Platelets wnl today-    - history of clumping with red top cbc- blue top pending  3.     RTC in 4 months with cbc, cmp, iron, rafal, spe, light chains, blue top platelet count and b12 levels if labs stable this visit  4.     Continue taking iron daily and b12- will call with the rest of his lab results [phone]  5.     All labs reviewed with Dr. Barrios who recommends 4 mon f/u

## 2019-03-19 ENCOUNTER — LAB VISIT (OUTPATIENT)
Dept: LAB | Facility: HOSPITAL | Age: 84
End: 2019-03-19
Attending: NURSE PRACTITIONER
Payer: MEDICARE

## 2019-03-19 ENCOUNTER — OFFICE VISIT (OUTPATIENT)
Dept: HEMATOLOGY/ONCOLOGY | Facility: CLINIC | Age: 84
End: 2019-03-19
Payer: MEDICARE

## 2019-03-19 VITALS
TEMPERATURE: 98 F | BODY MASS INDEX: 36.24 KG/M2 | OXYGEN SATURATION: 95 % | RESPIRATION RATE: 18 BRPM | DIASTOLIC BLOOD PRESSURE: 70 MMHG | HEART RATE: 68 BPM | HEIGHT: 76 IN | WEIGHT: 297.63 LBS | SYSTOLIC BLOOD PRESSURE: 126 MMHG

## 2019-03-19 DIAGNOSIS — N18.2 STAGE 2 CHRONIC KIDNEY DISEASE: ICD-10-CM

## 2019-03-19 DIAGNOSIS — D64.9 ANEMIA, UNSPECIFIED TYPE: Primary | ICD-10-CM

## 2019-03-19 DIAGNOSIS — D69.6 THROMBOCYTOPENIA: ICD-10-CM

## 2019-03-19 DIAGNOSIS — E53.8 B12 DEFICIENCY: Primary | ICD-10-CM

## 2019-03-19 DIAGNOSIS — D62 ACUTE BLOOD LOSS ANEMIA: ICD-10-CM

## 2019-03-19 DIAGNOSIS — D64.9 ANEMIA, UNSPECIFIED TYPE: ICD-10-CM

## 2019-03-19 LAB
ALBUMIN SERPL BCP-MCNC: 3.7 G/DL
ALP SERPL-CCNC: 69 U/L
ALT SERPL W/O P-5'-P-CCNC: 14 U/L
ANION GAP SERPL CALC-SCNC: 8 MMOL/L
AST SERPL-CCNC: 19 U/L
BASOPHILS # BLD AUTO: 0.03 K/UL
BASOPHILS NFR BLD: 0.6 %
BILIRUB SERPL-MCNC: 0.5 MG/DL
BUN SERPL-MCNC: 16 MG/DL
CALCIUM SERPL-MCNC: 9.5 MG/DL
CHLORIDE SERPL-SCNC: 105 MMOL/L
CO2 SERPL-SCNC: 28 MMOL/L
CREAT SERPL-MCNC: 1.3 MG/DL
DIFFERENTIAL METHOD: ABNORMAL
EOSINOPHIL # BLD AUTO: 0.2 K/UL
EOSINOPHIL NFR BLD: 4.1 %
ERYTHROCYTE [DISTWIDTH] IN BLOOD BY AUTOMATED COUNT: 12.6 %
EST. GFR  (AFRICAN AMERICAN): 59 ML/MIN/1.73 M^2
EST. GFR  (NON AFRICAN AMERICAN): 51 ML/MIN/1.73 M^2
FERRITIN SERPL-MCNC: 287 NG/ML
GLUCOSE SERPL-MCNC: 111 MG/DL
HCT VFR BLD AUTO: 39.8 %
HGB BLD-MCNC: 12.6 G/DL
IMM GRANULOCYTES # BLD AUTO: 0 K/UL
IMM GRANULOCYTES NFR BLD AUTO: 0 %
IRON SERPL-MCNC: 77 UG/DL
LYMPHOCYTES # BLD AUTO: 1.6 K/UL
LYMPHOCYTES NFR BLD: 33.6 %
MCH RBC QN AUTO: 32.2 PG
MCHC RBC AUTO-ENTMCNC: 31.7 G/DL
MCV RBC AUTO: 102 FL
MONOCYTES # BLD AUTO: 0.7 K/UL
MONOCYTES NFR BLD: 14.6 %
NEUTROPHILS # BLD AUTO: 2.2 K/UL
NEUTROPHILS NFR BLD: 47.1 %
NRBC BLD-RTO: 0 /100 WBC
PLATELET # BLD AUTO: 188 K/UL
PMV BLD AUTO: 10.5 FL
POTASSIUM SERPL-SCNC: 4.3 MMOL/L
PROT SERPL-MCNC: 7.9 G/DL
RBC # BLD AUTO: 3.91 M/UL
SATURATED IRON: 26 %
SODIUM SERPL-SCNC: 141 MMOL/L
TOTAL IRON BINDING CAPACITY: 299 UG/DL
TRANSFERRIN SERPL-MCNC: 202 MG/DL
WBC # BLD AUTO: 4.67 K/UL

## 2019-03-19 PROCEDURE — 80053 COMPREHEN METABOLIC PANEL: CPT

## 2019-03-19 PROCEDURE — 99213 OFFICE O/P EST LOW 20 MIN: CPT | Mod: PBBFAC,PN | Performed by: NURSE PRACTITIONER

## 2019-03-19 PROCEDURE — 86334 IMMUNOFIX E-PHORESIS SERUM: CPT | Mod: 26,,, | Performed by: PATHOLOGY

## 2019-03-19 PROCEDURE — 84165 PATHOLOGIST INTERPRETATION SPE: ICD-10-PCS | Mod: 26,,, | Performed by: PATHOLOGY

## 2019-03-19 PROCEDURE — 83540 ASSAY OF IRON: CPT

## 2019-03-19 PROCEDURE — 99999 PR PBB SHADOW E&M-EST. PATIENT-LVL III: ICD-10-PCS | Mod: PBBFAC,,, | Performed by: NURSE PRACTITIONER

## 2019-03-19 PROCEDURE — 85025 COMPLETE CBC W/AUTO DIFF WBC: CPT

## 2019-03-19 PROCEDURE — 83520 IMMUNOASSAY QUANT NOS NONAB: CPT

## 2019-03-19 PROCEDURE — 86334 PATHOLOGIST INTERPRETATION IFE: ICD-10-PCS | Mod: 26,,, | Performed by: PATHOLOGY

## 2019-03-19 PROCEDURE — 36415 COLL VENOUS BLD VENIPUNCTURE: CPT

## 2019-03-19 PROCEDURE — 84165 PROTEIN E-PHORESIS SERUM: CPT

## 2019-03-19 PROCEDURE — 99214 PR OFFICE/OUTPT VISIT, EST, LEVL IV, 30-39 MIN: ICD-10-PCS | Mod: S$PBB,,, | Performed by: NURSE PRACTITIONER

## 2019-03-19 PROCEDURE — 86334 IMMUNOFIX E-PHORESIS SERUM: CPT

## 2019-03-19 PROCEDURE — 84165 PROTEIN E-PHORESIS SERUM: CPT | Mod: 26,,, | Performed by: PATHOLOGY

## 2019-03-19 PROCEDURE — 99214 OFFICE O/P EST MOD 30 MIN: CPT | Mod: S$PBB,,, | Performed by: NURSE PRACTITIONER

## 2019-03-19 PROCEDURE — 82728 ASSAY OF FERRITIN: CPT

## 2019-03-19 PROCEDURE — 99999 PR PBB SHADOW E&M-EST. PATIENT-LVL III: CPT | Mod: PBBFAC,,, | Performed by: NURSE PRACTITIONER

## 2019-03-20 LAB
ALBUMIN SERPL ELPH-MCNC: 3.86 G/DL
ALPHA1 GLOB SERPL ELPH-MCNC: 0.28 G/DL
ALPHA2 GLOB SERPL ELPH-MCNC: 0.8 G/DL
B-GLOBULIN SERPL ELPH-MCNC: 0.67 G/DL
GAMMA GLOB SERPL ELPH-MCNC: 1.69 G/DL
INTERPRETATION SERPL IFE-IMP: NORMAL
KAPPA LC SER QL IA: 4.39 MG/DL
KAPPA LC/LAMBDA SER IA: 1.68
LAMBDA LC SER QL IA: 2.61 MG/DL
PATHOLOGIST INTERPRETATION IFE: NORMAL
PATHOLOGIST INTERPRETATION SPE: NORMAL
PROT SERPL-MCNC: 7.3 G/DL

## 2019-04-11 ENCOUNTER — OFFICE VISIT (OUTPATIENT)
Dept: FAMILY MEDICINE | Facility: CLINIC | Age: 84
End: 2019-04-11
Payer: MEDICARE

## 2019-04-11 ENCOUNTER — HOSPITAL ENCOUNTER (OUTPATIENT)
Dept: RADIOLOGY | Facility: HOSPITAL | Age: 84
Discharge: HOME | End: 2019-04-11
Attending: INTERNAL MEDICINE
Payer: MEDICARE

## 2019-04-11 VITALS
TEMPERATURE: 98 F | DIASTOLIC BLOOD PRESSURE: 72 MMHG | HEART RATE: 61 BPM | RESPIRATION RATE: 16 BRPM | BODY MASS INDEX: 36.07 KG/M2 | WEIGHT: 296.19 LBS | SYSTOLIC BLOOD PRESSURE: 128 MMHG | HEIGHT: 76 IN

## 2019-04-11 DIAGNOSIS — Z90.79 H/O PROSTATECTOMY: Chronic | ICD-10-CM

## 2019-04-11 DIAGNOSIS — E78.5 DYSLIPIDEMIA: ICD-10-CM

## 2019-04-11 DIAGNOSIS — I10 ESSENTIAL HYPERTENSION: Primary | ICD-10-CM

## 2019-04-11 DIAGNOSIS — R42 DIZZINESS: ICD-10-CM

## 2019-04-11 DIAGNOSIS — D64.9 ANEMIA, UNSPECIFIED TYPE: ICD-10-CM

## 2019-04-11 PROCEDURE — 70210 X-RAY EXAM OF SINUSES: CPT | Mod: 26,,, | Performed by: RADIOLOGY

## 2019-04-11 PROCEDURE — 99214 PR OFFICE/OUTPT VISIT, EST, LEVL IV, 30-39 MIN: ICD-10-PCS | Mod: S$PBB,,, | Performed by: INTERNAL MEDICINE

## 2019-04-11 PROCEDURE — 99214 OFFICE O/P EST MOD 30 MIN: CPT | Mod: S$PBB,,, | Performed by: INTERNAL MEDICINE

## 2019-04-11 PROCEDURE — 99214 OFFICE O/P EST MOD 30 MIN: CPT | Mod: PBBFAC,PO | Performed by: INTERNAL MEDICINE

## 2019-04-11 PROCEDURE — 99999 PR PBB SHADOW E&M-EST. PATIENT-LVL IV: CPT | Mod: PBBFAC,,, | Performed by: INTERNAL MEDICINE

## 2019-04-11 PROCEDURE — 99999 PR PBB SHADOW E&M-EST. PATIENT-LVL IV: ICD-10-PCS | Mod: PBBFAC,,, | Performed by: INTERNAL MEDICINE

## 2019-04-11 PROCEDURE — 70210 X-RAY EXAM OF SINUSES: CPT | Mod: TC,FY,PO

## 2019-04-11 PROCEDURE — 70210 XR SINUSES LTD LESS THAN 3 VIEWS: ICD-10-PCS | Mod: 26,,, | Performed by: RADIOLOGY

## 2019-04-11 RX ORDER — HYDROCHLOROTHIAZIDE 25 MG/1
25 TABLET ORAL DAILY PRN
Qty: 30 TABLET | Refills: 1 | Status: SHIPPED | OUTPATIENT
Start: 2019-04-11 | End: 2020-02-03

## 2019-04-11 NOTE — PROGRESS NOTES
Subjective:       Patient ID: Dominic Cohen is a 83 y.o. male.    Chief Complaint: Follow-up; Hypertension; Hyperlipidemia; and Anemia    Hypertension   Pertinent negatives include no chest pain, headaches, neck pain, palpitations or shortness of breath.   Hyperlipidemia   Associated symptoms include myalgias. Pertinent negatives include no chest pain or shortness of breath.   Anemia   There has been no abdominal pain, bruising/bleeding easily, confusion, fever, light-headedness, pallor or palpitations.     Past Medical History:   Diagnosis Date    Arthritis     Atherosclerosis of abdominal aorta     Cataract     Chronic constipation     Glaucoma     History of anal fissures     Hypertension     Polyneuropathy in other diseases classified elsewhere     due to folate deficiency    Prediabetes     Venous insufficiency     Venous insufficiency      Past Surgical History:   Procedure Laterality Date    BACK SURGERY      BONE GRAFT Left 6/27/2017    Per[facility] by Malcolm Gill Sr[facility]    CATARACT EXTRACTION BILATERAL W/ ANTERIOR VITRECTOMY      COLONOSCOPY N/A 5/13/2016    [facility]formed [facility] ([facility]    COLONOSCOPY N/A 11/13/2012    Performed by [facility]ley Phillips [facility] at [facility] (4[facility] F[facility])    ESOPHAGOGASTRODUODENOSCOPY (EGD) N/A 7/5/2018    Performed by Khanh Asencio III, MD at [facility]    ESOPHAGOGASTRODUODENOSCOPY (EGD) N/A 5/13/2016    Performed by Kendall Jeffery MD at [facility] (4TH FLR)    JOINT REPLACEMENT Left     x 2    KNEE SURGERY Left     x2. revision 06/27/17    PROSTATE SURGERY      REMOVAL-IMPLANT Left 6/27/2017    Performed by Malcolm Gill Sr[facility] at [facility]    REVISION-ARTHROPLASTY-KNEE Left 6/27/2017    Performed by Malcolm Gill Sr[facility] at [facility]    SYNOVECTOMY-KNEE Left 6/27/2017    Performed by Malcolm Gill Sr[facility] at [facility]    TONSILLECTOMY, ADENOIDECTOMY       Family History   Problem Relation Age of Onset    No  Known Problems Son     No Known Problems Daughter     No Known Problems Son     Diabetes Neg Hx     Heart disease Neg Hx     Cancer Neg Hx     Celiac disease Neg Hx     Cirrhosis Neg Hx     Colon cancer Neg Hx     Colon polyps Neg Hx     Crohn's disease Neg Hx     Cystic fibrosis Neg Hx     Esophageal cancer Neg Hx     Hemochromatosis Neg Hx     Inflammatory bowel disease Neg Hx     Irritable bowel syndrome Neg Hx     Liver cancer Neg Hx     Liver disease Neg Hx     Rectal cancer Neg Hx     Stomach cancer Neg Hx     Ulcerative colitis Neg Hx     Zak's disease Neg Hx     Amblyopia Neg Hx     Blindness Neg Hx     Glaucoma Neg Hx     Hypertension Neg Hx     Macular degeneration Neg Hx     Retinal detachment Neg Hx     Strabismus Neg Hx      Social History     Socioeconomic History    Marital status:      Spouse name: Not on file    Number of children: 3    Years of education: Not on file    Highest education level: Not on file   Occupational History    Occupation: Retired     Comment: [employer] [place]   Social Needs    Financial resource strain: Not on file    Food insecurity:     Worry: Not on file     Inability: Not on file    Transportation needs:     Medical: Not on file     Non-medical: Not on file   Tobacco Use    Smoking status: Former Smoker     Packs/day: 0.30     Years: 52.00     Pack years: 15.60     Types: Cigarettes     Last attempt to quit: 2000     Years since quittin.4    Smokeless tobacco: Never Used   Substance and Sexual Activity    Alcohol use: No    Drug use: No    Sexual activity: Yes   Lifestyle    Physical activity:     Days per week: Not on file     Minutes per session: Not on file    Stress: Not on file   Relationships    Social connections:     Talks on phone: Not on file     Gets together: Not on file     Attends Yazidism service: Not on file     Active member of club or organization: Not on file     Attends meetings of clubs  or organizations: Not on file     Relationship status: Not on file   Other Topics Concern    Not on file   Social History Narrative    Not on file     Review of Systems   Constitutional: Negative for activity change, appetite change, chills, diaphoresis, fatigue, fever and unexpected weight change.   HENT: Negative for drooling, ear discharge, ear pain, facial swelling, hearing loss, mouth sores, nosebleeds, postnasal drip, rhinorrhea, sinus pressure, sneezing, sore throat, tinnitus, trouble swallowing and voice change.    Eyes: Negative for photophobia, redness and visual disturbance.   Respiratory: Negative for apnea, cough, choking, chest tightness, shortness of breath and wheezing.    Cardiovascular: Negative for chest pain, palpitations and leg swelling.   Gastrointestinal: Negative for abdominal distention, abdominal pain, anal bleeding, blood in stool, constipation, diarrhea, nausea and vomiting.   Endocrine: Negative for cold intolerance, heat intolerance, polydipsia, polyphagia and polyuria.   Genitourinary: Negative for difficulty urinating, dysuria, enuresis, flank pain, frequency, genital sores, hematuria and urgency.   Musculoskeletal: Positive for arthralgias, gait problem and myalgias. Negative for back pain, joint swelling, neck pain and neck stiffness.   Skin: Negative for color change, pallor, rash and wound.   Allergic/Immunologic: Negative for food allergies and immunocompromised state.   Neurological: Positive for dizziness. Negative for tremors, seizures, syncope, facial asymmetry, speech difficulty, weakness, light-headedness, numbness and headaches.   Hematological: Negative for adenopathy. Does not bruise/bleed easily.   Psychiatric/Behavioral: Negative for agitation, behavioral problems, confusion, decreased concentration, dysphoric mood, hallucinations, self-injury, sleep disturbance and suicidal ideas. The patient is not nervous/anxious and is not hyperactive.        Objective:       Physical Exam   Constitutional: He is oriented to person, place, and time. He appears well-developed and well-nourished. No distress.   HENT:   Head: Normocephalic and atraumatic.   Eyes: Pupils are equal, round, and reactive to light.   Neck: Normal range of motion. Neck supple. No JVD present. Carotid bruit is not present. No tracheal deviation present. No thyromegaly present.   Cardiovascular: Normal rate, regular rhythm, normal heart sounds and intact distal pulses.   Pulmonary/Chest: Effort normal and breath sounds normal. No respiratory distress. He has no wheezes. He has no rales. He exhibits no tenderness.   Abdominal: Soft. Bowel sounds are normal. He exhibits no distension. There is no tenderness. There is no rebound and no guarding.   Musculoskeletal: Normal range of motion. He exhibits no edema or tenderness.   Lymphadenopathy:     He has no cervical adenopathy.   Neurological: He is alert and oriented to person, place, and time.   Skin: Skin is warm and dry. No rash noted. He is not diaphoretic. No erythema. No pallor.   Psychiatric: He has a normal mood and affect. His behavior is normal. Judgment and thought content normal.   Nursing note and vitals reviewed.      CMP  Sodium   Date Value Ref Range Status   03/19/2019 141 136 - 145 mmol/L Final     Potassium   Date Value Ref Range Status   03/19/2019 4.3 3.5 - 5.1 mmol/L Final     Chloride   Date Value Ref Range Status   03/19/2019 105 95 - 110 mmol/L Final     CO2   Date Value Ref Range Status   03/19/2019 28 23 - 29 mmol/L Final     Glucose   Date Value Ref Range Status   03/19/2019 111 (H) 70 - 110 mg/dL Final     BUN, Bld   Date Value Ref Range Status   03/19/2019 16 8 - 23 mg/dL Final     Creatinine   Date Value Ref Range Status   03/19/2019 1.3 0.5 - 1.4 mg/dL Final     Calcium   Date Value Ref Range Status   03/19/2019 9.5 8.7 - 10.5 mg/dL Final     Total Protein   Date Value Ref Range Status   03/19/2019 7.9 6.0 - 8.4 g/dL Final     Albumin    Date Value Ref Range Status   03/19/2019 3.7 3.5 - 5.2 g/dL Final     Total Bilirubin   Date Value Ref Range Status   03/19/2019 0.5 0.1 - 1.0 mg/dL Final     Comment:     For infants and newborns, interpretation of results should be based  on gestational age, weight and in agreement with clinical  observations.  Premature Infant recommended reference ranges:  Up to 24 hours.............<8.0 mg/dL  Up to 48 hours............<12.0 mg/dL  3-5 days..................<15.0 mg/dL  6-29 days.................<15.0 mg/dL       Alkaline Phosphatase   Date Value Ref Range Status   03/19/2019 69 55 - 135 U/L Final     AST   Date Value Ref Range Status   03/19/2019 19 10 - 40 U/L Final     ALT   Date Value Ref Range Status   03/19/2019 14 10 - 44 U/L Final     Anion Gap   Date Value Ref Range Status   03/19/2019 8 8 - 16 mmol/L Final     eGFR if    Date Value Ref Range Status   03/19/2019 59 (A) >60 mL/min/1.73 m^2 Final     eGFR if non    Date Value Ref Range Status   03/19/2019 51 (A) >60 mL/min/1.73 m^2 Final     Comment:     Calculation used to obtain the estimated glomerular filtration  rate (eGFR) is the CKD-EPI equation.        Lab Results   Component Value Date    WBC 4.67 03/19/2019    HGB 12.6 (L) 03/19/2019    HCT 39.8 (L) 03/19/2019     (H) 03/19/2019     03/19/2019     Lab Results   Component Value Date    CHOL 169 10/11/2018     Lab Results   Component Value Date    HDL 45 10/11/2018     Lab Results   Component Value Date    LDLCALC 101.2 10/11/2018     Lab Results   Component Value Date    TRIG 114 10/11/2018     Lab Results   Component Value Date    CHOLHDL 26.6 10/11/2018     Lab Results   Component Value Date    TSH 1.038 11/27/2018     Lab Results   Component Value Date    HGBA1C 5.5 06/07/2018     Assessment:       1. Essential hypertension    2. H/O prostatectomy    3. Dyslipidemia    4. Anemia, unspecified type    5. Dizziness        Plan:   Essential  hypertension    H/O prostatectomy    Dyslipidemia    Anemia, unspecified type    Dizziness  -     X-Ray Sinuses Ltd Less Than 3 Views; Future; Expected date: 04/11/2019  -     Ambulatory referral to ENT    Other orders  -     hydroCHLOROthiazide (HYDRODIURIL) 25 MG tablet; Take 1 tablet (25 mg total) by mouth daily as needed.  Dispense: 30 tablet; Refill: 1    Stable----------continue meds.            F/u 6 months.

## 2019-04-15 ENCOUNTER — TELEPHONE (OUTPATIENT)
Dept: FAMILY MEDICINE | Facility: CLINIC | Age: 84
End: 2019-04-15

## 2019-04-15 ENCOUNTER — CLINICAL SUPPORT (OUTPATIENT)
Dept: AUDIOLOGY | Facility: CLINIC | Age: 84
End: 2019-04-15
Payer: MEDICARE

## 2019-04-15 DIAGNOSIS — H93.12 TINNITUS, LEFT: ICD-10-CM

## 2019-04-15 DIAGNOSIS — H90.3 SENSORINEURAL HEARING LOSS, BILATERAL: Primary | ICD-10-CM

## 2019-04-15 PROCEDURE — 92557 COMPREHENSIVE HEARING TEST: CPT | Mod: PBBFAC,PN | Performed by: AUDIOLOGIST-HEARING AID FITTER

## 2019-04-15 PROCEDURE — 92567 TYMPANOMETRY: CPT | Mod: PBBFAC,PN | Performed by: AUDIOLOGIST-HEARING AID FITTER

## 2019-04-15 NOTE — TELEPHONE ENCOUNTER
----- Message from Soraida Crespo sent at 4/15/2019  3:44 PM CDT -----  Contact: pt wife - Cammie   Type:  Test Results    Who Called:  Pt wife   Name of Test (Lab/Mammo/Etc): xrays   Date of Test: 04/11/2019  Ordering Provider: jr   Where the test was performed: [facility]   Would the patient rather a call back or a response via [facility]? Phone   Best Call Back Number: [phone]  Additional Information:

## 2019-04-15 NOTE — PROGRESS NOTES
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 Patient complains of dizziness that began roughly 3 months ago. He reports his dizziness occurs mostly when he attempts to turn his head to the left. He could not describe the sensation that it causes. He has a hx of severe neck/cervical issues and has pain and limited range of motion from this. He c/o pressure in the frontal sinus area also when he moves his head to the left. He denies migraines. He c/o tinnitus for the left ear only that sounds like "hissing". Denies any tinnitus for the right ear. He reports having sinus xrays completed last week per PCP but has not received the results.     Results reveal a normal to severe sensorineural hearing loss 250-8000 Hz for the right ear, and a normal to severe sensorineural hearing loss 250-8000 Hz for the left ear.   Speech Reception Thresholds were  15 dBHL for the right ear and 20 dBHL for the left ear.   Word recognition scores were excellent for the right ear and excellent for the left ear.   Tympanograms were Type A, normal for the right ear and Type A, normal for the left ear.    Modified Castana Hallpike - Negative bilaterally.    Patient was counseled on the above findings.    Recommendations:  1. ENT for sinus trouble, possible migraines, and dizziness.   2. Annual Audiograms.  3. We discussed that tinnitus is most often caused by a hearing loss, and that as the hair cells are damaged, either genetic or as a result of loud noise exposure, they then cause tinnitus. Some patients find that restricting the salt or caffeine in their diet helps, and there is also an OTC supplement, lipflavinoids, that some people find to be effective though their benefit is not fully proven. Tinnitus tends to be louder in times of stress and fatigue, and may decrease with time. Sound machines may also be an effective masking technique if needed at night.   4. Binaural hearing " aids with tinnitus masking technology.

## 2019-04-15 NOTE — TELEPHONE ENCOUNTER
Pt notified. Pt also stated his neck is still hurting, pt offered next available appointment and pt declined.

## 2019-04-18 ENCOUNTER — OFFICE VISIT (OUTPATIENT)
Dept: OTOLARYNGOLOGY | Facility: CLINIC | Age: 84
End: 2019-04-18
Payer: MEDICARE

## 2019-04-18 VITALS
BODY MASS INDEX: 36.17 KG/M2 | SYSTOLIC BLOOD PRESSURE: 148 MMHG | DIASTOLIC BLOOD PRESSURE: 84 MMHG | WEIGHT: 297.19 LBS | TEMPERATURE: 98 F | HEART RATE: 67 BPM

## 2019-04-18 DIAGNOSIS — J30.89 NON-SEASONAL ALLERGIC RHINITIS, UNSPECIFIED TRIGGER: Primary | ICD-10-CM

## 2019-04-18 DIAGNOSIS — R42 DIZZINESS: ICD-10-CM

## 2019-04-18 PROCEDURE — 99214 OFFICE O/P EST MOD 30 MIN: CPT | Mod: S$PBB,,, | Performed by: PHYSICIAN ASSISTANT

## 2019-04-18 PROCEDURE — 99999 PR PBB SHADOW E&M-EST. PATIENT-LVL III: ICD-10-PCS | Mod: PBBFAC,,, | Performed by: PHYSICIAN ASSISTANT

## 2019-04-18 PROCEDURE — 99213 OFFICE O/P EST LOW 20 MIN: CPT | Mod: PBBFAC,PN | Performed by: PHYSICIAN ASSISTANT

## 2019-04-18 PROCEDURE — 99999 PR PBB SHADOW E&M-EST. PATIENT-LVL III: CPT | Mod: PBBFAC,,, | Performed by: PHYSICIAN ASSISTANT

## 2019-04-18 PROCEDURE — 99214 PR OFFICE/OUTPT VISIT, EST, LEVL IV, 30-39 MIN: ICD-10-PCS | Mod: S$PBB,,, | Performed by: PHYSICIAN ASSISTANT

## 2019-04-18 RX ORDER — FLUTICASONE PROPIONATE 50 MCG
2 SPRAY, SUSPENSION (ML) NASAL 2 TIMES DAILY
Qty: 9.9 ML | Refills: 11 | Status: SHIPPED | OUTPATIENT
Start: 2019-04-18 | End: 2020-02-03

## 2019-04-18 RX ORDER — AZELASTINE 1 MG/ML
1 SPRAY, METERED NASAL 2 TIMES DAILY
Qty: 30 ML | Refills: 11 | Status: SHIPPED | OUTPATIENT
Start: 2019-04-18 | End: 2020-02-03

## 2019-04-18 RX ORDER — LEVOCETIRIZINE DIHYDROCHLORIDE 5 MG/1
5 TABLET, FILM COATED ORAL NIGHTLY
Qty: 30 TABLET | Refills: 11 | Status: SHIPPED | OUTPATIENT
Start: 2019-04-18 | End: 2020-02-03

## 2019-04-18 NOTE — PROGRESS NOTES
"Referring Provider:    No referring provider defined for this encounter.  Subjective:   Patient: Dominic Cohen [id number], :1935   Visit date:2019 10:21 AM    Chief Complaint:  Follow-up (audio; neck and forehead hurting)    HPI:  Dominic is a 83 y.o. male who I was asked to see in consultation for evaluation of the following issue(s):    Dizziness/Vertigo:  Onset:  3 month(s)  Total number of episodes:  daily  Duration of individual episodes: minutes  Severity: moderate  Exacerbating factors: rolling in bed to the left and turning head  Prior Medications: nothing  Relieving factors:  remaining motionless  Associated signs and symptoms:  Frontal HA/ rhinorrhea/ L ear "hissing" sounds  Quality:   Spinning- Yes   Light headedness- No   Sensation that room is moving but patient is motionless- Yes  Prior history of similar events: No    Exacerbated with turning his head to the left side.     Tinnitus:     Patient presents with tinnitus. Onset of symptoms was gradual starting 3 months ago ago with unchanged course since that time. Patient describes the tinnitus as constant located in the left ear. The quality is described as "hissing sound like air". The pattern is nonpulsatile with an intensity that is very soft. Patient describes his level of annoyance as minimally annoying, always aware. Associated symptoms include dizziness and drainage Previous treatments include none.    C/o increased pressure HA, frontal, for several months. C/o clear rhinorrhea and pressure in ears, greater in left with "hissing/air sounds". No significant hx of allergies in the past. Not taking medication(s) for allergy ssx.    Review of Systems:  -     Allergic/Immunologic: has No Known Allergies..  -     Constitutional: Current temp:         His meds, allergies, medical, surgical, social & family histories were reviewed & updated:  -     He has a current medication list which includes the following prescription(s): vitamin " c, aspirin, blood sugar diagnostic, blood sugar diagnostic, vitamin b-12, ergocalciferol (vitamin d2), hydrochlorothiazide, lancets, linaclotide, timolol maleate 0.5%, triamcinolone acetonide 0.1%, azelastine, fluticasone, levocetirizine, and ranitidine.  -     He  has a past medical history of Arthritis, Atherosclerosis of abdominal aorta, Cataract, Chronic constipation, Glaucoma, History of anal fissures, Hypertension, Polyneuropathy in other diseases classified elsewhere, Prediabetes, Venous insufficiency, and Venous insufficiency.   -     He does not have any pertinent problems on file.   -     He  has a past surgical history that includes Back surgery; Prostate surgery; TONSILLECTOMY, ADENOIDECTOMY; Cataract extraction bilateral w/ anterior vitrectomy; Colonoscopy (N/A, 5/13/2016); Knee surgery (Left); Joint replacement (Left); and Esophagogastroduodenoscopy (N/A, 7/5/2018).  -     He  reports that he quit smoking about 18 years ago. His smoking use included cigarettes. He has a 15.60 pack-year smoking history. He has never used smokeless tobacco. He reports that he does not drink alcohol or use drugs.  -     His family history includes No Known Problems in his daughter, son, and son.  -     He is allergic to penicillins; protonix [pantoprazole]; and sulfa (sulfonamide antibiotics).    Objective:     Physical Exam: (abnormal findings indicated in BOLD)    Physical Exam:  Vitals:  BP (!) 148/84   Pulse 67   Temp 98.2 °F (36.8 °C) (Tympanic)   Wt 134.8 kg (297 lb 2.9 oz)   BMI 36.17 kg/m²   General appearance:  Well developed, well nourished    Eyes:  Extraocular motions intact, PERRL    Communication:  no hoarseness, no dysphonia    Ears:  Otoscopy of external auditory canals and tympanic membranes was normal, clinical speech reception thresholds grossly intact, no mass/lesion of auricle.  Nose:  No masses/lesions of external nose, nasal mucosa, septum, and turbinates were within normal limits.  Mouth:  No  mass/lesion of lips, teeth, gums, hard/soft palate, tongue, tonsils, or oropharynx.    Cardiovascular:  No pedal edema; Radial Pulses +2     Neck & Lymphatics:  No cervical lymphadenopathy, no neck mass/crepitus/ asymmetry, trachea is midline, no thyroid enlargement/tenderness/mass.    Psych: Oriented x3,  Alert with normal mood and affect.     Respiration/Chest:  Symmetric expansion during respiration, normal respiratory effort.    Skin:  Warm and intact. No ulcerations of face, scalp, neck.    CRANIAL NERVES:  III, IV, VI:  Extraocular muscles intact.  Pupils equally reactive to light and accommodation.  V: Facial sensory function to light touch intact and symmetrical.  No evidence of atrophy of the masseter and temporal muscles.  VII:  Facial strength intact and symmetrical.  IX:  Soft palate elevates in the midline.  XI:  Shoulders motility and strength intact and symmetrical. No atrophy of sternocleidomastoid and trapezius muscles.  XII:  Tongue motility and strength intact.  No spontaneous or gaze nystagmus.    [place]-Hallpike test:  No vertigo or nystagmus.    Head-shaking test:  No nystagmus.  Vestibulo-ocular reflex:  No evidence of catch-up saccades to bilateral head turns.  Oculo-counter torsion:  Intact to bilateral head tilts.  Romberg test:  No abnormal sway or falls with eyes open and closed.    Fukuda stepping test:  No abnormal sway.    Gait:  No ataxia and can tandem gait 6 steps.    Finger-to-nose testing intact without dysmetria.  Deviation of index.  Eyes closed:  No deviation.  MOTOR STRENGTH:  Strength 5/5 throughout.  SENSORY:  Sensory function to light touch and proprioception intact throughout.    Audiogram:      DH: Negative bilaterally            Assessment & Plan:   Dominic was seen today for follow-up.    Diagnoses and all orders for this visit:    Non-seasonal allergic rhinitis, unspecified trigger    Dizziness    Other orders  -     levocetirizine (XYZAL) 5 MG tablet; Take 1 tablet (5  mg total) by mouth every evening.  -     fluticasone (FLONASE) 50 mcg/actuation nasal spray; 2 sprays (100 mcg total) by Each Nare route 2 (two) times daily.  -     azelastine (ASTELIN) 137 mcg (0.1 %) nasal spray; 1 spray (137 mcg total) by Nasal route 2 (two) times daily.          DIZZINESS-   Dizziness is an extremely complex problem that may arise from many sources.  I requires the coordination between the visual system, the vestibular system as well as the proprioreceptive system.  Additionally, balance is compromised in the setting of musculoskeletal, cerebral, cardiac, and numerous physiologic disorders.  The complex interplay between these systems may also lead to dizziness if there is dysynchrony between the bilateral vestibular symptoms.    Central vestibular symptoms can generally be distinguished from peripheral vestibulopathies by the presence of other non vestibular neurologic symptoms (focal weakness, headache), light headedness (rather than true vertigo), near syncope, weak limbs, panic, fuzziness/cloudiness in mentation, and clumsiness.  Peripheral vestibulopathies are generally, at some point during their course, characterized by a true vertiginous sensation of movement, difficulty with sudden head movements, nausea, difficulty with sudden head movement, and possibly oscicllopsia.    BPPV is the most common cause of episodic vertigo.  Symptoms generally last for seconds then resolve when motionless, otitic symptoms are absent and may be provoked with sudden head motion.  This may occur spontaneously, but frequently follow head trauma or recent vestibular neuronitis.  Nystagmus is typically geotropic and directed toward the affected ear (hyperactive input to the inferior vestibular nerve via the Singular nerve). Canalith repositioning maneuvers are the method of choice for treating this condition.  Mild imbalance following is common and may last 1-2 weeks.    Vestibular neuronitis and labyrinthitis  can be distinguished by the presence of sensorineural hearing loss in labyrinthitis, but during their active phase, vertigo is constant.   MRI may be necessary during this phase to exclude CNS pathology.  Frequently this is preceded by a viral illness. Both result in permanent partial end organ weakness of the affected vestibular nerve (usually superior).  Otherwise, they are essentially the same.  The early (vertiginous, 1-3 days) phase is characterized by acute onset of vertigo that is essentially constant and present even in the absence of motion.  Nystagmus is directed away from the affected ear (hypoactive).  In the acute phase, steroids, limited use of vestibular suppressants and hydration are the most effective treatment. Patients then enter the second phase of uncompensated vestibulopathy where they have a general sense of imbalance.  The duration of this phase depends on several factors, but is generally delayed in the presence of vestibular suppressants, advanced age, central/systemic balance issues and sessile behavior.   Phase 3 is compensated vestibulopathy where symptoms generally only occur with sudden head movements and can be seen with catch up saccades on head thrust.   However, in the presence of bilateral VN, oscillopsia is the hallmark of the disease and compensation is frequently very delayed and poor.    Other causes of vertigo that are typically constant are vestibulotoxic medications and autoimmune inner ear disease and these are generally bilateral in nature.    Meniere's disease is typically (85%) unilateral and defined by 2 spontaneous vertigo attacks lasting 20 min or more, documented hearing loss (typically low tone, frequently fluctuating) and otic fullness or tinnitus in the affected ear. However, the presence of endolymphatic hydrops may result in similar symptoms, not meeting these strict criteria.  This disease can be difficult to distinguish from vestibular migraines, which are  not always associated with headaches.    Superior canal dehiscence presents with episodic vertigo lasting seconds to minutes, aural fullness, autophony, hyperacusis and tinnitus (binta pulsatile).  Aural pressure is the most common presenting symptom.  Symptoms can be provoked with Valsalva.  Bone conduction thresholds are supranormal.    Perilymph fistula presents with fluctuating hearing loss, episodic vertigo lasting seconds to minutes and frequently is associated with prior barotrauma or otologic surgery.  Conservative measures such as stool softeners and bedrest frequently lead to resolution.  In cases of persistent symptoms, unfortunately there is no noninvasive diagnostic test with high specificity, and surgical exploration is sometimes necessary when this is expected.    Vestibular schwannomas may have constant or episodic vertigo, frequently SNHL and sometimes may be accompanied by headache or facial numbness.    The diagnosis and options of management were discussed at length with the patient, including hearing, balance function and the risks associated with my recommendations. We spent a considerable amount of time discussing strategies to cope with dizziness. I emphasized the great importance of fall avoidance and activities that may be dangerous if Dominic has an episode of dizziness.  I answered all questions in layman's terms. Based on the history, physical exam and imaging studies there is little evidence of a vestibular dysfunction.      I recommend:    Xyzal QHS  Flonase/Astelin BID  Recheck in 3-4 weeks    We discussed his medical conditions, treatments and plan.  Dominic should return to clinic if any issues arise (symptoms worsen or persist), otherwise we will see him back in the clinic In 1 months.    Thank you for allowing me to participate in the care of oDminic.    Irma Miguel PA-C

## 2019-07-09 ENCOUNTER — OUTPATIENT CASE MANAGEMENT (OUTPATIENT)
Dept: ADMINISTRATIVE | Facility: OTHER | Age: 84
End: 2019-07-09

## 2019-07-09 DIAGNOSIS — I10 ESSENTIAL HYPERTENSION: Primary | ICD-10-CM

## 2019-07-09 DIAGNOSIS — D64.9 ANEMIA, UNSPECIFIED TYPE: ICD-10-CM

## 2019-07-09 NOTE — PROGRESS NOTES
Please note, patient's information has been sent to Outpatient Care Management for high risk screening.    Reason for Referral:   Hypertension  Anemia  Risk score 74    Please contact [facility] with any questions (ext. [phone]).    Thank you,    Mago Baez RN,CCM

## 2019-07-15 ENCOUNTER — OUTPATIENT CASE MANAGEMENT (OUTPATIENT)
Dept: ADMINISTRATIVE | Facility: OTHER | Age: 84
End: 2019-07-15

## 2019-07-15 NOTE — LETTER
July 16, 2019    [place]  [address]  [place] [address]             [facility] [address]  [facility] [address] Dear Mr. Cohen,     I work with [facility]'s Outpatient Case Management Department. We received a referral to call you to discuss your medical history.These services are free of charge and are offered to [facility] patients who have recently been discharged from any of our facilities or who have complex medical conditions that may require the skill of a nurse to assist with management.          Please call our department so that we can go over some questions with you regarding your health.  The Outpatient Case Management Department can be reached at [phone] from 8:00AM to 4:30 PM on Monday thru Friday. [facility] also has a program where a nurse is available 24/7 to answer questions or provide medical advice, their number is [phone].    Thanks,    Gabriela Rothman RN   Outpatient Care Management

## 2019-07-15 NOTE — LETTER
July 23, 2019    Dominic Cohen  [address]  [place] [address]             [facility] [address]  [facility] [address]  Dear Mr. Cohen,                   Welcome to the [facility] program. My goal is to help you function at the healthiest and highest level possible. You can contact me directly at [phone]. Here are the educational and resource materials that I believe you may find useful. Please take your time to review them. Do not hesitate to call me for any questions or concerns.      General appointment scheduling [phone]  [facility] 24/7 (nurse line) [phone]    Sincerely,     Gabriela Rothman RN   Outpatient Case Management  [facility]  [phone]

## 2019-07-16 ENCOUNTER — TELEPHONE (OUTPATIENT)
Dept: FAMILY MEDICINE | Facility: CLINIC | Age: 84
End: 2019-07-16

## 2019-07-16 ENCOUNTER — TELEPHONE (OUTPATIENT)
Dept: CARDIOLOGY | Facility: CLINIC | Age: 84
End: 2019-07-16

## 2019-07-16 NOTE — TELEPHONE ENCOUNTER
"----- Message from Gabriela Rothman RN sent at 7/16/2019  9:31 AM CDT -----  Hi, this is Gabriela Rothman RN with [facility] Out[facility] team. I received a referral on the following patient. I spoke with patient today on the telephone.    Patient has several questions regarding his medications. He is not taking ASA or Hctz daily. Patient reports increased dizziness and "irregular feeling heartbeat". Patient denies chest pain or SOB. Patient requests a follow up appointment with Dr. Liu or Dr. Marshall this week or as soon as possible.     Please contact patient with your recommendations.     Thank you,  Gabriela Rothman RN      " Chief Complaint   Patient presents with    Hypertension     f/u    Diabetes    Cholesterol Problem

## 2019-07-16 NOTE — PROGRESS NOTES
Summary:  Initial and RN Assessments completed with patient on the phone today. Patient has PMH of HTN, Glaucoma, HLP, CHF, Sinus Bradycardia, Prostatectomy, CKD 2, Anemia, Arthritis, with left knee replacement, Obesity. Patient reports he is totally independent with all of his ADLs and still drives himself to his MD appointments and around town. Patient reports he has a cane and a walker but only uses the cane if he feels weak or is off balance. Patient denies any falls in the last year but we did discuss fall prevention/home safety like good lighting, clear pathways, no throw rugs and no electrical cords and patient voiced understanding/agreement. Patient states he used to have a lady that came in to help with chores etc and was there at night incase he needed someone but she quit. So now patient is actively searching for a person to stay in his home and help out with those things. Patient reports recent visit to Cardiology and goes back on Monday 7/29 to have a Holter monitor placed to monitor his Bradycardia and reports chest palpitations. Patient denies any s/s of AMI or CHF like chest pain, SOB, edema etc. Patient reports he has a BP cuff at home and takes his reading several times weekly but is not sure if his device is correct. Encouraged patient to bring BP cuff into MD with him for staff to compare the readings to their cuff and patient voiced understanding/agreement. Reminded patient of upcoming appointment at [facility] on 7/29 to have ECHO and Holter monitor placed. Encouraged patient to communicate with physician and call this RN if having any questions/concerns. [facility] will continue to follow. Patient is agreeable to follow up call in 2 weeks in am. ANGELA Clifford O[facility]     Interventions:  Mailed educational materials on HTN (logs), [place], s/s of AMI, Advance Directives information packet  Medication Reconciliation completed    Plan:  F/u on receipt of educational materials. Continue education on HTN  (yenifers)Jason s/s of AMI, Advance Directives information packet    Todays [facility] Self-Management Care Plan was developed with the patients/caregivers input and was based on identified barriers from todays assessment.  Goals were written today with the patient/caregiver and the patient has agreed to work towards these goals to improve his/her overall well-being. Patient verbalized understanding of the care plan, goals, and all of today's instructions. Encouraged patient/caregiver to communicate with his/her physician and health care team about health conditions and the treatment plan.  Provided my contact information today and encouraged patient/caregiver to call me with any questions as needed.

## 2019-07-16 NOTE — TELEPHONE ENCOUNTER
"Second attempt to contact pt to make appt  Left vm to return call.     Attempted without success to contact pt to schedule appt this week.  Left vm to return call.     ----- Message from Gabriela Rothman RN sent at 7/16/2019  9:31 AM CDT -----  Hi, this is Gabriela Rothman RN with [facility] team. I received a referral on the following patient. I spoke with patient today on the telephone.    Patient has several questions regarding his medications. He is not taking ASA or Hctz daily. Patient reports increased dizziness and "irregular feeling heartbeat". Patient denies chest pain or SOB. Patient requests a follow up appointment with Dr. Liu or Dr. Marshall this week or as soon as possible.     Please contact patient with your recommendations.     Thank you,  Gabriela Rothman RN        "

## 2019-07-18 ENCOUNTER — OFFICE VISIT (OUTPATIENT)
Dept: FAMILY MEDICINE | Facility: CLINIC | Age: 84
End: 2019-07-18
Payer: MEDICARE

## 2019-07-18 VITALS
DIASTOLIC BLOOD PRESSURE: 64 MMHG | OXYGEN SATURATION: 93 % | HEART RATE: 64 BPM | WEIGHT: 284.81 LBS | HEIGHT: 76 IN | TEMPERATURE: 98 F | BODY MASS INDEX: 34.68 KG/M2 | SYSTOLIC BLOOD PRESSURE: 138 MMHG

## 2019-07-18 DIAGNOSIS — M54.2 NECK PAIN: ICD-10-CM

## 2019-07-18 DIAGNOSIS — I10 ESSENTIAL HYPERTENSION: Primary | ICD-10-CM

## 2019-07-18 PROCEDURE — 99214 PR OFFICE/OUTPT VISIT, EST, LEVL IV, 30-39 MIN: ICD-10-PCS | Mod: S$PBB,,, | Performed by: INTERNAL MEDICINE

## 2019-07-18 PROCEDURE — 99214 OFFICE O/P EST MOD 30 MIN: CPT | Mod: S$PBB,,, | Performed by: INTERNAL MEDICINE

## 2019-07-18 PROCEDURE — 99215 OFFICE O/P EST HI 40 MIN: CPT | Mod: PBBFAC,PO | Performed by: INTERNAL MEDICINE

## 2019-07-18 PROCEDURE — 99999 PR PBB SHADOW E&M-EST. PATIENT-LVL V: ICD-10-PCS | Mod: PBBFAC,,, | Performed by: INTERNAL MEDICINE

## 2019-07-18 PROCEDURE — 99999 PR PBB SHADOW E&M-EST. PATIENT-LVL V: CPT | Mod: PBBFAC,,, | Performed by: INTERNAL MEDICINE

## 2019-07-18 RX ORDER — LORATADINE 10 MG/1
10 TABLET ORAL
COMMUNITY
Start: 2018-12-16 | End: 2020-02-03

## 2019-07-18 RX ORDER — BACLOFEN 10 MG/1
10 TABLET ORAL NIGHTLY PRN
Qty: 30 TABLET | Refills: 0 | Status: SHIPPED | OUTPATIENT
Start: 2019-07-18 | End: 2020-03-12

## 2019-07-18 NOTE — PROGRESS NOTES
Subjective:       Patient ID: Dominic Choen is a 83 y.o. male.    Chief Complaint: Dizziness and Neck Pain (runs from base of the neck up to ears and cause ringing in the ear. Only happens when he turns his head.)    Dizziness:   Chronicity:  Recurrentno hearing loss, no ear pain, no fever, no headaches, no tinnitus, no nausea, no vomiting, no diaphoresis, no weakness, no light-headedness, no palpitations and no chest pain.  Neck Pain    Pertinent negatives include no chest pain, fever, headaches, numbness, photophobia, trouble swallowing or weakness.     Past Medical History:   Diagnosis Date    Arthritis     Atherosclerosis of abdominal aorta     Cataract     Chronic constipation     Glaucoma     History of anal fissures     Hypertension     Polyneuropathy in other diseases classified elsewhere     due to folate deficiency    Prediabetes     Venous insufficiency     Venous insufficiency      Past Surgical History:   Procedure Laterality Date    BACK SURGERY      BONE GRAFT Left 6/27/2017    Performed by Malcolm Gill Sr., [facility] at [facility] OR    CATARACT EXTRACTION BILATERAL W/ ANTERIOR VITRECTOMY      COLONOSCOPY N/A 5/13/2016    Performed by Presley Phillips [facility] at [facility] (4TH FLR)    COLONOSCOPY N/A 11/13/2012    Performed by [facility] Phillips [facility] at [facility] (4TH FLR)    ESOPHAGOGASTRODUODENOSCOPY (EGD) N/A 7/5/2018    Performed by Khanh Asencio III, MD at [facility] ENDO    ESOPHAGOGASTRODUODENOSCOPY (EGD) N/A 5/13/2016    Performed by Kendall Jeffery MD at [facility] (4TH FLR)    JOINT REPLACEMENT Left     x 2    KNEE SURGERY Left     x2. revision 06/27/17    PROSTATE SURGERY      REMOVAL-IMPLANT Left 6/27/2017    Performed by Malcolm Gill Sr[facility] at [facility] OR    REVISION-ARTHROPLASTY-KNEE Left 6/27/2017    Performed by Malcolm [facility] Sr[facility] at [facility] OR    SYNOVECTOMY-KNEE Left 6/27/2017    Performed by Malcolm [facility] Sr[facility] at [facility] OR    TONSILLECTOMY, ADENOIDECTOMY        Family History   Problem Relation Age of Onset    No Known Problems Son     No Known Problems Daughter     No Known Problems Son     Diabetes Neg Hx     Heart disease Neg Hx     Cancer Neg Hx     Celiac disease Neg Hx     Cirrhosis Neg Hx     Colon cancer Neg Hx     Colon polyps Neg Hx     Crohn's disease Neg Hx     Cystic fibrosis Neg Hx     Esophageal cancer Neg Hx     Hemochromatosis Neg Hx     Inflammatory bowel disease Neg Hx     Irritable bowel syndrome Neg Hx     Liver cancer Neg Hx     Liver disease Neg Hx     Rectal cancer Neg Hx     Stomach cancer Neg Hx     Ulcerative colitis Neg Hx     Zak's disease Neg Hx     Amblyopia Neg Hx     Blindness Neg Hx     Glaucoma Neg Hx     Hypertension Neg Hx     Macular degeneration Neg Hx     Retinal detachment Neg Hx     Strabismus Neg Hx      Social History     Socioeconomic History    Marital status:      Spouse name: Not on file    Number of children: 3    Years of education: Not on file    Highest education level: Not on file   Occupational History    Occupation: Retired     Comment: [employer]   Social Needs    Financial resource strain: Not on file    Food insecurity:     Worry: Not on file     Inability: Not on file    Transportation needs:     Medical: Not on file     Non-medical: Not on file   Tobacco Use    Smoking status: Former Smoker     Packs/day: 0.30     Years: 52.00     Pack years: 15.60     Types: Cigarettes     Last attempt to quit: 2000     Years since quittin.6    Smokeless tobacco: Never Used   Substance and Sexual Activity    Alcohol use: No    Drug use: No    Sexual activity: Yes   Lifestyle    Physical activity:     Days per week: Not on file     Minutes per session: Not on file    Stress: Not on file   Relationships    Social connections:     Talks on phone: Not on file     Gets together: Not on file     Attends Religion service: Not on file     Active member of club or  organization: Not on file     Attends meetings of clubs or organizations: Not on file     Relationship status: Not on file   Other Topics Concern    Not on file   Social History Narrative    Not on file     Review of Systems   Constitutional: Positive for fatigue. Negative for activity change, appetite change, chills, diaphoresis, fever and unexpected weight change.   HENT: Negative for drooling, ear discharge, ear pain, facial swelling, hearing loss, mouth sores, nosebleeds, postnasal drip, rhinorrhea, sinus pressure, sneezing, sore throat, tinnitus, trouble swallowing and voice change.    Eyes: Negative for photophobia, redness and visual disturbance.   Respiratory: Negative for apnea, cough, choking, chest tightness, shortness of breath and wheezing.    Cardiovascular: Positive for leg swelling. Negative for chest pain and palpitations.   Gastrointestinal: Negative for abdominal distention, abdominal pain, anal bleeding, blood in stool, constipation, diarrhea, nausea and vomiting.   Endocrine: Negative for cold intolerance, heat intolerance, polydipsia, polyphagia and polyuria.   Genitourinary: Negative for difficulty urinating, dysuria, enuresis, flank pain, frequency, genital sores, hematuria and urgency.   Musculoskeletal: Positive for arthralgias, gait problem, myalgias and neck pain. Negative for back pain, joint swelling and neck stiffness.   Skin: Negative for color change, pallor, rash and wound.   Allergic/Immunologic: Negative for food allergies and immunocompromised state.   Neurological: Positive for dizziness. Negative for tremors, seizures, syncope, facial asymmetry, speech difficulty, weakness, light-headedness, numbness and headaches.   Hematological: Negative for adenopathy. Does not bruise/bleed easily.   Psychiatric/Behavioral: Negative for agitation, behavioral problems, confusion, decreased concentration, dysphoric mood, hallucinations, self-injury, sleep disturbance and suicidal ideas. The  patient is not nervous/anxious and is not hyperactive.        Objective:      Physical Exam   Constitutional: He is oriented to person, place, and time. He appears well-developed and well-nourished. No distress.   HENT:   Head: Normocephalic and atraumatic.   Eyes: Pupils are equal, round, and reactive to light.   Neck: Normal range of motion. Neck supple. No JVD present. Carotid bruit is not present. No tracheal deviation present. No thyromegaly present.   Cardiovascular: Normal rate, regular rhythm, normal heart sounds and intact distal pulses.   Pulmonary/Chest: Effort normal and breath sounds normal. No respiratory distress. He has no wheezes. He has no rales. He exhibits no tenderness.   Abdominal: Soft. Bowel sounds are normal. He exhibits no distension. There is no tenderness. There is no rebound and no guarding.   Musculoskeletal: Normal range of motion. He exhibits no edema or tenderness.   Lymphadenopathy:     He has no cervical adenopathy.   Neurological: He is alert and oriented to person, place, and time.   Skin: Skin is warm and dry. No rash noted. He is not diaphoretic. No erythema. No pallor.   Psychiatric: He has a normal mood and affect. His behavior is normal.   Nursing note and vitals reviewed.      CMP  Sodium   Date Value Ref Range Status   03/19/2019 141 136 - 145 mmol/L Final     Potassium   Date Value Ref Range Status   03/19/2019 4.3 3.5 - 5.1 mmol/L Final     Chloride   Date Value Ref Range Status   03/19/2019 105 95 - 110 mmol/L Final     CO2   Date Value Ref Range Status   03/19/2019 28 23 - 29 mmol/L Final     Glucose   Date Value Ref Range Status   03/19/2019 111 (H) 70 - 110 mg/dL Final     BUN, Bld   Date Value Ref Range Status   03/19/2019 16 8 - 23 mg/dL Final     Creatinine   Date Value Ref Range Status   03/19/2019 1.3 0.5 - 1.4 mg/dL Final     Calcium   Date Value Ref Range Status   03/19/2019 9.5 8.7 - 10.5 mg/dL Final     Total Protein   Date Value Ref Range Status    03/19/2019 7.9 6.0 - 8.4 g/dL Final     Albumin   Date Value Ref Range Status   03/19/2019 3.7 3.5 - 5.2 g/dL Final     Total Bilirubin   Date Value Ref Range Status   03/19/2019 0.5 0.1 - 1.0 mg/dL Final     Comment:     For infants and newborns, interpretation of results should be based  on gestational age, weight and in agreement with clinical  observations.  Premature Infant recommended reference ranges:  Up to 24 hours.............<8.0 mg/dL  Up to 48 hours............<12.0 mg/dL  3-5 days..................<15.0 mg/dL  6-29 days.................<15.0 mg/dL       Alkaline Phosphatase   Date Value Ref Range Status   03/19/2019 69 55 - 135 U/L Final     AST   Date Value Ref Range Status   03/19/2019 19 10 - 40 U/L Final     ALT   Date Value Ref Range Status   03/19/2019 14 10 - 44 U/L Final     Anion Gap   Date Value Ref Range Status   03/19/2019 8 8 - 16 mmol/L Final     eGFR if    Date Value Ref Range Status   03/19/2019 59 (A) >60 mL/min/1.73 m^2 Final     eGFR if non    Date Value Ref Range Status   03/19/2019 51 (A) >60 mL/min/1.73 m^2 Final     Comment:     Calculation used to obtain the estimated glomerular filtration  rate (eGFR) is the CKD-EPI equation.        Lab Results   Component Value Date    WBC 4.67 03/19/2019    HGB 12.6 (L) 03/19/2019    HCT 39.8 (L) 03/19/2019     (H) 03/19/2019     03/19/2019     Lab Results   Component Value Date    CHOL 169 10/11/2018     Lab Results   Component Value Date    HDL 45 10/11/2018     Lab Results   Component Value Date    LDLCALC 101.2 10/11/2018     Lab Results   Component Value Date    TRIG 114 10/11/2018     Lab Results   Component Value Date    CHOLHDL 26.6 10/11/2018     Lab Results   Component Value Date    TSH 1.038 11/27/2018     Lab Results   Component Value Date    HGBA1C 5.5 06/07/2018     Assessment:       1. Essential hypertension    2. Neck pain        Plan:   Essential hypertension  -     Ambulatory  referral to Cardiology  -     Ambulatory referral to Optometry    Neck pain-----call if persists----------------.  -     baclofen (LIORESAL) 10 MG tablet; Take 1 tablet (10 mg total) by mouth nightly as needed.  Dispense: 30 tablet; Refill: 0    f/u as scheduled.

## 2019-07-19 DIAGNOSIS — I10 ESSENTIAL HYPERTENSION: Primary | ICD-10-CM

## 2019-07-22 ENCOUNTER — OFFICE VISIT (OUTPATIENT)
Dept: OPHTHALMOLOGY | Facility: CLINIC | Age: 84
End: 2019-07-22
Payer: MEDICARE

## 2019-07-22 ENCOUNTER — OFFICE VISIT (OUTPATIENT)
Dept: CARDIOLOGY | Facility: CLINIC | Age: 84
End: 2019-07-22
Payer: MEDICARE

## 2019-07-22 ENCOUNTER — CLINICAL SUPPORT (OUTPATIENT)
Dept: CARDIOLOGY | Facility: CLINIC | Age: 84
End: 2019-07-22
Payer: MEDICARE

## 2019-07-22 VITALS
HEART RATE: 58 BPM | BODY MASS INDEX: 35.07 KG/M2 | HEIGHT: 76 IN | SYSTOLIC BLOOD PRESSURE: 130 MMHG | DIASTOLIC BLOOD PRESSURE: 74 MMHG | WEIGHT: 288 LBS

## 2019-07-22 DIAGNOSIS — I70.0 ATHEROSCLEROSIS OF ABDOMINAL AORTA: Chronic | ICD-10-CM

## 2019-07-22 DIAGNOSIS — H25.13 NUCLEAR SCLEROSIS, BILATERAL: ICD-10-CM

## 2019-07-22 DIAGNOSIS — I87.2 VENOUS INSUFFICIENCY: ICD-10-CM

## 2019-07-22 DIAGNOSIS — R00.2 PALPITATIONS: ICD-10-CM

## 2019-07-22 DIAGNOSIS — H40.1133 PRIMARY OPEN ANGLE GLAUCOMA OF BOTH EYES, SEVERE STAGE: Primary | ICD-10-CM

## 2019-07-22 DIAGNOSIS — I10 ESSENTIAL HYPERTENSION: ICD-10-CM

## 2019-07-22 DIAGNOSIS — H52.223 REGULAR ASTIGMATISM OF BOTH EYES: ICD-10-CM

## 2019-07-22 DIAGNOSIS — I50.32 DIASTOLIC DYSFUNCTION WITH CHRONIC HEART FAILURE: ICD-10-CM

## 2019-07-22 DIAGNOSIS — I10 ESSENTIAL HYPERTENSION: Primary | ICD-10-CM

## 2019-07-22 DIAGNOSIS — H52.4 PRESBYOPIA: ICD-10-CM

## 2019-07-22 DIAGNOSIS — H21.562 AFFERENT PUPILLARY DEFECT, LEFT: ICD-10-CM

## 2019-07-22 DIAGNOSIS — E78.5 DYSLIPIDEMIA: ICD-10-CM

## 2019-07-22 PROCEDURE — 99214 PR OFFICE/OUTPT VISIT, EST, LEVL IV, 30-39 MIN: ICD-10-PCS | Mod: S$PBB,,, | Performed by: INTERNAL MEDICINE

## 2019-07-22 PROCEDURE — 93010 ELECTROCARDIOGRAM REPORT: CPT | Mod: S$PBB,,, | Performed by: NUCLEAR MEDICINE

## 2019-07-22 PROCEDURE — 99211 OFF/OP EST MAY X REQ PHY/QHP: CPT | Mod: PBBFAC,27 | Performed by: OPTOMETRIST

## 2019-07-22 PROCEDURE — 99213 OFFICE O/P EST LOW 20 MIN: CPT | Mod: PBBFAC,25 | Performed by: INTERNAL MEDICINE

## 2019-07-22 PROCEDURE — 99214 OFFICE O/P EST MOD 30 MIN: CPT | Mod: S$PBB,,, | Performed by: INTERNAL MEDICINE

## 2019-07-22 PROCEDURE — 92002 PR EYE EXAM, NEW PATIENT,INTERMED: ICD-10-PCS | Mod: S$PBB,,, | Performed by: OPTOMETRIST

## 2019-07-22 PROCEDURE — 99999 PR PBB SHADOW E&M-EST. PATIENT-LVL III: CPT | Mod: PBBFAC,,, | Performed by: INTERNAL MEDICINE

## 2019-07-22 PROCEDURE — 93010 EKG 12-LEAD: ICD-10-PCS | Mod: S$PBB,,, | Performed by: NUCLEAR MEDICINE

## 2019-07-22 PROCEDURE — 99999 PR PBB SHADOW E&M-EST. PATIENT-LVL I: ICD-10-PCS | Mod: PBBFAC,,, | Performed by: OPTOMETRIST

## 2019-07-22 PROCEDURE — 99999 PR PBB SHADOW E&M-EST. PATIENT-LVL I: CPT | Mod: PBBFAC,,, | Performed by: OPTOMETRIST

## 2019-07-22 PROCEDURE — 92133 CPTRZD OPH DX IMG PST SGM ON: CPT | Mod: PBBFAC | Performed by: OPTOMETRIST

## 2019-07-22 PROCEDURE — 92002 INTRM OPH EXAM NEW PATIENT: CPT | Mod: S$PBB,,, | Performed by: OPTOMETRIST

## 2019-07-22 PROCEDURE — 93005 ELECTROCARDIOGRAM TRACING: CPT | Mod: PBBFAC | Performed by: NUCLEAR MEDICINE

## 2019-07-22 PROCEDURE — 92133 POSTERIOR SEGMENT OCT OPTIC NERVE(OCULAR COHERENCE TOMOGRAPHY) - OU - BOTH EYES: ICD-10-PCS | Mod: 26,S$PBB,, | Performed by: OPTOMETRIST

## 2019-07-22 PROCEDURE — 99999 PR PBB SHADOW E&M-EST. PATIENT-LVL III: ICD-10-PCS | Mod: PBBFAC,,, | Performed by: INTERNAL MEDICINE

## 2019-07-22 NOTE — LETTER
July 22, 2019      Sudhakar Liu MD  [address] [place] [address]           [facility] Cardiology  [address]  [place] [address]  Phone: [phone]  Fax: [phone]          Patient: Dominic Cohen   MR Number: [id number]   YOB: 1935   Date of Visit: 7/22/2019       Dear Dr. Sudhakar Liu:    Thank you for referring Dominic Cohen to me for evaluation. Attached you will find relevant portions of my assessment and plan of care.    If you have questions, please do not hesitate to call me. I look forward to following Dominic Cohen along with you.    Sincerely,    Cody Ferrer MD    Enclosure  CC:  No Recipients    If you would like to receive this communication electronically, please contact [email or web address] or [phone] to request more information on [employer] access.    For providers and/or their staff who would like to refer a patient to [facility], please contact us through our one-stop-shop provider referral line, [facility], at [phone].    If you feel you have received this communication in error or would no longer like to receive these types of communications, please e-mail [email or web address]

## 2019-07-22 NOTE — LETTER
July 22, 2019      Sudhakar Liu MD  [address] [place] [address]           [facility] Ophthalmology  [address]  [place] [address]  Phone: [phone]  Fax: [phone]          Patient: Dominic Cohen   MR Number: [id number]   YOB: 1935   Date of Visit: 7/22/2019       Dear Dr. Sudhakar Liu:    Thank you for referring Dominic Cohen to me for evaluation. Attached you will find relevant portions of my assessment and plan of care.    If you have questions, please do not hesitate to call me. I look forward to following Dominic Cohen along with you.    Sincerely,    Jorge Jean-Baptiste, OD    Enclosure  CC:  No Recipients    If you would like to receive this communication electronically, please contact [email or web address] or [phone] to request more information on [employer] Link access.    For providers and/or their staff who would like to refer a patient to [facility], please contact us through our one-stop-shop provider referral line, [facility] , at [phone].    If you feel you have received this communication in error or would no longer like to receive these types of communications, please e-mail [email or web address]

## 2019-07-22 NOTE — PROGRESS NOTES
"HPI     The patient was last seen in 2018 with  for severe stage   glaucoma OU. The patient states he is using his Xalatan BID OU. The   patient has not seen  since 2018 and he is here to reestablish   care. The patient states his vision is very blurred and he doesn't   understand why  can't get his vision any better. The patient   also states the left side of his body gets warm like a fever and a pain   and he has been to numerous doctors who can't figure out what's wrong.    Last edited by Yoanna Mcclellan on 7/22/2019  1:35 PM. (History)            Assessment /Plan     For exam results, see Encounter Report.    Primary open angle glaucoma of both eyes, severe stage  -     Posterior Segment OCT Optic Nerve- Both eyes  Currently using timolol 0.5%  bid OU  IOP stable today compared to previous visits  Continue current tx : timolol 0.5% bid OU  Get VF then consider if lower IOP needed, gOCT seems stable compared to last gOCT record in [place]    Afferent pupillary defect, left    Nuclear sclerosis, bilateral  Cataract accounts for change in vision. Patient is at the option stage. Cataract surgery will improve vision, but necessity is dependent on patient's symptoms. Patient prefers to wait on surgery at this time. Pt to call back if symptoms worsen before next appointment.    Regular astigmatism of both eyes  Presbyopia  Minimal improvement in va with refraction today for dist but pt primarily troubled by near va, " can't see well to read"  Trial framed near rx, "much better"  Eyeglass Final Rx     Eyeglass Final Rx       Sphere Cylinder Axis    Right +2.25 +1.00 180    Left +2.75 +1.00 180    Type:  SVL    Expiration Date:  7/22/2020    Comments:  Reading only                RTC 1-2 months for 24-2VF, dilation with Optos SDPs or PRN  Discussed above and all questions were answered.                    "

## 2019-07-22 NOTE — PROGRESS NOTES
Subjective:   Patient ID:  Dominic Cohen is a 83 y.o. male who presents for cardiac consult of Hypertension (consult)      HPI  The patient came in today for cardiac consult of Hypertension (consult)    Referring Physician: Sudhakar Liu MD   Reason for consult:  HTN    Dominic Cohen is a 83 y.o. male pt with HTN, venous insufficiency, polyneuropathy, abd atherosclerosis here for CV follow up.     Pt of Dr. Marshall - last seen 2018  PMH HTN.No smoking/drinking.  No h/o heart attack, stroke and DM.  C/o ADAMS now while walking from parking. Sleeps with 3 pillows.  No chest pain, dizziness and faint  Occasional skipping beats  Leg swelling  EKG on 11/13 sinus and HR at 57 bpm.  ECHO in 2015 normal EF and DD.  HGB and BNP wnl    7/22/19  He has been having intermittent palpitations, sometimes 3-4 x day. Occ times not that bad. He has been taking aspirins for knee pain, ear pain.   He has significant Left sided pain in joints. He was started on muscle relaxants.   He also saw ENT for sinus infections.     Patient feels no chest pain, no sob, no leg swelling, no PND, no dizziness, no syncope, no CNS symptoms.    Patient has fairly good exercise tolerance.    Patient is compliant with medications.    ECG - sinus meliza, LAFB, LVH    Past Medical History:   Diagnosis Date    Arthritis     Atherosclerosis of abdominal aorta     Cataract     Chronic constipation     Glaucoma     History of anal fissures     Hypertension     Polyneuropathy in other diseases classified elsewhere     due to folate deficiency    Prediabetes     Venous insufficiency     Venous insufficiency        Past Surgical History:   Procedure Laterality Date    BACK SURGERY      BONE GRAFT Left 6/27/2017    Performed by Malcolm Gill Sr., [facility] at [facility] OR    CATARACT EXTRACTION BILATERAL W/ ANTERIOR VITRECTOMY      COLONOSCOPY N/A 5/13/2016    Performed by Presley Phillips MD at [facility] ENDO (4TH FLR)    COLONOSCOPY N/A  2012    Performed by Presley Phillips MD at [facility] ENDO (4TH FLR)    ESOPHAGOGASTRODUODENOSCOPY (EGD) N/A 2018    Performed by Khanh Asencio III, MD at [facility] ENDO    ESOPHAGOGASTRODUODENOSCOPY (EGD) N/A 2016    Performed by Kendall Jeffery MD at [facility] ENDO (4TH FLR)    JOINT REPLACEMENT Left     x 2    KNEE SURGERY Left     x2. revision 17    PROSTATE SURGERY      REMOVAL-IMPLANT Left 2017    Performed by Malcolm Gill Sr., MD at [facility] OR    REVISION-ARTHROPLASTY-KNEE Left 2017    Performed by Malcolm Gill Sr., MD at [facility] OR    SYNOVECTOMY-KNEE Left 2017    Performed by Malcolm Gill Sr., MD at [facility] OR    TONSILLECTOMY, ADENOIDECTOMY         Social History     Tobacco Use    Smoking status: Former Smoker     Packs/day: 0.30     Years: 52.00     Pack years: 15.60     Types: Cigarettes     Last attempt to quit: 2000     Years since quittin.6    Smokeless tobacco: Never Used   Substance Use Topics    Alcohol use: No    Drug use: No       Family History   Problem Relation Age of Onset    No Known Problems Son     No Known Problems Daughter     No Known Problems Son     Diabetes Neg Hx     Heart disease Neg Hx     Cancer Neg Hx     Celiac disease Neg Hx     Cirrhosis Neg Hx     Colon cancer Neg Hx     Colon polyps Neg Hx     Crohn's disease Neg Hx     Cystic fibrosis Neg Hx     Esophageal cancer Neg Hx     Hemochromatosis Neg Hx     Inflammatory bowel disease Neg Hx     Irritable bowel syndrome Neg Hx     Liver cancer Neg Hx     Liver disease Neg Hx     Rectal cancer Neg Hx     Stomach cancer Neg Hx     Ulcerative colitis Neg Hx     Zak's disease Neg Hx     Amblyopia Neg Hx     Blindness Neg Hx     Glaucoma Neg Hx     Hypertension Neg Hx     Macular degeneration Neg Hx     Retinal detachment Neg Hx     Strabismus Neg Hx        Patient's Medications   New Prescriptions    No medications on file   Previous Medications     ASCORBIC ACID, VITAMIN C, (VITAMIN C) 60 MG LOZG    Take 1 tablet by mouth daily as needed.     ASPIRIN (ECOTRIN) 81 MG EC TABLET    Take 81 mg by mouth once daily.    AZELASTINE (ASTELIN) 137 MCG (0.1 %) NASAL SPRAY    1 spray (137 mcg total) by Nasal route 2 (two) times daily.    BACLOFEN (LIORESAL) 10 MG TABLET    Take 1 tablet (10 mg total) by mouth nightly as needed.    BLOOD SUGAR DIAGNOSTIC (BLOOD GLUCOSE TEST) STRP    1 strip by Misc.(Non-Drug; Combo Route) route 3 (three) times a week.    BLOOD SUGAR DIAGNOSTIC STRP    1 strip by Misc.(Non-Drug; Combo Route) route 2 (two) times daily.    CYANOCOBALAMIN, VITAMIN B-12, (VITAMIN B-12) 50 MCG TABLET    Take 50 mcg by mouth once daily.    ERGOCALCIFEROL, VITAMIN D2, (VITAMIN D ORAL)    Take 1 tablet by mouth once daily.     FLUTICASONE (FLONASE) 50 MCG/ACTUATION NASAL SPRAY    2 sprays (100 mcg total) by Each Nare route 2 (two) times daily.    HYDROCHLOROTHIAZIDE (HYDRODIURIL) 25 MG TABLET    Take 1 tablet (25 mg total) by mouth daily as needed.    HYDROCHLOROTHIAZIDE ORAL    Take by mouth.    LANCETS 30 GAUGE MISC    1 lancet by Misc.(Non-Drug; Combo Route) route 2 (two) times daily.    LEVOCETIRIZINE (XYZAL) 5 MG TABLET    Take 1 tablet (5 mg total) by mouth every evening.    LINACLOTIDE (LINZESS) 145 MCG CAP CAPSULE    Take 1 capsule (145 mcg total) by mouth once daily.    LORATADINE (CLARITIN) 10 MG TABLET    Take 10 mg by mouth as needed.    RANITIDINE (ZANTAC) 150 MG TABLET    Take 1 tablet (150 mg total) by mouth 2 (two) times daily.    TIMOLOL MALEATE 0.5% (TIMOPTIC) 0.5 % DROP    Place 1 drop into both eyes 2 (two) times daily.    TRIAMCINOLONE ACETONIDE 0.1% (KENALOG) 0.1 % CREAM    APPLY TO AFFECTED AREA TOPICALLY TWICE A DAY AS NEEDED   Modified Medications    No medications on file   Discontinued Medications    No medications on file       Review of Systems   Constitutional: Negative.    HENT: Negative.    Eyes: Negative.    Respiratory: Negative.   "  Cardiovascular: Positive for chest pain (pin sensation) and palpitations.   Gastrointestinal: Negative.    Genitourinary: Negative.    Musculoskeletal: Negative.    Skin: Negative.    Neurological: Negative.    Endo/Heme/Allergies: Negative.    Psychiatric/Behavioral: Negative.    All 12 systems otherwise negative.      Wt Readings from Last 3 Encounters:   07/22/19 130.6 kg (288 lb)   07/18/19 129.2 kg (284 lb 13.4 oz)   04/18/19 134.8 kg (297 lb 2.9 oz)     Temp Readings from Last 3 Encounters:   07/18/19 98.3 °F (36.8 °C) (Oral)   04/18/19 98.2 °F (36.8 °C) (Tympanic)   04/11/19 97.7 °F (36.5 °C) (Oral)     BP Readings from Last 3 Encounters:   07/22/19 130/74   07/18/19 138/64   04/18/19 (!) 148/84     Pulse Readings from Last 3 Encounters:   07/22/19 (!) 58   07/18/19 64   04/18/19 67       /74 (BP Method: Large (Manual))   Pulse (!) 58   Ht 6' 4" (1.93 m)   Wt 130.6 kg (288 lb)   BMI 35.06 kg/m²     Objective:   Physical Exam   Constitutional: He is oriented to person, place, and time. He appears well-developed and well-nourished. No distress.   HENT:   Head: Normocephalic and atraumatic.   Nose: Nose normal.   Mouth/Throat: Oropharynx is clear and moist.   Eyes: Conjunctivae and EOM are normal. No scleral icterus.   Neck: Normal range of motion. Neck supple. No JVD present. No thyromegaly present.   Cardiovascular: Normal rate, regular rhythm, S1 normal and S2 normal. Exam reveals no gallop, no S3, no S4 and no friction rub.   Murmur heard.  Pulmonary/Chest: Effort normal and breath sounds normal. No stridor. No respiratory distress. He has no wheezes. He has no rales. He exhibits no tenderness.   Abdominal: Soft. Bowel sounds are normal. He exhibits no distension and no mass. There is no tenderness. There is no rebound.   Genitourinary:   Genitourinary Comments: Deferred   Musculoskeletal: Normal range of motion. He exhibits no edema, tenderness or deformity.   Lymphadenopathy:     He has no " cervical adenopathy.   Neurological: He is alert and oriented to person, place, and time. He exhibits normal muscle tone. Coordination normal.   Skin: Skin is warm and dry. No rash noted. He is not diaphoretic. No erythema. No pallor.   Psychiatric: He has a normal mood and affect. His behavior is normal. Judgment and thought content normal.   Nursing note and vitals reviewed.      Lab Results   Component Value Date     03/19/2019    K 4.3 03/19/2019     03/19/2019    CO2 28 03/19/2019    BUN 16 03/19/2019    CREATININE 1.3 03/19/2019     (H) 03/19/2019    HGBA1C 5.5 06/07/2018    MG 2.1 04/23/2014    AST 19 03/19/2019    ALT 14 03/19/2019    ALBUMIN 3.7 03/19/2019    PROT 7.9 03/19/2019    BILITOT 0.5 03/19/2019    WBC 4.67 03/19/2019    HGB 12.6 (L) 03/19/2019    HCT 39.8 (L) 03/19/2019     (H) 03/19/2019     03/19/2019    INR 1.1 04/08/2015    TSH 1.038 11/27/2018    CHOL 169 10/11/2018    HDL 45 10/11/2018    LDLCALC 101.2 10/11/2018    TRIG 114 10/11/2018    BNP 28 11/13/2018     Assessment:      1. Essential hypertension    2. Venous insufficiency    3. Dyslipidemia    4. Diastolic dysfunction with chronic heart failure    5. Atherosclerosis of abdominal aorta    6. Palpitations        Plan:   1. Palpitations  - check Holter and ECHO  - HR now 58, not on BB/CCB    2. HFpEF  - pt euvolemic    3. HTN  - cont meds  - low salt diet    4. Venous insuf  - cont LE compression stockings    5. Abd atherosc aorta  - cont asa, statin    Follow up with Dr. Marshall    Thank you for allowing me to participate in this patient's care. Please do not hesitate to contact me with any questions or concerns. Consult note has been forwarded to the referral physician.

## 2019-07-23 NOTE — PATIENT INSTRUCTIONS
Understanding Bradycardia    Your heart has an electrical system that sends signals to control the heartbeat. Any abnormal change in the speed or pattern of the heartbeat is called an arrhythmia. An arrhythmia that causes the heart to beat slower than normal is called bradycardia. There are many types of bradycardia. In healthy children and adults, bradycardia is often normal, particularly during sleep. Sometimes bradycardia is caused by failure of the hearts natural timer or failure of the electrical pathways within the heart. Depending on the type you have and how severe it is, you may need treatment.  What causes bradycardia?  Many things can cause bradycardia, including:   · Natural aging  process  · Coronary artery disease  · Heart attack  · Heart muscle disease  · Problems with the SA node. This is the hearts natural pacemaker that starts each heartbeat.  · Problems with the electrical pathways in the heart  · Problems with the structure of the heart that you are born with  · Infection  · Use of certain medicines  · Electrolyte imbalance  · Underactive thyroid   · Sleep apnea  · Increased pressure in the brain or stroke   Well-conditioned athletes often have a naturally slow heart rate.  What are the symptoms of bradycardia?  Bradycardia can cause a slow or irregular heartbeat. It can also make it harder for the heart to pump blood to the body. This may cause symptoms such as:  · Tiredness  · Weakness  · Loss of ability to exercise  · Shortness of breath  · Dizziness or fainting  · Chest pain  · Heart failure  Some people with bradycardia have no symptoms at all.  How is bradycardia treated?  Treatment for bradycardia depends on the cause. It also depends on the type you have and how severe your symptoms are. If you need treatment, your options may include:  · Treatment of the underlying cause. For instance, if a medicine is causing bradycardia, stopping the medicine, under your doctors guidance, may  correct the problem. Or if a condition such as an underactive thyroid is the cause, treating the thyroid may keep bradycardia from coming back.  · Medicines. Medicines may be used to treat conditions that cause bradycardia.  Some medicines can also be used in the short-term to increase the heart rate. These are often not long-term solutions.   · Temporary pacing. Pacing is used to help regulate your heartbeat.When the heart beats too slowly, the device sends signals to keep the heart beating at the right pace. A temporary pacemaker may be connected to the heart using wires guided through a blood vessel in your neck or leg to the heart. This is also sometimes done using special pads placed on the chest. This may be used in an emergency, as a bridge to permanent pacing, when pacing is only needed short-term, or to further evaluate your condition.  · Pacemaker. This is a device that is placed permanently under the skin in your chest and connected to your heart. When the heart beats too slowly, the device sends signals to keep the heart beating at the right pace.  What are the complications of bradycardia?  These can include:  · Development of other types of arrhythmias  · Heart failure. This problem occurs when the heart weakens so much that it no longer pumps blood well.  · Sudden cardiac arrest. This is when the heart suddenly stops beating.  When should I call my healthcare provider?  Call your healthcare provider right away if you have any of these:  · Symptoms that dont get better with treatment, or get worse  · New symptoms   Date Last Reviewed: 5/1/2016  © 9050-2226 [employer]. [address] [place] [address], [place] [address]. All rights reserved. This information is not intended as a substitute for professional medical care. Always follow your healthcare professional's instructions.        When to Use the Emergency Department (ED)    An emergency means you could die if you dont get care quickly. Or  you could be hurt permanently (disabled). Read below to know when to use -- and when not to use -- an emergency department (also called ED).  Dangers to your life  Here are examples of emergencies. These need immediate care:  · A hard time breathing  · Severe chest pain  · Choking  · Severe bleeding  · Suddenly not able to move or speak  · Blacking out (fainting)`  · Poisoning  Dangers of permanent injuries  Here are other emergencies. These also need immediate care:  · Deep cuts or severe burns  · Broken bones, or sudden severe pain and swelling in a joint  When its an emergency  If you have an emergency, follow these steps:  1. Go to the nearest emergency department  · If you can, go to the hospital ED closest to you right away.  · If you cannot get there right away, or if it is not safe to take yourself, call 911 or your police emergency number.  2. Call your primary care doctor  · Tell your doctor about the emergency. Call within 24 hours of going to the ED.  · If you cannot call, have someone call for you.  · Go to your doctor (not the ED) for any follow-up care.  When its not an emergency  If a problem is not an emergency, follow these steps:  1. Call your primary care doctor  · If you dont know the name of your doctor, call your health plan.  · If you cannot call, have someone call for you.  2. Follow instructions  · Your doctor will tell you what you should do.  · You may be told to see your doctor right away. You may be told to go to the ED. Or you may be told to go to an urgent care center.  · Follow your doctors advice.  Date Last Reviewed: 7/15/2015  © 1631-9467 [employer]. [address] [place] [address], [place] [address]. All rights reserved. This information is not intended as a substitute for professional medical care. Always follow your healthcare professional's instructions.        Recognizing a Heart Attack or Angina  If you have risk factors for heart problems, you should always watch  for signs of angina or a heart attack. If you have a sudden heart problem, getting treatment right away could save your life.   Risk factors for a heart attack include advanced age, high cholesterol, high blood pressure, having a family member who had a heart attack before the age of 50, diabetes, smoking, and stress. There are other risk factors including eating a high-fat diet and getting minimal exercise.  Understanding angina and heart attack  · Angina is a painful burning, tightness, or pressure in the chest, back, neck, throat, or jaw. It means not enough blood is getting to the heart. This is usually from a blocked artery in the heart. Angina is a sign that you may be having, or are about to have, a heart attack. It needs to be addressed by a healthcare provider right away.  · A heart attack, also known as acute myocardial infarction, or AMI, is what happens when blood can't get to part of the heart muscle. That part of the heart muscle is damaged and starts to die. If enough of the heart is affected, it will severely reduce its ability to provide blood to the rest of the body. It may cause death. It is vital to get help as soon as possible for a heart attack.  Stable angina versus unstable angina  Stable angina, also known as chronic angina, has a typical pattern. It occurs predictably with physical exertion or strong emotion. Nitroglycerin, rest, or both, will easily relieve stable angina symptoms. Stable angina symptoms will most likely feel the same each time you have them. It is important to discuss these symptoms with your healthcare provider. They can be a warning sign for a future heart attack.  Unstable angina causes unexpected or unpredictable symptoms, commonly occurring at rest, and is a medical emergency. Angina is also considered unstable if resting and nitroglycerin doesn't relieve symptoms, It's also unstable if symptoms are worsening, occurring more often, or are lasting longer. These  symptoms suggest a severe blockage or a spasm of a heart artery. Unstable angina is commonly a sign of an active heart attack. Remember the following tips:  · Stable angina symptoms should go away with rest or medicine. If they dont go away, call 911!  · Stable angina symptoms last for only a few minutes. If they last longer than that, or if they go away and come back, you may be having a heart attack. Call 911!  · If you have shortness of breath, cold sweat, nausea, or lightheadedness, call 911!  For new-onset angina, there is only one response: ?call 911! You should never diagnose angina by yourself. If these symptoms are new, or worse than usual, call 911!  Warning signs of a heart attack  If you have symptoms that you cant explain, call 911 right away. Do not drive yourself to the emergency room. The following are warning signs of a possible heart attack:  · Chest discomfort. Most heart attacks involve discomfort in the center of the chest that lasts more than a few minutes, or that goes away and comes back. It can feel like uncomfortable pressure, squeezing, fullness or pain.  · Discomfort in other areas of the upper body. Symptoms can include pain or discomfort in one or both arms, the back, neck, jaw, or stomach.  · Shortness of breath with or without chest discomfort.  · Other signs may include breaking out in a cold sweat, nausea, or lightheadedness.  Note for women: Like men, women commonly have chest pain or discomfort as a heart attack symptom. But women are somewhat more likely than men to have other common symptoms, such as shortness of breath, nausea and vomiting, back pain, or jaw pain.  Note for older adults: Older people may also have atypical symptoms of a heart attack. These symptoms include fainting, weakness, or confusion. Ignoring these symptoms can lead to critical illness or death. They should be investigated right away.  If you've had a heart attack: People who have had one heart attack  are at risk for having another. Your provider may prescribe medicine such as nitroglycerin to take when chest pain starts. Or you may need medicines to lower your heart rate and blood pressure to prevent angina and another heart attack. Remember to take any medicines your provider has given and do not stop them without speaking with him or her first.     If you have diabetes: silent heart problems  Over time, high blood sugar can damage nerves in your body. This may keep you from feeling pain caused by a heart problem, leading to a silent heart problem. If you dont feel symptoms, you are less able to recognize that you may be having a heart attack and get treatment right away. Talk to your healthcare provider about how to lower your risk for silent heart problems.   Date Last Reviewed: 6/1/2016 © 2000-2017 [employer]. [address] [place] [address], [place] [address]. All rights reserved. This information is not intended as a substitute for professional medical care. Always follow your healthcare professional's instructions.        Discharge Instructions: Holter Monitor  Your doctor has recommended that you wear a Holter monitor for a day or two. This device painlessly records your heartbeat while you are at home and away from the doctors office. The Holter monitor is a small, battery operated, portable electrocardiogram (ECG). Small, painless pads (electrodes) are put on your chest. These connect to a lightweight unit that attaches to a belt or shoulder strap. It records your heartbeat as you carry it with you. You will keep the device on for at least 24 hours and complete a diary during this time. Your doctor can then review this information at a later time.  Home care  These home care steps can help you manage your Holter monitor.  · Try to sleep on your back.  · Dont take a shower. A sponge bath is OK. Do not get the equipment wet. Avoid swimming and other activities where it might get  wet.  · Follow your normal routine. This means, for example, dont try to avoid stress, work, or exercise that occurs in your normal day.  · While wearing the monitor, be sure to stay away from magnets, electric blankets, metal detectors, and high-voltage areas such as power lines. These are things that could affect the recording.  Keeping a record  Keep a diary of things you do during the day. This is important. It could help you match your symptoms to times when your heart is beating abnormally.  · Write down when you take medications, drive to work, take a nap, or find yourself in stressful situations.  · For each entry you make, record the time of day.  · Note any changes in activity, including when you take medications.  · Note any symptoms you feel.  · Activate the monitor as instructed when you feel any unusual symptoms.  Follow-up care  Make a follow-up appointment as directed by our staff.     When to call your doctor  Call your doctor right away if the Holter monitor makes noise or electrodes come off.   Date Last Reviewed: 2/25/2014 © 2000-2017 [employer]. [address] [place] [address]. All rights reserved. This information is not intended as a substitute for professional medical care. Always follow your healthcare professional's instructions.        Metabolic Syndrome: Losing Excess Weight    Metabolic syndrome is a set of five health factors that can lead to serious health problems. The factors greatly increase your risk for diabetes, heart attack, or stroke. Extra weight with a large waist is one of the factors for metabolic syndrome. Being overweight or obese means that you weigh too much for what is healthy for your height. A large waist size is 40 inches or more for men, and 35 inches or more for women.  But you can take steps to lose weight and lower your risk for serious health problems.  Benefits of weight loss  Even with a small weight loss, you may have more energy  and feel better. Losing even a small amount of weight can affect your blood pressure, triglycerides, HDL cholesterol, and blood sugar. You may be able to take less medicine for blood pressure, cholesterol, or blood sugar. Or you may be able to stop taking medicine. As you lose weight, your risk for diabetes, heart attack, and stroke will get lower.  Getting started  The best way to lose weight is to do it gradually. For example, lose 1/2 to 1 pound a week. You will need to be more active and eat healthier foods. Make sure to:  · Exercise every day. Talk with your healthcare provider to make sure it is safe for you to exercise. Make sure you start slowly. Begin with 10 to 15 minutes of activity. Try to exercise or be active for at least 30 minutes most days of the week. You can exercise all at once or break it up into 10- or 15-minute sessions. And think about other ways you can be more active throughout the day.  · Eat healthy foods. Most successful dieters make changes in what, when, and how much they eat. The best way to lose weight is to eat fewer calories. You should make sure you check your portion sizes, eat breakfast, plan your meals and snacks, and eat slowly.  Working with your healthcare provider  Talk with your healthcare provider. He or she can guide you through the process of losing weight. As you begin to make changes, your healthcare provider will:  · Check your weight loss progress  · Check your blood pressure and blood test results  · Talk with you about your results  · Make suggestions about diet and exercise  · Recommend other experts or programs  · Make changes to your medicines and help with any side effects  Getting additional support  It can be hard to make healthy lifestyle changes. It may take some time to create new habits.  Your healthcare provider may suggest other experts or programs to help you, such as:  · Health . A health  gives ongoing support and makes suggestions to help  you with healthy lifestyle changes, like weight loss.  · Weight loss programs. There are many safe weight loss programs. Some are free or low-cost.  · Dietitian. He or she can help you make changes to your diet.  · Exercise specialist. He or she can help you with an exercise plan.  · Occupational therapist. He or she can help you make lifestyle changes to help you lose weight more effectively, particularly if you already have health issues or complications.   · Counselor. A counselor can help you deal with your feelings and emotions. There are psychiatrists, psychologists, and social workers who specialize in weight problems.  · Bariatric or obesity specialist. These healthcare providers are experts in obesity. They can help with diet, exercise, behavioral therapy or counseling, medicines for weight loss, and very low-calorie diets.  · Bariatric surgeon. Weight loss surgery may be a choice. But it is only advised for people who are over a certain weight, who have health problems because of their weight, and who have not been able to lose weight with other treatments.  Keeping the weight off  After losing weight, keeping it off can be even harder. Dont give up. Make sure to:  · Keep exercising. That means at least 40 to 60 minutes most days of the week.  · Keep eating healthy foods. Continue eating foods that are healthy and avoiding those that arent.  · Stay motivated. Watch your health improve. If you eat something unhealthy or skip exercising, dont give up. Simply make your next choice a healthy one.  Date Last Reviewed: 7/1/2016  © 6563-0734 [employer]. [address] [place] [address], [place] [address]. All rights reserved. This information is not intended as a substitute for professional medical care. Always follow your healthcare professional's instructions.        Tips for Taking Medication  Its easy to forget to take your medicine, especially when you take a lot of pills. But, to get the best   results from medicines, always take them as directed. The tips on these pages can help you keep track.  Staying on schedule     A pill organizer can help you keep track of the medicines you take each day.   Every medicine has a different purpose. So, each one needs to be taken as prescribed. Dont skip pills or stop taking a medicine, even when you feel fine. To stay on track try to:  · Take your medicine at set times. You could take it each morning with breakfast or right before you go to bed. Some medicines may need to be taken at certain times of the day, or with food. Ask your doctor if this is the case for any of your medicines.  · Find ways to remind yourself to take medicine. Use a pillbox or organize pills for the week. Set your watch or cell phone alarm to go off when youre supposed to take your medicine. Or, put a note on the bathroom mirror to remind yourself.  · Have your prescriptions refilled while you still have plenty of pills left. Keep in mind that certain suppliers, such as mail order pharmacies, may take longer to fill prescriptions.  · When traveling, keep all medicines in your carry-on bag. This way youll have them in case you and your checked luggage get . Also, bring copies of each of your prescriptions when you travel.  Safety tips  Read the warning labels and usage instructions for each medicine you take. Also, keep these safety tips in mind:  · Get help organizing your pills if you need it. Taking more than one medicine can be confusing. A family member or friend can help prevent you from making a mistake that could be dangerous to your health.  · Fill all your prescriptions at the same drug store. This way, your records are all in one place.  · Ask your pharmacist or doctor for a fact sheet or other patient information when you start a new medicine.  · Tell your doctor and pharmacist if you have allergies to any medicine.  · Dont split your pills to save money. Talk to your  doctor if youre having trouble paying for your medicine.  · Never share medicine with anyone.  · Ask your pharmacy how you should dispose of old or  medicine.  · Give a copy of your medicine list to a family member or close friend. Hold copies of each others lists in case of emergency.  · Store medicines in a cool, dry, dark place - not in a steamy bathroom.  · Make sure you tell your healthcare providers if you are taking any other supplements or drugs over-the-counter.  If you have side effects  Some medicines can cause side effects, such as nausea or dizziness. Tell your doctor if you have any side effects. He or she may change the dosage or schedule to reduce effects. Be sure to keep taking your medicine as directed, and always talk to your healthcare team about how you feel. Your feedback will help the doctor find the best medicine plan for you.  Date Last Reviewed: 2016  © 8131-8013 [employer]. [address] [place] [address]. All rights reserved. This information is not intended as a substitute for professional medical care. Always follow your healthcare professional's instructions.        Established High Blood Pressure    High blood pressure (hypertension) is a chronic disease. Often, healthcare providers dont know what causes it. But it can be caused by certain health conditions and medicines.  If you have high blood pressure, you may not have any symptoms. If you do have symptoms, they may include headache, dizziness, changes in your vision, chest pain, and shortness of breath. But even without symptoms, high blood pressure thats not treated raises your risk for heart attack and stroke. High blood pressure is a serious health risk and shouldnt be ignored.  A blood pressure reading is made up of two numbers: a higher number over a lower number. The top number is the systolic pressure. The bottom number is the diastolic pressure. A normal blood pressure is a systolic  pressure of  less than 120 over a diastolic pressure of less than 80. You will see your blood pressure readings written together. For example, a person with a systolic pressure of 188 and a diastolic pressure of 78 will have 118/78 written in the medical record.  High blood pressure is when either the top number is 140 or higher, or the bottom number is 90 or higher. This must be the result when taking your blood pressure a number of times. The blood pressures between normal and high are called prehypertension.  Home care  If you have high blood pressure, you should do what is listed below to lower your blood pressure. If you are taking medicines for high blood pressure, these methods may reduce or end your need for medicines in the future.  · Begin a weight-loss program if you are overweight.  · Cut back on how much salt you get in your diet. Heres how to do this:  ¨ Dont eat foods that have a lot of salt. These include olives, pickles, smoked meats, and salted potato chips.  ¨ Dont add salt to your food at the table.  ¨ Use only small amounts of salt when cooking.  · Start an exercise program. Talk with your healthcare provider about the type of exercise program that would be best for you. It doesn't have to be hard. Even brisk walking for 20 minutes 3 times a week is a good form of exercise.  · Dont take medicines that stimulate the heart. This includes many over-the-counter cold and sinus decongestant pills and sprays, as well as diet pills. Check the warnings about hypertension on the label. Before buying any over-the-counter medicines or supplements, always ask the pharmacist about the product's potential interaction with your high blood pressure and your high blood pressure medicines.  · Stimulants such as amphetamine or cocaine could be deadly for someone with high blood pressure. Never take these.  · Limit how much caffeine you get in your diet. Switch to caffeine-free products.  · Stop smoking. If you  are a long-time smoker, this can be hard. Talk to your healthcare provider about medicines and nicotine replacement options to help you. Also, enroll in a stop-smoking program to make it more likely that you will quit for good.  · Learn how to handle stress. This is an important part of any program to lower blood pressure. Learn about relaxation methods like meditation, yoga, or biofeedback.  · If your provider prescribed medicines, take them exactly as directed. Missing doses may cause your blood pressure get out of control.  · If you miss a dose or doses, check with your healthcare provider or pharmacist about what to do.  · Consider buying an automatic blood pressure machine. Ask your provider for a recommendation. You can get one of these at most pharmacies.     The American Heart Association recommends the following guidelines for home blood pressure monitoring:  · Don't smoke or drink coffee for 30 minutes before taking your blood pressure.  · Go to the bathroom before the test.  · Relax for 5 minutes before taking the measurement.  · Sit with your back supported (don't sit on a couch or soft chair); keep your feet on the floor uncrossed. Place your arm on a solid flat surface (like a table) with the upper part of the arm at heart level. Place the middle of the cuff directly above the eye of the elbow. Check the monitor's instruction manual for an illustration.  · Take multiple readings. When you measure, take 2 to 3 readings one minute apart and record all of the results.  · Take your blood pressure at the same time every day, or as your healthcare provider recommends.  · Record the date, time, and blood pressure reading.  · Take the record with you to your next medical appointment. If your blood pressure monitor has a built-in memory, simply take the monitor with you to your next appointment.  · Call your provider if you have several high readings. Don't be frightened by a single high blood pressure reading,  but if you get several high readings, check in with your healthcare provider.  · Note: When blood pressure reaches a systolic (top number) of 180 or higher OR diastolic (bottom number) of 110 or higher, seek emergency medical treatment.  Follow-up care  You will need to see your healthcare provider regularly. This is to check your blood pressure and to make changes to your medicines. Make a follow-up appointment as directed. Bring the record of your home blood pressure readings to the appointment.  When to seek medical advice  Call your healthcare provider right away if any of these occur:  · Blood pressure reaches a systolic (upper number) of 180 or higher OR a diastolic (bottom number) of 110 or higher  · Chest pain or shortness of breath  · Severe headache  · Throbbing or rushing sound in the ears  · Nosebleed  · Sudden severe pain in your belly (abdomen)  · Extreme drowsiness, confusion, or fainting  · Dizziness or spinning sensation (vertigo)  · Weakness of an arm or leg or one side of the face  · You have problems speaking or seeing   Date Last Reviewed: 12/1/2016  © 0743-6591 [employer]. [address] [place] [address]. All rights reserved. This information is not intended as a substitute for professional medical care. Always follow your healthcare professional's instructions.        Step-by-Step  Checking Your Blood Pressure    Date Last Reviewed: 4/27/2016  © 2887-5980 [employer]. [address] [place] [address]. All rights reserved. This information is not intended as a substitute for professional medical care. Always follow your healthcare professional's instructions.        Low-Salt Choices  Eating salt (sodium) can make your body retain too much water. Excess water makes your heart work harder. Canned, packaged, and frozen foods are easy to prepare, but they are often high in sodium. Here are some ideas for low-salt foods you can easily prepare  yourself.    For breakfast  · Fruit or 100% fruit juice  · Whole-wheat bread or an English muffin. Compare sodium content on labels.  · Low-fat milk or yogurt  · Unsalted eggs  · Shredded wheat  · Corn tortillas  · Unsalted steamed rice  · Regular (not instant) hot cereal, made without salt  Stay away from:  · Sausage, rosenberg, and ham  · Flour tortillas  · Packaged muffins, pancakes, and biscuits  · Instant hot cereals  · Cottage cheese  For lunch and dinner  · Fresh fish, chicken, turkey, or meat--baked, broiled, or roasted without salt  · Dry beans, cooked without salt  · Tofu, stir-fried without salt  · Unsalted fresh fruit and vegetables, or frozen or canned fruit and vegetables with no added salt  Stay away from:  · Lunch or deli meat that is cured or smoked  · Cheese  · Tomato juice and catsup  · Canned vegetables, soups, and fish not labeled as no-salt-added or reduced sodium  · Packaged gravies and sauces  · Olives, pickles, and relish  · Bottled salad dressings  For snacks and desserts  · Yogurt  · Unsalted, air popped popcorn  · Unsalted nuts or seeds  Stay away from:  · Pies and cakes  · Packaged dessert mixes  · Pizza  · Canned and packaged puddings  · Pretzels, chips, crackers, and nuts--unless the label says unsalted  Date Last Reviewed: 6/17/2015  © 2102-8685 [employer]. [address] [place] [address]. All rights reserved. This information is not intended as a substitute for professional medical care. Always follow your healthcare professional's instructions.        Tips for Using Less Salt    Most people with heart problems need to eat less salt (sodium). Reducing the amount of salt you eat may help control your blood pressure. The higher your blood pressure, the greater your risk for heart disease, stroke, blindness, and kidney problems.  At the store  · Make low-salt choices by reading labels carefully. Look for the total amount of sodium per serving.  · Use more fresh  food. Buy more fruits and vegetables. Select lean meats, fish, and poultry.  · Use fewer frozen, canned, and packaged foods which often contain a lot of sodium.  · Use plain frozen vegetables without sauces or toppings. These products are often low- or no-sodium.  · Opt for reduced-sodium or no-salt-added versions of canned vegetables and soups.  In the kitchen  · Don't add salt to food when you're cooking. Season with flavorings such as onion, garlic, pepper, salt-free herbal blends, and lemon or lime juice.  · Use a cookbook containing low-salt recipes. It can give you ideas for tasty meals that are healthy for your heart.  · Sprinkle salt-free herbal blends on vegetables and meat.  · Drain and rinse canned foods, such as canned beans and vegetables, before cooking or eating.  Eating out  · Tell the  you're on a low-salt diet. Ask questions about the menu.  · Order fish, chicken, and meat broiled, baked, poached, or grilled without salt, butter, or breading.  · Use lemon, pepper, and salt-free herb mixes to add flavor.  · Choose plain steamed rice, boiled noodles, and baked or boiled potatoes. Top potatoes with chives and a little sour cream.     Beware! Salt goes by many other names. Limit foods with these words listed as ingredients: salt, sodium, soy sauce, baking soda, baking powder, MSG, monosodium, Na (the chemical symbol for sodium). Some antacids are also high in salt.   Date Last Reviewed: 6/19/2015  © 7019-1384 [employer]. [address] [place] [address], [place] [address]. All rights reserved. This information is not intended as a substitute for professional medical care. Always follow your healthcare professional's instructions.        4 Steps for Eating Healthier  Changing the way you eat can improve your health. It can lower your cholesterol and blood pressure, and help you stay at a healthy weight. Your diet doesnt have to be bland and boring to be healthy. Just watch your calories and  follow these steps:    1. Eat fewer unhealthy fats  · Choose more fish and lean meats instead of fatty cuts of meat.  · Skip butter and lard, and use less margarine.  · Pass on foods that have palm, coconut, or hydrogenated oils.  · Eat fewer high-fat dairy foods like cheese, ice cream, and whole milk.  · Get a heart-healthy cookbook and try some low-fat recipes.  2. Go light on salt  · Keep the saltshaker off the table.  · Limit high-salt ingredients, such as soy sauce, bouillon, and garlic salt.  · Instead of adding salt when cooking, season your food with herbs and flavorings. Try lemon, garlic, and onion.  · Limit convenience foods, such as boxed or canned foods and restaurant food.  · Read food labels and choose lower-sodium options.  3. Limit sugar  · Pause before you add sugars to pancakes, cereal, coffee, or tea. This includes white and brown table sugar, syrup, honey, and molasses. Cut your usual amount by half.  · Use non-sugar sweeteners. Stevia, aspartame, and sucralose can satisfy a sweet tooth without adding calories.  · Swap out sugar-filled soda and other drinks. Buy sugar-free or low-calorie beverages. Remember water is always the best choice.  · Read labels and choose foods with less added sugar. Keep in mind that dairy foods and foods with fruit will have some natural sugar.  · Cut the sugar in recipes by 1/3 to 1/2. Boost the flavor with extracts like almond, vanilla, or orange. Or add spices such as cinnamon or nutmeg.  4. Eat more fiber  · Eat fresh fruits and vegetables every day.  · Boost your diet with whole grains. Go for oats, whole-grain rice, and bran.  · Add beans and lentils to your meals.  · Drink more water to match your fiber increase. This is to help prevent constipation.  Date Last Reviewed: 5/11/2015  © 5670-0110 [employer]. [address] [place] [address], [place] [address]. All rights reserved. This information is not intended as a substitute for professional medical  care. Always follow your healthcare professional's instructions.        Discharge Instructions: Eating a Low-Salt Diet  Your health care provider has prescribed a low-salt diet for you. Most people with heart problems need to eat less salt, which is full of sodium. Too much sodium is linked to high blood pressure, which is linked to a greater risk of heart disease, stroke, blindness, and kidney problems.  Home care    Learn ways to cut back on salt (sodium):  · Eat less frozen, canned, dried, packaged, and fast foods. These often contain high amounts of sodium.  · Season foods with herbs instead of salt when you cook.  · Season with flavorings such as pepper, lemon, garlic, and onion.  · Dont add salt to your food at the table.  · Sprinkle salt-free herbal blends on meats and vegetables.  Learn to read food labels carefully:  · Look for the total amount of sodium per serving.  · Look for foods labeled low sodium, reduced sodium, or no added salt.  · Beware. Salt goes by many names. Cut down on foods with these words (all forms of salt) listed as ingredients:  ¨ Salt  ¨ Sodium  ¨ Soy sauce  ¨ Baking soda  ¨ Baking powder  ¨ MSG  ¨ Monosodium  ¨ Na (the chemical symbol for sodium)  Other ideas:  · Use more fresh food. Buy more fruits and vegetables.  · Select lean meats, fish, and poultry.  · Find a cookbook with low-salt recipes. Youll find ideas for tasty meals that are healthy for your heart.  ·   When eating out, ask questions about the menu. Tell the  you're on a low-salt diet.  ¨ If you order fish, chicken, beef, or pork, ask the  to have it broiled, baked, poached, or grilled without salt, butter, or breading.  ¨ Choose plain steamed rice, boiled noodles, and baked or boiled potatoes. Top potatoes with chives and a little sour cream instead of butter.  · Avoid antacids that are high in salt. Check the label before you buy.  Follow-up  Make a follow-up appointment with a nutritionist as directed by  our staff.  Date Last Reviewed: 6/20/2015  © 1545-4578 [employer]. [address] [place] [address], [place] [address]. All rights reserved. This information is not intended as a substitute for professional medical care. Always follow your healthcare professional's instructions.        Heart Failure: Making Changes to Your Diet  You have a condition called heart failure. When you have heart failure, excess fluid is more likely to build up in your body because your heart isn't working well. This makes the heart work harder to pump blood. Fluid buildup causes symptoms such as shortness of breath and swelling (edema). This is often referred to as congestive heart failure or CHF. Controlling the amount of salt (sodium) you eat may help stop fluid from building up. Your doctor may also tell you to reduce the amount of fluid you drink.  Reading food labels    Your healthcare provider will tell you how much sodium you can eat each day. Read food labels to keep track. Keep in mind that certain foods are high in salt. These include canned, frozen, and processed foods. Check the amount of sodium in each serving. Watch out for high-sodium ingredients. These include MSG (monosodium glutamate), baking soda, and sodium phosphate.   Eating less salt  Give yourself time to get used to eating less salt. It may take a little while. Here are some tips to help:  · Take the saltshaker off the table. Replace it with salt-free herb mixes and spices.  · Eat fresh or plain frozen vegetables. These have much less salt than canned vegetables.  · Choose low-sodium snacks like sodium-free pretzels, crackers, or air-popped popcorn.  · Dont add salt to your food when youre cooking. Instead, season your foods with pepper, lemon, garlic, or onion.  · When you eat out, ask that your food be cooked without added salt.  · Avoid eating fried foods as these often have a great deal of salt.  If youre told to limit fluids  You may need to limit  how much fluid you have to help prevent swelling. This includes anything that is liquid at room temperature, such as ice cream and soup. If your doctor tells you to limit fluid, try these tips:  · Measure drinks in a measuring cup before you drink them. This will help you meet daily goals.  · Chill drinks to make them more refreshing.  · Suck on frozen lemon wedges to quench thirst.  · Only drink when youre thirsty.  · Chew sugarless gum or suck on hard candy to keep your mouth moist.  · Weigh yourself daily to know if your body's fluid content is rising.  My sodium goal  Your healthcare provider may give you a sodium goal to meet each day. This includes sodium found in food as well as salt that you add. My goal is to eat no more than ___________ mg of sodium per day.     When to call your doctor  Call your doctor right away if you have any symptoms of worsening heart failure. These can include:  · Sudden weight gain  · Increased swelling of your legs or ankles  · Trouble breathing when youre resting or at night  · Increase in the number of pillows you have to sleep on  · Chest pain, pressure, discomfort, or pain in the jaw, neck, or back   Date Last Reviewed: 3/21/2016  © 5389-2136 [employer]. [address] [place] [address], [place] [address]. All rights reserved. This information is not intended as a substitute for professional medical care. Always follow your healthcare professional's instructions.

## 2019-07-29 ENCOUNTER — CLINICAL SUPPORT (OUTPATIENT)
Dept: CARDIOLOGY | Facility: CLINIC | Age: 84
End: 2019-07-29
Attending: INTERNAL MEDICINE
Payer: MEDICARE

## 2019-07-29 DIAGNOSIS — I10 ESSENTIAL HYPERTENSION: ICD-10-CM

## 2019-07-29 DIAGNOSIS — R00.2 PALPITATIONS: ICD-10-CM

## 2019-07-29 LAB
ESTIMATED PA SYSTOLIC PRESSURE: 22.36
MITRAL VALVE MOBILITY: NORMAL
RETIRED EF AND QEF - SEE NOTES: 60 (ref 55–65)

## 2019-07-29 PROCEDURE — 93226 XTRNL ECG REC<48 HR SCAN A/R: CPT | Mod: PBBFAC | Performed by: INTERNAL MEDICINE

## 2019-07-29 PROCEDURE — 93306 2D ECHO WITH COLOR FLOW DOPPLER: ICD-10-PCS | Mod: 26,S$PBB,, | Performed by: NUCLEAR MEDICINE

## 2019-07-29 PROCEDURE — 93227 XTRNL ECG REC<48 HR R&I: CPT | Mod: S$PBB,,, | Performed by: INTERNAL MEDICINE

## 2019-07-29 PROCEDURE — 93227 HOLTER MONITOR - 48 HOUR: ICD-10-PCS | Mod: S$PBB,,, | Performed by: INTERNAL MEDICINE

## 2019-07-29 PROCEDURE — 93306 TTE W/DOPPLER COMPLETE: CPT | Mod: PBBFAC | Performed by: NUCLEAR MEDICINE

## 2019-08-01 ENCOUNTER — TELEPHONE (OUTPATIENT)
Dept: CARDIOLOGY | Facility: CLINIC | Age: 84
End: 2019-08-01

## 2019-08-01 NOTE — TELEPHONE ENCOUNTER
----- Message from Cody Ferrer MD sent at 8/1/2019  1:39 PM CDT -----  Please call patient regarding normal result of overall heart monitor. If he/she has any questions please let me know or have him/her schedule an appt to see me soon to discuss. Thank you

## 2019-08-05 ENCOUNTER — OUTPATIENT CASE MANAGEMENT (OUTPATIENT)
Dept: ADMINISTRATIVE | Facility: OTHER | Age: 84
End: 2019-08-05

## 2019-08-09 NOTE — PROGRESS NOTES
Summary:  Contacted patient by phone today for follow up visit. Patient reports continued ambulation with cane if needed for balance. Patient denies any falls since our last visit and we continued discussions on fall prevention/home safety like good lighting, clear pathways, no throw rugs and no electrical cords and patient voiced understanding/agreement. Patient reports follow up visit with Cardiologist for Holter monitor results and everything was good. Patient johanny and further s/s of bradycardia or chest discomfort. Patient denies any s/s of AMI or CHF like chest pain, SOB, edema etc. Patient reports continues to take his blood pressure daily and write it down and it was 142/67 today. Reminded patient that if BP is >140/90 for 3 consecutive days to notify MD and patient voiced understanding/agreement. We discussed how most people with HTN have no s/s. A few people may have headaches, dizziness, chest tightness, SOB or nosebleeds. Symptoms of Hypotension include fainting, dizziness, blurred vision and nausea. We discussed the importance of reading food labels and foods with hidden sodium like canned soups and canned vegetables, tomato products, processed meats and frozen dinners. Encouraged patient to communicate with physician and call this RN if having any questions/concerns. OPCM will continue to follow. Patient is agreeable to follow up call in 2 weeks in am. ANGELA Clifford OPCM      Interventions:  Continued discussions on HTN (logs), Bradycardia, s/s of AMI (received materials)     Plan:  Continue education on HTN (logs) and low salt diet     OPCM Self-Management Care Plan reviewed with patient/Caregiver. Goals were reviewed with patient/Caregiver and the patient/Caregiver has agreed to work towards these goals to improve his/her overall well-being. Patient/Caregiver verbalized understanding of the care plan, goals, and all of today's instructions. Encouraged patient/Caregiver to communicate with his/her  physician and health care team about health conditions and the treatment plan. Provided my contact information today and encouraged patient/Caregiver to call me with any questions as needed.

## 2019-08-20 ENCOUNTER — OUTPATIENT CASE MANAGEMENT (OUTPATIENT)
Dept: ADMINISTRATIVE | Facility: OTHER | Age: 84
End: 2019-08-20

## 2019-08-20 NOTE — LETTER
September 5, 2019    [place]  [address]  [place] [address]             [facility]  [address]  [facility] [address] Dear Mr. Cohen,            I have been unsuccessful at reaching you to follow-up to see how you have been doing. I work with [facility]'s Outpatient Case Management Department.Please call me back at your earliest convenience to discuss your health care needs.           I can be reached at [phone] from 8:00AM to 4:30 PM on Monday thru Friday. [facility] is a program offered through [facility] where a nurse is available 24/7 to answer questions or provide medical advice, their number is [phone].    Thanks,    Gabriela Rothman RN   Outpatient Case Management

## 2019-09-09 ENCOUNTER — OFFICE VISIT (OUTPATIENT)
Dept: OPHTHALMOLOGY | Facility: CLINIC | Age: 84
End: 2019-09-09
Payer: MEDICARE

## 2019-09-09 ENCOUNTER — APPOINTMENT (OUTPATIENT)
Dept: OPHTHALMOLOGY | Facility: CLINIC | Age: 84
End: 2019-09-09
Payer: MEDICARE

## 2019-09-09 DIAGNOSIS — H40.1133 PRIMARY OPEN ANGLE GLAUCOMA OF BOTH EYES, SEVERE STAGE: Primary | ICD-10-CM

## 2019-09-09 DIAGNOSIS — H52.7 REFRACTIVE ERROR: ICD-10-CM

## 2019-09-09 DIAGNOSIS — H25.13 NUCLEAR SCLEROSIS OF BOTH EYES: ICD-10-CM

## 2019-09-09 PROCEDURE — 92083 EXTENDED VISUAL FIELD XM: CPT | Mod: PBBFAC | Performed by: OPHTHALMOLOGY

## 2019-09-09 PROCEDURE — 92133 POSTERIOR SEGMENT OCT OPTIC NERVE(OCULAR COHERENCE TOMOGRAPHY) - OU - BOTH EYES: ICD-10-PCS | Mod: 26,S$PBB,, | Performed by: OPHTHALMOLOGY

## 2019-09-09 PROCEDURE — 99212 OFFICE O/P EST SF 10 MIN: CPT | Mod: PBBFAC,25 | Performed by: OPHTHALMOLOGY

## 2019-09-09 PROCEDURE — 92133 CPTRZD OPH DX IMG PST SGM ON: CPT | Mod: PBBFAC | Performed by: OPHTHALMOLOGY

## 2019-09-09 PROCEDURE — 92083 HUMPHREY VISUAL FIELD - OU - BOTH EYES: ICD-10-PCS | Mod: 26,S$PBB,, | Performed by: OPHTHALMOLOGY

## 2019-09-09 PROCEDURE — 92012 PR EYE EXAM, EST PATIENT,INTERMED: ICD-10-PCS | Mod: S$PBB,,, | Performed by: OPHTHALMOLOGY

## 2019-09-09 PROCEDURE — 92020 GONIOSCOPY: CPT | Mod: PBBFAC | Performed by: OPHTHALMOLOGY

## 2019-09-09 PROCEDURE — 99999 PR PBB SHADOW E&M-EST. PATIENT-LVL II: CPT | Mod: PBBFAC,,, | Performed by: OPHTHALMOLOGY

## 2019-09-09 PROCEDURE — 92020 GONIOSCOPY: CPT | Mod: S$PBB,,, | Performed by: OPHTHALMOLOGY

## 2019-09-09 PROCEDURE — 92020 PR SPECIAL EYE EVAL,GONIOSCOPY: ICD-10-PCS | Mod: S$PBB,,, | Performed by: OPHTHALMOLOGY

## 2019-09-09 PROCEDURE — 92015 DETERMINE REFRACTIVE STATE: CPT | Mod: ,,, | Performed by: OPHTHALMOLOGY

## 2019-09-09 PROCEDURE — 92015 PR REFRACTION: ICD-10-PCS | Mod: ,,, | Performed by: OPHTHALMOLOGY

## 2019-09-09 PROCEDURE — 99999 PR PBB SHADOW E&M-EST. PATIENT-LVL II: ICD-10-PCS | Mod: PBBFAC,,, | Performed by: OPHTHALMOLOGY

## 2019-09-09 PROCEDURE — 92012 INTRM OPH EXAM EST PATIENT: CPT | Mod: S$PBB,,, | Performed by: OPHTHALMOLOGY

## 2019-09-09 RX ORDER — TIMOLOL MALEATE 5 MG/ML
1 SOLUTION/ DROPS OPHTHALMIC 2 TIMES DAILY
Qty: 10 ML | Refills: 12 | Status: CANCELLED | OUTPATIENT
Start: 2019-09-09

## 2019-09-09 RX ORDER — LATANOPROST 50 UG/ML
1 SOLUTION/ DROPS OPHTHALMIC NIGHTLY
Qty: 1 BOTTLE | Refills: 12 | Status: SHIPPED | OUTPATIENT
Start: 2019-09-09 | End: 2020-09-29 | Stop reason: SDUPTHER

## 2019-09-09 RX ORDER — TIMOLOL MALEATE 5 MG/ML
1 SOLUTION/ DROPS OPHTHALMIC 2 TIMES DAILY
Qty: 10 ML | Refills: 12 | Status: SHIPPED | OUTPATIENT
Start: 2019-09-09 | End: 2020-09-29 | Stop reason: SDUPTHER
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
## 2019-09-13 NOTE — PROGRESS NOTES
Summary:  Contacted patient by phone today for follow up visit. Patient reports continued blood pressure checks daily and average 139-145/68-70 and he writes it down. Reminded patient that if BP is >140/90 for 3 consecutive days to notify MD and he is aware.We discussed some s/s of HTN like headaches, dizziness, chest tightness, SOB or nosebleeds. We discussed continuation of low sodium diet and limiting canned vegetables, tomato products and frozen dinners. OPCM assisted patient to make his follow up appointment with Dr. Liu on the same day as his lab draw appointment, 9/18 at 3 pm ahd he is agreeable to time and date. Encouraged patient to communicate with physician and call this RN if having any questions/concerns. OPCM will continue to follow. Patient is agreeable to follow up call in 2 weeks at any time. ANGELA Clifford OPCM       Interventions:  Continued discussions on HTN and low sodium diet     Plan:  Continue education on HTN (logs) and low salt diet      OPCM Self-Management Care Plan reviewed with patient/Caregiver. Goals were reviewed with patient/Caregiver and the patient/Caregiver has agreed to work towards these goals to improve his/her overall well-being. Patient/Caregiver verbalized understanding of the care plan, goals, and all of today's instructions. Encouraged patient/Caregiver to communicate with his/her physician and health care team about health conditions and the treatment plan. Provided my contact information today and encouraged patient/Caregiver to call me with any questions as needed.

## 2019-09-18 ENCOUNTER — OFFICE VISIT (OUTPATIENT)
Dept: FAMILY MEDICINE | Facility: CLINIC | Age: 84
End: 2019-09-18
Payer: MEDICARE

## 2019-09-18 ENCOUNTER — HOSPITAL ENCOUNTER (OUTPATIENT)
Dept: RADIOLOGY | Facility: HOSPITAL | Age: 84
Discharge: HOME | End: 2019-09-18
Attending: INTERNAL MEDICINE
Payer: MEDICARE

## 2019-09-18 VITALS
HEIGHT: 76 IN | SYSTOLIC BLOOD PRESSURE: 132 MMHG | BODY MASS INDEX: 35.41 KG/M2 | OXYGEN SATURATION: 97 % | DIASTOLIC BLOOD PRESSURE: 78 MMHG | TEMPERATURE: 98 F | HEART RATE: 67 BPM | WEIGHT: 290.81 LBS

## 2019-09-18 DIAGNOSIS — H93.12 TINNITUS OF LEFT EAR: Primary | ICD-10-CM

## 2019-09-18 DIAGNOSIS — M54.2 NECK PAIN: ICD-10-CM

## 2019-09-18 DIAGNOSIS — L98.9 SKIN LESIONS: ICD-10-CM

## 2019-09-18 PROCEDURE — 99999 PR PBB SHADOW E&M-EST. PATIENT-LVL V: CPT | Mod: PBBFAC,,, | Performed by: INTERNAL MEDICINE

## 2019-09-18 PROCEDURE — 72040 X-RAY EXAM NECK SPINE 2-3 VW: CPT | Mod: 26,,, | Performed by: RADIOLOGY

## 2019-09-18 PROCEDURE — 72040 XR CERVICAL SPINE AP LATERAL: ICD-10-PCS | Mod: 26,,, | Performed by: RADIOLOGY

## 2019-09-18 PROCEDURE — 99999 PR PBB SHADOW E&M-EST. PATIENT-LVL V: ICD-10-PCS | Mod: PBBFAC,,, | Performed by: INTERNAL MEDICINE

## 2019-09-18 PROCEDURE — 99213 PR OFFICE/OUTPT VISIT, EST, LEVL III, 20-29 MIN: ICD-10-PCS | Mod: S$PBB,,, | Performed by: INTERNAL MEDICINE

## 2019-09-18 PROCEDURE — 99213 OFFICE O/P EST LOW 20 MIN: CPT | Mod: S$PBB,,, | Performed by: INTERNAL MEDICINE

## 2019-09-18 PROCEDURE — 72040 X-RAY EXAM NECK SPINE 2-3 VW: CPT | Mod: TC,FY,PO

## 2019-09-18 PROCEDURE — 99215 OFFICE O/P EST HI 40 MIN: CPT | Mod: PBBFAC,PO,25 | Performed by: INTERNAL MEDICINE

## 2019-09-18 RX ORDER — GABAPENTIN 300 MG/1
300 CAPSULE ORAL NIGHTLY PRN
Qty: 30 CAPSULE | Refills: 3 | Status: SHIPPED | OUTPATIENT
Start: 2019-09-18 | End: 2020-02-03

## 2019-09-18 NOTE — PROGRESS NOTES
Subjective:       Patient ID: Dominic Cohen is a 83 y.o. male.    Chief Complaint: Follow-up; Neck Pain; and Tinnitus    Follow-up   Associated symptoms include arthralgias, myalgias, neck pain and a rash. Pertinent negatives include no abdominal pain, chest pain, chills, coughing, diaphoresis, fatigue, fever, headaches, joint swelling, nausea, numbness, sore throat, vomiting or weakness.   Neck Pain    This is a chronic problem. The current episode started more than 1 year ago. The problem has been waxing and waning. Pertinent negatives include no chest pain, fever, headaches, numbness, photophobia, trouble swallowing or weakness.   Ringing in Ears:   Chronicity:  Chronic  Onset:  More than 1 year ago  Progression since onset:  Waxing and waning   Associated symptoms: tinnitus.  No dizziness, no ear pain, no fever, no headaches and no rhinorrhea.No dizziness.    Past Medical History:   Diagnosis Date    Arthritis     Atherosclerosis of abdominal aorta     Cataract     Chronic constipation     Glaucoma     History of anal fissures     Hypertension     Polyneuropathy in other diseases classified elsewhere     due to folate deficiency    Prediabetes     Venous insufficiency     Venous insufficiency      Past Surgical History:   Procedure Laterality Date    BACK SURGERY      BONE GRAFT Left 6/27/2017    Performed by Malcolm Gill Sr., MD at [facility] OR    CATARACT EXTRACTION BILATERAL W/ ANTERIOR VITRECTOMY      COLONOSCOPY N/A 5/13/2016    Performed by Presley Phillips MD at [facility] (4TH FLR)    COLONOSCOPY N/A 11/13/2012    Performed by [facility] Phillips [facility] at [facility] ([facility] FLR)    ESOPHAGOGASTRODUODENOSCOPY (EGD) N/A 7/5/2018    Performed by Khanh Asencio III, MD at [facility] ENDO    ESOPHAGOGASTRODUODENOSCOPY (EGD) N/A 5/13/2016    Performed by Kendall Jeffery MD at [facility] (4TH FLR)    JOINT REPLACEMENT Left     x 2    KNEE SURGERY Left     x2. revision 06/27/17    PROSTATE SURGERY       REMOVAL-IMPLANT Left 2017    Per[facility] by [facility] at [facility] OR    REVISION-ARTHROPLASTY-KNEE Left 2017    Performed by [facility] Sr[facility] at [facility] OR    SYNOVECTOMY-KNEE Left 2017    Performed by Malcolm [facility] Sr[facility] at [facility] OR    TONSILLECTOMY, ADENOIDECTOMY       Family History   Problem Relation Age of Onset    No Known Problems Son     No Known Problems Daughter     No Known Problems Son     Diabetes Neg Hx     Heart disease Neg Hx     Cancer Neg Hx     Celiac disease Neg Hx     Cirrhosis Neg Hx     Colon cancer Neg Hx     Colon polyps Neg Hx     Crohn's disease Neg Hx     Cystic fibrosis Neg Hx     Esophageal cancer Neg Hx     Hemochromatosis Neg Hx     Inflammatory bowel disease Neg Hx     Irritable bowel syndrome Neg Hx     Liver cancer Neg Hx     Liver disease Neg Hx     Rectal cancer Neg Hx     Stomach cancer Neg Hx     Ulcerative colitis Neg Hx     Zak's disease Neg Hx     Amblyopia Neg Hx     Blindness Neg Hx     Glaucoma Neg Hx     Hypertension Neg Hx     Macular degeneration Neg Hx     Retinal detachment Neg Hx     Strabismus Neg Hx      Social History     Socioeconomic History    Marital status:      Spouse name: Not on file    Number of children: 3    Years of education: Not on file    Highest education level: Not on file   Occupational History    Occupation: Retired     Comment: [place], [place]   Social Needs    Financial resource strain: Not on file    Food insecurity:     Worry: Not on file     Inability: Not on file    Transportation needs:     Medical: Not on file     Non-medical: Not on file   Tobacco Use    Smoking status: Former Smoker     Packs/day: 0.30     Years: 52.00     Pack years: 15.60     Types: Cigarettes     Last attempt to quit: 2000     Years since quittin.8    Smokeless tobacco: Never Used   Substance and Sexual Activity    Alcohol use: No    Drug use: No    Sexual activity:  Yes   Lifestyle    Physical activity:     Days per week: Not on file     Minutes per session: Not on file    Stress: Not on file   Relationships    Social connections:     Talks on phone: Not on file     Gets together: Not on file     Attends [employer] service: Not on file     Active member of club or organization: Not on file     Attends meetings of clubs or organizations: Not on file     Relationship status: Not on file   Other Topics Concern    Not on file   Social History Narrative    Not on file     Review of Systems   Constitutional: Negative for activity change, appetite change, chills, diaphoresis, fatigue, fever and unexpected weight change.   HENT: Positive for tinnitus. Negative for drooling, ear discharge, ear pain, facial swelling, hearing loss, mouth sores, nosebleeds, postnasal drip, rhinorrhea, sinus pressure, sneezing, sore throat, trouble swallowing and voice change.    Eyes: Negative for photophobia, redness and visual disturbance.   Respiratory: Negative for apnea, cough, choking, chest tightness, shortness of breath and wheezing.    Cardiovascular: Negative for chest pain, palpitations and leg swelling.   Gastrointestinal: Negative for abdominal distention, abdominal pain, anal bleeding, blood in stool, constipation, diarrhea, nausea and vomiting.   Endocrine: Negative for cold intolerance, heat intolerance, polydipsia, polyphagia and polyuria.   Genitourinary: Negative for difficulty urinating, dysuria, enuresis, flank pain, frequency, genital sores, hematuria and urgency.   Musculoskeletal: Positive for arthralgias, gait problem, myalgias and neck pain. Negative for back pain, joint swelling and neck stiffness.   Skin: Positive for rash. Negative for color change, pallor and wound.   Allergic/Immunologic: Negative for food allergies and immunocompromised state.   Neurological: Negative for dizziness, tremors, seizures, syncope, facial asymmetry, speech difficulty, weakness,  light-headedness, numbness and headaches.   Hematological: Negative for adenopathy. Does not bruise/bleed easily.   Psychiatric/Behavioral: Negative for agitation, behavioral problems, confusion, decreased concentration, dysphoric mood, hallucinations, self-injury, sleep disturbance and suicidal ideas. The patient is not nervous/anxious and is not hyperactive.        Objective:      Physical Exam   Constitutional: He is oriented to person, place, and time. He appears well-developed and well-nourished. No distress.   HENT:   Head: Normocephalic and atraumatic.   Eyes: Pupils are equal, round, and reactive to light.   Neck: Normal range of motion. Neck supple. No JVD present. Carotid bruit is not present. No tracheal deviation present. No thyromegaly present.   Cardiovascular: Normal rate, regular rhythm, normal heart sounds and intact distal pulses.   Pulmonary/Chest: Effort normal and breath sounds normal. No respiratory distress. He has no wheezes. He has no rales. He exhibits no tenderness.   Abdominal: Soft. Bowel sounds are normal. He exhibits no distension. There is no tenderness. There is no rebound and no guarding.   Musculoskeletal: Normal range of motion. He exhibits no edema or tenderness.   Lymphadenopathy:     He has no cervical adenopathy.   Neurological: He is alert and oriented to person, place, and time.   Skin: Skin is warm and dry. No rash noted. He is not diaphoretic. No erythema. No pallor.   Psychiatric: He has a normal mood and affect. His behavior is normal. Judgment and thought content normal.   Nursing note and vitals reviewed.      CMP  Sodium   Date Value Ref Range Status   03/19/2019 141 136 - 145 mmol/L Final     Potassium   Date Value Ref Range Status   03/19/2019 4.3 3.5 - 5.1 mmol/L Final     Chloride   Date Value Ref Range Status   03/19/2019 105 95 - 110 mmol/L Final     CO2   Date Value Ref Range Status   03/19/2019 28 23 - 29 mmol/L Final     Glucose   Date Value Ref Range Status    03/19/2019 111 (H) 70 - 110 mg/dL Final     BUN, Bld   Date Value Ref Range Status   03/19/2019 16 8 - 23 mg/dL Final     Creatinine   Date Value Ref Range Status   03/19/2019 1.3 0.5 - 1.4 mg/dL Final     Calcium   Date Value Ref Range Status   03/19/2019 9.5 8.7 - 10.5 mg/dL Final     Total Protein   Date Value Ref Range Status   03/19/2019 7.9 6.0 - 8.4 g/dL Final     Albumin   Date Value Ref Range Status   03/19/2019 3.7 3.5 - 5.2 g/dL Final     Total Bilirubin   Date Value Ref Range Status   03/19/2019 0.5 0.1 - 1.0 mg/dL Final     Comment:     For infants and newborns, interpretation of results should be based  on gestational age, weight and in agreement with clinical  observations.  Premature Infant recommended reference ranges:  Up to 24 hours.............<8.0 mg/dL  Up to 48 hours............<12.0 mg/dL  3-5 days..................<15.0 mg/dL  6-29 days.................<15.0 mg/dL       Alkaline Phosphatase   Date Value Ref Range Status   03/19/2019 69 55 - 135 U/L Final     AST   Date Value Ref Range Status   03/19/2019 19 10 - 40 U/L Final     ALT   Date Value Ref Range Status   03/19/2019 14 10 - 44 U/L Final     Anion Gap   Date Value Ref Range Status   03/19/2019 8 8 - 16 mmol/L Final     eGFR if    Date Value Ref Range Status   03/19/2019 59 (A) >60 mL/min/1.73 m^2 Final     eGFR if non    Date Value Ref Range Status   03/19/2019 51 (A) >60 mL/min/1.73 m^2 Final     Comment:     Calculation used to obtain the estimated glomerular filtration  rate (eGFR) is the CKD-EPI equation.        Lab Results   Component Value Date    WBC 4.67 03/19/2019    HGB 12.6 (L) 03/19/2019    HCT 39.8 (L) 03/19/2019     (H) 03/19/2019     03/19/2019     Lab Results   Component Value Date    CHOL 169 10/11/2018     Lab Results   Component Value Date    HDL 45 10/11/2018     Lab Results   Component Value Date    LDLCALC 101.2 10/11/2018     Lab Results   Component Value Date     TRIG 114 10/11/2018     Lab Results   Component Value Date    CHOLHDL 26.6 10/11/2018     Lab Results   Component Value Date    TSH 1.038 11/27/2018     Lab Results   Component Value Date    HGBA1C 5.5 06/07/2018     Assessment:       1. Tinnitus of left ear    2. Neck pain    3. Skin lesions        Plan:   Tinnitus of left ear-------------chronic----------seen by shaila ent's    Neck pain  -     X-Ray Cervical Spine AP And Lateral; Future; Expected date: 09/18/2019  -     gabapentin (NEURONTIN) 300 MG capsule; Take 1 capsule (300 mg total) by mouth nightly as needed.  Dispense: 30 capsule; Refill: 3    Skin lesions  -     Ambulatory referral to Dermatology    F/u 3 months.

## 2019-09-19 ENCOUNTER — INITIAL CONSULT (OUTPATIENT)
Dept: DERMATOLOGY | Facility: CLINIC | Age: 84
End: 2019-09-19
Payer: MEDICARE

## 2019-09-19 DIAGNOSIS — L60.8 NAIL DISCOLORATION: ICD-10-CM

## 2019-09-19 DIAGNOSIS — L30.9 DERMATITIS: ICD-10-CM

## 2019-09-19 DIAGNOSIS — L82.1 SEBORRHEIC KERATOSES: Primary | ICD-10-CM

## 2019-09-19 PROCEDURE — 11104 PUNCH BX SKIN SINGLE LESION: CPT | Mod: PBBFAC | Performed by: PHYSICIAN ASSISTANT

## 2019-09-19 PROCEDURE — 88305 TISSUE EXAM BY PATHOLOGIST: CPT | Mod: 26,,, | Performed by: PATHOLOGY

## 2019-09-19 PROCEDURE — 99213 PR OFFICE/OUTPT VISIT, EST, LEVL III, 20-29 MIN: ICD-10-PCS | Mod: 25,S$PBB,, | Performed by: PHYSICIAN ASSISTANT

## 2019-09-19 PROCEDURE — 99999 PR PBB SHADOW E&M-EST. PATIENT-LVL III: CPT | Mod: PBBFAC,,, | Performed by: PHYSICIAN ASSISTANT

## 2019-09-19 PROCEDURE — 99999 PR PBB SHADOW E&M-EST. PATIENT-LVL III: ICD-10-PCS | Mod: PBBFAC,,, | Performed by: PHYSICIAN ASSISTANT

## 2019-09-19 PROCEDURE — 99213 OFFICE O/P EST LOW 20 MIN: CPT | Mod: 25,S$PBB,, | Performed by: PHYSICIAN ASSISTANT

## 2019-09-19 PROCEDURE — 11105 PUNCH BX SKIN EA SEP/ADDL: CPT | Mod: PBBFAC | Performed by: PHYSICIAN ASSISTANT

## 2019-09-19 PROCEDURE — 11104 PR PUNCH BIOPSY, SKIN, SINGLE LESION: ICD-10-PCS | Mod: S$PBB,,, | Performed by: PHYSICIAN ASSISTANT

## 2019-09-19 PROCEDURE — 11104 PUNCH BX SKIN SINGLE LESION: CPT | Mod: S$PBB,,, | Performed by: PHYSICIAN ASSISTANT

## 2019-09-19 PROCEDURE — 88305 TISSUE EXAM BY PATHOLOGIST: CPT | Performed by: PATHOLOGY

## 2019-09-19 PROCEDURE — 88305 TISSUE SPECIMEN TO PATHOLOGY, DERMATOLOGY: ICD-10-PCS | Mod: 26,,, | Performed by: PATHOLOGY

## 2019-09-19 PROCEDURE — 11105 PUNCH BX SKIN EA SEP/ADDL: CPT | Mod: S$PBB,,, | Performed by: PHYSICIAN ASSISTANT

## 2019-09-19 PROCEDURE — 99213 OFFICE O/P EST LOW 20 MIN: CPT | Mod: PBBFAC | Performed by: PHYSICIAN ASSISTANT

## 2019-09-19 PROCEDURE — 11105 PR PUNCH BIOPSY, SKIN, EA ADDTL LESION: ICD-10-PCS | Mod: S$PBB,,, | Performed by: PHYSICIAN ASSISTANT

## 2019-09-19 RX ORDER — TRIAMCINOLONE ACETONIDE 1 MG/G
CREAM TOPICAL 2 TIMES DAILY
Qty: 80 G | Refills: 0 | Status: SHIPPED | OUTPATIENT
Start: 2019-09-19 | End: 2020-03-12

## 2019-09-19 NOTE — PROGRESS NOTES
"  Subjective:       Patient ID:  Dominic Cohen is a 83 y.o. male who presents for   Chief Complaint   Patient presents with    Acne     to back X 3 weeks      History of Present Illness: The patient presents with chief complaint of acne. Initially seen by Dr. Liu on 9/18/19 and referred for skin lesions. He notes history of surgery of back many years ago and he believes the bumps are in distribution of surgical site, but is unable to see back.    Location: back  Duration: 3 weeks  Signs/Symptoms: raised bumps, "mushy", stinging (with water in shower), itching    Prior treatments: none  PMHX: CAD, HTN, Diastolic CHF (pt states he takes lasix, but HCTZ listed medication list)      Review of Systems   Constitutional: Negative for fever and chills.   Gastrointestinal: Negative for nausea and vomiting.   Skin: Positive for itching, rash, dry skin and activity-related sunscreen use. Negative for daily sunscreen use and recent sunburn.   Hematologic/Lymphatic: Does not bruise/bleed easily.        Objective:    Physical Exam   Constitutional: He appears well-developed and well-nourished. No distress.   Neurological: He is alert and oriented to person, place, and time. He is not disoriented.   Psychiatric: He has a normal mood and affect.   Skin:   Areas Examined (abnormalities noted in diagram):   Head / Face Inspection Performed  Neck Inspection Performed  Chest / Axilla Inspection Performed  Abdomen Inspection Performed  Back Inspection Performed  RUE Inspected  LUE Inspection Performed  RLE Inspected  LLE Inspection Performed  Nails and Digits Inspection Performed                  Diagram Legend     Erythematous scaling macule/papule c/w actinic keratosis       Vascular papule c/w angioma      Pigmented verrucoid papule/plaque c/w seborrheic keratosis      Yellow umbilicated papule c/w sebaceous hyperplasia      Irregularly shaped tan macule c/w lentigo     1-2 mm smooth white papules consistent with Milia "      Movable subcutaneous cyst with punctum c/w epidermal inclusion cyst      Subcutaneous movable cyst c/w pilar cyst      Firm pink to brown papule c/w dermatofibroma      Pedunculated fleshy papule(s) c/w skin tag(s)      Evenly pigmented macule c/w junctional nevus     Mildly variegated pigmented, slightly irregular-bordered macule c/w mildly atypical nevus      Flesh colored to evenly pigmented papule c/w intradermal nevus       Pink pearly papule/plaque c/w basal cell carcinoma      Erythematous hyperkeratotic cursted plaque c/w SCC      Surgical scar with no sign of skin cancer recurrence      Open and closed comedones      Inflammatory papules and pustules      Verrucoid papule consistent consistent with wart     Erythematous eczematous patches and plaques     Dystrophic onycholytic nail with subungual debris c/w onychomycosis     Umbilicated papule    Erythematous-base heme-crusted tan verrucoid plaque consistent with inflamed seborrheic keratosis     Erythematous Silvery Scaling Plaque c/w Psoriasis     See annotation          Assessment / Plan:      Pathology Orders:     Normal Orders This Visit    Tissue Specimen To Pathology, Dermatology     Questions:    Directional Terms:  Other(comment)    Clinical Information:  lichen planus vs. other    Specific Site:  right back    Tissue Specimen To Pathology, Dermatology     Questions:    Directional Terms:  Other(comment)    Clinical Information:  lichen planus vs. other    Specific Site:  left back        Seborrheic keratoses  Reassurance given.  Lesions are benign.    Nail discoloration  Likely secondary to hemosiderin from trauma and baby aspirin.  Pt notes it has gotten smaller since first appeared a few weeks ago.  Advised to monitor, if any increase in size or change in color, would consider nail biopsy in future.    Dermatitis  -     Tissue Specimen To Pathology, Dermatology  -     Tissue Specimen To Pathology, Dermatology  -     triamcinolone acetonide  0.1% (KENALOG) 0.1 % cream; Apply topically 2 (two) times daily. For rash of back.  Dispense: 80 g; Refill: 0  PROCEDURE NOTE -- PUNCH BIOPSY  Location: left back    After risks, benefits, and alternatives were discussed with the patient, the patient agrees to the procedure by verbal consent. The area(s) is cleansed with alcohol. A total of 1 cc of lidocaine 1% with epinephrine and sodium bicarbonate was injected for local anesthesia. A 3 mm punch biopsy was used to remove part or all of the lesion(s). The specimen was sent for tissue pathology. The defect(s) was then packed with gelfoam. The area was dressed with antibiotic ointment and bandaged. The patient tolerated the procedure well without adverse events.  Wound care instructions were given to the patient on the AVS.  The patient will be notified of pathology results once available. Results will also be available in Epic.    PROCEDURE NOTE -- PUNCH BIOPSY  Location: right back    After risks, benefits, and alternatives were discussed with the patient, the patient agrees to the procedure by verbal consent. The area(s) is cleansed with alcohol. A total of 1 cc of lidocaine 1% with epinephrine and sodium bicarbonate was injected for local anesthesia. A 3 mm punch biopsy was used to remove part or all of the lesion(s). The specimen was sent for tissue pathology. The defect(s) was then packed with gelfoam. The area was dressed with antibiotic ointment and bandaged. The patient tolerated the procedure well without adverse events.  Wound care instructions were given to the patient on the AVS.  The patient will be notified of pathology results once available. Results will also be available in Generate.    Patient is , since biopsies are on back, will schedule for nurse visit in 4 days to recheck. Reviewed wound care instructions extensively with patient.            Follow up in about 4 weeks (around 10/17/2019) for to see Dermatology MD.

## 2019-09-19 NOTE — LETTER
September 19, 2019      Sudhakar Liu MD  [address] [place] [address]           [facility]  [address]  [place] [address]  Phone: [phone]  Fax: [phone]          Patient: Dominic Cohen   MR Number: [id number]   YOB: 1935   Date of Visit: 9/19/2019       Dear Dr. Sudhakar Liu:    Thank you for referring Dominic Cohen to me for evaluation. Attached you will find relevant portions of my assessment and plan of care.    If you have questions, please do not hesitate to call me. I look forward to following Dominic Cohen along with you.    Sincerely,    Mitzi Powell PA-C    Enclosure  CC:  No Recipients    If you would like to receive this communication electronically, please contact [email or web address] or [phone] to request more information on [employer] Link access.    For providers and/or their staff who would like to refer a patient to [facility], please contact us through our one-stop-shop provider referral line, [facility], at [phone].    If you feel you have received this communication in error or would no longer like to receive these types of communications, please e-mail [email or web address]

## 2019-09-19 NOTE — PATIENT INSTRUCTIONS
Punch Biopsy Wound Care    Your PA has performed a punch biopsy today.  A band aid and antibiotic ointment has been placed over the site.  This should remain in place for 24 hours.  It is recommended that you keep the area dry for the first 24 hours.  After 24 hours, you may remove the band aid and wash the area with warm soap and water and apply Vaseline jelly.  Many patients prefer to use Neosporin or Bacitracin ointment.  This is acceptable; however know that you can develop an allergy to this medication even if you have used it safely for years.  It is important to keep the area moist.  Letting it dry out and get air slows healing time, will worsen the scar, and make it more difficult to remove the stitches if they were placed.  Band aid is optional after first 24 hours.      If you notice increasing redness, tenderness, pain, or yellow drainage at the biopsy or surgical site, please notify your doctor.  These are signs of an infection.    If your biopsy/surgical site is bleeding, apply firm pressure for 15 minutes straight.  Repeat for another 15 minutes, if it is still bleeding.   If the surgical site continues to bleed, then please contact your doctor.      [facility] - [facility]   DERMATOLOGY  [address]   [place] [address]   Dept: [phone]   Dept Fax: [phone]

## 2019-09-23 ENCOUNTER — CLINICAL SUPPORT (OUTPATIENT)
Dept: DERMATOLOGY | Facility: CLINIC | Age: 84
End: 2019-09-23
Payer: MEDICARE

## 2019-09-23 DIAGNOSIS — Z48.89 ENCOUNTER FOR POST SURGICAL WOUND CHECK: Primary | ICD-10-CM

## 2019-09-23 PROCEDURE — 99024 POSTOP FOLLOW-UP VISIT: CPT | Mod: POP,,, | Performed by: PHYSICIAN ASSISTANT

## 2019-09-23 PROCEDURE — 99213 OFFICE O/P EST LOW 20 MIN: CPT | Mod: PBBFAC

## 2019-09-23 PROCEDURE — 99024 PR POST-OP FOLLOW-UP VISIT: ICD-10-PCS | Mod: POP,,, | Performed by: PHYSICIAN ASSISTANT

## 2019-09-23 PROCEDURE — 99999 PR PBB SHADOW E&M-EST. PATIENT-LVL III: CPT | Mod: PBBFAC,,,

## 2019-09-23 PROCEDURE — 99999 PR PBB SHADOW E&M-EST. PATIENT-LVL III: ICD-10-PCS | Mod: PBBFAC,,,

## 2019-09-23 NOTE — PROGRESS NOTES
Subjective:       Patient ID: Dominic Cohen is a 83 y.o. male.    Chief Complaint: thrombocytopenia (f/u)  Collaborating provider is Dr. Bryant Barriso MD    HPI Patient presents for 4 mon f/u evaluation of an abnormal platelet count. Referred by his PCP Dr. Ender Liu.    Reviewed epic records dated back to 2/2/11.     Ultrasound of the abdomen revealed normal appearing spleen 4/20/17.     Patient has had intermittent slight drops in his hgb and mild drops in his platelet count. 2016 pt had one drop to 145 in his platelets. The drops in 2017 in his platelets occurred after his knee replacement re-do surgery that occurred 6/27/17.     Patient relates having to have 2 units of blood after his surgery in June 2017.    Denies any unusual fatigue and no unusual SOB-    Denies any bleeding or unusual bruising. No gingival bleeding, no nose bleeds, no hematuria, no hematochezia- has occasional blood with passing a hard stool on toilet paper only. No night sweats or fevers. Appetite is good.     Taking oral iron and b12 daily with no adverse effects    FH: no cancers    NSAID use- aspirin daily.    No gastric or GI surgeries in the past    No alcohol use    Last blood donation 2013    Over the counter supplements- magnesium, multivitamin and calcium      Review of Systems   Constitutional: Negative for appetite change and unexpected weight change.   HENT: Positive for tinnitus (chronic- follwed by ent at [facility]).    Eyes: Positive for visual disturbance.   Respiratory: Negative for cough and shortness of breath.    Cardiovascular: Negative for chest pain.   Gastrointestinal: Negative for abdominal pain and diarrhea.   Genitourinary: Positive for frequency.   Musculoskeletal: Positive for back pain.   Skin: Negative for rash.   Neurological: Negative for headaches.   Hematological: Negative for adenopathy.   Psychiatric/Behavioral: The patient is not nervous/anxious.       Objective:      Physical Exam    Constitutional: He is oriented to person, place, and time. He appears well-developed and well-nourished. No distress.   HENT:   Head: Normocephalic and atraumatic.   Right Ear: External ear normal.   Left Ear: External ear normal.   Nose: Nose normal.   Mouth/Throat: Oropharynx is clear and moist.   Eyes: Pupils are equal, round, and reactive to light. Conjunctivae and EOM are normal. Right eye exhibits no discharge. Left eye exhibits no discharge. No scleral icterus.   Neck: Normal range of motion. Neck supple. No thyromegaly present.   Cardiovascular: Normal rate, regular rhythm and normal heart sounds. Exam reveals no gallop and no friction rub.   No murmur heard.  Pulmonary/Chest: Effort normal and breath sounds normal. No respiratory distress. He has no wheezes. He has no rales.   Abdominal: Soft. Bowel sounds are normal. He exhibits no distension and no mass. There is no tenderness. There is no rebound and no guarding.   Musculoskeletal: Normal range of motion. He exhibits edema.   Lymphadenopathy:     He has no cervical adenopathy.   Neurological: He is alert and oriented to person, place, and time. He has normal strength. Gait normal.   Skin: Skin is warm and dry. No lesion and no rash noted.   Psychiatric: He has a normal mood and affect. His behavior is normal. Judgment and thought content normal.        LABS:  Results for CANDELARIA CASILLAS (MRN [id number]) as of 9/23/2019 12:44   Ref. Range 3/19/2019 13:05 9/18/2019 13:56 9/18/2019 15:36   WBC Latest Ref Range: 3.90 - 12.70 K/uL 4.67  4.58   RBC Latest Ref Range: 4.60 - 6.20 M/uL 3.91 (L)  3.53 (L)   Hemoglobin Latest Ref Range: 14.0 - 18.0 g/dL 12.6 (L)  12.0 (L)   Hematocrit Latest Ref Range: 40.0 - 54.0 % 39.8 (L)  37.8 (L)   MCV Latest Ref Range: 82 - 98 fL 102 (H)  107 (H)   MCH Latest Ref Range: 27.0 - 31.0 pg 32.2 (H)  34.0 (H)   MCHC Latest Ref Range: 32.0 - 36.0 g/dL 31.7 (L)  31.7 (L)   RDW Latest Ref Range: 11.5 - 14.5 % 12.6  12.8    Platelets Latest Ref Range: 150 - 350 K/uL 188  101 (L)   MPV Latest Ref Range: 9.2 - 12.9 fL 10.5  12.1   Gran% Latest Ref Range: 38.0 - 73.0 % 47.1  56.9   Gran # (ANC) Latest Ref Range: 1.8 - 7.7 K/uL 2.2  2.6   Lymph% Latest Ref Range: 18.0 - 48.0 % 33.6  26.9   Lymph # Latest Ref Range: 1.0 - 4.8 K/uL 1.6  1.2   Mono% Latest Ref Range: 4.0 - 15.0 % 14.6  11.8   Mono # Latest Ref Range: 0.3 - 1.0 K/uL 0.7  0.5   Eosinophil% Latest Ref Range: 0.0 - 8.0 % 4.1  3.5   Eos # Latest Ref Range: 0.0 - 0.5 K/uL 0.2  0.2   Basophil% Latest Ref Range: 0.0 - 1.9 % 0.6  0.7   Baso # Latest Ref Range: 0.00 - 0.20 K/uL 0.03  0.03   nRBC Latest Ref Range: 0 /100 WBC 0  0   Differential Method Unknown Automated  Automated   Immature Grans (Abs) Latest Ref Range: 0.00 - 0.04 K/uL 0.00  0.01   Immature Granulocytes Latest Ref Range: 0.0 - 0.5 % 0.0  0.2   Iron Latest Ref Range: 45 - 160 ug/dL 77 69    TIBC Latest Ref Range: 250 - 450 ug/dL 299 292    Saturated Iron Latest Ref Range: 20 - 50 % 26 24    Transferrin Latest Ref Range: 200 - 375 mg/dL 202 197 (L)    Ferritin Latest Ref Range: 20.0 - 300.0 ng/mL 287 295    Vitamin B-12 Latest Ref Range: 210 - 950 pg/mL  877        Renal function stable- creatinine and BUN- normal- GFR normal  Calcium normal  Iron - normal  MIKKI, spe and LT chains stable- polyclonal      Assessment:       1. Anemia, unspecified type    2. B12 deficiency    3. Macrocytic anemia    4. Thrombocytopenia        Plan:   1.     Stable labs  2.     Platelets low but stable- history of clumping with red top cbc- blue top ordered for next lab appt  3.     RTC in 4 months with cbc, cmp, iron, mikki, spe, light chains, blue top platelet count and b12 levels if labs stable this visit  4.     Continue taking iron daily and b12-   5.     All labs reviewed with Dr. Barrios who recommends 4 mon f/u

## 2019-09-23 NOTE — PROGRESS NOTES
The wound is cleansed and dressed. The patient is alerted to watch for any signs of infection (redness, pus, pain, increased swelling or fever) and call if such occurs.

## 2019-09-25 ENCOUNTER — CLINICAL SUPPORT (OUTPATIENT)
Dept: DERMATOLOGY | Facility: CLINIC | Age: 84
End: 2019-09-25
Payer: MEDICARE

## 2019-09-25 ENCOUNTER — OFFICE VISIT (OUTPATIENT)
Dept: HEMATOLOGY/ONCOLOGY | Facility: CLINIC | Age: 84
End: 2019-09-25
Payer: MEDICARE

## 2019-09-25 VITALS
HEIGHT: 76 IN | RESPIRATION RATE: 18 BRPM | OXYGEN SATURATION: 95 % | BODY MASS INDEX: 35.01 KG/M2 | WEIGHT: 287.5 LBS | DIASTOLIC BLOOD PRESSURE: 65 MMHG | HEART RATE: 60 BPM | TEMPERATURE: 98 F | SYSTOLIC BLOOD PRESSURE: 140 MMHG

## 2019-09-25 DIAGNOSIS — D53.9 MACROCYTIC ANEMIA: ICD-10-CM

## 2019-09-25 DIAGNOSIS — D69.6 THROMBOCYTOPENIA: ICD-10-CM

## 2019-09-25 DIAGNOSIS — Z48.89 ENCOUNTER FOR POST SURGICAL WOUND CHECK: Primary | ICD-10-CM

## 2019-09-25 DIAGNOSIS — D64.9 ANEMIA, UNSPECIFIED TYPE: Primary | ICD-10-CM

## 2019-09-25 DIAGNOSIS — E53.8 B12 DEFICIENCY: ICD-10-CM

## 2019-09-25 PROCEDURE — 99999 PR PBB SHADOW E&M-EST. PATIENT-LVL IV: CPT | Mod: PBBFAC,,, | Performed by: NURSE PRACTITIONER

## 2019-09-25 PROCEDURE — 99214 OFFICE O/P EST MOD 30 MIN: CPT | Mod: S$PBB,,, | Performed by: NURSE PRACTITIONER

## 2019-09-25 PROCEDURE — 99999 PR PBB SHADOW E&M-EST. PATIENT-LVL IV: ICD-10-PCS | Mod: PBBFAC,,, | Performed by: NURSE PRACTITIONER

## 2019-09-25 PROCEDURE — 99214 OFFICE O/P EST MOD 30 MIN: CPT | Mod: PBBFAC | Performed by: NURSE PRACTITIONER

## 2019-09-25 PROCEDURE — 99999 PR PBB SHADOW E&M-EST. PATIENT-LVL III: CPT | Mod: PBBFAC,,,

## 2019-09-25 PROCEDURE — 99214 PR OFFICE/OUTPT VISIT, EST, LEVL IV, 30-39 MIN: ICD-10-PCS | Mod: S$PBB,,, | Performed by: NURSE PRACTITIONER

## 2019-09-25 PROCEDURE — 99999 PR PBB SHADOW E&M-EST. PATIENT-LVL III: ICD-10-PCS | Mod: PBBFAC,,,

## 2019-09-25 PROCEDURE — 99213 OFFICE O/P EST LOW 20 MIN: CPT | Mod: PBBFAC,27

## 2019-09-25 PROCEDURE — 99024 PR POST-OP FOLLOW-UP VISIT: ICD-10-PCS | Mod: POP,,, | Performed by: PHYSICIAN ASSISTANT

## 2019-09-25 PROCEDURE — 99024 POSTOP FOLLOW-UP VISIT: CPT | Mod: POP,,, | Performed by: PHYSICIAN ASSISTANT

## 2019-09-25 NOTE — PROGRESS NOTES
The wound is cleansed and dressed. The patient is alerted to watch for any signs of infection (redness, pus, pain, increased swelling or fever) and call if such occurs. Home wound care instructions are provided.

## 2019-10-01 ENCOUNTER — OUTPATIENT CASE MANAGEMENT (OUTPATIENT)
Dept: ADMINISTRATIVE | Facility: OTHER | Age: 84
End: 2019-10-01

## 2019-10-01 NOTE — PROGRESS NOTES
Summary:  Contacted patient by phone today for follow up visit. Patient reports continues blood pressure checks daily and average last was 141/78. Patient continues with low sodium diet. Patient reports he takes all of his medications as directed. Reminded of upcoming a ppt with Dr. Rivero on 10/7 at 145 and Derm at 2 pm and he is aware. Ecouarged patient to communicate with physician and call this RN if having any questions/concerns.  Venessa RN OPCM       Interventions:  Continued discussions on HTN and low sodium diet     Plan:  This patient will be closed by the OPCM RN, as the nursing centered patient goals have been met.

## 2019-10-07 ENCOUNTER — CLINICAL SUPPORT (OUTPATIENT)
Dept: DERMATOLOGY | Facility: CLINIC | Age: 84
End: 2019-10-07
Payer: MEDICARE

## 2019-10-07 ENCOUNTER — OFFICE VISIT (OUTPATIENT)
Dept: OPHTHALMOLOGY | Facility: CLINIC | Age: 84
End: 2019-10-07
Payer: MEDICARE

## 2019-10-07 DIAGNOSIS — Z51.89 WOUND CHECK, ABSCESS: Primary | ICD-10-CM

## 2019-10-07 DIAGNOSIS — H25.13 NUCLEAR SCLEROSIS OF BOTH EYES: ICD-10-CM

## 2019-10-07 DIAGNOSIS — H40.1133 PRIMARY OPEN ANGLE GLAUCOMA OF BOTH EYES, SEVERE STAGE: Primary | ICD-10-CM

## 2019-10-07 PROCEDURE — 99211 OFF/OP EST MAY X REQ PHY/QHP: CPT | Mod: PBBFAC,27 | Performed by: OPHTHALMOLOGY

## 2019-10-07 PROCEDURE — 99999 PR PBB SHADOW E&M-EST. PATIENT-LVL III: CPT | Mod: PBBFAC,,,

## 2019-10-07 PROCEDURE — 99024 PR POST-OP FOLLOW-UP VISIT: ICD-10-PCS | Mod: POP,,, | Performed by: PHYSICIAN ASSISTANT

## 2019-10-07 PROCEDURE — 99024 POSTOP FOLLOW-UP VISIT: CPT | Mod: POP,,, | Performed by: PHYSICIAN ASSISTANT

## 2019-10-07 PROCEDURE — 99999 PR PBB SHADOW E&M-EST. PATIENT-LVL I: CPT | Mod: PBBFAC,,, | Performed by: OPHTHALMOLOGY

## 2019-10-07 PROCEDURE — 99999 PR PBB SHADOW E&M-EST. PATIENT-LVL III: ICD-10-PCS | Mod: PBBFAC,,,

## 2019-10-07 PROCEDURE — 92012 INTRM OPH EXAM EST PATIENT: CPT | Mod: S$PBB,,, | Performed by: OPHTHALMOLOGY

## 2019-10-07 PROCEDURE — 99999 PR PBB SHADOW E&M-EST. PATIENT-LVL I: ICD-10-PCS | Mod: PBBFAC,,, | Performed by: OPHTHALMOLOGY

## 2019-10-07 PROCEDURE — 92012 PR EYE EXAM, EST PATIENT,INTERMED: ICD-10-PCS | Mod: S$PBB,,, | Performed by: OPHTHALMOLOGY

## 2019-10-07 PROCEDURE — 99213 OFFICE O/P EST LOW 20 MIN: CPT | Mod: PBBFAC

## 2019-10-07 NOTE — PROGRESS NOTES
The wound is cleansed, debrided of foreign material as much as possible, and dressed. The patient is alerted to watch for any signs of infection (redness, pus, pain, increased swelling or fever) and call if such occurs.

## 2019-10-07 NOTE — PROGRESS NOTES
SUBJECTIVE:   Dominic Cohen is a 83 y.o. male   Corrected distance visual acuity was 20/40 in the right eye and CF at 3' in the left eye.   Chief Complaint   Patient presents with    Glaucoma     IOP check Latanoprost qhs OU, timolol bid OU        HPI:  HPI     Glaucoma      Additional comments: IOP check Latanoprost qhs OU, timolol bid OU              Comments     Last seen by [facility] on 9/9/19 for HVF/FD  Patient states vision is a little cloudy today started this morning  Previously got new glasses  Using timolol drops as directed.   States he no longer uses Latanoprost it burns his eyes  Pt previously saw Dr. Carvalho  Saw Dr. Jean-Baptiste 7/22/19    1. Severe COAG   2. NSC OU    Timolol BID OU  Latanoprost qhs OU(not using)          Last edited by Saida Ordaz, PCT on 10/7/2019  1:28 PM. (History)          Assessment /Plan :  1. Primary open angle glaucoma of both eyes, severe stage Doing well, IOP within acceptable range relative to target IOP and no evidence of progression. Continue current treatment. Reviewed importance of continued compliance with treatment and follow up.      Use timolol QAM OU and Latanoprost QHS OU   2. Nuclear sclerosis of both eyes will cont to follow at this time           Return to clinic in 1 month  or as needed.  With IOP Check

## 2019-10-23 ENCOUNTER — OFFICE VISIT (OUTPATIENT)
Dept: DERMATOLOGY | Facility: CLINIC | Age: 84
End: 2019-10-23
Payer: MEDICARE

## 2019-10-23 DIAGNOSIS — L43.9 LICHEN PLANUS: Primary | ICD-10-CM

## 2019-10-23 PROCEDURE — 99213 PR OFFICE/OUTPT VISIT, EST, LEVL III, 20-29 MIN: ICD-10-PCS | Mod: S$PBB,,, | Performed by: DERMATOLOGY

## 2019-10-23 PROCEDURE — 99213 OFFICE O/P EST LOW 20 MIN: CPT | Mod: S$PBB,,, | Performed by: DERMATOLOGY

## 2019-10-23 PROCEDURE — 99999 PR PBB SHADOW E&M-EST. PATIENT-LVL II: ICD-10-PCS | Mod: PBBFAC,,, | Performed by: DERMATOLOGY

## 2019-10-23 PROCEDURE — 99212 OFFICE O/P EST SF 10 MIN: CPT | Mod: PBBFAC | Performed by: DERMATOLOGY

## 2019-10-23 PROCEDURE — 99999 PR PBB SHADOW E&M-EST. PATIENT-LVL II: CPT | Mod: PBBFAC,,, | Performed by: DERMATOLOGY

## 2019-10-23 RX ORDER — BETAMETHASONE DIPROPIONATE 0.5 MG/G
OINTMENT TOPICAL 2 TIMES DAILY PRN
Qty: 90 G | Refills: 1 | Status: SHIPPED | OUTPATIENT
Start: 2019-10-23 | End: 2020-03-12

## 2019-10-23 NOTE — PROGRESS NOTES
Subjective:       Patient ID:  Dominic Cohen is a 83 y.o. male who presents for   Chief Complaint   Patient presents with    Follow-up     Bumps on back that itch     Newly diagnosed lichen planus of the back x 2 months s/p biopsy at last visit on 9/19/19 by DILSHAD parada.  Currently using TAC cream with some improvement. Denies new lesions. + pruritus.      Review of Systems   Constitutional: Negative for fever and chills.   Gastrointestinal: Negative for nausea and vomiting.   Skin: Positive for itching and rash. Negative for daily sunscreen use, activity-related sunscreen use and recent sunburn.   Hematologic/Lymphatic: Does not bruise/bleed easily.        Objective:    Physical Exam   Constitutional: He appears well-developed and well-nourished. No distress.   HENT:   Mouth/Throat: Uvula is midline.       Neurological: He is alert and oriented to person, place, and time. He is not disoriented.   Psychiatric: He has a normal mood and affect.   Skin:   Areas Examined (abnormalities noted in diagram):   Head / Face Inspection Performed  Neck Inspection Performed  Chest / Axilla Inspection Performed  Abdomen Inspection Performed  Back Inspection Performed  RUE Inspected  LUE Inspection Performed  Nails and Digits Inspection Performed              FINAL PATHOLOGIC DIAGNOSIS  1. Skin, right back, punch biopsy:  - LICHENOID DERMATITIS CONSISTENT WITH LICHEN PLANUS.  MICROSCOPIC DESCRIPTION: Sections show irregular epidermal hyperplasia with orthohyperkeratosis and  wedge-shaped hypergranulosis. There are vacuolar-interface changes with dyskeratotic cells. A lichenoid  lymphohistiocytic infiltrate obscuring the dermoepidermal junction is present in the superficial dermis, along with  scattered melanophages.  2. Skin, left back, punch biopsy:  - LICHENOID DERMATITIS CONSISTENT WITH LICHEN PLANUS.  MICROSCOPIC DESCRIPTION: Sections show irregular epidermal hyperplasia with orthohyperkeratosis and  wedge-shaped  hypergranulosis. There are vacuolar-interface changes with dyskeratotic cells. A lichenoid  lymphohistiocytic infiltrate obscuring the dermoepidermal junction is present in the superficial dermis, along with  scattered melanophages.    Assessment / Plan:        Lichen planus  -     betamethasone dipropionate (DIPROLENE) 0.05 % ointment; Apply topically 2 (two) times daily as needed.  Dispense: 90 g; Refill: 1  -     Limited distribution. No oral involvement noted.  Will change to betamethasone oint bid prn.               Follow up in about 3 months (around 1/23/2020).

## 2019-10-23 NOTE — PATIENT INSTRUCTIONS
Understanding Lichen Planus  Lichen planus is a long-term (chronic) skin disease. Its a rash that can develop anywhere on the body. But it most often erupts on the wrists, arms, legs, scalp, and genitals. It may also appear on the nails and in the mouth. Peoples ages 30 to 60 are more likely to get it.  How to say it  LY-zeb play-nuhs   What causes lichen planus?  The cause of lichen planus is often not known. But metals such as gold, and certain medicines may trigger it. These include non-steroidal anti-inflammatory medicines and penicillin. Some research suggests the disease may also be linked to hepatitis C.  Symptoms of lichen planus  Lichen planus causes flat-topped bumps or patches to form on the skin. These bumps may hurt and be very itchy. They are usually shiny and violet in color. But they may be dark red or purple. They may also have fine white lines on them. In the mouth, the condition may look like patches of white lace.  Treatment for lichen planus  Lichen planus often goes away within a few years. It may do so without treatment. But to speed up healing, treatment options include:  · Steroids. These medicines can be put directly onto the skin or injected into the affected area. They can also be taken by mouth.  · Other medicines. Your healthcare provider may give you other medicines, such as an antihistamine or retinoid, to ease itching and pain. Anti-itch creams or ointments may also work. Your provider might also prescribe other medicines that suppress the immune system.  · Phototherapy. This treatment directs ultraviolet light on the skin to help clear it.  Self-care tips for lichen planus  · Dont scratch any affected areas.  · Use mild soap and moisturizing lotion after bathing.  When to call your healthcare provider  Call your healthcare provider right away if you have any of these:  · Fever of 100.4°F (38°C) or higher, or as directed  · New symptoms  · Pain that gets worse  · Symptoms that  dont get better, or get worse  · Ulcers in the mouth   Date Last Reviewed: 5/1/2016  © 9373-3987 [employer]. [address] [place] [address], [place] [address]. All rights reserved. This information is not intended as a substitute for professional medical care. Always follow your healthcare professional's instructions.

## 2019-11-04 ENCOUNTER — OFFICE VISIT (OUTPATIENT)
Dept: OPHTHALMOLOGY | Facility: CLINIC | Age: 84
End: 2019-11-04
Payer: MEDICARE

## 2019-11-04 DIAGNOSIS — H04.129 DRY EYE: ICD-10-CM

## 2019-11-04 DIAGNOSIS — H25.13 NUCLEAR SCLEROSIS OF BOTH EYES: ICD-10-CM

## 2019-11-04 DIAGNOSIS — H40.1133 PRIMARY OPEN ANGLE GLAUCOMA OF BOTH EYES, SEVERE STAGE: Primary | ICD-10-CM

## 2019-11-04 PROCEDURE — 99212 OFFICE O/P EST SF 10 MIN: CPT | Mod: PBBFAC | Performed by: OPHTHALMOLOGY

## 2019-11-04 PROCEDURE — 99999 PR PBB SHADOW E&M-EST. PATIENT-LVL II: ICD-10-PCS | Mod: PBBFAC,,, | Performed by: OPHTHALMOLOGY

## 2019-11-04 PROCEDURE — 92012 PR EYE EXAM, EST PATIENT,INTERMED: ICD-10-PCS | Mod: S$PBB,,, | Performed by: OPHTHALMOLOGY

## 2019-11-04 PROCEDURE — 99999 PR PBB SHADOW E&M-EST. PATIENT-LVL II: CPT | Mod: PBBFAC,,, | Performed by: OPHTHALMOLOGY

## 2019-11-04 PROCEDURE — 92012 INTRM OPH EXAM EST PATIENT: CPT | Mod: S$PBB,,, | Performed by: OPHTHALMOLOGY

## 2019-11-04 NOTE — PROGRESS NOTES
SUBJECTIVE:   Dominic Cohen is a 83 y.o. male   Corrected distance visual acuity was 20/40 in the right eye and CF at 3' in the left eye.   Chief Complaint   Patient presents with    Glaucoma     1 mth IOP check. pt states OD is blurry         HPI:  HPI     Glaucoma      Additional comments: 1 mth IOP check. pt states OD is blurry               Comments     1. Severe COAG OS>OD tmax=26/32 Goal=16-17  2. NSC OU      Timolol qam OU   Latanoprost qhs OU          Last edited by Latasha Joshi MA on 11/4/2019  1:33 PM. (History)        Assessment /Plan :  1. Primary open angle glaucoma of both eyes, severe stage Doing well, IOP within acceptable range relative to target IOP and no evidence of progression. Continue current treatment. Reviewed importance of continued compliance with treatment and follow up.     2. Nuclear sclerosis of both eyes Patient does reports mild visual decline from incipient cataractous changes, but not sufficient to affect activities of daily living. I recommend monitoring visual status and follow up when visual symptoms worsen.     3. Dry eye Recommend systane qid ou            Return to clinic in 3 months  or as needed.  With Dilation and SDP

## 2020-01-28 NOTE — PROGRESS NOTES
Subjective:       Patient ID: Dominic Cohen is a 84 y.o. male.    Chief Complaint: thrombocytopenia    Collaborating provider is Dr. Bryant Barrios MD    HPI Patient presents for 4 mon f/u evaluation of an abnormal platelet count. Referred by his PCP Dr. Ender Liu.    Pt missed his lab appt.     Patient relates presenting to the emergency room at [facility] on January 26, 2020 with chest heaviness and pain. He states that they gave him a GI cocktail which helped relieve some of the symptoms.  He was kept overnight and states he had a stress test that was negative.  He was told that his blood sugar was mildly elevated in that he could possibly have diabetes and was recommended to follow up with his primary care physician which she has an appointment with on February 3rd.  BUN and creatinine were normal, calcium normal.    Reviewed labs from Care [facility] and there were mild elevations in his blood sugar.  Renal function was within normal limits.  CBC unremarkable.  Patient has chronic macrocytosis dating back to 2011.  Platelet count was normal.    Patient relates cutting back on sugars and has lost a little bit of weight.  He states intermittently he does have episodes of dizziness when he changes positions.  He denies any fevers or night sweats.  No decrease in appetite.    Reviewed [facility] records dated back to 2/2/11.     Ultrasound of the abdomen revealed normal appearing spleen 4/20/17.     Patient has had intermittent slight drops in his hgb and mild drops in his platelet count. 2016 pt had one drop to 145 in his platelets. The drops in 2017 in his platelets occurred after his knee replacement re-do surgery that occurred 6/27/17.     Patient relates having to have 2 units of blood after his surgery in June 2017.    Denies any unusual fatigue and no unusual SOB-    Denies any bleeding or unusual bruising. No gingival bleeding, no nose bleeds, no hematuria, no hematochezia- has occasional blood with  passing a hard stool on toilet paper only. No night sweats or fevers. Appetite is good.     Taking oral iron and b12 daily with no adverse effects    FH: no cancers    NSAID use- aspirin daily.    No gastric or GI surgeries in the past    No alcohol use    Last blood donation 2013    Over the counter supplements- magnesium, multivitamin and calcium      Review of Systems   Constitutional: Negative for appetite change and unexpected weight change.   HENT: Positive for tinnitus (chronic- follwed by ent at [facility]).    Eyes: Positive for visual disturbance.   Respiratory: Negative for cough and shortness of breath.    Cardiovascular: Negative for chest pain.   Gastrointestinal: Negative for abdominal pain and diarrhea.   Genitourinary: Positive for frequency.   Musculoskeletal: Positive for back pain.   Skin: Negative for rash.   Neurological: Negative for headaches.   Hematological: Negative for adenopathy.   Psychiatric/Behavioral: The patient is not nervous/anxious.       Objective:      Physical Exam   Constitutional: He is oriented to person, place, and time. He appears well-developed and well-nourished. No distress.   HENT:   Head: Normocephalic and atraumatic.   Right Ear: External ear normal.   Left Ear: External ear normal.   Nose: Nose normal.   Mouth/Throat: Oropharynx is clear and moist.   Eyes: Pupils are equal, round, and reactive to light. Conjunctivae and EOM are normal. Right eye exhibits no discharge. Left eye exhibits no discharge. No scleral icterus.   Neck: Normal range of motion. Neck supple. No thyromegaly present.   Cardiovascular: Normal rate, regular rhythm and normal heart sounds. Exam reveals no gallop and no friction rub.   No murmur heard.  Pulmonary/Chest: Effort normal and breath sounds normal. No respiratory distress. He has no wheezes. He has no rales.   Abdominal: Soft. Bowel sounds are normal. He exhibits no distension and no mass. There is no tenderness. There is no rebound and no  guarding.   Musculoskeletal: Normal range of motion. He exhibits edema.   Lymphadenopathy:     He has no cervical adenopathy.   Neurological: He is alert and oriented to person, place, and time. He has normal strength. Gait normal.   Skin: Skin is warm and dry. No lesion and no rash noted.   Psychiatric: He has a normal mood and affect. His behavior is normal. Judgment and thought content normal.        LABS:  Pending    Assessment:       1. Acute blood loss anemia    2. B12 deficiency    3. Stage 2 chronic kidney disease    4. Thrombocytopenia        Plan:   1.     Labs pending  2.     Platelets low but stable in the past- history of clumping with red top cbc- blue top ordered for next lab appt  3.     Will review labs and call patient with results.  If stable will have patient RTC in 4 months with cbc, cmp, iron, rafal, spe, light chains, blue top platelet count and b12 levels if labs stable this visit  4.     Continue taking iron daily and b12-   5.     All labs reviewed with Dr. Barrios who recommends 4 mon f/u  6.     Discussed elevated blood sugar with patient.  Instructed patient to watch sugars in complex carbohydrates.  Patient verbalized understanding and will follow up with his primary care on Monday

## 2020-01-29 ENCOUNTER — LAB VISIT (OUTPATIENT)
Dept: LAB | Facility: HOSPITAL | Age: 85
End: 2020-01-29
Attending: NURSE PRACTITIONER
Payer: MEDICARE

## 2020-01-29 ENCOUNTER — OFFICE VISIT (OUTPATIENT)
Dept: HEMATOLOGY/ONCOLOGY | Facility: CLINIC | Age: 85
End: 2020-01-29
Payer: MEDICARE

## 2020-01-29 VITALS
TEMPERATURE: 98 F | SYSTOLIC BLOOD PRESSURE: 139 MMHG | OXYGEN SATURATION: 96 % | DIASTOLIC BLOOD PRESSURE: 75 MMHG | WEIGHT: 281.31 LBS | HEART RATE: 84 BPM | BODY MASS INDEX: 34.24 KG/M2

## 2020-01-29 DIAGNOSIS — N18.2 STAGE 2 CHRONIC KIDNEY DISEASE: ICD-10-CM

## 2020-01-29 DIAGNOSIS — D62 ACUTE BLOOD LOSS ANEMIA: Primary | ICD-10-CM

## 2020-01-29 DIAGNOSIS — E53.8 B12 DEFICIENCY: ICD-10-CM

## 2020-01-29 DIAGNOSIS — D53.9 MACROCYTIC ANEMIA: ICD-10-CM

## 2020-01-29 DIAGNOSIS — D64.9 ANEMIA, UNSPECIFIED TYPE: ICD-10-CM

## 2020-01-29 DIAGNOSIS — D69.6 THROMBOCYTOPENIA: ICD-10-CM

## 2020-01-29 LAB
ALBUMIN SERPL BCP-MCNC: 3.4 G/DL (ref 3.5–5.2)
ALP SERPL-CCNC: 52 U/L (ref 55–135)
ALT SERPL W/O P-5'-P-CCNC: 19 U/L (ref 10–44)
ANION GAP SERPL CALC-SCNC: 10 MMOL/L (ref 8–16)
AST SERPL-CCNC: 19 U/L (ref 10–40)
BASOPHILS # BLD AUTO: 0.03 K/UL (ref 0–0.2)
BASOPHILS NFR BLD: 0.5 % (ref 0–1.9)
BILIRUB SERPL-MCNC: 0.7 MG/DL (ref 0.1–1)
BUN SERPL-MCNC: 16 MG/DL (ref 8–23)
CALCIUM SERPL-MCNC: 9.7 MG/DL (ref 8.7–10.5)
CHLORIDE SERPL-SCNC: 104 MMOL/L (ref 95–110)
CO2 SERPL-SCNC: 27 MMOL/L (ref 23–29)
CREAT SERPL-MCNC: 1.3 MG/DL (ref 0.5–1.4)
DIFFERENTIAL METHOD: ABNORMAL
EOSINOPHIL # BLD AUTO: 0.1 K/UL (ref 0–0.5)
EOSINOPHIL NFR BLD: 2.3 % (ref 0–8)
ERYTHROCYTE [DISTWIDTH] IN BLOOD BY AUTOMATED COUNT: 12.6 % (ref 11.5–14.5)
EST. GFR  (AFRICAN AMERICAN): 59 ML/MIN/1.73 M^2
EST. GFR  (NON AFRICAN AMERICAN): 51 ML/MIN/1.73 M^2
GLUCOSE SERPL-MCNC: 126 MG/DL (ref 70–110)
HCT VFR BLD AUTO: 40.2 % (ref 40–54)
HGB BLD-MCNC: 13 G/DL (ref 14–18)
IMM GRANULOCYTES # BLD AUTO: 0.01 K/UL (ref 0–0.04)
IMM GRANULOCYTES NFR BLD AUTO: 0.2 % (ref 0–0.5)
LYMPHOCYTES # BLD AUTO: 0.9 K/UL (ref 1–4.8)
LYMPHOCYTES NFR BLD: 15.4 % (ref 18–48)
MCH RBC QN AUTO: 33.5 PG (ref 27–31)
MCHC RBC AUTO-ENTMCNC: 32.3 G/DL (ref 32–36)
MCV RBC AUTO: 104 FL (ref 82–98)
MONOCYTES # BLD AUTO: 0.7 K/UL (ref 0.3–1)
MONOCYTES NFR BLD: 12.7 % (ref 4–15)
MPV, BLUE TOP: 11.1 FL (ref 9.2–12.9)
NEUTROPHILS # BLD AUTO: 3.9 K/UL (ref 1.8–7.7)
NEUTROPHILS NFR BLD: 69.1 % (ref 38–73)
NRBC BLD-RTO: 0 /100 WBC
PLATELET # BLD AUTO: 205 K/UL (ref 150–350)
PLATELET, BLUE TOP: 163 K/UL (ref 150–350)
PMV BLD AUTO: 10.8 FL (ref 9.2–12.9)
POTASSIUM SERPL-SCNC: 3.8 MMOL/L (ref 3.5–5.1)
PROT SERPL-MCNC: 8 G/DL (ref 6–8.4)
RBC # BLD AUTO: 3.88 M/UL (ref 4.6–6.2)
SODIUM SERPL-SCNC: 141 MMOL/L (ref 136–145)
WBC # BLD AUTO: 5.66 K/UL (ref 3.9–12.7)

## 2020-01-29 PROCEDURE — 99214 OFFICE O/P EST MOD 30 MIN: CPT | Mod: S$PBB,,, | Performed by: NURSE PRACTITIONER

## 2020-01-29 PROCEDURE — 82728 ASSAY OF FERRITIN: CPT

## 2020-01-29 PROCEDURE — 1159F PR MEDICATION LIST DOCUMENTED IN MEDICAL RECORD: ICD-10-PCS | Mod: ,,, | Performed by: NURSE PRACTITIONER

## 2020-01-29 PROCEDURE — 86334 IMMUNOFIX E-PHORESIS SERUM: CPT

## 2020-01-29 PROCEDURE — 1159F MED LIST DOCD IN RCRD: CPT | Mod: ,,, | Performed by: NURSE PRACTITIONER

## 2020-01-29 PROCEDURE — 80053 COMPREHEN METABOLIC PANEL: CPT

## 2020-01-29 PROCEDURE — 1126F AMNT PAIN NOTED NONE PRSNT: CPT | Mod: ,,, | Performed by: NURSE PRACTITIONER

## 2020-01-29 PROCEDURE — 1126F PR PAIN SEVERITY QUANTIFIED, NO PAIN PRESENT: ICD-10-PCS | Mod: ,,, | Performed by: NURSE PRACTITIONER

## 2020-01-29 PROCEDURE — 86334 PATHOLOGIST INTERPRETATION IFE: ICD-10-PCS | Mod: 26,,, | Performed by: PATHOLOGY

## 2020-01-29 PROCEDURE — 99212 OFFICE O/P EST SF 10 MIN: CPT | Mod: PBBFAC | Performed by: NURSE PRACTITIONER

## 2020-01-29 PROCEDURE — 84165 PROTEIN E-PHORESIS SERUM: CPT

## 2020-01-29 PROCEDURE — 85025 COMPLETE CBC W/AUTO DIFF WBC: CPT

## 2020-01-29 PROCEDURE — 84165 PROTEIN E-PHORESIS SERUM: CPT | Mod: 26,,, | Performed by: PATHOLOGY

## 2020-01-29 PROCEDURE — 84165 PATHOLOGIST INTERPRETATION SPE: ICD-10-PCS | Mod: 26,,, | Performed by: PATHOLOGY

## 2020-01-29 PROCEDURE — 83540 ASSAY OF IRON: CPT

## 2020-01-29 PROCEDURE — 86334 IMMUNOFIX E-PHORESIS SERUM: CPT | Mod: 26,,, | Performed by: PATHOLOGY

## 2020-01-29 PROCEDURE — 85049 AUTOMATED PLATELET COUNT: CPT

## 2020-01-29 PROCEDURE — 99999 PR PBB SHADOW E&M-EST. PATIENT-LVL II: CPT | Mod: PBBFAC,,, | Performed by: NURSE PRACTITIONER

## 2020-01-29 PROCEDURE — 82607 VITAMIN B-12: CPT

## 2020-01-29 PROCEDURE — 99999 PR PBB SHADOW E&M-EST. PATIENT-LVL II: ICD-10-PCS | Mod: PBBFAC,,, | Performed by: NURSE PRACTITIONER

## 2020-01-29 PROCEDURE — 99214 PR OFFICE/OUTPT VISIT, EST, LEVL IV, 30-39 MIN: ICD-10-PCS | Mod: S$PBB,,, | Performed by: NURSE PRACTITIONER

## 2020-01-29 PROCEDURE — 83520 IMMUNOASSAY QUANT NOS NONAB: CPT

## 2020-01-30 ENCOUNTER — TELEPHONE (OUTPATIENT)
Dept: HEMATOLOGY/ONCOLOGY | Facility: CLINIC | Age: 85
End: 2020-01-30

## 2020-01-30 LAB
ALBUMIN SERPL ELPH-MCNC: 3.66 G/DL (ref 3.35–5.55)
ALPHA1 GLOB SERPL ELPH-MCNC: 0.42 G/DL (ref 0.17–0.41)
ALPHA2 GLOB SERPL ELPH-MCNC: 1.12 G/DL (ref 0.43–0.99)
B-GLOBULIN SERPL ELPH-MCNC: 0.73 G/DL (ref 0.5–1.1)
FERRITIN SERPL-MCNC: 691 NG/ML (ref 20–300)
GAMMA GLOB SERPL ELPH-MCNC: 1.67 G/DL (ref 0.67–1.58)
INTERPRETATION SERPL IFE-IMP: NORMAL
IRON SERPL-MCNC: 31 UG/DL (ref 45–160)
KAPPA LC SER QL IA: 5.96 MG/DL (ref 0.33–1.94)
KAPPA LC/LAMBDA SER IA: 2.29 (ref 0.26–1.65)
LAMBDA LC SER QL IA: 2.6 MG/DL (ref 0.57–2.63)
PATHOLOGIST INTERPRETATION IFE: NORMAL
PATHOLOGIST INTERPRETATION SPE: NORMAL
PROT SERPL-MCNC: 7.6 G/DL (ref 6–8.4)
SATURATED IRON: 12 % (ref 20–50)
TOTAL IRON BINDING CAPACITY: 253 UG/DL (ref 250–450)
TRANSFERRIN SERPL-MCNC: 171 MG/DL (ref 200–375)
VIT B12 SERPL-MCNC: 611 PG/ML (ref 210–950)

## 2020-01-30 NOTE — TELEPHONE ENCOUNTER
----- Message from Sarah Schaeffer NP sent at 1/30/2020  6:36 AM CST -----  Please call patient and see if he is taking oral iron.  If he is not asked Kann to take an iron supplement once a day and to avoid taking any T or dairy products like milk or she has at the time of his iron because it affects absorption.

## 2020-02-03 ENCOUNTER — TELEPHONE (OUTPATIENT)
Dept: SURGERY | Facility: CLINIC | Age: 85
End: 2020-02-03

## 2020-02-03 ENCOUNTER — OFFICE VISIT (OUTPATIENT)
Dept: FAMILY MEDICINE | Facility: CLINIC | Age: 85
End: 2020-02-03
Payer: MEDICARE

## 2020-02-03 ENCOUNTER — OFFICE VISIT (OUTPATIENT)
Dept: RHEUMATOLOGY | Facility: CLINIC | Age: 85
End: 2020-02-03
Payer: MEDICARE

## 2020-02-03 ENCOUNTER — LAB VISIT (OUTPATIENT)
Dept: LAB | Facility: HOSPITAL | Age: 85
End: 2020-02-03
Payer: MEDICARE

## 2020-02-03 VITALS
BODY MASS INDEX: 34.51 KG/M2 | SYSTOLIC BLOOD PRESSURE: 138 MMHG | HEART RATE: 105 BPM | DIASTOLIC BLOOD PRESSURE: 91 MMHG | WEIGHT: 283.5 LBS

## 2020-02-03 VITALS
BODY MASS INDEX: 34.55 KG/M2 | RESPIRATION RATE: 18 BRPM | SYSTOLIC BLOOD PRESSURE: 101 MMHG | DIASTOLIC BLOOD PRESSURE: 59 MMHG | TEMPERATURE: 99 F | WEIGHT: 283.75 LBS | OXYGEN SATURATION: 97 % | HEART RATE: 91 BPM | HEIGHT: 76 IN

## 2020-02-03 DIAGNOSIS — Z12.5 ENCOUNTER FOR PROSTATE CANCER SCREENING: ICD-10-CM

## 2020-02-03 DIAGNOSIS — R07.9 CHEST PAIN, UNSPECIFIED TYPE: ICD-10-CM

## 2020-02-03 DIAGNOSIS — K59.09 CHRONIC CONSTIPATION: ICD-10-CM

## 2020-02-03 DIAGNOSIS — E66.9 OBESITY (BMI 30-39.9): ICD-10-CM

## 2020-02-03 DIAGNOSIS — E78.5 DYSLIPIDEMIA: ICD-10-CM

## 2020-02-03 DIAGNOSIS — D64.9 ANEMIA, UNSPECIFIED TYPE: ICD-10-CM

## 2020-02-03 DIAGNOSIS — M19.072 ARTHRITIS OF LEFT ANKLE: ICD-10-CM

## 2020-02-03 DIAGNOSIS — M54.2 NECK PAIN: Primary | ICD-10-CM

## 2020-02-03 DIAGNOSIS — M25.50 POLYARTHRALGIA: Primary | ICD-10-CM

## 2020-02-03 DIAGNOSIS — M19.90 ARTHRITIS: ICD-10-CM

## 2020-02-03 DIAGNOSIS — I10 ESSENTIAL HYPERTENSION: ICD-10-CM

## 2020-02-03 PROCEDURE — 99205 PR OFFICE/OUTPT VISIT, NEW, LEVL V, 60-74 MIN: ICD-10-PCS | Mod: S$PBB,,, | Performed by: INTERNAL MEDICINE

## 2020-02-03 PROCEDURE — 99214 PR OFFICE/OUTPT VISIT, EST, LEVL IV, 30-39 MIN: ICD-10-PCS | Mod: S$PBB,,, | Performed by: INTERNAL MEDICINE

## 2020-02-03 PROCEDURE — 99999 PR PBB SHADOW E&M-EST. PATIENT-LVL II: CPT | Mod: PBBFAC,,, | Performed by: INTERNAL MEDICINE

## 2020-02-03 PROCEDURE — 99212 OFFICE O/P EST SF 10 MIN: CPT | Mod: PBBFAC,27,25 | Performed by: INTERNAL MEDICINE

## 2020-02-03 PROCEDURE — 99205 OFFICE O/P NEW HI 60 MIN: CPT | Mod: S$PBB,,, | Performed by: INTERNAL MEDICINE

## 2020-02-03 PROCEDURE — 99999 PR PBB SHADOW E&M-EST. PATIENT-LVL V: CPT | Mod: PBBFAC,,, | Performed by: INTERNAL MEDICINE

## 2020-02-03 PROCEDURE — 99999 PR PBB SHADOW E&M-EST. PATIENT-LVL V: ICD-10-PCS | Mod: PBBFAC,,, | Performed by: INTERNAL MEDICINE

## 2020-02-03 PROCEDURE — 84153 ASSAY OF PSA TOTAL: CPT

## 2020-02-03 PROCEDURE — 99999 PR PBB SHADOW E&M-EST. PATIENT-LVL II: ICD-10-PCS | Mod: PBBFAC,,, | Performed by: INTERNAL MEDICINE

## 2020-02-03 PROCEDURE — 80061 LIPID PANEL: CPT

## 2020-02-03 PROCEDURE — 84443 ASSAY THYROID STIM HORMONE: CPT

## 2020-02-03 PROCEDURE — 36415 COLL VENOUS BLD VENIPUNCTURE: CPT | Mod: PO

## 2020-02-03 PROCEDURE — 99215 OFFICE O/P EST HI 40 MIN: CPT | Mod: PBBFAC,PO,25 | Performed by: INTERNAL MEDICINE

## 2020-02-03 PROCEDURE — 96372 THER/PROPH/DIAG INJ SC/IM: CPT | Mod: PBBFAC

## 2020-02-03 PROCEDURE — 99214 OFFICE O/P EST MOD 30 MIN: CPT | Mod: S$PBB,,, | Performed by: INTERNAL MEDICINE

## 2020-02-03 RX ORDER — BETAMETHASONE SODIUM PHOSPHATE AND BETAMETHASONE ACETATE 3; 3 MG/ML; MG/ML
6 INJECTION, SUSPENSION INTRA-ARTICULAR; INTRALESIONAL; INTRAMUSCULAR; SOFT TISSUE
Status: COMPLETED | OUTPATIENT
Start: 2020-02-03 | End: 2020-02-03

## 2020-02-03 RX ORDER — METHYLPREDNISOLONE 4 MG/1
TABLET ORAL
Qty: 1 PACKAGE | Refills: 0 | Status: ON HOLD | OUTPATIENT
Start: 2020-02-04 | End: 2020-02-17 | Stop reason: HOSPADM

## 2020-02-03 RX ADMIN — BETAMETHASONE ACETATE AND BETAMETHASONE SODIUM PHOSPHATE 6 MG: 3; 3 INJECTION, SUSPENSION INTRA-ARTICULAR; INTRALESIONAL; INTRAMUSCULAR; SOFT TISSUE at 12:02

## 2020-02-03 NOTE — PROGRESS NOTES
Subjective:       Patient ID: Dominic Cohen is a 84 y.o. male.    Chief Complaint: Follow-up (hospital); Hypertension; Hyperlipidemia; Constipation; and Neck Pain    Follow-up   Associated symptoms include arthralgias, fatigue, myalgias and neck pain. Pertinent negatives include no abdominal pain, chest pain, chills, coughing, diaphoresis, fever, headaches, joint swelling, nausea, numbness, rash, sore throat, vomiting or weakness.   Hypertension   Associated symptoms include neck pain. Pertinent negatives include no chest pain, headaches, palpitations or shortness of breath.   Hyperlipidemia   Associated symptoms include myalgias. Pertinent negatives include no chest pain or shortness of breath.   Constipation   Pertinent negatives include no abdominal pain, back pain, diarrhea, difficulty urinating, fever, nausea or vomiting.   Neck Pain    Pertinent negatives include no chest pain, fever, headaches, numbness, photophobia, trouble swallowing or weakness.     Past Medical History:   Diagnosis Date    Arthritis     Atherosclerosis of abdominal aorta     Cataract     Chronic constipation     Glaucoma     History of anal fissures     Hypertension     Polyneuropathy in other diseases classified elsewhere     due to folate deficiency    Prediabetes     Venous insufficiency     Venous insufficiency      Past Surgical History:   Procedure Laterality Date    BACK SURGERY      CATARACT EXTRACTION BILATERAL W/ ANTERIOR VITRECTOMY      COLONOSCOPY N/A 5/13/2016    Procedure: COLONOSCOPY;  Surgeon: Presley Phillips MD;  Location: [facility] ([address] [place] [address]);  Service: Endoscopy;  Laterality: N/A;  EGD with Dr. Jeffery prior to Colonoscopy. EC    ESOPHAGOGASTRODUODENOSCOPY N/A 7/5/2018    Procedure: ESOPHAGOGASTRODUODENOSCOPY (EGD);  Surgeon: Khanh Asencio III, MD;  Location: [facility];  Service: Endoscopy;  Laterality: N/A;    JOINT REPLACEMENT Left     x 2    KNEE SURGERY Left     x2. revision 06/27/17     PROSTATE SURGERY      TONSILLECTOMY, ADENOIDECTOMY       Family History   Problem Relation Age of Onset    No Known Problems Son     No Known Problems Daughter     No Known Problems Son     Diabetes Neg Hx     Heart disease Neg Hx     Cancer Neg Hx     Celiac disease Neg Hx     Cirrhosis Neg Hx     Colon cancer Neg Hx     Colon polyps Neg Hx     Crohn's disease Neg Hx     Cystic fibrosis Neg Hx     Esophageal cancer Neg Hx     Hemochromatosis Neg Hx     Inflammatory bowel disease Neg Hx     Irritable bowel syndrome Neg Hx     Liver cancer Neg Hx     Liver disease Neg Hx     Rectal cancer Neg Hx     Stomach cancer Neg Hx     Ulcerative colitis Neg Hx     Zak's disease Neg Hx     Amblyopia Neg Hx     Blindness Neg Hx     Glaucoma Neg Hx     Hypertension Neg Hx     Macular degeneration Neg Hx     Retinal detachment Neg Hx     Strabismus Neg Hx      Social History     Socioeconomic History    Marital status:      Spouse name: Not on file    Number of children: 3    Years of education: Not on file    Highest education level: Not on file   Occupational History    Occupation: Retired     Comment: [employer] [place]   Social Needs    Financial resource strain: Not on file    Food insecurity:     Worry: Not on file     Inability: Not on file    Transportation needs:     Medical: Not on file     Non-medical: Not on file   Tobacco Use    Smoking status: Former Smoker     Packs/day: 0.30     Years: 52.00     Pack years: 15.60     Types: Cigarettes     Last attempt to quit: 2000     Years since quittin.2    Smokeless tobacco: Never Used   Substance and Sexual Activity    Alcohol use: No    Drug use: No    Sexual activity: Yes   Lifestyle    Physical activity:     Days per week: Not on file     Minutes per session: Not on file    Stress: Not on file   Relationships    Social connections:     Talks on phone: Not on file     Gets together: Not on file     Attends  Uats[employer] service: Not on file     Active member of club or organization: Not on file     Attends meetings of clubs or organizations: Not on file     Relationship status: Not on file   Other Topics Concern    Not on file   Social History Narrative    Not on file     Review of Systems   Constitutional: Positive for fatigue. Negative for activity change, appetite change, chills, diaphoresis, fever and unexpected weight change.   HENT: Negative for drooling, ear discharge, ear pain, facial swelling, hearing loss, mouth sores, nosebleeds, postnasal drip, rhinorrhea, sinus pressure, sneezing, sore throat, tinnitus, trouble swallowing and voice change.    Eyes: Negative for photophobia, redness and visual disturbance.   Respiratory: Negative for apnea, cough, choking, chest tightness, shortness of breath and wheezing.    Cardiovascular: Negative for chest pain and palpitations.   Gastrointestinal: Positive for constipation. Negative for abdominal distention, abdominal pain, anal bleeding, blood in stool, diarrhea, nausea and vomiting.   Endocrine: Negative for cold intolerance, heat intolerance, polydipsia, polyphagia and polyuria.   Genitourinary: Negative for difficulty urinating, dysuria, enuresis, flank pain, frequency, genital sores, hematuria and urgency.   Musculoskeletal: Positive for arthralgias, myalgias and neck pain. Negative for back pain, gait problem, joint swelling and neck stiffness.   Skin: Negative for color change, pallor, rash and wound.   Allergic/Immunologic: Negative for food allergies and immunocompromised state.   Neurological: Negative for dizziness, tremors, seizures, syncope, facial asymmetry, speech difficulty, weakness, light-headedness, numbness and headaches.   Hematological: Negative for adenopathy. Does not bruise/bleed easily.   Psychiatric/Behavioral: Negative for agitation, behavioral problems, confusion, decreased concentration, dysphoric mood, hallucinations, self-injury, sleep  disturbance and suicidal ideas. The patient is not nervous/anxious and is not hyperactive.        Objective:      Physical Exam   Constitutional: He is oriented to person, place, and time. He appears well-developed and well-nourished. No distress.   HENT:   Head: Normocephalic and atraumatic.   Eyes: Pupils are equal, round, and reactive to light.   Neck: Normal range of motion. Neck supple. No JVD present. Carotid bruit is not present. No tracheal deviation present. No thyromegaly present.   Cardiovascular: Normal rate, regular rhythm, normal heart sounds and intact distal pulses.   Pulmonary/Chest: Effort normal and breath sounds normal. No respiratory distress. He has no wheezes. He has no rales. He exhibits no tenderness.   Abdominal: Soft. Bowel sounds are normal. He exhibits no distension. There is no tenderness. There is no rebound and no guarding.   Musculoskeletal: Normal range of motion. He exhibits no edema or tenderness.   Lymphadenopathy:     He has no cervical adenopathy.   Neurological: He is alert and oriented to person, place, and time.   Skin: Skin is warm and dry. No rash noted. He is not diaphoretic. No erythema. No pallor.   Psychiatric: He has a normal mood and affect. His behavior is normal. Judgment and thought content normal.   Nursing note and vitals reviewed.      CMP  Sodium   Date Value Ref Range Status   01/29/2020 141 136 - 145 mmol/L Final     Potassium   Date Value Ref Range Status   01/29/2020 3.8 3.5 - 5.1 mmol/L Final     Chloride   Date Value Ref Range Status   01/29/2020 104 95 - 110 mmol/L Final     CO2   Date Value Ref Range Status   01/29/2020 27 23 - 29 mmol/L Final     Glucose   Date Value Ref Range Status   01/29/2020 126 (H) 70 - 110 mg/dL Final     BUN, Bld   Date Value Ref Range Status   01/29/2020 16 8 - 23 mg/dL Final     Creatinine   Date Value Ref Range Status   01/29/2020 1.3 0.5 - 1.4 mg/dL Final     Calcium   Date Value Ref Range Status   01/29/2020 9.7 8.7 -  10.5 mg/dL Final     Total Protein   Date Value Ref Range Status   01/29/2020 8.0 6.0 - 8.4 g/dL Final     Albumin   Date Value Ref Range Status   01/29/2020 3.4 (L) 3.5 - 5.2 g/dL Final     Total Bilirubin   Date Value Ref Range Status   01/29/2020 0.7 0.1 - 1.0 mg/dL Final     Comment:     For infants and newborns, interpretation of results should be based  on gestational age, weight and in agreement with clinical  observations.  Premature Infant recommended reference ranges:  Up to 24 hours.............<8.0 mg/dL  Up to 48 hours............<12.0 mg/dL  3-5 days..................<15.0 mg/dL  6-29 days.................<15.0 mg/dL       Alkaline Phosphatase   Date Value Ref Range Status   01/29/2020 52 (L) 55 - 135 U/L Final     AST   Date Value Ref Range Status   01/29/2020 19 10 - 40 U/L Final     ALT   Date Value Ref Range Status   01/29/2020 19 10 - 44 U/L Final     Anion Gap   Date Value Ref Range Status   01/29/2020 10 8 - 16 mmol/L Final     eGFR if    Date Value Ref Range Status   01/29/2020 59 (A) >60 mL/min/1.73 m^2 Final     eGFR if non    Date Value Ref Range Status   01/29/2020 51 (A) >60 mL/min/1.73 m^2 Final     Comment:     Calculation used to obtain the estimated glomerular filtration  rate (eGFR) is the CKD-EPI equation.        Lab Results   Component Value Date    WBC 5.66 01/29/2020    HGB 13.0 (L) 01/29/2020    HCT 40.2 01/29/2020     (H) 01/29/2020     01/29/2020     Lab Results   Component Value Date    CHOL 169 10/11/2018     Lab Results   Component Value Date    HDL 45 10/11/2018     Lab Results   Component Value Date    LDLCALC 101.2 10/11/2018     Lab Results   Component Value Date    TRIG 114 10/11/2018     Lab Results   Component Value Date    CHOLHDL 26.6 10/11/2018     Lab Results   Component Value Date    TSH 1.038 11/27/2018     Lab Results   Component Value Date    HGBA1C 5.5 06/07/2018     Assessment:       1. Neck pain    2.  Dyslipidemia    3. Anemia, unspecified type    4. Chronic constipation    5. Essential hypertension    6. Chest pain, unspecified type    7. Arthritis    8. Encounter for prostate cancer screening        Plan:   Neck pain    Dyslipidemia  -     Lipid panel; Future; Expected date: 02/03/2020    Anemia, unspecified type    Chronic constipation  -     Ambulatory referral to Gastroenterology    Essential hypertension  -     TSH; Future; Expected date: 02/03/2020    Chest pain, unspecified type---------------------------hospital f/u------------------------------  -     Ambulatory referral to Cardiology    Arthritis  -     Ambulatory referral to Rheumatology    Encounter for prostate cancer screening  -     PSA, Screening; Future; Expected date: 02/03/2020    F/u 3 months----------------------------

## 2020-02-03 NOTE — LETTER
February 3, 2020      Sudhakar Liu MD  [address] [place] [address]           [facility]  [address] [facility]  [place] [address]  Phone: [phone]  Fax: [phone]          Patient: Dominic Cohen   MR Number: [id number]   YOB: 1935   Date of Visit: 2/3/2020       Dear Dr. Sudhakar Liu:    Thank you for referring Dominic Cohen to me for evaluation. Attached you will find relevant portions of my assessment and plan of care.    If you have questions, please do not hesitate to call me. I look forward to following Dominic Cohen along with you.    Sincerely,    Leanna Mota MD    Enclosure  CC:  No Recipients    If you would like to receive this communication electronically, please contact [email or web address] or [phone] to request more information on [employer] Link access.    For providers and/or their staff who would like to refer a patient to [facility], please contact us through our one-stop-shop provider referral line, [facility], at [phone].    If you feel you have received this communication in error or would no longer like to receive these types of communications, please e-mail [email or web address]

## 2020-02-03 NOTE — TELEPHONE ENCOUNTER
----- Message from Sarah Schaeffer NP sent at 2/3/2020  2:48 PM CST -----  Regarding: labs  Lola- please try to reach this pt and let him know that he has had a change in the labs that evaluate the presence of abnormal protein in his blood. I tried to reach him several times today and there is not answer- I left a message too for him to call.    He needs an appt to see Dr. Barrios in the next couple of weeks per Dr. Barrios request.    Thank you,    Sarah

## 2020-02-03 NOTE — PROGRESS NOTES
CC:  Chief Complaint   Patient presents with    New Pt     head, neck, shoulder pain with foggy vision     PCP :Sudhakar Liu MD    History of Present Illness:  Dominic Hurtado a 84 y.o.yo male who presents with chronic history of pain involving his neck shoulders radiates up to his head  No history of migraines  He feels dizzy with foggy vision during flares  Duration at least last 1 year  He takes Tylenol or other over-the-counter medications as needed    He also mentions of history of gout but is not on allopurinol  He has bilateral venous insufficiency with lower extremity edema  He currently complains of pain in the left ankle worsened with activity  He suspects this could be gout    Denies any jaw pain  No sudden loss of vision      Review of Systems:  Constitutional: Denies fever, chills. No recent weight changes.   Fatigue: yes  Muscle weakness: no  Headaches: no new headaches  Eyes: No redness .    ENT:  No oral or nasal ulcers.  Card: No chest pain.  Resp: No cough or sob.   Gastro: No nausea or vomiting.  No heartburn.  Constipation: +  Diarrhea: no  Genito:  No dysuria.  No genital ulcers.  Skin: No rash.  Raynauds:no  Neuro: No numbness / tingling.   Psych: No depression, anxiety  Endo:  no excess thirst.  Heme: no abnormal bleeding or bruising  Clots:none       Past Medical History:   Diagnosis Date    Arthritis     Atherosclerosis of abdominal aorta     Cataract     Chronic constipation     Glaucoma     History of anal fissures     Hypertension     Polyneuropathy in other diseases classified elsewhere     due to folate deficiency    Prediabetes     Venous insufficiency     Venous insufficiency        Past Surgical History:   Procedure Laterality Date    BACK SURGERY      CATARACT EXTRACTION BILATERAL W/ ANTERIOR VITRECTOMY      COLONOSCOPY N/A 5/13/2016    Procedure: COLONOSCOPY;  Surgeon: Presley Phillips MD;  Location: [facility] ([address] [place] [address]);  Service: Endoscopy;   Laterality: N/A;  EGD with Dr. Jeffery prior to Colonoscopy. EC    ESOPHAGOGASTRODUODENOSCOPY N/A 2018    Procedure: ESOPHAGOGASTRODUODENOSCOPY (EGD);  Surgeon: Khanh Asencio III, MD;  Location[facility];  Service: Endoscopy;  Laterality: N/A;    JOINT REPLACEMENT Left     x 2    KNEE SURGERY Left     x2. revision 17    PROSTATE SURGERY      TONSILLECTOMY, ADENOIDECTOMY           Social History     Tobacco Use    Smoking status: Former Smoker     Packs/day: 0.30     Years: 52.00     Pack years: 15.60     Types: Cigarettes     Last attempt to quit: 2000     Years since quittin.2    Smokeless tobacco: Never Used   Substance Use Topics    Alcohol use: No    Drug use: No       Family History   Problem Relation Age of Onset    No Known Problems Son     No Known Problems Daughter     No Known Problems Son     Diabetes Neg Hx     Heart disease Neg Hx     Cancer Neg Hx     Celiac disease Neg Hx     Cirrhosis Neg Hx     Colon cancer Neg Hx     Colon polyps Neg Hx     Crohn's disease Neg Hx     Cystic fibrosis Neg Hx     Esophageal cancer Neg Hx     Hemochromatosis Neg Hx     Inflammatory bowel disease Neg Hx     Irritable bowel syndrome Neg Hx     Liver cancer Neg Hx     Liver disease Neg Hx     Rectal cancer Neg Hx     Stomach cancer Neg Hx     Ulcerative colitis Neg Hx     Zak's disease Neg Hx     Amblyopia Neg Hx     Blindness Neg Hx     Glaucoma Neg Hx     Hypertension Neg Hx     Macular degeneration Neg Hx     Retinal detachment Neg Hx     Strabismus Neg Hx        Review of patient's allergies indicates:   Allergen Reactions    Penicillins Hives and Swelling    Protonix [pantoprazole] Rash    Sulfa (sulfonamide antibiotics) Hives         OBJECTIVE:     Vital Signs   Vitals:    20 1138   BP: (!) 138/91   Pulse: 105     Physical Exam:  General Appearance:  NAD.   Gait: not favoring.  HEENT: PERRL.  Eyes not dry or injected.  No nasal ulcers.  Mouth  not dry, no oral lesions.  Lymph: cervical, supraclavicular or axillary nodes: none abnormal   Cardio: no irregularity of S1 or S2.  No gallops or rubs.   Resp: Normal respiratory motion. Clear to auscultation bilaterally.   No abnormal chest conformation.  Abd: Soft, non-tender, nondistended.  No masses.   Skin: Head and neck,  and extremities examined. No rashes.   Neuro: Ox3.   Cranial nerves II-XII grossly intact.   Sensation intact  in both distal LE and upper extremities to light touch.  Musculoskeletal Exam:  No temporal tenderness noted  Bilateral hands no synovitis  Bilateral feet and lower extremity edema  Left ankle swelling along the lateral aspect with pain on range of motion no warmth or redness    Neck:  Facet tenderness, restricted range of motion    Walks with the help of a cane  No muscle tenderness noted    Laboratory: I have reviewed all of the patient's relevant lab work available in the medical record and have utilized this in my evaluation and management recommendations today    Imaging : I have reviewed all of the patient's diagnostic/imaging results available in the medical record and have utilized this in my evaluation and management recommendations today.    Notes reviewed  Other procedures:    ASSESSMENT/PLAN:     Polyarthralgia  -     betamethasone acetate-betamethasone sodium phosphate injection 6 mg  -     CK; Standing  -     Aldolase; Standing    Arthritis of left ankle  -     methylPREDNISolone (MEDROL DOSEPACK) 4 mg tablet; use as directed  Dispense: 1 Package; Refill: 0  -     C-reactive protein; Standing  -     Sedimentation rate; Standing  -     GUNNER Screen w/Reflex; Standing  -     Uric acid; Standing  -     Rheumatoid factor; Future; Expected date: 02/03/2020  -     Cyclic citrul peptide antibody, IgG; Future; Expected date: 02/03/2020    Obesity (BMI 30-39.9)      1:  Left ankle arthritis  Unclear etiology  Rule out gout, rule out any possible autoimmune etiology- check  RF/CCP/GUNNER  Check uric acid  Celestone IM x1 today  Start Medrol Dosepak from tomorrow    2:  Bilateral shoulder pain and headaches/neck pain:  History of severe degenerative neck disease  X-ray C-spine reviewed    Etiology likely related to C-spine arthritis versus r/o PMR  Check ESR/CRP    3:  Exercise, weight loss    2 week return    Thank you for the referral    Risks vs Benefits and potential side effects of medication prescribed today were discussed with patient. Medication literature given to patient up discharge  Went over uptodate information /literature on the meds prescribed today    Steroids may be associated with palpitations, gastritis or intolerance issues  If you are unsure what to do please call our office for instruction. [facility] [phone]      Disclaimer: This note was prepared using voice recognition system and is likely to have sound alike errors and is not proof read.  Please call me with any questions.

## 2020-02-03 NOTE — PROGRESS NOTES
Administered 1cc of Betamethasone 6mg/cc to Left Ventrogluteal.  Patient tolerated well. No acute reaction noted to site.  Patient instructed on S/S to report. Patient verbalized understanding.    Lot:[id number]  Exp:11/24/20

## 2020-02-03 NOTE — TELEPHONE ENCOUNTER
----- Message from Rayna Sanchez sent at 2/3/2020  4:06 PM CST -----  Contact: Patient   .Type:  Patient Returning Call    Who Called: Patient   Who Left Message for Patient: nurse   Does the patient know what this is regarding?:  Would the patient rather a call back or a response via [facility]? call  Best Call Back Number: [phone]  Additional Information: n/a

## 2020-02-04 LAB
CHOLEST SERPL-MCNC: 115 MG/DL (ref 120–199)
CHOLEST/HDLC SERPL: 2.7 {RATIO} (ref 2–5)
COMPLEXED PSA SERPL-MCNC: <0.01 NG/ML (ref 0–4)
HDLC SERPL-MCNC: 43 MG/DL (ref 40–75)
HDLC SERPL: 37.4 % (ref 20–50)
LDLC SERPL CALC-MCNC: 59.6 MG/DL (ref 63–159)
NONHDLC SERPL-MCNC: 72 MG/DL
TRIGL SERPL-MCNC: 62 MG/DL (ref 30–150)
TSH SERPL DL<=0.005 MIU/L-ACNC: 0.43 UIU/ML (ref 0.4–4)

## 2020-02-06 ENCOUNTER — OFFICE VISIT (OUTPATIENT)
Dept: OPHTHALMOLOGY | Facility: CLINIC | Age: 85
End: 2020-02-06
Payer: MEDICARE

## 2020-02-06 DIAGNOSIS — H25.13 NUCLEAR SCLEROSIS OF BOTH EYES: ICD-10-CM

## 2020-02-06 DIAGNOSIS — H40.1133 PRIMARY OPEN ANGLE GLAUCOMA OF BOTH EYES, SEVERE STAGE: Primary | ICD-10-CM

## 2020-02-06 PROCEDURE — 92012 PR EYE EXAM, EST PATIENT,INTERMED: ICD-10-PCS | Mod: S$PBB,,, | Performed by: OPHTHALMOLOGY

## 2020-02-06 PROCEDURE — 99999 PR PBB SHADOW E&M-EST. PATIENT-LVL II: CPT | Mod: PBBFAC,,, | Performed by: OPHTHALMOLOGY

## 2020-02-06 PROCEDURE — 99999 PR PBB SHADOW E&M-EST. PATIENT-LVL II: ICD-10-PCS | Mod: PBBFAC,,, | Performed by: OPHTHALMOLOGY

## 2020-02-06 PROCEDURE — 92012 INTRM OPH EXAM EST PATIENT: CPT | Mod: S$PBB,,, | Performed by: OPHTHALMOLOGY

## 2020-02-06 PROCEDURE — 99212 OFFICE O/P EST SF 10 MIN: CPT | Mod: PBBFAC | Performed by: OPHTHALMOLOGY

## 2020-02-06 NOTE — PROGRESS NOTES
SUBJECTIVE  [place] is 84 y.o. male  Corrected distance visual acuity was 20/40 -2 in the right eye and CF at 1' in the left eye.   Chief Complaint   Patient presents with    Glaucoma     3 mth IOP check-pt defers dilation today due to not having a ride           HPI     Glaucoma      Additional comments: 3 mth IOP check-pt defers dilation today due to not   having a ride               Comments     1. Severe COAG OS>OD tmax=26/32 Goal=16-17  Combigan=dizzy   Slt OD 5/15  Slt OS 4/15  2. NSC OU      Timolol qam OU   Latanoprost qhs OU          Last edited by Latasha Joshi MA on 2/6/2020  2:06 PM. (History)         Assessment /Plan :  1. Primary open angle glaucoma of both eyes, severe stage Doing well, IOP within acceptable range relative to target IOP and no evidence of progression. Continue current treatment. Reviewed importance of continued compliance with treatment and follow up.      2. Nuclear sclerosis of both eyes Patient does reports mild visual decline from incipient cataractous changes, but not sufficient to affect activities of daily living. I recommend monitoring visual status and follow up when visual symptoms worsen.           Return to clinic in 3-4 months  or as needed.  With Dilation, HVF 24-2 and SDP

## 2020-02-13 ENCOUNTER — HOSPITAL ENCOUNTER (INPATIENT)
Facility: HOSPITAL | Age: 85
LOS: 5 days | Discharge: HOME HEALTH | DRG: 270 | End: 2020-02-18
Attending: EMERGENCY MEDICINE | Admitting: FAMILY MEDICINE
Payer: MEDICARE

## 2020-02-13 DIAGNOSIS — Z98.890 S/P PERICARDIAL WINDOW CREATION: ICD-10-CM

## 2020-02-13 DIAGNOSIS — I10 ESSENTIAL HYPERTENSION: ICD-10-CM

## 2020-02-13 DIAGNOSIS — I31.39 PERICARDIAL EFFUSION: ICD-10-CM

## 2020-02-13 DIAGNOSIS — I48.91 ATRIAL FIBRILLATION WITH RAPID VENTRICULAR RESPONSE: Primary | ICD-10-CM

## 2020-02-13 DIAGNOSIS — I48.91 ATRIAL FIBRILLATION WITH RVR: ICD-10-CM

## 2020-02-13 DIAGNOSIS — R06.02 SHORTNESS OF BREATH: ICD-10-CM

## 2020-02-13 DIAGNOSIS — J18.9 PNEUMONIA OF LEFT LOWER LOBE DUE TO INFECTIOUS ORGANISM: ICD-10-CM

## 2020-02-13 DIAGNOSIS — I48.0 PAF (PAROXYSMAL ATRIAL FIBRILLATION): ICD-10-CM

## 2020-02-13 PROBLEM — R07.9 CHEST PAIN: Status: ACTIVE | Noted: 2020-02-13

## 2020-02-13 PROBLEM — J90 BILATERAL PLEURAL EFFUSION: Status: ACTIVE | Noted: 2020-02-13

## 2020-02-13 LAB
ALBUMIN SERPL BCP-MCNC: 2.7 G/DL (ref 3.5–5.2)
ALP SERPL-CCNC: 69 U/L (ref 55–135)
ALT SERPL W/O P-5'-P-CCNC: 71 U/L (ref 10–44)
ANION GAP SERPL CALC-SCNC: 12 MMOL/L (ref 8–16)
AORTIC ROOT ANNULUS: 4.04 CM
APTT BLDCRRT: 28.3 SEC (ref 21–32)
ASCENDING AORTA: 2.99 CM
AST SERPL-CCNC: 40 U/L (ref 10–40)
AV INDEX (PROSTH): 1.03
AV MEAN GRADIENT: 7 MMHG
AV PEAK GRADIENT: 9 MMHG
AV VALVE AREA: 3.34 CM2
AV VELOCITY RATIO: 0.89
BASOPHILS # BLD AUTO: 0.03 K/UL (ref 0–0.2)
BASOPHILS NFR BLD: 0.3 % (ref 0–1.9)
BILIRUB SERPL-MCNC: 1.4 MG/DL (ref 0.1–1)
BNP SERPL-MCNC: 97 PG/ML (ref 0–99)
BSA FOR ECHO PROCEDURE: 2.21 M2
BUN SERPL-MCNC: 16 MG/DL (ref 8–23)
CALCIUM SERPL-MCNC: 8.7 MG/DL (ref 8.7–10.5)
CHLORIDE SERPL-SCNC: 100 MMOL/L (ref 95–110)
CO2 SERPL-SCNC: 26 MMOL/L (ref 23–29)
CREAT SERPL-MCNC: 1.2 MG/DL (ref 0.5–1.4)
CV ECHO LV RWT: 1.14 CM
DIFFERENTIAL METHOD: ABNORMAL
DOP CALC AO PEAK VEL: 1.49 M/S
DOP CALC AO VTI: 26.04 CM
DOP CALC LVOT AREA: 3.2 CM2
DOP CALC LVOT DIAMETER: 2.03 CM
DOP CALC LVOT PEAK VEL: 1.33 M/S
DOP CALC LVOT STROKE VOLUME: 86.99 CM3
DOP CALCLVOT PEAK VEL VTI: 26.89 CM
E WAVE DECELERATION TIME: 226.9 MSEC
E/A RATIO: 0.91
E/E' RATIO: 8.24 M/S
ECHO LV POSTERIOR WALL: 1.88 CM (ref 0.6–1.1)
EOSINOPHIL # BLD AUTO: 0.1 K/UL (ref 0–0.5)
EOSINOPHIL NFR BLD: 0.6 % (ref 0–8)
ERYTHROCYTE [DISTWIDTH] IN BLOOD BY AUTOMATED COUNT: 13.4 % (ref 11.5–14.5)
EST. GFR  (AFRICAN AMERICAN): >60 ML/MIN/1.73 M^2
EST. GFR  (NON AFRICAN AMERICAN): 56 ML/MIN/1.73 M^2
FRACTIONAL SHORTENING: 31 % (ref 28–44)
GLUCOSE SERPL-MCNC: 138 MG/DL (ref 70–110)
HCT VFR BLD AUTO: 36.3 % (ref 40–54)
HGB BLD-MCNC: 11.5 G/DL (ref 14–18)
IMM GRANULOCYTES # BLD AUTO: 0.06 K/UL (ref 0–0.04)
IMM GRANULOCYTES NFR BLD AUTO: 0.6 % (ref 0–0.5)
INR PPP: 1.3 (ref 0.8–1.2)
INTERVENTRICULAR SEPTUM: 1.84 CM (ref 0.6–1.1)
IVRT: 0.17 MSEC
LA MAJOR: 4.78 CM
LA MINOR: 3.92 CM
LA WIDTH: 3.02 CM
LACTATE SERPL-SCNC: 1.5 MMOL/L (ref 0.5–2.2)
LEFT ATRIUM SIZE: 3.26 CM
LEFT ATRIUM VOLUME INDEX: 16.3 ML/M2
LEFT ATRIUM VOLUME: 36.05 CM3
LEFT INTERNAL DIMENSION IN SYSTOLE: 2.29 CM (ref 2.1–4)
LEFT VENTRICLE DIASTOLIC VOLUME INDEX: 20.02 ML/M2
LEFT VENTRICLE DIASTOLIC VOLUME: 44.36 ML
LEFT VENTRICLE MASS INDEX: 117 G/M2
LEFT VENTRICLE SYSTOLIC VOLUME INDEX: 8.1 ML/M2
LEFT VENTRICLE SYSTOLIC VOLUME: 17.87 ML
LEFT VENTRICULAR INTERNAL DIMENSION IN DIASTOLE: 3.31 CM (ref 3.5–6)
LEFT VENTRICULAR MASS: 259.49 G
LV LATERAL E/E' RATIO: 8.75 M/S
LV SEPTAL E/E' RATIO: 7.78 M/S
LYMPHOCYTES # BLD AUTO: 0.8 K/UL (ref 1–4.8)
LYMPHOCYTES NFR BLD: 7.1 % (ref 18–48)
MAGNESIUM SERPL-MCNC: 2.2 MG/DL (ref 1.6–2.6)
MCH RBC QN AUTO: 32.5 PG (ref 27–31)
MCHC RBC AUTO-ENTMCNC: 31.7 G/DL (ref 32–36)
MCV RBC AUTO: 103 FL (ref 82–98)
MONOCYTES # BLD AUTO: 1.2 K/UL (ref 0.3–1)
MONOCYTES NFR BLD: 10.7 % (ref 4–15)
MV PEAK A VEL: 0.77 M/S
MV PEAK E VEL: 0.7 M/S
NEUTROPHILS # BLD AUTO: 8.7 K/UL (ref 1.8–7.7)
NEUTROPHILS NFR BLD: 80.7 % (ref 38–73)
NRBC BLD-RTO: 0 /100 WBC
PISA TR MAX VEL: 2.56 M/S
PLATELET # BLD AUTO: 361 K/UL (ref 150–350)
PMV BLD AUTO: 10.4 FL (ref 9.2–12.9)
POTASSIUM SERPL-SCNC: 4.2 MMOL/L (ref 3.5–5.1)
PROT SERPL-MCNC: 7.7 G/DL (ref 6–8.4)
PROTHROMBIN TIME: 13.6 SEC (ref 9–12.5)
RA MAJOR: 4.71 CM
RA PRESSURE: 15 MMHG
RA WIDTH: 3.05 CM
RBC # BLD AUTO: 3.54 M/UL (ref 4.6–6.2)
SINUS: 3.91 CM
SODIUM SERPL-SCNC: 138 MMOL/L (ref 136–145)
STJ: 3.48 CM
T4 FREE SERPL-MCNC: 0.99 NG/DL (ref 0.71–1.51)
TDI LATERAL: 0.08 M/S
TDI SEPTAL: 0.09 M/S
TDI: 0.09 M/S
TR MAX PG: 26 MMHG
TRICUSPID ANNULAR PLANE SYSTOLIC EXCURSION: 1.3 CM
TROPONIN I SERPL DL<=0.01 NG/ML-MCNC: 0.01 NG/ML (ref 0–0.03)
TROPONIN I SERPL DL<=0.01 NG/ML-MCNC: 0.01 NG/ML (ref 0–0.03)
TSH SERPL DL<=0.005 MIU/L-ACNC: 0.34 UIU/ML (ref 0.4–4)
TV REST PULMONARY ARTERY PRESSURE: 41 MMHG
WBC # BLD AUTO: 10.82 K/UL (ref 3.9–12.7)

## 2020-02-13 PROCEDURE — 85610 PROTHROMBIN TIME: CPT

## 2020-02-13 PROCEDURE — 84484 ASSAY OF TROPONIN QUANT: CPT

## 2020-02-13 PROCEDURE — 83605 ASSAY OF LACTIC ACID: CPT

## 2020-02-13 PROCEDURE — 99223 PR INITIAL HOSPITAL CARE,LEVL III: ICD-10-PCS | Mod: 25,,, | Performed by: INTERNAL MEDICINE

## 2020-02-13 PROCEDURE — 85730 THROMBOPLASTIN TIME PARTIAL: CPT

## 2020-02-13 PROCEDURE — 84484 ASSAY OF TROPONIN QUANT: CPT | Mod: 91

## 2020-02-13 PROCEDURE — 21400001 HC TELEMETRY ROOM

## 2020-02-13 PROCEDURE — 87040 BLOOD CULTURE FOR BACTERIA: CPT

## 2020-02-13 PROCEDURE — 93010 EKG 12-LEAD: ICD-10-PCS | Mod: ,,, | Performed by: INTERNAL MEDICINE

## 2020-02-13 PROCEDURE — 86920 COMPATIBILITY TEST SPIN: CPT

## 2020-02-13 PROCEDURE — 80053 COMPREHEN METABOLIC PANEL: CPT

## 2020-02-13 PROCEDURE — 93005 ELECTROCARDIOGRAM TRACING: CPT

## 2020-02-13 PROCEDURE — 99223 1ST HOSP IP/OBS HIGH 75: CPT | Mod: 25,,, | Performed by: INTERNAL MEDICINE

## 2020-02-13 PROCEDURE — 63600175 PHARM REV CODE 636 W HCPCS: Performed by: EMERGENCY MEDICINE

## 2020-02-13 PROCEDURE — 84443 ASSAY THYROID STIM HORMONE: CPT

## 2020-02-13 PROCEDURE — 27000221 HC OXYGEN, UP TO 24 HOURS

## 2020-02-13 PROCEDURE — 85025 COMPLETE CBC W/AUTO DIFF WBC: CPT

## 2020-02-13 PROCEDURE — 25000003 PHARM REV CODE 250: Performed by: EMERGENCY MEDICINE

## 2020-02-13 PROCEDURE — 94761 N-INVAS EAR/PLS OXIMETRY MLT: CPT

## 2020-02-13 PROCEDURE — 83880 ASSAY OF NATRIURETIC PEPTIDE: CPT

## 2020-02-13 PROCEDURE — 96375 TX/PRO/DX INJ NEW DRUG ADDON: CPT

## 2020-02-13 PROCEDURE — 63600175 PHARM REV CODE 636 W HCPCS: Performed by: PHYSICIAN ASSISTANT

## 2020-02-13 PROCEDURE — 96376 TX/PRO/DX INJ SAME DRUG ADON: CPT

## 2020-02-13 PROCEDURE — 25500020 PHARM REV CODE 255: Performed by: EMERGENCY MEDICINE

## 2020-02-13 PROCEDURE — 99900035 HC TECH TIME PER 15 MIN (STAT)

## 2020-02-13 PROCEDURE — 99291 CRITICAL CARE FIRST HOUR: CPT | Mod: 25

## 2020-02-13 PROCEDURE — 25000003 PHARM REV CODE 250: Performed by: PHYSICIAN ASSISTANT

## 2020-02-13 PROCEDURE — 83735 ASSAY OF MAGNESIUM: CPT

## 2020-02-13 PROCEDURE — 36415 COLL VENOUS BLD VENIPUNCTURE: CPT

## 2020-02-13 PROCEDURE — 84439 ASSAY OF FREE THYROXINE: CPT

## 2020-02-13 PROCEDURE — 93010 ELECTROCARDIOGRAM REPORT: CPT | Mod: ,,, | Performed by: INTERNAL MEDICINE

## 2020-02-13 PROCEDURE — 96365 THER/PROPH/DIAG IV INF INIT: CPT

## 2020-02-13 RX ORDER — TIMOLOL MALEATE 5 MG/ML
1 SOLUTION/ DROPS OPHTHALMIC 2 TIMES DAILY
Status: DISCONTINUED | OUTPATIENT
Start: 2020-02-13 | End: 2020-02-18 | Stop reason: HOSPADM

## 2020-02-13 RX ORDER — ASPIRIN 81 MG/1
81 TABLET ORAL DAILY
Status: DISCONTINUED | OUTPATIENT
Start: 2020-02-13 | End: 2020-02-18 | Stop reason: HOSPADM

## 2020-02-13 RX ORDER — FUROSEMIDE 10 MG/ML
20 INJECTION INTRAMUSCULAR; INTRAVENOUS ONCE
Status: COMPLETED | OUTPATIENT
Start: 2020-02-13 | End: 2020-02-13

## 2020-02-13 RX ORDER — ERGOCALCIFEROL (VITAMIN D2) 10 MCG
1 TABLET ORAL
COMMUNITY
End: 2020-11-30

## 2020-02-13 RX ORDER — DILTIAZEM HYDROCHLORIDE 5 MG/ML
20 INJECTION INTRAVENOUS
Status: COMPLETED | OUTPATIENT
Start: 2020-02-13 | End: 2020-02-13

## 2020-02-13 RX ORDER — METOPROLOL TARTRATE 1 MG/ML
5 INJECTION, SOLUTION INTRAVENOUS EVERY 5 MIN PRN
Status: COMPLETED | OUTPATIENT
Start: 2020-02-13 | End: 2020-02-13

## 2020-02-13 RX ORDER — HEPARIN SODIUM,PORCINE/D5W 25000/250
12 INTRAVENOUS SOLUTION INTRAVENOUS CONTINUOUS
Status: DISCONTINUED | OUTPATIENT
Start: 2020-02-13 | End: 2020-02-14

## 2020-02-13 RX ORDER — FAMOTIDINE 20 MG/1
20 TABLET, FILM COATED ORAL 2 TIMES DAILY
Status: DISCONTINUED | OUTPATIENT
Start: 2020-02-13 | End: 2020-02-18 | Stop reason: HOSPADM

## 2020-02-13 RX ORDER — HYDROCODONE BITARTRATE AND ACETAMINOPHEN 500; 5 MG/1; MG/1
TABLET ORAL
Status: DISCONTINUED | OUTPATIENT
Start: 2020-02-13 | End: 2020-02-18 | Stop reason: HOSPADM

## 2020-02-13 RX ORDER — ENOXAPARIN SODIUM 100 MG/ML
40 INJECTION SUBCUTANEOUS EVERY 24 HOURS
Status: DISCONTINUED | OUTPATIENT
Start: 2020-02-13 | End: 2020-02-13

## 2020-02-13 RX ORDER — LEVOFLOXACIN 5 MG/ML
750 INJECTION, SOLUTION INTRAVENOUS
Status: COMPLETED | OUTPATIENT
Start: 2020-02-13 | End: 2020-02-13

## 2020-02-13 RX ORDER — ONDANSETRON 8 MG/1
8 TABLET, ORALLY DISINTEGRATING ORAL EVERY 8 HOURS PRN
Status: DISCONTINUED | OUTPATIENT
Start: 2020-02-13 | End: 2020-02-18 | Stop reason: HOSPADM

## 2020-02-13 RX ORDER — LATANOPROST 50 UG/ML
1 SOLUTION/ DROPS OPHTHALMIC NIGHTLY
Status: DISCONTINUED | OUTPATIENT
Start: 2020-02-13 | End: 2020-02-18 | Stop reason: HOSPADM

## 2020-02-13 RX ORDER — FERROUS SULFATE 325(65) MG
325 TABLET ORAL
COMMUNITY
End: 2020-11-30

## 2020-02-13 RX ORDER — HYDROCODONE BITARTRATE AND ACETAMINOPHEN 5; 325 MG/1; MG/1
1 TABLET ORAL EVERY 6 HOURS PRN
Status: DISCONTINUED | OUTPATIENT
Start: 2020-02-13 | End: 2020-02-14

## 2020-02-13 RX ORDER — MULTIVITAMIN
1 TABLET ORAL
COMMUNITY
End: 2020-11-30

## 2020-02-13 RX ORDER — ACETAMINOPHEN 325 MG/1
650 TABLET ORAL EVERY 6 HOURS PRN
Status: DISCONTINUED | OUTPATIENT
Start: 2020-02-13 | End: 2020-02-18 | Stop reason: HOSPADM

## 2020-02-13 RX ORDER — MAGNESIUM 30 MG
30 TABLET ORAL
COMMUNITY
End: 2020-03-12

## 2020-02-13 RX ADMIN — FAMOTIDINE 20 MG: 20 TABLET ORAL at 08:02

## 2020-02-13 RX ADMIN — IOHEXOL 100 ML: 350 INJECTION, SOLUTION INTRAVENOUS at 02:02

## 2020-02-13 RX ADMIN — METOPROLOL SUCCINATE 12.5 MG: 25 TABLET, EXTENDED RELEASE ORAL at 06:02

## 2020-02-13 RX ADMIN — METOPROLOL TARTRATE 5 MG: 5 INJECTION INTRAVENOUS at 01:02

## 2020-02-13 RX ADMIN — TIMOLOL MALEATE 1 DROP: 5 SOLUTION/ DROPS OPHTHALMIC at 08:02

## 2020-02-13 RX ADMIN — SODIUM CHLORIDE 1000 ML: 0.9 INJECTION, SOLUTION INTRAVENOUS at 01:02

## 2020-02-13 RX ADMIN — HEPARIN SODIUM 12 UNITS/KG/HR: 10000 INJECTION, SOLUTION INTRAVENOUS at 06:02

## 2020-02-13 RX ADMIN — DILTIAZEM HYDROCHLORIDE 20 MG: 5 INJECTION INTRAVENOUS at 12:02

## 2020-02-13 RX ADMIN — ASPIRIN 81 MG: 81 TABLET ORAL at 04:02

## 2020-02-13 RX ADMIN — FUROSEMIDE 20 MG: 10 INJECTION, SOLUTION INTRAMUSCULAR; INTRAVENOUS at 06:02

## 2020-02-13 RX ADMIN — LATANOPROST 1 DROP: 50 SOLUTION OPHTHALMIC at 09:02

## 2020-02-13 RX ADMIN — LEVOFLOXACIN 750 MG: 750 INJECTION, SOLUTION INTRAVENOUS at 01:02

## 2020-02-13 NOTE — H&P
[facility] Hospital Medicine  History & Physical    Patient Name: Dominic Cohen  MRN: [id number]  Admission Date: 2/13/2020  Attending Physician: Jeromy Day MD   Primary Care Provider: Sudhakar Liu MD         Patient information was obtained from patient, past medical records and ER records.     Subjective:     Principal Problem:Atrial fibrillation with rapid ventricular response    Chief Complaint:   Chief Complaint   Patient presents with    Tachycardia     SOB, afib with RVR en route        HPI: Dominic Cohen is an 84 year old male who presented to the ED with complaints of SOB and chest pain for 4 to 5 days prior to presentation. The patient states that his SOB worsened today prompting his presentation to the ED. The chest pain is located in his mid lower chest and sometimes reproducible with palpation. The chest pain and SOB are worse with laying flat. He also notes worsening lower extremity edema and cough with thick white sputum production. The patient denies fever and chills. He feels that symptoms are associated with receiving the flu shot at [facility] on 1/27/20. This was upon discharge from a hospitalization for chest pain. During this hospitalization, he underwent stress echocardiogram on 1/27/20 that was negative. He states that since discharge, he has had upper respiratory tract symptoms and his blood pressure has been low. He states that Dr. Liu took him off his blood pressure medications on follow up on 2/3/20, but it is not documented. Also of note, the patient recently completed a medrol dose pack for gout symptoms. The patient denies a history of atrial fibrillation. In the ED, he was found to have atrial fibrillation with RVR. His rate improved with metoprolol IV. Labs were significant for a mildly elevated total bilirubin of 1.4, ALT of 71 and INR of 1.3. Code status was discussed with the patient. He is a full code. He was unwilling to give a surrogate  medical decision maker at this time.     Past Medical History:   Diagnosis Date    Arthritis     Atherosclerosis of abdominal aorta     Cataract     Chronic constipation     Glaucoma     History of anal fissures     Hypertension     Polyneuropathy in other diseases classified elsewhere     due to folate deficiency    Prediabetes     Venous insufficiency     Venous insufficiency        Past Surgical History:   Procedure Laterality Date    BACK SURGERY      CATARACT EXTRACTION BILATERAL W/ ANTERIOR VITRECTOMY      COLONOSCOPY N/A 5/13/2016    Procedure: COLONOSCOPY;  Surgeon: Presley Phillips MD;  Location: [facility] ([address] [place] [address]);  Service: Endoscopy;  Laterality: N/A;  EGD with Dr. Jeffery prior to Colonoscopy. EC    ESOPHAGOGASTRODUODENOSCOPY N/A 7/5/2018    Procedure: ESOPHAGOGASTRODUODENOSCOPY (EGD);  Surgeon: Khanh Asencio III, MD;  Location: [facility];  Service: Endoscopy;  Laterality: N/A;    JOINT REPLACEMENT Left     x 2    KNEE SURGERY Left     x2. revision 06/27/17    PROSTATE SURGERY      TONSILLECTOMY, ADENOIDECTOMY         Review of patient's allergies indicates:   Allergen Reactions    Penicillins Hives and Swelling    Protonix [pantoprazole] Rash    Sulfa (sulfonamide antibiotics) Hives       No current facility-administered medications on file prior to encounter.      Current Outpatient Medications on File Prior to Encounter   Medication Sig    ascorbic acid, vitamin C, (VITAMIN C) 60 mg Lozg Take 1 tablet by mouth daily as needed.     aspirin (ECOTRIN) 81 MG EC tablet Take 81 mg by mouth as needed.     cyanocobalamin, vitamin B-12, (VITAMIN B-12) 50 mcg tablet Take 50 mcg by mouth once daily.    ERGOCALCIFEROL, VITAMIN D2, (VITAMIN D ORAL) Take 1 tablet by mouth once daily.     ferrous sulfate (FEOSOL) 325 mg (65 mg iron) Tab tablet Take 325 mg by mouth.    magnesium 30 mg Tab Take 30 mg by mouth.    multivitamin with folic acid 400 mcg Tab Take 1 tablet by mouth.     POTASSIUM CHLORIDE ORAL Take 1 tablet by mouth.    baclofen (LIORESAL) 10 MG tablet Take 1 tablet (10 mg total) by mouth nightly as needed.    betamethasone dipropionate (DIPROLENE) 0.05 % ointment Apply topically 2 (two) times daily as needed.    blood sugar diagnostic (BLOOD GLUCOSE TEST) Strp 1 strip by Misc.(Non-Drug; Combo Route) route 3 (three) times a week.    blood sugar diagnostic Strp 1 strip by Misc.(Non-Drug; Combo Route) route 2 (two) times daily.    ergocalciferol, vitamin D2, 400 unit Tab Take 1 tablet by mouth.    lancets 30 gauge Misc 1 lancet by Misc.(Non-Drug; Combo Route) route 2 (two) times daily.    latanoprost 0.005 % ophthalmic solution Place 1 drop into both eyes every evening.    linaclotide (LINZESS) 145 mcg Cap capsule Take 1 capsule (145 mcg total) by mouth once daily.    methylPREDNISolone (MEDROL DOSEPACK) 4 mg tablet use as directed    ranitidine (ZANTAC) 150 MG tablet Take 1 tablet (150 mg total) by mouth 2 (two) times daily.    timolol maleate 0.5% (TIMOPTIC) 0.5 % Drop Place 1 drop into both eyes 2 (two) times daily.    triamcinolone acetonide 0.1% (KENALOG) 0.1 % cream Apply topically 2 (two) times daily. For rash of back.    [DISCONTINUED] FUROSEMIDE ORAL Take 50 mg by mouth.     Family History     Problem Relation (Age of Onset)    No Known Problems Son, Daughter, Son        Tobacco Use    Smoking status: Former Smoker     Packs/day: 0.30     Years: 52.00     Pack years: 15.60     Types: Cigarettes     Last attempt to quit: 2000     Years since quittin.2    Smokeless tobacco: Never Used   Substance and Sexual Activity    Alcohol use: No    Drug use: No    Sexual activity: Yes     Review of Systems   Constitutional: Negative for appetite change, chills, diaphoresis, fatigue and fever.   HENT: Negative for congestion, ear pain, mouth sores, sore throat and trouble swallowing.    Eyes: Negative for pain and visual disturbance.   Respiratory: Positive  for cough and shortness of breath. Negative for chest tightness.    Cardiovascular: Positive for chest pain and leg swelling. Negative for palpitations.   Gastrointestinal: Negative for abdominal pain, constipation, diarrhea and nausea.   Endocrine: Negative for cold intolerance, heat intolerance, polydipsia and polyuria.   Genitourinary: Negative for dysuria, frequency and hematuria.   Musculoskeletal: Negative for arthralgias, back pain, myalgias and neck pain.   Skin: Negative for pallor, rash and wound.   Allergic/Immunologic: Negative for environmental allergies and immunocompromised state.   Neurological: Negative for dizziness, seizures, syncope, weakness, numbness and headaches.   Hematological: Negative for adenopathy. Does not bruise/bleed easily.   Psychiatric/Behavioral: Negative for agitation, confusion and sleep disturbance.     Objective:     Vital Signs (Most Recent):  Temp: 98.8 °F (37.1 °C) (02/13/20 1145)  Pulse: 97 (02/13/20 1402)  Resp: (!) 23 (02/13/20 1402)  BP: 109/61 (02/13/20 1402)  SpO2: 97 % (02/13/20 1402) Vital Signs (24h Range):  Temp:  [98.8 °F (37.1 °C)] 98.8 °F (37.1 °C)  Pulse:  [] 97  Resp:  [18-24] 23  SpO2:  [95 %-97 %] 97 %  BP: ()/(55-77) 109/61     Weight: 90.7 kg (200 lb)  Body mass index is 24.34 kg/m².    Physical Exam   Constitutional: He is oriented to person, place, and time. He appears well-developed and well-nourished.   HENT:   Head: Normocephalic and atraumatic.   Eyes: Conjunctivae are normal.   Neck: Neck supple. No JVD present.   Cardiovascular: Normal rate and normal heart sounds. An irregularly irregular rhythm present.   Pulmonary/Chest: Effort normal and breath sounds normal. He has no wheezes.   Abdominal: Soft. Bowel sounds are normal. He exhibits no distension. There is no tenderness.   Musculoskeletal: Normal range of motion.   Neurological: He is alert and oriented to person, place, and time.   Skin: Skin is warm and dry. No rash noted.    Psychiatric: He has a normal mood and affect. His behavior is normal. Thought content normal.   Nursing note and vitals reviewed.          Significant Labs:   CBC:   Recent Labs   Lab 02/13/20  1218   WBC 10.82   HGB 11.5*   HCT 36.3*   *     CMP:   Recent Labs   Lab 02/13/20  1218      K 4.2      CO2 26   *   BUN 16   CREATININE 1.2   CALCIUM 8.7   PROT 7.7   ALBUMIN 2.7*   BILITOT 1.4*   ALKPHOS 69   AST 40   ALT 71*   ANIONGAP 12   EGFRNONAA 56*     Troponin:   Recent Labs   Lab 02/13/20  1218   TROPONINI 0.014     All pertinent labs within the past 24 hours have been reviewed.    Significant Imaging: I have reviewed all pertinent imaging results/findings within the past 24 hours.   Imaging Results          CTA Chest Non-Coronary (In process)                X-Ray Chest AP Portable (Final result)  Result time 02/13/20 12:15:46    Final result by Presley Nicolas MD (02/13/20 12:15:46)                 Impression:      Patchy infiltrate suspected within the left lower lobe with small to moderate left-sided pleural effusion.      Electronically signed by: Presley Nicolas MD  Date:    02/13/2020  Time:    12:15             Narrative:    EXAMINATION:  XR CHEST AP PORTABLE    CLINICAL HISTORY:  CHF;    FINDINGS:  Single view of the chest.  Comparison 06/06/2017    Cardiac silhouette is enlarged.  Aorta demonstrates atherosclerotic disease. Patchy infiltrate suspected within the left lower lobe with small to moderate left-sided pleural effusion.  Right lung is clear.  Left upper lobe is clear..  No pneumothorax.  Bones demonstrate degenerative changes.                                  Assessment/Plan:     * Atrial fibrillation with rapid ventricular response  -Continue rate control.   -Follow up echocardiogram.  -Hold anticoagulation until echocardiogram reviewed by Cardiology due to concern for tamponade.    -Cardiology consult.       Pericardial effusion  -Hypotension and tachycardia  concerning for tamponade.   -Follow up echocardiogram.   -Cardiology consulted.       Chest pain  -Patient with negative stress echocardiogram on 1/27/20.   -Trend troponin.  -Daily ASA.   -Cardiology consulted.       VTE Risk Mitigation (From admission, onward)         Ordered     enoxaparin injection 40 mg  Daily      02/13/20 1527                   DILSHAD Gusman  Department of Hospital Medicine   [facility]

## 2020-02-13 NOTE — ASSESSMENT & PLAN NOTE
-Continue rate control.   -Follow up echocardiogram.  -Hold anticoagulation until echocardiogram reviewed by Cardiology due to concern for tamponade.    -Cardiology consult.

## 2020-02-13 NOTE — HPI
Mr. Cohen is an 84 year old male patient whose current medical conditions include HTN,CVI, polyneuropathy, and abdominal atherosclerosis who presented to [facility] ED today with a chief complaint of progressively worsening SOB over the past 4-5 days. Associated symptoms included BLE edema, congestion, orthopnea, and pleuritic chest pain/soreness. Patient denied any associated lightheadedness, dizziness, palpitations, near syncope, syncope, fever, or chills. Initial workup in ED revealed elevated Tbili (1.4), ALT of 71, and INR of 1.3. CTA of chest showed large pericardial effusion and bilateral pleural effusions. EKG showed afib with RVR and patient subsequently admitted for further evaluation and treatment. Cardiology consulted to assist with management. Patient seen and examined while in ED. Remains SOB with conversational dyspnea. Reports some chest discomfort with deep breathing and at time, palpation. Denies any prior history of afib/CAD. States he was recently hospitalized at [facility] in late January due to chest pain. Workup at that time, including, stress echo was negative, but he has been experiencing URI type symptoms since his discharge. Chart reviewed. BNP normal. Troponin negative. 2D echo pending.

## 2020-02-13 NOTE — SUBJECTIVE & OBJECTIVE
Past Medical History:   Diagnosis Date    Arthritis     Atherosclerosis of abdominal aorta     Cataract     Chronic constipation     Glaucoma     History of anal fissures     Hypertension     Polyneuropathy in other diseases classified elsewhere     due to folate deficiency    Prediabetes     Venous insufficiency     Venous insufficiency        Past Surgical History:   Procedure Laterality Date    BACK SURGERY      CATARACT EXTRACTION BILATERAL W/ ANTERIOR VITRECTOMY      COLONOSCOPY N/A 5/13/2016    Procedure: COLONOSCOPY;  Surgeon: Presley Phillips MD;  Location: [address]);  Service: Endoscopy;  Laterality: N/A;  EGD with Dr. Jeffery prior to Colonoscopy. EC    ESOPHAGOGASTRODUODENOSCOPY N/A 7/5/2018    Procedure: ESOPHAGOGASTRODUODENOSCOPY (EGD);  Surgeon: Khanh Asencio III, MD;  Location: [facility];  Service: Endoscopy;  Laterality: N/A;    JOINT REPLACEMENT Left     x 2    KNEE SURGERY Left     x2. revision 06/27/17    PROSTATE SURGERY      TONSILLECTOMY, ADENOIDECTOMY         Review of patient's allergies indicates:   Allergen Reactions    Penicillins Hives and Swelling    Protonix [pantoprazole] Rash    Sulfa (sulfonamide antibiotics) Hives       No current facility-administered medications on file prior to encounter.      Current Outpatient Medications on File Prior to Encounter   Medication Sig    ascorbic acid, vitamin C, (VITAMIN C) 60 mg Lozg Take 1 tablet by mouth daily as needed.     aspirin (ECOTRIN) 81 MG EC tablet Take 81 mg by mouth as needed.     cyanocobalamin, vitamin B-12, (VITAMIN B-12) 50 mcg tablet Take 50 mcg by mouth once daily.    ERGOCALCIFEROL, VITAMIN D2, (VITAMIN D ORAL) Take 1 tablet by mouth once daily.     ferrous sulfate (FEOSOL) 325 mg (65 mg iron) Tab tablet Take 325 mg by mouth.    magnesium 30 mg Tab Take 30 mg by mouth.    multivitamin with folic acid 400 mcg Tab Take 1 tablet by mouth.    POTASSIUM CHLORIDE ORAL Take 1 tablet by  mouth.    baclofen (LIORESAL) 10 MG tablet Take 1 tablet (10 mg total) by mouth nightly as needed.    betamethasone dipropionate (DIPROLENE) 0.05 % ointment Apply topically 2 (two) times daily as needed.    blood sugar diagnostic (BLOOD GLUCOSE TEST) Strp 1 strip by Misc.(Non-Drug; Combo Route) route 3 (three) times a week.    blood sugar diagnostic Strp 1 strip by Misc.(Non-Drug; Combo Route) route 2 (two) times daily.    ergocalciferol, vitamin D2, 400 unit Tab Take 1 tablet by mouth.    lancets 30 gauge Misc 1 lancet by Misc.(Non-Drug; Combo Route) route 2 (two) times daily.    latanoprost 0.005 % ophthalmic solution Place 1 drop into both eyes every evening.    linaclotide (LINZESS) 145 mcg Cap capsule Take 1 capsule (145 mcg total) by mouth once daily.    methylPREDNISolone (MEDROL DOSEPACK) 4 mg tablet use as directed    ranitidine (ZANTAC) 150 MG tablet Take 1 tablet (150 mg total) by mouth 2 (two) times daily.    timolol maleate 0.5% (TIMOPTIC) 0.5 % Drop Place 1 drop into both eyes 2 (two) times daily.    triamcinolone acetonide 0.1% (KENALOG) 0.1 % cream Apply topically 2 (two) times daily. For rash of back.    [DISCONTINUED] FUROSEMIDE ORAL Take 50 mg by mouth.     Family History     Problem Relation (Age of Onset)    No Known Problems Son, Daughter, Son        Tobacco Use    Smoking status: Former Smoker     Packs/day: 0.30     Years: 52.00     Pack years: 15.60     Types: Cigarettes     Last attempt to quit: 2000     Years since quittin.2    Smokeless tobacco: Never Used   Substance and Sexual Activity    Alcohol use: No    Drug use: No    Sexual activity: Yes     Review of Systems   Constitution: Positive for malaise/fatigue.   HENT: Negative.    Eyes: Negative.    Cardiovascular: Positive for chest pain (atypical), dyspnea on exertion, leg swelling and orthopnea.   Respiratory: Positive for cough, shortness of breath and sputum production.    Endocrine: Negative.     Hematologic/Lymphatic: Negative.    Skin: Negative.    Musculoskeletal: Negative.    Gastrointestinal: Negative.    Genitourinary: Negative.    Neurological: Negative.    Psychiatric/Behavioral: Negative.    Allergic/Immunologic: Negative.      Objective:     Vital Signs (Most Recent):  Temp: 99.8 °F (37.7 °C) (02/13/20 1530)  Pulse: (!) 113 (02/13/20 1530)  Resp: 20 (02/13/20 1530)  BP: 117/81 (02/13/20 1530)  SpO2: 95 % (02/13/20 1530) Vital Signs (24h Range):  Temp:  [98.8 °F (37.1 °C)-99.8 °F (37.7 °C)] 99.8 °F (37.7 °C)  Pulse:  [] 113  Resp:  [18-24] 20  SpO2:  [95 %-97 %] 95 %  BP: ()/(55-81) 117/81     Weight: 90.7 kg (200 lb)  Body mass index is 24.34 kg/m².    SpO2: 95 %  O2 Device (Oxygen Therapy): nasal cannula      Intake/Output Summary (Last 24 hours) at 2/13/2020 1556  Last data filed at 2/13/2020 1455  Gross per 24 hour   Intake 750 ml   Output --   Net 750 ml       Lines/Drains/Airways     Peripheral Intravenous Line                 Peripheral IV - Single Lumen 02/13/20 1213 20 G Left Antecubital less than 1 day                Physical Exam   Constitutional: He is oriented to person, place, and time. He appears well-developed and well-nourished. No distress.   HENT:   Head: Normocephalic and atraumatic.   Eyes: Pupils are equal, round, and reactive to light. Right eye exhibits no discharge. Left eye exhibits no discharge.   Neck: Neck supple. No JVD present.   Cardiovascular: S1 normal and S2 normal. An irregularly irregular rhythm present. Tachycardia present.   No murmur heard.  Pulmonary/Chest: Effort normal and breath sounds normal. No respiratory distress. He has no wheezes. He has no rales.   Abdominal: Soft. He exhibits no distension.   Musculoskeletal: He exhibits no edema.   Neurological: He is alert and oriented to person, place, and time.   Skin: Skin is warm and dry. He is not diaphoretic. No erythema.   Psychiatric: He has a normal mood and affect. His behavior is normal.  Thought content normal.   Nursing note and vitals reviewed.      Significant Labs:   CMP   Recent Labs   Lab 02/13/20  1218      K 4.2      CO2 26   *   BUN 16   CREATININE 1.2   CALCIUM 8.7   PROT 7.7   ALBUMIN 2.7*   BILITOT 1.4*   ALKPHOS 69   AST 40   ALT 71*   ANIONGAP 12   ESTGFRAFRICA >60   EGFRNONAA 56*   , CBC   Recent Labs   Lab 02/13/20  1218   WBC 10.82   HGB 11.5*   HCT 36.3*   *   , Troponin   Recent Labs   Lab 02/13/20  1218   TROPONINI 0.014    and All pertinent lab results from the last 24 hours have been reviewed.    Significant Imaging: Echocardiogram:   2D echo with color flow doppler:   Results for orders placed or performed in visit on 07/29/19   2D echo with color flow doppler   Result Value Ref Range    QEF 60 55 - 65    Est. PA Systolic Pressure 22.36     Mitral Valve Mobility NORMAL     Narrative    Date of Procedure: 07/29/2019        TEST DESCRIPTION       Aorta: The aortic root is normal in size, measuring 4.3 cm at sinotubular junction and 4.3 cm at Sinuses of Valsalva. The proximal ascending aorta is normal in size, measuring 3.6 cm across.     Left Atrium: The left atrial volume index is mildly enlarged, measuring 40.11 cc/m2.     Left Ventricle: The left ventricle is normal in size, with an end-diastolic diameter of 5.0 cm, and an end-systolic diameter of 2.5 cm. Wall thickness is increased, with the septum and the posterior wall each measuring 1.8 cm across. Relative wall   thickness was increased at 0.72, and the LV mass index was increased at 200.0 g/m2 consistent with moderate concentric left ventricular hypertrophy. There are no regional wall motion abnormalities. Left ventricular systolic function appears normal.   Visually estimated ejection fraction is 60-65%. The LV Doppler derived stroke volume equals 115.0 ccs.     Diastolic indices: E wave velocity 1.1 m/s, E/A ratio 0.8,  msec., E/e' ratio(avg) 11. Diastolic function is indeterminate.      Right Atrium: The right atrium is normal in size, measuring 6.7 cm in length and 4.1 cm in width in the apical view.     Right Ventricle: The right ventricle is normal in size measuring 3.1 cm at the base in the apical right ventricle-focused view. Global right ventricular systolic function appears normal. Tricuspid annular plane systolic excursion (TAPSE) is 2.4 cm. The   estimated PA systolic pressure is 22 mmHg.     Aortic Valve:  The aortic valve is normal in structure with normal leaflet mobility. The aortic valve is tri-leaflet in structure.     Mitral Valve:  The mitral valve is normal in structure with normal leaflet mobility. There is mitral annular calcification.     Tricuspid Valve:  The tricuspid valve is normal in structure.     Pulmonary Valve:  The pulmonic valve is normal in structure.     IVC: IVC is normal in size and collapses > 50% with a sniff, suggesting normal right atrial pressure of 3 mmHg.     Intracavitary: There is no evidence of pericardial effusion, intracavity mass, thrombi, or vegetation.         CONCLUSIONS     1 - Mild left atrial enlargement.     2 - Concentric hypertrophy.     3 - No wall motion abnormalities.     4 - Normal left ventricular systolic function (EF 60-65%).     5 - Normal right ventricular systolic function .     6 - The estimated PA systolic pressure is 22 mmHg.             This document has been electronically    SIGNED BY: Romeo Dawkins MD On: 07/29/2019 14:33   , EKG: Reviewed and X-Ray: CXR: X-Ray Chest 1 View (CXR): No results found for this visit on 02/13/20. and X-Ray Chest PA and Lateral (CXR): No results found for this visit on 02/13/20.

## 2020-02-13 NOTE — ASSESSMENT & PLAN NOTE
-Patient with negative stress echocardiogram on 1/27/20.   -Trend troponin.  -Daily ASA.   -Cardiology consulted.

## 2020-02-13 NOTE — NURSING
Pt arrive to unit via stretcher, pt transferred from stretcher to bed without assistance. Made comfortable in bed and placed on heart monitor. Vitals and assessment completed. Educated on fall prevention and hourly rounding. No complaints at this time. Call bell and personal belongings within reach. Reminded to call for assistance. Will continue to monitor.

## 2020-02-13 NOTE — ASSESSMENT & PLAN NOTE
-Atypical and seems related to afib with RVR  -Trend troponin  -Continue ASA  -Low dose BB initiated  -Check 2D echo

## 2020-02-13 NOTE — SUBJECTIVE & OBJECTIVE
Past Medical History:   Diagnosis Date    Arthritis     Atherosclerosis of abdominal aorta     Cataract     Chronic constipation     Glaucoma     History of anal fissures     Hypertension     Polyneuropathy in other diseases classified elsewhere     due to folate deficiency    Prediabetes     Venous insufficiency     Venous insufficiency        Past Surgical History:   Procedure Laterality Date    BACK SURGERY      CATARACT EXTRACTION BILATERAL W/ ANTERIOR VITRECTOMY      COLONOSCOPY N/A 5/13/2016    Procedure: COLONOSCOPY;  Surgeon: Presley Phillips MD;  Location: [address]);  Service: Endoscopy;  Laterality: N/A;  EGD with Dr. Jeffery prior to Colonoscopy. EC    ESOPHAGOGASTRODUODENOSCOPY N/A 7/5/2018    Procedure: ESOPHAGOGASTRODUODENOSCOPY (EGD);  Surgeon: Khanh Asencio III, MD;  Location: [facility];  Service: Endoscopy;  Laterality: N/A;    JOINT REPLACEMENT Left     x 2    KNEE SURGERY Left     x2. revision 06/27/17    PROSTATE SURGERY      TONSILLECTOMY, ADENOIDECTOMY         Review of patient's allergies indicates:   Allergen Reactions    Penicillins Hives and Swelling    Protonix [pantoprazole] Rash    Sulfa (sulfonamide antibiotics) Hives       No current facility-administered medications on file prior to encounter.      Current Outpatient Medications on File Prior to Encounter   Medication Sig    ascorbic acid, vitamin C, (VITAMIN C) 60 mg Lozg Take 1 tablet by mouth daily as needed.     aspirin (ECOTRIN) 81 MG EC tablet Take 81 mg by mouth as needed.     cyanocobalamin, vitamin B-12, (VITAMIN B-12) 50 mcg tablet Take 50 mcg by mouth once daily.    ERGOCALCIFEROL, VITAMIN D2, (VITAMIN D ORAL) Take 1 tablet by mouth once daily.     ferrous sulfate (FEOSOL) 325 mg (65 mg iron) Tab tablet Take 325 mg by mouth.    magnesium 30 mg Tab Take 30 mg by mouth.    multivitamin with folic acid 400 mcg Tab Take 1 tablet by mouth.    POTASSIUM CHLORIDE ORAL Take 1 tablet by  mouth.    baclofen (LIORESAL) 10 MG tablet Take 1 tablet (10 mg total) by mouth nightly as needed.    betamethasone dipropionate (DIPROLENE) 0.05 % ointment Apply topically 2 (two) times daily as needed.    blood sugar diagnostic (BLOOD GLUCOSE TEST) Strp 1 strip by Misc.(Non-Drug; Combo Route) route 3 (three) times a week.    blood sugar diagnostic Strp 1 strip by Misc.(Non-Drug; Combo Route) route 2 (two) times daily.    ergocalciferol, vitamin D2, 400 unit Tab Take 1 tablet by mouth.    lancets 30 gauge Misc 1 lancet by Misc.(Non-Drug; Combo Route) route 2 (two) times daily.    latanoprost 0.005 % ophthalmic solution Place 1 drop into both eyes every evening.    linaclotide (LINZESS) 145 mcg Cap capsule Take 1 capsule (145 mcg total) by mouth once daily.    methylPREDNISolone (MEDROL DOSEPACK) 4 mg tablet use as directed    ranitidine (ZANTAC) 150 MG tablet Take 1 tablet (150 mg total) by mouth 2 (two) times daily.    timolol maleate 0.5% (TIMOPTIC) 0.5 % Drop Place 1 drop into both eyes 2 (two) times daily.    triamcinolone acetonide 0.1% (KENALOG) 0.1 % cream Apply topically 2 (two) times daily. For rash of back.    [DISCONTINUED] FUROSEMIDE ORAL Take 50 mg by mouth.     Family History     Problem Relation (Age of Onset)    No Known Problems Son, Daughter, Son        Tobacco Use    Smoking status: Former Smoker     Packs/day: 0.30     Years: 52.00     Pack years: 15.60     Types: Cigarettes     Last attempt to quit: 2000     Years since quittin.2    Smokeless tobacco: Never Used   Substance and Sexual Activity    Alcohol use: No    Drug use: No    Sexual activity: Yes     Review of Systems   Constitutional: Negative for appetite change, chills, diaphoresis, fatigue and fever.   HENT: Negative for congestion, ear pain, mouth sores, sore throat and trouble swallowing.    Eyes: Negative for pain and visual disturbance.   Respiratory: Positive for cough and shortness of breath. Negative  for chest tightness.    Cardiovascular: Positive for chest pain and leg swelling. Negative for palpitations.   Gastrointestinal: Negative for abdominal pain, constipation, diarrhea and nausea.   Endocrine: Negative for cold intolerance, heat intolerance, polydipsia and polyuria.   Genitourinary: Negative for dysuria, frequency and hematuria.   Musculoskeletal: Negative for arthralgias, back pain, myalgias and neck pain.   Skin: Negative for pallor, rash and wound.   Allergic/Immunologic: Negative for environmental allergies and immunocompromised state.   Neurological: Negative for dizziness, seizures, syncope, weakness, numbness and headaches.   Hematological: Negative for adenopathy. Does not bruise/bleed easily.   Psychiatric/Behavioral: Negative for agitation, confusion and sleep disturbance.     Objective:     Vital Signs (Most Recent):  Temp: 98.8 °F (37.1 °C) (02/13/20 1145)  Pulse: 97 (02/13/20 1402)  Resp: (!) 23 (02/13/20 1402)  BP: 109/61 (02/13/20 1402)  SpO2: 97 % (02/13/20 1402) Vital Signs (24h Range):  Temp:  [98.8 °F (37.1 °C)] 98.8 °F (37.1 °C)  Pulse:  [] 97  Resp:  [18-24] 23  SpO2:  [95 %-97 %] 97 %  BP: ()/(55-77) 109/61     Weight: 90.7 kg (200 lb)  Body mass index is 24.34 kg/m².    Physical Exam   Constitutional: He is oriented to person, place, and time. He appears well-developed and well-nourished.   HENT:   Head: Normocephalic and atraumatic.   Eyes: Conjunctivae are normal.   Neck: Neck supple. No JVD present.   Cardiovascular: Normal rate and normal heart sounds. An irregularly irregular rhythm present.   Pulmonary/Chest: Effort normal and breath sounds normal. He has no wheezes.   Abdominal: Soft. Bowel sounds are normal. He exhibits no distension. There is no tenderness.   Musculoskeletal: Normal range of motion.   Neurological: He is alert and oriented to person, place, and time.   Skin: Skin is warm and dry. No rash noted.   Psychiatric: He has a normal mood and affect.  His behavior is normal. Thought content normal.   Nursing note and vitals reviewed.          Significant Labs:   CBC:   Recent Labs   Lab 02/13/20  1218   WBC 10.82   HGB 11.5*   HCT 36.3*   *     CMP:   Recent Labs   Lab 02/13/20  1218      K 4.2      CO2 26   *   BUN 16   CREATININE 1.2   CALCIUM 8.7   PROT 7.7   ALBUMIN 2.7*   BILITOT 1.4*   ALKPHOS 69   AST 40   ALT 71*   ANIONGAP 12   EGFRNONAA 56*     Troponin:   Recent Labs   Lab 02/13/20  1218   TROPONINI 0.014     All pertinent labs within the past 24 hours have been reviewed.    Significant Imaging: I have reviewed all pertinent imaging results/findings within the past 24 hours.   Imaging Results          CTA Chest Non-Coronary (In process)                X-Ray Chest AP Portable (Final result)  Result time 02/13/20 12:15:46    Final result by Presley Nicolas MD (02/13/20 12:15:46)                 Impression:      Patchy infiltrate suspected within the left lower lobe with small to moderate left-sided pleural effusion.      Electronically signed by: Presley Nicolas MD  Date:    02/13/2020  Time:    12:15             Narrative:    EXAMINATION:  XR CHEST AP PORTABLE    CLINICAL HISTORY:  CHF;    FINDINGS:  Single view of the chest.  Comparison 06/06/2017    Cardiac silhouette is enlarged.  Aorta demonstrates atherosclerotic disease. Patchy infiltrate suspected within the left lower lobe with small to moderate left-sided pleural effusion.  Right lung is clear.  Left upper lobe is clear..  No pneumothorax.  Bones demonstrate degenerative changes.

## 2020-02-13 NOTE — ED NOTES
Oriented to room and plan of care, verbalized understanding. Cart in low position, siderails up x 2 and call bell in reach. Will continue to monitor.

## 2020-02-13 NOTE — ED PROVIDER NOTES
SCRIBE #1 NOTE: I, Estuardo Trejo/Sona Avila, am scribing for, and in the presence of, Ayaz Pradhan Jr., MD. I have scribed the entire note.       History     Chief Complaint   Patient presents with    Tachycardia     SOB, afib with RVR en route     Review of patient's allergies indicates:   Allergen Reactions    Penicillins Hives and Swelling    Protonix [pantoprazole] Rash    Sulfa (sulfonamide antibiotics) Hives         History of Present Illness     HPI    2/13/2020, 11:52 AM  History obtained from the patient and EMS      History of Present Illness: Dominic Cohen is a 84 y.o. male patient with a PMHx of HTN who presents to the Emergency Department for evaluation of SOB, onset just PTA. EMS reports that the pt was in Afib RVR en route to the ED.  Patient has no prior history of atrial fibrillation or CHF.  Symptoms are constant and moderate in severity. No mitigating or exacerbating factors reported. Associated sxs include productive cough, BLE edema, and chest tightness.  Patient does note orthopnea but no PND.  Patient denies any wheezing, weakness, n/v, fever, dizziness, chills, headache, and all other sxs at this time. Pt was given Diltiazem en route to the ED. No further complaints or concerns at this time.       Arrival mode:   EMS      PCP: Sudhakar Liu MD        Past Medical History:  Past Medical History:   Diagnosis Date    Arthritis     Atherosclerosis of abdominal aorta     Cataract     Chronic constipation     Glaucoma     History of anal fissures     Hypertension     Polyneuropathy in other diseases classified elsewhere     due to folate deficiency    Prediabetes     Venous insufficiency     Venous insufficiency        Past Surgical History:  Past Surgical History:   Procedure Laterality Date    BACK SURGERY      CATARACT EXTRACTION BILATERAL W/ ANTERIOR VITRECTOMY      COLONOSCOPY N/A 5/13/2016    Procedure: COLONOSCOPY;  Surgeon: Presley Phillips MD;   Location: [facility] (4TH FLR);  Service: Endoscopy;  Laterality: N/A;  EGD with Dr. Jeffery prior to Colonoscopy. EC    ESOPHAGOGASTRODUODENOSCOPY N/A 2018    Procedure: ESOPHAGOGASTRODUODENOSCOPY (EGD);  Surgeon: Khanh Asencio III, MD;  Location: [facility];  Service: Endoscopy;  Laterality: N/A;    JOINT REPLACEMENT Left     x 2    KNEE SURGERY Left     x2. revision 17    PROSTATE SURGERY      TONSILLECTOMY, ADENOIDECTOMY           Family History:  Family History   Problem Relation Age of Onset    No Known Problems Son     No Known Problems Daughter     No Known Problems Son     Diabetes Neg Hx     Heart disease Neg Hx     Cancer Neg Hx     Celiac disease Neg Hx     Cirrhosis Neg Hx     Colon cancer Neg Hx     Colon polyps Neg Hx     Crohn's disease Neg Hx     Cystic fibrosis Neg Hx     Esophageal cancer Neg Hx     Hemochromatosis Neg Hx     Inflammatory bowel disease Neg Hx     Irritable bowel syndrome Neg Hx     Liver cancer Neg Hx     Liver disease Neg Hx     Rectal cancer Neg Hx     Stomach cancer Neg Hx     Ulcerative colitis Neg Hx     Zak's disease Neg Hx     Amblyopia Neg Hx     Blindness Neg Hx     Glaucoma Neg Hx     Hypertension Neg Hx     Macular degeneration Neg Hx     Retinal detachment Neg Hx     Strabismus Neg Hx        Social History:  Social History     Tobacco Use    Smoking status: Former Smoker     Packs/day: 0.30     Years: 52.00     Pack years: 15.60     Types: Cigarettes     Last attempt to quit: 2000     Years since quittin.2    Smokeless tobacco: Never Used   Substance and Sexual Activity    Alcohol use: No    Drug use: No    Sexual activity: Yes        Review of Systems     Review of Systems   Constitutional: Negative for chills and fever.   HENT: Negative for sore throat.    Respiratory: Positive for cough (productive) and shortness of breath. Negative for wheezing.    Cardiovascular: Positive for chest pain and leg  swelling (BLE).   Gastrointestinal: Negative for nausea and vomiting.   Genitourinary: Negative for dysuria.   Musculoskeletal: Negative for back pain.   Skin: Negative for rash.   Neurological: Negative for dizziness, weakness and headaches.   Hematological: Does not bruise/bleed easily.   All other systems reviewed and are negative.       Physical Exam     Initial Vitals [02/13/20 1145]   BP Pulse Resp Temp SpO2   109/77 (!) 120 18 98.8 °F (37.1 °C) 97 %      MAP       --          Physical Exam  Nursing Notes and Vital Signs Reviewed.  Constitutional: Patient is in no acute distress. Well-developed and well-nourished.  Head: Atraumatic. Normocephalic.  Eyes:. EOM intact. Conjunctivae are not pale. No scleral icterus.  ENT: Mucous membranes are moist. Oropharynx is clear and symmetric.  nares clear  Neck: Supple. Full ROM. No lymphadenopathy.  Cardiovascular:  Tachycardic irregular rhythm. No rubs.  Pulmonary/Chest: No respiratory distress. Clear to auscultation bilaterally. No wheezing or rales.  Abdominal: Soft and non-distended.  There is no tenderness.  No rebound, guarding, or rigidity. Good bowel sounds.  Genitourinary: No CVA tenderness. No suprapubic tenderness.  Musculoskeletal: Moves all extremities. No obvious deformities. Symmetrical bilateral pedal edema 2+.  Skin: Warm and dry. No cellulitis.  Neurological:  Alert, awake, and appropriate.  Normal speech.  No acute focal neurological deficits are appreciated. 2 through 12 intact bilaterally. No focal lateralizing signs.  Psychiatric: Normal affect. Good eye contact. Appropriate in content.     ED Course   Critical Care  Date/Time: 2/13/2020 1:01 PM  Performed by: Ayaz Pradhan Jr., MD  Authorized by: Ayaz Prahdan Jr., MD   Direct patient critical care time: 35 minutes  Additional history critical care time: 5 minutes  Ordering / reviewing critical care time: 5 minutes  Documentation critical care time: 5 minutes  Consulting other physicians  critical care time: 5 minutes  Consult with family critical care time: 5 minutes  Total critical care time (exclusive of procedural time) : 60 minutes  Critical care time was exclusive of separately billable procedures and treating other patients and teaching time.  Critical care was necessary to treat or prevent imminent or life-threatening deterioration of the following conditions: Afib RVR.  Critical care was time spent personally by me on the following activities: blood draw for specimens, development of treatment plan with patient or surrogate, discussions with consultants, interpretation of cardiac output measurements, evaluation of patient's response to treatment, examination of patient, obtaining history from patient or surrogate, ordering and performing treatments and interventions, ordering and review of laboratory studies, ordering and review of radiographic studies, pulse oximetry and re-evaluation of patient's condition.        ED Vital Signs:  Vitals:    02/13/20 1145 02/13/20 1206 02/13/20 1215 02/13/20 1245   BP: 109/77  (!) 174/65 (!) 85/55   Pulse: (!) 120 (!) 139 91 (!) 113   Resp: 18  20 (!) 23   Temp: 98.8 °F (37.1 °C)      TempSrc: Oral      SpO2: 97%  97% 95%       Abnormal Lab Results:  Labs Reviewed   CBC W/ AUTO DIFFERENTIAL - Abnormal; Notable for the following components:       Result Value    RBC 3.54 (*)     Hemoglobin 11.5 (*)     Hematocrit 36.3 (*)     Mean Corpuscular Volume 103 (*)     Mean Corpuscular Hemoglobin 32.5 (*)     Mean Corpuscular Hemoglobin Conc 31.7 (*)     Platelets 361 (*)     Immature Granulocytes 0.6 (*)     Gran # (ANC) 8.7 (*)     Immature Grans (Abs) 0.06 (*)     Lymph # 0.8 (*)     Mono # 1.2 (*)     Gran% 80.7 (*)     Lymph% 7.1 (*)     All other components within normal limits   COMPREHENSIVE METABOLIC PANEL - Abnormal; Notable for the following components:    Glucose 138 (*)     Albumin 2.7 (*)     Total Bilirubin 1.4 (*)     ALT 71 (*)     eGFR if non   56 (*)     All other components within normal limits   PROTIME-INR - Abnormal; Notable for the following components:    Prothrombin Time 13.6 (*)     INR 1.3 (*)     All other components within normal limits   CULTURE, BLOOD   CULTURE, BLOOD   TROPONIN I   B-TYPE NATRIURETIC PEPTIDE   LACTIC ACID, PLASMA        All Lab Results:  Results for orders placed or performed during the hospital encounter of 02/13/20   CBC auto differential   Result Value Ref Range    WBC 10.82 3.90 - 12.70 K/uL    RBC 3.54 (L) 4.60 - 6.20 M/uL    Hemoglobin 11.5 (L) 14.0 - 18.0 g/dL    Hematocrit 36.3 (L) 40.0 - 54.0 %    Mean Corpuscular Volume 103 (H) 82 - 98 fL    Mean Corpuscular Hemoglobin 32.5 (H) 27.0 - 31.0 pg    Mean Corpuscular Hemoglobin Conc 31.7 (L) 32.0 - 36.0 g/dL    RDW 13.4 11.5 - 14.5 %    Platelets 361 (H) 150 - 350 K/uL    MPV 10.4 9.2 - 12.9 fL    Immature Granulocytes 0.6 (H) 0.0 - 0.5 %    Gran # (ANC) 8.7 (H) 1.8 - 7.7 K/uL    Immature Grans (Abs) 0.06 (H) 0.00 - 0.04 K/uL    Lymph # 0.8 (L) 1.0 - 4.8 K/uL    Mono # 1.2 (H) 0.3 - 1.0 K/uL    Eos # 0.1 0.0 - 0.5 K/uL    Baso # 0.03 0.00 - 0.20 K/uL    nRBC 0 0 /100 WBC    Gran% 80.7 (H) 38.0 - 73.0 %    Lymph% 7.1 (L) 18.0 - 48.0 %    Mono% 10.7 4.0 - 15.0 %    Eosinophil% 0.6 0.0 - 8.0 %    Basophil% 0.3 0.0 - 1.9 %    Differential Method Automated    Comprehensive metabolic panel   Result Value Ref Range    Sodium 138 136 - 145 mmol/L    Potassium 4.2 3.5 - 5.1 mmol/L    Chloride 100 95 - 110 mmol/L    CO2 26 23 - 29 mmol/L    Glucose 138 (H) 70 - 110 mg/dL    BUN, Bld 16 8 - 23 mg/dL    Creatinine 1.2 0.5 - 1.4 mg/dL    Calcium 8.7 8.7 - 10.5 mg/dL    Total Protein 7.7 6.0 - 8.4 g/dL    Albumin 2.7 (L) 3.5 - 5.2 g/dL    Total Bilirubin 1.4 (H) 0.1 - 1.0 mg/dL    Alkaline Phosphatase 69 55 - 135 U/L    AST 40 10 - 40 U/L    ALT 71 (H) 10 - 44 U/L    Anion Gap 12 8 - 16 mmol/L    eGFR if African American >60 >60 mL/min/1.73 m^2    eGFR if non African  American 56 (A) >60 mL/min/1.73 m^2   Troponin I   Result Value Ref Range    Troponin I 0.014 0.000 - 0.026 ng/mL   Protime-INR   Result Value Ref Range    Prothrombin Time 13.6 (H) 9.0 - 12.5 sec    INR 1.3 (H) 0.8 - 1.2     Imaging Results:  Imaging Results          X-Ray Chest AP Portable (Final result)  Result time 02/13/20 12:15:46    Final result by Presley Nicolas MD (02/13/20 12:15:46)                 Impression:      Patchy infiltrate suspected within the left lower lobe with small to moderate left-sided pleural effusion.      Electronically signed by: Presley Nicolas MD  Date:    02/13/2020  Time:    12:15             Narrative:    EXAMINATION:  XR CHEST AP PORTABLE    CLINICAL HISTORY:  CHF;    FINDINGS:  Single view of the chest.  Comparison 06/06/2017    Cardiac silhouette is enlarged.  Aorta demonstrates atherosclerotic disease. Patchy infiltrate suspected within the left lower lobe with small to moderate left-sided pleural effusion.  Right lung is clear.  Left upper lobe is clear..  No pneumothorax.  Bones demonstrate degenerative changes.                                 The EKG was ordered, reviewed, and independently interpreted by the ED provider.  Interpretation time: 11:57  Rate: 141 BPM  Rhythm: Atrial fibrillation with rapid ventricular response  Interpretation: Left anterior fascicular block. Abnormal QRS-T angle, consider primary T wave abnormality. No STEMI.    The EKG was ordered, reviewed, and independently interpreted by the ED provider.  Interpretation time: 13:11  Rate: 113 BPM  Rhythm: Atrial fibrillation with rapid ventricular response with premature aberrantly conducted complexes  Interpretation: Left anterior fascicular block. Abnormal QRS-T angle, consider primary T wave abnormality. No STEMI.             The Emergency Provider reviewed the vital signs and test results, which are outlined above.     ED Discussion       12:33 PM  After 1st dose of Cardizem, patient had initial  improvement in heart rate but now 133.  Will re-dose.     1:00 PM  Heart rate has been slowed the patient remains a bit tachycardic and hypotensive.  Patient has a left lower lobe pneumonia on his chest x-ray without CHF noted.  This in combination with the Cardizem may be the cause of his hypotension so fluids will be given.  Patient will be reassessed.  Cultures and antibiotics have been ordered on this patient at this time.  Patient is awake alert oriented and stable otherwise    1:15 PM: Pt's BP has elevated to 118 systolic at this time, but is still tachycardic. Will starting dosing Lopressor.    2:14 PM: Discussed case with DILSHAD Gusman ([facility]). Dr. Day agrees with current care and management of pt and accepts admission.   Admitting Service: Hospital Medicine  Admitting Physician: Dr. Day  Admit to: OBS tele    2:17 PM: Re-evaluated pt. I have discussed test results, shared treatment plan, and the need for admission with patient and family at bedside. Pt and family express understanding at this time and agree with all information. All questions answered. Pt and family have no further questions or concerns at this time. Pt is ready for admit.  Medical Decision Making:   Clinical Tests:   Lab Tests: Ordered and Reviewed  Radiological Study: Ordered and Reviewed  Medical Tests: Ordered and Reviewed           ED Medication(s):  Medications   sodium chloride 0.9% bolus 1,000 mL (has no administration in time range)   levoFLOXacin 750 mg/150 mL IVPB 750 mg (has no administration in time range)   diltiaZEM injection 20 mg (20 mg Intravenous Given 2/13/20 1207)   diltiaZEM injection 20 mg (20 mg Intravenous Given 2/13/20 1240)       New Prescriptions    No medications on file               Scribe Attestation:   Scribe #1: I performed the above scribed service and the documentation accurately describes the services I performed. I attest to the accuracy of the note.     Attending:   Physician  Attestation Statement for Scribe #1: I, Ayaz Pradhan Jr., MD, personally performed the services described in this documentation, as scribed by Estuardo Trejo/Sona Avila, in my presence, and it is both accurate and complete.           Clinical Impression       ICD-10-CM ICD-9-CM   1. Atrial fibrillation with rapid ventricular response I48.91 427.31   2. Shortness of breath R06.02 786.05   3. Atrial fibrillation with RVR I48.91 427.31       Disposition:   Disposition: Placed in Observation  Condition: Fair       Ayaz Pradhan Jr., MD  02/13/20 0337

## 2020-02-13 NOTE — ED NOTES
"Spoke with Junie with security pts request to not have daughter up here to visit. Pt states "I don't want my daughter anywhere near me."  "

## 2020-02-13 NOTE — ASSESSMENT & PLAN NOTE
-Presented with afib with RVR, new onset  -Converted to SR once on telemetry floor  -Will add low dose Toprol XL 12.5 mg daily given borderline BP upon admission  -Heparin gtt initiated   -Assess response

## 2020-02-13 NOTE — CONSULTS
[facility]  Cardiology  Consult Note    Patient Name: Dominic Cohen  MRN: [id number]  Admission Date: 2/13/2020  Hospital Length of Stay: 0 days  Code Status: Full Code   Attending Provider: Jeromy Day MD   Consulting Provider: Libia Earl PA-C  Primary Care Physician: Sudhakar Liu MD  Principal Problem:Atrial fibrillation with rapid ventricular response    Patient information was obtained from patient, past medical records and ER records.     Inpatient consult to Cardiology  Consult performed by: Libia Earl PA-C  Consult ordered by: DILSHAD Gusman        Subjective:     Chief Complaint: Shortness of breath    HPI:   Mr. Cohen is an 84 year old male patient whose current medical conditions include HTN,CVI, polyneuropathy, and abdominal atherosclerosis who presented to [facility] ED today with a chief complaint of progressively worsening SOB over the past 4-5 days. Associated symptoms included BLE edema, congestion, orthopnea, and pleuritic chest pain/soreness. Patient denied any associated lightheadedness, dizziness, palpitations, near syncope, syncope, fever, or chills. Initial workup in ED revealed elevated Tbili (1.4), ALT of 71, and INR of 1.3. CTA of chest showed large pericardial effusion and bilateral pleural effusions. EKG showed afib with RVR and patient subsequently admitted for further evaluation and treatment. Cardiology consulted to assist with management. Patient seen and examined while in ED. Remains SOB with conversational dyspnea. Reports some chest discomfort with deep breathing and at time, palpation. Denies any prior history of afib/CAD. States he was recently hospitalized at [facility] in late January due to chest pain. Workup at that time, including, stress echo was negative, but he has been experiencing URI type symptoms since his discharge. Chart reviewed. BNP normal. Troponin negative. 2D echo pending.      Past Medical History:   Diagnosis Date     Arthritis     Atherosclerosis of abdominal aorta     Cataract     Chronic constipation     Glaucoma     History of anal fissures     Hypertension     Polyneuropathy in other diseases classified elsewhere     due to folate deficiency    Prediabetes     Venous insufficiency     Venous insufficiency        Past Surgical History:   Procedure Laterality Date    BACK SURGERY      CATARACT EXTRACTION BILATERAL W/ ANTERIOR VITRECTOMY      COLONOSCOPY N/A 5/13/2016    Procedure: COLONOSCOPY;  Surgeon: Presley Phillips MD;  [facility]  Service: Endoscopy;  Laterality: N/A;  EGD with Dr. Jeffery prior to Colonoscopy. EC    ESOPHAGOGASTRODUODENOSCOPY N/A 7/5/2018    Procedure: ESOPHAGOGASTRODUODENOSCOPY (EGD);  Surgeon: Khanh Asencio III, MD;  Location: [facility];  Service: Endoscopy;  Laterality: N/A;    JOINT REPLACEMENT Left     x 2    KNEE SURGERY Left     x2. revision 06/27/17    PROSTATE SURGERY      TONSILLECTOMY, ADENOIDECTOMY         Review of patient's allergies indicates:   Allergen Reactions    Penicillins Hives and Swelling    Protonix [pantoprazole] Rash    Sulfa (sulfonamide antibiotics) Hives       No current facility-administered medications on file prior to encounter.      Current Outpatient Medications on File Prior to Encounter   Medication Sig    ascorbic acid, vitamin C, (VITAMIN C) 60 mg Lozg Take 1 tablet by mouth daily as needed.     aspirin (ECOTRIN) 81 MG EC tablet Take 81 mg by mouth as needed.     cyanocobalamin, vitamin B-12, (VITAMIN B-12) 50 mcg tablet Take 50 mcg by mouth once daily.    ERGOCALCIFEROL, VITAMIN D2, (VITAMIN D ORAL) Take 1 tablet by mouth once daily.     ferrous sulfate (FEOSOL) 325 mg (65 mg iron) Tab tablet Take 325 mg by mouth.    magnesium 30 mg Tab Take 30 mg by mouth.    multivitamin with folic acid 400 mcg Tab Take 1 tablet by mouth.    POTASSIUM CHLORIDE ORAL Take 1 tablet by mouth.    baclofen (LIORESAL) 10 MG tablet  Take 1 tablet (10 mg total) by mouth nightly as needed.    betamethasone dipropionate (DIPROLENE) 0.05 % ointment Apply topically 2 (two) times daily as needed.    blood sugar diagnostic (BLOOD GLUCOSE TEST) Strp 1 strip by Misc.(Non-Drug; Combo Route) route 3 (three) times a week.    blood sugar diagnostic Strp 1 strip by Misc.(Non-Drug; Combo Route) route 2 (two) times daily.    ergocalciferol, vitamin D2, 400 unit Tab Take 1 tablet by mouth.    lancets 30 gauge Misc 1 lancet by Misc.(Non-Drug; Combo Route) route 2 (two) times daily.    latanoprost 0.005 % ophthalmic solution Place 1 drop into both eyes every evening.    linaclotide (LINZESS) 145 mcg Cap capsule Take 1 capsule (145 mcg total) by mouth once daily.    methylPREDNISolone (MEDROL DOSEPACK) 4 mg tablet use as directed    ranitidine (ZANTAC) 150 MG tablet Take 1 tablet (150 mg total) by mouth 2 (two) times daily.    timolol maleate 0.5% (TIMOPTIC) 0.5 % Drop Place 1 drop into both eyes 2 (two) times daily.    triamcinolone acetonide 0.1% (KENALOG) 0.1 % cream Apply topically 2 (two) times daily. For rash of back.    [DISCONTINUED] FUROSEMIDE ORAL Take 50 mg by mouth.     Family History     Problem Relation (Age of Onset)    No Known Problems Son, Daughter, Son        Tobacco Use    Smoking status: Former Smoker     Packs/day: 0.30     Years: 52.00     Pack years: 15.60     Types: Cigarettes     Last attempt to quit: 2000     Years since quittin.2    Smokeless tobacco: Never Used   Substance and Sexual Activity    Alcohol use: No    Drug use: No    Sexual activity: Yes     Review of Systems   Constitution: Positive for malaise/fatigue.   HENT: Negative.    Eyes: Negative.    Cardiovascular: Positive for chest pain (atypical), dyspnea on exertion, leg swelling and orthopnea.   Respiratory: Positive for cough, shortness of breath and sputum production.    Endocrine: Negative.    Hematologic/Lymphatic: Negative.    Skin:  Negative.    Musculoskeletal: Negative.    Gastrointestinal: Negative.    Genitourinary: Negative.    Neurological: Negative.    Psychiatric/Behavioral: Negative.    Allergic/Immunologic: Negative.      Objective:     Vital Signs (Most Recent):  Temp: 99.8 °F (37.7 °C) (02/13/20 1530)  Pulse: (!) 113 (02/13/20 1530)  Resp: 20 (02/13/20 1530)  BP: 117/81 (02/13/20 1530)  SpO2: 95 % (02/13/20 1530) Vital Signs (24h Range):  Temp:  [98.8 °F (37.1 °C)-99.8 °F (37.7 °C)] 99.8 °F (37.7 °C)  Pulse:  [] 113  Resp:  [18-24] 20  SpO2:  [95 %-97 %] 95 %  BP: ()/(55-81) 117/81     Weight: 90.7 kg (200 lb)  Body mass index is 24.34 kg/m².    SpO2: 95 %  O2 Device (Oxygen Therapy): nasal cannula      Intake/Output Summary (Last 24 hours) at 2/13/2020 1556  Last data filed at 2/13/2020 1455  Gross per 24 hour   Intake 750 ml   Output --   Net 750 ml       Lines/Drains/Airways     Peripheral Intravenous Line                 Peripheral IV - Single Lumen 02/13/20 1213 20 G Left Antecubital less than 1 day                Physical Exam   Constitutional: He is oriented to person, place, and time. He appears well-developed and well-nourished. No distress.   HENT:   Head: Normocephalic and atraumatic.   Eyes: Pupils are equal, round, and reactive to light. Right eye exhibits no discharge. Left eye exhibits no discharge.   Neck: Neck supple. No JVD present.   Cardiovascular: S1 normal and S2 normal. An irregularly irregular rhythm present. Tachycardia present.   No murmur heard.  Pulmonary/Chest: Effort normal and breath sounds normal. No respiratory distress. He has no wheezes. He has no rales.   Abdominal: Soft. He exhibits no distension.   Musculoskeletal: He exhibits no edema.   Neurological: He is alert and oriented to person, place, and time.   Skin: Skin is warm and dry. He is not diaphoretic. No erythema.   Psychiatric: He has a normal mood and affect. His behavior is normal. Thought content normal.   Nursing note and  vitals reviewed.      Significant Labs:   CMP   Recent Labs   Lab 02/13/20  1218      K 4.2      CO2 26   *   BUN 16   CREATININE 1.2   CALCIUM 8.7   PROT 7.7   ALBUMIN 2.7*   BILITOT 1.4*   ALKPHOS 69   AST 40   ALT 71*   ANIONGAP 12   ESTGFRAFRICA >60   EGFRNONAA 56*   , CBC   Recent Labs   Lab 02/13/20  1218   WBC 10.82   HGB 11.5*   HCT 36.3*   *   , Troponin   Recent Labs   Lab 02/13/20  1218   TROPONINI 0.014    and All pertinent lab results from the last 24 hours have been reviewed.    Significant Imaging: Echocardiogram:   2D echo with color flow doppler:   Results for orders placed or performed in visit on 07/29/19   2D echo with color flow doppler   Result Value Ref Range    QEF 60 55 - 65    Est. PA Systolic Pressure 22.36     Mitral Valve Mobility NORMAL     Narrative    Date of Procedure: 07/29/2019        TEST DESCRIPTION       Aorta: The aortic root is normal in size, measuring 4.3 cm at sinotubular junction and 4.3 cm at Sinuses of Valsalva. The proximal ascending aorta is normal in size, measuring 3.6 cm across.     Left Atrium: The left atrial volume index is mildly enlarged, measuring 40.11 cc/m2.     Left Ventricle: The left ventricle is normal in size, with an end-diastolic diameter of 5.0 cm, and an end-systolic diameter of 2.5 cm. Wall thickness is increased, with the septum and the posterior wall each measuring 1.8 cm across. Relative wall   thickness was increased at 0.72, and the LV mass index was increased at 200.0 g/m2 consistent with moderate concentric left ventricular hypertrophy. There are no regional wall motion abnormalities. Left ventricular systolic function appears normal.   Visually estimated ejection fraction is 60-65%. The LV Doppler derived stroke volume equals 115.0 ccs.     Diastolic indices: E wave velocity 1.1 m/s, E/A ratio 0.8,  msec., E/e' ratio(avg) 11. Diastolic function is indeterminate.     Right Atrium: The right atrium is normal in  size, measuring 6.7 cm in length and 4.1 cm in width in the apical view.     Right Ventricle: The right ventricle is normal in size measuring 3.1 cm at the base in the apical right ventricle-focused view. Global right ventricular systolic function appears normal. Tricuspid annular plane systolic excursion (TAPSE) is 2.4 cm. The   estimated PA systolic pressure is 22 mmHg.     Aortic Valve:  The aortic valve is normal in structure with normal leaflet mobility. The aortic valve is tri-leaflet in structure.     Mitral Valve:  The mitral valve is normal in structure with normal leaflet mobility. There is mitral annular calcification.     Tricuspid Valve:  The tricuspid valve is normal in structure.     Pulmonary Valve:  The pulmonic valve is normal in structure.     IVC: IVC is normal in size and collapses > 50% with a sniff, suggesting normal right atrial pressure of 3 mmHg.     Intracavitary: There is no evidence of pericardial effusion, intracavity mass, thrombi, or vegetation.         CONCLUSIONS     1 - Mild left atrial enlargement.     2 - Concentric hypertrophy.     3 - No wall motion abnormalities.     4 - Normal left ventricular systolic function (EF 60-65%).     5 - Normal right ventricular systolic function .     6 - The estimated PA systolic pressure is 22 mmHg.             This document has been electronically    SIGNED BY: Romeo Dawkins MD On: 07/29/2019 14:33   , EKG: Reviewed and X-Ray: CXR: X-Ray Chest 1 View (CXR): No results found for this visit on 02/13/20. and X-Ray Chest PA and Lateral (CXR): No results found for this visit on 02/13/20.    Assessment and Plan:   Patient who presents with SOB in setting of rapid afib/bilateral pleural effusions. Converted to SR. Low dose BB added. Large pericardial effusion also noted, echo pending. Consult CTS for possible pericardial window. Gently diurese.   * Atrial fibrillation with rapid ventricular response  -Presented with afib with RVR, new  onset  -Converted to SR once on telemetry floor  -Will add low dose Toprol XL 12.5 mg daily given borderline BP upon admission  -Heparin gtt initiated   -Assess response    Bilateral pleural effusion  -Will gently diurese and assess response  -2D echo pending    Chest pain  -Atypical and seems related to afib with RVR  -Trend troponin  -Continue ASA  -Low dose BB initiated  -Check 2D echo      Pericardial effusion  -2D echo pending  -Will likely need CTS consult for possible pericardial window        VTE Risk Mitigation (From admission, onward)         Ordered     heparin 25,000 units in dextrose 5% (100 units/ml) IV bolus from bag INITIAL BOLUS  Once     Question:  Heparin Infusion Adjustment (DO NOT MODIFY ANSWER)  Answer:  [email or web address]\epic\Images\Pharmacy\HeparinInfusions\heparin LOW INTENSITY nomogram for [facility] [id number]    02/13/20 1553     heparin 25,000 units in dextrose 5% 250 mL (100 units/mL) infusion LOW INTENSITY nomogram - [facility]  Continuous     Question:  Heparin Infusion Adjustment (DO NOT MODIFY ANSWER)  Answer:  [email or web address]\epic\Images\Pharmacy\HeparinInfusions\heparin LOW INTENSITY nomogram for [facility] [id number]    02/13/20 1553     heparin 25,000 units in dextrose 5% (100 units/ml) IV bolus from bag - ADDITIONAL PRN BOLUS - 60 units/kg  As needed (PRN)     Question:  Heparin Infusion Adjustment (DO NOT MODIFY ANSWER)  Answer:  [email or web address]\epic\Images\Pharmacy\HeparinInfusions\heparin LOW INTENSITY nomogram for [facility] [id number]    02/13/20 1553     heparin 25,000 units in dextrose 5% (100 units/ml) IV bolus from bag - ADDITIONAL PRN BOLUS - 30 units/kg  As needed (PRN)     Question:  Heparin Infusion Adjustment (DO NOT MODIFY ANSWER)  Answer:  [email or web address]\epic\Images\Pharmacy\HeparinInfusions\heparin LOW INTENSITY nomogram for [facility] [id number]    02/13/20 1553                Thank you for your consult. I will follow-up with patient. Please contact us if you have any additional questions.    Libia CERRATO  RADHA Earl  Cardiology   [facility]

## 2020-02-13 NOTE — HPI
Dominic Cohen is an 84 year old male who presented to the ED with complaints of SOB and chest pain for 4 to 5 days prior to presentation. The patient states that his SOB worsened today prompting his presentation to the ED. The chest pain is located in his mid lower chest and sometimes reproducible with palpation. The chest pain and SOB are worse with laying flat. He also notes worsening lower extremity edema and cough with thick white sputum production. The patient denies fever and chills. He feels that symptoms are associated with receiving the flu shot at [facility] on 1/27/20. This was upon discharge from a hospitalization for chest pain. During this hospitalization, he underwent stress echocardiogram on 1/27/20 that was negative. He states that since discharge, he has had upper respiratory tract symptoms and his blood pressure has been low. He states that Dr. Liu took him off his blood pressure medications on follow up on 2/3/20, but it is not documented. Also of note, the patient recently completed a medrol dose pack for gout symptoms. The patient denies a history of atrial fibrillation. In the ED, he was found to have atrial fibrillation with RVR. His rate improved with metoprolol IV. Labs were significant for a mildly elevated total bilirubin of 1.4, ALT of 71 and INR of 1.3. Code status was discussed with the patient. He is a full code. He was unwilling to give a surrogate medical decision maker at this time.

## 2020-02-13 NOTE — ASSESSMENT & PLAN NOTE
-Hypotension and tachycardia concerning for tamponade.   -Follow up echocardiogram.   -Cardiology consulted.

## 2020-02-14 ENCOUNTER — ANESTHESIA (OUTPATIENT)
Dept: SURGERY | Facility: HOSPITAL | Age: 85
DRG: 270 | End: 2020-02-14
Payer: MEDICARE

## 2020-02-14 ENCOUNTER — ANESTHESIA EVENT (OUTPATIENT)
Dept: SURGERY | Facility: HOSPITAL | Age: 85
DRG: 270 | End: 2020-02-14
Payer: MEDICARE

## 2020-02-14 PROBLEM — Z98.890 S/P PERICARDIAL WINDOW CREATION: Status: ACTIVE | Noted: 2020-02-14

## 2020-02-14 LAB
ABO + RH BLD: NORMAL
ALBUMIN FLD-MCNC: 2 G/DL
ALBUMIN SERPL BCP-MCNC: 2.3 G/DL (ref 3.5–5.2)
ALP SERPL-CCNC: 58 U/L (ref 55–135)
ALT SERPL W/O P-5'-P-CCNC: 63 U/L (ref 10–44)
ANION GAP SERPL CALC-SCNC: 8 MMOL/L (ref 8–16)
ANION GAP SERPL CALC-SCNC: 9 MMOL/L (ref 8–16)
APTT BLDCRRT: 27.8 SEC (ref 21–32)
APTT BLDCRRT: 28.5 SEC (ref 21–32)
AST SERPL-CCNC: 40 U/L (ref 10–40)
BASOPHILS # BLD AUTO: 0.05 K/UL (ref 0–0.2)
BASOPHILS NFR BLD: 0.6 % (ref 0–1.9)
BILIRUB SERPL-MCNC: 1.3 MG/DL (ref 0.1–1)
BLD GP AB SCN CELLS X3 SERPL QL: NORMAL
BUN SERPL-MCNC: 14 MG/DL (ref 8–23)
BUN SERPL-MCNC: 16 MG/DL (ref 8–23)
CALCIUM SERPL-MCNC: 8.3 MG/DL (ref 8.7–10.5)
CALCIUM SERPL-MCNC: 8.5 MG/DL (ref 8.7–10.5)
CHLORIDE SERPL-SCNC: 103 MMOL/L (ref 95–110)
CHLORIDE SERPL-SCNC: 103 MMOL/L (ref 95–110)
CO2 SERPL-SCNC: 24 MMOL/L (ref 23–29)
CO2 SERPL-SCNC: 27 MMOL/L (ref 23–29)
CREAT SERPL-MCNC: 0.9 MG/DL (ref 0.5–1.4)
CREAT SERPL-MCNC: 1.1 MG/DL (ref 0.5–1.4)
DIFFERENTIAL METHOD: ABNORMAL
EOSINOPHIL # BLD AUTO: 0.1 K/UL (ref 0–0.5)
EOSINOPHIL NFR BLD: 1.1 % (ref 0–8)
ERYTHROCYTE [DISTWIDTH] IN BLOOD BY AUTOMATED COUNT: 13.7 % (ref 11.5–14.5)
EST. GFR  (AFRICAN AMERICAN): >60 ML/MIN/1.73 M^2
EST. GFR  (AFRICAN AMERICAN): >60 ML/MIN/1.73 M^2
EST. GFR  (NON AFRICAN AMERICAN): >60 ML/MIN/1.73 M^2
EST. GFR  (NON AFRICAN AMERICAN): >60 ML/MIN/1.73 M^2
GLUCOSE FLD-MCNC: 117 MG/DL
GLUCOSE SERPL-MCNC: 108 MG/DL (ref 70–110)
GLUCOSE SERPL-MCNC: 172 MG/DL (ref 70–110)
HCT VFR BLD AUTO: 32.3 % (ref 40–54)
HGB BLD-MCNC: 10 G/DL (ref 14–18)
IMM GRANULOCYTES # BLD AUTO: 0.06 K/UL (ref 0–0.04)
IMM GRANULOCYTES NFR BLD AUTO: 0.7 % (ref 0–0.5)
INR PPP: 1.3 (ref 0.8–1.2)
LYMPHOCYTES # BLD AUTO: 0.9 K/UL (ref 1–4.8)
LYMPHOCYTES NFR BLD: 9.7 % (ref 18–48)
MAGNESIUM SERPL-MCNC: 2.1 MG/DL (ref 1.6–2.6)
MCH RBC QN AUTO: 31.8 PG (ref 27–31)
MCHC RBC AUTO-ENTMCNC: 31 G/DL (ref 32–36)
MCV RBC AUTO: 103 FL (ref 82–98)
MONOCYTES # BLD AUTO: 1.1 K/UL (ref 0.3–1)
MONOCYTES NFR BLD: 12 % (ref 4–15)
NEUTROPHILS # BLD AUTO: 6.9 K/UL (ref 1.8–7.7)
NEUTROPHILS NFR BLD: 75.9 % (ref 38–73)
NRBC BLD-RTO: 0 /100 WBC
PHOSPHATE SERPL-MCNC: 2.9 MG/DL (ref 2.7–4.5)
PLATELET # BLD AUTO: 322 K/UL (ref 150–350)
PMV BLD AUTO: 10.8 FL (ref 9.2–12.9)
POCT GLUCOSE: 114 MG/DL (ref 70–110)
POTASSIUM SERPL-SCNC: 4 MMOL/L (ref 3.5–5.1)
POTASSIUM SERPL-SCNC: 4.4 MMOL/L (ref 3.5–5.1)
PREALB SERPL-MCNC: 5 MG/DL (ref 20–43)
PROT FLD-MCNC: 4 G/DL
PROT SERPL-MCNC: 6.6 G/DL (ref 6–8.4)
PROTHROMBIN TIME: 13.5 SEC (ref 9–12.5)
RBC # BLD AUTO: 3.14 M/UL (ref 4.6–6.2)
SODIUM SERPL-SCNC: 135 MMOL/L (ref 136–145)
SODIUM SERPL-SCNC: 139 MMOL/L (ref 136–145)
SPECIMEN SOURCE: NORMAL
TROPONIN I SERPL DL<=0.01 NG/ML-MCNC: <0.006 NG/ML (ref 0–0.03)
WBC # BLD AUTO: 9.09 K/UL (ref 3.9–12.7)

## 2020-02-14 PROCEDURE — 88305 TISSUE EXAM BY PATHOLOGIST: CPT | Mod: 26,,, | Performed by: PATHOLOGY

## 2020-02-14 PROCEDURE — 97802 MEDICAL NUTRITION INDIV IN: CPT

## 2020-02-14 PROCEDURE — 84134 ASSAY OF PREALBUMIN: CPT

## 2020-02-14 PROCEDURE — 25000003 PHARM REV CODE 250

## 2020-02-14 PROCEDURE — 25000242 PHARM REV CODE 250 ALT 637 W/ HCPCS: Performed by: NURSE PRACTITIONER

## 2020-02-14 PROCEDURE — 99291 CRITICAL CARE FIRST HOUR: CPT | Mod: ,,, | Performed by: INTERNAL MEDICINE

## 2020-02-14 PROCEDURE — 25000003 PHARM REV CODE 250: Performed by: PHYSICIAN ASSISTANT

## 2020-02-14 PROCEDURE — 80053 COMPREHEN METABOLIC PANEL: CPT

## 2020-02-14 PROCEDURE — 99223 PR INITIAL HOSPITAL CARE,LEVL III: ICD-10-PCS | Mod: ,,, | Performed by: NURSE PRACTITIONER

## 2020-02-14 PROCEDURE — 25000003 PHARM REV CODE 250: Performed by: NURSE PRACTITIONER

## 2020-02-14 PROCEDURE — 33025 INCISION OF HEART SAC: CPT | Mod: ,,, | Performed by: THORACIC SURGERY (CARDIOTHORACIC VASCULAR SURGERY)

## 2020-02-14 PROCEDURE — 85730 THROMBOPLASTIN TIME PARTIAL: CPT | Mod: 91

## 2020-02-14 PROCEDURE — 86901 BLOOD TYPING SEROLOGIC RH(D): CPT

## 2020-02-14 PROCEDURE — 87205 SMEAR GRAM STAIN: CPT

## 2020-02-14 PROCEDURE — 63600175 PHARM REV CODE 636 W HCPCS: Performed by: NURSE PRACTITIONER

## 2020-02-14 PROCEDURE — 85610 PROTHROMBIN TIME: CPT

## 2020-02-14 PROCEDURE — 99291 PR CRITICAL CARE, E/M 30-74 MINUTES: ICD-10-PCS | Mod: ,,, | Performed by: INTERNAL MEDICINE

## 2020-02-14 PROCEDURE — 84157 ASSAY OF PROTEIN OTHER: CPT

## 2020-02-14 PROCEDURE — 63600175 PHARM REV CODE 636 W HCPCS: Performed by: NURSE ANESTHETIST, CERTIFIED REGISTERED

## 2020-02-14 PROCEDURE — 37000008 HC ANESTHESIA 1ST 15 MINUTES: Performed by: THORACIC SURGERY (CARDIOTHORACIC VASCULAR SURGERY)

## 2020-02-14 PROCEDURE — 88305 TISSUE EXAM BY PATHOLOGIST: ICD-10-PCS | Mod: 26,,, | Performed by: PATHOLOGY

## 2020-02-14 PROCEDURE — 33025 PR INCIS HEART SAC WINDW FOR DRAIN: ICD-10-PCS | Mod: ,,, | Performed by: THORACIC SURGERY (CARDIOTHORACIC VASCULAR SURGERY)

## 2020-02-14 PROCEDURE — 25000003 PHARM REV CODE 250: Performed by: THORACIC SURGERY (CARDIOTHORACIC VASCULAR SURGERY)

## 2020-02-14 PROCEDURE — S0073 INJECTION, AZTREONAM, 500 MG: HCPCS | Performed by: NURSE PRACTITIONER

## 2020-02-14 PROCEDURE — 82945 GLUCOSE OTHER FLUID: CPT

## 2020-02-14 PROCEDURE — 99223 1ST HOSP IP/OBS HIGH 75: CPT | Mod: ,,, | Performed by: NURSE PRACTITIONER

## 2020-02-14 PROCEDURE — 85025 COMPLETE CBC W/AUTO DIFF WBC: CPT

## 2020-02-14 PROCEDURE — 94640 AIRWAY INHALATION TREATMENT: CPT

## 2020-02-14 PROCEDURE — 85730 THROMBOPLASTIN TIME PARTIAL: CPT

## 2020-02-14 PROCEDURE — 63600175 PHARM REV CODE 636 W HCPCS: Performed by: THORACIC SURGERY (CARDIOTHORACIC VASCULAR SURGERY)

## 2020-02-14 PROCEDURE — 25000003 PHARM REV CODE 250: Performed by: NURSE ANESTHETIST, CERTIFIED REGISTERED

## 2020-02-14 PROCEDURE — 88305 TISSUE EXAM BY PATHOLOGIST: CPT | Mod: 59 | Performed by: PATHOLOGY

## 2020-02-14 PROCEDURE — 88112 CYTOPATH CELL ENHANCE TECH: CPT | Mod: 59 | Performed by: PATHOLOGY

## 2020-02-14 PROCEDURE — 83735 ASSAY OF MAGNESIUM: CPT

## 2020-02-14 PROCEDURE — 27201423 OPTIME MED/SURG SUP & DEVICES STERILE SUPPLY: Performed by: THORACIC SURGERY (CARDIOTHORACIC VASCULAR SURGERY)

## 2020-02-14 PROCEDURE — 21400001 HC TELEMETRY ROOM

## 2020-02-14 PROCEDURE — 36000710: Performed by: THORACIC SURGERY (CARDIOTHORACIC VASCULAR SURGERY)

## 2020-02-14 PROCEDURE — 27000221 HC OXYGEN, UP TO 24 HOURS

## 2020-02-14 PROCEDURE — 37000009 HC ANESTHESIA EA ADD 15 MINS: Performed by: THORACIC SURGERY (CARDIOTHORACIC VASCULAR SURGERY)

## 2020-02-14 PROCEDURE — 83615 LACTATE (LD) (LDH) ENZYME: CPT

## 2020-02-14 PROCEDURE — 87075 CULTR BACTERIA EXCEPT BLOOD: CPT

## 2020-02-14 PROCEDURE — 84100 ASSAY OF PHOSPHORUS: CPT

## 2020-02-14 PROCEDURE — C9290 INJ, BUPIVACAINE LIPOSOME: HCPCS | Performed by: THORACIC SURGERY (CARDIOTHORACIC VASCULAR SURGERY)

## 2020-02-14 PROCEDURE — 88112 CYTOPATH CELL ENHANCE TECH: CPT | Mod: 26,,, | Performed by: PATHOLOGY

## 2020-02-14 PROCEDURE — 71000033 HC RECOVERY, INTIAL HOUR: Performed by: THORACIC SURGERY (CARDIOTHORACIC VASCULAR SURGERY)

## 2020-02-14 PROCEDURE — 82042 OTHER SOURCE ALBUMIN QUAN EA: CPT

## 2020-02-14 PROCEDURE — 80048 BASIC METABOLIC PNL TOTAL CA: CPT

## 2020-02-14 PROCEDURE — C1729 CATH, DRAINAGE: HCPCS | Performed by: THORACIC SURGERY (CARDIOTHORACIC VASCULAR SURGERY)

## 2020-02-14 PROCEDURE — 36000711: Performed by: THORACIC SURGERY (CARDIOTHORACIC VASCULAR SURGERY)

## 2020-02-14 PROCEDURE — 94799 UNLISTED PULMONARY SVC/PX: CPT

## 2020-02-14 PROCEDURE — 99900038 HC OT GENERIC THERAPY SCREENING (STAT)

## 2020-02-14 PROCEDURE — 87206 SMEAR FLUORESCENT/ACID STAI: CPT | Mod: 91

## 2020-02-14 PROCEDURE — 88112 PR  CYTOPATH, CELL ENHANCE TECH: ICD-10-PCS | Mod: 26,,, | Performed by: PATHOLOGY

## 2020-02-14 PROCEDURE — 87102 FUNGUS ISOLATION CULTURE: CPT | Mod: 59

## 2020-02-14 PROCEDURE — 25000242 PHARM REV CODE 250 ALT 637 W/ HCPCS: Performed by: THORACIC SURGERY (CARDIOTHORACIC VASCULAR SURGERY)

## 2020-02-14 PROCEDURE — 87116 MYCOBACTERIA CULTURE: CPT | Mod: 59

## 2020-02-14 PROCEDURE — 36415 COLL VENOUS BLD VENIPUNCTURE: CPT

## 2020-02-14 PROCEDURE — 87070 CULTURE OTHR SPECIMN AEROBIC: CPT | Mod: 59

## 2020-02-14 PROCEDURE — 84484 ASSAY OF TROPONIN QUANT: CPT

## 2020-02-14 RX ORDER — POLYETHYLENE GLYCOL 3350 17 G/17G
17 POWDER, FOR SOLUTION ORAL DAILY
Status: DISCONTINUED | OUTPATIENT
Start: 2020-02-15 | End: 2020-02-14 | Stop reason: SDUPTHER

## 2020-02-14 RX ORDER — ALBUMIN HUMAN 50 G/1000ML
250 SOLUTION INTRAVENOUS
Status: DISCONTINUED | OUTPATIENT
Start: 2020-02-14 | End: 2020-02-18 | Stop reason: HOSPADM

## 2020-02-14 RX ORDER — IPRATROPIUM BROMIDE AND ALBUTEROL SULFATE 2.5; .5 MG/3ML; MG/3ML
3 SOLUTION RESPIRATORY (INHALATION) EVERY 4 HOURS
Status: DISCONTINUED | OUTPATIENT
Start: 2020-02-14 | End: 2020-02-14

## 2020-02-14 RX ORDER — DOCUSATE SODIUM 100 MG/1
100 CAPSULE, LIQUID FILLED ORAL DAILY
Status: DISCONTINUED | OUTPATIENT
Start: 2020-02-14 | End: 2020-02-18 | Stop reason: HOSPADM

## 2020-02-14 RX ORDER — SUCCINYLCHOLINE CHLORIDE 20 MG/ML
INJECTION INTRAMUSCULAR; INTRAVENOUS
Status: DISCONTINUED | OUTPATIENT
Start: 2020-02-14 | End: 2020-02-14

## 2020-02-14 RX ORDER — ONDANSETRON 2 MG/ML
4 INJECTION INTRAMUSCULAR; INTRAVENOUS EVERY 12 HOURS PRN
Status: DISCONTINUED | OUTPATIENT
Start: 2020-02-14 | End: 2020-02-18 | Stop reason: HOSPADM

## 2020-02-14 RX ORDER — BUPIVACAINE HYDROCHLORIDE 2.5 MG/ML
INJECTION, SOLUTION EPIDURAL; INFILTRATION; INTRACAUDAL
Status: DISCONTINUED | OUTPATIENT
Start: 2020-02-14 | End: 2020-02-14 | Stop reason: HOSPADM

## 2020-02-14 RX ORDER — ETOMIDATE 2 MG/ML
INJECTION INTRAVENOUS
Status: DISCONTINUED | OUTPATIENT
Start: 2020-02-14 | End: 2020-02-14

## 2020-02-14 RX ORDER — FENTANYL CITRATE 50 UG/ML
INJECTION, SOLUTION INTRAMUSCULAR; INTRAVENOUS
Status: DISCONTINUED | OUTPATIENT
Start: 2020-02-14 | End: 2020-02-14

## 2020-02-14 RX ORDER — CHLORHEXIDINE GLUCONATE ORAL RINSE 1.2 MG/ML
10 SOLUTION DENTAL
Status: DISCONTINUED | OUTPATIENT
Start: 2020-02-14 | End: 2020-02-14

## 2020-02-14 RX ORDER — POLYETHYLENE GLYCOL 3350 17 G/17G
17 POWDER, FOR SOLUTION ORAL DAILY
Status: DISCONTINUED | OUTPATIENT
Start: 2020-02-14 | End: 2020-02-18 | Stop reason: HOSPADM

## 2020-02-14 RX ORDER — ACETAMINOPHEN 325 MG/1
650 TABLET ORAL EVERY 6 HOURS
Status: DISPENSED | OUTPATIENT
Start: 2020-02-14 | End: 2020-02-16

## 2020-02-14 RX ORDER — DEXTROSE, SODIUM CHLORIDE, SODIUM LACTATE, POTASSIUM CHLORIDE, AND CALCIUM CHLORIDE 5; .6; .31; .03; .02 G/100ML; G/100ML; G/100ML; G/100ML; G/100ML
INJECTION, SOLUTION INTRAVENOUS CONTINUOUS
Status: ACTIVE | OUTPATIENT
Start: 2020-02-14 | End: 2020-02-15

## 2020-02-14 RX ORDER — OXYCODONE HYDROCHLORIDE 5 MG/1
5 TABLET ORAL EVERY 4 HOURS PRN
Status: DISCONTINUED | OUTPATIENT
Start: 2020-02-14 | End: 2020-02-18 | Stop reason: HOSPADM

## 2020-02-14 RX ORDER — SODIUM CHLORIDE, SODIUM LACTATE, POTASSIUM CHLORIDE, CALCIUM CHLORIDE 600; 310; 30; 20 MG/100ML; MG/100ML; MG/100ML; MG/100ML
500 INJECTION, SOLUTION INTRAVENOUS
Status: DISCONTINUED | OUTPATIENT
Start: 2020-02-14 | End: 2020-02-18 | Stop reason: HOSPADM

## 2020-02-14 RX ORDER — ROCURONIUM BROMIDE 10 MG/ML
INJECTION, SOLUTION INTRAVENOUS
Status: DISCONTINUED | OUTPATIENT
Start: 2020-02-14 | End: 2020-02-14

## 2020-02-14 RX ORDER — METOCLOPRAMIDE HYDROCHLORIDE 5 MG/ML
5 INJECTION INTRAMUSCULAR; INTRAVENOUS EVERY 6 HOURS PRN
Status: DISCONTINUED | OUTPATIENT
Start: 2020-02-14 | End: 2020-02-18 | Stop reason: HOSPADM

## 2020-02-14 RX ORDER — PHENYLEPHRINE HYDROCHLORIDE 10 MG/ML
INJECTION INTRAVENOUS
Status: DISCONTINUED | OUTPATIENT
Start: 2020-02-14 | End: 2020-02-14

## 2020-02-14 RX ORDER — VANCOMYCIN HCL IN 5 % DEXTROSE 1.5G/250ML
1500 PLASTIC BAG, INJECTION (ML) INTRAVENOUS
Status: COMPLETED | OUTPATIENT
Start: 2020-02-14 | End: 2020-02-14

## 2020-02-14 RX ORDER — IPRATROPIUM BROMIDE AND ALBUTEROL SULFATE 2.5; .5 MG/3ML; MG/3ML
3 SOLUTION RESPIRATORY (INHALATION)
Status: DISCONTINUED | OUTPATIENT
Start: 2020-02-14 | End: 2020-02-16

## 2020-02-14 RX ORDER — OXYCODONE HYDROCHLORIDE 5 MG/1
10 TABLET ORAL EVERY 4 HOURS PRN
Status: DISCONTINUED | OUTPATIENT
Start: 2020-02-14 | End: 2020-02-18 | Stop reason: HOSPADM

## 2020-02-14 RX ORDER — CHLORHEXIDINE GLUCONATE ORAL RINSE 1.2 MG/ML
10 SOLUTION DENTAL 2 TIMES DAILY
Status: DISCONTINUED | OUTPATIENT
Start: 2020-02-14 | End: 2020-02-18 | Stop reason: HOSPADM

## 2020-02-14 RX ADMIN — ROCURONIUM BROMIDE 5 MG: 10 INJECTION, SOLUTION INTRAVENOUS at 07:02

## 2020-02-14 RX ADMIN — AZTREONAM 2000 MG: 2 INJECTION, POWDER, LYOPHILIZED, FOR SOLUTION INTRAMUSCULAR; INTRAVENOUS at 04:02

## 2020-02-14 RX ADMIN — SODIUM CHLORIDE, SODIUM LACTATE, POTASSIUM CHLORIDE, AND CALCIUM CHLORIDE: .6; .31; .03; .02 INJECTION, SOLUTION INTRAVENOUS at 07:02

## 2020-02-14 RX ADMIN — TIMOLOL MALEATE 1 DROP: 5 SOLUTION/ DROPS OPHTHALMIC at 08:02

## 2020-02-14 RX ADMIN — SUGAMMADEX 200 MG: 100 INJECTION, SOLUTION INTRAVENOUS at 08:02

## 2020-02-14 RX ADMIN — FAMOTIDINE 20 MG: 20 TABLET ORAL at 10:02

## 2020-02-14 RX ADMIN — OXYCODONE HYDROCHLORIDE 10 MG: 5 TABLET ORAL at 11:02

## 2020-02-14 RX ADMIN — ONDANSETRON 4 MG: 2 INJECTION, SOLUTION INTRAMUSCULAR; INTRAVENOUS at 08:02

## 2020-02-14 RX ADMIN — LATANOPROST 1 DROP: 50 SOLUTION OPHTHALMIC at 08:02

## 2020-02-14 RX ADMIN — Medication 20 MG: at 08:02

## 2020-02-14 RX ADMIN — CHLORHEXIDINE GLUCONATE 0.12% ORAL RINSE 10 ML: 1.2 LIQUID ORAL at 08:02

## 2020-02-14 RX ADMIN — IPRATROPIUM BROMIDE AND ALBUTEROL SULFATE 3 ML: .5; 3 SOLUTION RESPIRATORY (INHALATION) at 07:02

## 2020-02-14 RX ADMIN — IPRATROPIUM BROMIDE AND ALBUTEROL SULFATE 3 ML: .5; 3 SOLUTION RESPIRATORY (INHALATION) at 03:02

## 2020-02-14 RX ADMIN — DOCUSATE SODIUM 100 MG: 100 CAPSULE, LIQUID FILLED ORAL at 10:02

## 2020-02-14 RX ADMIN — AZTREONAM 2 G: 2 INJECTION, POWDER, LYOPHILIZED, FOR SOLUTION INTRAMUSCULAR; INTRAVENOUS at 07:02

## 2020-02-14 RX ADMIN — POLYETHYLENE GLYCOL (3350) 17 G: 17 POWDER, FOR SOLUTION ORAL at 10:02

## 2020-02-14 RX ADMIN — ROCURONIUM BROMIDE 45 MG: 10 INJECTION, SOLUTION INTRAVENOUS at 07:02

## 2020-02-14 RX ADMIN — ETOMIDATE 16 MG: 2 INJECTION INTRAVENOUS at 07:02

## 2020-02-14 RX ADMIN — VANCOMYCIN HYDROCHLORIDE 1.5 MG: 100 INJECTION, POWDER, LYOPHILIZED, FOR SOLUTION INTRAVENOUS at 07:02

## 2020-02-14 RX ADMIN — FENTANYL CITRATE 100 MCG: 50 INJECTION, SOLUTION INTRAMUSCULAR; INTRAVENOUS at 08:02

## 2020-02-14 RX ADMIN — ACETAMINOPHEN 650 MG: 325 TABLET ORAL at 07:02

## 2020-02-14 RX ADMIN — ASPIRIN 81 MG: 81 TABLET ORAL at 10:02

## 2020-02-14 RX ADMIN — FENTANYL CITRATE 100 MCG: 50 INJECTION, SOLUTION INTRAMUSCULAR; INTRAVENOUS at 07:02

## 2020-02-14 RX ADMIN — DEXTROSE, SODIUM CHLORIDE, SODIUM LACTATE, POTASSIUM CHLORIDE, AND CALCIUM CHLORIDE: 5; .6; .31; .03; .02 INJECTION, SOLUTION INTRAVENOUS at 10:02

## 2020-02-14 RX ADMIN — METOPROLOL SUCCINATE 12.5 MG: 25 TABLET, EXTENDED RELEASE ORAL at 10:02

## 2020-02-14 RX ADMIN — TIMOLOL MALEATE 1 DROP: 5 SOLUTION/ DROPS OPHTHALMIC at 09:02

## 2020-02-14 RX ADMIN — PHENYLEPHRINE HYDROCHLORIDE 100 MCG: 10 INJECTION INTRAVENOUS at 07:02

## 2020-02-14 RX ADMIN — FAMOTIDINE 20 MG: 20 TABLET ORAL at 08:02

## 2020-02-14 RX ADMIN — SUCCINYLCHOLINE CHLORIDE 120 MG: 20 INJECTION, SOLUTION INTRAMUSCULAR; INTRAVENOUS at 07:02

## 2020-02-14 RX ADMIN — BUPIVACAINE 266 MG: 13.3 INJECTION, SUSPENSION, LIPOSOMAL INFILTRATION at 08:02

## 2020-02-14 NOTE — CONSULTS
[facility] -   Critical Care Medicine  Consult Note    Patient Name: Dominic Cohen  MRN: [id number]  Admission Date: 2/13/2020  Hospital Length of Stay: 1 days  Code Status: Full Code  Attending Physician: Ankush Graham MD   Primary Care Provider: Sudhakar Liu MD   Principal Problem: Atrial fibrillation with rapid ventricular response      Subjective:     HPI:  84 year old male with PMH including HTN; venous insufficiency; chronic constipation; glaucoma; arthritis; and former 52 pack year tobacco smoking history (quit in 2000)  Presented to ED on 2/13 with complaints of SOB, cough, LE edema, orthopnea, and chest tightness  EMS reported atrial fib with RVR en route, treated with diltiazem  Note recent hospitalization at [facility] for chest pain with negative stress test; received flu shot prior to d/c on 1/27 and he attributed current symptoms started after this  Eval revealed INR 1.3; mild anemia; glucose 138; albumin 2.7; t bili 1.4; lactate troponin and BNP wnl  A fib with RVR responded to metoprolol IV in ED  CXR with enlarged cardiac silhouette; Patchy infiltrate suspected within the left lower lobe with small to moderate left-sided pleural effusion.  Right lung is clear.  Left upper lobe is clear..  No pneumothorax  Admitted to telemetry floor for further eval and management  · Echo after admission revealed Large circumferential pericardial effusion. Effusion is fibrinous. 2.7 cm laterally located, posterior  · Severely decreased left ventricular systolic function. The estimated ejection fraction is 30%.  · Small left ventricular cavity size.  · Global hypokinetic wall motion.  · Moderate concentric left ventricular hypertrophy.  · Severely reduced right ventricular systolic function.  · Diastolic pattern consistent with atrial fibrillation observed.  · There is a large right pleural effusion.  · Elevated central venous pressure (15 mmHg).  · The estimated PA systolic pressure is 41  mmHg.  Pulmonary hypertension present.    Hospital/ICU Course:  Admitted to ICU today from PACU after creation of pericardial window with insertion of drainage catheter; he arrived to ICU awake and alert with mild HTN on 2L supplemental oxygen    Past Medical History:   Diagnosis Date    Arthritis     Atherosclerosis of abdominal aorta     Cataract     Chronic constipation     Glaucoma     History of anal fissures     Hypertension     Polyneuropathy in other diseases classified elsewhere     due to folate deficiency    Prediabetes     Venous insufficiency     Venous insufficiency        Past Surgical History:   Procedure Laterality Date    BACK SURGERY      CATARACT EXTRACTION BILATERAL W/ ANTERIOR VITRECTOMY      COLONOSCOPY N/A 2016    Procedure: COLONOSCOPY;  Surgeon: Presley Phillips MD;  [facility]  Service: Endoscopy;  Laterality: N/A;  EGD with Dr. Jeffery prior to Colonoscopy. EC    ESOPHAGOGASTRODUODENOSCOPY N/A 2018    Procedure: ESOPHAGOGASTRODUODENOSCOPY (EGD);  Surgeon: Khanh Asencio III, MD;  Location: [facility];  Service: Endoscopy;  Laterality: N/A;    JOINT REPLACEMENT Left     x 2    KNEE SURGERY Left     x2. revision 17    PROSTATE SURGERY      TONSILLECTOMY, ADENOIDECTOMY         Review of patient's allergies indicates:   Allergen Reactions    Penicillins Hives and Swelling    Protonix [pantoprazole] Rash    Sulfa (sulfonamide antibiotics) Hives       Family History     Problem Relation (Age of Onset)    No Known Problems Son, Daughter, Son        Tobacco Use    Smoking status: Former Smoker     Packs/day: 0.30     Years: 52.00     Pack years: 15.60     Types: Cigarettes     Last attempt to quit: 2000     Years since quittin.2    Smokeless tobacco: Never Used   Substance and Sexual Activity    Alcohol use: No    Drug use: No    Sexual activity: Yes         Review of Systems   Constitutional: Negative for chills and  fever.   HENT: Negative for facial swelling and sore throat.    Respiratory: Negative for chest tightness.    Cardiovascular: Positive for chest pain (post op pain) and leg swelling. Negative for palpitations.   Gastrointestinal: Negative for abdominal pain, nausea and vomiting.   Musculoskeletal: Negative.    Skin: Positive for wound. Negative for color change and pallor.   Neurological: Negative for speech difficulty and headaches.   Psychiatric/Behavioral: The patient is not nervous/anxious.      Objective:     Vital Signs (Most Recent):  Temp: 98.9 °F (37.2 °C) (02/14/20 1105)  Pulse: 84 (02/14/20 1105)  Resp: 17 (02/14/20 1105)  BP: (!) 153/78 (02/14/20 1105)  SpO2: 100 % (02/14/20 1105) Vital Signs (24h Range):  Temp:  [98 °F (36.7 °C)-99.8 °F (37.7 °C)] 98.9 °F (37.2 °C)  Pulse:  [] 84  Resp:  [11-24] 17  SpO2:  [93 %-100 %] 100 %  BP: ()/(55-86) 153/78  Arterial Line BP: (142-154)/(60-69) 154/69     Weight: 124 kg (273 lb 5.9 oz)  Body mass index is 33.28 kg/m².      Intake/Output Summary (Last 24 hours) at 2/14/2020 1148  Last data filed at 2/14/2020 1100  Gross per 24 hour   Intake 2450.65 ml   Output 1580 ml   Net 870.65 ml       Physical Exam   Constitutional: He is oriented to person, place, and time. He appears well-nourished. He is cooperative. He does not appear ill. No distress.       HENT:   Head: Atraumatic.   Eyes: Pupils are equal, round, and reactive to light. Conjunctivae are normal.   Neck: No JVD present.   Cardiovascular: Normal rate. An irregularly irregular rhythm present.   Pulses:       Radial pulses are 2+ on the right side, and 2+ on the left side.        Dorsalis pedis pulses are 2+ on the right side, and 2+ on the left side.   Pulmonary/Chest: No accessory muscle usage. No respiratory distress. He has decreased breath sounds. He has no wheezes. He has rhonchi. He has no rales.   Abdominal: Soft. Bowel sounds are normal. There is no tenderness.   Neurological: He is alert  and oriented to person, place, and time.   Skin: Skin is warm and dry. Capillary refill takes less than 2 seconds. No cyanosis.   Psychiatric: He has a normal mood and affect. His behavior is normal. Judgment and thought content normal.       Vents:  Oxygen Concentration (%): 98 (02/14/20 0905)    Lines/Drains/Airways     Drain                 Urethral Catheter 02/14/20 0715 Latex;Straight-tip 16 Fr. less than 1 day         Y Chest Tube 1 and 2 02/14/20 0820 1 Anterior Pericardial 32 Fr. 2 Left Pleural 32 Fr. less than 1 day          Arterial Line                 Arterial Line 02/14/20 0709 Right Radial less than 1 day         Arterial Line 02/14/20 0800 Right Radial less than 1 day          Peripheral Intravenous Line                 Peripheral IV - Single Lumen 02/13/20 1500 22 G Left Hand less than 1 day         Peripheral IV - Single Lumen 02/14/20 0502 18 G Right Antecubital less than 1 day                Significant Labs:    CBC/Anemia Profile:  Recent Labs   Lab 02/13/20  1218 02/14/20  0430   WBC 10.82 9.09   HGB 11.5* 10.0*   HCT 36.3* 32.3*   * 322   * 103*   RDW 13.4 13.7        Chemistries:  Recent Labs   Lab 02/13/20  1218 02/13/20  1612 02/14/20  0430     --  139   K 4.2  --  4.0     --  103   CO2 26  --  27   BUN 16  --  16   CREATININE 1.2  --  1.1   CALCIUM 8.7  --  8.5*   ALBUMIN 2.7*  --  2.3*   PROT 7.7  --  6.6   BILITOT 1.4*  --  1.3*   ALKPHOS 69  --  58   ALT 71*  --  63*   AST 40  --  40   MG  --  2.2 2.1   PHOS  --   --  2.9       All pertinent labs within the past 24 hours have been reviewed.    Significant Imaging:   I have reviewed all pertinent imaging results/findings within the past 24 hours.      ABG  No results for input(s): PH, PO2, PCO2, HCO3, BE in the last 168 hours.  Assessment/Plan:     Pulmonary  Bilateral pleural effusion  With left sided chest tubes in place  CTS managing  Encourage pulmonary toilet for prevention of atelectasis +or  pneumonia  Monitor CXR and chest tube output  Fluid studies pending    Cardiac/Vascular  * Atrial fibrillation with rapid ventricular response  Rate controlled   Heparin infusion stopped for surgery; will defer risk benefit of anticoagulation post op to CTS  ICU hemodynamic monitoring    Pericardial effusion  Now s/p pericardial window with CT in place and CTS managing    Preventive Measures:   Nutrition: cardiac diet  Stress Ulcer: pepcid  DVT: SCDs  BB: metoprolol  Bowel Prophylaxis: miralax  Head of Bed/Reposition: Elevate HOB and turn Q1-2 hours    Mobility: PT/OT to mobilize  Harper Day: 1  CT Day: 1  IVAB Day: 1  Code Status: Full    Counseling/Consultation: I have discussed the care of this patient in detail with nursing staff and Dr. Whyte. Status and plan of care discussed with team on multidisciplinary rounds.   Thank you for your consult. Will follow while in ICU and sign off on transfer out.      ALFONSO Redding-BC  Critical Care Medicine  [facility]

## 2020-02-14 NOTE — PLAN OF CARE
Pt AAOx4 .POC reviewed with pt. Pt verbalized understanding   Pt remains free of injuries and falls; fall precaution in place   SR on tele monitor. HR 70's  IV saline locked; intact heparin drip D/C at 0400  No C/O of pain  NPO after midnight  Pericardial window scheduled 02/14   2L O2  via NC  Bed low, side rails up x2, non slip socks in use, call light in reach   Reminded to call for assistance  [place]ourly rounding complete will continue to monitor

## 2020-02-14 NOTE — ASSESSMENT & PLAN NOTE
-2D echo pending  -Will likely need CTS consult for possible pericardial window    2/14/2020  -s/p pericardial window  -Mgmt as per CTS  -Will follow

## 2020-02-14 NOTE — ASSESSMENT & PLAN NOTE
-Continue rate control.   -Follow up echocardiogram.  -Hold anticoagulation until echocardiogram reviewed by Cardiology due to concern for tamponade.    -Cardiology consult.       2/14:  Currently rate controlled   Cont current management  In ICU per primary    28-Jan-2019

## 2020-02-14 NOTE — PLAN OF CARE
Recommendations     Recommendation: 1. Add cardiac restriction to current diet order. 2. Add boost plus BID. 3. RD to f/u.   Goals: Meet > 85 % EEN/EPN by RD f/u   Nutrition Goal Status: new  Communication of RD Recs: (POc, sticky note)

## 2020-02-14 NOTE — PROGRESS NOTES
[facility]  Cardiology  Progress Note    Patient Name: Dominic Cohen  MRN: [id number]  Admission Date: 2/13/2020  Hospital Length of Stay: 1 days  Code Status: Full Code   Attending Physician: Ankush Graham MD   Primary Care Physician: Sudhakar Liu MD  Expected Discharge Date:   Principal Problem:Atrial fibrillation with rapid ventricular response    Subjective:   HPI:  Mr. Cohen is an 84 year old male patient whose current medical conditions include HTN,CVI, polyneuropathy, and abdominal atherosclerosis who presented to [facility] ED today with a chief complaint of progressively worsening SOB over the past 4-5 days. Associated symptoms included BLE edema, congestion, orthopnea, and pleuritic chest pain/soreness. Patient denied any associated lightheadedness, dizziness, palpitations, near syncope, syncope, fever, or chills. Initial workup in ED revealed elevated Tbili (1.4), ALT of 71, and INR of 1.3. CTA of chest showed large pericardial effusion and bilateral pleural effusions. EKG showed afib with RVR and patient subsequently admitted for further evaluation and treatment. Cardiology consulted to assist with management. Patient seen and examined while in ED. Remains SOB with conversational dyspnea. Reports some chest discomfort with deep breathing and at time, palpation. Denies any prior history of afib/CAD. States he was recently hospitalized at [facility] in late January due to chest pain. Workup at that time, including, stress echo was negative, but he has been experiencing URI type symptoms since his discharge. Chart reviewed. BNP normal. Troponin negative. 2D echo pending.    Hospital Course:   2/14/2020-Patient seen and examined today, s/p pericardial window. No complaints. Feels well. SOB improved. Remains in SR.        Review of Systems   Constitution: Positive for malaise/fatigue.   HENT: Negative.    Eyes: Negative.    Cardiovascular: Negative.    Respiratory: Negative.     Endocrine: Negative.    Hematologic/Lymphatic: Negative.    Skin: Negative.    Musculoskeletal: Negative.    Gastrointestinal: Negative.    Genitourinary: Negative.    Neurological: Negative.    Psychiatric/Behavioral: Negative.    Allergic/Immunologic: Negative.      Objective:     Vital Signs (Most Recent):  Temp: 98.1 °F (36.7 °C) (02/14/20 1505)  Pulse: 73 (02/14/20 1534)  Resp: 16 (02/14/20 1534)  BP: 117/69 (02/14/20 1505)  SpO2: 97 % (02/14/20 1534) Vital Signs (24h Range):  Temp:  [98 °F (36.7 °C)-99 °F (37.2 °C)] 98.1 °F (36.7 °C)  Pulse:  [68-86] 73  Resp:  [11-23] 16  SpO2:  [93 %-100 %] 97 %  BP: (100-163)/(64-86) 117/69  Arterial Line BP: (142-154)/(60-69) 154/69     Weight: 124 kg (273 lb 5.9 oz)  Body mass index is 33.28 kg/m².     SpO2: 97 %  O2 Device (Oxygen Therapy): room air      Intake/Output Summary (Last 24 hours) at 2/14/2020 1637  Last data filed at 2/14/2020 1500  Gross per 24 hour   Intake 2150.65 ml   Output 1855 ml   Net 295.65 ml       Lines/Drains/Airways     Drain                 Urethral Catheter 02/14/20 0715 Latex;Straight-tip 16 Fr. less than 1 day         Y Chest Tube 1 and 2 02/14/20 0820 1 Anterior Pericardial 32 Fr. 2 Left Pleural 32 Fr. less than 1 day          Arterial Line                 Arterial Line 02/14/20 0709 Right Radial less than 1 day          Peripheral Intravenous Line                 Peripheral IV - Single Lumen 02/13/20 1500 22 G Left Hand 1 day         Peripheral IV - Single Lumen 02/14/20 0502 18 G Right Antecubital less than 1 day                Physical Exam   Constitutional: He is oriented to person, place, and time. He appears well-developed and well-nourished. No distress.   HENT:   Head: Normocephalic and atraumatic.   Eyes: Pupils are equal, round, and reactive to light. Right eye exhibits no discharge. Left eye exhibits no discharge.   Neck: Neck supple. No JVD present.   Cardiovascular: Normal rate, regular rhythm, S1 normal, S2 normal and normal  heart sounds.   No murmur heard.  Pulmonary/Chest: Effort normal. No respiratory distress. He has no wheezes.   Incision C/D/I; dressed    Diminished BS at bases   Abdominal: Soft. He exhibits no distension. There is no rebound.   Musculoskeletal: He exhibits no edema.   Neurological: He is alert and oriented to person, place, and time.   Skin: Skin is warm and dry. He is not diaphoretic. No erythema.   Psychiatric: He has a normal mood and affect. His behavior is normal. Thought content normal.   Nursing note and vitals reviewed.      Significant Labs:   CMP   Recent Labs   Lab 02/13/20  1218 02/14/20  0430    139   K 4.2 4.0    103   CO2 26 27   * 108   BUN 16 16   CREATININE 1.2 1.1   CALCIUM 8.7 8.5*   PROT 7.7 6.6   ALBUMIN 2.7* 2.3*   BILITOT 1.4* 1.3*   ALKPHOS 69 58   AST 40 40   ALT 71* 63*   ANIONGAP 12 9   ESTGFRAFRICA >60 >60   EGFRNONAA 56* >60   , CBC   Recent Labs   Lab 02/13/20  1218 02/14/20  0430   WBC 10.82 9.09   HGB 11.5* 10.0*   HCT 36.3* 32.3*   * 322   , Troponin   Recent Labs   Lab 02/13/20  1218 02/13/20  1821 02/14/20  0008   TROPONINI 0.014 0.010 <0.006    and All pertinent lab results from the last 24 hours have been reviewed.    Significant Imaging: Echocardiogram:   2D echo with color flow doppler:   Results for orders placed or performed in visit on 07/29/19   2D echo with color flow doppler   Result Value Ref Range    QEF 60 55 - 65    Est. PA Systolic Pressure 22.36     Mitral Valve Mobility NORMAL     Narrative    Date of Procedure: 07/29/2019        TEST DESCRIPTION       Aorta: The aortic root is normal in size, measuring 4.3 cm at sinotubular junction and 4.3 cm at Sinuses of Valsalva. The proximal ascending aorta is normal in size, measuring 3.6 cm across.     Left Atrium: The left atrial volume index is mildly enlarged, measuring 40.11 cc/m2.     Left Ventricle: The left ventricle is normal in size, with an end-diastolic diameter of 5.0 cm, and an  end-systolic diameter of 2.5 cm. Wall thickness is increased, with the septum and the posterior wall each measuring 1.8 cm across. Relative wall   thickness was increased at 0.72, and the LV mass index was increased at 200.0 g/m2 consistent with moderate concentric left ventricular hypertrophy. There are no regional wall motion abnormalities. Left ventricular systolic function appears normal.   Visually estimated ejection fraction is 60-65%. The LV Doppler derived stroke volume equals 115.0 ccs.     Diastolic indices: E wave velocity 1.1 m/s, E/A ratio 0.8,  msec., E/e' ratio(avg) 11. Diastolic function is indeterminate.     Right Atrium: The right atrium is normal in size, measuring 6.7 cm in length and 4.1 cm in width in the apical view.     Right Ventricle: The right ventricle is normal in size measuring 3.1 cm at the base in the apical right ventricle-focused view. Global right ventricular systolic function appears normal. Tricuspid annular plane systolic excursion (TAPSE) is 2.4 cm. The   estimated PA systolic pressure is 22 mmHg.     Aortic Valve:  The aortic valve is normal in structure with normal leaflet mobility. The aortic valve is tri-leaflet in structure.     Mitral Valve:  The mitral valve is normal in structure with normal leaflet mobility. There is mitral annular calcification.     Tricuspid Valve:  The tricuspid valve is normal in structure.     Pulmonary Valve:  The pulmonic valve is normal in structure.     IVC: IVC is normal in size and collapses > 50% with a sniff, suggesting normal right atrial pressure of 3 mmHg.     Intracavitary: There is no evidence of pericardial effusion, intracavity mass, thrombi, or vegetation.         CONCLUSIONS     1 - Mild left atrial enlargement.     2 - Concentric hypertrophy.     3 - No wall motion abnormalities.     4 - Normal left ventricular systolic function (EF 60-65%).     5 - Normal right ventricular systolic function .     6 - The estimated PA  systolic pressure is 22 mmHg.             This document has been electronically    SIGNED BY: Romeo Dawkins MD On: 07/29/2019 14:33   , EKG: Reviewed and X-Ray: CXR: X-Ray Chest 1 View (CXR): No results found for this visit on 02/13/20. and X-Ray Chest PA and Lateral (CXR): No results found for this visit on 02/13/20.    Assessment and Plan:   Patient who presents with afib with RVR/acute CHF and large pericardial effusion. Stable s/p pericardial window. Remains in SR. Needs AC on board when ok with CTS. Further optimization of meds pending clinical course.     * Atrial fibrillation with rapid ventricular response  -Presented with afib with RVR, new onset  -Converted to SR once on telemetry floor  -Will add low dose Toprol XL 12.5 mg daily given borderline BP upon admission  -Heparin gtt initiated   -Assess response    2/14/2020  -Continue low dose BB  -Remains in SR  -Needs AC on board when ok with CTS    Bilateral pleural effusion  -Will gently diurese and assess response  -2D echo pending      Chest pain  -Atypical and seems related to afib with RVR  -Trend troponin  -Continue ASA  -Low dose BB initiated  -Check 2D echo    2/14/2020  -2D echo showed EF of 30%, severely depressed RV function  -Continue ASA, BB  -Serial troponin negative  -Consider stress test as OP      Pericardial effusion  -2D echo pending  -Will likely need CTS consult for possible pericardial window    2/14/2020  -s/p pericardial window  -Mgmt as per CTS  -Will follow        VTE Risk Mitigation (From admission, onward)         Ordered     IP VTE LOW RISK PATIENT  Once      02/14/20 0635     Place TALAT hose  Until discontinued      02/14/20 0635     Place sequential compression device  Until discontinued      02/14/20 0635                Libia Earl PA-C  Cardiology  [facility]

## 2020-02-14 NOTE — CONSULTS
"  [facility] -   Adult Nutrition  Consult Note    SUMMARY     Recommendations    Recommendation: 1. Add cardiac restriction to current diet order. 2. Add boost plus BID. 3. RD to f/u.   Goals: Meet > 85 % EEN/EPN by RD f/u   Nutrition Goal Status: new  Communication of RD Recs: (POc, sticky note)    Reason for Assessment    Reason For Assessment: consult  Diagnosis: AF, SOB, Pericardial effusion   Relevant Medical History:  HTN, Prediabetes, Arthritis, Atherosclerosis of abdominal aorta, HTN  Interdisciplinary Rounds: attended  General Information Comments: Pt in ICU. S/p creation pericardial window. Pt not in the room at time of visit. Unable to perform NFPE d/t this. Will complete at f/u.   Nutrition Discharge Planning: Cardiac diet     Nutrition Risk Screen    Nutrition Risk Screen: other (see comments)(poor appetite)    Nutrition/Diet History    Spiritual, Cultural Beliefs, Yarsani Practices, Values that Affect Care: no    Anthropometrics    Temp: 99 °F (37.2 °C)  Height: 6' 4" (193 cm)  Height (inches): 76 in  Weight Method: Bed Scale  Weight: 124 kg (273 lb 5.9 oz)  Weight (lb): 273.37 lb  Ideal Body Weight (IBW), Male: 202 lb  % Ideal Body Weight, Male (lb): 135.33 %  BMI (Calculated): 33.3  BMI Grade: 30 - 34.9- obesity - grade I       Lab/Procedures/Meds    Pertinent Labs Reviewed: reviewed  BMP  Lab Results   Component Value Date     02/14/2020    K 4.0 02/14/2020     02/14/2020    CO2 27 02/14/2020    BUN 16 02/14/2020    CREATININE 1.1 02/14/2020    CALCIUM 8.5 (L) 02/14/2020    ANIONGAP 9 02/14/2020    ESTGFRAFRICA >60 02/14/2020    EGFRNONAA >60 02/14/2020     Lab Results   Component Value Date    CALCIUM 8.5 (L) 02/14/2020    PHOS 2.9 02/14/2020     Lab Results   Component Value Date    ALBUMIN 2.3 (L) 02/14/2020     ,  Recent Labs   Lab 02/14/20  0653   POCTGLUCOSE 114*       Pertinent Medications Reviewed: reviewed      Estimated/Assessed Needs    Weight Used For Calorie " Calculations: 124 kg (273 lb 5.9 oz)  Energy Calorie Requirements (kcal): 2041 - 2449  Energy Need Method: [place]-St Jeor(x1 - 1.2)  Protein Requirements: 183 g  Weight Used For Protein Calculations: 91.6 kg (202 lb)(IBW - obese ICU)     Estimated Fluid Requirement Method: RDA Method(or per MD)  RDA Method (mL): 2041         Nutrition Prescription Ordered    Current Diet Order: Regular diet    Evaluation of Received Nutrient/Fluid Intake       % Intake of Estimated Energy Needs: 50 - 75 %  % Meal Intake: 50 - 75 %    Nutrition Risk      2xweekly    Assessment and Plan    Nutrition Problem  Decreased nutrient needs (fat, Na)    Related to (etiology):   Current dx and past medical hx    Signs and Symptoms (as evidenced by):   AF, Pericardial effusion, hx of HTN, Atherosclerosis of abdominal aorta     Interventions  Mineral/fat modified diet     Nutrition Diagnosis Status:   New      Monitor and Evaluation    Food and Nutrient Intake: food and beverage intake, energy intake  Food and Nutrient Adminstration: diet order  Anthropometric Measurements: weight  Biochemical Data, Medical Tests and Procedures: electrolyte and renal panel, glucose/endocrine profile  Nutrition-Focused Physical Findings: overall appearance     Malnutrition Assessment           to be completed at f/u                             Nutrition Follow-Up    RD Follow-up?: Yes

## 2020-02-14 NOTE — ANESTHESIA PROCEDURE NOTES
Arterial    Diagnosis: Pericarial Tamponade    Patient location during procedure: done in OR  Procedure start time: 2/14/2020 7:09 AM  Timeout: 2/14/2020 7:09 AM  Procedure end time: 2/14/2020 7:13 AM    Staffing  Authorizing Provider: Andrews Bello MD  Performing Provider: Andrews Bello MD    Anesthesiologist was present at the time of the procedure.    Preanesthetic Checklist  Completed: patient identified, pre-op evaluation, timeout performed, IV checked, risks and benefits discussed, monitors and equipment checked and anesthesia consent givenArterial  Skin Prep: chlorhexidine gluconate  Local Infiltration: lidocaine  Orientation: right  Location: radial  Catheter Size: 20 G  Catheter placement by Anatomical landmarks. Heme positive aspiration all ports.Insertion Attempts: 1  Assessment  Dressing: secured with tape and tegaderm  Patient: Tolerated well

## 2020-02-14 NOTE — ADDENDUM NOTE
Addendum  created 02/14/20 1128 by Andrews Bello MD    Child order released for a procedure order, Intraprocedure Blocks edited, LDA created via procedure documentation, Sign clinical note

## 2020-02-14 NOTE — H&P (VIEW-ONLY)
"[facility]  Cardiothoracic Surgery  Consult Note    Patient Name: Dominic Cohen  MRN: [id number]  Admission Date: 2/13/2020  Attending Physician: Jeromy Day MD  Referring Provider: Self, Aaareferral    Patient information was obtained from patient, past medical records and ER records.     Consults  Subjective:     Principal Problem: Atrial fibrillation with rapid ventricular response    History of Present Illness: The patient is an 84 year old male with large pericardial window and JOSLYN pleural effusions that presented to the [facility] emergency department with chest pain and sob, worked up for A-fib w/ RVR. Patient has HTN, atherosclerosis of the abdominal aorta, arthritis, polyneuropathy, prediabetes, venous insufficiency, gout, former smoker 0.5 ppd x 52 years. The patient was in his usual state of health prior to 3 weeks ago, when he began experiencing a "heavy feeling on his chest" and fatigue. 1 week ago the patient began experiencing  Intermittent sharp, burning, non radiating substernal chest pain with associated SOB and fatigue. + orthopnea. Reports a productive cough x 1 week. Patient was recently discharged from [facility] on Sunday, worked up for chest pain, patient reports "all tests were negative" and he was discharged. Patient reports negative stress test and negative echocardiogram at [facility]. Today at [facility] patient converted from A-fib RVR to NSR with IV metoprolol. Patient remains NSR. Currently on heparin gtt. ED workup showed negative troponin, normal BNP, elevated total bilirubin of 1.4, ALT of 71 and INR of 1.3, large pericardial effusion confirmed via CT scan and TTE, JOSLYN pleural effusions. Patient performs ADLs independently. Reports that he ambulates well. He drives. Denies palpitations, fever, malaise, weakness, syncope, tremors, falls, abdominal pain, diarrhea, and edema.       No current facility-administered medications on file prior to encounter.      Current Outpatient " Medications on File Prior to Encounter   Medication Sig    ascorbic acid, vitamin C, (VITAMIN C) 60 mg Lozg Take 1 tablet by mouth daily as needed.     aspirin (ECOTRIN) 81 MG EC tablet Take 81 mg by mouth as needed.     cyanocobalamin, vitamin B-12, (VITAMIN B-12) 50 mcg tablet Take 50 mcg by mouth once daily.    ERGOCALCIFEROL, VITAMIN D2, (VITAMIN D ORAL) Take 1 tablet by mouth once daily.     ferrous sulfate (FEOSOL) 325 mg (65 mg iron) Tab tablet Take 325 mg by mouth.    magnesium 30 mg Tab Take 30 mg by mouth.    multivitamin with folic acid 400 mcg Tab Take 1 tablet by mouth.    POTASSIUM CHLORIDE ORAL Take 1 tablet by mouth.    baclofen (LIORESAL) 10 MG tablet Take 1 tablet (10 mg total) by mouth nightly as needed.    betamethasone dipropionate (DIPROLENE) 0.05 % ointment Apply topically 2 (two) times daily as needed.    blood sugar diagnostic (BLOOD GLUCOSE TEST) Strp 1 strip by Misc.(Non-Drug; Combo Route) route 3 (three) times a week.    blood sugar diagnostic Strp 1 strip by Misc.(Non-Drug; Combo Route) route 2 (two) times daily.    ergocalciferol, vitamin D2, 400 unit Tab Take 1 tablet by mouth.    lancets 30 gauge Misc 1 lancet by Misc.(Non-Drug; Combo Route) route 2 (two) times daily.    latanoprost 0.005 % ophthalmic solution Place 1 drop into both eyes every evening.    linaclotide (LINZESS) 145 mcg Cap capsule Take 1 capsule (145 mcg total) by mouth once daily.    methylPREDNISolone (MEDROL DOSEPACK) 4 mg tablet use as directed    ranitidine (ZANTAC) 150 MG tablet Take 1 tablet (150 mg total) by mouth 2 (two) times daily.    timolol maleate 0.5% (TIMOPTIC) 0.5 % Drop Place 1 drop into both eyes 2 (two) times daily.    triamcinolone acetonide 0.1% (KENALOG) 0.1 % cream Apply topically 2 (two) times daily. For rash of back.    [DISCONTINUED] FUROSEMIDE ORAL Take 50 mg by mouth.       Review of patient's allergies indicates:   Allergen Reactions    Penicillins Hives and Swelling     Protonix [pantoprazole] Rash    Sulfa (sulfonamide antibiotics) Hives       Past Medical History:   Diagnosis Date    Arthritis     Atherosclerosis of abdominal aorta     Cataract     Chronic constipation     Glaucoma     History of anal fissures     Hypertension     Polyneuropathy in other diseases classified elsewhere     due to folate deficiency    Prediabetes     Venous insufficiency     Venous insufficiency      Past Surgical History:   Procedure Laterality Date    BACK SURGERY      CATARACT EXTRACTION BILATERAL W/ ANTERIOR VITRECTOMY      COLONOSCOPY N/A 2016    Procedure: COLONOSCOPY;  Surgeon: Presley Phillips MD;  Location: [facility] ([address] [place] [address]);  Service: Endoscopy;  Laterality: N/A;  EGD with Dr. Jeffery prior to Colonoscopy. EC    ESOPHAGOGASTRODUODENOSCOPY N/A 2018    Procedure: ESOPHAGOGASTRODUODENOSCOPY (EGD);  Surgeon: Khanh Asencio III, MD;  Location: [facility];  Service: Endoscopy;  Laterality: N/A;    JOINT REPLACEMENT Left     x 2    KNEE SURGERY Left     x2. revision 17    PROSTATE SURGERY      TONSILLECTOMY, ADENOIDECTOMY       Family History     Problem Relation (Age of Onset)    No Known Problems Son, Daughter, Son        Tobacco Use    Smoking status: Former Smoker     Packs/day: 0.30     Years: 52.00     Pack years: 15.60     Types: Cigarettes     Last attempt to quit: 2000     Years since quittin.2    Smokeless tobacco: Never Used   Substance and Sexual Activity    Alcohol use: No    Drug use: No    Sexual activity: Yes     Review of Systems   Constitutional: Positive for activity change and fatigue. Negative for appetite change, chills, diaphoresis, fever and unexpected weight change.   HENT: Positive for congestion, postnasal drip and sinus pressure. Negative for dental problem, drooling, ear discharge, ear pain, facial swelling, hearing loss, mouth sores, nosebleeds, rhinorrhea, sinus pain, sneezing, sore throat, tinnitus, trouble  swallowing and voice change.    Eyes: Positive for visual disturbance. Negative for photophobia, pain, discharge, redness and itching.   Respiratory: Positive for cough, chest tightness, shortness of breath and wheezing. Negative for apnea, choking and stridor.         Productive cough x 1 week.    Cardiovascular: Positive for chest pain and leg swelling. Negative for palpitations.   Gastrointestinal: Positive for constipation. Negative for abdominal distention, abdominal pain, anal bleeding, blood in stool, diarrhea, nausea, rectal pain and vomiting.   Endocrine: Negative for cold intolerance, heat intolerance, polydipsia, polyphagia and polyuria.   Genitourinary: Negative for decreased urine volume, difficulty urinating, discharge, dysuria, enuresis, flank pain, frequency, genital sores, hematuria, penile pain, penile swelling, scrotal swelling, testicular pain and urgency.        Incontinent. Wears  Brief   Musculoskeletal: Positive for arthralgias, back pain and gait problem. Negative for joint swelling, myalgias, neck pain and neck stiffness.   Skin: Negative for color change, pallor, rash and wound.   Neurological: Positive for dizziness, weakness, light-headedness and numbness. Negative for tremors, seizures, syncope, facial asymmetry, speech difficulty and headaches.   Hematological: Bruises/bleeds easily.   Psychiatric/Behavioral: Negative for agitation, behavioral problems, confusion, decreased concentration, dysphoric mood, hallucinations, self-injury, sleep disturbance and suicidal ideas. The patient is not nervous/anxious and is not hyperactive.      Objective:     Vital Signs (Most Recent):  Temp: 99.8 °F (37.7 °C) (02/13/20 1530)  Pulse: 77 (02/13/20 1650)  Resp: 20 (02/13/20 1530)  BP: 120/68 (02/13/20 1650)  SpO2: 95 % (02/13/20 1530) Vital Signs (24h Range):  Temp:  [98.8 °F (37.1 °C)-99.8 °F (37.7 °C)] 99.8 °F (37.7 °C)  Pulse:  [] 77  Resp:  [18-24] 20  SpO2:  [95 %-97 %] 95 %  BP:  ()/(55-81) 120/68     Weight: 90.7 kg (200 lb)  Body mass index is 24.34 kg/m².    SpO2: 95 %  O2 Device (Oxygen Therapy): nasal cannula     Intake/Output - Last 3 Shifts       02/11 0700 - 02/12 0659 02/12 0700 - 02/13 0659 02/13 0700 - 02/14 0659    IV Piggyback   750    Total Intake(mL/kg)   750 (8.3)    Urine (mL/kg/hr)   200    Total Output   200    Net   +550                  Lines/Drains/Airways     Peripheral Intravenous Line                 Peripheral IV - Single Lumen 02/13/20 1213 20 G Left Antecubital less than 1 day                 STS Risk Score: N/A    Physical Exam   Constitutional: He is oriented to person, place, and time. He appears well-developed and well-nourished. No distress.   HENT:   Head: Normocephalic and atraumatic.   Eyes: Pupils are equal, round, and reactive to light. EOM are normal.   Neck: Normal range of motion. Neck supple. No JVD present.   Cardiovascular: Normal rate, regular rhythm and intact distal pulses. Exam reveals no gallop and no friction rub.   No murmur heard.  Distant heart sounds. NSR.    Pulmonary/Chest: Effort normal. No stridor. No respiratory distress. He has no wheezes. He has no rales. He exhibits no tenderness.   Diminished breath sounds to the posterior left lower lobe.    Abdominal: Soft. Bowel sounds are normal. He exhibits no distension and no mass. There is no tenderness. There is no rebound and no guarding. No hernia.   Musculoskeletal: Normal range of motion. He exhibits no edema, tenderness or deformity.   Neurological: He is alert and oriented to person, place, and time. He displays normal reflexes. No cranial nerve deficit or sensory deficit. He exhibits normal muscle tone. Coordination normal.   Skin: Skin is warm and dry. Capillary refill takes less than 2 seconds. No rash noted. He is not diaphoretic. No erythema. No pallor.   Psychiatric: He has a normal mood and affect. His behavior is normal. Judgment and thought content normal.   Nursing  note and vitals reviewed.      Significant Labs:  BMP:   Recent Labs   Lab 02/13/20  1218 02/13/20  1612   *  --      --    K 4.2  --      --    CO2 26  --    BUN 16  --    CREATININE 1.2  --    CALCIUM 8.7  --    MG  --  2.2     CBC:   Recent Labs   Lab 02/13/20  1218   WBC 10.82   RBC 3.54*   HGB 11.5*   HCT 36.3*   *   *   MCH 32.5*   MCHC 31.7*       Significant Diagnostics:  I have reviewed all pertinent imaging results/findings within the past 24 hours.    Assessment/Plan:     NYHA Score: N/A    Pericardial effusion  The patient is an 84 year old male with large pericardial window and JOSLYN pleural effusions that presented to the [facility] emergency department with chest pain and sob, worked up for A-fib w/ RVR. Patient has HTN, atherosclerosis of the abdominal aorta, arthritis, polyneuropathy, prediabetes, venous insufficiency, gout, former smoker 0.5 ppd x 52 years. The patient with large pericardial window and JOSLYN pleural effusions is a candidate for urgent pericardial window creation with possible JOSLYN thoracostomy tube placement. The risks and benefits of surgery were explained to the patient in great detail. All questions were answered to the patient's satisfaction. The patient has agreed to proceed with surgery. The patient will be scheduled for urgent pericardial window creation with possible JOSLYN thoracostomy tube placement on 02/14/2020.        Thank you for your consult. I will follow-up with patient. Please contact us if you have any additional questions.    Elan Yu NP  Cardiothoracic Surgery  [facility]

## 2020-02-14 NOTE — INTERVAL H&P NOTE
The patient has been examined and the H&P has been reviewed:    I concur with the findings and no changes have occurred since H&P was written.    Anesthesia/Surgery risks, benefits and alternative options discussed and understood by patient/family.          Active Hospital Problems    Diagnosis  POA    *Atrial fibrillation with rapid ventricular response [I48.91]  Yes    S/P pericardial window creation [Z98.890]  Not Applicable    Pericardial effusion [I31.3]  Yes    Chest pain [R07.9]  Yes    Bilateral pleural effusion [J90]  Yes      Resolved Hospital Problems   No resolved problems to display.

## 2020-02-14 NOTE — PLAN OF CARE
Second attempt to assess pt. Pt AMS/poor historian. Pt could not verbalize his SDM/EC. Will continue to FU for DC assessment.  Robb Gilmore LMSW 2/14/2020 11:25 AM

## 2020-02-14 NOTE — PT/OT/SLP PROGRESS
Physical Therapy      Patient Name:  Dominic Cohen   MRN:  [id number]    KAMILLE MEZA INITIATED THIS AM VIA CHART REVIEW, PT CURRENTLY IN SX., WILL COMPLETE KAMILLE MEZA ONCE PT MEDICALLY APPROPRIATE    Magali Ramos, PT   2/14/2020  0810

## 2020-02-14 NOTE — ASSESSMENT & PLAN NOTE
Rate controlled   Heparin infusion stopped for surgery; will defer risk benefit of anticoagulation post op to CTS  ICU hemodynamic monitoring
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
## 2020-02-14 NOTE — ASSESSMENT & PLAN NOTE
-Atypical and seems related to afib with RVR  -Trend troponin  -Continue ASA  -Low dose BB initiated  -Check 2D echo    2/14/2020  -2D echo showed EF of 30%, severely depressed RV function  -Continue ASA, BB  -Serial troponin negative  -Consider stress test as OP

## 2020-02-14 NOTE — PLAN OF CARE
Attempted to see pt for DC assessment. Pt not in room. Will FU PRN to complete assessment.  Robb Gilmore LMSW 2/14/2020 9:14 AM

## 2020-02-14 NOTE — SUBJECTIVE & OBJECTIVE
Past Medical History:   Diagnosis Date    Arthritis     Atherosclerosis of abdominal aorta     Cataract     Chronic constipation     Glaucoma     History of anal fissures     Hypertension     Polyneuropathy in other diseases classified elsewhere     due to folate deficiency    Prediabetes     Venous insufficiency     Venous insufficiency        Past Surgical History:   Procedure Laterality Date    BACK SURGERY      CATARACT EXTRACTION BILATERAL W/ ANTERIOR VITRECTOMY      COLONOSCOPY N/A 2016    Procedure: COLONOSCOPY;  Surgeon: Presley Phillips MD;  Location: [address]);  Service: Endoscopy;  Laterality: N/A;  EGD with Dr. Jeffery prior to Colonoscopy. EC    ESOPHAGOGASTRODUODENOSCOPY N/A 2018    Procedure: ESOPHAGOGASTRODUODENOSCOPY (EGD);  Surgeon: Khanh Asencio III, MD;  Location: [facility];  Service: Endoscopy;  Laterality: N/A;    JOINT REPLACEMENT Left     x 2    KNEE SURGERY Left     x2. revision 17    PROSTATE SURGERY      TONSILLECTOMY, ADENOIDECTOMY         Review of patient's allergies indicates:   Allergen Reactions    Penicillins Hives and Swelling    Protonix [pantoprazole] Rash    Sulfa (sulfonamide antibiotics) Hives       Family History     Problem Relation (Age of Onset)    No Known Problems Son, Daughter, Son        Tobacco Use    Smoking status: Former Smoker     Packs/day: 0.30     Years: 52.00     Pack years: 15.60     Types: Cigarettes     Last attempt to quit: 2000     Years since quittin.2    Smokeless tobacco: Never Used   Substance and Sexual Activity    Alcohol use: No    Drug use: No    Sexual activity: Yes         Review of Systems   Constitutional: Negative for chills and fever.   HENT: Negative for facial swelling and sore throat.    Respiratory: Negative for chest tightness.    Cardiovascular: Positive for chest pain (post op pain) and leg swelling. Negative for palpitations.   Gastrointestinal: Negative for abdominal  pain, nausea and vomiting.   Musculoskeletal: Negative.    Skin: Positive for wound. Negative for color change and pallor.   Neurological: Negative for speech difficulty and headaches.   Psychiatric/Behavioral: The patient is not nervous/anxious.      Objective:     Vital Signs (Most Recent):  Temp: 98.9 °F (37.2 °C) (02/14/20 1105)  Pulse: 84 (02/14/20 1105)  Resp: 17 (02/14/20 1105)  BP: (!) 153/78 (02/14/20 1105)  SpO2: 100 % (02/14/20 1105) Vital Signs (24h Range):  Temp:  [98 °F (36.7 °C)-99.8 °F (37.7 °C)] 98.9 °F (37.2 °C)  Pulse:  [] 84  Resp:  [11-24] 17  SpO2:  [93 %-100 %] 100 %  BP: ()/(55-86) 153/78  Arterial Line BP: (142-154)/(60-69) 154/69     Weight: 124 kg (273 lb 5.9 oz)  Body mass index is 33.28 kg/m².      Intake/Output Summary (Last 24 hours) at 2/14/2020 1148  Last data filed at 2/14/2020 1100  Gross per 24 hour   Intake 2450.65 ml   Output 1580 ml   Net 870.65 ml       Physical Exam   Constitutional: He is oriented to person, place, and time. He appears well-nourished. He is cooperative. He does not appear ill. No distress.       HENT:   Head: Atraumatic.   Eyes: Pupils are equal, round, and reactive to light. Conjunctivae are normal.   Neck: No JVD present.   Cardiovascular: Normal rate. An irregularly irregular rhythm present.   Pulses:       Radial pulses are 2+ on the right side, and 2+ on the left side.        Dorsalis pedis pulses are 2+ on the right side, and 2+ on the left side.   Pulmonary/Chest: No accessory muscle usage. No respiratory distress. He has decreased breath sounds. He has no wheezes. He has rhonchi. He has no rales.   Abdominal: Soft. Bowel sounds are normal. There is no tenderness.   Neurological: He is alert and oriented to person, place, and time.   Skin: Skin is warm and dry. Capillary refill takes less than 2 seconds. No cyanosis.   Psychiatric: He has a normal mood and affect. His behavior is normal. Judgment and thought content normal.        Vents:  Oxygen Concentration (%): 98 (02/14/20 0905)    Lines/Drains/Airways     Drain                 Urethral Catheter 02/14/20 0715 Latex;Straight-tip 16 Fr. less than 1 day         Y Chest Tube 1 and 2 02/14/20 0820 1 Anterior Pericardial 32 Fr. 2 Left Pleural 32 Fr. less than 1 day          Arterial Line                 Arterial Line 02/14/20 0709 Right Radial less than 1 day         Arterial Line 02/14/20 0800 Right Radial less than 1 day          Peripheral Intravenous Line                 Peripheral IV - Single Lumen 02/13/20 1500 22 G Left Hand less than 1 day         Peripheral IV - Single Lumen 02/14/20 0502 18 G Right Antecubital less than 1 day                Significant Labs:    CBC/Anemia Profile:  Recent Labs   Lab 02/13/20  1218 02/14/20  0430   WBC 10.82 9.09   HGB 11.5* 10.0*   HCT 36.3* 32.3*   * 322   * 103*   RDW 13.4 13.7        Chemistries:  Recent Labs   Lab 02/13/20  1218 02/13/20  1612 02/14/20  0430     --  139   K 4.2  --  4.0     --  103   CO2 26  --  27   BUN 16  --  16   CREATININE 1.2  --  1.1   CALCIUM 8.7  --  8.5*   ALBUMIN 2.7*  --  2.3*   PROT 7.7  --  6.6   BILITOT 1.4*  --  1.3*   ALKPHOS 69  --  58   ALT 71*  --  63*   AST 40  --  40   MG  --  2.2 2.1   PHOS  --   --  2.9       All pertinent labs within the past 24 hours have been reviewed.    Significant Imaging:   I have reviewed all pertinent imaging results/findings within the past 24 hours.

## 2020-02-14 NOTE — SUBJECTIVE & OBJECTIVE
No current facility-administered medications on file prior to encounter.      Current Outpatient Medications on File Prior to Encounter   Medication Sig    ascorbic acid, vitamin C, (VITAMIN C) 60 mg Lozg Take 1 tablet by mouth daily as needed.     aspirin (ECOTRIN) 81 MG EC tablet Take 81 mg by mouth as needed.     cyanocobalamin, vitamin B-12, (VITAMIN B-12) 50 mcg tablet Take 50 mcg by mouth once daily.    ERGOCALCIFEROL, VITAMIN D2, (VITAMIN D ORAL) Take 1 tablet by mouth once daily.     ferrous sulfate (FEOSOL) 325 mg (65 mg iron) Tab tablet Take 325 mg by mouth.    magnesium 30 mg Tab Take 30 mg by mouth.    multivitamin with folic acid 400 mcg Tab Take 1 tablet by mouth.    POTASSIUM CHLORIDE ORAL Take 1 tablet by mouth.    baclofen (LIORESAL) 10 MG tablet Take 1 tablet (10 mg total) by mouth nightly as needed.    betamethasone dipropionate (DIPROLENE) 0.05 % ointment Apply topically 2 (two) times daily as needed.    blood sugar diagnostic (BLOOD GLUCOSE TEST) Strp 1 strip by Misc.(Non-Drug; Combo Route) route 3 (three) times a week.    blood sugar diagnostic Strp 1 strip by Misc.(Non-Drug; Combo Route) route 2 (two) times daily.    ergocalciferol, vitamin D2, 400 unit Tab Take 1 tablet by mouth.    lancets 30 gauge Misc 1 lancet by Misc.(Non-Drug; Combo Route) route 2 (two) times daily.    latanoprost 0.005 % ophthalmic solution Place 1 drop into both eyes every evening.    linaclotide (LINZESS) 145 mcg Cap capsule Take 1 capsule (145 mcg total) by mouth once daily.    methylPREDNISolone (MEDROL DOSEPACK) 4 mg tablet use as directed    ranitidine (ZANTAC) 150 MG tablet Take 1 tablet (150 mg total) by mouth 2 (two) times daily.    timolol maleate 0.5% (TIMOPTIC) 0.5 % Drop Place 1 drop into both eyes 2 (two) times daily.    triamcinolone acetonide 0.1% (KENALOG) 0.1 % cream Apply topically 2 (two) times daily. For rash of back.    [DISCONTINUED] FUROSEMIDE ORAL Take 50 mg by mouth.        Review of patient's allergies indicates:   Allergen Reactions    Penicillins Hives and Swelling    Protonix [pantoprazole] Rash    Sulfa (sulfonamide antibiotics) Hives       Past Medical History:   Diagnosis Date    Arthritis     Atherosclerosis of abdominal aorta     Cataract     Chronic constipation     Glaucoma     History of anal fissures     Hypertension     Polyneuropathy in other diseases classified elsewhere     due to folate deficiency    Prediabetes     Venous insufficiency     Venous insufficiency      Past Surgical History:   Procedure Laterality Date    BACK SURGERY      CATARACT EXTRACTION BILATERAL W/ ANTERIOR VITRECTOMY      COLONOSCOPY N/A 2016    Procedure: COLONOSCOPY;  Surgeon: Presley Phillips MD;  Location: [address]);  Service: Endoscopy;  Laterality: N/A;  EGD with Dr. Jeffery prior to Colonoscopy. EC    ESOPHAGOGASTRODUODENOSCOPY N/A 2018    Procedure: ESOPHAGOGASTRODUODENOSCOPY (EGD);  Surgeon: Khanh Asencio III, MD;  Location: [facility];  Service: Endoscopy;  Laterality: N/A;    JOINT REPLACEMENT Left     x 2    KNEE SURGERY Left     x2. revision 17    PROSTATE SURGERY      TONSILLECTOMY, ADENOIDECTOMY       Family History     Problem Relation (Age of Onset)    No Known Problems Son, Daughter, Son        Tobacco Use    Smoking status: Former Smoker     Packs/day: 0.30     Years: 52.00     Pack years: 15.60     Types: Cigarettes     Last attempt to quit: 2000     Years since quittin.2    Smokeless tobacco: Never Used   Substance and Sexual Activity    Alcohol use: No    Drug use: No    Sexual activity: Yes     Review of Systems   Constitutional: Positive for activity change and fatigue. Negative for appetite change, chills, diaphoresis, fever and unexpected weight change.   HENT: Positive for congestion, postnasal drip and sinus pressure. Negative for dental problem, drooling, ear discharge, ear pain, facial swelling,  hearing loss, mouth sores, nosebleeds, rhinorrhea, sinus pain, sneezing, sore throat, tinnitus, trouble swallowing and voice change.    Eyes: Positive for visual disturbance. Negative for photophobia, pain, discharge, redness and itching.   Respiratory: Positive for cough, chest tightness, shortness of breath and wheezing. Negative for apnea, choking and stridor.         Productive cough x 1 week.    Cardiovascular: Positive for chest pain and leg swelling. Negative for palpitations.   Gastrointestinal: Positive for constipation. Negative for abdominal distention, abdominal pain, anal bleeding, blood in stool, diarrhea, nausea, rectal pain and vomiting.   Endocrine: Negative for cold intolerance, heat intolerance, polydipsia, polyphagia and polyuria.   Genitourinary: Negative for decreased urine volume, difficulty urinating, discharge, dysuria, enuresis, flank pain, frequency, genital sores, hematuria, penile pain, penile swelling, scrotal swelling, testicular pain and urgency.        Incontinent. Wears  Brief   Musculoskeletal: Positive for arthralgias, back pain and gait problem. Negative for joint swelling, myalgias, neck pain and neck stiffness.   Skin: Negative for color change, pallor, rash and wound.   Neurological: Positive for dizziness, weakness, light-headedness and numbness. Negative for tremors, seizures, syncope, facial asymmetry, speech difficulty and headaches.   Hematological: Bruises/bleeds easily.   Psychiatric/Behavioral: Negative for agitation, behavioral problems, confusion, decreased concentration, dysphoric mood, hallucinations, self-injury, sleep disturbance and suicidal ideas. The patient is not nervous/anxious and is not hyperactive.      Objective:     Vital Signs (Most Recent):  Temp: 99.8 °F (37.7 °C) (02/13/20 1530)  Pulse: 77 (02/13/20 1650)  Resp: 20 (02/13/20 1530)  BP: 120/68 (02/13/20 1650)  SpO2: 95 % (02/13/20 1530) Vital Signs (24h Range):  Temp:  [98.8 °F (37.1 °C)-99.8 °F  (37.7 °C)] 99.8 °F (37.7 °C)  Pulse:  [] 77  Resp:  [18-24] 20  SpO2:  [95 %-97 %] 95 %  BP: ()/(55-81) 120/68     Weight: 90.7 kg (200 lb)  Body mass index is 24.34 kg/m².    SpO2: 95 %  O2 Device (Oxygen Therapy): nasal cannula     Intake/Output - Last 3 Shifts       02/11 0700 - 02/12 0659 02/12 0700 - 02/13 0659 02/13 0700 - 02/14 0659    IV Piggyback   750    Total Intake(mL/kg)   750 (8.3)    Urine (mL/kg/hr)   200    Total Output   200    Net   +550                  Lines/Drains/Airways     Peripheral Intravenous Line                 Peripheral IV - Single Lumen 02/13/20 1213 20 G Left Antecubital less than 1 day                 STS Risk Score: N/A    Physical Exam   Constitutional: He is oriented to person, place, and time. He appears well-developed and well-nourished. No distress.   HENT:   Head: Normocephalic and atraumatic.   Eyes: Pupils are equal, round, and reactive to light. EOM are normal.   Neck: Normal range of motion. Neck supple. No JVD present.   Cardiovascular: Normal rate, regular rhythm and intact distal pulses. Exam reveals no gallop and no friction rub.   No murmur heard.  Distant heart sounds. NSR.    Pulmonary/Chest: Effort normal. No stridor. No respiratory distress. He has no wheezes. He has no rales. He exhibits no tenderness.   Diminished breath sounds to the posterior left lower lobe.    Abdominal: Soft. Bowel sounds are normal. He exhibits no distension and no mass. There is no tenderness. There is no rebound and no guarding. No hernia.   Musculoskeletal: Normal range of motion. He exhibits no edema, tenderness or deformity.   Neurological: He is alert and oriented to person, place, and time. He displays normal reflexes. No cranial nerve deficit or sensory deficit. He exhibits normal muscle tone. Coordination normal.   Skin: Skin is warm and dry. Capillary refill takes less than 2 seconds. No rash noted. He is not diaphoretic. No erythema. No pallor.   Psychiatric: He  has a normal mood and affect. His behavior is normal. Judgment and thought content normal.   Nursing note and vitals reviewed.      Significant Labs:  BMP:   Recent Labs   Lab 02/13/20  1218 02/13/20  1612   *  --      --    K 4.2  --      --    CO2 26  --    BUN 16  --    CREATININE 1.2  --    CALCIUM 8.7  --    MG  --  2.2     CBC:   Recent Labs   Lab 02/13/20  1218   WBC 10.82   RBC 3.54*   HGB 11.5*   HCT 36.3*   *   *   MCH 32.5*   MCHC 31.7*       Significant Diagnostics:  I have reviewed all pertinent imaging results/findings within the past 24 hours.

## 2020-02-14 NOTE — ASSESSMENT & PLAN NOTE
The patient is an 84 year old male with large pericardial window and JOSLYN pleural effusions that presented to the [facility] emergency department with chest pain and sob, worked up for A-fib w/ RVR. Patient has HTN, atherosclerosis of the abdominal aorta, arthritis, polyneuropathy, prediabetes, venous insufficiency, gout, former smoker 0.5 ppd x 52 years. The patient with large pericardial window and JOSLYN pleural effusions is a candidate for urgent pericardial window creation with possible JOSLYN thoracostomy tube placement. The risks and benefits of surgery were explained to the patient in great detail. All questions were answered to the patient's satisfaction. The patient has agreed to proceed with surgery. The patient will be scheduled for urgent pericardial window creation with possible JOSLYN thoracostomy tube placement on 02/14/2020.

## 2020-02-14 NOTE — ANESTHESIA PREPROCEDURE EVALUATION
02/14/2020  Dominic Cohen is a 84 y.o., male.    Pre-op Assessment    I have reviewed the Patient Summary Reports.    I have reviewed the Nursing Notes.   I have reviewed the Medications.     Review of Systems  Anesthesia Hx:  No problems with previous Anesthesia  History of prior surgery of interest to airway management or planning: Previous anesthesia: General  Denies Personal Hx of Anesthesia complications.   Social:  Former Smoker    Hematology/Oncology:  Hematology Normal   Oncology Normal     EENT/Dental:EENT/Dental Normal   Cardiovascular:   Hypertension Dysrhythmias atrial fibrillation ECG has been reviewed. 2/2020 Echo  · Large circumferential pericardial effusion. Effusion is fibrinous. 2.7 cm laterally located, posterior  · Severely decreased left ventricular systolic function. The estimated ejection fraction is 30%.  · Small left ventricular cavity size.  · Global hypokinetic wall motion.  · Moderate concentric left ventricular hypertrophy.  · Severely reduced right ventricular systolic function.  · Diastolic pattern consistent with atrial fibrillation observed.  · There is a large right pleural effusion.  · Elevated central venous pressure (15 mmHg).  · The estimated PA systolic pressure is 41 mmHg.  · Pulmonary hypertension present.  · The ascending aorta is mildly dilated.      Pulmonary:  Pulmonary Normal    Renal/:   Chronic Renal Disease, CRI    Hepatic/GI:  Hepatic/GI Normal    Musculoskeletal:  Musculoskeletal Normal    Neurological:  Neurology Normal    Endocrine:  Endocrine Normal    Dermatological:  Skin Normal    Psych:  Psychiatric Normal           Physical Exam  General:  Well nourished, Obesity    Airway/Jaw/Neck:  Airway Findings: Mouth Opening: Normal Tongue: Normal  Mallampati: II      Dental:  Dental Findings: Edentulous, Upper Dentures, Lower Dentures    Chest/Lungs:  Chest/Lungs Clear    Heart/Vascular:  Heart Findings: Normal Heart murmur: negative       Mental Status:  Mental Status Findings:  Cooperative, Alert and Oriented         Anesthesia Plan  Type of Anesthesia, risks & benefits discussed:  Anesthesia Type:  general  Patient's Preference:   Intra-op Monitoring Plan: standard ASA monitors and arterial line  Intra-op Monitoring Plan Comments:   Post Op Pain Control Plan: multimodal analgesia  Post Op Pain Control Plan Comments:   Induction:   IV  Beta Blocker:  Patient is not currently on a Beta-Blocker (No further documentation required).       Informed Consent: Patient understands risks and agrees with Anesthesia plan.  Questions answered. Anesthesia consent signed with patient.  ASA Score: 4     Day of Surgery Review of History & Physical: I have interviewed and examined the patient. I have reviewed the patient's H&P dated:    H&P update referred to the surgeon.

## 2020-02-14 NOTE — PLAN OF CARE
Pt aaox3. NSR on monitor. Room air. BP stable. Art line intact. Tolerating cardiac diet. Harper intact. UOP ~30-45ml/hr. Midline chest incision CDI. Chest tubes intact, -20cm suction. Pt turns independently. PIVs intact, D5LR infusing @25ml/hr. Bed low, wheels locked, call light within reach, alarms audible. POC reviewed with pt.

## 2020-02-14 NOTE — PLAN OF CARE
POC reviewed, verbalized understanding. Pt remained free from falls, fall precautions in place. Pt is a fib converted to NSR on monitor, 8638. VSS. Heparin drip initiated after PTT of 28.3. Labs ordered for 0040 2/14. Lasix and metoprolol given per MD order. Cardiology and cardiothoracic surgery following. Possible pericardial window 2/14, pt NPO after 0000 2/14. No other c/o at this time. PIV intact. Call bell and personal belongings within reach. Hourly rounding complete. Reminded to call for assistance. Will continue to monitor.

## 2020-02-14 NOTE — HPI
84 year old male with PMH including HTN; venous insufficiency; chronic constipation; glaucoma; arthritis; and former 52 pack year tobacco smoking history (quit in 2000)  Presented to ED on 2/13 with complaints of SOB, cough, LE edema, orthopnea, and chest tightness  EMS reported atrial fib with RVR en route, treated with diltiazem  Note recent hospitalization at [facility] for chest pain with negative stress test; received flu shot prior to d/c on 1/27 and he attributed current symptoms started after this  Eval revealed INR 1.3; mild anemia; glucose 138; albumin 2.7; t bili 1.4; lactate troponin and BNP wnl  A fib with RVR responded to metoprolol IV in ED  CXR with enlarged cardiac silhouette; Patchy infiltrate suspected within the left lower lobe with small to moderate left-sided pleural effusion.  Right lung is clear.  Left upper lobe is clear..  No pneumothorax  Admitted to telemetry floor for further eval and management  · Echo after admission revealed Large circumferential pericardial effusion. Effusion is fibrinous. 2.7 cm laterally located, posterior  · Severely decreased left ventricular systolic function. The estimated ejection fraction is 30%.  · Small left ventricular cavity size.  · Global hypokinetic wall motion.  · Moderate concentric left ventricular hypertrophy.  · Severely reduced right ventricular systolic function.  · Diastolic pattern consistent with atrial fibrillation observed.  · There is a large right pleural effusion.  · Elevated central venous pressure (15 mmHg).  · The estimated PA systolic pressure is 41 mmHg.  · Pulmonary hypertension present.

## 2020-02-14 NOTE — ASSESSMENT & PLAN NOTE
With left sided chest tubes in place  CTS managing  Encourage pulmonary toilet for prevention of atelectasis +or pneumonia  Monitor CXR and chest tube output  Fluid studies pending

## 2020-02-14 NOTE — PROGRESS NOTES
[facility] Hospital Medicine  Progress Note    Patient Name: Dominic Cohen  MRN: [id number]  Patient Class: IP- Inpatient   Admission Date: 2/13/2020  Length of Stay: 1 days  Attending Physician: Ankush Graham MD  Primary Care Provider: Sudhakar Liu MD        Subjective:     Principal Problem:Atrial fibrillation with rapid ventricular response        HPI:  Dominic Cohen is an 84 year old male who presented to the ED with complaints of SOB and chest pain for 4 to 5 days prior to presentation. The patient states that his SOB worsened today prompting his presentation to the ED. The chest pain is located in his mid lower chest and sometimes reproducible with palpation. The chest pain and SOB are worse with laying flat. He also notes worsening lower extremity edema and cough with thick white sputum production. The patient denies fever and chills. He feels that symptoms are associated with receiving the flu shot at [facility] on 1/27/20. This was upon discharge from a hospitalization for chest pain. During this hospitalization, he underwent stress echocardiogram on 1/27/20 that was negative. He states that since discharge, he has had upper respiratory tract symptoms and his blood pressure has been low. He states that Dr. Liu took him off his blood pressure medications on follow up on 2/3/20, but it is not documented. Also of note, the patient recently completed a medrol dose pack for gout symptoms. The patient denies a history of atrial fibrillation. In the ED, he was found to have atrial fibrillation with RVR. His rate improved with metoprolol IV. Labs were significant for a mildly elevated total bilirubin of 1.4, ALT of 71 and INR of 1.3. Code status was discussed with the patient. He is a full code. He was unwilling to give a surrogate medical decision maker at this time.     Overview/Hospital Course:  2/14:  S/p pericardial window. Cardiothoracic surgery primary on case. Will  remain on case for medical management. Currently in ICU. Ok to step down from medicine standpoint. Will defer decision to transfer to primary team.     Interval History: s/p pericardial window. In icu per CTS. Primary service transferred to CTS. Currently reports some dizziness.     Review of Systems   Constitutional: Negative for fatigue and fever.   Respiratory: Negative for shortness of breath.    Cardiovascular: Positive for chest pain.   Gastrointestinal: Negative for nausea and vomiting.   Skin: Negative for rash.     Objective:     Vital Signs (Most Recent):  Temp: 99 °F (37.2 °C) (02/14/20 0950)  Pulse: 82 (02/14/20 0930)  Resp: (!) 23 (02/14/20 0930)  BP: (!) 157/80 (02/14/20 0930)  SpO2: (!) 93 % (02/14/20 0930) Vital Signs (24h Range):  Temp:  [98 °F (36.7 °C)-99.8 °F (37.7 °C)] 99 °F (37.2 °C)  Pulse:  [] 82  Resp:  [11-24] 23  SpO2:  [93 %-97 %] 93 %  BP: ()/(55-86) 157/80  Arterial Line BP: (142-154)/(60-69) 154/69     Weight: 124 kg (273 lb 5.9 oz)  Body mass index is 33.28 kg/m².    Intake/Output Summary (Last 24 hours) at 2/14/2020 1100  Last data filed at 2/14/2020 1000  Gross per 24 hour   Intake 2436.07 ml   Output 1460 ml   Net 976.07 ml      Physical Exam   Constitutional: He appears well-developed and well-nourished. No distress.   HENT:   Head: Normocephalic and atraumatic.   Cardiovascular: Normal rate, regular rhythm and normal heart sounds. Exam reveals no gallop and no friction rub.   No murmur heard.  Drain in place   Pulmonary/Chest: Effort normal and breath sounds normal. No stridor. No respiratory distress. He has no wheezes. He has no rales.   Abdominal: Soft. Bowel sounds are normal. He exhibits no distension and no mass. There is no tenderness. There is no guarding.   Musculoskeletal: Normal range of motion. He exhibits no edema.   Skin: Skin is warm. He is not diaphoretic. No erythema.       Significant Labs:   Recent Lab Results       02/14/20  0811   02/14/20  0698    02/14/20  0430   02/14/20  0008   02/13/20 2001        Unit Blood Type Code         5100[P]              5100[P]     Unit Expiration         [id number][P]              [id number][P]     Unit Blood Type         O POS[P]              O POS[P]     Albumin     2.3         Alkaline Phosphatase     58         ALT     63         Anion Gap     9         aPTT     27.8  Comment:  aPTT therapeutic range = 39-69 seconds 28.5  Comment:  aPTT therapeutic range = 39-69 seconds       AST     40         Baso #     0.05         Basophil%     0.6         BILIRUBIN TOTAL     1.3  Comment:  For infants and newborns, interpretation of results should be based  on gestational age, weight and in agreement with clinical  observations.  Premature Infant recommended reference ranges:  Up to 24 hours.............<8.0 mg/dL  Up to 48 hours............<12.0 mg/dL  3-5 days..................<15.0 mg/dL  6-29 days.................<15.0 mg/dL           BUN, Bld     16         Calcium     8.5         Chloride     103         CO2     27         CODING SYSTEM         MGFE453[P]              [id number][P]     Creatinine     1.1         Differential Method     Automated         DISPENSE STATUS         CROSSMATCHED[P]              CROSSMATCHED[P]     eGFR if      >60         eGFR if non      >60  Comment:  Calculation used to obtain the estimated glomerular filtration  rate (eGFR) is the CKD-EPI equation.            Eos #     0.1         Eosinophil%     1.1         Free T4               Glucose     108         Gran # (ANC)     6.9         Gran%     75.9         Group & Rh     O POS         Hematocrit     32.3         Hemoglobin     10.0         Immature Grans (Abs)     0.06  Comment:  Mild elevation in immature granulocytes is non specific and   can be seen in a variety of conditions including stress response,   acute inflammation, trauma and pregnancy. Correlation with other   laboratory and clinical findings is  essential.           Immature Granulocytes     0.7         INDIRECT TERESA     NEG         INR 1.3  Comment:  Coumadin Therapy:  2.0 - 3.0 for INR for all indicators except mechanical heart valves  and antiphospholipid syndromes which should use 2.5 - 3.5.               Lymph #     0.9         Lymph%     9.7         Magnesium     2.1         MCH     31.8         MCHC     31.0         MCV     103         Mono #     1.1         Mono%     12.0         MPV     10.8         nRBC     0         Phosphorus     2.9         Platelets     322         POCT Glucose   114           Potassium     4.0         Prealbumin     5         Product Code         K5467G16[P]              [id number][P]     PROTEIN TOTAL     6.6         Protime 13.5             RBC     3.14         RDW     13.7         Sodium     139         Troponin I       <0.006  Comment:  The reference interval for Troponin I represents the 99th percentile   cutoff   for our facility and is consistent with 3rd generation assay   performance.         TSH               UNIT NUMBER         [id number][P]              [id number][P]     WBC     9.09                          02/13/20  1821   02/13/20  1612        Unit Blood Type Code         Unit Expiration         Unit Blood Type         Albumin         Alkaline Phosphatase         ALT         Anion Gap         aPTT   28.3  Comment:  aPTT therapeutic range = 39-69 seconds     AST         Baso #         Basophil%         BILIRUBIN TOTAL         BUN, Bld         Calcium         Chloride         CO2         CODING SYSTEM         Creatinine         Differential Method         DISPENSE STATUS         eGFR if          eGFR if non          Eos #         Eosinophil%         Free T4   0.99     Glucose         Gran # (ANC)         Gran%         Group & Rh         Hematocrit         Hemoglobin         Immature Grans (Abs)         Immature Granulocytes         INDIRECT TERESA         INR         Lymph #          Lymph%         Magnesium   2.2     MCH         MCHC         MCV         Mono #         Mono%         MPV         nRBC         Phosphorus         Platelets         POCT Glucose         Potassium         Prealbumin         Product Code         PROTEIN TOTAL         Protime         RBC         RDW         Sodium         Troponin I 0.010  Comment:  The reference interval for Troponin I represents the 99th percentile   cutoff   for our facility and is consistent with 3rd generation assay   performance.         TSH   0.337     UNIT NUMBER         WBC             All pertinent labs within the past 24 hours have been reviewed.    Significant Imaging: I have reviewed all pertinent imaging results/findings within the past 24 hours.      Assessment/Plan:      * Atrial fibrillation with rapid ventricular response  -Continue rate control.   -Follow up echocardiogram.  -Hold anticoagulation until echocardiogram reviewed by Cardiology due to concern for tamponade.    -Cardiology consult.       2/14:  Currently rate controlled   Cont current management  In ICU per primary     Chest pain  -Patient with negative stress echocardiogram on 1/27/20.   -Trend troponin.  -Daily ASA.   -Cardiology consulted.       Pericardial effusion  -Hypotension and tachycardia concerning for tamponade.   -Follow up echocardiogram.   -Cardiology consulted.     2/14:  S/p pericardial window  Being managed by CTS      VTE Risk Mitigation (From admission, onward)         Ordered     IP VTE LOW RISK PATIENT  Once      02/14/20 0635     Place TALAT hose  Until discontinued      02/14/20 0635     Place sequential compression device  Until discontinued      02/14/20 0635                Critical care time spent on the evaluation and treatment of severe organ dysfunction, review of pertinent labs and imaging studies, discussions with consulting providers and discussions with patient/family: 40 minutes.      Jeromy Day MD  Department of Hospital Medicine    [facility]

## 2020-02-14 NOTE — HOSPITAL COURSE
2/14/2020-Patient seen and examined today, s/p pericardial window. No complaints. Feels well. SOB improved. Remains in SR.    2/15/2020-Patient seen and examined today. Feels ok. Complains of constipation/abdominal bloating. Admits to some mild SOB. Still in SR. Labs reviewed and are stable. BP borderline.     2/16/2020-Patient seen and examined today. Only complaint is abdominal bloating/consitpation. No chest pain or SOB. Chest tube in place, still significant OP. CT of chest ordered. Labs stable. BP ok, needs BB increased and ACEi/ARB on board if he can tolerate.     2/17/2020--Patient seen and examined in room, ambulating in hallway with PT this AM. CT removed this AM. Feels better today. Labs reviewed, K+ 3.9, Cr 0.9, Mag 2.0.     2/18/2020--Patient seen and examined in room, lying in bed. Feels good today. BP much improved overnight. Will need OP Cardiology follow up next week, patient and wife prefer a cardiologist in [place]. Reviewed dietary and fluid restriction in detail with patient and family in detail today.

## 2020-02-14 NOTE — SUBJECTIVE & OBJECTIVE
Review of Systems   Constitution: Positive for malaise/fatigue.   HENT: Negative.    Eyes: Negative.    Cardiovascular: Negative.    Respiratory: Negative.    Endocrine: Negative.    Hematologic/Lymphatic: Negative.    Skin: Negative.    Musculoskeletal: Negative.    Gastrointestinal: Negative.    Genitourinary: Negative.    Neurological: Negative.    Psychiatric/Behavioral: Negative.    Allergic/Immunologic: Negative.      Objective:     Vital Signs (Most Recent):  Temp: 98.1 °F (36.7 °C) (02/14/20 1505)  Pulse: 73 (02/14/20 1534)  Resp: 16 (02/14/20 1534)  BP: 117/69 (02/14/20 1505)  SpO2: 97 % (02/14/20 1534) Vital Signs (24h Range):  Temp:  [98 °F (36.7 °C)-99 °F (37.2 °C)] 98.1 °F (36.7 °C)  Pulse:  [68-86] 73  Resp:  [11-23] 16  SpO2:  [93 %-100 %] 97 %  BP: (100-163)/(64-86) 117/69  Arterial Line BP: (142-154)/(60-69) 154/69     Weight: 124 kg (273 lb 5.9 oz)  Body mass index is 33.28 kg/m².     SpO2: 97 %  O2 Device (Oxygen Therapy): room air      Intake/Output Summary (Last 24 hours) at 2/14/2020 1637  Last data filed at 2/14/2020 1500  Gross per 24 hour   Intake 2150.65 ml   Output 1855 ml   Net 295.65 ml       Lines/Drains/Airways     Drain                 Urethral Catheter 02/14/20 0715 Latex;Straight-tip 16 Fr. less than 1 day         Y Chest Tube 1 and 2 02/14/20 0820 1 Anterior Pericardial 32 Fr. 2 Left Pleural 32 Fr. less than 1 day          Arterial Line                 Arterial Line 02/14/20 0709 Right Radial less than 1 day          Peripheral Intravenous Line                 Peripheral IV - Single Lumen 02/13/20 1500 22 G Left Hand 1 day         Peripheral IV - Single Lumen 02/14/20 0502 18 G Right Antecubital less than 1 day                Physical Exam   Constitutional: He is oriented to person, place, and time. He appears well-developed and well-nourished. No distress.   HENT:   Head: Normocephalic and atraumatic.   Eyes: Pupils are equal, round, and reactive to light. Right eye exhibits  no discharge. Left eye exhibits no discharge.   Neck: Neck supple. No JVD present.   Cardiovascular: Normal rate, regular rhythm, S1 normal, S2 normal and normal heart sounds.   No murmur heard.  Pulmonary/Chest: Effort normal. No respiratory distress. He has no wheezes.   Incision C/D/I; dressed    Diminished BS at bases   Abdominal: Soft. He exhibits no distension. There is no rebound.   Musculoskeletal: He exhibits no edema.   Neurological: He is alert and oriented to person, place, and time.   Skin: Skin is warm and dry. He is not diaphoretic. No erythema.   Psychiatric: He has a normal mood and affect. His behavior is normal. Thought content normal.   Nursing note and vitals reviewed.      Significant Labs:   CMP   Recent Labs   Lab 02/13/20  1218 02/14/20  0430    139   K 4.2 4.0    103   CO2 26 27   * 108   BUN 16 16   CREATININE 1.2 1.1   CALCIUM 8.7 8.5*   PROT 7.7 6.6   ALBUMIN 2.7* 2.3*   BILITOT 1.4* 1.3*   ALKPHOS 69 58   AST 40 40   ALT 71* 63*   ANIONGAP 12 9   ESTGFRAFRICA >60 >60   EGFRNONAA 56* >60   , CBC   Recent Labs   Lab 02/13/20  1218 02/14/20  0430   WBC 10.82 9.09   HGB 11.5* 10.0*   HCT 36.3* 32.3*   * 322   , Troponin   Recent Labs   Lab 02/13/20  1218 02/13/20  1821 02/14/20  0008   TROPONINI 0.014 0.010 <0.006    and All pertinent lab results from the last 24 hours have been reviewed.    Significant Imaging: Echocardiogram:   2D echo with color flow doppler:   Results for orders placed or performed in visit on 07/29/19   2D echo with color flow doppler   Result Value Ref Range    QEF 60 55 - 65    Est. PA Systolic Pressure 22.36     Mitral Valve Mobility NORMAL     Narrative    Date of Procedure: 07/29/2019        TEST DESCRIPTION       Aorta: The aortic root is normal in size, measuring 4.3 cm at sinotubular junction and 4.3 cm at Sinuses of Valsalva. The proximal ascending aorta is normal in size, measuring 3.6 cm across.     Left Atrium: The left atrial  volume index is mildly enlarged, measuring 40.11 cc/m2.     Left Ventricle: The left ventricle is normal in size, with an end-diastolic diameter of 5.0 cm, and an end-systolic diameter of 2.5 cm. Wall thickness is increased, with the septum and the posterior wall each measuring 1.8 cm across. Relative wall   thickness was increased at 0.72, and the LV mass index was increased at 200.0 g/m2 consistent with moderate concentric left ventricular hypertrophy. There are no regional wall motion abnormalities. Left ventricular systolic function appears normal.   Visually estimated ejection fraction is 60-65%. The LV Doppler derived stroke volume equals 115.0 ccs.     Diastolic indices: E wave velocity 1.1 m/s, E/A ratio 0.8,  msec., E/e' ratio(avg) 11. Diastolic function is indeterminate.     Right Atrium: The right atrium is normal in size, measuring 6.7 cm in length and 4.1 cm in width in the apical view.     Right Ventricle: The right ventricle is normal in size measuring 3.1 cm at the base in the apical right ventricle-focused view. Global right ventricular systolic function appears normal. Tricuspid annular plane systolic excursion (TAPSE) is 2.4 cm. The   estimated PA systolic pressure is 22 mmHg.     Aortic Valve:  The aortic valve is normal in structure with normal leaflet mobility. The aortic valve is tri-leaflet in structure.     Mitral Valve:  The mitral valve is normal in structure with normal leaflet mobility. There is mitral annular calcification.     Tricuspid Valve:  The tricuspid valve is normal in structure.     Pulmonary Valve:  The pulmonic valve is normal in structure.     IVC: IVC is normal in size and collapses > 50% with a sniff, suggesting normal right atrial pressure of 3 mmHg.     Intracavitary: There is no evidence of pericardial effusion, intracavity mass, thrombi, or vegetation.         CONCLUSIONS     1 - Mild left atrial enlargement.     2 - Concentric hypertrophy.     3 - No wall  motion abnormalities.     4 - Normal left ventricular systolic function (EF 60-65%).     5 - Normal right ventricular systolic function .     6 - The estimated PA systolic pressure is 22 mmHg.             This document has been electronically    SIGNED BY: Romeo Dawkins MD On: 07/29/2019 14:33   , EKG: Reviewed and X-Ray: CXR: X-Ray Chest 1 View (CXR): No results found for this visit on 02/13/20. and X-Ray Chest PA and Lateral (CXR): No results found for this visit on 02/13/20.

## 2020-02-14 NOTE — HOSPITAL COURSE
Admitted to ICU today from PACU after creation of pericardial window with insertion of drainage catheter; he arrived to ICU awake and alert with mild HTN on 2L supplemental oxygen  2/15 transferred to floor, no new c/o

## 2020-02-14 NOTE — HPI
"The patient is an 84 year old male with large pericardial window and JOSLYN pleural effusions that presented to the [facility] emergency department with chest pain and sob, worked up for A-fib w/ RVR. Patient has HTN, atherosclerosis of the abdominal aorta, arthritis, polyneuropathy, prediabetes, venous insufficiency, gout, former smoker 0.5 ppd x 52 years. The patient was in his usual state of health prior to 3 weeks ago, when he began experiencing a "heavy feeling on his chest" and fatigue. 1 week ago the patient began experiencing  Intermittent sharp, burning, non radiating substernal chest pain with associated SOB and fatigue. + orthopnea. Reports a productive cough x 1 week. Patient was recently discharged from [facility] on Sunday, worked up for chest pain, patient reports "all tests were negative" and he was discharged. Patient reports negative stress test and negative echocardiogram at [facility]. Today at [facility] patient converted from A-fib RVR to NSR with IV metoprolol. Patient remains NSR. Currently on heparin gtt. ED workup showed negative troponin, normal BNP, elevated total bilirubin of 1.4, ALT of 71 and INR of 1.3, large pericardial effusion confirmed via CT scan and TTE, JOSLYN pleural effusions. Patient performs ADLs independently. Reports that he ambulates well. He drives. Denies palpitations, fever, malaise, weakness, syncope, tremors, falls, abdominal pain, diarrhea, and edema.     "

## 2020-02-14 NOTE — SUBJECTIVE & OBJECTIVE
Interval History: s/p pericardial window. In icu per CTS. Primary service transferred to [facility]. Currently reports some dizziness.     Review of Systems   Constitutional: Negative for fatigue and fever.   Respiratory: Negative for shortness of breath.    Cardiovascular: Positive for chest pain.   Gastrointestinal: Negative for nausea and vomiting.   Skin: Negative for rash.     Objective:     Vital Signs (Most Recent):  Temp: 99 °F (37.2 °C) (02/14/20 0950)  Pulse: 82 (02/14/20 0930)  Resp: (!) 23 (02/14/20 0930)  BP: (!) 157/80 (02/14/20 0930)  SpO2: (!) 93 % (02/14/20 0930) Vital Signs (24h Range):  Temp:  [98 °F (36.7 °C)-99.8 °F (37.7 °C)] 99 °F (37.2 °C)  Pulse:  [] 82  Resp:  [11-24] 23  SpO2:  [93 %-97 %] 93 %  BP: ()/(55-86) 157/80  Arterial Line BP: (142-154)/(60-69) 154/69     Weight: 124 kg (273 lb 5.9 oz)  Body mass index is 33.28 kg/m².    Intake/Output Summary (Last 24 hours) at 2/14/2020 1100  Last data filed at 2/14/2020 1000  Gross per 24 hour   Intake 2436.07 ml   Output 1460 ml   Net 976.07 ml      Physical Exam   Constitutional: He appears well-developed and well-nourished. No distress.   HENT:   Head: Normocephalic and atraumatic.   Cardiovascular: Normal rate, regular rhythm and normal heart sounds. Exam reveals no gallop and no friction rub.   No murmur heard.  Drain in place   Pulmonary/Chest: Effort normal and breath sounds normal. No stridor. No respiratory distress. He has no wheezes. He has no rales.   Abdominal: Soft. Bowel sounds are normal. He exhibits no distension and no mass. There is no tenderness. There is no guarding.   Musculoskeletal: Normal range of motion. He exhibits no edema.   Skin: Skin is warm. He is not diaphoretic. No erythema.       Significant Labs:   Recent Lab Results       02/14/20  0811   02/14/20  0653   02/14/20  0430   02/14/20  0008   02/13/20 2001        Unit Blood Type Code         5100[P]              5100[P]     Unit Expiration          [id number][P]              [id number][P]     Unit Blood Type         O POS[P]              O POS[P]     Albumin     2.3         Alkaline Phosphatase     58         ALT     63         Anion Gap     9         aPTT     27.8  Comment:  aPTT therapeutic range = 39-69 seconds 28.5  Comment:  aPTT therapeutic range = 39-69 seconds       AST     40         Baso #     0.05         Basophil%     0.6         BILIRUBIN TOTAL     1.3  Comment:  For infants and newborns, interpretation of results should be based  on gestational age, weight and in agreement with clinical  observations.  Premature Infant recommended reference ranges:  Up to 24 hours.............<8.0 mg/dL  Up to 48 hours............<12.0 mg/dL  3-5 days..................<15.0 mg/dL  6-29 days.................<15.0 mg/dL           BUN, Bld     16         Calcium     8.5         Chloride     103         CO2     27         CODING SYSTEM         TYCO208[P]              DVOV324[P]     Creatinine     1.1         Differential Method     Automated         DISPENSE STATUS         CROSSMATCHED[P]              CROSSMATCHED[P]     eGFR if      >60         eGFR if non      >60  Comment:  Calculation used to obtain the estimated glomerular filtration  rate (eGFR) is the CKD-EPI equation.            Eos #     0.1         Eosinophil%     1.1         Free T4               Glucose     108         Gran # (ANC)     6.9         Gran%     75.9         Group & Rh     O POS         Hematocrit     32.3         Hemoglobin     10.0         Immature Grans (Abs)     0.06  Comment:  Mild elevation in immature granulocytes is non specific and   can be seen in a variety of conditions including stress response,   acute inflammation, trauma and pregnancy. Correlation with other   laboratory and clinical findings is essential.           Immature Granulocytes     0.7         INDIRECT TERESA     NEG         INR 1.3  Comment:  Coumadin Therapy:  2.0 - 3.0 for INR for  all indicators except mechanical heart valves  and antiphospholipid syndromes which should use 2.5 - 3.5.               Lymph #     0.9         Lymph%     9.7         Magnesium     2.1         MCH     31.8         MCHC     31.0         MCV     103         Mono #     1.1         Mono%     12.0         MPV     10.8         nRBC     0         Phosphorus     2.9         Platelets     322         POCT Glucose   114           Potassium     4.0         Prealbumin     5         Product Code         P4390Q34[P]              [id number][P]     PROTEIN TOTAL     6.6         Protime 13.5             RBC     3.14         RDW     13.7         Sodium     139         Troponin I       <0.006  Comment:  The reference interval for Troponin I represents the 99th percentile   cutoff   for our facility and is consistent with 3rd generation assay   performance.         TSH               UNIT NUMBER         [id number][P]              [id number][P]     WBC     9.09                          02/13/20  1821   02/13/20  1612        Unit Blood Type Code         Unit Expiration         Unit Blood Type         Albumin         Alkaline Phosphatase         ALT         Anion Gap         aPTT   28.3  Comment:  aPTT therapeutic range = 39-69 seconds     AST         Baso #         Basophil%         BILIRUBIN TOTAL         BUN, Bld         Calcium         Chloride         CO2         CODING SYSTEM         Creatinine         Differential Method         DISPENSE STATUS         eGFR if          eGFR if non          Eos #         Eosinophil%         Free T4   0.99     Glucose         Gran # (ANC)         Gran%         Group & Rh         Hematocrit         Hemoglobin         Immature Grans (Abs)         Immature Granulocytes         INDIRECT TERESA         INR         Lymph #         Lymph%         Magnesium   2.2     MCH         MCHC         MCV         Mono #         Mono%         MPV         nRBC         Phosphorus          Platelets         POCT Glucose         Potassium         Prealbumin         Product Code         PROTEIN TOTAL         Protime         RBC         RDW         Sodium         Troponin I 0.010  Comment:  The reference interval for Troponin I represents the 99th percentile   cutoff   for our facility and is consistent with 3rd generation assay   performance.         TSH   0.337     UNIT NUMBER         WBC             All pertinent labs within the past 24 hours have been reviewed.    Significant Imaging: I have reviewed all pertinent imaging results/findings within the past 24 hours.

## 2020-02-14 NOTE — TRANSFER OF CARE
"Anesthesia Transfer of Care Note    Patient: Dominic Cohen    Procedure(s) Performed: Procedure(s) (LRB):  CREATION, PERICARDIAL WINDOW (N/A)  INSERTION, CATHETER, INTERCOSTAL, FOR DRAINAGE (Left)    Patient location: PACU    Anesthesia Type: general    Transport from OR: Transported from OR on room air with adequate spontaneous ventilation    Post pain: adequate analgesia    Post assessment: no apparent anesthetic complications    Post vital signs: stable    Level of consciousness: awake    Nausea/Vomiting: no nausea/vomiting    Complications: none    Transfer of care protocol was followed      Last vitals:   Visit Vitals  /74 (BP Location: Right arm, Patient Position: Lying)   Pulse 78   Temp 36.7 °C (98 °F) (Tympanic)   Resp 18   Ht 6' 4" (1.93 m)   Wt 124 kg (273 lb 5.9 oz)   SpO2 96%   BMI 33.28 kg/m²     "

## 2020-02-14 NOTE — ANESTHESIA POSTPROCEDURE EVALUATION
Anesthesia Post Evaluation    Patient: [place]    Procedure(s) Performed: Procedure(s) (LRB):  CREATION, PERICARDIAL WINDOW (N/A)  INSERTION, CATHETER, INTERCOSTAL, FOR DRAINAGE (Left)    Final Anesthesia Type: general    Patient location during evaluation: PACU  Patient participation: Yes- Able to Participate  Level of consciousness: awake and alert  Post-procedure vital signs: reviewed and stable  Pain management: adequate  Airway patency: patent    PONV status at discharge: No PONV  Anesthetic complications: no      Cardiovascular status: hemodynamically stable  Respiratory status: spontaneous ventilation  Hydration status: euvolemic  Follow-up not needed.          Vitals Value Taken Time   /90 2/14/2020  8:55 AM   Temp 36.7 °C (98 °F) 2/14/2020  6:40 AM   Pulse 78 2/14/2020  9:00 AM   Resp 9 2/14/2020  9:00 AM   SpO2 97 % 2/14/2020  9:00 AM   Vitals shown include unvalidated device data.      No case tracking events are documented in the log.      Pain/Dutch Score: No data recorded

## 2020-02-14 NOTE — PT/OT/SLP PROGRESS
Occupational Therapy      Patient Name:  Dominic Cohen   MRN:  [id number]  LUCAÍ MEZA INITIATED THIS AM VIA CHART REVIEW, PT CURRENTLY IN SX., WILL COMPLETE LUCÍA MEZA ONCE PT MEDICALLY APPROPRIATE    Erin Adams, OT  2/14/2020   0902

## 2020-02-14 NOTE — ASSESSMENT & PLAN NOTE
-Presented with afib with RVR, new onset  -Converted to SR once on telemetry floor  -Will add low dose Toprol XL 12.5 mg daily given borderline BP upon admission  -Heparin gtt initiated   -Assess response    2/14/2020  -Continue low dose BB  -Remains in SR  -Needs AC on board when ok with CTS

## 2020-02-14 NOTE — PROGRESS NOTES
The patient is an 84-year-old male with atrial fibrillation, atherosclerosis of the aorta, status post prostatectomy, hypertension, venous insufficiency, pre diabetes, gout, B12 deficiency  who was worked up for chest pain and shortness of breath.  Workup in the emergency room shows atrial fibrillation with RVR.  In addition CT scan of the chest shows a large pericardial effusion and bilateral pleural effusions with left greater than right.  Echocardiogram confirms the large pericardial effusion.  The patient is now in the operating room for pericardial window and tube thoracostomy.  The patient understands the risks and benefits of surgery the risks include but not limited to death, stroke, injury to the heart necessitating median sternotomy, bleeding and infection.  The patient understand the risks and benefits of surgery and he has agreed to proceed with pericardial window and tube thoracostomies.

## 2020-02-14 NOTE — ASSESSMENT & PLAN NOTE
-Hypotension and tachycardia concerning for tamponade.   -Follow up echocardiogram.   -Cardiology consulted.     2/14:  S/p pericardial window  Being managed by CTS

## 2020-02-14 NOTE — OP NOTE
[facility]  Cardiothoracic Surgery  Operative Note    SUMMARY     Date of Procedure: 2/14/2020     Procedure: Procedure(s) (LRB):  CREATION, PERICARDIAL WINDOW (N/A)  INSERTION, CATHETER, INTERCOSTAL, FOR DRAINAGE (Left)    Surgeon(s) and Role:     * Ankush Graham MD - Primary    Assisting Surgeon: None    Pre-Operative Diagnosis: Pericardial effusion [I31.3]    Post-Operative Diagnosis: Post-Op Diagnosis Codes:     * Pericardial effusion [I31.3]    Anesthesia: General    Technical Procedures Used:  The patient was prepped and draped sterilely.  A midline incision about 5 cm was made in the upper abdomen to overlie the xiphoid process.  This was carried down sharply until the linea alba was encountered the linea alba was then opened to enter the preperitoneal space dissection was then carried upwards bluntly still day pericardium was identified. The left pleura was also identified prolapsing into the incision. The left pleura was then opened. About 1 L of clear serous fluid was then suction out of the left pleura.  The pericardium was then opened.  About 320 mL of serous fluid was then suctioned out.  The pericardial space was filled with fibrinous exudate.  The pericardium itself was thickened and fibrotic.  A 3 x 4 cm piece of pericardium was then excised.  A 32 Maldivian straight chest tube was then inserted into the left pleural space and a right 32 Maldivian right angle chest tube was placed in the pericardium.  The fascia was then closed with 1.  Vicryl sutures.  And the skin was closed with 4 0 Monocryl sutures.  Description of the Findings of the Procedure:  Fibrinous exudate in the pericardium    Significant Surgical Tasks Conducted by the Assistant(s), if Applicable:  None    Complications: No    Estimated Blood Loss (EBL): 50 mL           Implants: * No implants in log *    Specimens:   Specimen (12h ago, onward)    None                  Condition: Stable    Disposition: PACU -  hemodynamically stable.    Attestation: I performed the procedure.

## 2020-02-14 NOTE — CONSULTS
"[facility]  Cardiothoracic Surgery  Consult Note    Patient Name: Dominic Cohen  MRN: [id number]  Admission Date: 2/13/2020  Attending Physician: Jeromy Day MD  Referring Provider: Self, Aaareferral    Patient information was obtained from patient, past medical records and ER records.     Consults  Subjective:     Principal Problem: Atrial fibrillation with rapid ventricular response    History of Present Illness: The patient is an 84 year old male with large pericardial window and JOSLYN pleural effusions that presented to the [facility] emergency department with chest pain and sob, worked up for A-fib w/ RVR. Patient has HTN, atherosclerosis of the abdominal aorta, arthritis, polyneuropathy, prediabetes, venous insufficiency, gout, former smoker 0.5 ppd x 52 years. The patient was in his usual state of health prior to 3 weeks ago, when he began experiencing a "heavy feeling on his chest" and fatigue. 1 week ago the patient began experiencing  Intermittent sharp, burning, non radiating substernal chest pain with associated SOB and fatigue. + orthopnea. Reports a productive cough x 1 week. Patient was recently discharged from [facility] on Sunday, worked up for chest pain, patient reports "all tests were negative" and he was discharged. Patient reports negative stress test and negative echocardiogram at [facility]. Today at [facility] patient converted from A-fib RVR to NSR with IV metoprolol. Patient remains NSR. Currently on heparin gtt. ED workup showed negative troponin, normal BNP, elevated total bilirubin of 1.4, ALT of 71 and INR of 1.3, large pericardial effusion confirmed via CT scan and TTE, JOSLYN pleural effusions. Patient performs ADLs independently. Reports that he ambulates well. He drives. Denies palpitations, fever, malaise, weakness, syncope, tremors, falls, abdominal pain, diarrhea, and edema.       No current facility-administered medications on file prior to encounter.      Current Outpatient " Medications on File Prior to Encounter   Medication Sig    ascorbic acid, vitamin C, (VITAMIN C) 60 mg Lozg Take 1 tablet by mouth daily as needed.     aspirin (ECOTRIN) 81 MG EC tablet Take 81 mg by mouth as needed.     cyanocobalamin, vitamin B-12, (VITAMIN B-12) 50 mcg tablet Take 50 mcg by mouth once daily.    ERGOCALCIFEROL, VITAMIN D2, (VITAMIN D ORAL) Take 1 tablet by mouth once daily.     ferrous sulfate (FEOSOL) 325 mg (65 mg iron) Tab tablet Take 325 mg by mouth.    magnesium 30 mg Tab Take 30 mg by mouth.    multivitamin with folic acid 400 mcg Tab Take 1 tablet by mouth.    POTASSIUM CHLORIDE ORAL Take 1 tablet by mouth.    baclofen (LIORESAL) 10 MG tablet Take 1 tablet (10 mg total) by mouth nightly as needed.    betamethasone dipropionate (DIPROLENE) 0.05 % ointment Apply topically 2 (two) times daily as needed.    blood sugar diagnostic (BLOOD GLUCOSE TEST) Strp 1 strip by Misc.(Non-Drug; Combo Route) route 3 (three) times a week.    blood sugar diagnostic Strp 1 strip by Misc.(Non-Drug; Combo Route) route 2 (two) times daily.    ergocalciferol, vitamin D2, 400 unit Tab Take 1 tablet by mouth.    lancets 30 gauge Misc 1 lancet by Misc.(Non-Drug; Combo Route) route 2 (two) times daily.    latanoprost 0.005 % ophthalmic solution Place 1 drop into both eyes every evening.    linaclotide (LINZESS) 145 mcg Cap capsule Take 1 capsule (145 mcg total) by mouth once daily.    methylPREDNISolone (MEDROL DOSEPACK) 4 mg tablet use as directed    ranitidine (ZANTAC) 150 MG tablet Take 1 tablet (150 mg total) by mouth 2 (two) times daily.    timolol maleate 0.5% (TIMOPTIC) 0.5 % Drop Place 1 drop into both eyes 2 (two) times daily.    triamcinolone acetonide 0.1% (KENALOG) 0.1 % cream Apply topically 2 (two) times daily. For rash of back.    [DISCONTINUED] FUROSEMIDE ORAL Take 50 mg by mouth.       Review of patient's allergies indicates:   Allergen Reactions    Penicillins Hives and Swelling     Protonix [pantoprazole] Rash    Sulfa (sulfonamide antibiotics) Hives       Past Medical History:   Diagnosis Date    Arthritis     Atherosclerosis of abdominal aorta     Cataract     Chronic constipation     Glaucoma     History of anal fissures     Hypertension     Polyneuropathy in other diseases classified elsewhere     due to folate deficiency    Prediabetes     Venous insufficiency     Venous insufficiency      Past Surgical History:   Procedure Laterality Date    BACK SURGERY      CATARACT EXTRACTION BILATERAL W/ ANTERIOR VITRECTOMY      COLONOSCOPY N/A 2016    Procedure: COLONOSCOPY;  Surgeon: Presley Phillips MD;  Location: [facility] ([address] [place] [address]);  Service: Endoscopy;  Laterality: N/A;  EGD with Dr. Jeffery prior to Colonoscopy. EC    ESOPHAGOGASTRODUODENOSCOPY N/A 2018    Procedure: ESOPHAGOGASTRODUODENOSCOPY (EGD);  Surgeon: Khanh Asencio III, MD;  Location: [facility];  Service: Endoscopy;  Laterality: N/A;    JOINT REPLACEMENT Left     x 2    KNEE SURGERY Left     x2. revision 17    PROSTATE SURGERY      TONSILLECTOMY, ADENOIDECTOMY       Family History     Problem Relation (Age of Onset)    No Known Problems Son, Daughter, Son        Tobacco Use    Smoking status: Former Smoker     Packs/day: 0.30     Years: 52.00     Pack years: 15.60     Types: Cigarettes     Last attempt to quit: 2000     Years since quittin.2    Smokeless tobacco: Never Used   Substance and Sexual Activity    Alcohol use: No    Drug use: No    Sexual activity: Yes     Review of Systems   Constitutional: Positive for activity change and fatigue. Negative for appetite change, chills, diaphoresis, fever and unexpected weight change.   HENT: Positive for congestion, postnasal drip and sinus pressure. Negative for dental problem, drooling, ear discharge, ear pain, facial swelling, hearing loss, mouth sores, nosebleeds, rhinorrhea, sinus pain, sneezing, sore throat, tinnitus, trouble  swallowing and voice change.    Eyes: Positive for visual disturbance. Negative for photophobia, pain, discharge, redness and itching.   Respiratory: Positive for cough, chest tightness, shortness of breath and wheezing. Negative for apnea, choking and stridor.         Productive cough x 1 week.    Cardiovascular: Positive for chest pain and leg swelling. Negative for palpitations.   Gastrointestinal: Positive for constipation. Negative for abdominal distention, abdominal pain, anal bleeding, blood in stool, diarrhea, nausea, rectal pain and vomiting.   Endocrine: Negative for cold intolerance, heat intolerance, polydipsia, polyphagia and polyuria.   Genitourinary: Negative for decreased urine volume, difficulty urinating, discharge, dysuria, enuresis, flank pain, frequency, genital sores, hematuria, penile pain, penile swelling, scrotal swelling, testicular pain and urgency.        Incontinent. Wears  Brief   Musculoskeletal: Positive for arthralgias, back pain and gait problem. Negative for joint swelling, myalgias, neck pain and neck stiffness.   Skin: Negative for color change, pallor, rash and wound.   Neurological: Positive for dizziness, weakness, light-headedness and numbness. Negative for tremors, seizures, syncope, facial asymmetry, speech difficulty and headaches.   Hematological: Bruises/bleeds easily.   Psychiatric/Behavioral: Negative for agitation, behavioral problems, confusion, decreased concentration, dysphoric mood, hallucinations, self-injury, sleep disturbance and suicidal ideas. The patient is not nervous/anxious and is not hyperactive.      Objective:     Vital Signs (Most Recent):  Temp: 99.8 °F (37.7 °C) (02/13/20 1530)  Pulse: 77 (02/13/20 1650)  Resp: 20 (02/13/20 1530)  BP: 120/68 (02/13/20 1650)  SpO2: 95 % (02/13/20 1530) Vital Signs (24h Range):  Temp:  [98.8 °F (37.1 °C)-99.8 °F (37.7 °C)] 99.8 °F (37.7 °C)  Pulse:  [] 77  Resp:  [18-24] 20  SpO2:  [95 %-97 %] 95 %  BP:  ()/(55-81) 120/68     Weight: 90.7 kg (200 lb)  Body mass index is 24.34 kg/m².    SpO2: 95 %  O2 Device (Oxygen Therapy): nasal cannula     Intake/Output - Last 3 Shifts       02/11 0700 - 02/12 0659 02/12 0700 - 02/13 0659 02/13 0700 - 02/14 0659    IV Piggyback   750    Total Intake(mL/kg)   750 (8.3)    Urine (mL/kg/hr)   200    Total Output   200    Net   +550                  Lines/Drains/Airways     Peripheral Intravenous Line                 Peripheral IV - Single Lumen 02/13/20 1213 20 G Left Antecubital less than 1 day                 STS Risk Score: N/A    Physical Exam   Constitutional: He is oriented to person, place, and time. He appears well-developed and well-nourished. No distress.   HENT:   Head: Normocephalic and atraumatic.   Eyes: Pupils are equal, round, and reactive to light. EOM are normal.   Neck: Normal range of motion. Neck supple. No JVD present.   Cardiovascular: Normal rate, regular rhythm and intact distal pulses. Exam reveals no gallop and no friction rub.   No murmur heard.  Distant heart sounds. NSR.    Pulmonary/Chest: Effort normal. No stridor. No respiratory distress. He has no wheezes. He has no rales. He exhibits no tenderness.   Diminished breath sounds to the posterior left lower lobe.    Abdominal: Soft. Bowel sounds are normal. He exhibits no distension and no mass. There is no tenderness. There is no rebound and no guarding. No hernia.   Musculoskeletal: Normal range of motion. He exhibits no edema, tenderness or deformity.   Neurological: He is alert and oriented to person, place, and time. He displays normal reflexes. No cranial nerve deficit or sensory deficit. He exhibits normal muscle tone. Coordination normal.   Skin: Skin is warm and dry. Capillary refill takes less than 2 seconds. No rash noted. He is not diaphoretic. No erythema. No pallor.   Psychiatric: He has a normal mood and affect. His behavior is normal. Judgment and thought content normal.   Nursing  note and vitals reviewed.      Significant Labs:  BMP:   Recent Labs   Lab 02/13/20  1218 02/13/20  1612   *  --      --    K 4.2  --      --    CO2 26  --    BUN 16  --    CREATININE 1.2  --    CALCIUM 8.7  --    MG  --  2.2     CBC:   Recent Labs   Lab 02/13/20  1218   WBC 10.82   RBC 3.54*   HGB 11.5*   HCT 36.3*   *   *   MCH 32.5*   MCHC 31.7*       Significant Diagnostics:  I have reviewed all pertinent imaging results/findings within the past 24 hours.    Assessment/Plan:     NYHA Score: N/A    Pericardial effusion  The patient is an 84 year old male with large pericardial window and JOSLYN pleural effusions that presented to the [facility] emergency department with chest pain and sob, worked up for A-fib w/ RVR. Patient has HTN, atherosclerosis of the abdominal aorta, arthritis, polyneuropathy, prediabetes, venous insufficiency, gout, former smoker 0.5 ppd x 52 years. The patient with large pericardial window and JOSLYN pleural effusions is a candidate for urgent pericardial window creation with possible JOSLYN thoracostomy tube placement. The risks and benefits of surgery were explained to the patient in great detail. All questions were answered to the patient's satisfaction. The patient has agreed to proceed with surgery. The patient will be scheduled for urgent pericardial window creation with possible JOSLYN thoracostomy tube placement on 02/14/2020.        Thank you for your consult. I will follow-up with patient. Please contact us if you have any additional questions.    Elan Yu NP  Cardiothoracic Surgery  [facility]

## 2020-02-15 PROBLEM — I50.21 ACUTE SYSTOLIC CHF (CONGESTIVE HEART FAILURE): Status: ACTIVE | Noted: 2020-02-15

## 2020-02-15 PROBLEM — R07.9 CHEST PAIN: Status: RESOLVED | Noted: 2020-02-13 | Resolved: 2020-02-15

## 2020-02-15 PROBLEM — J93.9 PNEUMOTHORAX ON LEFT: Status: ACTIVE | Noted: 2020-02-15

## 2020-02-15 LAB
ALBUMIN SERPL BCP-MCNC: 2.3 G/DL (ref 3.5–5.2)
ALP SERPL-CCNC: 61 U/L (ref 55–135)
ALT SERPL W/O P-5'-P-CCNC: 56 U/L (ref 10–44)
ANION GAP SERPL CALC-SCNC: 5 MMOL/L (ref 8–16)
ANION GAP SERPL CALC-SCNC: 6 MMOL/L (ref 8–16)
ANION GAP SERPL CALC-SCNC: 8 MMOL/L (ref 8–16)
APTT BLDCRRT: 27.6 SEC (ref 21–32)
AST SERPL-CCNC: 31 U/L (ref 10–40)
BACTERIA #/AREA URNS HPF: NORMAL /HPF
BASOPHILS # BLD AUTO: 0.03 K/UL (ref 0–0.2)
BASOPHILS # BLD AUTO: 0.03 K/UL (ref 0–0.2)
BASOPHILS NFR BLD: 0.3 % (ref 0–1.9)
BASOPHILS NFR BLD: 0.3 % (ref 0–1.9)
BILIRUB SERPL-MCNC: 1 MG/DL (ref 0.1–1)
BILIRUB UR QL STRIP: ABNORMAL
BUN SERPL-MCNC: 11 MG/DL (ref 8–23)
BUN SERPL-MCNC: 12 MG/DL (ref 8–23)
BUN SERPL-MCNC: 12 MG/DL (ref 8–23)
CALCIUM SERPL-MCNC: 8.2 MG/DL (ref 8.7–10.5)
CALCIUM SERPL-MCNC: 8.7 MG/DL (ref 8.7–10.5)
CALCIUM SERPL-MCNC: 8.8 MG/DL (ref 8.7–10.5)
CHLORIDE SERPL-SCNC: 101 MMOL/L (ref 95–110)
CLARITY UR: CLEAR
CO2 SERPL-SCNC: 29 MMOL/L (ref 23–29)
CO2 SERPL-SCNC: 29 MMOL/L (ref 23–29)
CO2 SERPL-SCNC: 31 MMOL/L (ref 23–29)
COLOR UR: YELLOW
CREAT SERPL-MCNC: 0.9 MG/DL (ref 0.5–1.4)
CREAT SERPL-MCNC: 0.9 MG/DL (ref 0.5–1.4)
CREAT SERPL-MCNC: 1 MG/DL (ref 0.5–1.4)
DIFFERENTIAL METHOD: ABNORMAL
DIFFERENTIAL METHOD: ABNORMAL
EOSINOPHIL # BLD AUTO: 0.2 K/UL (ref 0–0.5)
EOSINOPHIL # BLD AUTO: 0.2 K/UL (ref 0–0.5)
EOSINOPHIL NFR BLD: 1.9 % (ref 0–8)
EOSINOPHIL NFR BLD: 1.9 % (ref 0–8)
ERYTHROCYTE [DISTWIDTH] IN BLOOD BY AUTOMATED COUNT: 13.8 % (ref 11.5–14.5)
ERYTHROCYTE [DISTWIDTH] IN BLOOD BY AUTOMATED COUNT: 13.8 % (ref 11.5–14.5)
EST. GFR  (AFRICAN AMERICAN): >60 ML/MIN/1.73 M^2
EST. GFR  (NON AFRICAN AMERICAN): >60 ML/MIN/1.73 M^2
GLUCOSE SERPL-MCNC: 135 MG/DL (ref 70–110)
GLUCOSE SERPL-MCNC: 135 MG/DL (ref 70–110)
GLUCOSE SERPL-MCNC: 136 MG/DL (ref 70–110)
GLUCOSE UR QL STRIP: NEGATIVE
HCT VFR BLD AUTO: 35.7 % (ref 40–54)
HCT VFR BLD AUTO: 35.7 % (ref 40–54)
HGB BLD-MCNC: 10.8 G/DL (ref 14–18)
HGB BLD-MCNC: 10.8 G/DL (ref 14–18)
HGB UR QL STRIP: NEGATIVE
IMM GRANULOCYTES # BLD AUTO: 0.07 K/UL (ref 0–0.04)
IMM GRANULOCYTES # BLD AUTO: 0.07 K/UL (ref 0–0.04)
IMM GRANULOCYTES NFR BLD AUTO: 0.7 % (ref 0–0.5)
IMM GRANULOCYTES NFR BLD AUTO: 0.7 % (ref 0–0.5)
INR PPP: 1.3 (ref 0.8–1.2)
KETONES UR QL STRIP: NEGATIVE
LEUKOCYTE ESTERASE UR QL STRIP: ABNORMAL
LYMPHOCYTES # BLD AUTO: 0.9 K/UL (ref 1–4.8)
LYMPHOCYTES # BLD AUTO: 0.9 K/UL (ref 1–4.8)
LYMPHOCYTES NFR BLD: 8.3 % (ref 18–48)
LYMPHOCYTES NFR BLD: 8.3 % (ref 18–48)
MAGNESIUM SERPL-MCNC: 2.1 MG/DL (ref 1.6–2.6)
MCH RBC QN AUTO: 31.5 PG (ref 27–31)
MCH RBC QN AUTO: 31.5 PG (ref 27–31)
MCHC RBC AUTO-ENTMCNC: 30.3 G/DL (ref 32–36)
MCHC RBC AUTO-ENTMCNC: 30.3 G/DL (ref 32–36)
MCV RBC AUTO: 104 FL (ref 82–98)
MCV RBC AUTO: 104 FL (ref 82–98)
MICROSCOPIC COMMENT: NORMAL
MONOCYTES # BLD AUTO: 0.8 K/UL (ref 0.3–1)
MONOCYTES # BLD AUTO: 0.8 K/UL (ref 0.3–1)
MONOCYTES NFR BLD: 7.1 % (ref 4–15)
MONOCYTES NFR BLD: 7.1 % (ref 4–15)
NEUTROPHILS # BLD AUTO: 8.6 K/UL (ref 1.8–7.7)
NEUTROPHILS # BLD AUTO: 8.6 K/UL (ref 1.8–7.7)
NEUTROPHILS NFR BLD: 81.7 % (ref 38–73)
NEUTROPHILS NFR BLD: 81.7 % (ref 38–73)
NITRITE UR QL STRIP: NEGATIVE
NRBC BLD-RTO: 0 /100 WBC
NRBC BLD-RTO: 0 /100 WBC
PH UR STRIP: 6 [PH] (ref 5–8)
PHOSPHATE SERPL-MCNC: 3.1 MG/DL (ref 2.7–4.5)
PLATELET # BLD AUTO: 281 K/UL (ref 150–350)
PLATELET # BLD AUTO: 281 K/UL (ref 150–350)
PMV BLD AUTO: 12 FL (ref 9.2–12.9)
PMV BLD AUTO: 12 FL (ref 9.2–12.9)
POTASSIUM SERPL-SCNC: 4.1 MMOL/L (ref 3.5–5.1)
POTASSIUM SERPL-SCNC: 4.2 MMOL/L (ref 3.5–5.1)
POTASSIUM SERPL-SCNC: 4.2 MMOL/L (ref 3.5–5.1)
PROT SERPL-MCNC: 7 G/DL (ref 6–8.4)
PROT UR QL STRIP: ABNORMAL
PROTHROMBIN TIME: 13.6 SEC (ref 9–12.5)
RBC # BLD AUTO: 3.43 M/UL (ref 4.6–6.2)
RBC # BLD AUTO: 3.43 M/UL (ref 4.6–6.2)
SODIUM SERPL-SCNC: 136 MMOL/L (ref 136–145)
SODIUM SERPL-SCNC: 137 MMOL/L (ref 136–145)
SODIUM SERPL-SCNC: 138 MMOL/L (ref 136–145)
SP GR UR STRIP: >=1.03 (ref 1–1.03)
URN SPEC COLLECT METH UR: ABNORMAL
UROBILINOGEN UR STRIP-ACNC: ABNORMAL EU/DL
WBC # BLD AUTO: 10.54 K/UL (ref 3.9–12.7)
WBC # BLD AUTO: 10.54 K/UL (ref 3.9–12.7)
WBC #/AREA URNS HPF: 5 /HPF (ref 0–5)

## 2020-02-15 PROCEDURE — 93005 ELECTROCARDIOGRAM TRACING: CPT

## 2020-02-15 PROCEDURE — 80048 BASIC METABOLIC PNL TOTAL CA: CPT | Mod: 91

## 2020-02-15 PROCEDURE — 99233 PR SUBSEQUENT HOSPITAL CARE,LEVL III: ICD-10-PCS | Mod: ,,, | Performed by: INTERNAL MEDICINE

## 2020-02-15 PROCEDURE — 93005 ELECTROCARDIOGRAM TRACING: CPT | Performed by: INTERNAL MEDICINE

## 2020-02-15 PROCEDURE — 94799 UNLISTED PULMONARY SVC/PX: CPT

## 2020-02-15 PROCEDURE — 81000 URINALYSIS NONAUTO W/SCOPE: CPT

## 2020-02-15 PROCEDURE — 99900035 HC TECH TIME PER 15 MIN (STAT)

## 2020-02-15 PROCEDURE — 63600175 PHARM REV CODE 636 W HCPCS: Performed by: THORACIC SURGERY (CARDIOTHORACIC VASCULAR SURGERY)

## 2020-02-15 PROCEDURE — 97165 OT EVAL LOW COMPLEX 30 MIN: CPT

## 2020-02-15 PROCEDURE — 97116 GAIT TRAINING THERAPY: CPT

## 2020-02-15 PROCEDURE — 36415 COLL VENOUS BLD VENIPUNCTURE: CPT

## 2020-02-15 PROCEDURE — 97162 PT EVAL MOD COMPLEX 30 MIN: CPT

## 2020-02-15 PROCEDURE — 25000003 PHARM REV CODE 250: Performed by: THORACIC SURGERY (CARDIOTHORACIC VASCULAR SURGERY)

## 2020-02-15 PROCEDURE — 27000221 HC OXYGEN, UP TO 24 HOURS

## 2020-02-15 PROCEDURE — 93010 ELECTROCARDIOGRAM REPORT: CPT | Mod: ,,, | Performed by: INTERNAL MEDICINE

## 2020-02-15 PROCEDURE — 99233 SBSQ HOSP IP/OBS HIGH 50: CPT | Mod: ,,, | Performed by: INTERNAL MEDICINE

## 2020-02-15 PROCEDURE — 94761 N-INVAS EAR/PLS OXIMETRY MLT: CPT

## 2020-02-15 PROCEDURE — 85025 COMPLETE CBC W/AUTO DIFF WBC: CPT

## 2020-02-15 PROCEDURE — 93010 EKG 12-LEAD: ICD-10-PCS | Mod: ,,, | Performed by: INTERNAL MEDICINE

## 2020-02-15 PROCEDURE — 94640 AIRWAY INHALATION TREATMENT: CPT

## 2020-02-15 PROCEDURE — 83735 ASSAY OF MAGNESIUM: CPT

## 2020-02-15 PROCEDURE — 84100 ASSAY OF PHOSPHORUS: CPT

## 2020-02-15 PROCEDURE — 21400001 HC TELEMETRY ROOM

## 2020-02-15 PROCEDURE — 25000242 PHARM REV CODE 250 ALT 637 W/ HCPCS: Performed by: THORACIC SURGERY (CARDIOTHORACIC VASCULAR SURGERY)

## 2020-02-15 PROCEDURE — 85610 PROTHROMBIN TIME: CPT

## 2020-02-15 PROCEDURE — 97530 THERAPEUTIC ACTIVITIES: CPT

## 2020-02-15 PROCEDURE — 85730 THROMBOPLASTIN TIME PARTIAL: CPT

## 2020-02-15 PROCEDURE — 63600175 PHARM REV CODE 636 W HCPCS: Performed by: EMERGENCY MEDICINE

## 2020-02-15 PROCEDURE — 80053 COMPREHEN METABOLIC PANEL: CPT

## 2020-02-15 RX ORDER — ADHESIVE BANDAGE
30 BANDAGE TOPICAL DAILY PRN
Status: DISCONTINUED | OUTPATIENT
Start: 2020-02-15 | End: 2020-02-15

## 2020-02-15 RX ORDER — DEXTROSE, SODIUM CHLORIDE, SODIUM LACTATE, POTASSIUM CHLORIDE, AND CALCIUM CHLORIDE 5; .6; .31; .03; .02 G/100ML; G/100ML; G/100ML; G/100ML; G/100ML
INJECTION, SOLUTION INTRAVENOUS CONTINUOUS
Status: ACTIVE | OUTPATIENT
Start: 2020-02-15 | End: 2020-02-16

## 2020-02-15 RX ORDER — BISACODYL 10 MG
10 SUPPOSITORY, RECTAL RECTAL DAILY PRN
Status: DISCONTINUED | OUTPATIENT
Start: 2020-02-15 | End: 2020-02-15

## 2020-02-15 RX ADMIN — IPRATROPIUM BROMIDE AND ALBUTEROL SULFATE 3 ML: .5; 3 SOLUTION RESPIRATORY (INHALATION) at 11:02

## 2020-02-15 RX ADMIN — ACETAMINOPHEN 650 MG: 325 TABLET ORAL at 06:02

## 2020-02-15 RX ADMIN — CHLORHEXIDINE GLUCONATE 0.12% ORAL RINSE 10 ML: 1.2 LIQUID ORAL at 08:02

## 2020-02-15 RX ADMIN — ASPIRIN 81 MG: 81 TABLET ORAL at 08:02

## 2020-02-15 RX ADMIN — OXYCODONE HYDROCHLORIDE 5 MG: 5 TABLET ORAL at 04:02

## 2020-02-15 RX ADMIN — IPRATROPIUM BROMIDE AND ALBUTEROL SULFATE 3 ML: .5; 3 SOLUTION RESPIRATORY (INHALATION) at 07:02

## 2020-02-15 RX ADMIN — LATANOPROST 1 DROP: 50 SOLUTION OPHTHALMIC at 08:02

## 2020-02-15 RX ADMIN — DOCUSATE SODIUM 100 MG: 100 CAPSULE, LIQUID FILLED ORAL at 08:02

## 2020-02-15 RX ADMIN — ACETAMINOPHEN 650 MG: 325 TABLET ORAL at 01:02

## 2020-02-15 RX ADMIN — FAMOTIDINE 20 MG: 20 TABLET ORAL at 08:02

## 2020-02-15 RX ADMIN — DEXTROSE, SODIUM CHLORIDE, SODIUM LACTATE, POTASSIUM CHLORIDE, AND CALCIUM CHLORIDE: 5; .6; .31; .03; .02 INJECTION, SOLUTION INTRAVENOUS at 09:02

## 2020-02-15 RX ADMIN — DEXTROSE, SODIUM CHLORIDE, SODIUM LACTATE, POTASSIUM CHLORIDE, AND CALCIUM CHLORIDE 500 ML: 5; .6; .31; .03; .02 INJECTION, SOLUTION INTRAVENOUS at 02:02

## 2020-02-15 RX ADMIN — METOPROLOL SUCCINATE 12.5 MG: 25 TABLET, EXTENDED RELEASE ORAL at 08:02

## 2020-02-15 RX ADMIN — ACETAMINOPHEN 650 MG: 325 TABLET ORAL at 12:02

## 2020-02-15 RX ADMIN — TIMOLOL MALEATE 1 DROP: 5 SOLUTION/ DROPS OPHTHALMIC at 08:02

## 2020-02-15 RX ADMIN — POLYETHYLENE GLYCOL (3350) 17 G: 17 POWDER, FOR SOLUTION ORAL at 08:02

## 2020-02-15 RX ADMIN — DEXTROSE, SODIUM CHLORIDE, SODIUM LACTATE, POTASSIUM CHLORIDE, AND CALCIUM CHLORIDE: 5; .6; .31; .03; .02 INJECTION, SOLUTION INTRAVENOUS at 04:02

## 2020-02-15 RX ADMIN — IPRATROPIUM BROMIDE AND ALBUTEROL SULFATE 3 ML: .5; 3 SOLUTION RESPIRATORY (INHALATION) at 03:02

## 2020-02-15 NOTE — PROGRESS NOTES
Right radial arterial line removed per order prior to transfer to telemetry. Pressure held for 15 min. No bleeding or swelling noted. Gauze, tegaderm and coban applied.

## 2020-02-15 NOTE — ASSESSMENT & PLAN NOTE
Sec to Afib, large Pericardial Effusion and B Pleural effusion s/p drainage  Overall CHF better but still has large L pleural effusion  Because of drop in urine out put-- pt getting IVF. Will avoid diuresis at present.

## 2020-02-15 NOTE — SUBJECTIVE & OBJECTIVE
Interval History:  The patient is postop day 1 status post pericardial window and left tube thoracostomy.  Patient has no complaints.    ROS  Medications:  Continuous Infusions:   dextrose 5% lactated ringers 25 mL/hr at 02/14/20 2200     Scheduled Meds:   acetaminophen  650 mg Oral Q6H    albuterol-ipratropium  3 mL Nebulization Q4H WAKE    aspirin  81 mg Oral Daily    chlorhexidine  10 mL Mouth/Throat BID    docusate sodium  100 mg Oral Daily    famotidine  20 mg Oral BID    latanoprost  1 drop Both Eyes QHS    metoprolol succinate  12.5 mg Oral Daily    nozaseptin   Each Nostril BID    polyethylene glycol  17 g Oral Daily    timolol maleate 0.5%  1 drop Both Eyes BID     PRN Meds:sodium chloride, acetaminophen, albumin human 5%, lactated ringers, metoclopramide HCl, ondansetron, ondansetron, oxyCODONE, oxyCODONE     Objective:     Vital Signs (Most Recent):  Temp: 98.2 °F (36.8 °C) (02/15/20 0711)  Pulse: 79 (02/15/20 0900)  Resp: 20 (02/15/20 0728)  BP: 100/74 (02/15/20 0711)  SpO2: 96 % (02/15/20 0728) Vital Signs (24h Range):  Temp:  [98 °F (36.7 °C)-99 °F (37.2 °C)] 98.2 °F (36.8 °C)  Pulse:  [68-98] 79  Resp:  [11-23] 20  SpO2:  [79 %-100 %] 96 %  BP: ()/() 100/74  Arterial Line BP: (154)/(69) 154/69     Weight: 130.7 kg (288 lb 2.3 oz)  Body mass index is 35.07 kg/m².    SpO2: 96 %  O2 Device (Oxygen Therapy): nasal cannula    Intake/Output - Last 3 Shifts       02/13 0700 - 02/14 0659 02/14 0700 - 02/15 0659 02/15 0700 - 02/16 0659    P.O. 120 710     I.V. (mL/kg) 13.1 (0.1) 1964.6 (15)     IV Piggyback 803 200     Total Intake(mL/kg) 936.1 (7.5) 2874.6 (22)     Urine (mL/kg/hr) 500 1340 (0.4)     Emesis/NG output 0      Other 0      Stool 0 0     Blood  50     Chest Tube  705     Total Output 500 2095     Net +436.1 +779.6            Urine Occurrence 2 x      Stool Occurrence 0 x 0 x     Emesis Occurrence 0 x            Lines/Drains/Airways     Drain                 Y Chest Tube 1  and 2 02/14/20 0820 1 Anterior Pericardial 32 Fr. 2 Left Pleural 32 Fr. 1 day          Peripheral Intravenous Line                 Peripheral IV - Single Lumen 02/13/20 1500 22 G Left Hand 1 day         Peripheral IV - Single Lumen 02/14/20 0502 18 G Right Antecubital 1 day                Physical Exam   Constitutional: He is oriented to person, place, and time. He appears well-developed and well-nourished. No distress.   HENT:   Head: Normocephalic and atraumatic.   Cardiovascular: Normal rate.   Pulmonary/Chest: Effort normal and breath sounds normal.   Abdominal: Soft. Bowel sounds are normal.   Musculoskeletal: He exhibits edema.   Neurological: He is alert and oriented to person, place, and time.   Skin: Skin is warm and dry. He is not diaphoretic.   Psychiatric: He has a normal mood and affect.       Significant Labs:  All pertinent labs from the last 24 hours have been reviewed.    Significant Diagnostics:  I have reviewed all pertinent imaging results/findings within the past 24 hours.

## 2020-02-15 NOTE — NURSING
6 hours post lee catheter & no void. Bladder scanned patient and max volume was 24 mL. Will continue to monitor & reassess. MD notified.

## 2020-02-15 NOTE — ASSESSMENT & PLAN NOTE
Patient is postop day 1 status post pericardial window and left chest tube placement.  Total chest tube output since surgery has been about 700 mL.  Will continue chest tube to drainage for now.

## 2020-02-15 NOTE — PLAN OF CARE
Patient AAOX 4. VSS.  Patient remained afebrile throughout shift.  Patient remained free of falls this shift  Patient 5/10 of pain this shift  Acetaminophen pain medication administered as ordered.  Plan of care reviewed.  Patient verbalized understanding.  Patient moving/turning with assistance  Frequent weight shifting encouraged.  Patient NSR on monitor  Bed low, side rails up x2, wheels locked, call light in reach.  Patient instructed to call for assistance.  Hourly rounding completed.  Will continue to monitor.

## 2020-02-15 NOTE — HOSPITAL COURSE
02/15/2020  The patient is postop day 1 status post pericardial window and left tube thoracostomy.    02/16/2020  The patient is postop day 2.  Status post pericardial window and a left chest tube placement.    02/17/2020  The patient is postop day 3.  Status post pericardial window and a left chest tube placement.    02/18/2020  The patient is postop day 4.  Status post pericardial window and a left chest tube placement.

## 2020-02-15 NOTE — PROGRESS NOTES
[facility] -   Pulmonology  Progress Note    Patient Name: Dominic Cohen  MRN: [id number]  Admission Date: 2/13/2020  Hospital Length of Stay: 2 days  Code Status: Full Code  Attending Provider: Ankush Graham MD  Primary Care Provider: Sudhakar Liu MD   Principal Problem: Atrial fibrillation with rapid ventricular response    Subjective: no new c/o , transferred out of ICU     Interval History: stable overnight    Objective:     Vital Signs (Most Recent):  Temp: 98.2 °F (36.8 °C) (02/15/20 0711)  Pulse: 78 (02/15/20 1517)  Resp: 20 (02/15/20 1517)  BP: 100/74 (02/15/20 0711)  SpO2: 95 % (02/15/20 1517) Vital Signs (24h Range):  Temp:  [98 °F (36.7 °C)-98.7 °F (37.1 °C)] 98.2 °F (36.8 °C)  Pulse:  [70-98] 78  Resp:  [11-20] 20  SpO2:  [79 %-97 %] 95 %  BP: ()/() 100/74     Weight: 130.7 kg (288 lb 2.3 oz)  Body mass index is 35.07 kg/m².      Intake/Output Summary (Last 24 hours) at 2/15/2020 1521  Last data filed at 2/15/2020 0400  Gross per 24 hour   Intake 910 ml   Output 740 ml   Net 170 ml       Physical Exam   Constitutional: He is oriented to person, place, and time. He appears well-developed and well-nourished.   HENT:   Head: Normocephalic and atraumatic.   Eyes: Pupils are equal, round, and reactive to light. Conjunctivae are normal.   Neck: Neck supple. No JVD present. No tracheal deviation present. No thyromegaly present.   Cardiovascular: Normal rate, regular rhythm and normal heart sounds.   Pulmonary/Chest: Effort normal. He has decreased breath sounds in the right lower field and the left lower field.   Chest tubes in place   Abdominal: Soft.   Musculoskeletal: Normal range of motion.   Lymphadenopathy:     He has no cervical adenopathy.   Neurological: He is alert and oriented to person, place, and time.   Skin: Skin is warm and dry.   Nursing note and vitals reviewed.      Vents:  Oxygen Concentration (%): 28 (02/15/20 1517)    Lines/Drains/Airways      Drain                 Y Chest Tube 1 and 2 02/14/20 0820 1 Anterior Pericardial 32 Fr. 2 Left Pleural 32 Fr. 1 day          Peripheral Intravenous Line                 Peripheral IV - Single Lumen 02/13/20 1500 22 G Left Hand 2 days         Peripheral IV - Single Lumen 02/14/20 0502 18 G Right Antecubital 1 day                Significant Labs:    CBC/Anemia Profile:  Recent Labs   Lab 02/14/20  0430 02/15/20  0638   WBC 9.09 10.54  10.54   HGB 10.0* 10.8*  10.8*   HCT 32.3* 35.7*  35.7*    281  281   * 104*  104*   RDW 13.7 13.8  13.8        Chemistries:  Recent Labs   Lab 02/13/20  1612 02/14/20 0430 02/14/20  1833 02/15/20  0638   NA  --  139 135* 136  138   K  --  4.0 4.4 4.1  4.2   CL  --  103 103 101  101   CO2  --  27 24 29  29   BUN  --  16 14 12  12   CREATININE  --  1.1 0.9 0.9  0.9   CALCIUM  --  8.5* 8.3* 8.7  8.8   ALBUMIN  --  2.3*  --  2.3*   PROT  --  6.6  --  7.0   BILITOT  --  1.3*  --  1.0   ALKPHOS  --  58  --  61   ALT  --  63*  --  56*   AST  --  40  --  31   MG 2.2 2.1  --  2.1   PHOS  --  2.9  --  3.1       BMP:   Recent Labs   Lab 02/15/20  0638   *  135*     138   K 4.1  4.2     101   CO2 29  29   BUN 12  12   CREATININE 0.9  0.9   CALCIUM 8.7  8.8   MG 2.1     CMP:   Recent Labs   Lab 02/14/20  0430 02/14/20  1833 02/15/20  0638    135* 136  138   K 4.0 4.4 4.1  4.2    103 101  101   CO2 27 24 29  29    172* 135*  135*   BUN 16 14 12  12   CREATININE 1.1 0.9 0.9  0.9   CALCIUM 8.5* 8.3* 8.7  8.8   PROT 6.6  --  7.0   ALBUMIN 2.3*  --  2.3*   BILITOT 1.3*  --  1.0   ALKPHOS 58  --  61   AST 40  --  31   ALT 63*  --  56*   ANIONGAP 9 8 6*  8   EGFRNONAA >60 >60 >60  >60     All pertinent labs within the past 24 hours have been reviewed.    X-Ray Chest AP Portable  Narrative: EXAMINATION:  XR CHEST AP PORTABLE    CLINICAL HISTORY:  Post-op;    COMPARISON:  Prior radiograph from February 14,  2020.    FINDINGS:  Again demonstrated are 2 left-sided thoracostomy tube in place with extensive pleuroparenchymal disease.  No definite pneumothorax.  Resolution of trace apical pneumothorax.  The heart remains enlarged.  The right lung is relatively well aerated.No significant bony findings.  Impression: No significant change with 2 thoracostomy tubes in place and pleuroparenchymal disease within the left chest base.    Electronically signed by: Josue Anthony Jr., MD  Date:    02/15/2020  Time:    07:30        Significant Imaging:  I have reviewed all pertinent imaging results/findings within the past 24 hours.  I have reviewed and interpreted all pertinent imaging results/findings within the past 24 hours.      ABG  No results for input(s): PH, PO2, PCO2, HCO3, BE in the last 168 hours.  Assessment/Plan:     Pneumothorax on left  2/15 resolved with chest tube    Bilateral pleural effusion  With left sided chest tubes in place  CTS managing  Encourage pulmonary toilet for prevention of atelectasis +or pneumonia  Monitor CXR and chest tube output  Fluid studies pending  2/15 improved  Pleural fluid analysis Results for CANDELARIA CASILLAS (MRN [id number]) as of 2/15/2020 15:24   Ref. Range 2/14/2020 08:45   Glucose, Fluid Latest Ref Range: Not established mg/dL 117   Body Fluid Source, Refrigerated Unknown Pleural Fluid, Left   Body Fluid Albumin Latest Ref Range: See text g/dL 2.0   Body Fluid Source, Albumin Unknown Pleural Fluid, Left   Body Fluid Source, Glucose Unknown Pleural Fluid, Left   Body Fluid Source, Total Protein Unknown Pleural Fluid, Left   Body Fluid, Protein Latest Ref Range: Not established g/dL 4.0              Manas Whyte MD  Pulmonology  [facility] -

## 2020-02-15 NOTE — PLAN OF CARE
02/15/20 1248   Post-Acute Status   Post-Acute Authorization Home Health/Hospice   Home Health/Hospice Status Referrals Sent   Patient choice form signed by patient/caregiver List with quality metrics by geographic area provided

## 2020-02-15 NOTE — PROGRESS NOTES
Referral faxed to [facility] [phone].  Message left for on on call nurse notifying of referral.  Awaiting return call back.

## 2020-02-15 NOTE — PT/OT/SLP EVAL
Physical Therapy Evaluation    Patient Name:  Dominic Cohen   MRN:  [id number]    Recommendations:     Discharge Recommendations:  home health PT   Discharge Equipment Recommendations: walker, rolling   Barriers to discharge: Decreased caregiver support    Assessment:     Dominic Cohen is a 84 y.o. male admitted with a medical diagnosis of Atrial fibrillation with rapid ventricular response.  He presents with the following impairments/functional limitations:  weakness, impaired self care skills, impaired endurance, impaired functional mobilty, pain, impaired balance, impaired cardiopulmonary response to activity .    Rehab Prognosis: Good; patient would benefit from acute skilled PT services to address these deficits and reach maximum level of function.    Recent Surgery: Procedure(s) (LRB):  CREATION, PERICARDIAL WINDOW (N/A)  INSERTION, CATHETER, INTERCOSTAL, FOR DRAINAGE (Left) 1 Day Post-Op    Plan:     During this hospitalization, patient to be seen (will be seen a minimum of 5 out of 7 days a weeks as marcela) to address the identified rehab impairments via gait training, therapeutic activities, therapeutic exercises and progress toward the following goals:    · Plan of Care Expires:       Subjective     Chief Complaint: abdominal pain  Patient/Family Comments/goals: have bowel movement, return to home  Pain/Comfort:  · Pain Rating 1: 4/10  · Location 1: abdomen  · Pain Addressed 1: Reposition    Patients cultural, spiritual, Anglican conflicts given the current situation:      Living Environment:  Pt lives alone in one story house. No steps. IND with ADLS. Amb with STC.  Driving. Not working  Prior to admission, patients level of function was Mod I with gait. IND with self care.  Equipment used at home: cane, straight.  DME owned (not currently used): none.  Upon discharge, patient will have assistance from noone.    Objective:     Communicated with Nurse Mcfadden prior to session.  Patient found  supine with telemetry, peripheral IV, oxygen  upon PT entry to room.    General Precautions: Standard,     Orthopedic Precautions:N/A   Braces:       Exams:  · RLE ROM: WFL  · RLE Strength: WFL  · LLE ROM: WFL  · LLE Strength: WFL    Functional Mobility:  · Bed Mobility:     · Supine to Sit: stand by assistance  · Transfers:     · Sit to Stand:  stand by assistance with rolling walker  · Gait: 100 ft with RW with CGA      Therapeutic Activities and Exercises:   Demo unsteady gait especially with turning and increased distance.Needed CGA to maintain ERICA    AM-PAC 6 CLICK MOBILITY  Total Score:19     Patient left in bed with all lines intact, call button in reach and nursing notified.    GOALS:   Multidisciplinary Problems     Physical Therapy Goals        Problem: Physical Therapy Goal    Goal Priority Disciplines Outcome Goal Variances Interventions   Physical Therapy Goal     PT, PT/OT      Description:  PT eval complete.  The following goals will be met in 7 days  1.  Pt will be IND with bed mobility  2. Pt will be IND with transfer  3. Pt will be mod I with RW for 150 ft                    History:     Past Medical History:   Diagnosis Date    Arthritis     Atherosclerosis of abdominal aorta     Cataract     Chronic constipation     Glaucoma     History of anal fissures     Hypertension     Polyneuropathy in other diseases classified elsewhere     due to folate deficiency    Prediabetes     Venous insufficiency     Venous insufficiency        Past Surgical History:   Procedure Laterality Date    BACK SURGERY      CATARACT EXTRACTION BILATERAL W/ ANTERIOR VITRECTOMY      COLONOSCOPY N/A 5/13/2016    Procedure: COLONOSCOPY;  Surgeon: Presley Phillips MD;  Location: [address];  Service: Endoscopy;  Laterality: N/A;  EGD with Dr. Jeffery prior to Colonoscopy. EC    ESOPHAGOGASTRODUODENOSCOPY N/A 7/5/2018    Procedure: ESOPHAGOGASTRODUODENOSCOPY (EGD);  Surgeon: Khanh Asencio III, MD;   Location: [facility];  Service: Endoscopy;  Laterality: N/A;    JOINT REPLACEMENT Left     x 2    KNEE SURGERY Left     x2. revision 06/27/17    PROSTATE SURGERY      TONSILLECTOMY, ADENOIDECTOMY         Time Tracking:     PT Received On: 02/15/20  PT Start Time: 0900     PT Stop Time: 0925  PT Total Time (min): 25 min     Billable Minutes: Evaluation 15 and Gait Training 10      Parminder Nowak, PT  02/15/2020

## 2020-02-15 NOTE — PROGRESS NOTES
[facility] -   Cardiothoracic Surgery  Progress Note    Patient Name: Dominic Cohen  MRN: [id number]  Admission Date: 2/13/2020  Hospital Length of Stay: 2 days  Code Status: Full Code   Attending Physician: Ankush Graham MD   Referring Provider: Self, Aaareferral  Principal Problem:Atrial fibrillation with rapid ventricular response            Subjective:     Post-Op Info:  Procedure(s) (LRB):  CREATION, PERICARDIAL WINDOW (N/A)  INSERTION, CATHETER, INTERCOSTAL, FOR DRAINAGE (Left)   1 Day Post-Op     Interval History:  The patient is postop day 1 status post pericardial window and left tube thoracostomy.  Patient has no complaints.    ROS  Medications:  Continuous Infusions:   dextrose 5% lactated ringers 25 mL/hr at 02/14/20 2200     Scheduled Meds:   acetaminophen  650 mg Oral Q6H    albuterol-ipratropium  3 mL Nebulization Q4H WAKE    aspirin  81 mg Oral Daily    chlorhexidine  10 mL Mouth/Throat BID    docusate sodium  100 mg Oral Daily    famotidine  20 mg Oral BID    latanoprost  1 drop Both Eyes QHS    metoprolol succinate  12.5 mg Oral Daily    nozaseptin   Each Nostril BID    polyethylene glycol  17 g Oral Daily    timolol maleate 0.5%  1 drop Both Eyes BID     PRN Meds:sodium chloride, acetaminophen, albumin human 5%, lactated ringers, metoclopramide HCl, ondansetron, ondansetron, oxyCODONE, oxyCODONE     Objective:     Vital Signs (Most Recent):  Temp: 98.2 °F (36.8 °C) (02/15/20 0711)  Pulse: 79 (02/15/20 0900)  Resp: 20 (02/15/20 0728)  BP: 100/74 (02/15/20 0711)  SpO2: 96 % (02/15/20 0728) Vital Signs (24h Range):  Temp:  [98 °F (36.7 °C)-99 °F (37.2 °C)] 98.2 °F (36.8 °C)  Pulse:  [68-98] 79  Resp:  [11-23] 20  SpO2:  [79 %-100 %] 96 %  BP: ()/() 100/74  Arterial Line BP: (154)/(69) 154/69     Weight: 130.7 kg (288 lb 2.3 oz)  Body mass index is 35.07 kg/m².    SpO2: 96 %  O2 Device (Oxygen Therapy): nasal cannula    Intake/Output - Last 3 Shifts        02/13 0700 - 02/14 0659 02/14 0700 - 02/15 0659 02/15 0700 - 02/16 0659    P.O. 120 710     I.V. (mL/kg) 13.1 (0.1) 1964.6 (15)     IV Piggyback 803 200     Total Intake(mL/kg) 936.1 (7.5) 2874.6 (22)     Urine (mL/kg/hr) 500 1340 (0.4)     Emesis/NG output 0      Other 0      Stool 0 0     Blood  50     Chest Tube  705     Total Output 500 2095     Net +436.1 +779.6            Urine Occurrence 2 x      Stool Occurrence 0 x 0 x     Emesis Occurrence 0 x            Lines/Drains/Airways     Drain                 Y Chest Tube 1 and 2 02/14/20 0820 1 Anterior Pericardial 32 Fr. 2 Left Pleural 32 Fr. 1 day          Peripheral Intravenous Line                 Peripheral IV - Single Lumen 02/13/20 1500 22 G Left Hand 1 day         Peripheral IV - Single Lumen 02/14/20 0502 18 G Right Antecubital 1 day                Physical Exam   Constitutional: He is oriented to person, place, and time. He appears well-developed and well-nourished. No distress.   HENT:   Head: Normocephalic and atraumatic.   Cardiovascular: Normal rate.   Pulmonary/Chest: Effort normal and breath sounds normal.   Abdominal: Soft. Bowel sounds are normal.   Musculoskeletal: He exhibits edema.   Neurological: He is alert and oriented to person, place, and time.   Skin: Skin is warm and dry. He is not diaphoretic.   Psychiatric: He has a normal mood and affect.       Significant Labs:  All pertinent labs from the last 24 hours have been reviewed.    Significant Diagnostics:  I have reviewed all pertinent imaging results/findings within the past 24 hours.    Assessment/Plan:     S/P pericardial window creation  Patient is postop day 1 status post pericardial window and left chest tube placement.  Total chest tube output since surgery has been about 700 mL.  Will continue chest tube to drainage for now.    Pericardial effusion  The patient is an 84 year old male with large pericardial window and JOSLYN pleural effusions that presented to the [facility] emergency  department with chest pain and sob, worked up for A-fib w/ RVR. Patient has HTN, atherosclerosis of the abdominal aorta, arthritis, polyneuropathy, prediabetes, venous insufficiency, gout, former smoker 0.5 ppd x 52 years. The patient with large pericardial window and JOSLYN pleural effusions is a candidate for urgent pericardial window creation with possible JOSLYN thoracostomy tube placement. The risks and benefits of surgery were explained to the patient in great detail. All questions were answered to the patient's satisfaction. The patient has agreed to proceed with surgery. The patient will be scheduled for urgent pericardial window creation with possible JOSLYN thoracostomy tube placement on 02/14/2020.        Ankush Graham MD  Cardiothoracic Surgery  [facility] -

## 2020-02-15 NOTE — ASSESSMENT & PLAN NOTE
-Presented with afib with RVR, new onset  -Converted to SR once on telemetry floor  -Will add low dose Toprol XL 12.5 mg daily given borderline BP upon admission  -Heparin gtt initiated   -Assess response    2/14/2020  -Continue low dose BB  -Remains in SR  -Needs AC on board when ok with CTS    2/15/2020  -Remains in SR  -Continue BB  -Needs AC re-initiated when ok with CTS

## 2020-02-15 NOTE — NURSING
Bladder scanned patient and max volume was 73 mL. Will continue IV fluids & monitoring. MD notified.

## 2020-02-15 NOTE — ASSESSMENT & PLAN NOTE
-Continue rate control.   -Follow up echocardiogram.  -Hold anticoagulation until echocardiogram reviewed by Cardiology due to concern for tamponade.    -Cardiology consult.       2/14:  Currently rate controlled   Cont current management  In ICU per primary     2/15- in NSR, HR 78, looks good

## 2020-02-15 NOTE — PLAN OF CARE
Pt c/o of AFIB, B PE, & Pericardial effusion.   Interventions performed: Cardiac monitoring continued, labs monitored, activity increased as tolerated, pain meds given prn, incision sites monitored, CT drainage monitored, skin monitored for breakdown, PT/OT following, CTS following, Cardiology following.  Mobility status: Chairfast, assist x 2.   Patient SR on monitor.   Safety maintained per pt.  Pt aware of POC.  Will continue to monitor.

## 2020-02-15 NOTE — SUBJECTIVE & OBJECTIVE
Review of Systems   Constitution: Positive for malaise/fatigue.   HENT: Negative.    Eyes: Negative.    Cardiovascular: Positive for dyspnea on exertion.   Respiratory: Negative.    Endocrine: Negative.    Hematologic/Lymphatic: Negative.    Skin: Negative.    Musculoskeletal: Negative.    Gastrointestinal: Positive for bloating and constipation.   Genitourinary: Negative.    Neurological: Negative.    Psychiatric/Behavioral: Negative.    Allergic/Immunologic: Negative.      Objective:     Vital Signs (Most Recent):  Temp: 98.2 °F (36.8 °C) (02/15/20 0711)  Pulse: 79 (02/15/20 0900)  Resp: 20 (02/15/20 0728)  BP: 100/74 (02/15/20 0711)  SpO2: 96 % (02/15/20 0728) Vital Signs (24h Range):  Temp:  [98 °F (36.7 °C)-98.9 °F (37.2 °C)] 98.2 °F (36.8 °C)  Pulse:  [68-98] 79  Resp:  [11-20] 20  SpO2:  [79 %-100 %] 96 %  BP: ()/() 100/74     Weight: 130.7 kg (288 lb 2.3 oz)  Body mass index is 35.07 kg/m².     SpO2: 96 %  O2 Device (Oxygen Therapy): nasal cannula      Intake/Output Summary (Last 24 hours) at 2/15/2020 1013  Last data filed at 2/15/2020 0400  Gross per 24 hour   Intake 1374.58 ml   Output 1135 ml   Net 239.58 ml       Lines/Drains/Airways     Drain                 Y Chest Tube 1 and 2 02/14/20 0820 1 Anterior Pericardial 32 Fr. 2 Left Pleural 32 Fr. 1 day          Peripheral Intravenous Line                 Peripheral IV - Single Lumen 02/13/20 1500 22 G Left Hand 1 day         Peripheral IV - Single Lumen 02/14/20 0502 18 G Right Antecubital 1 day                Physical Exam   Constitutional: He is oriented to person, place, and time. He appears well-developed and well-nourished. No distress.   On supplemental O2   HENT:   Head: Normocephalic and atraumatic.   Eyes: Pupils are equal, round, and reactive to light. Right eye exhibits no discharge. Left eye exhibits no discharge.   Neck: Neck supple. No JVD present. No tracheal deviation present. No thyromegaly present.   Cardiovascular:  Normal rate, regular rhythm, S1 normal, S2 normal and normal heart sounds.   No murmur heard.  Pulmonary/Chest: Effort normal and breath sounds normal. No respiratory distress.   Slightly diminished BS at bases   Abdominal: He exhibits distension.   Musculoskeletal: He exhibits no edema.   Neurological: He is alert and oriented to person, place, and time.   Skin: Skin is warm and dry. He is not diaphoretic. No erythema.   Psychiatric: He has a normal mood and affect. His behavior is normal. Thought content normal.   Nursing note and vitals reviewed.      Significant Labs:   CMP   Recent Labs   Lab 02/13/20  1218 02/14/20  0430 02/14/20  1833 02/15/20  0638    139 135* 136  138   K 4.2 4.0 4.4 4.1  4.2    103 103 101  101   CO2 26 27 24 29  29   * 108 172* 135*  135*   BUN 16 16 14 12  12   CREATININE 1.2 1.1 0.9 0.9  0.9   CALCIUM 8.7 8.5* 8.3* 8.7  8.8   PROT 7.7 6.6  --  7.0   ALBUMIN 2.7* 2.3*  --  2.3*   BILITOT 1.4* 1.3*  --  1.0   ALKPHOS 69 58  --  61   AST 40 40  --  31   ALT 71* 63*  --  56*   ANIONGAP 12 9 8 6*  8   ESTGFRAFRICA >60 >60 >60 >60  >60   EGFRNONAA 56* >60 >60 >60  >60   , CBC   Recent Labs   Lab 02/13/20 1218 02/14/20  0430 02/15/20  0638   WBC 10.82 9.09 10.54  10.54   HGB 11.5* 10.0* 10.8*  10.8*   HCT 36.3* 32.3* 35.7*  35.7*   * 322 281  281   , Troponin   Recent Labs   Lab 02/13/20 1218 02/13/20  1821 02/14/20  0008   TROPONINI 0.014 0.010 <0.006    and All pertinent lab results from the last 24 hours have been reviewed.    Significant Imaging: Echocardiogram:   2D echo with color flow doppler:   Results for orders placed or performed in visit on 07/29/19   2D echo with color flow doppler   Result Value Ref Range    QEF 60 55 - 65    Est. PA Systolic Pressure 22.36     Mitral Valve Mobility NORMAL     Narrative    Date of Procedure: 07/29/2019        TEST DESCRIPTION       Aorta: The aortic root is normal in size, measuring 4.3 cm at sinotubular  junction and 4.3 cm at Sinuses of Valsalva. The proximal ascending aorta is normal in size, measuring 3.6 cm across.     Left Atrium: The left atrial volume index is mildly enlarged, measuring 40.11 cc/m2.     Left Ventricle: The left ventricle is normal in size, with an end-diastolic diameter of 5.0 cm, and an end-systolic diameter of 2.5 cm. Wall thickness is increased, with the septum and the posterior wall each measuring 1.8 cm across. Relative wall   thickness was increased at 0.72, and the LV mass index was increased at 200.0 g/m2 consistent with moderate concentric left ventricular hypertrophy. There are no regional wall motion abnormalities. Left ventricular systolic function appears normal.   Visually estimated ejection fraction is 60-65%. The LV Doppler derived stroke volume equals 115.0 ccs.     Diastolic indices: E wave velocity 1.1 m/s, E/A ratio 0.8,  msec., E/e' ratio(avg) 11. Diastolic function is indeterminate.     Right Atrium: The right atrium is normal in size, measuring 6.7 cm in length and 4.1 cm in width in the apical view.     Right Ventricle: The right ventricle is normal in size measuring 3.1 cm at the base in the apical right ventricle-focused view. Global right ventricular systolic function appears normal. Tricuspid annular plane systolic excursion (TAPSE) is 2.4 cm. The   estimated PA systolic pressure is 22 mmHg.     Aortic Valve:  The aortic valve is normal in structure with normal leaflet mobility. The aortic valve is tri-leaflet in structure.     Mitral Valve:  The mitral valve is normal in structure with normal leaflet mobility. There is mitral annular calcification.     Tricuspid Valve:  The tricuspid valve is normal in structure.     Pulmonary Valve:  The pulmonic valve is normal in structure.     IVC: IVC is normal in size and collapses > 50% with a sniff, suggesting normal right atrial pressure of 3 mmHg.     Intracavitary: There is no evidence of pericardial effusion,  intracavity mass, thrombi, or vegetation.         CONCLUSIONS     1 - Mild left atrial enlargement.     2 - Concentric hypertrophy.     3 - No wall motion abnormalities.     4 - Normal left ventricular systolic function (EF 60-65%).     5 - Normal right ventricular systolic function .     6 - The estimated PA systolic pressure is 22 mmHg.             This document has been electronically    SIGNED BY: Romeo Dawkins MD On: 07/29/2019 14:33   , EKG: Reviewed and X-Ray: CXR: X-Ray Chest 1 View (CXR): No results found for this visit on 02/13/20. and X-Ray Chest PA and Lateral (CXR): No results found for this visit on 02/13/20.

## 2020-02-15 NOTE — PROGRESS NOTES
[facility]  Cardiology  Progress Note    Patient Name: Dominic Cohen  MRN: [id number]  Admission Date: 2/13/2020  Hospital Length of Stay: 2 days  Code Status: Full Code   Attending Physician: Ankush Graham MD   Primary Care Physician: Sudhakar Liu MD  Expected Discharge Date:   Principal Problem:Atrial fibrillation with rapid ventricular response    Subjective:   HPI:  Mr. Cohen is an 84 year old male patient whose current medical conditions include HTN,CVI, polyneuropathy, and abdominal atherosclerosis who presented to [facility] ED today with a chief complaint of progressively worsening SOB over the past 4-5 days. Associated symptoms included BLE edema, congestion, orthopnea, and pleuritic chest pain/soreness. Patient denied any associated lightheadedness, dizziness, palpitations, near syncope, syncope, fever, or chills. Initial workup in ED revealed elevated Tbili (1.4), ALT of 71, and INR of 1.3. CTA of chest showed large pericardial effusion and bilateral pleural effusions. EKG showed afib with RVR and patient subsequently admitted for further evaluation and treatment. Cardiology consulted to assist with management. Patient seen and examined while in ED. Remains SOB with conversational dyspnea. Reports some chest discomfort with deep breathing and at time, palpation. Denies any prior history of afib/CAD. States he was recently hospitalized at [facility] in late January due to chest pain. Workup at that time, including, stress echo was negative, but he has been experiencing URI type symptoms since his discharge. Chart reviewed. BNP normal. Troponin negative. 2D echo pending.    Hospital Course:   2/14/2020-Patient seen and examined today, s/p pericardial window. No complaints. Feels well. SOB improved. Remains in SR.    2/15/2020-Patient seen and examined today. Feels ok. Complains of constipation/abdominal bloating. Admits to some mild SOB. Still in SR. Labs reviewed and are  stable. BP borderline.         Review of Systems   Constitution: Positive for malaise/fatigue.   HENT: Negative.    Eyes: Negative.    Cardiovascular: Positive for dyspnea on exertion.   Respiratory: Negative.    Endocrine: Negative.    Hematologic/Lymphatic: Negative.    Skin: Negative.    Musculoskeletal: Negative.    Gastrointestinal: Positive for bloating and constipation.   Genitourinary: Negative.    Neurological: Negative.    Psychiatric/Behavioral: Negative.    Allergic/Immunologic: Negative.      Objective:     Vital Signs (Most Recent):  Temp: 98.2 °F (36.8 °C) (02/15/20 0711)  Pulse: 79 (02/15/20 0900)  Resp: 20 (02/15/20 0728)  BP: 100/74 (02/15/20 0711)  SpO2: 96 % (02/15/20 0728) Vital Signs (24h Range):  Temp:  [98 °F (36.7 °C)-98.9 °F (37.2 °C)] 98.2 °F (36.8 °C)  Pulse:  [68-98] 79  Resp:  [11-20] 20  SpO2:  [79 %-100 %] 96 %  BP: ()/() 100/74     Weight: 130.7 kg (288 lb 2.3 oz)  Body mass index is 35.07 kg/m².     SpO2: 96 %  O2 Device (Oxygen Therapy): nasal cannula      Intake/Output Summary (Last 24 hours) at 2/15/2020 1013  Last data filed at 2/15/2020 0400  Gross per 24 hour   Intake 1374.58 ml   Output 1135 ml   Net 239.58 ml       Lines/Drains/Airways     Drain                 Y Chest Tube 1 and 2 02/14/20 0820 1 Anterior Pericardial 32 Fr. 2 Left Pleural 32 Fr. 1 day          Peripheral Intravenous Line                 Peripheral IV - Single Lumen 02/13/20 1500 22 G Left Hand 1 day         Peripheral IV - Single Lumen 02/14/20 0502 18 G Right Antecubital 1 day                Physical Exam   Constitutional: He is oriented to person, place, and time. He appears well-developed and well-nourished. No distress.   On supplemental O2   HENT:   Head: Normocephalic and atraumatic.   Eyes: Pupils are equal, round, and reactive to light. Right eye exhibits no discharge. Left eye exhibits no discharge.   Neck: Neck supple. No JVD present. No tracheal deviation present. No thyromegaly  present.   Cardiovascular: Normal rate, regular rhythm, S1 normal, S2 normal and normal heart sounds.   No murmur heard.  Pulmonary/Chest: Effort normal and breath sounds normal. No respiratory distress.   Slightly diminished BS at bases   Abdominal: He exhibits distension.   Musculoskeletal: He exhibits no edema.   Neurological: He is alert and oriented to person, place, and time.   Skin: Skin is warm and dry. He is not diaphoretic. No erythema.   Psychiatric: He has a normal mood and affect. His behavior is normal. Thought content normal.   Nursing note and vitals reviewed.      Significant Labs:   CMP   Recent Labs   Lab 02/13/20 1218 02/14/20  0430 02/14/20  1833 02/15/20  0638    139 135* 136  138   K 4.2 4.0 4.4 4.1  4.2    103 103 101  101   CO2 26 27 24 29  29   * 108 172* 135*  135*   BUN 16 16 14 12  12   CREATININE 1.2 1.1 0.9 0.9  0.9   CALCIUM 8.7 8.5* 8.3* 8.7  8.8   PROT 7.7 6.6  --  7.0   ALBUMIN 2.7* 2.3*  --  2.3*   BILITOT 1.4* 1.3*  --  1.0   ALKPHOS 69 58  --  61   AST 40 40  --  31   ALT 71* 63*  --  56*   ANIONGAP 12 9 8 6*  8   ESTGFRAFRICA >60 >60 >60 >60  >60   EGFRNONAA 56* >60 >60 >60  >60   , CBC   Recent Labs   Lab 02/13/20 1218 02/14/20  0430 02/15/20  0638   WBC 10.82 9.09 10.54  10.54   HGB 11.5* 10.0* 10.8*  10.8*   HCT 36.3* 32.3* 35.7*  35.7*   * 322 281  281   , Troponin   Recent Labs   Lab 02/13/20 1218 02/13/20  1821 02/14/20  0008   TROPONINI 0.014 0.010 <0.006    and All pertinent lab results from the last 24 hours have been reviewed.    Significant Imaging: Echocardiogram:   2D echo with color flow doppler:   Results for orders placed or performed in visit on 07/29/19   2D echo with color flow doppler   Result Value Ref Range    QEF 60 55 - 65    Est. PA Systolic Pressure 22.36     Mitral Valve Mobility NORMAL     Narrative    Date of Procedure: 07/29/2019        TEST DESCRIPTION       Aorta: The aortic root is normal in size,  measuring 4.3 cm at sinotubular junction and 4.3 cm at Sinuses of Valsalva. The proximal ascending aorta is normal in size, measuring 3.6 cm across.     Left Atrium: The left atrial volume index is mildly enlarged, measuring 40.11 cc/m2.     Left Ventricle: The left ventricle is normal in size, with an end-diastolic diameter of 5.0 cm, and an end-systolic diameter of 2.5 cm. Wall thickness is increased, with the septum and the posterior wall each measuring 1.8 cm across. Relative wall   thickness was increased at 0.72, and the LV mass index was increased at 200.0 g/m2 consistent with moderate concentric left ventricular hypertrophy. There are no regional wall motion abnormalities. Left ventricular systolic function appears normal.   Visually estimated ejection fraction is 60-65%. The LV Doppler derived stroke volume equals 115.0 ccs.     Diastolic indices: E wave velocity 1.1 m/s, E/A ratio 0.8,  msec., E/e' ratio(avg) 11. Diastolic function is indeterminate.     Right Atrium: The right atrium is normal in size, measuring 6.7 cm in length and 4.1 cm in width in the apical view.     Right Ventricle: The right ventricle is normal in size measuring 3.1 cm at the base in the apical right ventricle-focused view. Global right ventricular systolic function appears normal. Tricuspid annular plane systolic excursion (TAPSE) is 2.4 cm. The   estimated PA systolic pressure is 22 mmHg.     Aortic Valve:  The aortic valve is normal in structure with normal leaflet mobility. The aortic valve is tri-leaflet in structure.     Mitral Valve:  The mitral valve is normal in structure with normal leaflet mobility. There is mitral annular calcification.     Tricuspid Valve:  The tricuspid valve is normal in structure.     Pulmonary Valve:  The pulmonic valve is normal in structure.     IVC: IVC is normal in size and collapses > 50% with a sniff, suggesting normal right atrial pressure of 3 mmHg.     Intracavitary: There is no  evidence of pericardial effusion, intracavity mass, thrombi, or vegetation.         CONCLUSIONS     1 - Mild left atrial enlargement.     2 - Concentric hypertrophy.     3 - No wall motion abnormalities.     4 - Normal left ventricular systolic function (EF 60-65%).     5 - Normal right ventricular systolic function .     6 - The estimated PA systolic pressure is 22 mmHg.             This document has been electronically    SIGNED BY: Romeo Dawkins MD On: 07/29/2019 14:33   , EKG: Reviewed and X-Ray: CXR: X-Ray Chest 1 View (CXR): No results found for this visit on 02/13/20. and X-Ray Chest PA and Lateral (CXR): No results found for this visit on 02/13/20.    Assessment and Plan:   Patient who presents with afib/acute CHF/pericardial effusion. Recovering s/p pericardial window. Stable CV wise. Needs AC on board when ok with CTS. Will further optimize CHF regimen as clinical condition permits.     * Atrial fibrillation with rapid ventricular response  -Presented with afib with RVR, new onset  -Converted to SR once on telemetry floor  -Will add low dose Toprol XL 12.5 mg daily given borderline BP upon admission  -Heparin gtt initiated   -Assess response    2/14/2020  -Continue low dose BB  -Remains in SR  -Needs AC on board when ok with CTS    2/15/2020  -Remains in SR  -Continue BB  -Needs AC re-initiated when ok with CTS    Acute systolic CHF (congestive heart failure)  -EF 30% by 2D echo  -Likely influenced by afib  -Continue BB  -Will further optimize regimen pending clinical course/BP trend  -Stress test IP vs OP    Bilateral pleural effusion  -Will gently diurese and assess response  -2D echo pending      Chest pain  -Atypical and seems related to afib with RVR  -Trend troponin  -Continue ASA  -Low dose BB initiated  -Check 2D echo    2/14/2020  -2D echo showed EF of 30%, severely depressed RV function  -Continue ASA, BB  -Serial troponin negative  -Consider stress test as OP      Pericardial effusion  -2D  echo pending  -Will likely need CTS consult for possible pericardial window    2/14/2020  -s/p pericardial window  -Mgmt as per CTS  -Will follow        VTE Risk Mitigation (From admission, onward)    None          Libia Earl PA-C  Cardiology  [facility]

## 2020-02-15 NOTE — ASSESSMENT & PLAN NOTE
With left sided chest tubes in place  CTS managing  Encourage pulmonary toilet for prevention of atelectasis +or pneumonia  Monitor CXR and chest tube output  Fluid studies pending  2/15 improved  Pleural fluid analysis Results for CANDELARIA CASILLAS (MRN [id number]) as of 2/15/2020 15:24   Ref. Range 2/14/2020 08:45   Glucose, Fluid Latest Ref Range: Not established mg/dL 117   Body Fluid Source, Refrigerated Unknown Pleural Fluid, Left   Body Fluid Albumin Latest Ref Range: See text g/dL 2.0   Body Fluid Source, Albumin Unknown Pleural Fluid, Left   Body Fluid Source, Glucose Unknown Pleural Fluid, Left   Body Fluid Source, Total Protein Unknown Pleural Fluid, Left   Body Fluid, Protein Latest Ref Range: Not established g/dL 4.0

## 2020-02-15 NOTE — PLAN OF CARE
Assessment completed at bedside with pt.  Pt provided with blue transition folder along with information on advance directives.  Pt states he does have children locally, but they do not check on him.  However, pt states he has a son from [place] that is enroute to [place] and will be here on tomorrow and will provide d/c transport (pt does not sound certain of transport).  CM phoned nikki Khan [phone] no answer. Pt provided with list of Home Health agencies.  States he does not have a HH preference.  Freedom choice form signed and placed in chart.  HH referral faxed to [facility].    Pt's PCP is Sudhakar Liu MD  And his pharmacy of choice is   [facility] [address]  [address] [address]  Phone: [phone] Fax: [phone]       02/15/20 1225   Discharge Assessment   Assessment Type Discharge Planning Assessment   Confirmed/corrected address and phone number on facesheet? Yes   Assessment information obtained from? Patient   Prior to hospitilization cognitive status: Alert/Oriented   Prior to hospitalization functional status: Assistive Equipment;Independent   Current cognitive status: Alert/Oriented   Current Functional Status: Assistive Equipment;Independent   Facility Arrived From: home   Lives With alone   Able to Return to Prior Arrangements yes   Is patient able to care for self after discharge? Yes   Who are your caregiver(s) and their phone number(s)? Nikki Khan [phone]   Readmission Within the Last 30 Days no previous admission in last 30 days   Patient currently being followed by outpatient case management? No   Patient currently receives any other outside agency services? No   Equipment Currently Used at Home cane, straight   Do you have any problems affording any of your prescribed medications? No   Is the patient taking medications as prescribed? yes   Does the patient have transportation home? Yes   Transportation  Anticipated family or friend will provide   Does the patient receive services at the [facility]? No   Discharge Plan A Home with family;Home Health   Discharge Plan B Home;Home Health   DME Needed Upon Discharge  none   Patient/Family in Agreement with Plan yes   Does the patient have transportation to healthcare appointments? Yes

## 2020-02-15 NOTE — ASSESSMENT & PLAN NOTE
S/p Pericardial window with initial 320 cc and now > 100 pericardial output since then  Continue Chest Tube  Labs show benign, no infection noted.

## 2020-02-15 NOTE — PROGRESS NOTES
REceived return call from [facility] with [facility] states they have received referral. Referral also sent via [facility] per LAINE Epps LMSW.

## 2020-02-15 NOTE — ASSESSMENT & PLAN NOTE
-EF 30% by 2D echo  -Likely influenced by afib  -Continue BB  -Will further optimize regimen pending clinical course/BP trend  -Stress test IP vs OP

## 2020-02-15 NOTE — SUBJECTIVE & OBJECTIVE
Interval History: Looks better, lee d/jackie this am, since then urine output has decreased with low volume bladder scan. Dr. Graham just gave a 500 cc fluid bolus after which he put out a little urine.      Review of Systems   Constitutional: Negative for chills and fever.   HENT: Negative for facial swelling and sore throat.    Respiratory: Negative for chest tightness.    Cardiovascular: Positive for chest pain (post op pain) and leg swelling. Negative for palpitations.   Gastrointestinal: Negative for abdominal pain, nausea and vomiting.   Genitourinary: Positive for decreased urine volume. Negative for scrotal swelling, testicular pain and urgency.   Musculoskeletal: Negative.    Skin: Positive for wound. Negative for color change and pallor.   Neurological: Negative for speech difficulty and headaches.   Psychiatric/Behavioral: The patient is not nervous/anxious.      Objective:     Vital Signs (Most Recent):  Temp: 98.2 °F (36.8 °C) (02/15/20 0711)  Pulse: 78 (02/15/20 1517)  Resp: 20 (02/15/20 1517)  BP: 100/74 (02/15/20 0711)  SpO2: 95 % (02/15/20 1517) Vital Signs (24h Range):  Temp:  [98 °F (36.7 °C)-98.7 °F (37.1 °C)] 98.2 °F (36.8 °C)  Pulse:  [70-98] 78  Resp:  [11-20] 20  SpO2:  [79 %-97 %] 95 %  BP: ()/() 100/74     Weight: 130.7 kg (288 lb 2.3 oz)  Body mass index is 35.07 kg/m².    Intake/Output Summary (Last 24 hours) at 2/15/2020 1520  Last data filed at 2/15/2020 0400  Gross per 24 hour   Intake 910 ml   Output 740 ml   Net 170 ml      Physical Exam   Constitutional: He is oriented to person, place, and time. He appears well-developed and well-nourished. He is cooperative. He does not appear ill. No distress.       HENT:   Head: Normocephalic and atraumatic.   Eyes: Pupils are equal, round, and reactive to light. Conjunctivae are normal.   Neck: No JVD present.   Cardiovascular: Normal rate. An irregularly irregular rhythm present.   Pulses:       Radial pulses are 2+ on the right  side, and 2+ on the left side.        Dorsalis pedis pulses are 2+ on the right side, and 2+ on the left side.   Pulmonary/Chest: Effort normal and breath sounds normal. No accessory muscle usage. No respiratory distress. He has no wheezes. He has no rales.   Abdominal: Soft. Bowel sounds are normal. There is no tenderness.   Musculoskeletal: He exhibits edema.   Neurological: He is alert and oriented to person, place, and time.   Skin: Skin is warm and dry. Capillary refill takes less than 2 seconds. He is not diaphoretic. No cyanosis.   Psychiatric: He has a normal mood and affect. His behavior is normal. Judgment and thought content normal.   Nursing note and vitals reviewed.      Significant Labs:   Blood Culture:   BMP:   Recent Labs   Lab 02/15/20  0638   *  135*     138   K 4.1  4.2     101   CO2 29  29   BUN 12  12   CREATININE 0.9  0.9   CALCIUM 8.7  8.8   MG 2.1     CBC:   Recent Labs   Lab 02/14/20  0430 02/15/20  0638   WBC 9.09 10.54  10.54   HGB 10.0* 10.8*  10.8*   HCT 32.3* 35.7*  35.7*    281  281     Respiratory Culture:   All pertinent labs within the past 24 hours have been reviewed.    Significant Imaging: I have reviewed all pertinent imaging results/findings within the past 24 hours.

## 2020-02-15 NOTE — PROGRESS NOTES
[facility] Hospital Medicine  Progress Note    Patient Name: Dominic Cohen  MRN: [id number]  Patient Class: IP- Inpatient   Admission Date: 2/13/2020  Length of Stay: 2 days  Attending Physician: Ankush Graham MD  Primary Care Provider: Sudhakar Liu MD        Subjective:     Principal Problem:Atrial fibrillation with rapid ventricular response        HPI:  Dominic Cohen is an 84 year old male who presented to the ED with complaints of SOB and chest pain for 4 to 5 days prior to presentation. The patient states that his SOB worsened today prompting his presentation to the ED. The chest pain is located in his mid lower chest and sometimes reproducible with palpation. The chest pain and SOB are worse with laying flat. He also notes worsening lower extremity edema and cough with thick white sputum production. The patient denies fever and chills. He feels that symptoms are associated with receiving the flu shot at [facility] on 1/27/20. This was upon discharge from a hospitalization for chest pain. During this hospitalization, he underwent stress echocardiogram on 1/27/20 that was negative. He states that since discharge, he has had upper respiratory tract symptoms and his blood pressure has been low. He states that Dr. Liu took him off his blood pressure medications on follow up on 2/3/20, but it is not documented. Also of note, the patient recently completed a medrol dose pack for gout symptoms. The patient denies a history of atrial fibrillation. In the ED, he was found to have atrial fibrillation with RVR. His rate improved with metoprolol IV. Labs were significant for a mildly elevated total bilirubin of 1.4, ALT of 71 and INR of 1.3. Code status was discussed with the patient. He is a full code. He was unwilling to give a surrogate medical decision maker at this time.     Overview/Hospital Course:  2/14:  S/p pericardial window. Cardiothoracic surgery primary on case. Will  remain on case for medical management. Currently in ICU. Ok to step down from medicine standpoint. Will defer decision to transfer to primary team.     2/15- POD #1, s/p pericardial window and B Chest Tubes for B pleural effusions. About 1 L of clear serous fluid was then suction out of the left pleura. The pericardium was then opened and about 320 mL of serous fluid was then suctioned out.The pericardial space was filled with fibrinous exudate. The pericardium itself was thickened and fibrotic. A 3 x 4 cm piece of pericardium was then excised. Since then about 705 cc fluid has drained from both the tubes.   Looks better, lee d/jacike this am, since then urine output has decreased with low volume bladder scan. Dr. Graham just gave a 500 cc fluid bolus after which he put out a little urine.     Interval History: Looks better, lee d/jackie this am, since then urine output has decreased with low volume bladder scan. Dr. Graham just gave a 500 cc fluid bolus after which he put out a little urine.      Review of Systems   Constitutional: Negative for chills and fever.   HENT: Negative for facial swelling and sore throat.    Respiratory: Negative for chest tightness.    Cardiovascular: Positive for chest pain (post op pain) and leg swelling. Negative for palpitations.   Gastrointestinal: Negative for abdominal pain, nausea and vomiting.   Genitourinary: Positive for decreased urine volume. Negative for scrotal swelling, testicular pain and urgency.   Musculoskeletal: Negative.    Skin: Positive for wound. Negative for color change and pallor.   Neurological: Negative for speech difficulty and headaches.   Psychiatric/Behavioral: The patient is not nervous/anxious.      Objective:     Vital Signs (Most Recent):  Temp: 98.2 °F (36.8 °C) (02/15/20 0711)  Pulse: 78 (02/15/20 1517)  Resp: 20 (02/15/20 1517)  BP: 100/74 (02/15/20 0711)  SpO2: 95 % (02/15/20 1517) Vital Signs (24h Range):  Temp:  [98 °F (36.7 °C)-98.7 °F (37.1  °C)] 98.2 °F (36.8 °C)  Pulse:  [70-98] 78  Resp:  [11-20] 20  SpO2:  [79 %-97 %] 95 %  BP: ()/() 100/74     Weight: 130.7 kg (288 lb 2.3 oz)  Body mass index is 35.07 kg/m².    Intake/Output Summary (Last 24 hours) at 2/15/2020 1520  Last data filed at 2/15/2020 0400  Gross per 24 hour   Intake 910 ml   Output 740 ml   Net 170 ml      Physical Exam   Constitutional: He is oriented to person, place, and time. He appears well-developed and well-nourished. He is cooperative. He does not appear ill. No distress.       HENT:   Head: Normocephalic and atraumatic.   Eyes: Pupils are equal, round, and reactive to light. Conjunctivae are normal.   Neck: No JVD present.   Cardiovascular: Normal rate. An irregularly irregular rhythm present.   Pulses:       Radial pulses are 2+ on the right side, and 2+ on the left side.        Dorsalis pedis pulses are 2+ on the right side, and 2+ on the left side.   Pulmonary/Chest: Effort normal and breath sounds normal. No accessory muscle usage. No respiratory distress. He has no wheezes. He has no rales.   Abdominal: Soft. Bowel sounds are normal. There is no tenderness.   Musculoskeletal: He exhibits edema.   Neurological: He is alert and oriented to person, place, and time.   Skin: Skin is warm and dry. Capillary refill takes less than 2 seconds. He is not diaphoretic. No cyanosis.   Psychiatric: He has a normal mood and affect. His behavior is normal. Judgment and thought content normal.   Nursing note and vitals reviewed.      Significant Labs:   Blood Culture:   BMP:   Recent Labs   Lab 02/15/20  0638   *  135*     138   K 4.1  4.2     101   CO2 29  29   BUN 12  12   CREATININE 0.9  0.9   CALCIUM 8.7  8.8   MG 2.1     CBC:   Recent Labs   Lab 02/14/20  0430 02/15/20  0638   WBC 9.09 10.54  10.54   HGB 10.0* 10.8*  10.8*   HCT 32.3* 35.7*  35.7*    281  281     Respiratory Culture:   All pertinent labs within the past 24 hours have  been reviewed.    Significant Imaging: I have reviewed all pertinent imaging results/findings within the past 24 hours.      Assessment/Plan:      * Atrial fibrillation with rapid ventricular response  -Continue rate control.   -Follow up echocardiogram.  -Hold anticoagulation until echocardiogram reviewed by Cardiology due to concern for tamponade.    -Cardiology consult.       2/14:  Currently rate controlled   Cont current management  In ICU per primary     2/15- in NSR, HR 78, looks good    Acute systolic CHF (congestive heart failure)  Sec to Afib, large Pericardial Effusion and B Pleural effusion s/p drainage  Overall CHF better but still has large L pleural effusion  Because of drop in urine out put-- pt getting IVF. Will avoid diuresis at present.       S/P pericardial window creation  S/p Pericardial window with initial 320 cc and now > 100 pericardial output since then  Continue Chest Tube  Labs show benign, no infection noted.      Bilateral pleural effusion  S/p L Chest Tube drainage via [place]  S/p 1500 cc removal so far        Pericardial effusion  -Hypotension and tachycardia concerning for tamponade.   -Follow up echocardiogram.   -Cardiology consulted.     2/14:  S/p pericardial window  Being managed by CTS      VTE Risk Mitigation (From admission, onward)    None                Barron Cruz MD  Department of Hospital Medicine   [facility]

## 2020-02-15 NOTE — PT/OT/SLP EVAL
Occupational Therapy   Evaluation    Name: Dominic Cohen  MRN: [id number]  Admitting Diagnosis:  Atrial fibrillation with rapid ventricular response 1 Day Post-Op    Recommendations:     Discharge Recommendations: home health OT  Discharge Equipment Recommendations:     Barriers to discharge:       Assessment:     Dominic Cohen is a 84 y.o. male with a medical diagnosis of Atrial fibrillation with rapid ventricular response.  He presents with DEBILITY AND GENERALIZED WEAKNESS. Performance deficits affecting function: weakness, impaired self care skills, impaired balance, decreased coordination, decreased safety awareness, impaired endurance, impaired functional mobilty, decreased upper extremity function, gait instability.      Rehab Prognosis: Good; patient would benefit from acute skilled OT services to address these deficits and reach maximum level of function.       Plan:     Patient to be seen 3 x/week to address the above listed problems via self-care/home management, therapeutic activities, therapeutic exercises  · Plan of Care Expires:    · Plan of Care Reviewed with: patient    Subjective     Chief Complaint: DEBILITY AND GENERALIZED WEAKNESS  Patient/Family Comments/goals:     Occupational Profile:  Living Environment: LIVES ALONE IN 1 STORY HOUSE NO STEPS TO ENTER  Previous level of function:  (I) WITH ADL'S AND MOD (I) WITH FUNCTIONAL THERAPY  Roles and Routines: OCCUPATIONAL THERAPY  Equipment Used at Home:  cane, straight  Assistance upon Discharge:     Pain/Comfort:    Patients cultural, spiritual, Judaism conflicts given the current situation:      Objective:     Communicated with: NURSE AND Epic CHART REVIEW prior to session.  Patient found HOB elevated with telemetry upon OT entry to room.    General Precautions: Standard, fall   Orthopedic Precautions:N/A   Braces: N/A     Occupational Performance:    Bed Mobility:    · Patient completed Rolling/Turning to Right with minimum  assistance  · Patient completed Scooting/Bridging with minimum assistance  · Patient completed Supine to Sit with minimum assistance and with side rail    Functional Mobility/Transfers:  · Patient completed Sit <> Stand Transfer with minimum assistance  with  rolling walker   · Patient completed Bed <> Chair Transfer using Step Transfer technique with minimum assistance with rolling walker  · Functional Mobility: PT AMBULATED 100 FEET WITH CGA  WITH ROLLING WALKER    Activities of Daily Living:  · Upper Body Dressing: minimum assistance .  · Lower Body Dressing: total assistance PT STATED UNABLE TO PERFORM TASK    Cognitive/Visual Perceptual:  Cognitive/Psychosocial Skills:     -       Oriented to: Person, Place, Time and Situation   -       Follows Commands/attention:Follows two-step commands  -       Communication: clear/fluent  -       Memory: No Deficits noted  -       Safety awareness/insight to disability: impaired   Visual/Perceptual:      -Intact .    Physical Exam:  Upper Extremity Range of Motion:     -       Right Upper Extremity: WFL  -       Left Upper Extremity: WFL  Upper Extremity Strength:    -       Right Upper Extremity: MMT: 4/5 GROSSLY  -       Left Upper Extremity: MMT: 4/5 GROSSLY   Strength:    -       Right Upper Extremity: WFL  -       Left Upper Extremity: WFL    AMPAC 6 Click ADL:  AMPAC Total Score: 18    Treatment & Education:    Education:    Patient left HOB elevated with all lines intact, call button in reach, bed alarm off and NURSE notified    GOALS:   Multidisciplinary Problems     Occupational Therapy Goals        Problem: Occupational Therapy Goal    Goal Priority Disciplines Outcome Interventions   Occupational Therapy Goal     OT, PT/OT     Description:  OT GOALS TO BE MET BY 2-22-20  S WITH UE DRESSING  S WITH TOILET T/F'S  SBA WITH LE DRESSING  PT WILL TOLERATE 1 S ET X 20 REPS B UE ROM EXERCISE WITH MIN RESISTANCE                    History:     Past Medical History:    Diagnosis Date    Arthritis     Atherosclerosis of abdominal aorta     Cataract     Chronic constipation     Glaucoma     History of anal fissures     Hypertension     Polyneuropathy in other diseases classified elsewhere     due to folate deficiency    Prediabetes     Venous insufficiency     Venous insufficiency        Past Surgical History:   Procedure Laterality Date    BACK SURGERY      CATARACT EXTRACTION BILATERAL W/ ANTERIOR VITRECTOMY      COLONOSCOPY N/A 5/13/2016    Procedure: COLONOSCOPY;  Surgeon: Presley Phillips MD;  Location: [address]);  Service: Endoscopy;  Laterality: N/A;  EGD with Dr. Jeffery prior to Colonoscopy. EC    ESOPHAGOGASTRODUODENOSCOPY N/A 7/5/2018    Procedure: ESOPHAGOGASTRODUODENOSCOPY (EGD);  Surgeon: Khanh Asencio III, MD;  Location: [facility];  Service: Endoscopy;  Laterality: N/A;    JOINT REPLACEMENT Left     x 2    KNEE SURGERY Left     x2. revision 06/27/17    PROSTATE SURGERY      TONSILLECTOMY, ADENOIDECTOMY         Time Tracking:     OT Date of Treatment: 02/15/20  OT Start Time: 0920  OT Stop Time: 0945  OT Total Time (min): 25 min    Billable Minutes:Evaluation 10 MINUTES  Therapeutic Activity 15 MINUTES    Erin Adams OT  2/15/2020

## 2020-02-15 NOTE — SUBJECTIVE & OBJECTIVE
Interval History: stable overnight    Objective:     Vital Signs (Most Recent):  Temp: 98.2 °F (36.8 °C) (02/15/20 0711)  Pulse: 78 (02/15/20 1517)  Resp: 20 (02/15/20 1517)  BP: 100/74 (02/15/20 0711)  SpO2: 95 % (02/15/20 1517) Vital Signs (24h Range):  Temp:  [98 °F (36.7 °C)-98.7 °F (37.1 °C)] 98.2 °F (36.8 °C)  Pulse:  [70-98] 78  Resp:  [11-20] 20  SpO2:  [79 %-97 %] 95 %  BP: ()/() 100/74     Weight: 130.7 kg (288 lb 2.3 oz)  Body mass index is 35.07 kg/m².      Intake/Output Summary (Last 24 hours) at 2/15/2020 1521  Last data filed at 2/15/2020 0400  Gross per 24 hour   Intake 910 ml   Output 740 ml   Net 170 ml       Physical Exam   Constitutional: He is oriented to person, place, and time. He appears well-developed and well-nourished.   HENT:   Head: Normocephalic and atraumatic.   Eyes: Pupils are equal, round, and reactive to light. Conjunctivae are normal.   Neck: Neck supple. No JVD present. No tracheal deviation present. No thyromegaly present.   Cardiovascular: Normal rate, regular rhythm and normal heart sounds.   Pulmonary/Chest: Effort normal. He has decreased breath sounds in the right lower field and the left lower field.   Chest tubes in place   Abdominal: Soft.   Musculoskeletal: Normal range of motion.   Lymphadenopathy:     He has no cervical adenopathy.   Neurological: He is alert and oriented to person, place, and time.   Skin: Skin is warm and dry.   Nursing note and vitals reviewed.      Vents:  Oxygen Concentration (%): 28 (02/15/20 1517)    Lines/Drains/Airways     Drain                 Y Chest Tube 1 and 2 02/14/20 0820 1 Anterior Pericardial 32 Fr. 2 Left Pleural 32 Fr. 1 day          Peripheral Intravenous Line                 Peripheral IV - Single Lumen 02/13/20 1500 22 G Left Hand 2 days         Peripheral IV - Single Lumen 02/14/20 0502 18 G Right Antecubital 1 day                Significant Labs:    CBC/Anemia Profile:  Recent Labs   Lab 02/14/20  5218  02/15/20  0638   WBC 9.09 10.54  10.54   HGB 10.0* 10.8*  10.8*   HCT 32.3* 35.7*  35.7*    281  281   * 104*  104*   RDW 13.7 13.8  13.8        Chemistries:  Recent Labs   Lab 02/13/20  1612 02/14/20  0430 02/14/20  1833 02/15/20  0638   NA  --  139 135* 136  138   K  --  4.0 4.4 4.1  4.2   CL  --  103 103 101  101   CO2  --  27 24 29  29   BUN  --  16 14 12  12   CREATININE  --  1.1 0.9 0.9  0.9   CALCIUM  --  8.5* 8.3* 8.7  8.8   ALBUMIN  --  2.3*  --  2.3*   PROT  --  6.6  --  7.0   BILITOT  --  1.3*  --  1.0   ALKPHOS  --  58  --  61   ALT  --  63*  --  56*   AST  --  40  --  31   MG 2.2 2.1  --  2.1   PHOS  --  2.9  --  3.1       BMP:   Recent Labs   Lab 02/15/20  0638   *  135*     138   K 4.1  4.2     101   CO2 29  29   BUN 12  12   CREATININE 0.9  0.9   CALCIUM 8.7  8.8   MG 2.1     CMP:   Recent Labs   Lab 02/14/20  0430 02/14/20  1833 02/15/20  0638    135* 136  138   K 4.0 4.4 4.1  4.2    103 101  101   CO2 27 24 29  29    172* 135*  135*   BUN 16 14 12  12   CREATININE 1.1 0.9 0.9  0.9   CALCIUM 8.5* 8.3* 8.7  8.8   PROT 6.6  --  7.0   ALBUMIN 2.3*  --  2.3*   BILITOT 1.3*  --  1.0   ALKPHOS 58  --  61   AST 40  --  31   ALT 63*  --  56*   ANIONGAP 9 8 6*  8   EGFRNONAA >60 >60 >60  >60     All pertinent labs within the past 24 hours have been reviewed.    X-Ray Chest AP Portable  Narrative: EXAMINATION:  XR CHEST AP PORTABLE    CLINICAL HISTORY:  Post-op;    COMPARISON:  Prior radiograph from February 14, 2020.    FINDINGS:  Again demonstrated are 2 left-sided thoracostomy tube in place with extensive pleuroparenchymal disease.  No definite pneumothorax.  Resolution of trace apical pneumothorax.  The heart remains enlarged.  The right lung is relatively well aerated.No significant bony findings.  Impression: No significant change with 2 thoracostomy tubes in place and pleuroparenchymal disease within the left chest  base.    Electronically signed by: Josue Anthony Jr., MD  Date:    02/15/2020  Time:    07:30        Significant Imaging:  I have reviewed all pertinent imaging results/findings within the past 24 hours.  I have reviewed and interpreted all pertinent imaging results/findings within the past 24 hours.

## 2020-02-15 NOTE — NURSING
Pt is AAOx4. Chest tubes hooked to suction. Monitor placed and verified with monitor tech. Will continue to monitor.

## 2020-02-16 LAB
ALBUMIN SERPL BCP-MCNC: 2 G/DL (ref 3.5–5.2)
ALP SERPL-CCNC: 52 U/L (ref 55–135)
ALT SERPL W/O P-5'-P-CCNC: 38 U/L (ref 10–44)
ANION GAP SERPL CALC-SCNC: 6 MMOL/L (ref 8–16)
ANION GAP SERPL CALC-SCNC: 7 MMOL/L (ref 8–16)
ANION GAP SERPL CALC-SCNC: 8 MMOL/L (ref 8–16)
AST SERPL-CCNC: 23 U/L (ref 10–40)
BASOPHILS # BLD AUTO: 0.03 K/UL (ref 0–0.2)
BASOPHILS # BLD AUTO: 0.03 K/UL (ref 0–0.2)
BASOPHILS NFR BLD: 0.3 % (ref 0–1.9)
BASOPHILS NFR BLD: 0.3 % (ref 0–1.9)
BILIRUB SERPL-MCNC: 0.7 MG/DL (ref 0.1–1)
BUN SERPL-MCNC: 7 MG/DL (ref 8–23)
BUN SERPL-MCNC: 8 MG/DL (ref 8–23)
BUN SERPL-MCNC: 8 MG/DL (ref 8–23)
CALCIUM SERPL-MCNC: 8.2 MG/DL (ref 8.7–10.5)
CALCIUM SERPL-MCNC: 8.4 MG/DL (ref 8.7–10.5)
CALCIUM SERPL-MCNC: 8.6 MG/DL (ref 8.7–10.5)
CHLORIDE SERPL-SCNC: 101 MMOL/L (ref 95–110)
CO2 SERPL-SCNC: 29 MMOL/L (ref 23–29)
CO2 SERPL-SCNC: 30 MMOL/L (ref 23–29)
CO2 SERPL-SCNC: 30 MMOL/L (ref 23–29)
CREAT SERPL-MCNC: 0.9 MG/DL (ref 0.5–1.4)
CREAT SERPL-MCNC: 1 MG/DL (ref 0.5–1.4)
CREAT SERPL-MCNC: 1 MG/DL (ref 0.5–1.4)
DIFFERENTIAL METHOD: ABNORMAL
DIFFERENTIAL METHOD: ABNORMAL
EOSINOPHIL # BLD AUTO: 0.3 K/UL (ref 0–0.5)
EOSINOPHIL # BLD AUTO: 0.3 K/UL (ref 0–0.5)
EOSINOPHIL NFR BLD: 3.2 % (ref 0–8)
EOSINOPHIL NFR BLD: 3.2 % (ref 0–8)
ERYTHROCYTE [DISTWIDTH] IN BLOOD BY AUTOMATED COUNT: 13.9 % (ref 11.5–14.5)
ERYTHROCYTE [DISTWIDTH] IN BLOOD BY AUTOMATED COUNT: 13.9 % (ref 11.5–14.5)
EST. GFR  (AFRICAN AMERICAN): >60 ML/MIN/1.73 M^2
EST. GFR  (NON AFRICAN AMERICAN): >60 ML/MIN/1.73 M^2
GLUCOSE SERPL-MCNC: 138 MG/DL (ref 70–110)
GLUCOSE SERPL-MCNC: 138 MG/DL (ref 70–110)
GLUCOSE SERPL-MCNC: 140 MG/DL (ref 70–110)
HCT VFR BLD AUTO: 31.6 % (ref 40–54)
HCT VFR BLD AUTO: 31.6 % (ref 40–54)
HGB BLD-MCNC: 9.6 G/DL (ref 14–18)
HGB BLD-MCNC: 9.6 G/DL (ref 14–18)
IMM GRANULOCYTES # BLD AUTO: 0.06 K/UL (ref 0–0.04)
IMM GRANULOCYTES # BLD AUTO: 0.06 K/UL (ref 0–0.04)
IMM GRANULOCYTES NFR BLD AUTO: 0.6 % (ref 0–0.5)
IMM GRANULOCYTES NFR BLD AUTO: 0.6 % (ref 0–0.5)
LDH FLD L TO P-CCNC: 381 U/L
LYMPHOCYTES # BLD AUTO: 0.9 K/UL (ref 1–4.8)
LYMPHOCYTES # BLD AUTO: 0.9 K/UL (ref 1–4.8)
LYMPHOCYTES NFR BLD: 8.7 % (ref 18–48)
LYMPHOCYTES NFR BLD: 8.7 % (ref 18–48)
MAGNESIUM SERPL-MCNC: 1.9 MG/DL (ref 1.6–2.6)
MCH RBC QN AUTO: 31.5 PG (ref 27–31)
MCH RBC QN AUTO: 31.5 PG (ref 27–31)
MCHC RBC AUTO-ENTMCNC: 30.4 G/DL (ref 32–36)
MCHC RBC AUTO-ENTMCNC: 30.4 G/DL (ref 32–36)
MCV RBC AUTO: 104 FL (ref 82–98)
MCV RBC AUTO: 104 FL (ref 82–98)
MONOCYTES # BLD AUTO: 0.8 K/UL (ref 0.3–1)
MONOCYTES # BLD AUTO: 0.8 K/UL (ref 0.3–1)
MONOCYTES NFR BLD: 8.1 % (ref 4–15)
MONOCYTES NFR BLD: 8.1 % (ref 4–15)
NEUTROPHILS # BLD AUTO: 7.8 K/UL (ref 1.8–7.7)
NEUTROPHILS # BLD AUTO: 7.8 K/UL (ref 1.8–7.7)
NEUTROPHILS NFR BLD: 79.1 % (ref 38–73)
NEUTROPHILS NFR BLD: 79.1 % (ref 38–73)
NRBC BLD-RTO: 0 /100 WBC
NRBC BLD-RTO: 0 /100 WBC
PHOSPHATE SERPL-MCNC: 2.8 MG/DL (ref 2.7–4.5)
PLATELET # BLD AUTO: 329 K/UL (ref 150–350)
PLATELET # BLD AUTO: 329 K/UL (ref 150–350)
PMV BLD AUTO: 9.5 FL (ref 9.2–12.9)
PMV BLD AUTO: 9.5 FL (ref 9.2–12.9)
POTASSIUM SERPL-SCNC: 4.1 MMOL/L (ref 3.5–5.1)
POTASSIUM SERPL-SCNC: 4.1 MMOL/L (ref 3.5–5.1)
POTASSIUM SERPL-SCNC: 4.2 MMOL/L (ref 3.5–5.1)
PROT SERPL-MCNC: 6.3 G/DL (ref 6–8.4)
RBC # BLD AUTO: 3.05 M/UL (ref 4.6–6.2)
RBC # BLD AUTO: 3.05 M/UL (ref 4.6–6.2)
SODIUM SERPL-SCNC: 137 MMOL/L (ref 136–145)
SODIUM SERPL-SCNC: 138 MMOL/L (ref 136–145)
SODIUM SERPL-SCNC: 138 MMOL/L (ref 136–145)
SPECIMEN SOURCE: NORMAL
WBC # BLD AUTO: 9.9 K/UL (ref 3.9–12.7)
WBC # BLD AUTO: 9.9 K/UL (ref 3.9–12.7)

## 2020-02-16 PROCEDURE — 85025 COMPLETE CBC W/AUTO DIFF WBC: CPT

## 2020-02-16 PROCEDURE — 21400001 HC TELEMETRY ROOM

## 2020-02-16 PROCEDURE — 97530 THERAPEUTIC ACTIVITIES: CPT

## 2020-02-16 PROCEDURE — 94640 AIRWAY INHALATION TREATMENT: CPT

## 2020-02-16 PROCEDURE — 99900035 HC TECH TIME PER 15 MIN (STAT)

## 2020-02-16 PROCEDURE — 83735 ASSAY OF MAGNESIUM: CPT

## 2020-02-16 PROCEDURE — 93010 ELECTROCARDIOGRAM REPORT: CPT | Mod: ,,, | Performed by: INTERNAL MEDICINE

## 2020-02-16 PROCEDURE — 25000003 PHARM REV CODE 250: Performed by: THORACIC SURGERY (CARDIOTHORACIC VASCULAR SURGERY)

## 2020-02-16 PROCEDURE — 93005 ELECTROCARDIOGRAM TRACING: CPT

## 2020-02-16 PROCEDURE — 36415 COLL VENOUS BLD VENIPUNCTURE: CPT

## 2020-02-16 PROCEDURE — 63600175 PHARM REV CODE 636 W HCPCS: Performed by: THORACIC SURGERY (CARDIOTHORACIC VASCULAR SURGERY)

## 2020-02-16 PROCEDURE — 27000221 HC OXYGEN, UP TO 24 HOURS

## 2020-02-16 PROCEDURE — 94799 UNLISTED PULMONARY SVC/PX: CPT

## 2020-02-16 PROCEDURE — 80053 COMPREHEN METABOLIC PANEL: CPT

## 2020-02-16 PROCEDURE — 93010 EKG 12-LEAD: ICD-10-PCS | Mod: ,,, | Performed by: INTERNAL MEDICINE

## 2020-02-16 PROCEDURE — 97116 GAIT TRAINING THERAPY: CPT

## 2020-02-16 PROCEDURE — 80048 BASIC METABOLIC PNL TOTAL CA: CPT | Mod: 91

## 2020-02-16 PROCEDURE — 84100 ASSAY OF PHOSPHORUS: CPT

## 2020-02-16 PROCEDURE — 99233 SBSQ HOSP IP/OBS HIGH 50: CPT | Mod: ,,, | Performed by: INTERNAL MEDICINE

## 2020-02-16 PROCEDURE — 25000242 PHARM REV CODE 250 ALT 637 W/ HCPCS: Performed by: THORACIC SURGERY (CARDIOTHORACIC VASCULAR SURGERY)

## 2020-02-16 PROCEDURE — 25000003 PHARM REV CODE 250: Performed by: NURSE PRACTITIONER

## 2020-02-16 PROCEDURE — 94761 N-INVAS EAR/PLS OXIMETRY MLT: CPT

## 2020-02-16 PROCEDURE — 63600175 PHARM REV CODE 636 W HCPCS: Performed by: EMERGENCY MEDICINE

## 2020-02-16 PROCEDURE — 99233 PR SUBSEQUENT HOSPITAL CARE,LEVL III: ICD-10-PCS | Mod: ,,, | Performed by: INTERNAL MEDICINE

## 2020-02-16 RX ORDER — METOPROLOL SUCCINATE 25 MG/1
25 TABLET, EXTENDED RELEASE ORAL DAILY
Status: DISCONTINUED | OUTPATIENT
Start: 2020-02-17 | End: 2020-02-17

## 2020-02-16 RX ORDER — IPRATROPIUM BROMIDE AND ALBUTEROL SULFATE 2.5; .5 MG/3ML; MG/3ML
3 SOLUTION RESPIRATORY (INHALATION) EVERY 8 HOURS
Status: DISCONTINUED | OUTPATIENT
Start: 2020-02-16 | End: 2020-02-18 | Stop reason: HOSPADM

## 2020-02-16 RX ORDER — GUAIFENESIN 100 MG/5ML
200 SOLUTION ORAL EVERY 4 HOURS PRN
Status: DISCONTINUED | OUTPATIENT
Start: 2020-02-16 | End: 2020-02-18 | Stop reason: HOSPADM

## 2020-02-16 RX ORDER — CYANOCOBALAMIN 1000 UG/ML
1000 INJECTION, SOLUTION INTRAMUSCULAR; SUBCUTANEOUS
Status: DISCONTINUED | OUTPATIENT
Start: 2020-02-16 | End: 2020-02-18 | Stop reason: HOSPADM

## 2020-02-16 RX ADMIN — FAMOTIDINE 20 MG: 20 TABLET ORAL at 09:02

## 2020-02-16 RX ADMIN — POLYETHYLENE GLYCOL (3350) 17 G: 17 POWDER, FOR SOLUTION ORAL at 09:02

## 2020-02-16 RX ADMIN — CHLORHEXIDINE GLUCONATE 0.12% ORAL RINSE 10 ML: 1.2 LIQUID ORAL at 08:02

## 2020-02-16 RX ADMIN — CHLORHEXIDINE GLUCONATE 0.12% ORAL RINSE 10 ML: 1.2 LIQUID ORAL at 09:02

## 2020-02-16 RX ADMIN — ACETAMINOPHEN 650 MG: 325 TABLET ORAL at 09:02

## 2020-02-16 RX ADMIN — GUAIFENESIN 200 MG: 200 SOLUTION ORAL at 09:02

## 2020-02-16 RX ADMIN — IPRATROPIUM BROMIDE AND ALBUTEROL SULFATE 3 ML: .5; 3 SOLUTION RESPIRATORY (INHALATION) at 03:02

## 2020-02-16 RX ADMIN — CYANOCOBALAMIN 1000 MCG: 1000 INJECTION, SOLUTION INTRAMUSCULAR; SUBCUTANEOUS at 02:02

## 2020-02-16 RX ADMIN — DEXTROSE, SODIUM CHLORIDE, SODIUM LACTATE, POTASSIUM CHLORIDE, AND CALCIUM CHLORIDE: 5; .6; .31; .03; .02 INJECTION, SOLUTION INTRAVENOUS at 09:02

## 2020-02-16 RX ADMIN — FAMOTIDINE 20 MG: 20 TABLET ORAL at 08:02

## 2020-02-16 RX ADMIN — IPRATROPIUM BROMIDE AND ALBUTEROL SULFATE 3 ML: .5; 3 SOLUTION RESPIRATORY (INHALATION) at 07:02

## 2020-02-16 RX ADMIN — ASPIRIN 81 MG: 81 TABLET ORAL at 09:02

## 2020-02-16 RX ADMIN — METOPROLOL SUCCINATE 12.5 MG: 25 TABLET, EXTENDED RELEASE ORAL at 09:02

## 2020-02-16 RX ADMIN — DOCUSATE SODIUM 100 MG: 100 CAPSULE, LIQUID FILLED ORAL at 09:02

## 2020-02-16 RX ADMIN — TIMOLOL MALEATE 1 DROP: 5 SOLUTION/ DROPS OPHTHALMIC at 08:02

## 2020-02-16 RX ADMIN — GUAIFENESIN 200 MG: 200 SOLUTION ORAL at 02:02

## 2020-02-16 RX ADMIN — LATANOPROST 1 DROP: 50 SOLUTION OPHTHALMIC at 08:02

## 2020-02-16 RX ADMIN — ACETAMINOPHEN 650 MG: 325 TABLET ORAL at 02:02

## 2020-02-16 RX ADMIN — TIMOLOL MALEATE 1 DROP: 5 SOLUTION/ DROPS OPHTHALMIC at 09:02

## 2020-02-16 NOTE — PROGRESS NOTES
[facility]  Cardiology  Progress Note    Patient Name: Dominic Cohen  MRN: [id number]  Admission Date: 2/13/2020  Hospital Length of Stay: 3 days  Code Status: Full Code   Attending Physician: Ankush Graham MD   Primary Care Physician: Sudhakar Liu MD  Expected Discharge Date:   Principal Problem:Atrial fibrillation with rapid ventricular response    Subjective:   HPI:  Mr. Cohen is an 84 year old male patient whose current medical conditions include HTN,CVI, polyneuropathy, and abdominal atherosclerosis who presented to [facility] ED today with a chief complaint of progressively worsening SOB over the past 4-5 days. Associated symptoms included BLE edema, congestion, orthopnea, and pleuritic chest pain/soreness. Patient denied any associated lightheadedness, dizziness, palpitations, near syncope, syncope, fever, or chills. Initial workup in ED revealed elevated Tbili (1.4), ALT of 71, and INR of 1.3. CTA of chest showed large pericardial effusion and bilateral pleural effusions. EKG showed afib with RVR and patient subsequently admitted for further evaluation and treatment. Cardiology consulted to assist with management. Patient seen and examined while in ED. Remains SOB with conversational dyspnea. Reports some chest discomfort with deep breathing and at time, palpation. Denies any prior history of afib/CAD. States he was recently hospitalized at [facility] in late January due to chest pain. Workup at that time, including, stress echo was negative, but he has been experiencing URI type symptoms since his discharge. Chart reviewed. BNP normal. Troponin negative. 2D echo pending.      Hospital Course:   2/14/2020-Patient seen and examined today, s/p pericardial window. No complaints. Feels well. SOB improved. Remains in SR.    2/15/2020-Patient seen and examined today. Feels ok. Complains of constipation/abdominal bloating. Admits to some mild SOB. Still in SR. Labs reviewed and are  stable. BP borderline.     2/16/2020-Patient seen and examined today. Only complaint is abdominal bloating/consitpation. No chest pain or SOB. Chest tube in place, still significant OP. CT of chest ordered. Labs stable. BP ok, needs BB increased and ACEi/ARB on board if he can tolerate.         Review of Systems   Constitution: Positive for malaise/fatigue.   HENT: Negative.    Eyes: Negative.    Cardiovascular: Negative.    Respiratory: Negative.    Endocrine: Negative.    Hematologic/Lymphatic: Negative.    Skin: Negative.    Musculoskeletal: Negative.    Gastrointestinal: Positive for bloating and constipation.   Genitourinary: Negative.    Neurological: Negative.    Psychiatric/Behavioral: Negative.    Allergic/Immunologic: Negative.      Objective:     Vital Signs (Most Recent):  Temp: 99.1 °F (37.3 °C) (02/16/20 0712)  Pulse: 78 (02/16/20 0726)  Resp: 18 (02/16/20 0726)  BP: 117/67 (02/16/20 0712)  SpO2: 96 % (02/16/20 0726) Vital Signs (24h Range):  Temp:  [98.1 °F (36.7 °C)-99.1 °F (37.3 °C)] 99.1 °F (37.3 °C)  Pulse:  [] 78  Resp:  [16-20] 18  SpO2:  [95 %-98 %] 96 %  BP: (110-119)/(65-70) 117/67     Weight: 118 kg (260 lb 2.3 oz)  Body mass index is 31.67 kg/m².     SpO2: 96 %  O2 Device (Oxygen Therapy): nasal cannula      Intake/Output Summary (Last 24 hours) at 2/16/2020 1055  Last data filed at 2/16/2020 0712  Gross per 24 hour   Intake 2565 ml   Output 1250 ml   Net 1315 ml       Lines/Drains/Airways     Drain                 Y Chest Tube 1 and 2 02/14/20 0820 1 Anterior Pericardial 32 Fr. 2 Left Pleural 32 Fr. 2 days          Peripheral Intravenous Line                 Peripheral IV - Single Lumen 02/15/20 2230 22 G Anterior;Right Hand less than 1 day                Physical Exam   Constitutional: He is oriented to person, place, and time. He appears well-developed and well-nourished. No distress.   HENT:   Head: Normocephalic and atraumatic.   Eyes: Pupils are equal, round, and reactive to  light. Right eye exhibits no discharge. Left eye exhibits no discharge.   Neck: Neck supple. No JVD present.   Cardiovascular: Normal rate, regular rhythm, S1 normal, S2 normal and normal heart sounds.   No murmur heard.  Pulmonary/Chest: Effort normal. No respiratory distress. He has no wheezes. He has no rales.   Incision C/D/I; no bleeding erythema or drainage, chest tube in place   Abdominal: Soft. He exhibits no distension. There is no tenderness.   Musculoskeletal: He exhibits no edema.   Neurological: He is alert and oriented to person, place, and time.   Skin: Skin is warm and dry. He is not diaphoretic. No erythema.   Psychiatric: He has a normal mood and affect. His behavior is normal. Thought content normal.   Nursing note and vitals reviewed.      Significant Labs:   CMP   Recent Labs   Lab 02/15/20  0638 02/15/20  1903 02/16/20  0647     138 137 137  138   K 4.1  4.2 4.2 4.1  4.2     101 101 101  101   CO2 29  29 31* 30*  29   *  135* 136* 138*  140*   BUN 12  12 11 8  8   CREATININE 0.9  0.9 1.0 1.0  1.0   CALCIUM 8.7  8.8 8.2* 8.4*  8.2*   PROT 7.0  --  6.3   ALBUMIN 2.3*  --  2.0*   BILITOT 1.0  --  0.7   ALKPHOS 61  --  52*   AST 31  --  23   ALT 56*  --  38   ANIONGAP 6*  8 5* 6*  8   ESTGFRAFRICA >60  >60 >60 >60  >60   EGFRNONAA >60  >60 >60 >60  >60   , CBC   Recent Labs   Lab 02/15/20  0638 02/16/20  0647   WBC 10.54  10.54 9.90  9.90   HGB 10.8*  10.8* 9.6*  9.6*   HCT 35.7*  35.7* 31.6*  31.6*     281 329  329   , Troponin No results for input(s): TROPONINI in the last 48 hours. and All pertinent lab results from the last 24 hours have been reviewed.    Significant Imaging: Echocardiogram:   2D echo with color flow doppler:   Results for orders placed or performed in visit on 07/29/19   2D echo with color flow doppler   Result Value Ref Range    QEF 60 55 - 65    Est. PA Systolic Pressure 22.36     Mitral Valve Mobility NORMAL      Narrative    Date of Procedure: 07/29/2019        TEST DESCRIPTION       Aorta: The aortic root is normal in size, measuring 4.3 cm at sinotubular junction and 4.3 cm at Sinuses of Valsalva. The proximal ascending aorta is normal in size, measuring 3.6 cm across.     Left Atrium: The left atrial volume index is mildly enlarged, measuring 40.11 cc/m2.     Left Ventricle: The left ventricle is normal in size, with an end-diastolic diameter of 5.0 cm, and an end-systolic diameter of 2.5 cm. Wall thickness is increased, with the septum and the posterior wall each measuring 1.8 cm across. Relative wall   thickness was increased at 0.72, and the LV mass index was increased at 200.0 g/m2 consistent with moderate concentric left ventricular hypertrophy. There are no regional wall motion abnormalities. Left ventricular systolic function appears normal.   Visually estimated ejection fraction is 60-65%. The LV Doppler derived stroke volume equals 115.0 ccs.     Diastolic indices: E wave velocity 1.1 m/s, E/A ratio 0.8,  msec., E/e' ratio(avg) 11. Diastolic function is indeterminate.     Right Atrium: The right atrium is normal in size, measuring 6.7 cm in length and 4.1 cm in width in the apical view.     Right Ventricle: The right ventricle is normal in size measuring 3.1 cm at the base in the apical right ventricle-focused view. Global right ventricular systolic function appears normal. Tricuspid annular plane systolic excursion (TAPSE) is 2.4 cm. The   estimated PA systolic pressure is 22 mmHg.     Aortic Valve:  The aortic valve is normal in structure with normal leaflet mobility. The aortic valve is tri-leaflet in structure.     Mitral Valve:  The mitral valve is normal in structure with normal leaflet mobility. There is mitral annular calcification.     Tricuspid Valve:  The tricuspid valve is normal in structure.     Pulmonary Valve:  The pulmonic valve is normal in structure.     IVC: IVC is normal in size and  collapses > 50% with a sniff, suggesting normal right atrial pressure of 3 mmHg.     Intracavitary: There is no evidence of pericardial effusion, intracavity mass, thrombi, or vegetation.         CONCLUSIONS     1 - Mild left atrial enlargement.     2 - Concentric hypertrophy.     3 - No wall motion abnormalities.     4 - Normal left ventricular systolic function (EF 60-65%).     5 - Normal right ventricular systolic function .     6 - The estimated PA systolic pressure is 22 mmHg.             This document has been electronically    SIGNED BY: Romeo Dawkins MD On: 07/29/2019 14:33   , EKG: Reviewed and X-Ray: CXR: X-Ray Chest 1 View (CXR): No results found for this visit on 02/13/20. and X-Ray Chest PA and Lateral (CXR): No results found for this visit on 02/13/20.    Assessment and Plan:   Patient who presents with afib/pericardial effusion/acute CHF s/p pericardial window. Still with significant OP from chest tube. CT of chest ordered by CTS. Continue same meds/mgmt. Needs ACEi/ARB and AC on board, when able.    * Atrial fibrillation with rapid ventricular response  -Presented with afib with RVR, new onset  -Converted to SR once on telemetry floor  -Will add low dose Toprol XL 12.5 mg daily given borderline BP upon admission  -Heparin gtt initiated   -Assess response    2/14/2020  -Continue low dose BB  -Remains in SR  -Needs AC on board when ok with CTS    2/15/2020  -Remains in SR  -Continue BB  -Needs AC re-initiated when ok with CTS    2/16/2020  -Remains in SR  -Increase Toprol to 25 mg daily  -Needs AC on board when ok with CTS    Acute systolic CHF (congestive heart failure)  -EF 30% by 2D echo  -Likely influenced by afib  -Continue BB  -Will further optimize regimen pending clinical course/BP trend  -Stress test IP vs OP    2/16/2020  -Toprol XL increased to 25 mg daily  -Add ACEi/ARB if BP permits    Bilateral pleural effusion  -Will gently diurese and assess response  -Improved       Pericardial  effusion  -2D echo pending  -Will likely need CTS consult for possible pericardial window    2/14/2020  -s/p pericardial window  -Mgmt as per CTS  -Will follow    2/15/2020  -Stable, mgmt as per CTS        VTE Risk Mitigation (From admission, onward)         Ordered     Place sequential compression device  Until discontinued      02/16/20 0944                Libia Earl PA-C  Cardiology  [facility]

## 2020-02-16 NOTE — ASSESSMENT & PLAN NOTE
-EF 30% by 2D echo  -Likely influenced by afib  -Continue BB  -Will further optimize regimen pending clinical course/BP trend  -Stress test IP vs OP    2/16/2020  -Toprol XL increased to 25 mg daily  -Add ACEi/ARB if BP permits

## 2020-02-16 NOTE — SUBJECTIVE & OBJECTIVE
Review of Systems   Constitution: Positive for malaise/fatigue.   HENT: Negative.    Eyes: Negative.    Cardiovascular: Negative.    Respiratory: Negative.    Endocrine: Negative.    Hematologic/Lymphatic: Negative.    Skin: Negative.    Musculoskeletal: Negative.    Gastrointestinal: Positive for bloating and constipation.   Genitourinary: Negative.    Neurological: Negative.    Psychiatric/Behavioral: Negative.    Allergic/Immunologic: Negative.      Objective:     Vital Signs (Most Recent):  Temp: 99.1 °F (37.3 °C) (02/16/20 0712)  Pulse: 78 (02/16/20 0726)  Resp: 18 (02/16/20 0726)  BP: 117/67 (02/16/20 0712)  SpO2: 96 % (02/16/20 0726) Vital Signs (24h Range):  Temp:  [98.1 °F (36.7 °C)-99.1 °F (37.3 °C)] 99.1 °F (37.3 °C)  Pulse:  [] 78  Resp:  [16-20] 18  SpO2:  [95 %-98 %] 96 %  BP: (110-119)/(65-70) 117/67     Weight: 118 kg (260 lb 2.3 oz)  Body mass index is 31.67 kg/m².     SpO2: 96 %  O2 Device (Oxygen Therapy): nasal cannula      Intake/Output Summary (Last 24 hours) at 2/16/2020 1055  Last data filed at 2/16/2020 0712  Gross per 24 hour   Intake 2565 ml   Output 1250 ml   Net 1315 ml       Lines/Drains/Airways     Drain                 Y Chest Tube 1 and 2 02/14/20 0820 1 Anterior Pericardial 32 Fr. 2 Left Pleural 32 Fr. 2 days          Peripheral Intravenous Line                 Peripheral IV - Single Lumen 02/15/20 2230 22 G Anterior;Right Hand less than 1 day                Physical Exam   Constitutional: He is oriented to person, place, and time. He appears well-developed and well-nourished. No distress.   HENT:   Head: Normocephalic and atraumatic.   Eyes: Pupils are equal, round, and reactive to light. Right eye exhibits no discharge. Left eye exhibits no discharge.   Neck: Neck supple. No JVD present.   Cardiovascular: Normal rate, regular rhythm, S1 normal, S2 normal and normal heart sounds.   No murmur heard.  Pulmonary/Chest: Effort normal. No respiratory distress. He has no  wheezes. He has no rales.   Incision C/D/I; no bleeding erythema or drainage, chest tube in place   Abdominal: Soft. He exhibits no distension. There is no tenderness.   Musculoskeletal: He exhibits no edema.   Neurological: He is alert and oriented to person, place, and time.   Skin: Skin is warm and dry. He is not diaphoretic. No erythema.   Psychiatric: He has a normal mood and affect. His behavior is normal. Thought content normal.   Nursing note and vitals reviewed.      Significant Labs:   CMP   Recent Labs   Lab 02/15/20  0638 02/15/20  1903 02/16/20  0647     138 137 137  138   K 4.1  4.2 4.2 4.1  4.2     101 101 101  101   CO2 29  29 31* 30*  29   *  135* 136* 138*  140*   BUN 12  12 11 8  8   CREATININE 0.9  0.9 1.0 1.0  1.0   CALCIUM 8.7  8.8 8.2* 8.4*  8.2*   PROT 7.0  --  6.3   ALBUMIN 2.3*  --  2.0*   BILITOT 1.0  --  0.7   ALKPHOS 61  --  52*   AST 31  --  23   ALT 56*  --  38   ANIONGAP 6*  8 5* 6*  8   ESTGFRAFRICA >60  >60 >60 >60  >60   EGFRNONAA >60  >60 >60 >60  >60   , CBC   Recent Labs   Lab 02/15/20  0638 02/16/20  0647   WBC 10.54  10.54 9.90  9.90   HGB 10.8*  10.8* 9.6*  9.6*   HCT 35.7*  35.7* 31.6*  31.6*     281 329  329   , Troponin No results for input(s): TROPONINI in the last 48 hours. and All pertinent lab results from the last 24 hours have been reviewed.    Significant Imaging: Echocardiogram:   2D echo with color flow doppler:   Results for orders placed or performed in visit on 07/29/19   2D echo with color flow doppler   Result Value Ref Range    QEF 60 55 - 65    Est. PA Systolic Pressure 22.36     Mitral Valve Mobility NORMAL     Narrative    Date of Procedure: 07/29/2019        TEST DESCRIPTION       Aorta: The aortic root is normal in size, measuring 4.3 cm at sinotubular junction and 4.3 cm at Sinuses of Valsalva. The proximal ascending aorta is normal in size, measuring 3.6 cm across.     Left Atrium: The left  atrial volume index is mildly enlarged, measuring 40.11 cc/m2.     Left Ventricle: The left ventricle is normal in size, with an end-diastolic diameter of 5.0 cm, and an end-systolic diameter of 2.5 cm. Wall thickness is increased, with the septum and the posterior wall each measuring 1.8 cm across. Relative wall   thickness was increased at 0.72, and the LV mass index was increased at 200.0 g/m2 consistent with moderate concentric left ventricular hypertrophy. There are no regional wall motion abnormalities. Left ventricular systolic function appears normal.   Visually estimated ejection fraction is 60-65%. The LV Doppler derived stroke volume equals 115.0 ccs.     Diastolic indices: E wave velocity 1.1 m/s, E/A ratio 0.8,  msec., E/e' ratio(avg) 11. Diastolic function is indeterminate.     Right Atrium: The right atrium is normal in size, measuring 6.7 cm in length and 4.1 cm in width in the apical view.     Right Ventricle: The right ventricle is normal in size measuring 3.1 cm at the base in the apical right ventricle-focused view. Global right ventricular systolic function appears normal. Tricuspid annular plane systolic excursion (TAPSE) is 2.4 cm. The   estimated PA systolic pressure is 22 mmHg.     Aortic Valve:  The aortic valve is normal in structure with normal leaflet mobility. The aortic valve is tri-leaflet in structure.     Mitral Valve:  The mitral valve is normal in structure with normal leaflet mobility. There is mitral annular calcification.     Tricuspid Valve:  The tricuspid valve is normal in structure.     Pulmonary Valve:  The pulmonic valve is normal in structure.     IVC: IVC is normal in size and collapses > 50% with a sniff, suggesting normal right atrial pressure of 3 mmHg.     Intracavitary: There is no evidence of pericardial effusion, intracavity mass, thrombi, or vegetation.         CONCLUSIONS     1 - Mild left atrial enlargement.     2 - Concentric hypertrophy.     3 - No  wall motion abnormalities.     4 - Normal left ventricular systolic function (EF 60-65%).     5 - Normal right ventricular systolic function .     6 - The estimated PA systolic pressure is 22 mmHg.             This document has been electronically    SIGNED BY: Romeo Dawkins MD On: 07/29/2019 14:33   , EKG: Reviewed and X-Ray: CXR: X-Ray Chest 1 View (CXR): No results found for this visit on 02/13/20. and X-Ray Chest PA and Lateral (CXR): No results found for this visit on 02/13/20.

## 2020-02-16 NOTE — SUBJECTIVE & OBJECTIVE
Interval History:  The patient is postop day 2.  Status post pericardial window and a left chest tube placement.  The patient has no complaints.    ROS  Medications:  Continuous Infusions:   dextrose 5% lactated ringers 100 mL/hr at 02/16/20 0923     Scheduled Meds:   acetaminophen  650 mg Oral Q6H    albuterol-ipratropium  3 mL Nebulization Q8H    aspirin  81 mg Oral Daily    chlorhexidine  10 mL Mouth/Throat BID    cyanocobalamin  1,000 mcg Intramuscular Q30 Days    docusate sodium  100 mg Oral Daily    famotidine  20 mg Oral BID    latanoprost  1 drop Both Eyes QHS    metoprolol succinate  12.5 mg Oral Daily    nozaseptin   Each Nostril BID    polyethylene glycol  17 g Oral Daily    timolol maleate 0.5%  1 drop Both Eyes BID     PRN Meds:sodium chloride, acetaminophen, albumin human 5%, guaifenesin 100 mg/5 ml, lactated ringers, metoclopramide HCl, ondansetron, ondansetron, oxyCODONE, oxyCODONE     Objective:     Vital Signs (Most Recent):  Temp: 99.1 °F (37.3 °C) (02/16/20 0712)  Pulse: 78 (02/16/20 0726)  Resp: 18 (02/16/20 0726)  BP: 117/67 (02/16/20 0712)  SpO2: 96 % (02/16/20 0726) Vital Signs (24h Range):  Temp:  [98.1 °F (36.7 °C)-99.1 °F (37.3 °C)] 99.1 °F (37.3 °C)  Pulse:  [] 78  Resp:  [16-20] 18  SpO2:  [95 %-98 %] 96 %  BP: (110-119)/(65-70) 117/67     Weight: 118 kg (260 lb 2.3 oz)  Body mass index is 31.67 kg/m².    SpO2: 96 %  O2 Device (Oxygen Therapy): nasal cannula    Intake/Output - Last 3 Shifts       02/14 0700 - 02/15 0659 02/15 0700 - 02/16 0659 02/16 0700 - 02/17 0659    P.O. 710 630     I.V. (mL/kg) 1964.6 (15) 1735 (14.7) 320 (2.7)    IV Piggyback 200      Total Intake(mL/kg) 2874.6 (22) 2365 (20) 320 (2.7)    Urine (mL/kg/hr) 1340 (0.4) 990 (0.3)     Emesis/NG output       Other       Stool 0 0     Blood 50      Chest Tube 705 260 0    Total Output 2095 1250 0    Net +779.6 +1115 +320           Stool Occurrence 0 x            Lines/Drains/Airways     Drain                  Y Chest Tube 1 and 2 02/14/20 0820 1 Anterior Pericardial 32 Fr. 2 Left Pleural 32 Fr. 2 days          Peripheral Intravenous Line                 Peripheral IV - Single Lumen 02/15/20 2230 22 G Anterior;Right Hand less than 1 day                Physical Exam   Constitutional: He is oriented to person, place, and time. He appears well-developed and well-nourished. No distress.   HENT:   Head: Normocephalic and atraumatic.   Cardiovascular: Normal rate.   Pulmonary/Chest: Effort normal and breath sounds normal.   Abdominal: Soft. Bowel sounds are normal.   Musculoskeletal: He exhibits edema.   Neurological: He is alert and oriented to person, place, and time.   Skin: Skin is warm and dry. He is not diaphoretic.   Psychiatric: He has a normal mood and affect.       Significant Labs:  All pertinent labs from the last 24 hours have been reviewed.    Significant Diagnostics:  I have reviewed all pertinent imaging results/findings within the past 24 hours.

## 2020-02-16 NOTE — ASSESSMENT & PLAN NOTE
S/p L Chest Tube drainage via [place] Seal  S/p 1500 cc removal so far    Improving  Needs IS, chest PT if ok with CVT

## 2020-02-16 NOTE — ASSESSMENT & PLAN NOTE
02/15/2020   Patient is postop day 1 status post pericardial window and left chest tube placement.  Total chest tube output since surgery has been about 700 mL.  Will continue chest tube to drainage for now.    02/16/2020  The patient is postop day 2.  Status post pericardial window and left chest tube placement.  Chest tube output state significant.  Continue chest tube to drainage. Chest x-ray suggests consolidation/fluid accumulation in the left chest.  Will obtain CT scan of the chest.

## 2020-02-16 NOTE — SUBJECTIVE & OBJECTIVE
Interval History: looks better, urine output improved, LFTs improving with IVF. Minimal discharge from the Pericardial and Pleural drain. Dr. Graham ordered CT chest which showed small L PTX plus small L pleural effusion and atelectasis. Getting PT/OT as well. Overall looks better. Will continue PT/OT and await removal of CT by Dr. Graham.    Review of Systems   Constitutional: Positive for activity change. Negative for chills and fever.   HENT: Negative for facial swelling and sore throat.    Respiratory: Negative for chest tightness.    Cardiovascular: Negative for chest pain (post op pain), palpitations and leg swelling.   Gastrointestinal: Negative for abdominal pain, nausea and vomiting.   Genitourinary: Negative for decreased urine volume, scrotal swelling, testicular pain and urgency.   Musculoskeletal: Negative.    Skin: Positive for wound. Negative for color change and pallor.   Neurological: Positive for weakness. Negative for speech difficulty and headaches.   Psychiatric/Behavioral: The patient is not nervous/anxious.      Objective:     Vital Signs (Most Recent):  Temp: 98.5 °F (36.9 °C) (02/16/20 1622)  Pulse: 77 (02/16/20 1622)  Resp: 18 (02/16/20 1622)  BP: 119/71 (02/16/20 1622)  SpO2: 95 % (02/16/20 1622) Vital Signs (24h Range):  Temp:  [98.1 °F (36.7 °C)-99.1 °F (37.3 °C)] 98.5 °F (36.9 °C)  Pulse:  [] 77  Resp:  [16-20] 18  SpO2:  [92 %-98 %] 95 %  BP: (110-119)/(65-71) 119/71     Weight: 118 kg (260 lb 2.3 oz)  Body mass index is 31.67 kg/m².    Intake/Output Summary (Last 24 hours) at 2/16/2020 1744  Last data filed at 2/16/2020 1500  Gross per 24 hour   Intake 2265 ml   Output 1597 ml   Net 668 ml      Physical Exam   Constitutional: He is oriented to person, place, and time. He appears well-developed and well-nourished. No distress.   HENT:   Head: Normocephalic and atraumatic.   Cardiovascular: Normal rate.   Pulmonary/Chest: Effort normal and breath sounds normal.   Abdominal:  Soft. Bowel sounds are normal.   Musculoskeletal: He exhibits edema.   Neurological: He is alert and oriented to person, place, and time.   Skin: Skin is warm and dry. He is not diaphoretic.   Psychiatric: He has a normal mood and affect.   Nursing note and vitals reviewed.      Significant Labs:   BMP:   Recent Labs   Lab 02/16/20  0647   *  140*     138   K 4.1  4.2     101   CO2 30*  29   BUN 8  8   CREATININE 1.0  1.0   CALCIUM 8.4*  8.2*   MG 1.9     CBC:   Recent Labs   Lab 02/15/20  0638 02/16/20  0647   WBC 10.54  10.54 9.90  9.90   HGB 10.8*  10.8* 9.6*  9.6*   HCT 35.7*  35.7* 31.6*  31.6*     281 329  329     All pertinent labs within the past 24 hours have been reviewed.  EXAMINATION:  CT CHEST WITHOUT CONTRAST    CLINICAL HISTORY:  Shortness of breath;    TECHNIQUE:  Low dose axial images, sagittal and coronal reformations were obtained from the thoracic inlet to the lung bases. Contrast was not administered.    COMPARISON:  Chest CT 02/13/2020    FINDINGS:  Base of Neck: No significant abnormality.    Thoracic soft tissues: Normal.    Aorta: Left-sided aortic arch.  No aneurysm and no significant atherosclerosis    Heart: Compared to prior study of 2/13/2020, interval significant reduction in volume of pericardial effusion status post placement of a large-bore inferior pericardial tube, and a left-sided large-bore pleural drain.  There is trace gas in the anterior pericardial space likely postoperative.  There is trace residual pericardial fluid.    Pulmonary vasculature: Pulmonary arteries distribute normally.    Liz/Mediastinum: Mediastinum is mildly shifted to the patient's left due to volume loss at the left lung base.  There is a small left pleural effusion with left lower lobe and left lingular atelectasis.    Airways: Patent.    Lungs/Pleura: There is a small left pneumothorax seen at the left apex and anterior to the left lingula.  No evidence of  tension pneumothorax.    Tiny right pleural effusion with overlying mild passive atelectasis.  Otherwise the right lung is clear.    Esophagus: Normal.    Upper Abdomen: Left upper pole simple renal cyst.    Bones: No acute fracture. No suspicious lytic or sclerotic lesions.      Impression       Compared to prior study of 2/13/2020, interval significant reduction in volume of pericardial effusion status post placement of a large-bore inferior pericardial drain, and a left basilar large-bore pleural drain.  There is trace residual pericardial fluid.    Mediastinum is mildly shifted to the patient's left due to volume loss at the left lung base. There is a small left pleural effusion with left lower lobe and left lingular atelectasis.    There is a small left pneumothorax seen at the left apex and anterior to the left lingula. No evidence of tension pneumothorax.    All CT scans at this facility use dose modulation, iterative reconstruction and/or weight based dosing when appropriate to reduce radiation dose to as low as reasonably achievable.    This critical information above was relayed by myself by telephone to patient's telemetry nurse Lv on 02/16/2020 at 13:37.      Electronically signed by: Romero Fuentes  Date: 02/16/2020  Time: 13:41       Significant Imaging: I have reviewed all pertinent imaging results/findings within the past 24 hours.

## 2020-02-16 NOTE — ASSESSMENT & PLAN NOTE
-2D echo pending  -Will likely need CTS consult for possible pericardial window    2/14/2020  -s/p pericardial window  -Mgmt as per CTS  -Will follow    2/15/2020  -Stable, mgmt as per CTS

## 2020-02-16 NOTE — PT/OT/SLP PROGRESS
Physical Therapy Treatment    Patient Name:  Dominic Cohen   MRN:  [id number]    Recommendations:     Discharge Recommendations:  home health PT   Discharge Equipment Recommendations: walker, rolling   Barriers to discharge: Decreased caregiver support    Assessment:     Dominic Cohen is a 84 y.o. male admitted with a medical diagnosis of Atrial fibrillation with rapid ventricular response.  He presents with the following impairments/functional limitations:  gait instability, weakness, impaired cardiopulmonary response to activity, impaired balance, decreased lower extremity function. Improved endurance and safety with gait today.  Complains of slight light headedness but resolves quickly once seated.    Rehab Prognosis: Good; patient would benefit from acute skilled PT services to address these deficits and reach maximum level of function.    Recent Surgery: Procedure(s) (LRB):  CREATION, PERICARDIAL WINDOW (N/A)  INSERTION, CATHETER, INTERCOSTAL, FOR DRAINAGE (Left) 2 Days Post-Op    Plan:     During this hospitalization, patient to be seen (will be seen a minimum of 5 out of 7 days a weeks as marcela) to address the identified rehab impairments via gait training, therapeutic activities, therapeutic exercises and progress toward the following goals:    · Plan of Care Expires:       Subjective     Chief Complaint: fatigue  Patient/Family Comments/goals: return to home  Pain/Comfort:  · Pain Rating 1: 0/10      Objective:     Communicated with Epic and Dr. Cruz prior to session.  Patient found supine with peripheral IV, oxygen, chest tube upon PT entry to room.     General Precautions: Standard, fall   Orthopedic Precautions:N/A   Braces:       Functional Mobility:  · Bed Mobility:     · Supine to Sit: stand by assistance  · Transfers:     · Sit to Stand:  stand by assistance with rolling walker  · Gait: 2 x 125 ft with RW      AM-PAC 6 CLICK MOBILITY  Turning over in bed (including adjusting bedclothes,  sheets and blankets)?: 4  Sitting down on and standing up from a chair with arms (e.g., wheelchair, bedside commode, etc.): 3  Moving from lying on back to sitting on the side of the bed?: 4  Moving to and from a bed to a chair (including a wheelchair)?: 3  Need to walk in hospital room?: 3  Climbing 3-5 steps with a railing?: 3  Basic Mobility Total Score: 20       Therapeutic Activities and Exercises:   Needs assist with tubes/wires. Demo mild unsteady gait but much less than yesterday.  VC for upright posture and proper speed of movement.  C/O of slight light headedness but resolved quickly with rest    Patient left with bed in chair position with all lines intact and call button in reach..    GOALS:   Multidisciplinary Problems     Physical Therapy Goals        Problem: Physical Therapy Goal    Goal Priority Disciplines Outcome Goal Variances Interventions   Physical Therapy Goal     PT, PT/OT      Description:  PT eval complete.  The following goals will be met in 7 days  1.  Pt will be IND with bed mobility  2. Pt will be IND with transfer  3. Pt will be mod I with RW for 150 ft                    Time Tracking:     PT Received On: 02/16/20  PT Start Time: 1210     PT Stop Time: 1235  PT Total Time (min): 25 min     Billable Minutes: Gait Training 15 and Therapeutic Activity 10    Treatment Type: Treatment  PT/PTA: PT           Parminder Nowak, PT  02/16/2020

## 2020-02-16 NOTE — PROGRESS NOTES
[facility] Hospital Medicine  Progress Note    Patient Name: Dominic Cohen  MRN: [id number]  Patient Class: IP- Inpatient   Admission Date: 2/13/2020  Length of Stay: 3 days  Attending Physician: Ankush Graham MD  Primary Care Provider: Sudhakar Liu MD        Subjective:     Principal Problem:Pericardial effusion        HPI:  Dominic Cohen is an 84 year old male who presented to the ED with complaints of SOB and chest pain for 4 to 5 days prior to presentation. The patient states that his SOB worsened today prompting his presentation to the ED. The chest pain is located in his mid lower chest and sometimes reproducible with palpation. The chest pain and SOB are worse with laying flat. He also notes worsening lower extremity edema and cough with thick white sputum production. The patient denies fever and chills. He feels that symptoms are associated with receiving the flu shot at [facility] on 1/27/20. This was upon discharge from a hospitalization for chest pain. During this hospitalization, he underwent stress echocardiogram on 1/27/20 that was negative. He states that since discharge, he has had upper respiratory tract symptoms and his blood pressure has been low. He states that Dr. Liu took him off his blood pressure medications on follow up on 2/3/20, but it is not documented. Also of note, the patient recently completed a medrol dose pack for gout symptoms. The patient denies a history of atrial fibrillation. In the ED, he was found to have atrial fibrillation with RVR. His rate improved with metoprolol IV. Labs were significant for a mildly elevated total bilirubin of 1.4, ALT of 71 and INR of 1.3. Code status was discussed with the patient. He is a full code. He was unwilling to give a surrogate medical decision maker at this time.     Overview/Hospital Course:  2/14:  S/p pericardial window. Cardiothoracic surgery primary on case. Will remain on case for medical  management. Currently in ICU. Ok to step down from medicine standpoint. Will defer decision to transfer to primary team.     2/15- POD #1, s/p pericardial window and B Chest Tubes for B pleural effusions. About 1 L of clear serous fluid was then suction out of the left pleura. The pericardium was then opened and about 320 mL of serous fluid was then suctioned out.The pericardial space was filled with fibrinous exudate. The pericardium itself was thickened and fibrotic. A 3 x 4 cm piece of pericardium was then excised. Since then about 705 cc fluid has drained from both the tubes.   Looks better, lee d/jackie this am, since then urine output has decreased with low volume bladder scan. Dr. Graham just gave a 500 cc fluid bolus after which he put out a little urine.   2/16- looks better, urine output improved, LFTs improving with IVF. Minimal discharge from the Pericardial and Pleural drain. Dr. Graham ordered CT chest which showed small L PTX plus small L pleural effusion and atelectasis. Getting PT/OT as well. Overall looks better. Will continue PT/OT and await removal of CT by Dr. Graham.      Interval History: looks better, urine output improved, LFTs improving with IVF. Minimal discharge from the Pericardial and Pleural drain. Dr. Graham ordered CT chest which showed small L PTX plus small L pleural effusion and atelectasis. Getting PT/OT as well. Overall looks better. Will continue PT/OT and await removal of CT by Dr. Graham.    Review of Systems   Constitutional: Positive for activity change. Negative for chills and fever.   HENT: Negative for facial swelling and sore throat.    Respiratory: Negative for chest tightness.    Cardiovascular: Negative for chest pain (post op pain), palpitations and leg swelling.   Gastrointestinal: Negative for abdominal pain, nausea and vomiting.   Genitourinary: Negative for decreased urine volume, scrotal swelling, testicular pain and urgency.   Musculoskeletal: Negative.     Skin: Positive for wound. Negative for color change and pallor.   Neurological: Positive for weakness. Negative for speech difficulty and headaches.   Psychiatric/Behavioral: The patient is not nervous/anxious.      Objective:     Vital Signs (Most Recent):  Temp: 98.5 °F (36.9 °C) (02/16/20 1622)  Pulse: 77 (02/16/20 1622)  Resp: 18 (02/16/20 1622)  BP: 119/71 (02/16/20 1622)  SpO2: 95 % (02/16/20 1622) Vital Signs (24h Range):  Temp:  [98.1 °F (36.7 °C)-99.1 °F (37.3 °C)] 98.5 °F (36.9 °C)  Pulse:  [] 77  Resp:  [16-20] 18  SpO2:  [92 %-98 %] 95 %  BP: (110-119)/(65-71) 119/71     Weight: 118 kg (260 lb 2.3 oz)  Body mass index is 31.67 kg/m².    Intake/Output Summary (Last 24 hours) at 2/16/2020 1744  Last data filed at 2/16/2020 1500  Gross per 24 hour   Intake 2265 ml   Output 1597 ml   Net 668 ml      Physical Exam   Constitutional: He is oriented to person, place, and time. He appears well-developed and well-nourished. No distress.   HENT:   Head: Normocephalic and atraumatic.   Cardiovascular: Normal rate.   Pulmonary/Chest: Effort normal and breath sounds normal.   Abdominal: Soft. Bowel sounds are normal.   Musculoskeletal: He exhibits edema.   Neurological: He is alert and oriented to person, place, and time.   Skin: Skin is warm and dry. He is not diaphoretic.   Psychiatric: He has a normal mood and affect.   Nursing note and vitals reviewed.      Significant Labs:   BMP:   Recent Labs   Lab 02/16/20  0647   *  140*     138   K 4.1  4.2     101   CO2 30*  29   BUN 8  8   CREATININE 1.0  1.0   CALCIUM 8.4*  8.2*   MG 1.9     CBC:   Recent Labs   Lab 02/15/20  0638 02/16/20  0647   WBC 10.54  10.54 9.90  9.90   HGB 10.8*  10.8* 9.6*  9.6*   HCT 35.7*  35.7* 31.6*  31.6*     281 329  329     All pertinent labs within the past 24 hours have been reviewed.  EXAMINATION:  CT CHEST WITHOUT CONTRAST    CLINICAL HISTORY:  Shortness of breath;    TECHNIQUE:  Low  dose axial images, sagittal and coronal reformations were obtained from the thoracic inlet to the lung bases. Contrast was not administered.    COMPARISON:  Chest CT 02/13/2020    FINDINGS:  Base of Neck: No significant abnormality.    Thoracic soft tissues: Normal.    Aorta: Left-sided aortic arch.  No aneurysm and no significant atherosclerosis    Heart: Compared to prior study of 2/13/2020, interval significant reduction in volume of pericardial effusion status post placement of a large-bore inferior pericardial tube, and a left-sided large-bore pleural drain.  There is trace gas in the anterior pericardial space likely postoperative.  There is trace residual pericardial fluid.    Pulmonary vasculature: Pulmonary arteries distribute normally.    Liz/Mediastinum: Mediastinum is mildly shifted to the patient's left due to volume loss at the left lung base.  There is a small left pleural effusion with left lower lobe and left lingular atelectasis.    Airways: Patent.    Lungs/Pleura: There is a small left pneumothorax seen at the left apex and anterior to the left lingula.  No evidence of tension pneumothorax.    Tiny right pleural effusion with overlying mild passive atelectasis.  Otherwise the right lung is clear.    Esophagus: Normal.    Upper Abdomen: Left upper pole simple renal cyst.    Bones: No acute fracture. No suspicious lytic or sclerotic lesions.      Impression       Compared to prior study of 2/13/2020, interval significant reduction in volume of pericardial effusion status post placement of a large-bore inferior pericardial drain, and a left basilar large-bore pleural drain.  There is trace residual pericardial fluid.    Mediastinum is mildly shifted to the patient's left due to volume loss at the left lung base. There is a small left pleural effusion with left lower lobe and left lingular atelectasis.    There is a small left pneumothorax seen at the left apex and anterior to the left lingula. No  evidence of tension pneumothorax.    All CT scans at this facility use dose modulation, iterative reconstruction and/or weight based dosing when appropriate to reduce radiation dose to as low as reasonably achievable.    This critical information above was relayed by myself by telephone to patient's telemetry nurse Lv on 02/16/2020 at 13:37.      Electronically signed by: Romero Fuentes  Date: 02/16/2020  Time: 13:41       Significant Imaging: I have reviewed all pertinent imaging results/findings within the past 24 hours.      Assessment/Plan:      * Pericardial effusion  -Hypotension and tachycardia concerning for tamponade.   -Follow up echocardiogram.   -Cardiology consulted.     2/14:  S/p pericardial window  Being managed by CTS    2/16- improved. Still has pericardial and pleural drains    Atrial fibrillation with rapid ventricular response  -Continue rate control.   -Follow up echocardiogram.  -Hold anticoagulation until echocardiogram reviewed by Cardiology due to concern for tamponade.    -Cardiology consult.       2/14:  Currently rate controlled   Cont current management  In ICU per primary     2/15- in NSR, HR 78, looks good    Bilateral pleural effusion  S/p L Chest Tube drainage via [place]  S/p 1500 cc removal so far    Improving  Needs IS, chest PT if ok with CVT        S/P pericardial window creation  S/p Pericardial window with initial 320 cc and now > 100 pericardial output since then  Continue Chest Tube  Labs show benign, no infection noted.      Acute systolic CHF (congestive heart failure)  Sec to Afib, large Pericardial Effusion and B Pleural effusion s/p drainage  Overall CHF better but still has large L pleural effusion  Because of drop in urine out put-- pt getting IVF. Will avoid diuresis at present.   improving    Pneumothorax on left  Likely sec to Chest tube insertion        VTE Risk Mitigation (From admission, onward)         Ordered     Place sequential compression device  Until  discontinued      02/16/20 0944                      Barron Cruz MD  Department of Hospital Medicine   [facility]

## 2020-02-16 NOTE — ASSESSMENT & PLAN NOTE
-Presented with afib with RVR, new onset  -Converted to SR once on telemetry floor  -Will add low dose Toprol XL 12.5 mg daily given borderline BP upon admission  -Heparin gtt initiated   -Assess response    2/14/2020  -Continue low dose BB  -Remains in SR  -Needs AC on board when ok with CTS    2/15/2020  -Remains in SR  -Continue BB  -Needs AC re-initiated when ok with CTS    2/16/2020  -Remains in SR  -Increase Toprol to 25 mg daily  -Needs AC on board when ok with CTS

## 2020-02-16 NOTE — ASSESSMENT & PLAN NOTE
Sec to Afib, large Pericardial Effusion and B Pleural effusion s/p drainage  Overall CHF better but still has large L pleural effusion  Because of drop in urine out put-- pt getting IVF. Will avoid diuresis at present.   improving

## 2020-02-16 NOTE — ASSESSMENT & PLAN NOTE
-Hypotension and tachycardia concerning for tamponade.   -Follow up echocardiogram.   -Cardiology consulted.     2/14:  S/p pericardial window  Being managed by CTS    2/16- improved. Still has pericardial and pleural drains

## 2020-02-16 NOTE — NURSING
Radiology called about CT Chest findings, notified Dr. Graham, he's aware and is going to look into the CT that was ordered.

## 2020-02-16 NOTE — PT/OT/SLP PROGRESS
"Occupational Therapy  Treatment    Dominic Cohen   MRN: [id number]   Admitting Diagnosis: Atrial fibrillation with rapid ventricular response    OT Date of Treatment: 02/16/20   OT Start Time: 1150  OT Stop Time: 1205  OT Total Time (min): 15 min    Billable Minutes:  Therapeutic Activity 15    General Precautions: Standard, fall  Orthopedic Precautions: N/A  Braces: N/A         Subjective:  Communicated with nurse and completed epic chart review prior to session.    Pain/Comfort  Pain Rating 1: 0/10    Objective:  Patient found with: peripheral IV, oxygen, chest tube     Therapeutic Activities and Exercises:  Pt completed functional mobility as needed for ADLs using RW with Min A with good safety awareness noted and good insight into deficits. Pt noted to complete all bed mobility with SBA. Pt donned socks and gown as robe with SBA.     AM-PAC 6 CLICK ADL   How much help from another person does this patient currently need?   1 = Unable, Total/Dependent Assistance  2 = A lot, Maximum/Moderate Assistance  3 = A little, Minimum/Contact Guard/Supervision  4 = None, Modified [place]/Independent    Putting on and taking off regular lower body clothing? : 2  Bathing (including washing, rinsing, drying)?: 2  Toileting, which includes using toilet, bedpan, or urinal? : 3  Putting on and taking off regular upper body clothing?: 3  Taking care of personal grooming such as brushing teeth?: 4  Eating meals?: 4  Daily Activity Total Score: 18     AM-PAC Raw Score CMS "G-Code Modifier Level of Impairment Assistance   6 % Total / Unable   7 - 8 CM 80 - 100% Maximal Assist   9-13 CL 60 - 80% Moderate Assist   14 - 19 CK 40 - 60% Moderate Assist   20 - 22 CJ 20 - 40% Minimal Assist   23 CI 1-20% SBA / CGA   24 CH 0% Independent/ Mod I       Patient left supine with all lines intact, call button in reach and nurse notified    ASSESSMENT:  Dominic Cohen is a 84 y.o. male with a medical diagnosis of Atrial " fibrillation with rapid ventricular response and presents with generalized weakness and debility.    Rehab identified problem list/impairments: Rehab identified problem list/impairments: weakness, impaired cardiopulmonary response to activity, impaired endurance, impaired self care skills, gait instability, impaired functional mobilty, impaired balance    Rehab potential is good.    Activity tolerance: Fair    Discharge recommendations: Discharge Facility/Level of Care Needs: home health OT     Barriers to discharge:      Equipment recommendations: walker, rolling     GOALS:   Multidisciplinary Problems     Occupational Therapy Goals        Problem: Occupational Therapy Goal    Goal Priority Disciplines Outcome Interventions   Occupational Therapy Goal     OT, PT/OT Ongoing, Progressing    Description:  OT GOALS TO BE MET BY 2-22-20  S WITH UE DRESSING  S WITH TOILET T/F'S  SBA WITH LE DRESSING  PT WILL TOLERATE 1 S ET X 20 REPS B UE ROM EXERCISE WITH MIN RESISTANCE                    Plan:  Patient to be seen 3 x/week to address the above listed problems via self-care/home management, therapeutic activities, therapeutic exercises  Plan of Care expires: 02/22/20  Plan of Care reviewed with: patient         Keyona Elias OT  02/16/2020

## 2020-02-16 NOTE — PROGRESS NOTES
[facility] -   Cardiothoracic Surgery  Progress Note    Patient Name: Dominic Cohen  MRN: [id number]  Admission Date: 2/13/2020  Hospital Length of Stay: 3 days  Code Status: Full Code   Attending Physician: Ankush Graham MD   Referring Provider: Self, Aaareferral  Principal Problem:Atrial fibrillation with rapid ventricular response            Subjective:     Post-Op Info:  Procedure(s) (LRB):  CREATION, PERICARDIAL WINDOW (N/A)  INSERTION, CATHETER, INTERCOSTAL, FOR DRAINAGE (Left)   2 Days Post-Op     Interval History:  The patient is postop day 2.  Status post pericardial window and a left chest tube placement.  The patient has no complaints.    ROS  Medications:  Continuous Infusions:   dextrose 5% lactated ringers 100 mL/hr at 02/16/20 0923     Scheduled Meds:   acetaminophen  650 mg Oral Q6H    albuterol-ipratropium  3 mL Nebulization Q8H    aspirin  81 mg Oral Daily    chlorhexidine  10 mL Mouth/Throat BID    cyanocobalamin  1,000 mcg Intramuscular Q30 Days    docusate sodium  100 mg Oral Daily    famotidine  20 mg Oral BID    latanoprost  1 drop Both Eyes QHS    metoprolol succinate  12.5 mg Oral Daily    nozaseptin   Each Nostril BID    polyethylene glycol  17 g Oral Daily    timolol maleate 0.5%  1 drop Both Eyes BID     PRN Meds:sodium chloride, acetaminophen, albumin human 5%, guaifenesin 100 mg/5 ml, lactated ringers, metoclopramide HCl, ondansetron, ondansetron, oxyCODONE, oxyCODONE     Objective:     Vital Signs (Most Recent):  Temp: 99.1 °F (37.3 °C) (02/16/20 0712)  Pulse: 78 (02/16/20 0726)  Resp: 18 (02/16/20 0726)  BP: 117/67 (02/16/20 0712)  SpO2: 96 % (02/16/20 0726) Vital Signs (24h Range):  Temp:  [98.1 °F (36.7 °C)-99.1 °F (37.3 °C)] 99.1 °F (37.3 °C)  Pulse:  [] 78  Resp:  [16-20] 18  SpO2:  [95 %-98 %] 96 %  BP: (110-119)/(65-70) 117/67     Weight: 118 kg (260 lb 2.3 oz)  Body mass index is 31.67 kg/m².    SpO2: 96 %  O2 Device (Oxygen Therapy):  nasal cannula    Intake/Output - Last 3 Shifts       02/14 0700 - 02/15 0659 02/15 0700 - 02/16 0659 02/16 0700 - 02/17 0659    P.O. 710 630     I.V. (mL/kg) 1964.6 (15) 1735 (14.7) 320 (2.7)    IV Piggyback 200      Total Intake(mL/kg) 2874.6 (22) 2365 (20) 320 (2.7)    Urine (mL/kg/hr) 1340 (0.4) 990 (0.3)     Emesis/NG output       Other       Stool 0 0     Blood 50      Chest Tube 705 260 0    Total Output 2095 1250 0    Net +779.6 +1115 +320           Stool Occurrence 0 x            Lines/Drains/Airways     Drain                 Y Chest Tube 1 and 2 02/14/20 0820 1 Anterior Pericardial 32 Fr. 2 Left Pleural 32 Fr. 2 days          Peripheral Intravenous Line                 Peripheral IV - Single Lumen 02/15/20 2230 22 G Anterior;Right Hand less than 1 day                Physical Exam   Constitutional: He is oriented to person, place, and time. He appears well-developed and well-nourished. No distress.   HENT:   Head: Normocephalic and atraumatic.   Cardiovascular: Normal rate.   Pulmonary/Chest: Effort normal and breath sounds normal.   Abdominal: Soft. Bowel sounds are normal.   Musculoskeletal: He exhibits edema.   Neurological: He is alert and oriented to person, place, and time.   Skin: Skin is warm and dry. He is not diaphoretic.   Psychiatric: He has a normal mood and affect.       Significant Labs:  All pertinent labs from the last 24 hours have been reviewed.    Significant Diagnostics:  I have reviewed all pertinent imaging results/findings within the past 24 hours.    Assessment/Plan:     S/P pericardial window creation  02/15/2020   Patient is postop day 1 status post pericardial window and left chest tube placement.  Total chest tube output since surgery has been about 700 mL.  Will continue chest tube to drainage for now.    02/16/2020  The patient is postop day 2.  Status post pericardial window and left chest tube placement.  Chest tube output state significant.  Continue chest tube to drainage.  Chest x-ray suggests consolidation/fluid accumulation in the left chest.  Will obtain CT scan of the chest.    Pericardial effusion  The patient is an 84 year old male with large pericardial window and JOSLYN pleural effusions that presented to the [facility] emergency department with chest pain and sob, worked up for A-fib w/ RVR. Patient has HTN, atherosclerosis of the abdominal aorta, arthritis, polyneuropathy, prediabetes, venous insufficiency, gout, former smoker 0.5 ppd x 52 years. The patient with large pericardial window and JOSLYN pleural effusions is a candidate for urgent pericardial window creation with possible JOSLYN thoracostomy tube placement. The risks and benefits of surgery were explained to the patient in great detail. All questions were answered to the patient's satisfaction. The patient has agreed to proceed with surgery. The patient will be scheduled for urgent pericardial window creation with possible JOSLYN thoracostomy tube placement on 02/14/2020.        Ankush Graham MD  Cardiothoracic Surgery  [facility]

## 2020-02-17 ENCOUNTER — TELEPHONE (OUTPATIENT)
Dept: PULMONOLOGY | Facility: CLINIC | Age: 85
End: 2020-02-17

## 2020-02-17 LAB
ALBUMIN SERPL BCP-MCNC: 2 G/DL (ref 3.5–5.2)
ALP SERPL-CCNC: 55 U/L (ref 55–135)
ALT SERPL W/O P-5'-P-CCNC: 41 U/L (ref 10–44)
ANION GAP SERPL CALC-SCNC: 6 MMOL/L (ref 8–16)
AST SERPL-CCNC: 24 U/L (ref 10–40)
BASOPHILS # BLD AUTO: 0.06 K/UL (ref 0–0.2)
BASOPHILS # BLD AUTO: 0.06 K/UL (ref 0–0.2)
BASOPHILS NFR BLD: 0.8 % (ref 0–1.9)
BASOPHILS NFR BLD: 0.8 % (ref 0–1.9)
BILIRUB SERPL-MCNC: 0.6 MG/DL (ref 0.1–1)
BUN SERPL-MCNC: 7 MG/DL (ref 8–23)
CALCIUM SERPL-MCNC: 8.3 MG/DL (ref 8.7–10.5)
CHLORIDE SERPL-SCNC: 102 MMOL/L (ref 95–110)
CO2 SERPL-SCNC: 32 MMOL/L (ref 23–29)
CREAT SERPL-MCNC: 0.9 MG/DL (ref 0.5–1.4)
DIFFERENTIAL METHOD: ABNORMAL
DIFFERENTIAL METHOD: ABNORMAL
EOSINOPHIL # BLD AUTO: 0.4 K/UL (ref 0–0.5)
EOSINOPHIL # BLD AUTO: 0.4 K/UL (ref 0–0.5)
EOSINOPHIL NFR BLD: 4.5 % (ref 0–8)
EOSINOPHIL NFR BLD: 4.5 % (ref 0–8)
ERYTHROCYTE [DISTWIDTH] IN BLOOD BY AUTOMATED COUNT: 14 % (ref 11.5–14.5)
ERYTHROCYTE [DISTWIDTH] IN BLOOD BY AUTOMATED COUNT: 14 % (ref 11.5–14.5)
EST. GFR  (AFRICAN AMERICAN): >60 ML/MIN/1.73 M^2
EST. GFR  (NON AFRICAN AMERICAN): >60 ML/MIN/1.73 M^2
GLUCOSE SERPL-MCNC: 96 MG/DL (ref 70–110)
HCT VFR BLD AUTO: 30.8 % (ref 40–54)
HCT VFR BLD AUTO: 30.8 % (ref 40–54)
HGB BLD-MCNC: 9.6 G/DL (ref 14–18)
HGB BLD-MCNC: 9.6 G/DL (ref 14–18)
IMM GRANULOCYTES # BLD AUTO: 0.05 K/UL (ref 0–0.04)
IMM GRANULOCYTES # BLD AUTO: 0.05 K/UL (ref 0–0.04)
IMM GRANULOCYTES NFR BLD AUTO: 0.6 % (ref 0–0.5)
IMM GRANULOCYTES NFR BLD AUTO: 0.6 % (ref 0–0.5)
LYMPHOCYTES # BLD AUTO: 1.1 K/UL (ref 1–4.8)
LYMPHOCYTES # BLD AUTO: 1.1 K/UL (ref 1–4.8)
LYMPHOCYTES NFR BLD: 13.9 % (ref 18–48)
LYMPHOCYTES NFR BLD: 13.9 % (ref 18–48)
MAGNESIUM SERPL-MCNC: 2 MG/DL (ref 1.6–2.6)
MCH RBC QN AUTO: 31.8 PG (ref 27–31)
MCH RBC QN AUTO: 31.8 PG (ref 27–31)
MCHC RBC AUTO-ENTMCNC: 31.2 G/DL (ref 32–36)
MCHC RBC AUTO-ENTMCNC: 31.2 G/DL (ref 32–36)
MCV RBC AUTO: 102 FL (ref 82–98)
MCV RBC AUTO: 102 FL (ref 82–98)
MONOCYTES # BLD AUTO: 0.6 K/UL (ref 0.3–1)
MONOCYTES # BLD AUTO: 0.6 K/UL (ref 0.3–1)
MONOCYTES NFR BLD: 8.2 % (ref 4–15)
MONOCYTES NFR BLD: 8.2 % (ref 4–15)
NEUTROPHILS # BLD AUTO: 5.6 K/UL (ref 1.8–7.7)
NEUTROPHILS # BLD AUTO: 5.6 K/UL (ref 1.8–7.7)
NEUTROPHILS NFR BLD: 72 % (ref 38–73)
NEUTROPHILS NFR BLD: 72 % (ref 38–73)
NRBC BLD-RTO: 0 /100 WBC
NRBC BLD-RTO: 0 /100 WBC
PHOSPHATE SERPL-MCNC: 3.1 MG/DL (ref 2.7–4.5)
PLATELET # BLD AUTO: 347 K/UL (ref 150–350)
PLATELET # BLD AUTO: 347 K/UL (ref 150–350)
PMV BLD AUTO: 10.2 FL (ref 9.2–12.9)
PMV BLD AUTO: 10.2 FL (ref 9.2–12.9)
POTASSIUM SERPL-SCNC: 3.9 MMOL/L (ref 3.5–5.1)
PROT SERPL-MCNC: 6.3 G/DL (ref 6–8.4)
RBC # BLD AUTO: 3.02 M/UL (ref 4.6–6.2)
RBC # BLD AUTO: 3.02 M/UL (ref 4.6–6.2)
SODIUM SERPL-SCNC: 140 MMOL/L (ref 136–145)
WBC # BLD AUTO: 7.79 K/UL (ref 3.9–12.7)
WBC # BLD AUTO: 7.79 K/UL (ref 3.9–12.7)

## 2020-02-17 PROCEDURE — 25000003 PHARM REV CODE 250: Performed by: THORACIC SURGERY (CARDIOTHORACIC VASCULAR SURGERY)

## 2020-02-17 PROCEDURE — 93010 ELECTROCARDIOGRAM REPORT: CPT | Mod: ,,, | Performed by: INTERNAL MEDICINE

## 2020-02-17 PROCEDURE — 25000242 PHARM REV CODE 250 ALT 637 W/ HCPCS: Performed by: THORACIC SURGERY (CARDIOTHORACIC VASCULAR SURGERY)

## 2020-02-17 PROCEDURE — 99232 SBSQ HOSP IP/OBS MODERATE 35: CPT | Mod: ,,, | Performed by: INTERNAL MEDICINE

## 2020-02-17 PROCEDURE — 94799 UNLISTED PULMONARY SVC/PX: CPT

## 2020-02-17 PROCEDURE — 94640 AIRWAY INHALATION TREATMENT: CPT

## 2020-02-17 PROCEDURE — 25000003 PHARM REV CODE 250: Performed by: PHYSICIAN ASSISTANT

## 2020-02-17 PROCEDURE — 97110 THERAPEUTIC EXERCISES: CPT

## 2020-02-17 PROCEDURE — 63600175 PHARM REV CODE 636 W HCPCS: Performed by: NURSE PRACTITIONER

## 2020-02-17 PROCEDURE — 99232 PR SUBSEQUENT HOSPITAL CARE,LEVL II: ICD-10-PCS | Mod: ,,, | Performed by: INTERNAL MEDICINE

## 2020-02-17 PROCEDURE — 21400001 HC TELEMETRY ROOM

## 2020-02-17 PROCEDURE — 36415 COLL VENOUS BLD VENIPUNCTURE: CPT

## 2020-02-17 PROCEDURE — 97116 GAIT TRAINING THERAPY: CPT

## 2020-02-17 PROCEDURE — 97803 MED NUTRITION INDIV SUBSEQ: CPT

## 2020-02-17 PROCEDURE — 25000003 PHARM REV CODE 250: Performed by: NURSE PRACTITIONER

## 2020-02-17 PROCEDURE — 97110 THERAPEUTIC EXERCISES: CPT | Performed by: PHYSICAL THERAPIST

## 2020-02-17 PROCEDURE — 94761 N-INVAS EAR/PLS OXIMETRY MLT: CPT

## 2020-02-17 PROCEDURE — 99900035 HC TECH TIME PER 15 MIN (STAT)

## 2020-02-17 PROCEDURE — 97530 THERAPEUTIC ACTIVITIES: CPT | Performed by: PHYSICAL THERAPIST

## 2020-02-17 PROCEDURE — 85025 COMPLETE CBC W/AUTO DIFF WBC: CPT

## 2020-02-17 PROCEDURE — 80053 COMPREHEN METABOLIC PANEL: CPT

## 2020-02-17 PROCEDURE — 93005 ELECTROCARDIOGRAM TRACING: CPT

## 2020-02-17 PROCEDURE — 83735 ASSAY OF MAGNESIUM: CPT

## 2020-02-17 PROCEDURE — 84100 ASSAY OF PHOSPHORUS: CPT

## 2020-02-17 PROCEDURE — 27000221 HC OXYGEN, UP TO 24 HOURS

## 2020-02-17 PROCEDURE — 93010 EKG 12-LEAD: ICD-10-PCS | Mod: ,,, | Performed by: INTERNAL MEDICINE

## 2020-02-17 RX ORDER — POLYETHYLENE GLYCOL 3350 17 G/17G
17 POWDER, FOR SOLUTION ORAL DAILY
Qty: 238 G | Refills: 0 | Status: SHIPPED | OUTPATIENT
Start: 2020-02-18 | End: 2020-03-03

## 2020-02-17 RX ORDER — METOPROLOL TARTRATE 25 MG/1
25 TABLET, FILM COATED ORAL 2 TIMES DAILY
Qty: 180 TABLET | Refills: 3 | Status: SHIPPED | OUTPATIENT
Start: 2020-02-17 | End: 2020-03-23

## 2020-02-17 RX ORDER — TRAMADOL HYDROCHLORIDE 50 MG/1
50 TABLET ORAL EVERY 8 HOURS PRN
Qty: 5 TABLET | Refills: 0 | Status: SHIPPED | OUTPATIENT
Start: 2020-02-17 | End: 2020-03-12

## 2020-02-17 RX ORDER — SYRING-NEEDL,DISP,INSUL,0.3 ML 29 G X1/2"
296 SYRINGE, EMPTY DISPOSABLE MISCELLANEOUS ONCE
Status: COMPLETED | OUTPATIENT
Start: 2020-02-17 | End: 2020-02-17

## 2020-02-17 RX ORDER — DOCUSATE SODIUM 100 MG/1
100 CAPSULE, LIQUID FILLED ORAL DAILY
Qty: 14 CAPSULE | Refills: 0 | Status: SHIPPED | OUTPATIENT
Start: 2020-02-18 | End: 2020-03-03

## 2020-02-17 RX ADMIN — IPRATROPIUM BROMIDE AND ALBUTEROL SULFATE 3 ML: .5; 3 SOLUTION RESPIRATORY (INHALATION) at 07:02

## 2020-02-17 RX ADMIN — FAMOTIDINE 20 MG: 20 TABLET ORAL at 08:02

## 2020-02-17 RX ADMIN — LATANOPROST 1 DROP: 50 SOLUTION OPHTHALMIC at 08:02

## 2020-02-17 RX ADMIN — CHLORHEXIDINE GLUCONATE 0.12% ORAL RINSE 10 ML: 1.2 LIQUID ORAL at 08:02

## 2020-02-17 RX ADMIN — MAGESIUM CITRATE 296 ML: 1.75 LIQUID ORAL at 09:02

## 2020-02-17 RX ADMIN — ASPIRIN 81 MG: 81 TABLET ORAL at 08:02

## 2020-02-17 RX ADMIN — IPRATROPIUM BROMIDE AND ALBUTEROL SULFATE 3 ML: .5; 3 SOLUTION RESPIRATORY (INHALATION) at 04:02

## 2020-02-17 RX ADMIN — OXYCODONE HYDROCHLORIDE 10 MG: 5 TABLET ORAL at 08:02

## 2020-02-17 RX ADMIN — CALCIUM GLUCONATE 3000 MG: 98 INJECTION, SOLUTION INTRAVENOUS at 06:02

## 2020-02-17 RX ADMIN — DOCUSATE SODIUM 100 MG: 100 CAPSULE, LIQUID FILLED ORAL at 08:02

## 2020-02-17 RX ADMIN — POLYETHYLENE GLYCOL (3350) 17 G: 17 POWDER, FOR SOLUTION ORAL at 08:02

## 2020-02-17 RX ADMIN — TIMOLOL MALEATE 1 DROP: 5 SOLUTION/ DROPS OPHTHALMIC at 08:02

## 2020-02-17 RX ADMIN — METOPROLOL SUCCINATE 25 MG: 25 TABLET, EXTENDED RELEASE ORAL at 08:02

## 2020-02-17 RX ADMIN — IPRATROPIUM BROMIDE AND ALBUTEROL SULFATE 3 ML: .5; 3 SOLUTION RESPIRATORY (INHALATION) at 11:02

## 2020-02-17 RX ADMIN — IPRATROPIUM BROMIDE AND ALBUTEROL SULFATE 3 ML: .5; 3 SOLUTION RESPIRATORY (INHALATION) at 12:02

## 2020-02-17 RX ADMIN — SODIUM CHLORIDE, SODIUM LACTATE, POTASSIUM CHLORIDE, AND CALCIUM CHLORIDE 500 ML: .6; .31; .03; .02 INJECTION, SOLUTION INTRAVENOUS at 05:02

## 2020-02-17 NOTE — ASSESSMENT & PLAN NOTE
02/15/2020   Patient is postop day 1 status post pericardial window and left chest tube placement.  Total chest tube output since surgery has been about 700 mL.  Will continue chest tube to drainage for now.    02/16/2020  The patient is postop day 2.  Status post pericardial window and left chest tube placement.  Chest tube output state significant.  Continue chest tube to drainage. Chest x-ray suggests consolidation/fluid accumulation in the left chest.  Will obtain CT scan of the chest.    02/17/2020  The patient is postop day 3.  Status post pericardial window and left chest tube placement.  Chest tube output less than 5 ml. No air leak. Due to the minimal output of the chest tubes, chest x-ray suggests atelectasis of left lower lobe, no pneumothorax. Disontinue chest tubes x 2. Will repeat chest xray at noon s/p chest tube removal. Anticipate discharge to home health if chest tube at noon in stable. Discussed with hospital medicine.

## 2020-02-17 NOTE — PROGRESS NOTES
"  [facility] -   Adult Nutrition  Progress Note    SUMMARY     Recommendations    Recommendation: 1. Continue current diet & ONS. 2. RD to f/u.  Goals: Meet > 85 % EEN/EPN by RD f/u   Nutrition Goal Status: goal met   Communication of RD Recs: (POc, sticky note)    Reason for Assessment    Reason For Assessment: RD f/u.   Diagnosis: AF, SOB, Pericardial effusion   Relevant Medical History:  HTN, Prediabetes, Arthritis, Atherosclerosis of abdominal aorta, HTN  General Information Comments: Now on telemetry floor. S/p creation pericardial window. Pt w/ fair appetite today (PO intake 75 %). Pt reports good intake PTA and no wt loss. Per NFPE 2/17/20, no muscle wasting/ fat depletion noted.   Nutrition Discharge Planning: Cardiac diet     Nutrition Risk Screen    Nutrition Risk Screen: no indicators present    Nutrition/Diet History    Spiritual, Cultural Beliefs, Caodaism Practices, Values that Affect Care: no    Anthropometrics    Temp: 98.8 °F (37.1 °C)  Height: 6' 4" (193 cm)  Height (inches): 76 in  Weight Method: Bed Scale  Weight: 118 kg (260 lb 2.3 oz)  Weight (lb): 260.15 lb  Ideal Body Weight (IBW), Male: 202 lb  % Ideal Body Weight, Male (lb): 135.33 %  BMI (Calculated): 31.7  BMI Grade: 30 - 34.9- obesity - grade I       Lab/Procedures/Meds    Pertinent Labs Reviewed: reviewed  BMP  Lab Results   Component Value Date     02/17/2020    K 3.9 02/17/2020     02/17/2020    CO2 32 (H) 02/17/2020    BUN 7 (L) 02/17/2020    CREATININE 0.9 02/17/2020    CALCIUM 8.3 (L) 02/17/2020    ANIONGAP 6 (L) 02/17/2020    ESTGFRAFRICA >60 02/17/2020    EGFRNONAA >60 02/17/2020     Lab Results   Component Value Date    CALCIUM 8.3 (L) 02/17/2020    PHOS 3.1 02/17/2020     Lab Results   Component Value Date    ALBUMIN 2.0 (L) 02/17/2020     ,  No results for input(s): POCTGLUCOSE in the last 24 hours.    Pertinent Medications Reviewed: reviewed      Estimated/Assessed Needs    Weight Used For Calorie " Calculations: 124 kg (273 lb 5.9 oz)  Energy Calorie Requirements (kcal): 2041 - 2449  Energy Need Method: [place]-St Jeor(x1 - 1.2)  Protein Requirements: 124 g  Weight Used For Protein Calculations: 124 kg (273 lb 5.9 oz)     Estimated Fluid Requirement Method: RDA Method(or per MD)  RDA Method (mL): 2041         Nutrition Prescription Ordered    Current Diet Order: cardiac diet + boost plus BID    Evaluation of Received Nutrient/Fluid Intake    Intake/Output Summary (Last 24 hours) at 2/17/2020 1112  Last data filed at 2/17/2020 0600  Gross per 24 hour   Intake 2064 ml   Output 2802 ml   Net -738 ml          % Intake of Estimated Energy Needs: 75 - 100 %  % Meal Intake: 75 - 100 %    Nutrition Risk      1xweekly    Assessment and Plan    Nutrition Problem  Decreased nutrient needs (fat, Na)    Related to (etiology):   Current dx and past medical hx    Signs and Symptoms (as evidenced by):   AF, Pericardial effusion, hx of HTN, Atherosclerosis of abdominal aorta     Interventions  [place]/fat modified diet     Nutrition Diagnosis Status:   Continues       Monitor and Evaluation    Food and Nutrient Intake: food and beverage intake, energy intake  Food and Nutrient Adminstration: diet order  Anthropometric Measurements: weight  Biochemical Data, Medical Tests and Procedures: electrolyte and renal panel, glucose/endocrine profile  Nutrition-Focused Physical Findings: overall appearance     Malnutrition Assessment          no muscle wasting/ fat depletion noted                             Nutrition Follow-Up    RD Follow-up?: Yes

## 2020-02-17 NOTE — ASSESSMENT & PLAN NOTE
-2D echo pending  -Will likely need CTS consult for possible pericardial window    2/14/2020  -s/p pericardial window  -Mgmt as per CTS  -Will follow    2/15/2020  -Stable, mgmt as per CTS    2/17  -mgmt per CTS

## 2020-02-17 NOTE — ASSESSMENT & PLAN NOTE
-Presented with afib with RVR, new onset  -Converted to SR once on telemetry floor  -Will add low dose Toprol XL 12.5 mg daily given borderline BP upon admission  -Heparin gtt initiated   -Assess response    2/14/2020  -Continue low dose BB  -Remains in SR  -Needs AC on board when ok with CTS    2/15/2020  -Remains in SR  -Continue BB  -Needs AC re-initiated when ok with CTS    2/16/2020  -Remains in SR  -Increase Toprol to 25 mg daily  -Needs AC on board when ok with CTS    2/17  -Continue BB  -Remains in SR today  -Needs AC once OK per CTS

## 2020-02-17 NOTE — PT/OT/SLP PROGRESS
Physical Therapy  Treatment    Dominic Cohen   MRN: [id number]   Admitting Diagnosis: Pericardial effusion       PT Start Time: 0740     PT Stop Time: 0805    PT Total Time (min): 25 min       Billable Minutes:  Gait Training 15 and Therapeutic Exercise 10    Treatment Type: Treatment  PT/PTA: PT             General Precautions: Standard, fall  Orthopedic Precautions: N/A   Braces: N/A         Subjective:  Communicated with RN prior to session. Agreed to PT; CT's came out this AM      Pain/Comfort  Pain Rating 1: 0/10    Objective:   Patient found with: peripheral IV, telemetry, oxygen; found in supine/HOB elevated    Functional Mobility:  Bed Mobility: supine to sit form hob elevated min A WITH HANDHELD A       Transfers: SIT TO/from stand cg/sba with RW cues for safe hand placement       Gait: Amb 100ft x 2 RW cg/sba for safety and cues for pace breathing; o2 SAT 97% RA       Stairs:  N/a    Balance:   Static Sit: G  Dynamic Sit: G  Static Stand: F+ RW  Dynamic stand:  F/F+ RW    Therapeutic Activities and Exercises:   PT educated patient on POC, seated B LE ROM to prep mobility, deep breathing exs and safety/fall precautions with mobility using RW    AM-PAC 6 CLICK MOBILITY  How much help from another person does this patient currently need?   1 = Unable, Total/Dependent Assistance  2 = A lot, Maximum/Moderate Assistance  3 = A little, Minimum/Contact Guard/Supervision  4 = None, Modified [place]/Independent    Turning over in bed (including adjusting bedclothes, sheets and blankets)?: 4  Sitting down on and standing up from a chair with arms (e.g., wheelchair, bedside commode, etc.): 3  Moving from lying on back to sitting on the side of the bed?: 3  Moving to and from a bed to a chair (including a wheelchair)?: 3  Need to walk in hospital room?: 3  Climbing 3-5 steps with a railing?: 1  Basic Mobility Total Score: 17    AM-PAC Raw Score CMS G-Code Modifier Level of Impairment Assistance   6 %  Total / Unable   7 - 9 CM 80 - 100% Maximal Assist   10 - 14 CL 60 - 80% Moderate Assist   15 - 19 CK 40 - 60% Moderate Assist   20 - 22 CJ 20 - 40% Minimal Assist   23 CI 1-20% SBA / CGA   24 CH 0% Independent/ Mod I     Patient left up in chair with all lines intact, call button in reach, chair alarm on and RN notified.    Assessment:  Dominic Cohen is a 84 y.o. male with a medical diagnosis of Pericardial effusion and presents with impaired mobility.    Rehab identified problem list/impairments: Rehab identified problem list/impairments: weakness, impaired self care skills, impaired balance, decreased safety awareness, impaired endurance, impaired functional mobilty, decreased lower extremity function, gait instability    Rehab potential is good.    Activity tolerance: Good    Discharge recommendations: Discharge Facility/Level of Care Needs: home health PT, home health OT     Barriers to discharge:      Equipment recommendations: Equipment Needed After Discharge: walker, rolling     GOALS:   Multidisciplinary Problems     Physical Therapy Goals        Problem: Physical Therapy Goal    Goal Priority Disciplines Outcome Goal Variances Interventions   Physical Therapy Goal     PT, PT/OT Ongoing, Progressing     Description:  PT eval complete.  The following goals will be met in 7 days  1.  Pt will be IND with bed mobility  2. Pt will be IND with transfer  3. Pt will be mod I with RW for 150 ft                    PLAN:    Patient to be seen 5 x/week  to address the above listed problems via gait training, therapeutic activities, therapeutic exercises  Plan of Care expires: 02/22/20  Plan of Care reviewed with: patient         Jarett Lechuga, PT  02/17/2020

## 2020-02-17 NOTE — PT/OT/SLP PROGRESS
"Occupational Therapy  Treatment    Dominic Cohen   MRN: [id number]   Admitting Diagnosis: Pericardial effusion    OT Date of Treatment: 02/17/20   OT Start Time: 0850  OT Stop Time: 0915  OT Total Time (min): 25 min    Billable Minutes:  Therapeutic Activity 15 min and Therapeutic Exercise 10 min    General Precautions: Standard, fall  Orthopedic Precautions: N/A  Braces: N/A         Subjective:  Communicated with Nurse Juan Francisco and epic chart review prior to session.  Pt found supine in bed and agreeable to tx at this time.   Pain/Comfort  Pain Rating 1: 0/10  Pain Rating Post-Intervention 1: 0/10    Objective:  Patient found with: peripheral IV, telemetry, oxygen     Functional Mobility:  Therapeutic Activities and Exercises:  Pt performed supine>sit with SBA, scooted to EOB with SBA, donned gown as robe with Min A, sit>stand with CGA, functional mobility ~100ft x 2 using RW with CGA, t/f to chair using RW with CGA. Pt educated in and performed (B) UE ROM exercises sitting in chair 1 x 15 reps: shoulder flex, chest press, bicep curls.      AM-PAC 6 CLICK ADL   How much help from another person does this patient currently need?   1 = Unable, Total/Dependent Assistance  2 = A lot, Maximum/Moderate Assistance  3 = A little, Minimum/Contact Guard/Supervision  4 = None, Modified [place]/Independent    Putting on and taking off regular lower body clothing? : 2  Bathing (including washing, rinsing, drying)?: 2  Toileting, which includes using toilet, bedpan, or urinal? : 3  Putting on and taking off regular upper body clothing?: 3  Taking care of personal grooming such as brushing teeth?: 4  Eating meals?: 4  Daily Activity Total Score: 18     AM-PAC Raw Score CMS "G-Code Modifier Level of Impairment Assistance   6 % Total / Unable   7 - 8 CM 80 - 100% Maximal Assist   9-13 CL 60 - 80% Moderate Assist   14 - 19 CK 40 - 60% Moderate Assist   20 - 22 CJ 20 - 40% Minimal Assist   23 CI 1-20% SBA / CGA   24 CH " 0% Independent/ Mod I       Patient left up in chair with all lines intact, call button in reach, chair alarm on and Nurse Juan Francisco notified    ASSESSMENT:  Dominic Cohen is a 84 y.o. male with a medical diagnosis of Pericardial effusion and presents with impaired functional mobility and ADLs. Pt will benefit from continued skilled OT in order to address impairments.     Rehab identified problem list/impairments: Rehab identified problem list/impairments: weakness, impaired endurance, impaired self care skills, impaired functional mobilty, decreased coordination, decreased safety awareness, decreased lower extremity function, impaired balance, gait instability, decreased upper extremity function    Rehab potential is good.    Activity tolerance: Good    Discharge recommendations: Discharge Facility/Level of Care Needs: home health PT, home health OT     Barriers to discharge:  UNKNOWN    Equipment recommendations: walker, rolling     GOALS:   Multidisciplinary Problems     Occupational Therapy Goals        Problem: Occupational Therapy Goal    Goal Priority Disciplines Outcome Interventions   Occupational Therapy Goal     OT, PT/OT Ongoing, Progressing    Description:  OT GOALS TO BE MET BY 2-22-20  S WITH UE DRESSING  S WITH TOILET T/F'S  SBA WITH LE DRESSING  PT WILL TOLERATE 1 S ET X 20 REPS B UE ROM EXERCISE WITH MIN RESISTANCE                    Plan:  Patient to be seen 3 x/week to address the above listed problems via self-care/home management, therapeutic activities, therapeutic exercises  Plan of Care expires: 02/22/20  Plan of Care reviewed with: patient         Soco Cornelia, PT/OT  02/17/2020

## 2020-02-17 NOTE — TELEPHONE ENCOUNTER
1st Attempt    Left voicemail for patient asking for call back to schedule pulmonary appointment based on referral received by pulmonary department work queue.     Please direct any possible call back from patient to Ender Syed, patient care navigator, pulmonary services.

## 2020-02-17 NOTE — DISCHARGE SUMMARY
"[facility]  Cardiothoracic Surgery  Discharge Summary      Patient Name: Dominic Cohen  MRN: [id number]  Admission Date: 2/13/2020  Hospital Length of Stay: 4 days  Discharge Date and Time:  02/17/2020 2:14 PM  Attending Physician: Ankush Graham MD   Discharging Provider: Elan Yu NP  Primary Care Provider: Sudhakar Liu MD    HPI:   The patient is an 84 year old male with large pericardial window and JOSLYN pleural effusions that presented to the [facility] emergency department with chest pain and sob, worked up for A-fib w/ RVR. Patient has HTN, atherosclerosis of the abdominal aorta, arthritis, polyneuropathy, prediabetes, venous insufficiency, gout, former smoker 0.5 ppd x 52 years. The patient was in his usual state of health prior to 3 weeks ago, when he began experiencing a "heavy feeling on his chest" and fatigue. 1 week ago the patient began experiencing  Intermittent sharp, burning, non radiating substernal chest pain with associated SOB and fatigue. + orthopnea. Reports a productive cough x 1 week. Patient was recently discharged from [facility] on Sunday, worked up for chest pain, patient reports "all tests were negative" and he was discharged. Patient reports negative stress test and negative echocardiogram at [facility]. Today at [facility] patient converted from A-fib RVR to NSR with IV metoprolol. Patient remains NSR. Currently on heparin gtt. ED workup showed negative troponin, normal BNP, elevated total bilirubin of 1.4, ALT of 71 and INR of 1.3, large pericardial effusion confirmed via CT scan and TTE, JOSLYN pleural effusions. Patient performs ADLs independently. Reports that he ambulates well. He drives. Denies palpitations, fever, malaise, weakness, syncope, tremors, falls, abdominal pain, diarrhea, and edema.       Procedure(s) (LRB):  CREATION, PERICARDIAL WINDOW (N/A)  INSERTION, CATHETER, INTERCOSTAL, FOR DRAINAGE (Left)      Indwelling Lines/Drains at time of discharge: "   Lines/Drains/Airways     Drain                 Y Chest Tube 1 and 2 02/14/20 0820 1 Anterior Pericardial 32 Fr. 2 Left Pleural 32 Fr. 3 days              Hospital Course: 02/15/2020  The patient is postop day 1 status post pericardial window and left tube thoracostomy.    02/16/2020  The patient is postop day 2.  Status post pericardial window and a left chest tube placement.    02/17/2020  The patient is postop day 3.  Status post pericardial window and a left chest tube placement.      Consults (From admission, onward)        Status Ordering Provider     Consult Case Management/Social Work  Once     Provider:  (Not yet assigned)    Completed DONNY NICHOLS     Consult to Case Management/Social Work  Once     Provider:  (Not yet assigned)    Completed DONNY NICHOLS     Inpatient consult to Cardiology  Once     Provider:  Cody Ferrer MD    Completed MARIA E MARTÍNEZ     Inpatient consult to Cardiothoracic Surgery  Once     Provider:  Donny Nichols MD    Acknowledged DONNY NICHOLS     Inpatient consult to Pulmonology  Once     Provider:  Manas Whyte MD    Completed VICKIE RODRIGUEZ     Inpatient consult to Registered Dietitian/Nutritionist  Once     Provider:  (Not yet assigned)    Completed VICKIE RODRIGUEZ          Significant Diagnostic Studies: Labs:   BMP:   Recent Labs   Lab 02/16/20  0647 02/16/20  1901 02/17/20  0415   *  140* 138* 96     138 138 140   K 4.1  4.2 4.1 3.9     101 101 102   CO2 30*  29 30* 32*   BUN 8  8 7* 7*   CREATININE 1.0  1.0 0.9 0.9   CALCIUM 8.4*  8.2* 8.6* 8.3*   MG 1.9  --  2.0    and CBC   Recent Labs   Lab 02/16/20  0647 02/17/20  0415   WBC 9.90  9.90 7.79  7.79   HGB 9.6*  9.6* 9.6*  9.6*   HCT 31.6*  31.6* 30.8*  30.8*     329 347  347     Radiology: X-Ray: CXR: X-Ray Chest 1 View (CXR):   Results for orders placed or performed during the hospital encounter of 02/13/20   X-Ray Chest 1 View    Narrative     EXAMINATION:  XR CHEST 1 VIEW    CLINICAL HISTORY:  s/p DC thoracostomy tubes.;    TECHNIQUE:  Single frontal view of the chest was performed.    COMPARISON:  Prior radiograph from earlier the same day.    FINDINGS:  Interval removal of left-sided thoracostomy tube/mediastinal drains.  No definite pneumothorax.  Unchanged pleuroparenchymal disease within the left lower chest.  Only trace pleural fluid within the right lower hemithorax, slightly decreased compared to the immediate prior.  Unchanged heart size or bones.      Impression    Removal of thoracostomy tubes without definite pneumothorax.  Persistent pleuroparenchymal disease within the left hemithorax.      Electronically signed by: Josue Anthony Jr., MD  Date:    02/17/2020  Time:    13:19       Pending Diagnostic Studies:     Procedure Component Value Units Date/Time    Cytology, Fluid/Wash/Brush [[id number]] Collected:  02/14/20 0845    Order Status:  Sent Lab Status:  In process Updated:  02/14/20 1030    Cytology, Fluid/Wash/Brush [[id number]] Collected:  02/14/20 0845    Order Status:  Sent Lab Status:  In process Updated:  02/14/20 1027    Specimen to Pathology, Surgery Cardiovascular [[id number]] Collected:  02/14/20 0845    Order Status:  Sent Lab Status:  In process Updated:  02/14/20 1240          S/P pericardial window creation  02/15/2020   Patient is postop day 1 status post pericardial window and left chest tube placement.  Total chest tube output since surgery has been about 700 mL.  Will continue chest tube to drainage for now.    02/16/2020  The patient is postop day 2.  Status post pericardial window and left chest tube placement.  Chest tube output state significant.  Continue chest tube to drainage. Chest x-ray suggests consolidation/fluid accumulation in the left chest.  Will obtain CT scan of the chest.    02/17/2020  The patient is postop day 3.  Status post pericardial window and left chest tube placement.  Chest tube output less than 5  ml. No air leak. Due to the minimal output of the chest tubes, chest x-ray suggests possible atelectasis of left lower lobe, no pneumothorax. Disontinued chest tubes x 2. A-febrile. Hemodynamically stable. Ambulating very well. Tolerating Room air well.  Anticipate discharge to home health today. Patient will follow-up with pulmonology as an outpatient. Patient will follow-up with Dr. Graham on 03/04/2020.  Discussed with hospital medicine.       Final Active Diagnoses:    Diagnosis Date Noted POA    PRINCIPAL PROBLEM:  Pericardial effusion [I31.3] 02/13/2020 Yes    Acute systolic CHF (congestive heart failure) [I50.21] 02/15/2020 Yes    Pneumothorax on left [J93.9] 02/15/2020 No    S/P pericardial window creation [Z98.890] 02/14/2020 Not Applicable    Atrial fibrillation with rapid ventricular response [I48.91] 02/13/2020 Yes    Bilateral pleural effusion [J90] 02/13/2020 Yes      Problems Resolved During this Admission:    Diagnosis Date Noted Date Resolved POA    Chest pain [R07.9] 02/13/2020 02/15/2020 Yes      Discharged Condition: good    Disposition: Home-Health Care Svc    Follow Up:  Follow-up Information     [facility].    Specialty:  Home Health Services  Why:  Home Health  Contact information:  [id number] [facility]  [address] [address]  [phone]             Ankush Graham MD On 3/4/2020.    Specialty:  Cardiothoracic Surgery  Contact information:  [address] [place] [address] [address] [place] [place] [address]  [phone]                 Patient Instructions:      Ambulatory referral/consult to Outpatient Case Management   Referral Priority: Routine Referral Type: Consultation   Referral Reason: Specialty Services Required   Number of Visits Requested: 1     Ambulatory referral/consult to Pulmonology   Standing Status: Future   Referral Priority: Routine Referral Type: Consultation   Referral Reason: Specialty Services Required   Requested Specialty: Pulmonary Disease   Number  of Visits Requested: 1     Ambulatory referral/consult to Home Health   Standing Status: Future   Referral Priority: Routine Referral Type: Home Health Care   Referral Reason: Specialty Services Required   Requested Specialty: Home Health Services   Number of Visits Requested: 1     Notify your health care provider if you experience any of the following:  temperature >100.4     Notify your health care provider if you experience any of the following:  persistent nausea and vomiting or diarrhea     Notify your health care provider if you experience any of the following:  severe uncontrolled pain     Notify your health care provider if you experience any of the following:  redness, tenderness, or signs of infection (pain, swelling, redness, odor or green/yellow discharge around incision site)     Notify your health care provider if you experience any of the following:  difficulty breathing or increased cough     Notify your health care provider if you experience any of the following:  severe persistent headache     Notify your health care provider if you experience any of the following:  worsening rash     Notify your health care provider if you experience any of the following:  persistent dizziness, light-headedness, or visual disturbances     Notify your health care provider if you experience any of the following:  increased confusion or weakness     Notify your health care provider if you experience any of the following:     No dressing needed     Activity as tolerated     Medications:  Reconciled Home Medications:      Medication List      START taking these medications    docusate sodium 100 MG capsule  Commonly known as:  COLACE  Take 1 capsule (100 mg total) by mouth once daily. for 14 days  Start taking on:  February 18, 2020     metoprolol tartrate 25 MG tablet  Commonly known as:  LOPRESSOR  Take 1 tablet (25 mg total) by mouth 2 (two) times daily.     polyethylene glycol 17 gram/dose powder  Commonly known  as:  GLYCOLAX  Take 17 g by mouth once daily. for 14 days  Start taking on:  February 18, 2020     traMADol 50 mg tablet  Commonly known as:  ULTRAM  Take 1 tablet (50 mg total) by mouth every 8 (eight) hours as needed for Pain.        CONTINUE taking these medications    aspirin 81 MG EC tablet  Commonly known as:  ECOTRIN  Take 81 mg by mouth as needed.     baclofen 10 MG tablet  Commonly known as:  LIORESAL  Take 1 tablet (10 mg total) by mouth nightly as needed.     betamethasone dipropionate 0.05 % ointment  Commonly known as:  DIPROLENE  Apply topically 2 (two) times daily as needed.     * blood sugar diagnostic Strp  Commonly known as:  Blood Glucose Test  1 strip by Misc.(Non-Drug; Combo Route) route 3 (three) times a week.     * blood sugar diagnostic Strp  1 strip by Misc.(Non-Drug; Combo Route) route 2 (two) times daily.     ergocalciferol (vitamin D2) 400 unit Tab  Take 1 tablet by mouth.     ferrous sulfate 325 mg (65 mg iron) Tab tablet  Commonly known as:  FEOSOL  Take 325 mg by mouth.     lancets 30 gauge Misc  1 lancet by Misc.(Non-Drug; Combo Route) route 2 (two) times daily.     latanoprost 0.005 % ophthalmic solution  Place 1 drop into both eyes every evening.     linaCLOtide 145 mcg Cap capsule  Commonly known as:  Linzess  Take 1 capsule (145 mcg total) by mouth once daily.     magnesium 30 mg Tab  Take 30 mg by mouth.     multivitamin with folic acid 400 mcg Tab  Take 1 tablet by mouth.     ranitidine 150 MG tablet  Commonly known as:  ZANTAC  Take 1 tablet (150 mg total) by mouth 2 (two) times daily.     timolol maleate 0.5% 0.5 % Drop  Commonly known as:  TIMOPTIC  Place 1 drop into both eyes 2 (two) times daily.     triamcinolone acetonide 0.1% 0.1 % cream  Commonly known as:  KENALOG  Apply topically 2 (two) times daily. For rash of back.     Vitamin B-12 50 mcg tablet  Generic drug:  cyanocobalamin (vitamin B-12)  Take 50 mcg by mouth once daily.     Vitamin C 60 mg Lozg  Generic drug:   ascorbic acid (vitamin C)  Take 1 tablet by mouth daily as needed.     VITAMIN D ORAL  Take 1 tablet by mouth once daily.         * This list has 2 medication(s) that are the same as other medications prescribed for you. Read the directions carefully, and ask your doctor or other care provider to review them with you.            STOP taking these medications    methylPREDNISolone 4 mg tablet  Commonly known as:  MEDROL DOSEPACK     POTASSIUM CHLORIDE ORAL          Time spent on the discharge of patient: 30 minutes    Elan Yu NP  Cardiothoracic Surgery  [facility]

## 2020-02-17 NOTE — SUBJECTIVE & OBJECTIVE
Interval History:  02/17/2020  The patient is postop day 3.  Status post pericardial window and a left chest tube placement.    ROS  Medications:  Continuous Infusions:  Scheduled Meds:   albuterol-ipratropium  3 mL Nebulization Q8H    aspirin  81 mg Oral Daily    chlorhexidine  10 mL Mouth/Throat BID    cyanocobalamin  1,000 mcg Intramuscular Q30 Days    docusate sodium  100 mg Oral Daily    famotidine  20 mg Oral BID    latanoprost  1 drop Both Eyes QHS    metoprolol succinate  25 mg Oral Daily    nozaseptin   Each Nostril BID    polyethylene glycol  17 g Oral Daily    timolol maleate 0.5%  1 drop Both Eyes BID     PRN Meds:sodium chloride, acetaminophen, albumin human 5%, guaifenesin 100 mg/5 ml, lactated ringers, metoclopramide HCl, ondansetron, ondansetron, oxyCODONE, oxyCODONE     Objective:     Vital Signs (Most Recent):  Temp: 98.8 °F (37.1 °C) (02/17/20 0752)  Pulse: 72 (02/17/20 0752)  Resp: 18 (02/17/20 0752)  BP: 139/67 (02/17/20 0752)  SpO2: 97 % (02/17/20 0908) Vital Signs (24h Range):  Temp:  [98 °F (36.7 °C)-98.8 °F (37.1 °C)] 98.8 °F (37.1 °C)  Pulse:  [69-92] 72  Resp:  [16-20] 18  SpO2:  [92 %-97 %] 97 %  BP: (108-149)/(52-78) 139/67     Weight: 118 kg (260 lb 2.3 oz)  Body mass index is 31.67 kg/m².    SpO2: 97 %  O2 Device (Oxygen Therapy): room air    Intake/Output - Last 3 Shifts       02/15 0700 - 02/16 0659 02/16 0700 - 02/17 0659 02/17 0700 - 02/18 0659    P.O. 630 1252     I.V. (mL/kg) 1735 (14.7) 1250 (10.6)     IV Piggyback       Total Intake(mL/kg) 2365 (20) 2502 (21.2)     Urine (mL/kg/hr) 990 (0.3) 3155 (1.1)     Stool 0 0     Blood       Chest Tube 260 27     Total Output 1250 3182     Net +1115 -680            Stool Occurrence  0 x           Lines/Drains/Airways     Drain                 Y Chest Tube 1 and 2 02/14/20 0820 1 Anterior Pericardial 32 Fr. 2 Left Pleural 32 Fr. 3 days          Peripheral Intravenous Line                 Peripheral IV - Single Lumen 02/16/20  1419 20 G Anterior;Right Forearm less than 1 day                Physical Exam   Constitutional: He is oriented to person, place, and time. He appears well-developed and well-nourished. No distress.   HENT:   Head: Normocephalic and atraumatic.   Eyes: Pupils are equal, round, and reactive to light. EOM are normal.   Neck: Normal range of motion. Neck supple. No JVD present.   Cardiovascular: Normal rate, regular rhythm, normal heart sounds and intact distal pulses. Exam reveals no gallop and no friction rub.   No murmur heard.  Mediastinal chest tube discontinued. Patient tolerated well.    Pulmonary/Chest: Effort normal and breath sounds normal. No stridor. No respiratory distress. He has no wheezes. He has no rales. He exhibits no tenderness.   Left pleural chest tube discontinued, patient tolerated well.    Abdominal: Soft. Bowel sounds are normal. He exhibits no distension and no mass. There is no tenderness. There is no rebound and no guarding. No hernia.   Musculoskeletal: Normal range of motion. He exhibits no edema, tenderness or deformity.   Neurological: He is alert and oriented to person, place, and time. He displays normal reflexes. No cranial nerve deficit or sensory deficit. He exhibits normal muscle tone. Coordination normal.   Skin: Skin is warm and dry. Capillary refill takes less than 2 seconds. No rash noted. He is not diaphoretic. No erythema. No pallor.   Incisions CDI.    Psychiatric: He has a normal mood and affect. His behavior is normal. Judgment and thought content normal.   Nursing note and vitals reviewed.      Significant Labs:  BMP:   Recent Labs   Lab 02/17/20  0415   GLU 96      K 3.9      CO2 32*   BUN 7*   CREATININE 0.9   CALCIUM 8.3*   MG 2.0     CBC:   Recent Labs   Lab 02/17/20  0415   WBC 7.79  7.79   RBC 3.02*  3.02*   HGB 9.6*  9.6*   HCT 30.8*  30.8*     347   *  102*   MCH 31.8*  31.8*   MCHC 31.2*  31.2*       Significant Diagnostics:  I  have reviewed all pertinent imaging results/findings within the past 24 hours.

## 2020-02-17 NOTE — PROGRESS NOTES
[facility]  Cardiology  Progress Note    Patient Name: Dominic Cohen  MRN: [id number]  Admission Date: 2/13/2020  Hospital Length of Stay: 4 days  Code Status: Full Code   Attending Physician: Ankush Graham MD   Primary Care Physician: Sudhakar Liu MD  Expected Discharge Date:   Principal Problem:Pericardial effusion    Subjective:   HPI    Mr. Cohen is an 84 year old male patient whose current medical conditions include HTN,CVI, polyneuropathy, and abdominal atherosclerosis who presented to [facility] ED today with a chief complaint of progressively worsening SOB over the past 4-5 days. Associated symptoms included BLE edema, congestion, orthopnea, and pleuritic chest pain/soreness. Patient denied any associated lightheadedness, dizziness, palpitations, near syncope, syncope, fever, or chills. Initial workup in ED revealed elevated Tbili (1.4), ALT of 71, and INR of 1.3. CTA of chest showed large pericardial effusion and bilateral pleural effusions. EKG showed afib with RVR and patient subsequently admitted for further evaluation and treatment. Cardiology consulted to assist with management. Patient seen and examined while in ED. Remains SOB with conversational dyspnea. Reports some chest discomfort with deep breathing and at time, palpation. Denies any prior history of afib/CAD. States he was recently hospitalized at [facility] in late January due to chest pain. Workup at that time, including, stress echo was negative, but he has been experiencing URI type symptoms since his discharge. Chart reviewed. BNP normal. Troponin negative. 2D echo pending.      Hospital Course:   2/14/2020-Patient seen and examined today, s/p pericardial window. No complaints. Feels well. SOB improved. Remains in SR.    2/15/2020-Patient seen and examined today. Feels ok. Complains of constipation/abdominal bloating. Admits to some mild SOB. Still in SR. Labs reviewed and are stable. BP borderline.      2/16/2020-Patient seen and examined today. Only complaint is abdominal bloating/consitpation. No chest pain or SOB. Chest tube in place, still significant OP. CT of chest ordered. Labs stable. BP ok, needs BB increased and ACEi/ARB on board if he can tolerate.     2/17/2020--Patient seen and examined in room, ambulating in hallway with PT this AM. CT removed this AM. Feels better today. Labs reviewed, K+ 3.9, Cr 0.9, Mag 2.0.     Interval History: NO ACUTE ISSUES NOTED O/N.     Review of Systems   Constitution: Positive for malaise/fatigue.   HENT: Negative for hearing loss and hoarse voice.    Eyes: Negative for blurred vision and visual disturbance.   Cardiovascular: Negative for chest pain, claudication, dyspnea on exertion, irregular heartbeat, leg swelling, near-syncope, orthopnea, palpitations, paroxysmal nocturnal dyspnea and syncope.   Respiratory: Negative for cough, hemoptysis, shortness of breath, sleep disturbances due to breathing, snoring and wheezing.    Endocrine: Negative for cold intolerance and heat intolerance.   Hematologic/Lymphatic: Bruises/bleeds easily.   Skin: Negative for color change, dry skin and nail changes.   Musculoskeletal: Positive for arthritis. Negative for back pain, joint pain and myalgias.   Gastrointestinal: Negative for bloating, abdominal pain, constipation, nausea and vomiting.   Genitourinary: Negative for dysuria, flank pain, hematuria and hesitancy.   Neurological: Negative for headaches, light-headedness, loss of balance, numbness, paresthesias and weakness.   Psychiatric/Behavioral: Negative for altered mental status.   Allergic/Immunologic: Negative for environmental allergies.     Objective:     Vital Signs (Most Recent):  Temp: 98.4 °F (36.9 °C) (02/17/20 1200)  Pulse: 69 (02/17/20 1200)  Resp: 18 (02/17/20 1200)  BP: (!) 99/57 (02/17/20 1200)  SpO2: 96 % (02/17/20 1200) Vital Signs (24h Range):  Temp:  [98 °F (36.7 °C)-98.8 °F (37.1 °C)] 98.4 °F (36.9  °C)  Pulse:  [69-92] 69  Resp:  [16-20] 18  SpO2:  [92 %-97 %] 96 %  BP: ()/(52-78) 99/57     Weight: 118 kg (260 lb 2.3 oz)  Body mass index is 31.67 kg/m².     SpO2: 96 %  O2 Device (Oxygen Therapy): room air      Intake/Output Summary (Last 24 hours) at 2/17/2020 1223  Last data filed at 2/17/2020 0600  Gross per 24 hour   Intake 1584 ml   Output 2802 ml   Net -1218 ml       Lines/Drains/Airways     Drain                 Y Chest Tube 1 and 2 02/14/20 0820 1 Anterior Pericardial 32 Fr. 2 Left Pleural 32 Fr. 3 days          Peripheral Intravenous Line                 Peripheral IV - Single Lumen 02/16/20 1419 20 G Anterior;Right Forearm less than 1 day                Physical Exam   Constitutional: He is oriented to person, place, and time. He appears well-developed and well-nourished. No distress.   HENT:   Head: Normocephalic and atraumatic.   Eyes: Pupils are equal, round, and reactive to light.   Neck: Normal range of motion and full passive range of motion without pain. Neck supple. No JVD present.   Cardiovascular: Normal rate, regular rhythm, S1 normal, S2 normal and intact distal pulses. PMI is not displaced. Exam reveals no distant heart sounds.   No murmur heard.  Pulses:       Radial pulses are 2+ on the right side, and 2+ on the left side.        Dorsalis pedis pulses are 2+ on the right side, and 2+ on the left side.   CHEST PAIN FREE  S/P PERICARDIAL WINDOW   Pulmonary/Chest: Effort normal. No accessory muscle usage. No respiratory distress. He has decreased breath sounds in the left lower field. He has no wheezes. He has no rales.   DECREASED BREATH SOUNDS TO LLL TODAY   Abdominal: Soft. Bowel sounds are normal. He exhibits no distension. There is no tenderness.   +obese abdomen   Musculoskeletal: Normal range of motion. He exhibits no edema.        Right ankle: He exhibits no swelling.        Left ankle: He exhibits no swelling.   Neurological: He is alert and oriented to person, place, and  time.   Skin: Skin is warm and dry. He is not diaphoretic. No cyanosis. Nails show no clubbing.   Psychiatric: He has a normal mood and affect. His speech is normal and behavior is normal. Judgment and thought content normal. Cognition and memory are normal.   Nursing note and vitals reviewed.      Significant Labs:   Blood Culture: No results for input(s): LABBLOO in the last 48 hours., BMP:   Recent Labs   Lab 02/16/20  0647 02/16/20  1901 02/17/20  0415   *  140* 138* 96     138 138 140   K 4.1  4.2 4.1 3.9     101 101 102   CO2 30*  29 30* 32*   BUN 8  8 7* 7*   CREATININE 1.0  1.0 0.9 0.9   CALCIUM 8.4*  8.2* 8.6* 8.3*   MG 1.9  --  2.0   , CBC   Recent Labs   Lab 02/16/20  0647 02/17/20  0415   WBC 9.90  9.90 7.79  7.79   HGB 9.6*  9.6* 9.6*  9.6*   HCT 31.6*  31.6* 30.8*  30.8*     329 347  347    and All pertinent lab results from the last 24 hours have been reviewed.    Significant Imaging: Echocardiogram:   2D echo with color flow doppler:   Results for orders placed or performed in visit on 07/29/19   2D echo with color flow doppler   Result Value Ref Range    QEF 60 55 - 65    Est. PA Systolic Pressure 22.36     Mitral Valve Mobility NORMAL     Narrative    Date of Procedure: 07/29/2019        TEST DESCRIPTION       Aorta: The aortic root is normal in size, measuring 4.3 cm at sinotubular junction and 4.3 cm at Sinuses of Valsalva. The proximal ascending aorta is normal in size, measuring 3.6 cm across.     Left Atrium: The left atrial volume index is mildly enlarged, measuring 40.11 cc/m2.     Left Ventricle: The left ventricle is normal in size, with an end-diastolic diameter of 5.0 cm, and an end-systolic diameter of 2.5 cm. Wall thickness is increased, with the septum and the posterior wall each measuring 1.8 cm across. Relative wall   thickness was increased at 0.72, and the LV mass index was increased at 200.0 g/m2 consistent with moderate concentric left  ventricular hypertrophy. There are no regional wall motion abnormalities. Left ventricular systolic function appears normal.   Visually estimated ejection fraction is 60-65%. The LV Doppler derived stroke volume equals 115.0 ccs.     Diastolic indices: E wave velocity 1.1 m/s, E/A ratio 0.8,  msec., E/e' ratio(avg) 11. Diastolic function is indeterminate.     Right Atrium: The right atrium is normal in size, measuring 6.7 cm in length and 4.1 cm in width in the apical view.     Right Ventricle: The right ventricle is normal in size measuring 3.1 cm at the base in the apical right ventricle-focused view. Global right ventricular systolic function appears normal. Tricuspid annular plane systolic excursion (TAPSE) is 2.4 cm. The   estimated PA systolic pressure is 22 mmHg.     Aortic Valve:  The aortic valve is normal in structure with normal leaflet mobility. The aortic valve is tri-leaflet in structure.     Mitral Valve:  The mitral valve is normal in structure with normal leaflet mobility. There is mitral annular calcification.     Tricuspid Valve:  The tricuspid valve is normal in structure.     Pulmonary Valve:  The pulmonic valve is normal in structure.     IVC: IVC is normal in size and collapses > 50% with a sniff, suggesting normal right atrial pressure of 3 mmHg.     Intracavitary: There is no evidence of pericardial effusion, intracavity mass, thrombi, or vegetation.         CONCLUSIONS     1 - Mild left atrial enlargement.     2 - Concentric hypertrophy.     3 - No wall motion abnormalities.     4 - Normal left ventricular systolic function (EF 60-65%).     5 - Normal right ventricular systolic function .     6 - The estimated PA systolic pressure is 22 mmHg.             This document has been electronically    SIGNED BY: Romeo Dawkins MD On: 07/29/2019 14:33    and Transthoracic echo (TTE) complete (Cupid Only):   Results for orders placed or performed during the hospital encounter of 02/13/20    Echo Color Flow Doppler? Yes   Result Value Ref Range    Ascending aorta 2.99 cm    STJ 3.48 cm    AV mean gradient 7 mmHg    Ao peak jose 1.49 m/s    Ao VTI 26.04 cm    IVRT 0.17 msec    IVS 1.84 (A) 0.6 - 1.1 cm    LA size 3.26 cm    Left Atrium Major Axis 4.78 cm    Left Atrium Minor Axis 3.92 cm    LVIDD 3.31 (A) 3.5 - 6.0 cm    LVIDS 2.29 2.1 - 4.0 cm    LVOT diameter 2.03 cm    LVOT peak VTI 26.89 cm    PW 1.88 (A) 0.6 - 1.1 cm    MV Peak A Jose 0.77 m/s    E wave decelartion time 226.90 msec    MV Peak E Jose 0.70 m/s    RA Major Axis 4.71 cm    RA Width 3.05 cm    Sinus 3.91 cm    TAPSE 1.30 cm    TR Max Jose 2.56 m/s    TDI LATERAL 0.08 m/s    TDI SEPTAL 0.09 m/s    LA WIDTH 3.02 cm    Ao root annulus 4.04 cm    LV Diastolic Volume 44.36 mL    LV Systolic Volume 17.87 mL    LVOT peak jose 1.33 m/s    LV LATERAL E/E' RATIO 8.75 m/s    LV SEPTAL E/E' RATIO 7.78 m/s    FS 31 %    LA volume 36.05 cm3    LV mass 259.49 g    Left Ventricle Relative Wall Thickness 1.14 cm    AV valve area 3.34 cm2    AV Velocity Ratio 0.89     AV index (prosthetic) 1.03     E/A ratio 0.91     Mean e' 0.09 m/s    LVOT area 3.2 cm2    LVOT stroke volume 86.99 cm3    AV peak gradient 9 mmHg    E/E' ratio 8.24 m/s    Triscuspid Valve Regurgitation Peak Gradient 26 mmHg    BSA 2.21 m2    LV Systolic Volume Index 8.1 mL/m2    LV Diastolic Volume Index 20.02 mL/m2    LA Volume Index 16.3 mL/m2    LV Mass Index 117 g/m2    Right Atrial Pressure (from IVC) 15 mmHg    TV rest pulmonary artery pressure 41 mmHg    Narrative    · Large circumferential pericardial effusion. Effusion is fibrinous. 2.7   cm laterally located, posterior  · Severely decreased left ventricular systolic function. The estimated   ejection fraction is 30%.  · Small left ventricular cavity size.  · Global hypokinetic wall motion.  · Moderate concentric left ventricular hypertrophy.  · Severely reduced right ventricular systolic function.  · Diastolic pattern consistent  with atrial fibrillation observed.  · There is a large right pleural effusion.  · Elevated central venous pressure (15 mmHg).  · The estimated PA systolic pressure is 41 mmHg.  · Pulmonary hypertension present.  · The ascending aorta is mildly dilated.       and X-Ray: CXR: X-Ray Chest 1 View (CXR):   Results for orders placed or performed during the hospital encounter of 02/13/20   X-Ray Chest 1 View    Narrative    EXAMINATION:  XR CHEST 1 VIEW    CLINICAL HISTORY:  s/p pericardial window, thoracostomy tube;    TECHNIQUE:  Single frontal view of the chest was performed.    COMPARISON:  Prior radiograph from February 16, 2020. the examination from February 14, 2020 was also reviewed.    FINDINGS:  Two left-sided thoracostomy tubes remain in place.  Unchanged pleural effusion and parenchymal disease within the left mid and lower chest.  New/developing pleural fluid within the right lower chest with overlying atelectasis or consolidation.  The heart remains enlarged.  No pneumothorax.  Osseous structures stable.      Impression    1. Increasing pleural fluid and overlying parenchymal disease within the right lower chest.  2. Unchanged disease within the left chest with there are 2 thoracostomy tubes.      Electronically signed by: Josue Anthony Jr., MD  Date:    02/17/2020  Time:    08:22    and X-Ray Chest PA and Lateral (CXR): No results found for this visit on 02/13/20.    Assessment and Plan:       * Pericardial effusion  -2D echo pending  -Will likely need CTS consult for possible pericardial window    2/14/2020  -s/p pericardial window  -Mgmt as per CTS  -Will follow    2/15/2020  -Stable, mgmt as per CTS    2/17  -mgmt per CTS    Acute systolic CHF (congestive heart failure)  -EF 30% by 2D echo  -Likely influenced by afib  -Continue BB  -Will further optimize regimen pending clinical course/BP trend  -Stress test IP vs OP    2/16/2020  -Toprol XL increased to 25 mg daily  -Add ACEi/ARB if BP  permits    2/17  -Continue BB  -BP remains on soft side today  -Will need to add ACEi/ARB once BP permits  -Further recs to follow in AM    Bilateral pleural effusion  -Will gently diurese and assess response  -Improved       Atrial fibrillation with rapid ventricular response  -Presented with afib with RVR, new onset  -Converted to SR once on telemetry floor  -Will add low dose Toprol XL 12.5 mg daily given borderline BP upon admission  -Heparin gtt initiated   -Assess response    2/14/2020  -Continue low dose BB  -Remains in SR  -Needs AC on board when ok with CTS    2/15/2020  -Remains in SR  -Continue BB  -Needs AC re-initiated when ok with CTS    2/16/2020  -Remains in SR  -Increase Toprol to 25 mg daily  -Needs AC on board when ok with CTS    2/17  -Continue BB  -Remains in SR today  -Needs AC once OK per CTS        VTE Risk Mitigation (From admission, onward)         Ordered     Place sequential compression device  Until discontinued      02/16/20 0944                Jordyn Redmond, JANNY-C  Cardiology  [facility]

## 2020-02-17 NOTE — PROGRESS NOTES
Discharge will be delayed r/t hypotension and dizziness. Patient will receive a 500ml LR bolus and 3 grams of calcium gluconate. Patient will be observed over night.

## 2020-02-17 NOTE — PLAN OF CARE
Patient performed bed mobility min a, transfers cg/sba and amb cg/sba 100ft x 2 RW  O2 sat 97% on RA

## 2020-02-17 NOTE — ASSESSMENT & PLAN NOTE
02/15/2020   Patient is postop day 1 status post pericardial window and left chest tube placement.  Total chest tube output since surgery has been about 700 mL.  Will continue chest tube to drainage for now.    02/16/2020  The patient is postop day 2.  Status post pericardial window and left chest tube placement.  Chest tube output state significant.  Continue chest tube to drainage. Chest x-ray suggests consolidation/fluid accumulation in the left chest.  Will obtain CT scan of the chest.    02/17/2020  The patient is postop day 3.  Status post pericardial window and left chest tube placement.  Chest tube output less than 5 ml. No air leak. Due to the minimal output of the chest tubes, chest x-ray suggests possible atelectasis of left lower lobe, no pneumothorax. Disontinued chest tubes x 2. A-febrile. Hemodynamically stable. Ambulating very well. Tolerating Room air well.  Anticipate discharge to home health today. Discussed with hospital medicine.

## 2020-02-17 NOTE — PLAN OF CARE
Patient AAOX 4. VSS.  Patient remained afebrile throughout shift.  Patient remained free of falls this shift  Patient 0/10 of pain this shift.  Chest Tube care provided.  Acetaminophen pain medication administered as ordered.  Plan of care reviewed.  Patient verbalized understanding.  Patient moving/turning with assistance  Frequent weight shifting encouraged.  Patient NSR on monitor  Bed low, side rails up x2, wheels locked, call light in reach.  Patient instructed to call for assistance.  Hourly rounding completed.  Will continue to monitor.

## 2020-02-17 NOTE — ASSESSMENT & PLAN NOTE
-EF 30% by 2D echo  -Likely influenced by afib  -Continue BB  -Will further optimize regimen pending clinical course/BP trend  -Stress test IP vs OP    2/16/2020  -Toprol XL increased to 25 mg daily  -Add ACEi/ARB if BP permits    2/17  -Continue BB  -BP remains on soft side today  -Will need to add ACEi/ARB once BP permits  -Further recs to follow in AM

## 2020-02-17 NOTE — PLAN OF CARE
Recommendations     Recommendation: 1. Continue current diet & ONS. 2. RD to f/u.  Goals: Meet > 85 % EEN/EPN by RD f/u   Nutrition Goal Status: goal met   Communication of RD Recs: (POc, sticky note)

## 2020-02-17 NOTE — PROGRESS NOTES
Pt seen and examined, doing well after CT removed vt Dr. Graham and discharged home. Will sign off. Thank you.

## 2020-02-17 NOTE — PROGRESS NOTES
[facility]   Cardiothoracic Surgery  Progress Note    Patient Name: Dominic Cohen  MRN: [id number]  Admission Date: 2/13/2020  Hospital Length of Stay: 4 days  Code Status: Full Code   Attending Physician: Ankush Graham MD   Referring Provider: Self, Aaareferral  Principal Problem:Pericardial effusion            Subjective:     Post-Op Info:  Procedure(s) (LRB):  CREATION, PERICARDIAL WINDOW (N/A)  INSERTION, CATHETER, INTERCOSTAL, FOR DRAINAGE (Left)   3 Days Post-Op     Interval History:  02/17/2020  The patient is postop day 3.  Status post pericardial window and a left chest tube placement.    ROS  Medications:  Continuous Infusions:  Scheduled Meds:   albuterol-ipratropium  3 mL Nebulization Q8H    aspirin  81 mg Oral Daily    chlorhexidine  10 mL Mouth/Throat BID    cyanocobalamin  1,000 mcg Intramuscular Q30 Days    docusate sodium  100 mg Oral Daily    famotidine  20 mg Oral BID    latanoprost  1 drop Both Eyes QHS    metoprolol succinate  25 mg Oral Daily    nozaseptin   Each Nostril BID    polyethylene glycol  17 g Oral Daily    timolol maleate 0.5%  1 drop Both Eyes BID     PRN Meds:sodium chloride, acetaminophen, albumin human 5%, guaifenesin 100 mg/5 ml, lactated ringers, metoclopramide HCl, ondansetron, ondansetron, oxyCODONE, oxyCODONE     Objective:     Vital Signs (Most Recent):  Temp: 98.8 °F (37.1 °C) (02/17/20 0752)  Pulse: 72 (02/17/20 0752)  Resp: 18 (02/17/20 0752)  BP: 139/67 (02/17/20 0752)  SpO2: 97 % (02/17/20 0908) Vital Signs (24h Range):  Temp:  [98 °F (36.7 °C)-98.8 °F (37.1 °C)] 98.8 °F (37.1 °C)  Pulse:  [69-92] 72  Resp:  [16-20] 18  SpO2:  [92 %-97 %] 97 %  BP: (108-149)/(52-78) 139/67     Weight: 118 kg (260 lb 2.3 oz)  Body mass index is 31.67 kg/m².    SpO2: 97 %  O2 Device (Oxygen Therapy): room air    Intake/Output - Last 3 Shifts       02/15 0700 - 02/16 0659 02/16 0700 - 02/17 0659 02/17 0700 - 02/18 0659    P.O. 630 1252     I.V. (mL/kg)  1735 (14.7) 1250 (10.6)     IV Piggyback       Total Intake(mL/kg) 2365 (20) 2502 (21.2)     Urine (mL/kg/hr) 990 (0.3) 3155 (1.1)     Stool 0 0     Blood       Chest Tube 260 27     Total Output 1250 3182     Net +1115 -680            Stool Occurrence  0 x           Lines/Drains/Airways     Drain                 Y Chest Tube 1 and 2 02/14/20 0820 1 Anterior Pericardial 32 Fr. 2 Left Pleural 32 Fr. 3 days          Peripheral Intravenous Line                 Peripheral IV - Single Lumen 02/16/20 1419 20 G Anterior;Right Forearm less than 1 day                Physical Exam   Constitutional: He is oriented to person, place, and time. He appears well-developed and well-nourished. No distress.   HENT:   Head: Normocephalic and atraumatic.   Eyes: Pupils are equal, round, and reactive to light. EOM are normal.   Neck: Normal range of motion. Neck supple. No JVD present.   Cardiovascular: Normal rate, regular rhythm, normal heart sounds and intact distal pulses. Exam reveals no gallop and no friction rub.   No murmur heard.  Mediastinal chest tube discontinued. Patient tolerated well.    Pulmonary/Chest: Effort normal and breath sounds normal. No stridor. No respiratory distress. He has no wheezes. He has no rales. He exhibits no tenderness.   Left pleural chest tube discontinued, patient tolerated well.    Abdominal: Soft. Bowel sounds are normal. He exhibits no distension and no mass. There is no tenderness. There is no rebound and no guarding. No hernia.   Musculoskeletal: Normal range of motion. He exhibits no edema, tenderness or deformity.   Neurological: He is alert and oriented to person, place, and time. He displays normal reflexes. No cranial nerve deficit or sensory deficit. He exhibits normal muscle tone. Coordination normal.   Skin: Skin is warm and dry. Capillary refill takes less than 2 seconds. No rash noted. He is not diaphoretic. No erythema. No pallor.   Incisions CDI.    Psychiatric: He has a normal  mood and affect. His behavior is normal. Judgment and thought content normal.   Nursing note and vitals reviewed.      Significant Labs:  BMP:   Recent Labs   Lab 02/17/20  0415   GLU 96      K 3.9      CO2 32*   BUN 7*   CREATININE 0.9   CALCIUM 8.3*   MG 2.0     CBC:   Recent Labs   Lab 02/17/20 0415   WBC 7.79  7.79   RBC 3.02*  3.02*   HGB 9.6*  9.6*   HCT 30.8*  30.8*     347   *  102*   MCH 31.8*  31.8*   MCHC 31.2*  31.2*       Significant Diagnostics:  I have reviewed all pertinent imaging results/findings within the past 24 hours.    Assessment/Plan:     * Pericardial effusion  The patient is an 84 year old male with large pericardial window and JOSLYN pleural effusions that presented to the [facility] emergency department with chest pain and sob, worked up for A-fib w/ RVR. Patient has HTN, atherosclerosis of the abdominal aorta, arthritis, polyneuropathy, prediabetes, venous insufficiency, gout, former smoker 0.5 ppd x 52 years. The patient with large pericardial window and JOSLYN pleural effusions is a candidate for urgent pericardial window creation with possible JOSLYN thoracostomy tube placement. The risks and benefits of surgery were explained to the patient in great detail. All questions were answered to the patient's satisfaction. The patient has agreed to proceed with surgery. The patient will be scheduled for urgent pericardial window creation with possible JOSLYN thoracostomy tube placement on 02/14/2020.    S/P pericardial window creation  02/15/2020   Patient is postop day 1 status post pericardial window and left chest tube placement.  Total chest tube output since surgery has been about 700 mL.  Will continue chest tube to drainage for now.    02/16/2020  The patient is postop day 2.  Status post pericardial window and left chest tube placement.  Chest tube output state significant.  Continue chest tube to drainage. Chest x-ray suggests consolidation/fluid accumulation in  the left chest.  Will obtain CT scan of the chest.    02/17/2020  The patient is postop day 3.  Status post pericardial window and left chest tube placement.  Chest tube output less than 5 ml. No air leak. Due to the minimal output of the chest tubes, chest x-ray suggests possible atelectasis of left lower lobe, no pneumothorax. Disontinued chest tubes x 2.  Anticipate discharge to home health today. Discussed with hospital medicine.         Elan Yu NP  Cardiothoracic Surgery  [facility]

## 2020-02-18 ENCOUNTER — OUTPATIENT CASE MANAGEMENT (OUTPATIENT)
Dept: ADMINISTRATIVE | Facility: OTHER | Age: 85
End: 2020-02-18

## 2020-02-18 VITALS
WEIGHT: 271.63 LBS | TEMPERATURE: 98 F | RESPIRATION RATE: 20 BRPM | DIASTOLIC BLOOD PRESSURE: 66 MMHG | BODY MASS INDEX: 33.08 KG/M2 | OXYGEN SATURATION: 96 % | HEIGHT: 76 IN | HEART RATE: 70 BPM | SYSTOLIC BLOOD PRESSURE: 134 MMHG

## 2020-02-18 LAB
ALBUMIN SERPL BCP-MCNC: 2.1 G/DL (ref 3.5–5.2)
ALP SERPL-CCNC: 50 U/L (ref 55–135)
ALT SERPL W/O P-5'-P-CCNC: 36 U/L (ref 10–44)
ANION GAP SERPL CALC-SCNC: 6 MMOL/L (ref 8–16)
AST SERPL-CCNC: 26 U/L (ref 10–40)
BACTERIA BLD CULT: NORMAL
BACTERIA BLD CULT: NORMAL
BACTERIA FLD AEROBE CULT: NO GROWTH
BACTERIA FLD AEROBE CULT: NO GROWTH
BASOPHILS # BLD AUTO: 0.04 K/UL (ref 0–0.2)
BASOPHILS NFR BLD: 0.5 % (ref 0–1.9)
BILIRUB SERPL-MCNC: 0.6 MG/DL (ref 0.1–1)
BLD PROD TYP BPU: NORMAL
BLD PROD TYP BPU: NORMAL
BLOOD UNIT EXPIRATION DATE: NORMAL
BLOOD UNIT EXPIRATION DATE: NORMAL
BLOOD UNIT TYPE CODE: 5100
BLOOD UNIT TYPE CODE: 5100
BLOOD UNIT TYPE: NORMAL
BLOOD UNIT TYPE: NORMAL
BUN SERPL-MCNC: 10 MG/DL (ref 8–23)
CALCIUM SERPL-MCNC: 8.8 MG/DL (ref 8.7–10.5)
CHLORIDE SERPL-SCNC: 103 MMOL/L (ref 95–110)
CO2 SERPL-SCNC: 31 MMOL/L (ref 23–29)
CODING SYSTEM: NORMAL
CODING SYSTEM: NORMAL
CREAT SERPL-MCNC: 1 MG/DL (ref 0.5–1.4)
DIFFERENTIAL METHOD: ABNORMAL
DISPENSE STATUS: NORMAL
DISPENSE STATUS: NORMAL
EOSINOPHIL # BLD AUTO: 0.4 K/UL (ref 0–0.5)
EOSINOPHIL NFR BLD: 4.4 % (ref 0–8)
ERYTHROCYTE [DISTWIDTH] IN BLOOD BY AUTOMATED COUNT: 14 % (ref 11.5–14.5)
EST. GFR  (AFRICAN AMERICAN): >60 ML/MIN/1.73 M^2
EST. GFR  (NON AFRICAN AMERICAN): >60 ML/MIN/1.73 M^2
FINAL PATHOLOGIC DIAGNOSIS: NORMAL
GLUCOSE SERPL-MCNC: 103 MG/DL (ref 70–110)
GRAM STN SPEC: NORMAL
GROSS: NORMAL
HCT VFR BLD AUTO: 33.7 % (ref 40–54)
HGB BLD-MCNC: 10.1 G/DL (ref 14–18)
IMM GRANULOCYTES # BLD AUTO: 0.07 K/UL (ref 0–0.04)
IMM GRANULOCYTES NFR BLD AUTO: 0.9 % (ref 0–0.5)
LYMPHOCYTES # BLD AUTO: 1.1 K/UL (ref 1–4.8)
LYMPHOCYTES NFR BLD: 13.6 % (ref 18–48)
MAGNESIUM SERPL-MCNC: 2.1 MG/DL (ref 1.6–2.6)
MCH RBC QN AUTO: 31 PG (ref 27–31)
MCHC RBC AUTO-ENTMCNC: 30 G/DL (ref 32–36)
MCV RBC AUTO: 103 FL (ref 82–98)
MICROSCOPIC EXAM: NORMAL
MONOCYTES # BLD AUTO: 0.7 K/UL (ref 0.3–1)
MONOCYTES NFR BLD: 7.9 % (ref 4–15)
NEUTROPHILS # BLD AUTO: 6 K/UL (ref 1.8–7.7)
NEUTROPHILS NFR BLD: 72.7 % (ref 38–73)
NRBC BLD-RTO: 0 /100 WBC
NUM UNITS TRANS PACKED RBC: NORMAL
NUM UNITS TRANS PACKED RBC: NORMAL
PHOSPHATE SERPL-MCNC: 3.2 MG/DL (ref 2.7–4.5)
PLATELET # BLD AUTO: 368 K/UL (ref 150–350)
PMV BLD AUTO: 9.8 FL (ref 9.2–12.9)
POTASSIUM SERPL-SCNC: 4.2 MMOL/L (ref 3.5–5.1)
PROT SERPL-MCNC: 6.5 G/DL (ref 6–8.4)
RBC # BLD AUTO: 3.26 M/UL (ref 4.6–6.2)
SODIUM SERPL-SCNC: 140 MMOL/L (ref 136–145)
WBC # BLD AUTO: 8.23 K/UL (ref 3.9–12.7)

## 2020-02-18 PROCEDURE — 97116 GAIT TRAINING THERAPY: CPT

## 2020-02-18 PROCEDURE — 25000003 PHARM REV CODE 250: Performed by: THORACIC SURGERY (CARDIOTHORACIC VASCULAR SURGERY)

## 2020-02-18 PROCEDURE — 93005 ELECTROCARDIOGRAM TRACING: CPT

## 2020-02-18 PROCEDURE — 93010 ELECTROCARDIOGRAM REPORT: CPT | Mod: ,,, | Performed by: INTERNAL MEDICINE

## 2020-02-18 PROCEDURE — 84100 ASSAY OF PHOSPHORUS: CPT

## 2020-02-18 PROCEDURE — 36415 COLL VENOUS BLD VENIPUNCTURE: CPT

## 2020-02-18 PROCEDURE — 80053 COMPREHEN METABOLIC PANEL: CPT

## 2020-02-18 PROCEDURE — 83735 ASSAY OF MAGNESIUM: CPT

## 2020-02-18 PROCEDURE — 97110 THERAPEUTIC EXERCISES: CPT

## 2020-02-18 PROCEDURE — 93010 EKG 12-LEAD: ICD-10-PCS | Mod: ,,, | Performed by: INTERNAL MEDICINE

## 2020-02-18 PROCEDURE — 85025 COMPLETE CBC W/AUTO DIFF WBC: CPT

## 2020-02-18 PROCEDURE — 99232 PR SUBSEQUENT HOSPITAL CARE,LEVL II: ICD-10-PCS | Mod: ,,, | Performed by: INTERNAL MEDICINE

## 2020-02-18 PROCEDURE — 94640 AIRWAY INHALATION TREATMENT: CPT

## 2020-02-18 PROCEDURE — 25000242 PHARM REV CODE 250 ALT 637 W/ HCPCS: Performed by: THORACIC SURGERY (CARDIOTHORACIC VASCULAR SURGERY)

## 2020-02-18 PROCEDURE — 99232 SBSQ HOSP IP/OBS MODERATE 35: CPT | Mod: ,,, | Performed by: INTERNAL MEDICINE

## 2020-02-18 RX ADMIN — FAMOTIDINE 20 MG: 20 TABLET ORAL at 09:02

## 2020-02-18 RX ADMIN — DOCUSATE SODIUM 100 MG: 100 CAPSULE, LIQUID FILLED ORAL at 09:02

## 2020-02-18 RX ADMIN — TIMOLOL MALEATE 1 DROP: 5 SOLUTION/ DROPS OPHTHALMIC at 09:02

## 2020-02-18 RX ADMIN — ASPIRIN 81 MG: 81 TABLET ORAL at 09:02

## 2020-02-18 RX ADMIN — IPRATROPIUM BROMIDE AND ALBUTEROL SULFATE 3 ML: .5; 3 SOLUTION RESPIRATORY (INHALATION) at 07:02

## 2020-02-18 RX ADMIN — CHLORHEXIDINE GLUCONATE 0.12% ORAL RINSE 10 ML: 1.2 LIQUID ORAL at 09:02

## 2020-02-18 RX ADMIN — POLYETHYLENE GLYCOL (3350) 17 G: 17 POWDER, FOR SOLUTION ORAL at 09:02

## 2020-02-18 NOTE — ASSESSMENT & PLAN NOTE
-EF 30% by 2D echo  -Likely influenced by afib  -Continue BB  -Will further optimize regimen pending clinical course/BP trend  -Stress test IP vs OP    2/16/2020  -Toprol XL increased to 25 mg daily  -Add ACEi/ARB if BP permits    2/17  -Continue BB  -BP remains on soft side today  -Will need to add ACEi/ARB once BP permits  -Further recs to follow in AM    2/18  -Continue BB  -Given hypotension yesterday, unable to add ACEi/ARB for now  -Will need to attempt as OP if BP can tolerate  -OP Cardiology follow up, discussed with patient the importance of close follow up next week

## 2020-02-18 NOTE — ASSESSMENT & PLAN NOTE
-Presented with afib with RVR, new onset  -Converted to SR once on telemetry floor  -Will add low dose Toprol XL 12.5 mg daily given borderline BP upon admission  -Heparin gtt initiated   -Assess response    2/14/2020  -Continue low dose BB  -Remains in SR  -Needs AC on board when ok with CTS    2/15/2020  -Remains in SR  -Continue BB  -Needs AC re-initiated when ok with CTS    2/16/2020  -Remains in SR  -Increase Toprol to 25 mg daily  -Needs AC on board when ok with CTS    2/17  -Continue BB  -Remains in SR today  -Needs AC once OK per CTS    2/18  -Continue BB, ASA  -Needs AC if OK with CTS

## 2020-02-18 NOTE — NURSING
Discharge order reviewed with patient and caretaker who both verbalized understanding. PIV removed. Patient awaiting ride and prescriptions to be delivered to bedside. Paper script for tramadol given to patient. Cardiac monitor remains on patient until ride arrives. Patient instructed to call when ride is here.

## 2020-02-18 NOTE — PROGRESS NOTES
[facility]  Cardiology  Progress Note    Patient Name: Dominic Cohen  MRN: [id number]  Admission Date: 2/13/2020  Hospital Length of Stay: 5 days  Code Status: Full Code   Attending Physician: Ankush Graham MD   Primary Care Physician: Sudhakar Liu MD  Expected Discharge Date: 2/18/2020  Principal Problem:Pericardial effusion    Subjective:   HPI      Mr. Cohen is an 84 year old male patient whose current medical conditions include HTN,CVI, polyneuropathy, and abdominal atherosclerosis who presented to [facility] ED today with a chief complaint of progressively worsening SOB over the past 4-5 days. Associated symptoms included BLE edema, congestion, orthopnea, and pleuritic chest pain/soreness. Patient denied any associated lightheadedness, dizziness, palpitations, near syncope, syncope, fever, or chills. Initial workup in ED revealed elevated Tbili (1.4), ALT of 71, and INR of 1.3. CTA of chest showed large pericardial effusion and bilateral pleural effusions. EKG showed afib with RVR and patient subsequently admitted for further evaluation and treatment. Cardiology consulted to assist with management. Patient seen and examined while in ED. Remains SOB with conversational dyspnea. Reports some chest discomfort with deep breathing and at time, palpation. Denies any prior history of afib/CAD. States he was recently hospitalized at [facility] in late January due to chest pain. Workup at that time, including, stress echo was negative, but he has been experiencing URI type symptoms since his discharge. Chart reviewed. BNP normal. Troponin negative. 2D echo pending.    Hospital Course:   2/14/2020-Patient seen and examined today, s/p pericardial window. No complaints. Feels well. SOB improved. Remains in SR.    2/15/2020-Patient seen and examined today. Feels ok. Complains of constipation/abdominal bloating. Admits to some mild SOB. Still in SR. Labs reviewed and are stable. BP borderline.      2/16/2020-Patient seen and examined today. Only complaint is abdominal bloating/consitpation. No chest pain or SOB. Chest tube in place, still significant OP. CT of chest ordered. Labs stable. BP ok, needs BB increased and ACEi/ARB on board if he can tolerate.     2/17/2020--Patient seen and examined in room, ambulating in hallway with PT this AM. CT removed this AM. Feels better today. Labs reviewed, K+ 3.9, Cr 0.9, Mag 2.0.     2/18/2020--Patient seen and examined in room, lying in bed. Feels good today. BP much improved overnight. Will need OP Cardiology follow up next week, patient and wife prefer a cardiologist in [place]. Reviewed dietary and fluid restriction in detail with patient and family in detail today.     Interval History: no acute issues noted o/n. Will need OP Cardiology follow up next week, prefers to establish in [place].     Review of Systems   Constitution: Positive for malaise/fatigue.   HENT: Negative for hearing loss and hoarse voice.    Eyes: Negative for blurred vision and visual disturbance.   Cardiovascular: Negative for chest pain, claudication, dyspnea on exertion, irregular heartbeat, leg swelling, near-syncope, orthopnea, palpitations, paroxysmal nocturnal dyspnea and syncope.   Respiratory: Negative for cough, hemoptysis, shortness of breath, sleep disturbances due to breathing, snoring and wheezing.    Endocrine: Negative for cold intolerance and heat intolerance.   Hematologic/Lymphatic: Bruises/bleeds easily.   Skin: Negative for color change, dry skin and nail changes.   Musculoskeletal: Positive for arthritis. Negative for back pain, joint pain and myalgias.   Gastrointestinal: Negative for bloating, abdominal pain, constipation, nausea and vomiting.   Genitourinary: Negative for dysuria, flank pain, hematuria and hesitancy.   Neurological: Negative for headaches, light-headedness, loss of balance, numbness, paresthesias and weakness.   Psychiatric/Behavioral: Negative for  altered mental status.   Allergic/Immunologic: Negative for environmental allergies.     Objective:     Vital Signs (Most Recent):  Temp: 98.3 °F (36.8 °C) (02/18/20 0734)  Pulse: 73 (02/18/20 0900)  Resp: 18 (02/18/20 0734)  BP: 115/69 (02/18/20 0734)  SpO2: (!) 93 % (02/18/20 0734) Vital Signs (24h Range):  Temp:  [98.3 °F (36.8 °C)-99.4 °F (37.4 °C)] 98.3 °F (36.8 °C)  Pulse:  [69-81] 73  Resp:  [16-20] 18  SpO2:  [93 %-96 %] 93 %  BP: ()/(52-70) 115/69     Weight: 123.2 kg (271 lb 9.7 oz)  Body mass index is 33.06 kg/m².     SpO2: (!) 93 %  O2 Device (Oxygen Therapy): room air      Intake/Output Summary (Last 24 hours) at 2/18/2020 1038  Last data filed at 2/18/2020 0400  Gross per 24 hour   Intake 0 ml   Output --   Net 0 ml       Lines/Drains/Airways     Peripheral Intravenous Line                 Peripheral IV - Single Lumen 02/17/20 1715 22 G Anterior;Right Forearm less than 1 day                Physical Exam   Constitutional: He is oriented to person, place, and time. He appears well-developed and well-nourished. No distress.   HENT:   Head: Normocephalic and atraumatic.   Eyes: Pupils are equal, round, and reactive to light.   Neck: Normal range of motion and full passive range of motion without pain. Neck supple. No JVD present.   Cardiovascular: Normal rate, regular rhythm, S1 normal, S2 normal and intact distal pulses. PMI is not displaced. Exam reveals no distant heart sounds.   No murmur heard.  Pulses:       Radial pulses are 2+ on the right side, and 2+ on the left side.        Dorsalis pedis pulses are 2+ on the right side, and 2+ on the left side.   CHEST PAIN FREE  S/P PERICARDIAL WINDOW   Pulmonary/Chest: Effort normal. No accessory muscle usage. No respiratory distress. He has no decreased breath sounds. He has no wheezes. He has no rales.   Coarse lung sounds bilaterally   Abdominal: Soft. Bowel sounds are normal. He exhibits no distension. There is no tenderness.   +obese abdomen    Musculoskeletal: Normal range of motion. He exhibits no edema.        Right ankle: He exhibits no swelling.        Left ankle: He exhibits no swelling.   Neurological: He is alert and oriented to person, place, and time.   Skin: Skin is warm and dry. He is not diaphoretic. No cyanosis. Nails show no clubbing.   Psychiatric: He has a normal mood and affect. His speech is normal and behavior is normal. Judgment and thought content normal. Cognition and memory are normal.   Nursing note and vitals reviewed.      Significant Labs:   BMP:   Recent Labs   Lab 02/16/20  1901 02/17/20  0415 02/18/20  0446   * 96 103    140 140   K 4.1 3.9 4.2    102 103   CO2 30* 32* 31*   BUN 7* 7* 10   CREATININE 0.9 0.9 1.0   CALCIUM 8.6* 8.3* 8.8   MG  --  2.0 2.1   , CBC   Recent Labs   Lab 02/17/20  0415 02/18/20  0446   WBC 7.79  7.79 8.23   HGB 9.6*  9.6* 10.1*   HCT 30.8*  30.8* 33.7*     347 368*    and All pertinent lab results from the last 24 hours have been reviewed.    Significant Imaging: Echocardiogram:   2D echo with color flow doppler:   Results for orders placed or performed in visit on 07/29/19   2D echo with color flow doppler   Result Value Ref Range    QEF 60 55 - 65    Est. PA Systolic Pressure 22.36     Mitral Valve Mobility NORMAL     Narrative    Date of Procedure: 07/29/2019        TEST DESCRIPTION       Aorta: The aortic root is normal in size, measuring 4.3 cm at sinotubular junction and 4.3 cm at Sinuses of Valsalva. The proximal ascending aorta is normal in size, measuring 3.6 cm across.     Left Atrium: The left atrial volume index is mildly enlarged, measuring 40.11 cc/m2.     Left Ventricle: The left ventricle is normal in size, with an end-diastolic diameter of 5.0 cm, and an end-systolic diameter of 2.5 cm. Wall thickness is increased, with the septum and the posterior wall each measuring 1.8 cm across. Relative wall   thickness was increased at 0.72, and the LV mass  index was increased at 200.0 g/m2 consistent with moderate concentric left ventricular hypertrophy. There are no regional wall motion abnormalities. Left ventricular systolic function appears normal.   Visually estimated ejection fraction is 60-65%. The LV Doppler derived stroke volume equals 115.0 ccs.     Diastolic indices: E wave velocity 1.1 m/s, E/A ratio 0.8,  msec., E/e' ratio(avg) 11. Diastolic function is indeterminate.     Right Atrium: The right atrium is normal in size, measuring 6.7 cm in length and 4.1 cm in width in the apical view.     Right Ventricle: The right ventricle is normal in size measuring 3.1 cm at the base in the apical right ventricle-focused view. Global right ventricular systolic function appears normal. Tricuspid annular plane systolic excursion (TAPSE) is 2.4 cm. The   estimated PA systolic pressure is 22 mmHg.     Aortic Valve:  The aortic valve is normal in structure with normal leaflet mobility. The aortic valve is tri-leaflet in structure.     Mitral Valve:  The mitral valve is normal in structure with normal leaflet mobility. There is mitral annular calcification.     Tricuspid Valve:  The tricuspid valve is normal in structure.     Pulmonary Valve:  The pulmonic valve is normal in structure.     IVC: IVC is normal in size and collapses > 50% with a sniff, suggesting normal right atrial pressure of 3 mmHg.     Intracavitary: There is no evidence of pericardial effusion, intracavity mass, thrombi, or vegetation.         CONCLUSIONS     1 - Mild left atrial enlargement.     2 - Concentric hypertrophy.     3 - No wall motion abnormalities.     4 - Normal left ventricular systolic function (EF 60-65%).     5 - Normal right ventricular systolic function .     6 - The estimated PA systolic pressure is 22 mmHg.             This document has been electronically    SIGNED BY: Romeo Dawkins MD On: 07/29/2019 14:33    and Transthoracic echo (TTE) complete (Cupid Only):    Results for orders placed or performed during the hospital encounter of 02/13/20   Echo Color Flow Doppler? Yes   Result Value Ref Range    Ascending aorta 2.99 cm    STJ 3.48 cm    AV mean gradient 7 mmHg    Ao peak jose 1.49 m/s    Ao VTI 26.04 cm    IVRT 0.17 msec    IVS 1.84 (A) 0.6 - 1.1 cm    LA size 3.26 cm    Left Atrium Major Axis 4.78 cm    Left Atrium Minor Axis 3.92 cm    LVIDD 3.31 (A) 3.5 - 6.0 cm    LVIDS 2.29 2.1 - 4.0 cm    LVOT diameter 2.03 cm    LVOT peak VTI 26.89 cm    PW 1.88 (A) 0.6 - 1.1 cm    MV Peak A Jose 0.77 m/s    E wave decelartion time 226.90 msec    MV Peak E Jose 0.70 m/s    RA Major Axis 4.71 cm    RA Width 3.05 cm    Sinus 3.91 cm    TAPSE 1.30 cm    TR Max Jose 2.56 m/s    TDI LATERAL 0.08 m/s    TDI SEPTAL 0.09 m/s    LA WIDTH 3.02 cm    Ao root annulus 4.04 cm    LV Diastolic Volume 44.36 mL    LV Systolic Volume 17.87 mL    LVOT peak jose 1.33 m/s    LV LATERAL E/E' RATIO 8.75 m/s    LV SEPTAL E/E' RATIO 7.78 m/s    FS 31 %    LA volume 36.05 cm3    LV mass 259.49 g    Left Ventricle Relative Wall Thickness 1.14 cm    AV valve area 3.34 cm2    AV Velocity Ratio 0.89     AV index (prosthetic) 1.03     E/A ratio 0.91     Mean e' 0.09 m/s    LVOT area 3.2 cm2    LVOT stroke volume 86.99 cm3    AV peak gradient 9 mmHg    E/E' ratio 8.24 m/s    Triscuspid Valve Regurgitation Peak Gradient 26 mmHg    BSA 2.21 m2    LV Systolic Volume Index 8.1 mL/m2    LV Diastolic Volume Index 20.02 mL/m2    LA Volume Index 16.3 mL/m2    LV Mass Index 117 g/m2    Right Atrial Pressure (from IVC) 15 mmHg    TV rest pulmonary artery pressure 41 mmHg    Narrative    · Large circumferential pericardial effusion. Effusion is fibrinous. 2.7   cm laterally located, posterior  · Severely decreased left ventricular systolic function. The estimated   ejection fraction is 30%.  · Small left ventricular cavity size.  · Global hypokinetic wall motion.  · Moderate concentric left ventricular hypertrophy.  ·  Severely reduced right ventricular systolic function.  · Diastolic pattern consistent with atrial fibrillation observed.  · There is a large right pleural effusion.  · Elevated central venous pressure (15 mmHg).  · The estimated PA systolic pressure is 41 mmHg.  · Pulmonary hypertension present.  · The ascending aorta is mildly dilated.       and X-Ray: CXR: X-Ray Chest 1 View (CXR):   Results for orders placed or performed during the hospital encounter of 02/13/20   X-Ray Chest 1 View    Narrative    EXAMINATION:  XR CHEST 1 VIEW    CLINICAL HISTORY:  , S/p pericardial window, thoracostomy tube;    COMPARISON:  Comparison 02/17/2020.    FINDINGS:  Heart is stable in size.  Stable focal opacification at the left base likely related to pleural effusion and adjacent consolidation.  No pneumothorax.      Impression    Stable chest.      Electronically signed by: Astrid Butler MD  Date:    02/18/2020  Time:    08:00    and X-Ray Chest PA and Lateral (CXR): No results found for this visit on 02/13/20.    Assessment and Plan:       * Pericardial effusion  -2D echo pending  -Will likely need CTS consult for possible pericardial window    2/14/2020  -s/p pericardial window  -Mgmt as per CTS  -Will follow    2/15/2020  -Stable, mgmt as per CTS    2/17  -mgmt per CTS    Acute systolic CHF (congestive heart failure)  -EF 30% by 2D echo  -Likely influenced by afib  -Continue BB  -Will further optimize regimen pending clinical course/BP trend  -Stress test IP vs OP    2/16/2020  -Toprol XL increased to 25 mg daily  -Add ACEi/ARB if BP permits    2/17  -Continue BB  -BP remains on soft side today  -Will need to add ACEi/ARB once BP permits  -Further recs to follow in AM    2/18  -Continue BB  -Given hypotension yesterday, unable to add ACEi/ARB for now  -Will need to attempt as OP if BP can tolerate  -OP Cardiology follow up, discussed with patient the importance of close follow up next week    Bilateral pleural effusion  -Will  gently diurese and assess response  -Improved       Atrial fibrillation with rapid ventricular response  -Presented with afib with RVR, new onset  -Converted to SR once on telemetry floor  -Will add low dose Toprol XL 12.5 mg daily given borderline BP upon admission  -Heparin gtt initiated   -Assess response    2/14/2020  -Continue low dose BB  -Remains in SR  -Needs AC on board when ok with CTS    2/15/2020  -Remains in SR  -Continue BB  -Needs AC re-initiated when ok with CTS    2/16/2020  -Remains in SR  -Increase Toprol to 25 mg daily  -Needs AC on board when ok with CTS    2/17  -Continue BB  -Remains in SR today  -Needs AC once OK per CTS    2/18  -Continue BB, ASA  -Needs AC if OK with CTS        VTE Risk Mitigation (From admission, onward)         Ordered     Place sequential compression device  Until discontinued      02/16/20 0944                Jordyn Redmond, KASEYP-C  Cardiology  [facility]

## 2020-02-18 NOTE — PLAN OF CARE
Patient AAOX 4. VSS.  Patient remained afebrile throughout shift.  Patient remained free of falls this shift  Patient 0/10 of pain this shift.  Plan of care reviewed.  Patient verbalized understanding.  Patient moving/turning with assistance  Frequent weight shifting encouraged.  Patient NSR on monitor  Bed low, side rails up x2, wheels locked, call light in reach.  Patient instructed to call for assistance.  Hourly rounding completed.  Will continue to monitor.

## 2020-02-18 NOTE — PT/OT/SLP PROGRESS
Physical Therapy  Treatment    Dominic Cohen   MRN: [id number]   Admitting Diagnosis: Pericardial effusion       PT Start Time: 0600     PT Stop Time: 0625    PT Total Time (min): 25 min       Billable Minutes:  Gait Training 15 and Therapeutic Exercise 10    Treatment Type: Treatment  PT/PTA: PT             General Precautions: Standard, fall(educated to use RW)  Orthopedic Precautions: N/A   Braces: N/A         Subjective:  Communicated with RN prior to session. Friend present for session      Pain/Comfort  Pain Rating 1: 0/10    Objective:   Patient found with: telemetry; in bed hob eleved    Functional Mobility:  Bed Mobility: supine to sit from elevated hob sba       Transfers: sit to/from stand eob spv with RW       Gait: Amb 200ft with 1 stand rest break sba/spv no gross LOB       Stairs:  n/a    Balance:   Static Sit: G  Dynamic Sit: G  Static Stand: F+ RW  Dynamic stand: F+ RW     Therapeutic Activities and Exercises:  PT educated patient on POC, B LE TE x 10-20 reps each to prep mobility, benefit of RW and safety/fall precautions with mobility.    AM-PAC 6 CLICK MOBILITY  How much help from another person does this patient currently need?   1 = Unable, Total/Dependent Assistance  2 = A lot, Maximum/Moderate Assistance  3 = A little, Minimum/Contact Guard/Supervision  4 = None, Modified [place]/Independent    Turning over in bed (including adjusting bedclothes, sheets and blankets)?: 4  Sitting down on and standing up from a chair with arms (e.g., wheelchair, bedside commode, etc.): 4  Moving from lying on back to sitting on the side of the bed?: 4  Moving to and from a bed to a chair (including a wheelchair)?: 4  Need to walk in hospital room?: 4  Climbing 3-5 steps with a railing?: 1  Basic Mobility Total Score: 21    AM-PAC Raw Score CMS G-Code Modifier Level of Impairment Assistance   6 % Total / Unable   7 - 9 CM 80 - 100% Maximal Assist   10 - 14 CL 60 - 80% Moderate Assist   15 - 19  CK 40 - 60% Moderate Assist   20 - 22 CJ 20 - 40% Minimal Assist   23 CI 1-20% SBA / CGA   24 CH 0% Independent/ Mod I     Patient left up in chair with all lines intact, call button in reach, chair alarm on, RN notified and friend present.    Assessment:  Dominic Cohen is a 84 y.o. male with a medical diagnosis of Pericardial effusion and presents with impaired mobility.    Rehab identified problem list/impairments: Rehab identified problem list/impairments: weakness, impaired endurance, impaired balance, decreased safety awareness, impaired self care skills, impaired functional mobilty    Rehab potential is good.    Activity tolerance: Good    Discharge recommendations: Discharge Facility/Level of Care Needs: home health PT     Barriers to discharge:      Equipment recommendations: Equipment Needed After Discharge: walker, rolling     GOALS:   Multidisciplinary Problems     Physical Therapy Goals        Problem: Physical Therapy Goal    Goal Priority Disciplines Outcome Goal Variances Interventions   Physical Therapy Goal     PT, PT/OT Ongoing, Progressing     Description:  PT eval complete.  The following goals will be met in 7 days  1.  Pt will be IND with bed mobility  2. Pt will be IND with transfer  3. Pt will be mod I with RW for 150 ft                    PLAN:    Patient to be seen 5 x/week  to address the above listed problems via gait training, therapeutic activities, therapeutic exercises  Plan of Care expires: 02/22/20  Plan of Care reviewed with: patient, friend         Jarett Lechuga, PT  02/18/2020

## 2020-02-18 NOTE — ASSESSMENT & PLAN NOTE
02/15/2020   Patient is postop day 1 status post pericardial window and left chest tube placement.  Total chest tube output since surgery has been about 700 mL.  Will continue chest tube to drainage for now.    02/16/2020  The patient is postop day 2.  Status post pericardial window and left chest tube placement.  Chest tube output state significant.  Continue chest tube to drainage. Chest x-ray suggests consolidation/fluid accumulation in the left chest.  Will obtain CT scan of the chest.    02/17/2020  The patient is postop day 3.  Status post pericardial window and left chest tube placement.  Chest tube output less than 5 ml. No air leak. Due to the minimal output of the chest tubes, chest x-ray suggests possible atelectasis of left lower lobe, no pneumothorax. Disontinued chest tubes x 2. A-febrile. Hemodynamically stable. Ambulating very well. Tolerating Room air well.  Anticipate discharge to home health today. Discussed with hospital medicine.     02/18/2020  The patient is postop day 4. Status post pericardial window and left chest tube placement.  Chest tubes were discontinued yesterday.  Chest x-ray has been stable.  Patient scheduled for discharge yesterday.  However patient had an episode of systolic blood pressure in the 90s.  Discharge was then postponed till today.  Patient has remained hemodynamically stable since receiving 500 mL fluid bolus.  Patient appears stable enough for discharge.

## 2020-02-18 NOTE — SUBJECTIVE & OBJECTIVE
Interval History:  Patient is postop day 4.  Status post pericardial window and left chest tube placement.  Patient has no complaints.  No further episodes of lightheadedness.  Patient looking forward to being discharged.  ROS  Medications:  Continuous Infusions:  Scheduled Meds:   albuterol-ipratropium  3 mL Nebulization Q8H    aspirin  81 mg Oral Daily    chlorhexidine  10 mL Mouth/Throat BID    cyanocobalamin  1,000 mcg Intramuscular Q30 Days    docusate sodium  100 mg Oral Daily    famotidine  20 mg Oral BID    latanoprost  1 drop Both Eyes QHS    nozaseptin   Each Nostril BID    polyethylene glycol  17 g Oral Daily    timolol maleate 0.5%  1 drop Both Eyes BID     PRN Meds:sodium chloride, acetaminophen, albumin human 5%, guaifenesin 100 mg/5 ml, lactated ringers, metoclopramide HCl, ondansetron, ondansetron, oxyCODONE, oxyCODONE     Objective:     Vital Signs (Most Recent):  Temp: 98.3 °F (36.8 °C) (02/18/20 0734)  Pulse: 73 (02/18/20 0900)  Resp: 18 (02/18/20 0734)  BP: 115/69 (02/18/20 0734)  SpO2: (!) 93 % (02/18/20 0734) Vital Signs (24h Range):  Temp:  [98.3 °F (36.8 °C)-99.4 °F (37.4 °C)] 98.3 °F (36.8 °C)  Pulse:  [69-81] 73  Resp:  [16-20] 18  SpO2:  [93 %-96 %] 93 %  BP: ()/(52-70) 115/69     Weight: 123.2 kg (271 lb 9.7 oz)  Body mass index is 33.06 kg/m².    SpO2: (!) 93 %  O2 Device (Oxygen Therapy): room air    Intake/Output - Last 3 Shifts       02/16 0700 - 02/17 0659 02/17 0700 - 02/18 0659 02/18 0700 - 02/19 0659    P.O. 1252      I.V. (mL/kg) 1250 (10.1) 0 (0)     Total Intake(mL/kg) 2502 (20.3) 0 (0)     Urine (mL/kg/hr) 3155 (1.1)      Stool 0      Chest Tube 27      Total Output 3182      Net -680 0            Urine Occurrence  5 x     Stool Occurrence 0 x 1 x           Lines/Drains/Airways     Peripheral Intravenous Line                 Peripheral IV - Single Lumen 02/17/20 1715 22 G Anterior;Right Forearm less than 1 day                Physical Exam   Constitutional: He  is oriented to person, place, and time. He appears well-developed and well-nourished. No distress.   HENT:   Head: Normocephalic and atraumatic.   Neck: Normal range of motion. Neck supple.   Cardiovascular: Normal rate.   Pulmonary/Chest: Effort normal and breath sounds normal.   Abdominal: Soft. Bowel sounds are normal.   Musculoskeletal: He exhibits edema.   Neurological: He is alert and oriented to person, place, and time.   Skin: Skin is warm and dry. He is not diaphoretic.   Psychiatric: He has a normal mood and affect.       Significant Labs:  All pertinent labs from the last 24 hours have been reviewed.    Significant Diagnostics:  I have reviewed all pertinent imaging results/findings within the past 24 hours.

## 2020-02-18 NOTE — SUBJECTIVE & OBJECTIVE
Interval History: no acute issues noted o/n. Will need OP Cardiology follow up next week, prefers to establish in [place].     Review of Systems   Constitution: Positive for malaise/fatigue.   HENT: Negative for hearing loss and hoarse voice.    Eyes: Negative for blurred vision and visual disturbance.   Cardiovascular: Negative for chest pain, claudication, dyspnea on exertion, irregular heartbeat, leg swelling, near-syncope, orthopnea, palpitations, paroxysmal nocturnal dyspnea and syncope.   Respiratory: Negative for cough, hemoptysis, shortness of breath, sleep disturbances due to breathing, snoring and wheezing.    Endocrine: Negative for cold intolerance and heat intolerance.   Hematologic/Lymphatic: Bruises/bleeds easily.   Skin: Negative for color change, dry skin and nail changes.   Musculoskeletal: Positive for arthritis. Negative for back pain, joint pain and myalgias.   Gastrointestinal: Negative for bloating, abdominal pain, constipation, nausea and vomiting.   Genitourinary: Negative for dysuria, flank pain, hematuria and hesitancy.   Neurological: Negative for headaches, light-headedness, loss of balance, numbness, paresthesias and weakness.   Psychiatric/Behavioral: Negative for altered mental status.   Allergic/Immunologic: Negative for environmental allergies.     Objective:     Vital Signs (Most Recent):  Temp: 98.3 °F (36.8 °C) (02/18/20 0734)  Pulse: 73 (02/18/20 0900)  Resp: 18 (02/18/20 0734)  BP: 115/69 (02/18/20 0734)  SpO2: (!) 93 % (02/18/20 0734) Vital Signs (24h Range):  Temp:  [98.3 °F (36.8 °C)-99.4 °F (37.4 °C)] 98.3 °F (36.8 °C)  Pulse:  [69-81] 73  Resp:  [16-20] 18  SpO2:  [93 %-96 %] 93 %  BP: ()/(52-70) 115/69     Weight: 123.2 kg (271 lb 9.7 oz)  Body mass index is 33.06 kg/m².     SpO2: (!) 93 %  O2 Device (Oxygen Therapy): room air      Intake/Output Summary (Last 24 hours) at 2/18/2020 1038  Last data filed at 2/18/2020 0400  Gross per 24 hour   Intake 0 ml   Output --    Net 0 ml       Lines/Drains/Airways     Peripheral Intravenous Line                 Peripheral IV - Single Lumen 02/17/20 1715 22 G Anterior;Right Forearm less than 1 day                Physical Exam   Constitutional: He is oriented to person, place, and time. He appears well-developed and well-nourished. No distress.   HENT:   Head: Normocephalic and atraumatic.   Eyes: Pupils are equal, round, and reactive to light.   Neck: Normal range of motion and full passive range of motion without pain. Neck supple. No JVD present.   Cardiovascular: Normal rate, regular rhythm, S1 normal, S2 normal and intact distal pulses. PMI is not displaced. Exam reveals no distant heart sounds.   No murmur heard.  Pulses:       Radial pulses are 2+ on the right side, and 2+ on the left side.        Dorsalis pedis pulses are 2+ on the right side, and 2+ on the left side.   CHEST PAIN FREE  S/P PERICARDIAL WINDOW   Pulmonary/Chest: Effort normal. No accessory muscle usage. No respiratory distress. He has no decreased breath sounds. He has no wheezes. He has no rales.   Coarse lung sounds bilaterally   Abdominal: Soft. Bowel sounds are normal. He exhibits no distension. There is no tenderness.   +obese abdomen   Musculoskeletal: Normal range of motion. He exhibits no edema.        Right ankle: He exhibits no swelling.        Left ankle: He exhibits no swelling.   Neurological: He is alert and oriented to person, place, and time.   Skin: Skin is warm and dry. He is not diaphoretic. No cyanosis. Nails show no clubbing.   Psychiatric: He has a normal mood and affect. His speech is normal and behavior is normal. Judgment and thought content normal. Cognition and memory are normal.   Nursing note and vitals reviewed.      Significant Labs:   BMP:   Recent Labs   Lab 02/16/20  1901 02/17/20  0415 02/18/20  0446   * 96 103    140 140   K 4.1 3.9 4.2    102 103   CO2 30* 32* 31*   BUN 7* 7* 10   CREATININE 0.9 0.9 1.0    CALCIUM 8.6* 8.3* 8.8   MG  --  2.0 2.1   , CBC   Recent Labs   Lab 02/17/20  0415 02/18/20  0446   WBC 7.79  7.79 8.23   HGB 9.6*  9.6* 10.1*   HCT 30.8*  30.8* 33.7*     347 368*    and All pertinent lab results from the last 24 hours have been reviewed.    Significant Imaging: Echocardiogram:   2D echo with color flow doppler:   Results for orders placed or performed in visit on 07/29/19   2D echo with color flow doppler   Result Value Ref Range    QEF 60 55 - 65    Est. PA Systolic Pressure 22.36     Mitral Valve Mobility NORMAL     Narrative    Date of Procedure: 07/29/2019        TEST DESCRIPTION       Aorta: The aortic root is normal in size, measuring 4.3 cm at sinotubular junction and 4.3 cm at Sinuses of Valsalva. The proximal ascending aorta is normal in size, measuring 3.6 cm across.     Left Atrium: The left atrial volume index is mildly enlarged, measuring 40.11 cc/m2.     Left Ventricle: The left ventricle is normal in size, with an end-diastolic diameter of 5.0 cm, and an end-systolic diameter of 2.5 cm. Wall thickness is increased, with the septum and the posterior wall each measuring 1.8 cm across. Relative wall   thickness was increased at 0.72, and the LV mass index was increased at 200.0 g/m2 consistent with moderate concentric left ventricular hypertrophy. There are no regional wall motion abnormalities. Left ventricular systolic function appears normal.   Visually estimated ejection fraction is 60-65%. The LV Doppler derived stroke volume equals 115.0 ccs.     Diastolic indices: E wave velocity 1.1 m/s, E/A ratio 0.8,  msec., E/e' ratio(avg) 11. Diastolic function is indeterminate.     Right Atrium: The right atrium is normal in size, measuring 6.7 cm in length and 4.1 cm in width in the apical view.     Right Ventricle: The right ventricle is normal in size measuring 3.1 cm at the base in the apical right ventricle-focused view. Global right ventricular systolic function  appears normal. Tricuspid annular plane systolic excursion (TAPSE) is 2.4 cm. The   estimated PA systolic pressure is 22 mmHg.     Aortic Valve:  The aortic valve is normal in structure with normal leaflet mobility. The aortic valve is tri-leaflet in structure.     Mitral Valve:  The mitral valve is normal in structure with normal leaflet mobility. There is mitral annular calcification.     Tricuspid Valve:  The tricuspid valve is normal in structure.     Pulmonary Valve:  The pulmonic valve is normal in structure.     IVC: IVC is normal in size and collapses > 50% with a sniff, suggesting normal right atrial pressure of 3 mmHg.     Intracavitary: There is no evidence of pericardial effusion, intracavity mass, thrombi, or vegetation.         CONCLUSIONS     1 - Mild left atrial enlargement.     2 - Concentric hypertrophy.     3 - No wall motion abnormalities.     4 - Normal left ventricular systolic function (EF 60-65%).     5 - Normal right ventricular systolic function .     6 - The estimated PA systolic pressure is 22 mmHg.             This document has been electronically    SIGNED BY: Romeo Dawkins MD On: 07/29/2019 14:33    and Transthoracic echo (TTE) complete (Cupid Only):   Results for orders placed or performed during the hospital encounter of 02/13/20   Echo Color Flow Doppler? Yes   Result Value Ref Range    Ascending aorta 2.99 cm    STJ 3.48 cm    AV mean gradient 7 mmHg    Ao peak jose 1.49 m/s    Ao VTI 26.04 cm    IVRT 0.17 msec    IVS 1.84 (A) 0.6 - 1.1 cm    LA size 3.26 cm    Left Atrium Major Axis 4.78 cm    Left Atrium Minor Axis 3.92 cm    LVIDD 3.31 (A) 3.5 - 6.0 cm    LVIDS 2.29 2.1 - 4.0 cm    LVOT diameter 2.03 cm    LVOT peak VTI 26.89 cm    PW 1.88 (A) 0.6 - 1.1 cm    MV Peak A Jose 0.77 m/s    E wave decelartion time 226.90 msec    MV Peak E Jose 0.70 m/s    RA Major Axis 4.71 cm    RA Width 3.05 cm    Sinus 3.91 cm    TAPSE 1.30 cm    TR Max Jose 2.56 m/s    TDI LATERAL 0.08 m/s     TDI SEPTAL 0.09 m/s    LA WIDTH 3.02 cm    Ao root annulus 4.04 cm    LV Diastolic Volume 44.36 mL    LV Systolic Volume 17.87 mL    LVOT peak jomar 1.33 m/s    LV LATERAL E/E' RATIO 8.75 m/s    LV SEPTAL E/E' RATIO 7.78 m/s    FS 31 %    LA volume 36.05 cm3    LV mass 259.49 g    Left Ventricle Relative Wall Thickness 1.14 cm    AV valve area 3.34 cm2    AV Velocity Ratio 0.89     AV index (prosthetic) 1.03     E/A ratio 0.91     Mean e' 0.09 m/s    LVOT area 3.2 cm2    LVOT stroke volume 86.99 cm3    AV peak gradient 9 mmHg    E/E' ratio 8.24 m/s    Triscuspid Valve Regurgitation Peak Gradient 26 mmHg    BSA 2.21 m2    LV Systolic Volume Index 8.1 mL/m2    LV Diastolic Volume Index 20.02 mL/m2    LA Volume Index 16.3 mL/m2    LV Mass Index 117 g/m2    Right Atrial Pressure (from IVC) 15 mmHg    TV rest pulmonary artery pressure 41 mmHg    Narrative    · Large circumferential pericardial effusion. Effusion is fibrinous. 2.7   cm laterally located, posterior  · Severely decreased left ventricular systolic function. The estimated   ejection fraction is 30%.  · Small left ventricular cavity size.  · Global hypokinetic wall motion.  · Moderate concentric left ventricular hypertrophy.  · Severely reduced right ventricular systolic function.  · Diastolic pattern consistent with atrial fibrillation observed.  · There is a large right pleural effusion.  · Elevated central venous pressure (15 mmHg).  · The estimated PA systolic pressure is 41 mmHg.  · Pulmonary hypertension present.  · The ascending aorta is mildly dilated.       and X-Ray: CXR: X-Ray Chest 1 View (CXR):   Results for orders placed or performed during the hospital encounter of 02/13/20   X-Ray Chest 1 View    Narrative    EXAMINATION:  XR CHEST 1 VIEW    CLINICAL HISTORY:  , S/p pericardial window, thoracostomy tube;    COMPARISON:  Comparison 02/17/2020.    FINDINGS:  Heart is stable in size.  Stable focal opacification at the left base likely related to  pleural effusion and adjacent consolidation.  No pneumothorax.      Impression    Stable chest.      Electronically signed by: Astrid Butler MD  Date:    02/18/2020  Time:    08:00    and X-Ray Chest PA and Lateral (CXR): No results found for this visit on 02/13/20.

## 2020-02-18 NOTE — PLAN OF CARE
Improved to spv all mobility; amb with RW 200ft with 1 short rest stand break; rec hhpt eval; defert o people movers for amb maintenance starting tomorrow if still in hospital; rec hhpt and RW upon d/c to home

## 2020-02-18 NOTE — PLAN OF CARE
Mar4 Post OP with Ankush Graham MD   Wednesday Mar 4, 2020 9:00 AM   Arrive at check-in approximately 15 minutes before your scheduled appointment time. Bring all outside medical records and imaging, along with a list of your current medications and insurance card. [facility]   [address] [place] [address] [address]  [place] [id number]   [phone]         02/18/20 1453   Final Note   Assessment Type Final Discharge Note   Anticipated Discharge Disposition Home-Health   Hospital Follow Up  Appt(s) scheduled? Yes   Right Care Referral Info   Post Acute Recommendation Home-care   Facility Name [facility]

## 2020-02-18 NOTE — PROGRESS NOTES
[facility] -   Cardiothoracic Surgery  Progress Note    Patient Name: Dominic Cohen  MRN: [id number]  Admission Date: 2/13/2020  Hospital Length of Stay: 5 days  Code Status: Full Code   Attending Physician: Ankush Graham MD   Referring Provider: Self, Aaareferral  Principal Problem:Pericardial effusion            Subjective:     Post-Op Info:  Procedure(s) (LRB):  CREATION, PERICARDIAL WINDOW (N/A)  INSERTION, CATHETER, INTERCOSTAL, FOR DRAINAGE (Left)   4 Days Post-Op     Interval History:  Patient is postop day 4.  Status post pericardial window and left chest tube placement.  Patient has no complaints.  No further episodes of lightheadedness.  Patient looking forward to being discharged.  ROS  Medications:  Continuous Infusions:  Scheduled Meds:   albuterol-ipratropium  3 mL Nebulization Q8H    aspirin  81 mg Oral Daily    chlorhexidine  10 mL Mouth/Throat BID    cyanocobalamin  1,000 mcg Intramuscular Q30 Days    docusate sodium  100 mg Oral Daily    famotidine  20 mg Oral BID    latanoprost  1 drop Both Eyes QHS    nozaseptin   Each Nostril BID    polyethylene glycol  17 g Oral Daily    timolol maleate 0.5%  1 drop Both Eyes BID     PRN Meds:sodium chloride, acetaminophen, albumin human 5%, guaifenesin 100 mg/5 ml, lactated ringers, metoclopramide HCl, ondansetron, ondansetron, oxyCODONE, oxyCODONE     Objective:     Vital Signs (Most Recent):  Temp: 98.3 °F (36.8 °C) (02/18/20 0734)  Pulse: 73 (02/18/20 0900)  Resp: 18 (02/18/20 0734)  BP: 115/69 (02/18/20 0734)  SpO2: (!) 93 % (02/18/20 0734) Vital Signs (24h Range):  Temp:  [98.3 °F (36.8 °C)-99.4 °F (37.4 °C)] 98.3 °F (36.8 °C)  Pulse:  [69-81] 73  Resp:  [16-20] 18  SpO2:  [93 %-96 %] 93 %  BP: ()/(52-70) 115/69     Weight: 123.2 kg (271 lb 9.7 oz)  Body mass index is 33.06 kg/m².    SpO2: (!) 93 %  O2 Device (Oxygen Therapy): room air    Intake/Output - Last 3 Shifts       02/16 0700 - 02/17 0659 02/17 0700 - 02/18  0659 02/18 0700 - 02/19 0659    P.O. 1252      I.V. (mL/kg) 1250 (10.1) 0 (0)     Total Intake(mL/kg) 2502 (20.3) 0 (0)     Urine (mL/kg/hr) 3155 (1.1)      Stool 0      Chest Tube 27      Total Output 3182      Net -680 0            Urine Occurrence  5 x     Stool Occurrence 0 x 1 x           Lines/Drains/Airways     Peripheral Intravenous Line                 Peripheral IV - Single Lumen 02/17/20 1715 22 G Anterior;Right Forearm less than 1 day                Physical Exam   Constitutional: He is oriented to person, place, and time. He appears well-developed and well-nourished. No distress.   HENT:   Head: Normocephalic and atraumatic.   Neck: Normal range of motion. Neck supple.   Cardiovascular: Normal rate.   Pulmonary/Chest: Effort normal and breath sounds normal.   Abdominal: Soft. Bowel sounds are normal.   Musculoskeletal: He exhibits edema.   Neurological: He is alert and oriented to person, place, and time.   Skin: Skin is warm and dry. He is not diaphoretic.   Psychiatric: He has a normal mood and affect.       Significant Labs:  All pertinent labs from the last 24 hours have been reviewed.    Significant Diagnostics:  I have reviewed all pertinent imaging results/findings within the past 24 hours.    Assessment/Plan:     * Pericardial effusion  The patient is an 84 year old male with large pericardial window and JOSLYN pleural effusions that presented to the [facility] emergency department with chest pain and sob, worked up for A-fib w/ RVR. Patient has HTN, atherosclerosis of the abdominal aorta, arthritis, polyneuropathy, prediabetes, venous insufficiency, gout, former smoker 0.5 ppd x 52 years. The patient with large pericardial window and JOSLYN pleural effusions is a candidate for urgent pericardial window creation with possible JOSLYN thoracostomy tube placement. The risks and benefits of surgery were explained to the patient in great detail. All questions were answered to the patient's satisfaction. The  patient has agreed to proceed with surgery. The patient will be scheduled for urgent pericardial window creation with possible JOSLYN thoracostomy tube placement on 02/14/2020.    S/P pericardial window creation  02/15/2020   Patient is postop day 1 status post pericardial window and left chest tube placement.  Total chest tube output since surgery has been about 700 mL.  Will continue chest tube to drainage for now.    02/16/2020  The patient is postop day 2.  Status post pericardial window and left chest tube placement.  Chest tube output state significant.  Continue chest tube to drainage. Chest x-ray suggests consolidation/fluid accumulation in the left chest.  Will obtain CT scan of the chest.    02/17/2020  The patient is postop day 3.  Status post pericardial window and left chest tube placement.  Chest tube output less than 5 ml. No air leak. Due to the minimal output of the chest tubes, chest x-ray suggests possible atelectasis of left lower lobe, no pneumothorax. Disontinued chest tubes x 2. A-febrile. Hemodynamically stable. Ambulating very well. Tolerating Room air well.  Anticipate discharge to home health today. Discussed with hospital medicine.     02/18/2020  The patient is postop day 4. Status post pericardial window and left chest tube placement.  Chest tubes were discontinued yesterday.  Chest x-ray has been stable.  Patient scheduled for discharge yesterday.  However patient had an episode of systolic blood pressure in the 90s.  Discharge was then postponed till today.  Patient has remained hemodynamically stable since receiving 500 mL fluid bolus.  Patient appears stable enough for discharge with home health.        Ankush Graham MD  Cardiothoracic Surgery  [facility] -

## 2020-02-19 ENCOUNTER — OFFICE VISIT (OUTPATIENT)
Dept: HEMATOLOGY/ONCOLOGY | Facility: CLINIC | Age: 85
End: 2020-02-19
Payer: MEDICARE

## 2020-02-19 ENCOUNTER — TELEPHONE (OUTPATIENT)
Dept: FAMILY MEDICINE | Facility: CLINIC | Age: 85
End: 2020-02-19

## 2020-02-19 VITALS
HEIGHT: 76 IN | DIASTOLIC BLOOD PRESSURE: 73 MMHG | HEART RATE: 76 BPM | OXYGEN SATURATION: 95 % | WEIGHT: 272.06 LBS | SYSTOLIC BLOOD PRESSURE: 134 MMHG | TEMPERATURE: 98 F | BODY MASS INDEX: 33.13 KG/M2

## 2020-02-19 DIAGNOSIS — R94.5 ABNORMAL RESULTS OF LIVER FUNCTION STUDIES: ICD-10-CM

## 2020-02-19 DIAGNOSIS — E64.0 SEQUELAE OF PROTEIN-CALORIE MALNUTRITION: ICD-10-CM

## 2020-02-19 DIAGNOSIS — D50.0 IRON DEFICIENCY ANEMIA DUE TO CHRONIC BLOOD LOSS: Primary | ICD-10-CM

## 2020-02-19 DIAGNOSIS — I31.39 PERICARDIAL EFFUSION: ICD-10-CM

## 2020-02-19 DIAGNOSIS — Z72.89 OTHER PROBLEMS RELATED TO LIFESTYLE: ICD-10-CM

## 2020-02-19 DIAGNOSIS — Z98.890 S/P PERICARDIAL WINDOW CREATION: ICD-10-CM

## 2020-02-19 DIAGNOSIS — Z11.4 ENCOUNTER FOR SCREENING FOR HUMAN IMMUNODEFICIENCY VIRUS (HIV): ICD-10-CM

## 2020-02-19 DIAGNOSIS — I50.32 DIASTOLIC DYSFUNCTION WITH CHRONIC HEART FAILURE: ICD-10-CM

## 2020-02-19 DIAGNOSIS — I48.91 ATRIAL FIBRILLATION WITH RAPID VENTRICULAR RESPONSE: ICD-10-CM

## 2020-02-19 DIAGNOSIS — D50.9 NORMOCYTIC HYPOCHROMIC ANEMIA: ICD-10-CM

## 2020-02-19 DIAGNOSIS — I70.0 ATHEROSCLEROSIS OF ABDOMINAL AORTA: Chronic | ICD-10-CM

## 2020-02-19 DIAGNOSIS — D47.2 MGUS (MONOCLONAL GAMMOPATHY OF UNKNOWN SIGNIFICANCE): ICD-10-CM

## 2020-02-19 PROCEDURE — 99999 PR PBB SHADOW E&M-EST. PATIENT-LVL III: CPT | Mod: PBBFAC,,, | Performed by: INTERNAL MEDICINE

## 2020-02-19 PROCEDURE — 99213 OFFICE O/P EST LOW 20 MIN: CPT | Mod: PBBFAC | Performed by: INTERNAL MEDICINE

## 2020-02-19 PROCEDURE — 99215 PR OFFICE/OUTPT VISIT, EST, LEVL V, 40-54 MIN: ICD-10-PCS | Mod: S$PBB,,, | Performed by: INTERNAL MEDICINE

## 2020-02-19 PROCEDURE — 99215 OFFICE O/P EST HI 40 MIN: CPT | Mod: S$PBB,,, | Performed by: INTERNAL MEDICINE

## 2020-02-19 PROCEDURE — 99999 PR PBB SHADOW E&M-EST. PATIENT-LVL III: ICD-10-PCS | Mod: PBBFAC,,, | Performed by: INTERNAL MEDICINE

## 2020-02-19 RX ORDER — SODIUM CHLORIDE 0.9 % (FLUSH) 0.9 %
10 SYRINGE (ML) INJECTION
Status: CANCELLED | OUTPATIENT
Start: 2020-03-05

## 2020-02-19 RX ORDER — HEPARIN 100 UNIT/ML
500 SYRINGE INTRAVENOUS
Status: CANCELLED | OUTPATIENT
Start: 2020-03-05

## 2020-02-19 RX ORDER — HEPARIN 100 UNIT/ML
500 SYRINGE INTRAVENOUS
Status: CANCELLED | OUTPATIENT
Start: 2020-02-19

## 2020-02-19 RX ORDER — SODIUM CHLORIDE 0.9 % (FLUSH) 0.9 %
10 SYRINGE (ML) INJECTION
Status: CANCELLED | OUTPATIENT
Start: 2020-02-19

## 2020-02-19 NOTE — TELEPHONE ENCOUNTER
2nd Attempt    Left voicemail for patient asking for call back to schedule pulmonary appointment based on referral received by pulmonary department work queue.     Please direct any possible call back from patient to Ender Syed, patient care navigator, pulmonary services.

## 2020-02-19 NOTE — PROGRESS NOTES
Subjective:       Patient ID: Dominic Cohen is a 84 y.o. male.    Chief Complaint: Anemia and Results    HPI will 84-year-old male recently discharged from hospital after pericardial window performed.  For pericardial effusion results of pleural and pericardial biopsy as well as fluid reveals no evidence of malignancy patient returns for review for evaluation of anemia    Past Medical History:   Diagnosis Date    Arthritis     Atherosclerosis of abdominal aorta     Cataract     Chronic constipation     Glaucoma     History of anal fissures     Hypertension     Polyneuropathy in other diseases classified elsewhere     due to folate deficiency    Prediabetes     Venous insufficiency     Venous insufficiency      Family History   Problem Relation Age of Onset    No Known Problems Son     No Known Problems Daughter     No Known Problems Son     Diabetes Neg Hx     Heart disease Neg Hx     Cancer Neg Hx     Celiac disease Neg Hx     Cirrhosis Neg Hx     Colon cancer Neg Hx     Colon polyps Neg Hx     Crohn's disease Neg Hx     Cystic fibrosis Neg Hx     Esophageal cancer Neg Hx     Hemochromatosis Neg Hx     Inflammatory bowel disease Neg Hx     Irritable bowel syndrome Neg Hx     Liver cancer Neg Hx     Liver disease Neg Hx     Rectal cancer Neg Hx     Stomach cancer Neg Hx     Ulcerative colitis Neg Hx     Zak's disease Neg Hx     Amblyopia Neg Hx     Blindness Neg Hx     Glaucoma Neg Hx     Hypertension Neg Hx     Macular degeneration Neg Hx     Retinal detachment Neg Hx     Strabismus Neg Hx      Social History     Socioeconomic History    Marital status:      Spouse name: Not on file    Number of children: 3    Years of education: Not on file    Highest education level: Not on file   Occupational History    Occupation: Retired     Comment: [place], [place]   Social Needs    Financial resource strain: Not on file    Food insecurity:     Worry: Not on file      Inability: Not on file    Transportation needs:     Medical: Not on file     Non-medical: Not on file   Tobacco Use    Smoking status: Former Smoker     Packs/day: 0.30     Years: 52.00     Pack years: 15.60     Types: Cigarettes     Last attempt to quit: 2000     Years since quittin.2    Smokeless tobacco: Never Used   Substance and Sexual Activity    Alcohol use: No    Drug use: No    Sexual activity: Yes   Lifestyle    Physical activity:     Days per week: Not on file     Minutes per session: Not on file    Stress: Not on file   Relationships    Social connections:     Talks on phone: Not on file     Gets together: Not on file     Attends [employer] service: Not on file     Active member of club or organization: Not on file     Attends meetings of clubs or organizations: Not on file     Relationship status: Not on file   Other Topics Concern    Not on file   Social History Narrative    Not on file     Past Surgical History:   Procedure Laterality Date    BACK SURGERY      CATARACT EXTRACTION BILATERAL W/ ANTERIOR VITRECTOMY      COLONOSCOPY N/A 2016    Procedure: COLONOSCOPY;  Surgeon: Presley Phillips MD;  Location: [address];  Service: Endoscopy;  Laterality: N/A;  EGD with Dr. Jeffery prior to Colonoscopy. EC    ESOPHAGOGASTRODUODENOSCOPY N/A 2018    Procedure: ESOPHAGOGASTRODUODENOSCOPY (EGD);  Surgeon: Khanh Asencio III, MD;  Location: [facility];  Service: Endoscopy;  Laterality: N/A;    JOINT REPLACEMENT Left     x 2    KNEE SURGERY Left     x2. revision 17    PERICARDIAL WINDOW N/A 2020    Procedure: CREATION, PERICARDIAL WINDOW;  Surgeon: Ankush Graham MD;  Location: [facility];  Service: Cardiothoracic;  Laterality: N/A;    PROSTATE SURGERY      TONSILLECTOMY, ADENOIDECTOMY      TUBE THORACOTOMY Left 2020    Procedure: INSERTION, CATHETER, INTERCOSTAL, FOR DRAINAGE;  Surgeon: Ankush Graham MD;  Location: [facility];  Service:  Cardiothoracic;  Laterality: Left;  LEFT PLEURA CHEST TUBE PLACEMENT       Labs:  Lab Results   Component Value Date    WBC 8.23 02/18/2020    HGB 10.1 (L) 02/18/2020    HCT 33.7 (L) 02/18/2020     (H) 02/18/2020     (H) 02/18/2020     BMP  Lab Results   Component Value Date     02/18/2020    K 4.2 02/18/2020     02/18/2020    CO2 31 (H) 02/18/2020    BUN 10 02/18/2020    CREATININE 1.0 02/18/2020    CALCIUM 8.8 02/18/2020    ANIONGAP 6 (L) 02/18/2020    ESTGFRAFRICA >60 02/18/2020    EGFRNONAA >60 02/18/2020     Lab Results   Component Value Date    ALT 36 02/18/2020    AST 26 02/18/2020    ALKPHOS 50 (L) 02/18/2020    BILITOT 0.6 02/18/2020       Lab Results   Component Value Date    IRON 31 (L) 01/29/2020    TIBC 253 01/29/2020    FERRITIN 691 (H) 01/29/2020     Lab Results   Component Value Date    POBURXIU84 611 01/29/2020     Lab Results   Component Value Date    FOLATE 5.8 02/09/2018     Lab Results   Component Value Date    TSH 0.337 (L) 02/13/2020         Review of Systems   Constitutional: Positive for fatigue. Negative for activity change, appetite change, chills, diaphoresis, fever and unexpected weight change.   HENT: Negative for congestion, dental problem, drooling, ear discharge, ear pain, facial swelling, hearing loss, mouth sores, nosebleeds, postnasal drip, rhinorrhea, sinus pressure, sneezing, sore throat, tinnitus, trouble swallowing and voice change.    Eyes: Negative for photophobia, pain, discharge, redness, itching and visual disturbance.   Respiratory: Negative for apnea, cough, choking, chest tightness, shortness of breath, wheezing and stridor.    Cardiovascular: Negative for chest pain, palpitations and leg swelling.   Gastrointestinal: Negative for abdominal distention, abdominal pain, anal bleeding, blood in stool, constipation, diarrhea, nausea, rectal pain and vomiting.   Endocrine: Negative for cold intolerance, heat intolerance, polydipsia, polyphagia and  polyuria.   Genitourinary: Negative for decreased urine volume, difficulty urinating, discharge, dysuria, enuresis, flank pain, frequency, genital sores, hematuria, penile pain, penile swelling, scrotal swelling, testicular pain and urgency.   Musculoskeletal: Negative for arthralgias, back pain, gait problem, joint swelling, myalgias, neck pain and neck stiffness.   Skin: Negative for color change, pallor, rash and wound.   Allergic/Immunologic: Negative for environmental allergies, food allergies and immunocompromised state.   Neurological: Positive for weakness. Negative for dizziness, tremors, seizures, syncope, facial asymmetry, speech difficulty, light-headedness, numbness and headaches.   Hematological: Negative for adenopathy. Does not bruise/bleed easily.   Psychiatric/Behavioral: Positive for dysphoric mood. Negative for agitation, behavioral problems, confusion, decreased concentration, hallucinations, self-injury, sleep disturbance and suicidal ideas. The patient is nervous/anxious. The patient is not hyperactive.        Objective:      Physical Exam   Constitutional: He is oriented to person, place, and time. He appears well-developed and well-nourished. He appears distressed.   HENT:   Head: Normocephalic.   Right Ear: External ear normal.   Left Ear: External ear normal.   Nose: Nose normal. Right sinus exhibits no maxillary sinus tenderness and no frontal sinus tenderness. Left sinus exhibits no maxillary sinus tenderness and no frontal sinus tenderness.   Mouth/Throat: Oropharynx is clear and moist. No oropharyngeal exudate.   Eyes: Pupils are equal, round, and reactive to light. EOM and lids are normal. Right eye exhibits no discharge. Left eye exhibits no discharge. Right conjunctiva is not injected. Right conjunctiva has no hemorrhage. Left conjunctiva is not injected. Left conjunctiva has no hemorrhage. No scleral icterus. Right eye exhibits normal extraocular motion. Left eye exhibits normal  extraocular motion.   Neck: Normal range of motion. Neck supple. No JVD present. No tracheal deviation present. No thyromegaly present.   Cardiovascular: Normal rate and regular rhythm.   Pulmonary/Chest: Effort normal. No stridor. No respiratory distress.   Abdominal: Soft. He exhibits no mass. There is no hepatosplenomegaly, splenomegaly or hepatomegaly. There is no tenderness.   Musculoskeletal: Normal range of motion. He exhibits no edema or tenderness.   Lymphadenopathy:        Head (right side): No posterior auricular and no occipital adenopathy present.        Head (left side): No posterior auricular and no occipital adenopathy present.     He has no cervical adenopathy.        Right cervical: No superficial cervical, no deep cervical and no posterior cervical adenopathy present.       Left cervical: No superficial cervical, no deep cervical and no posterior cervical adenopathy present.     He has no axillary adenopathy.        Right: No supraclavicular adenopathy present.        Left: No supraclavicular adenopathy present.   Neurological: He is alert and oriented to person, place, and time. He has normal strength. No cranial nerve deficit. Coordination normal.   Skin: Skin is dry. No rash noted. He is not diaphoretic. No cyanosis or erythema. Nails show no clubbing.   Psychiatric: He has a normal mood and affect. His behavior is normal. Judgment and thought content normal. Cognition and memory are normal.   Vitals reviewed.          Assessment:      1. Iron deficiency anemia due to chronic blood loss    2. Normocytic hypochromic anemia    3. Abnormal results of liver function studies     4. Encounter for screening for human immunodeficiency virus (HIV)     5. Other problems related to lifestyle     6. Sequelae of protein-calorie malnutrition     7. Atherosclerosis of abdominal aorta    8. Diastolic dysfunction with chronic heart failure    9. Atrial fibrillation with rapid ventricular response    10.  Pericardial effusion    11. S/P pericardial window creation           Plan:     History of documented iron deficiency anemia intolerant of oral iron patient is try oral medications in the past with constipation upset stomach will treat with intravenous iron return in 3 months with CBC iron status and free light chain prior.  Low level MGUS discussed implications with patient answered questions review starts total time spent 40 min face-to-face time coordination of care in review of previous records from hospitalization previous nurse practitioner's notes greater than 50% time face-to-face with patient        Tahir Barrios Jr, MD FACP

## 2020-02-20 ENCOUNTER — PATIENT OUTREACH (OUTPATIENT)
Dept: ADMINISTRATIVE | Facility: CLINIC | Age: 85
End: 2020-02-20

## 2020-02-20 DIAGNOSIS — I30.9 PERICARDIAL EFFUSION, ACUTE: Primary | ICD-10-CM

## 2020-02-20 DIAGNOSIS — Z98.890 STATUS POST CREATION OF PERICARDIAL WINDOW: ICD-10-CM

## 2020-02-20 NOTE — PATIENT INSTRUCTIONS
Heart Failure: Warning Signs of a Flare-Up  You have a condition called heart failure. Once you have heart failure, flare-ups can happen. Below are signs that can mean your heart failure is getting worse. If you notice any of these warning signs, call your healthcare provider.  Swelling    · Your feet, ankles, or lower legs get puffier.  · You notice skin changes on your lower legs.  · Your shoes feel too tight.  · Your clothes are tighter in the waist.  · You have trouble getting rings on or off your fingers.  Shortness of breath  · You have to breathe harder even when youre doing your normal activities or when youre resting.  · You are short of breath walking up stairs or even short distances.  · You wake up at night short of breath or coughing.  · You need to use more pillows or sit up to sleep.  · You wake up tired or restless.  Other warning signs  · You feel weaker, dizzy, or more tired.  · You have chest pain or changes in your heartbeat.  · You have a cough that wont go away.  · You cant remember things or dont feel like eating.  Tracking your weight  Gaining weight is often the first warning sign that heart failure is getting worse. Gaining even a few pounds can be a sign that your body is retaining excess water and salt. Weighing yourself each day in the morning after you urinate and before you eat, is the best way to know if you're retaining water. Get a scale that is easy to read and make sure you wear the same clothes and use the same scale every time you weigh. Your healthcare provider will show you how to track your weight. Call your doctor if you gain more than 2 pounds in 1 day, 5 pounds in 1 week, or whatever weight gain you were told to report by your doctor. This is often a sign of worsening heart failure and needs to be evaluated and treated before it compromises your breathing. Your doctor will tell you what to do next.    Date Last Reviewed: 3/15/2016  © 1091-1013 [employer] [address] [place] [address]. All rights reserved. This information is not intended as a substitute for professional medical care. Always follow your healthcare professional's instructions.

## 2020-02-20 NOTE — PROGRESS NOTES
Mago Dominguez RN attempted to contact patient. No answer. The following message was left for the patient to return the call:  Good morning  I am a nurse calling on behalf of [facility] from the [facility]ination [facility].  This is a Transitional Care Call for [facility]. When you have a moment please contact us at [phone] or [phone] Monday through Friday, between the hours of 8 am to 4 pm. We look forward to speaking with you. On behalf of [facility] have a nice day.    The patient has a scheduled POSTOP appointment with Dr Ankush Graham in [facility] on 3/4/20 @ 0900hrs. Message sent to PCP staff.

## 2020-02-21 ENCOUNTER — TELEPHONE (OUTPATIENT)
Dept: ENDOSCOPY | Facility: HOSPITAL | Age: 85
End: 2020-02-21

## 2020-02-21 ENCOUNTER — OUTPATIENT CASE MANAGEMENT (OUTPATIENT)
Dept: ADMINISTRATIVE | Facility: OTHER | Age: 85
End: 2020-02-21

## 2020-02-21 NOTE — PROGRESS NOTES
Outpatient Care Management  Initial Patient Assessment    Patient: Dominic Cohen  MRN: [id number]  Date of Service: 02/21/2020  Completed by: Tamika Magana RN  Referral Date: 02/17/2020  Program: Case Management (High Risk)    Reason for Visit   Patient presents with    OPCM RN First Assessment Attempt     Declined    Case Closure       Brief Summary: Phone contact made with pt and OPCM explained. Pt reports he is being followed by home health since recent hospital d/c, including SN and PT and his son and neighbor assist him with managing his health care needs in addition to home health. He denies need for OPCM screen and advised he will contact this CM in the future if he has any health related needs or questions.     Assessment Documentation     OPCM Initial Assessment    Involvement of Care  Patient Reported Insurance  Current Health Status  Patient Health Rating  Patient Reported Labs & Vitals  Social Determinants  Advanced Care Planning  Support  Housing  Access to [employer]  Clinical Assessment  Medication Adherence  *Active medication list was reviewed and reconciled with patient and/or caregiver:    Nutritional Status  Labs  PHQ Depression Screen  Cognitive/Behavioral Health  Culture/Voodoo  Communication  Health Literacy  Activities of Daily Living  Fall Risk  Equipment/Current Services  Community & Government Programs  Community Resource Assessment  Completion of Initial Assessment         Problem List and History     Patient Active Problem List   Diagnosis    Dermatochalasis    Arthritis    Hypertension    Venous insufficiency    Afferent pupillary defect - Left Eye    Senile nuclear sclerosis - Both Eyes    Ptosis - Both Eyes    Primary open angle glaucoma of both eyes, severe stage    B12 deficiency    H/O prostatectomy    Prediabetes    Atherosclerosis of abdominal aorta    Chronic constipation    Vertigo    Tinnitus    Anemia    Obesity (BMI 30-39.9)     Special screening for malignant neoplasms, colon    Fall    Dyslipidemia    Hoarseness    Change in stool    Failed total left knee replacement    Abnormal ECG    LAFB (left anterior fascicular block)    Preop cardiovascular exam    Left knee pain    Acute blood loss anemia    Postoperative state    Localized edema    Carpal tunnel syndrome of left wrist    Left carpal tunnel syndrome    Trigger finger of left hand    Stage 2 chronic kidney disease    Gout    Hoarseness of voice    Class 2 severe obesity with serious comorbidity in adult    Bradycardia, severe sinus    Leg swelling    Diastolic dysfunction with chronic heart failure    Nuclear sclerosis of both eyes    Neck pain    Skin lesions    Atrial fibrillation with rapid ventricular response    Pericardial effusion    Bilateral pleural effusion    S/P pericardial window creation    Acute systolic CHF (congestive heart failure)    Pneumothorax on left    Iron deficiency anemia due to chronic blood loss    MGUS (monoclonal gammopathy of unknown significance)       Reviewed Active Problem List with patient and/or Caregiver. The following were identified as areas of need: N/A    Medical History:  Reviewed medical history with patient and/or caregiver    Social History:  Reviewed social history with patient and/or caregiver    Complex Care Plan    Care plan was discussed and completed today with input from patient and/or caregiver.    Goals    None         Patient Instructions     Instructions were provided via the [employer] patient resources and are available for the patient to view on the patient portal, if active.    Next steps: No further contact. Tamika Magana RN    No follow-ups on file.    Todays OPCM Self-Management Care Plan was developed with the patients/caregivers input and was based on identified barriers from todays assessment.  Goals were written today with the patient/caregiver and the patient has agreed to work  towards these goals to improve his/her overall well-being. Patient verbalized understanding of the care plan, goals, and all of today's instructions. Encouraged patient/caregiver to communicate with his/her physician and health care team about health conditions and the treatment plan.  Provided my contact information today and encouraged patient/caregiver to call me with any questions as needed.

## 2020-02-24 ENCOUNTER — TELEPHONE (OUTPATIENT)
Dept: FAMILY MEDICINE | Facility: CLINIC | Age: 85
End: 2020-02-24

## 2020-02-24 LAB
BACTERIA SPEC ANAEROBE CULT: NORMAL
BACTERIA SPEC ANAEROBE CULT: NORMAL

## 2020-02-24 NOTE — TELEPHONE ENCOUNTER
Letter encouraging patient to contact me to schedule appointment mailed to patient at address on file in patient demographics. Referral removed from department work queue as such. Referral is still, however, available for scheduling.    Any subsequent call backs from patient may be directed to Ender Syed patient care navigator, for pulmonary services department.

## 2020-02-24 NOTE — TELEPHONE ENCOUNTER
Pt with amedysis---      Pt complaining with distal left lower extremity pain  2+ pitting edema   Tenderness along medial aspect of tibia   No metarsal warmth   Going is cool  With good pulse  Increase pain with weight bearing   Denies injury

## 2020-02-24 NOTE — TELEPHONE ENCOUNTER
3rd Attempt    Left voicemail for patient asking for call back to schedule pulmonary appointment based on referral received by pulmonary department work queue.     Please direct any possible call back from patient to Ender Syed, patient care navigator, pulmonary services.

## 2020-02-27 ENCOUNTER — OFFICE VISIT (OUTPATIENT)
Dept: CARDIOLOGY | Facility: CLINIC | Age: 85
End: 2020-02-27
Payer: MEDICARE

## 2020-02-27 ENCOUNTER — TELEPHONE (OUTPATIENT)
Dept: FAMILY MEDICINE | Facility: CLINIC | Age: 85
End: 2020-02-27

## 2020-02-27 ENCOUNTER — HOSPITAL ENCOUNTER (OUTPATIENT)
Dept: RADIOLOGY | Facility: HOSPITAL | Age: 85
Discharge: HOME | End: 2020-02-27
Attending: INTERNAL MEDICINE
Payer: MEDICARE

## 2020-02-27 ENCOUNTER — OFFICE VISIT (OUTPATIENT)
Dept: FAMILY MEDICINE | Facility: CLINIC | Age: 85
End: 2020-02-27
Payer: MEDICARE

## 2020-02-27 VITALS
WEIGHT: 272.06 LBS | SYSTOLIC BLOOD PRESSURE: 118 MMHG | OXYGEN SATURATION: 83 % | BODY MASS INDEX: 33.11 KG/M2 | DIASTOLIC BLOOD PRESSURE: 64 MMHG | HEART RATE: 85 BPM

## 2020-02-27 VITALS
OXYGEN SATURATION: 95 % | SYSTOLIC BLOOD PRESSURE: 127 MMHG | HEART RATE: 88 BPM | RESPIRATION RATE: 16 BRPM | DIASTOLIC BLOOD PRESSURE: 71 MMHG | BODY MASS INDEX: 33.11 KG/M2 | WEIGHT: 272.06 LBS | TEMPERATURE: 99 F

## 2020-02-27 DIAGNOSIS — Z09 HOSPITAL DISCHARGE FOLLOW-UP: Primary | ICD-10-CM

## 2020-02-27 DIAGNOSIS — I48.91 ATRIAL FIBRILLATION WITH RAPID VENTRICULAR RESPONSE: ICD-10-CM

## 2020-02-27 DIAGNOSIS — M25.571 BILATERAL ANKLE PAIN, UNSPECIFIED CHRONICITY: ICD-10-CM

## 2020-02-27 DIAGNOSIS — I31.39 PERICARDIAL EFFUSION: ICD-10-CM

## 2020-02-27 DIAGNOSIS — I50.21 ACUTE SYSTOLIC CHF (CONGESTIVE HEART FAILURE): ICD-10-CM

## 2020-02-27 DIAGNOSIS — Z09 HOSPITAL DISCHARGE FOLLOW-UP: ICD-10-CM

## 2020-02-27 DIAGNOSIS — I73.9 PVD (PERIPHERAL VASCULAR DISEASE): ICD-10-CM

## 2020-02-27 DIAGNOSIS — Z98.890 S/P PERICARDIAL WINDOW CREATION: Primary | ICD-10-CM

## 2020-02-27 DIAGNOSIS — N39.0 URINARY TRACT INFECTION WITHOUT HEMATURIA, SITE UNSPECIFIED: ICD-10-CM

## 2020-02-27 DIAGNOSIS — Z98.890 S/P PERICARDIAL WINDOW CREATION: ICD-10-CM

## 2020-02-27 DIAGNOSIS — M25.572 BILATERAL ANKLE PAIN, UNSPECIFIED CHRONICITY: ICD-10-CM

## 2020-02-27 PROBLEM — I50.22 CHRONIC SYSTOLIC CONGESTIVE HEART FAILURE: Status: ACTIVE | Noted: 2020-02-27

## 2020-02-27 PROCEDURE — 99214 OFFICE O/P EST MOD 30 MIN: CPT | Mod: S$PBB,,, | Performed by: INTERNAL MEDICINE

## 2020-02-27 PROCEDURE — 99999 PR PBB SHADOW E&M-EST. PATIENT-LVL III: CPT | Mod: PBBFAC,,, | Performed by: INTERNAL MEDICINE

## 2020-02-27 PROCEDURE — 99214 PR OFFICE/OUTPT VISIT, EST, LEVL IV, 30-39 MIN: ICD-10-PCS | Mod: S$PBB,,, | Performed by: INTERNAL MEDICINE

## 2020-02-27 PROCEDURE — 73630 XR FOOT COMPLETE 3 VIEW BILATERAL: ICD-10-PCS | Mod: 26,50,, | Performed by: RADIOLOGY

## 2020-02-27 PROCEDURE — 73610 X-RAY EXAM OF ANKLE: CPT | Mod: 26,50,, | Performed by: RADIOLOGY

## 2020-02-27 PROCEDURE — 73610 X-RAY EXAM OF ANKLE: CPT | Mod: TC,50,FY,PO

## 2020-02-27 PROCEDURE — 99213 OFFICE O/P EST LOW 20 MIN: CPT | Mod: PBBFAC,25 | Performed by: INTERNAL MEDICINE

## 2020-02-27 PROCEDURE — 73630 X-RAY EXAM OF FOOT: CPT | Mod: 26,50,, | Performed by: RADIOLOGY

## 2020-02-27 PROCEDURE — 71046 XR CHEST PA AND LATERAL: ICD-10-PCS | Mod: 26,,, | Performed by: RADIOLOGY

## 2020-02-27 PROCEDURE — 71046 X-RAY EXAM CHEST 2 VIEWS: CPT | Mod: 26,,, | Performed by: RADIOLOGY

## 2020-02-27 PROCEDURE — 99999 PR PBB SHADOW E&M-EST. PATIENT-LVL V: ICD-10-PCS | Mod: PBBFAC,,, | Performed by: INTERNAL MEDICINE

## 2020-02-27 PROCEDURE — 99999 PR PBB SHADOW E&M-EST. PATIENT-LVL V: CPT | Mod: PBBFAC,,, | Performed by: INTERNAL MEDICINE

## 2020-02-27 PROCEDURE — 71046 X-RAY EXAM CHEST 2 VIEWS: CPT | Mod: TC,FY,PO

## 2020-02-27 PROCEDURE — 73610 XR ANKLE COMPLETE 3 VIEW BILATERAL: ICD-10-PCS | Mod: 26,50,, | Performed by: RADIOLOGY

## 2020-02-27 PROCEDURE — 99215 OFFICE O/P EST HI 40 MIN: CPT | Mod: PBBFAC,25,27,PO | Performed by: INTERNAL MEDICINE

## 2020-02-27 PROCEDURE — 73630 X-RAY EXAM OF FOOT: CPT | Mod: TC,50,FY,PO

## 2020-02-27 PROCEDURE — 99999 PR PBB SHADOW E&M-EST. PATIENT-LVL III: ICD-10-PCS | Mod: PBBFAC,,, | Performed by: INTERNAL MEDICINE

## 2020-02-27 RX ORDER — FUROSEMIDE 40 MG/1
40 TABLET ORAL
Qty: 13 TABLET | Refills: 11 | Status: SHIPPED | OUTPATIENT
Start: 2020-02-28 | End: 2021-02-22

## 2020-02-27 NOTE — TELEPHONE ENCOUNTER
cxr is improved from the last one but still with some fluid in left lung-----------xray ankles /feet show deg changes/arthritis-

## 2020-02-27 NOTE — LETTER
February 27, 2020      Sudhakar Liu MD  [address] [place] [address]           [facility]  [address] [facility]  [place] [address]  Phone: [phone]  Fax: [phone]          Patient: Dominic Cohen   MR Number: [id number]   YOB: 1935   Date of Visit: 2/27/2020       Dear Dr. Sudhakar Liu:    Thank you for referring Dominic Cohen to me for evaluation. Attached you will find relevant portions of my assessment and plan of care.    If you have questions, please do not hesitate to call me. I look forward to following Dominic Cohen along with you.    Sincerely,    Jose Alejandro Marshall MD    Enclosure  CC:  No Recipients    If you would like to receive this communication electronically, please contact [email or web address] or [phone] to request more information on "[employer] Link access.    For providers and/or their staff who would like to refer a patient to [facility], please contact us through our one-stop-shop provider referral line, [facility], at [phone].    If you feel you have received this communication in error or would no longer like to receive these types of communications, please e-mail [email or web address]

## 2020-02-27 NOTE — PROGRESS NOTES
Subjective:   Patient ID:  Dominic Cohen is a 84 y.o. male who presents for follow up of Dizziness and Hypertension      83 yo male, hospital f/u.  PMH large pericardial effusion, PAF after efusiob, pleural effusion with ateleiosis, HTN, PVD and OA s/p knee repalcement. No smoking/drinking. No h/o heart attack, stroke and DM.  EKG on 11/13 sinus and HR at 57 bpm.  ECHO in 2015 normal EF and DD.  HGB and BNP wnl   admitted after cough, sputum and SOB.  CTA of chest showed large pericardial effusion and bilateral pleural effusions. EKG showed afib with RVR. Echo showed EF 55% (reviewed by myself) and moderate to large pericardial effusion. Had pericardial window with serous and later clear liquid. BNP and troponin negative, d/c after improved symptoms.    He had exercise stress echo done in  at [facility] showing METS 4 and normal EF. No stress induced ischemia  Today, c/o some dizziness. No faint and syncope.  SOb stable, walking from the parking to the .   Has chronic leg swelling         Past Medical History:   Diagnosis Date    Arthritis     Atherosclerosis of abdominal aorta     Cataract     CHF (congestive heart failure)     Chronic constipation     Glaucoma     History of anal fissures     Hypertension     Polyneuropathy in other diseases classified elsewhere     due to folate deficiency    Prediabetes     Venous insufficiency     Venous insufficiency        Past Surgical History:   Procedure Laterality Date    BACK SURGERY      CATARACT EXTRACTION BILATERAL W/ ANTERIOR VITRECTOMY      COLONOSCOPY N/A 5/13/2016    Procedure: COLONOSCOPY;  Surgeon: Presley Phillips MD;  Location: [address]);  Service: Endoscopy;  Laterality: N/A;  EGD with Dr. Jeffery prior to Colonoscopy. EC    ESOPHAGOGASTRODUODENOSCOPY N/A 7/5/2018    Procedure: ESOPHAGOGASTRODUODENOSCOPY (EGD);  Surgeon: Khanh Asencio III, MD;  Location: [facility];  Service: Endoscopy;  Laterality: N/A;     JOINT REPLACEMENT Left     x 2    KNEE SURGERY Left     x2. revision 17    PERICARDIAL WINDOW N/A 2020    Procedure: CREATION, PERICARDIAL WINDOW;  Surgeon: Ankush Graham MD;  Location: [facility] OR;  Service: Cardiothoracic;  Laterality: N/A;    PROSTATE SURGERY      TONSILLECTOMY, ADENOIDECTOMY      TUBE THORACOTOMY Left 2020    Procedure: INSERTION, CATHETER, INTERCOSTAL, FOR DRAINAGE;  Surgeon: Ankush Graham MD;  Location: [facility] OR;  Service: Cardiothoracic;  Laterality: Left;  LEFT PLEURA CHEST TUBE PLACEMENT       Social History     Tobacco Use    Smoking status: Former Smoker     Packs/day: 0.30     Years: 52.00     Pack years: 15.60     Types: Cigarettes     Last attempt to quit: 2000     Years since quittin.3    Smokeless tobacco: Never Used   Substance Use Topics    Alcohol use: No    Drug use: No       Family History   Problem Relation Age of Onset    No Known Problems Son     No Known Problems Daughter     No Known Problems Son     Diabetes Neg Hx     Heart disease Neg Hx     Cancer Neg Hx     Celiac disease Neg Hx     Cirrhosis Neg Hx     Colon cancer Neg Hx     Colon polyps Neg Hx     Crohn's disease Neg Hx     Cystic fibrosis Neg Hx     Esophageal cancer Neg Hx     Hemochromatosis Neg Hx     Inflammatory bowel disease Neg Hx     Irritable bowel syndrome Neg Hx     Liver cancer Neg Hx     Liver disease Neg Hx     Rectal cancer Neg Hx     Stomach cancer Neg Hx     Ulcerative colitis Neg Hx     Zak's disease Neg Hx     Amblyopia Neg Hx     Blindness Neg Hx     Glaucoma Neg Hx     Hypertension Neg Hx     Macular degeneration Neg Hx     Retinal detachment Neg Hx     Strabismus Neg Hx          Review of Systems   Constitution: Negative for decreased appetite, diaphoresis, fever, malaise/fatigue and night sweats.   HENT: Negative for nosebleeds.    Eyes: Negative for blurred vision and double vision.   Cardiovascular: Positive for  dyspnea on exertion and leg swelling. Negative for chest pain, claudication, irregular heartbeat, near-syncope, orthopnea, palpitations, paroxysmal nocturnal dyspnea and syncope.   Respiratory: Negative for cough, shortness of breath, sleep disturbances due to breathing, snoring, sputum production and wheezing.    Endocrine: Negative for cold intolerance and polyuria.   Hematologic/Lymphatic: Does not bruise/bleed easily.   Skin: Negative for rash.   Musculoskeletal: Negative for back pain, falls, joint pain, joint swelling and neck pain.   Gastrointestinal: Negative for abdominal pain, heartburn, nausea and vomiting.   Genitourinary: Negative for dysuria, frequency and hematuria.   Neurological: Negative for difficulty with concentration, dizziness, focal weakness, headaches, light-headedness, numbness, seizures and weakness.   Psychiatric/Behavioral: Negative for depression, memory loss and substance abuse. The patient does not have insomnia.    Allergic/Immunologic: Negative for HIV exposure and hives.       Objective:   Physical Exam   Constitutional: He is oriented to person, place, and time. He appears well-nourished.   HENT:   Head: Normocephalic.   Eyes: Pupils are equal, round, and reactive to light.   Neck: Normal carotid pulses and no JVD present. Carotid bruit is not present. No thyromegaly present.   Cardiovascular: Normal rate, regular rhythm, normal heart sounds and normal pulses.  No extrasystoles are present. PMI is not displaced. Exam reveals no gallop and no S3.   No murmur heard.  Pulmonary/Chest: Breath sounds normal. No stridor. No respiratory distress.   Abdominal: Soft. Bowel sounds are normal. There is no tenderness. There is no rebound.   Musculoskeletal: Normal range of motion. He exhibits edema.   1+ BLE   Neurological: He is alert and oriented to person, place, and time.   Skin: Skin is intact. No rash noted.   Psychiatric: His behavior is normal.       Lab Results   Component Value Date     CHOL 115 (L) 02/03/2020    CHOL 169 10/11/2018    CHOL 158 11/09/2017     Lab Results   Component Value Date    HDL 43 02/03/2020    HDL 45 10/11/2018    HDL 48 11/09/2017     Lab Results   Component Value Date    LDLCALC 59.6 (L) 02/03/2020    LDLCALC 101.2 10/11/2018    LDLCALC 86.4 11/09/2017     Lab Results   Component Value Date    TRIG 62 02/03/2020    TRIG 114 10/11/2018    TRIG 118 11/09/2017     Lab Results   Component Value Date    CHOLHDL 37.4 02/03/2020    CHOLHDL 26.6 10/11/2018    CHOLHDL 30.4 11/09/2017       Chemistry        Component Value Date/Time     02/18/2020 0446    K 4.2 02/18/2020 0446     02/18/2020 0446    CO2 31 (H) 02/18/2020 0446    BUN 10 02/18/2020 0446    CREATININE 1.0 02/18/2020 0446     02/18/2020 0446        Component Value Date/Time    CALCIUM 8.8 02/18/2020 0446    ALKPHOS 50 (L) 02/18/2020 0446    AST 26 02/18/2020 0446    ALT 36 02/18/2020 0446    BILITOT 0.6 02/18/2020 0446    ESTGFRAFRICA >60 02/18/2020 0446    EGFRNONAA >60 02/18/2020 0446          Lab Results   Component Value Date    HGBA1C 5.5 06/07/2018     Lab Results   Component Value Date    TSH 0.337 (L) 02/13/2020     Lab Results   Component Value Date    INR 1.3 (H) 02/15/2020    INR 1.3 (H) 02/14/2020    INR 1.3 (H) 02/13/2020     Lab Results   Component Value Date    WBC 8.23 02/18/2020    HGB 10.1 (L) 02/18/2020    HCT 33.7 (L) 02/18/2020     (H) 02/18/2020     (H) 02/18/2020     BMP  Sodium   Date Value Ref Range Status   02/18/2020 140 136 - 145 mmol/L Final     Potassium   Date Value Ref Range Status   02/18/2020 4.2 3.5 - 5.1 mmol/L Final     Chloride   Date Value Ref Range Status   02/18/2020 103 95 - 110 mmol/L Final     CO2   Date Value Ref Range Status   02/18/2020 31 (H) 23 - 29 mmol/L Final     BUN, Bld   Date Value Ref Range Status   02/18/2020 10 8 - 23 mg/dL Final     Creatinine   Date Value Ref Range Status   02/18/2020 1.0 0.5 - 1.4 mg/dL Final     Calcium    Date Value Ref Range Status   02/18/2020 8.8 8.7 - 10.5 mg/dL Final     Anion Gap   Date Value Ref Range Status   02/18/2020 6 (L) 8 - 16 mmol/L Final     eGFR if    Date Value Ref Range Status   02/18/2020 >60 >60 mL/min/1.73 m^2 Final     eGFR if non    Date Value Ref Range Status   02/18/2020 >60 >60 mL/min/1.73 m^2 Final     Comment:     Calculation used to obtain the estimated glomerular filtration  rate (eGFR) is the CKD-EPI equation.        BNP  @LABRCNTIP(BNP,BNPTRIAGEBLO)@  @LABRCNTIP(troponini)@  CrCl cannot be calculated (Patient's most recent lab result is older than the maximum 7 days allowed.).  No results found in the last 24 hours.  X-ray Chest Pa And Lateral    Result Date: 2/27/2020  As above Electronically signed by: Titi Childress DO Date:    02/27/2020 Time:    13:03    No results found in the last 24 hours.    Assessment:      1. S/P pericardial window creation    2. Hospital discharge follow-up    3. Atrial fibrillation with rapid ventricular response    4. Pericardial effusion    5. PVD (peripheral vascular disease)      Pericardial effusion from infection, resolved  Chronic PVD  Plan:   Repeat echo in 2 months for pericardial effusion  Add Lasix 40 mg on MWF prn for leg swelling  Counseled DASH  Recommend heart-healthy diet, weight control and regular exercise.  Gordon. Risk modification.   RTC in 1 yr sooner if indicated    I have reviewed all pertinent labs and cardiac studies. Plans and recommendations have been formulated under my direct supervision. All questions answered and patient voiced understanding. Patient to continue current medications.

## 2020-02-27 NOTE — PROGRESS NOTES
Subjective:       Patient ID: Dominic Cohen is a 84 y.o. male.    Chief Complaint: Hospital Follow Up; Atrial Fibrillation; Congestive Heart Failure; Pleural Effusion; and Shortness of Breath    [facility] hospital f/u--------for afib with chf---pericardial effusion --drained .   Pleural effusion---drained-/.--------feels better today-------still with sob with exertion and leg swelling------    Atrial Fibrillation   Symptoms include shortness of breath. Symptoms are negative for chest pain, dizziness, palpitations and weakness. Past medical history includes atrial fibrillation and CHF.   Congestive Heart Failure   Associated symptoms include shortness of breath. Pertinent negatives include no abdominal pain, chest pain or palpitations.   Pleural Effusion   Pertinent negatives include no abdominal pain, chest pain, chills, coughing, fever, nausea, numbness, vomiting or weakness.   Shortness of Breath   Associated symptoms include leg swelling. Pertinent negatives include no abdominal pain, chest pain, fever, vomiting or wheezing.     Past Medical History:   Diagnosis Date    Arthritis     Atherosclerosis of abdominal aorta     Cataract     CHF (congestive heart failure)     Chronic constipation     Glaucoma     History of anal fissures     Hypertension     Polyneuropathy in other diseases classified elsewhere     due to folate deficiency    Prediabetes     Venous insufficiency     Venous insufficiency      Past Surgical History:   Procedure Laterality Date    BACK SURGERY      CATARACT EXTRACTION BILATERAL W/ ANTERIOR VITRECTOMY      COLONOSCOPY N/A 5/13/2016    Procedure: COLONOSCOPY;  Surgeon: Presley Phillips MD;  Location: [address];  Service: Endoscopy;  Laterality: N/A;  EGD with Dr. Jeffery prior to Colonoscopy. EC    ESOPHAGOGASTRODUODENOSCOPY N/A 7/5/2018    Procedure: ESOPHAGOGASTRODUODENOSCOPY (EGD);  Surgeon: Khanh Asencio III, MD;  Location: [facility];  Service:  Endoscopy;  Laterality: N/A;    JOINT REPLACEMENT Left     x 2    KNEE SURGERY Left     x2. revision 06/27/17    PERICARDIAL WINDOW N/A 2/14/2020    Procedure: CREATION, PERICARDIAL WINDOW;  Surgeon: Ankush Graham MD;  Location: [facility] OR;  Service: Cardiothoracic;  Laterality: N/A;    PROSTATE SURGERY      TONSILLECTOMY, ADENOIDECTOMY      TUBE THORACOTOMY Left 2/14/2020    Procedure: INSERTION, CATHETER, INTERCOSTAL, FOR DRAINAGE;  Surgeon: Ankush Graham MD;  Location: [facility] OR;  Service: Cardiothoracic;  Laterality: Left;  LEFT PLEURA CHEST TUBE PLACEMENT     Family History   Problem Relation Age of Onset    No Known Problems Son     No Known Problems Daughter     No Known Problems Son     Diabetes Neg Hx     Heart disease Neg Hx     Cancer Neg Hx     Celiac disease Neg Hx     Cirrhosis Neg Hx     Colon cancer Neg Hx     Colon polyps Neg Hx     Crohn's disease Neg Hx     Cystic fibrosis Neg Hx     Esophageal cancer Neg Hx     Hemochromatosis Neg Hx     Inflammatory bowel disease Neg Hx     Irritable bowel syndrome Neg Hx     Liver cancer Neg Hx     Liver disease Neg Hx     Rectal cancer Neg Hx     Stomach cancer Neg Hx     Ulcerative colitis Neg Hx     Zak's disease Neg Hx     Amblyopia Neg Hx     Blindness Neg Hx     Glaucoma Neg Hx     Hypertension Neg Hx     Macular degeneration Neg Hx     Retinal detachment Neg Hx     Strabismus Neg Hx      Social History     Socioeconomic History    Marital status:      Spouse name: Not on file    Number of children: 3    Years of education: Not on file    Highest education level: Not on file   Occupational History    Occupation: Retired     Comment: [place]   Social Needs    Financial resource strain: Not on file    Food insecurity:     Worry: Not on file     Inability: Not on file    Transportation needs:     Medical: Not on file     Non-medical: Not on file   Tobacco Use    Smoking status: Former Smoker      Packs/day: 0.30     Years: 52.00     Pack years: 15.60     Types: Cigarettes     Last attempt to quit: 2000     Years since quittin.3    Smokeless tobacco: Never Used   Substance and Sexual Activity    Alcohol use: No    Drug use: No    Sexual activity: Yes   Lifestyle    Physical activity:     Days per week: Not on file     Minutes per session: Not on file    Stress: Not on file   Relationships    Social connections:     Talks on phone: Not on file     Gets together: Not on file     Attends Rastafari service: Not on file     Active member of club or organization: Not on file     Attends meetings of clubs or organizations: Not on file     Relationship status: Not on file   Other Topics Concern    Not on file   Social History Narrative    Not on file     Review of Systems   Constitutional: Negative for chills and fever.   HENT: Negative.    Respiratory: Positive for shortness of breath. Negative for apnea, cough, choking, chest tightness, wheezing and stridor.    Cardiovascular: Positive for leg swelling. Negative for chest pain and palpitations.   Gastrointestinal: Negative for abdominal pain, nausea and vomiting.   Genitourinary: Negative for dysuria and hematuria.   Neurological: Negative for dizziness, weakness, light-headedness and numbness.   Psychiatric/Behavioral: Negative for agitation, behavioral problems and confusion.       Objective:      Physical Exam   Constitutional: He is oriented to person, place, and time. He appears well-developed and well-nourished.   Cardiovascular: Normal rate, regular rhythm, normal heart sounds and intact distal pulses.   Pulmonary/Chest: Effort normal. He has rales.   Abdominal: Soft. Bowel sounds are normal.   Neurological: He is alert and oriented to person, place, and time.   Skin: Skin is warm and dry.   Nursing note and vitals reviewed.      CMP  Sodium   Date Value Ref Range Status   2020 140 136 - 145 mmol/L Final     Potassium   Date Value  Ref Range Status   02/18/2020 4.2 3.5 - 5.1 mmol/L Final     Chloride   Date Value Ref Range Status   02/18/2020 103 95 - 110 mmol/L Final     CO2   Date Value Ref Range Status   02/18/2020 31 (H) 23 - 29 mmol/L Final     Glucose   Date Value Ref Range Status   02/18/2020 103 70 - 110 mg/dL Final     BUN, Bld   Date Value Ref Range Status   02/18/2020 10 8 - 23 mg/dL Final     Creatinine   Date Value Ref Range Status   02/18/2020 1.0 0.5 - 1.4 mg/dL Final     Calcium   Date Value Ref Range Status   02/18/2020 8.8 8.7 - 10.5 mg/dL Final     Total Protein   Date Value Ref Range Status   02/18/2020 6.5 6.0 - 8.4 g/dL Final     Albumin   Date Value Ref Range Status   02/18/2020 2.1 (L) 3.5 - 5.2 g/dL Final     Total Bilirubin   Date Value Ref Range Status   02/18/2020 0.6 0.1 - 1.0 mg/dL Final     Comment:     For infants and newborns, interpretation of results should be based  on gestational age, weight and in agreement with clinical  observations.  Premature Infant recommended reference ranges:  Up to 24 hours.............<8.0 mg/dL  Up to 48 hours............<12.0 mg/dL  3-5 days..................<15.0 mg/dL  6-29 days.................<15.0 mg/dL       Alkaline Phosphatase   Date Value Ref Range Status   02/18/2020 50 (L) 55 - 135 U/L Final     AST   Date Value Ref Range Status   02/18/2020 26 10 - 40 U/L Final     ALT   Date Value Ref Range Status   02/18/2020 36 10 - 44 U/L Final     Anion Gap   Date Value Ref Range Status   02/18/2020 6 (L) 8 - 16 mmol/L Final     eGFR if    Date Value Ref Range Status   02/18/2020 >60 >60 mL/min/1.73 m^2 Final     eGFR if non    Date Value Ref Range Status   02/18/2020 >60 >60 mL/min/1.73 m^2 Final     Comment:     Calculation used to obtain the estimated glomerular filtration  rate (eGFR) is the CKD-EPI equation.        Lab Results   Component Value Date    WBC 8.23 02/18/2020    HGB 10.1 (L) 02/18/2020    HCT 33.7 (L) 02/18/2020     (H)  02/18/2020     (H) 02/18/2020     Lab Results   Component Value Date    CHOL 115 (L) 02/03/2020     Lab Results   Component Value Date    HDL 43 02/03/2020     Lab Results   Component Value Date    LDLCALC 59.6 (L) 02/03/2020     Lab Results   Component Value Date    TRIG 62 02/03/2020     Lab Results   Component Value Date    CHOLHDL 37.4 02/03/2020     Lab Results   Component Value Date    TSH 0.337 (L) 02/13/2020     Lab Results   Component Value Date    HGBA1C 5.5 06/07/2018     Assessment:       1. Hospital discharge follow-up    2. Acute systolic CHF (congestive heart failure)    3. S/P pericardial window creation    4. Atrial fibrillation with rapid ventricular response    5. Pericardial effusion    6. Bilateral ankle pain, unspecified chronicity    7. Urinary tract infection without hematuria, site unspecified    8. Chronic systolic congestive heart failure        Plan:   Hospital discharge follow-up  -     Ambulatory referral/consult to Cardiology; Future; Expected date: 03/05/2020    Acute systolic CHF (congestive heart failure)  -     Ambulatory referral/consult to Cardiology; Future; Expected date: 03/05/2020  -     X-Ray Chest PA And Lateral; Future; Expected date: 02/27/2020    S/P pericardial window creation  -     Ambulatory referral/consult to Cardiology; Future; Expected date: 03/05/2020    Atrial fibrillation with rapid ventricular response  -     Ambulatory referral/consult to Cardiology; Future; Expected date: 03/05/2020    Pericardial effusion  -     Ambulatory referral/consult to Cardiology; Future; Expected date: 03/05/2020    Bilateral ankle pain, unspecified chronicity  -     Cancel: X-Ray Ankle 2 View Bilateral; Future; Expected date: 02/27/2020  -     Cancel: X-Ray Foot AP Bilateral; Future; Expected date: 02/27/2020    Urinary tract infection without hematuria, site unspecified  -     Urinalysis; Future; Expected date: 02/27/2020  -     Urine culture; Future; Expected date:  02/27/2020        To see cards today----------F/u 2 weeks------------

## 2020-03-02 ENCOUNTER — TELEPHONE (OUTPATIENT)
Dept: PULMONOLOGY | Facility: CLINIC | Age: 85
End: 2020-03-02

## 2020-03-02 NOTE — TELEPHONE ENCOUNTER
Patient declined pulmonary appointment. He did not understand why he was referred to this department.

## 2020-03-04 ENCOUNTER — INFUSION (OUTPATIENT)
Dept: INFUSION THERAPY | Facility: HOSPITAL | Age: 85
End: 2020-03-04
Attending: INTERNAL MEDICINE
Payer: MEDICARE

## 2020-03-04 ENCOUNTER — OFFICE VISIT (OUTPATIENT)
Dept: CARDIOTHORACIC SURGERY | Facility: CLINIC | Age: 85
End: 2020-03-04
Payer: MEDICARE

## 2020-03-04 VITALS
TEMPERATURE: 98 F | OXYGEN SATURATION: 98 % | DIASTOLIC BLOOD PRESSURE: 90 MMHG | RESPIRATION RATE: 18 BRPM | SYSTOLIC BLOOD PRESSURE: 147 MMHG | HEART RATE: 72 BPM

## 2020-03-04 VITALS
TEMPERATURE: 98 F | HEART RATE: 78 BPM | BODY MASS INDEX: 33.11 KG/M2 | DIASTOLIC BLOOD PRESSURE: 70 MMHG | SYSTOLIC BLOOD PRESSURE: 138 MMHG | WEIGHT: 272.06 LBS

## 2020-03-04 DIAGNOSIS — D50.0 IRON DEFICIENCY ANEMIA DUE TO CHRONIC BLOOD LOSS: Primary | ICD-10-CM

## 2020-03-04 DIAGNOSIS — Z98.890 S/P PERICARDIAL WINDOW CREATION: Primary | ICD-10-CM

## 2020-03-04 PROCEDURE — 96365 THER/PROPH/DIAG IV INF INIT: CPT

## 2020-03-04 PROCEDURE — 99212 OFFICE O/P EST SF 10 MIN: CPT | Mod: PBBFAC,25 | Performed by: THORACIC SURGERY (CARDIOTHORACIC VASCULAR SURGERY)

## 2020-03-04 PROCEDURE — 99999 PR PBB SHADOW E&M-EST. PATIENT-LVL II: ICD-10-PCS | Mod: PBBFAC,,, | Performed by: THORACIC SURGERY (CARDIOTHORACIC VASCULAR SURGERY)

## 2020-03-04 PROCEDURE — 63600175 PHARM REV CODE 636 W HCPCS: Performed by: NURSE PRACTITIONER

## 2020-03-04 PROCEDURE — 99024 POSTOP FOLLOW-UP VISIT: CPT | Mod: POP,,, | Performed by: THORACIC SURGERY (CARDIOTHORACIC VASCULAR SURGERY)

## 2020-03-04 PROCEDURE — 63600175 PHARM REV CODE 636 W HCPCS: Mod: JG | Performed by: INTERNAL MEDICINE

## 2020-03-04 PROCEDURE — 96375 TX/PRO/DX INJ NEW DRUG ADDON: CPT

## 2020-03-04 PROCEDURE — 99024 PR POST-OP FOLLOW-UP VISIT: ICD-10-PCS | Mod: POP,,, | Performed by: THORACIC SURGERY (CARDIOTHORACIC VASCULAR SURGERY)

## 2020-03-04 PROCEDURE — 99999 PR PBB SHADOW E&M-EST. PATIENT-LVL II: CPT | Mod: PBBFAC,,, | Performed by: THORACIC SURGERY (CARDIOTHORACIC VASCULAR SURGERY)

## 2020-03-04 RX ORDER — METHYLPREDNISOLONE SOD SUCC 125 MG
80 VIAL (EA) INJECTION
Status: COMPLETED | OUTPATIENT
Start: 2020-03-04 | End: 2020-03-04

## 2020-03-04 RX ORDER — METHYLPREDNISOLONE SOD SUCC 125 MG
125 VIAL (EA) INJECTION
Status: CANCELLED
Start: 2020-03-05

## 2020-03-04 RX ORDER — METHYLPREDNISOLONE SOD SUCC 125 MG
80 VIAL (EA) INJECTION
Status: CANCELLED
Start: 2020-03-05

## 2020-03-04 RX ORDER — METHYLPREDNISOLONE SOD SUCC 125 MG
80 VIAL (EA) INJECTION
Status: CANCELLED
Start: 2020-03-04

## 2020-03-04 RX ADMIN — FERRIC CARBOXYMALTOSE INJECTION 750 MG: 50 INJECTION, SOLUTION INTRAVENOUS at 02:03

## 2020-03-04 RX ADMIN — METHYLPREDNISOLONE SODIUM SUCCINATE 80 MG: 125 INJECTION, POWDER, FOR SOLUTION INTRAMUSCULAR; INTRAVENOUS at 01:03

## 2020-03-04 NOTE — PLAN OF CARE
Patient denies pain today. Patient given blanket and pillow for comfort. Feet elevated. Coffee provided. NAD noted today.

## 2020-03-04 NOTE — DISCHARGE INSTRUCTIONS
[facility]  [address] [facility]  [address]  [phone] phone     [phone] fax  Hours of Operation: Monday- Friday 8:00am- 5:00pm  After hours phone  [phone]  Hematology / Oncology Physicians on call      Dr. Denis Aguayo, TOÑO Hernandez NP Tyesha Taylor, NP    Please call with any concerns regarding your appointment today.      FALL PREVENTION   Falls often occur due to slipping, tripping or losing your balance. Here are ways to reduce your risk of falling again.   Was there anything that caused your fall that can be fixed, removed or replaced?   Make your home safe by keeping walkways clear of objects you may trip over.   Use non-slip pads under rugs.   Do not walk in poorly lit areas.   Do not stand on chairs or wobbly ladders.   Use caution when reaching overhead or looking upward. This position can cause a loss of balance.   Be sure your shoes fit properly, have non-slip bottoms and are in good condition.   Be cautious when going up and down stairs, curbs, and when walking on uneven sidewalks.   If your balance is poor, consider using a cane or walker.   If your fall was related to alcohol use, stop or limit alcohol intake.   If your fall was related to use of sleeping medicines, talk to your doctor about this. You may need to reduce your dosage at bedtime if you awaken during the night to go to the bathroom.   To reduce the need for nighttime bathroom trips:   Avoid drinking fluids for several hours before going to bed   Empty your bladder before going to bed   Men can keep a urinal at the bedside   © 2319-9490 [employer] [facility], [address] [place] [address], [place] [address]. All rights reserved. This information is not intended as a substitute for professional medical care. Always follow your healthcare professional's instructions.      IRON DEFICIENCY ANEMIA:  Anemia  is a condition where the size or number of red blood cells in the body is reduced. Iron is needed in the diet to make red cells. The red blood cells carry oxygen to all parts of the body. Anemia limits the delivery of oxygen to where it is needed. This causes a feeling of being tired and run down. When anemia becomes severe, the skin becomes pale and there is shortness of breath with exertion, headaches, dizziness, drowsiness and fatigue.  The cause of your anemia is lack of iron in your body. This may occur due to blood loss (for example, heavy menstrual periods or bleeding from the stomach or intestines) or a poor diet (not eating enough iron-containing foods), inability to absorb iron from your diet, or pregnancy.  If the blood count is low enough, an IRON SUPPLEMENT will be prescribed. It usually takes about 2-3 months of treatment with iron supplements to correct an anemia. Severe cases of anemia requires a blood transfusion to rapidly correct symptoms and deliver more oxygen to the cells.  HOME CARE:  1) Increase the iron stores in your body by eating foods high in iron content. This is a natural way of building your blood cells back up again. Beef, liver, spinach and other dark green leafy vegetables, whole grain products, beans and nuts are all natural sources of iron.  2) If you are having symptoms of anemia listed above:  -- Do not overexert yourself.  -- Talk to your doctor before flying on an airplane or travelling to high altitudes.  FOLLOW UP with your doctor in 2 months for a repeat red blood cell count, or as recommended by our staff, to be sure that the anemia has been corrected.  GET PROMPT MEDICAL ATTENTION if any of the following occur or worsen:  -- Shortness of breath or chest pain  -- Dizziness or fainting  -- Vomiting blood or passing red or black-colored stool  © 0811-0393 [employer], [address] [facility], [place] [address]. All rights reserved. This information is not intended as a  substitute for professional medical care. Always follow your healthcare professional's instructions.

## 2020-03-04 NOTE — NURSING
Pt tolerated infusion well. No adverse reaction noted. Pt education reinforced on Injectafer, side effects, what to expect, and when to call Dr. Barrios. Pt verbalized understanding. I reviewed pt calendar w/ pt and understanding verbalized. IV flushed w/ NS and D/C per protocol.

## 2020-03-04 NOTE — PROGRESS NOTES
Dominic Cohen is a 84 y.o. male patient.   1. S/P pericardial window creation    HPI:  03/04/2020   The patient is a well appearing 84 year old male status post pericardial window and left chest tube placement on 02/14/2020. The patient presents to clinic for his first post-operative follow-up appointment for suture removal. The patient has no complaints. Excellent exercise tolerance. Steady gait and balance. Incisions CDI healing well. Regular bowel movements. Adequate appetite. Reports recent appointment with Dr. Marshall, placed on lasix for JOSLYN LE edema. Planned for future f/u echocardiogram. Denies chest pain, SOB, palpitations, fever, malaise, weakness, fatigue, syncope, dizziness, lightheadedness, tremors, falls, abdominal pain, constipation, and diarrhea.    Past Medical History:   Diagnosis Date    Arthritis     Atherosclerosis of abdominal aorta     Cataract     CHF (congestive heart failure)     Chronic constipation     Glaucoma     History of anal fissures     Hypertension     Polyneuropathy in other diseases classified elsewhere     due to folate deficiency    Prediabetes     Venous insufficiency     Venous insufficiency      Past Surgical History Pertinent Negatives:   Procedure Date Noted    CATARACT EXTRACTION 07/16/2015    CORNEAL TRANSPLANT 07/16/2015    RETINAL DETACHMENT SURGERY 06/25/2018    STRABISMUS SURGERY 07/16/2015     Scheduled Meds:  Continuous Infusions:  PRN Meds:    Review of patient's allergies indicates:   Allergen Reactions    Penicillins Hives and Swelling    Protonix [pantoprazole] Rash    Sulfa (sulfonamide antibiotics) Hives     There are no hospital problems to display for this patient.    Blood pressure 138/70, pulse 78, temperature 97.8 °F (36.6 °C), temperature source Oral, weight 123.4 kg (272 lb 0.8 oz).    Subjective:   Diet: Adequate intake.  Patient reports no nausea or vomiting.    Activity level: Normal.    Pain control: Well controlled.     Wound: Subjective wound complaints: Incisions CDI.       Objective:  Vital signs (most recent): Blood pressure 138/70, pulse 78, temperature 97.8 °F (36.6 °C), temperature source Oral, weight 123.4 kg (272 lb 0.8 oz).  General appearance: Comfortable, well-appearing, in no acute distress and not in pain.    Lungs:  Normal respiratory rate and normal effort.  He is in no respiratory distress.  Breath sounds normal.  There are no decreased breath sounds, wheezes, rales or rhonchi.    Heart: Normal rate.  Regular rhythm.  S1 normal and S2 normal.  There are no distant heart sounds.  No murmur, gallop or friction rub. No mechanical valve is heard.    Chest: Asymmetric chest wall expansion.  No sternal click or crepitus.    Abdomen: Abdomen is soft.  No distension or ascites.    Bowel sounds:  Bowel sounds are normal.    Tenderness: There is no abdominal tenderness tenderness.  There is no epigastric, periumbilical, right upper quadrant, right lower quadrant, left upper quadrant, left lower quadrant, suprapubic or incisional area tenderness.  There is no rebound tenderness.  There is no guarding.    Wound:  Clean.  There is no dehiscence, decubitis ulcer, hernia or healing ridge.  There is no drainage.    Extremities: There is normal range of motion.  There is dependent edema.  There is no thrill in a-v graft, thrill in a-v fistula, effusion, deformity, venous stasis or local swelling.  (2+ pitting edema to the JOSLYN LE. ).    Neurological: The patient is alert and oriented to person, place and time.  GCS score: 15.  Normal strength.  Pupils are equal, round, and reactive to light.  Pupils are equal.      Assessment & Plan      1. S/P pericardial window creation      Overall the patient is doing very well.   Removed sutures, sutures removed entirely, no complications, patient tolerated well.   Residual left pleural effusion does not require any surgical intervention at this time.   Repeat echocardiogram scheduled in 2  months.  Continue lasix r/t JOSLYN LE edema.  Patient will follow-up with cardiology, and PCP.   The patient has satisfied the post-operative goals set by cardiothoracic surgery. The patient will be discharged from the service and may follow-up PRN.       Elan Yu NP  3/4/2020

## 2020-03-06 ENCOUNTER — HOSPITAL ENCOUNTER (OUTPATIENT)
Dept: CARDIOLOGY | Facility: HOSPITAL | Age: 85
Discharge: HOME | End: 2020-03-06
Attending: INTERNAL MEDICINE
Payer: MEDICARE

## 2020-03-06 ENCOUNTER — HOSPITAL ENCOUNTER (EMERGENCY)
Facility: HOSPITAL | Age: 85
Discharge: HOME | End: 2020-03-06
Attending: FAMILY MEDICINE
Payer: MEDICARE

## 2020-03-06 VITALS
RESPIRATION RATE: 20 BRPM | TEMPERATURE: 98 F | DIASTOLIC BLOOD PRESSURE: 76 MMHG | HEART RATE: 70 BPM | SYSTOLIC BLOOD PRESSURE: 129 MMHG | OXYGEN SATURATION: 96 %

## 2020-03-06 VITALS — WEIGHT: 272 LBS | BODY MASS INDEX: 33.12 KG/M2 | HEIGHT: 76 IN

## 2020-03-06 DIAGNOSIS — R42 DIZZINESS: ICD-10-CM

## 2020-03-06 DIAGNOSIS — R42 LIGHTHEADEDNESS: Primary | ICD-10-CM

## 2020-03-06 DIAGNOSIS — I31.39 PERICARDIAL EFFUSION: ICD-10-CM

## 2020-03-06 DIAGNOSIS — T50.905A ADVERSE EFFECT OF DRUG, INITIAL ENCOUNTER: ICD-10-CM

## 2020-03-06 LAB
ALBUMIN SERPL BCP-MCNC: 2.8 G/DL (ref 3.5–5.2)
ALP SERPL-CCNC: 58 U/L (ref 55–135)
ALT SERPL W/O P-5'-P-CCNC: 17 U/L (ref 10–44)
ANION GAP SERPL CALC-SCNC: 10 MMOL/L (ref 8–16)
APTT BLDCRRT: 30.9 SEC (ref 21–32)
AST SERPL-CCNC: 18 U/L (ref 10–40)
BASOPHILS # BLD AUTO: 0.06 K/UL (ref 0–0.2)
BASOPHILS NFR BLD: 0.8 % (ref 0–1.9)
BILIRUB SERPL-MCNC: 0.5 MG/DL (ref 0.1–1)
BILIRUB UR QL STRIP: NEGATIVE
BNP SERPL-MCNC: 126 PG/ML (ref 0–99)
BUN SERPL-MCNC: 12 MG/DL (ref 8–23)
CALCIUM SERPL-MCNC: 8.7 MG/DL (ref 8.7–10.5)
CHLORIDE SERPL-SCNC: 103 MMOL/L (ref 95–110)
CLARITY UR: CLEAR
CO2 SERPL-SCNC: 28 MMOL/L (ref 23–29)
COLOR UR: YELLOW
CREAT SERPL-MCNC: 0.9 MG/DL (ref 0.5–1.4)
DIFFERENTIAL METHOD: ABNORMAL
EOSINOPHIL # BLD AUTO: 0.3 K/UL (ref 0–0.5)
EOSINOPHIL NFR BLD: 4.1 % (ref 0–8)
ERYTHROCYTE [DISTWIDTH] IN BLOOD BY AUTOMATED COUNT: 14.9 % (ref 11.5–14.5)
EST. GFR  (AFRICAN AMERICAN): >60 ML/MIN/1.73 M^2
EST. GFR  (NON AFRICAN AMERICAN): >60 ML/MIN/1.73 M^2
GLUCOSE SERPL-MCNC: 109 MG/DL (ref 70–110)
GLUCOSE UR QL STRIP: NEGATIVE
HCT VFR BLD AUTO: 32.9 % (ref 40–54)
HGB BLD-MCNC: 10.1 G/DL (ref 14–18)
HGB UR QL STRIP: NEGATIVE
IMM GRANULOCYTES # BLD AUTO: 0.1 K/UL (ref 0–0.04)
IMM GRANULOCYTES NFR BLD AUTO: 1.4 % (ref 0–0.5)
INR PPP: 1.2 (ref 0.8–1.2)
KETONES UR QL STRIP: NEGATIVE
LEUKOCYTE ESTERASE UR QL STRIP: NEGATIVE
LYMPHOCYTES # BLD AUTO: 1.4 K/UL (ref 1–4.8)
LYMPHOCYTES NFR BLD: 19 % (ref 18–48)
MAGNESIUM SERPL-MCNC: 1.7 MG/DL (ref 1.6–2.6)
MCH RBC QN AUTO: 31.4 PG (ref 27–31)
MCHC RBC AUTO-ENTMCNC: 30.7 G/DL (ref 32–36)
MCV RBC AUTO: 102 FL (ref 82–98)
MONOCYTES # BLD AUTO: 0.7 K/UL (ref 0.3–1)
MONOCYTES NFR BLD: 8.9 % (ref 4–15)
NEUTROPHILS # BLD AUTO: 4.8 K/UL (ref 1.8–7.7)
NEUTROPHILS NFR BLD: 65.8 % (ref 38–73)
NITRITE UR QL STRIP: NEGATIVE
NRBC BLD-RTO: 0 /100 WBC
PH UR STRIP: 6 [PH] (ref 5–8)
PLATELET # BLD AUTO: 233 K/UL (ref 150–350)
PMV BLD AUTO: 12.2 FL (ref 9.2–12.9)
POTASSIUM SERPL-SCNC: 3.5 MMOL/L (ref 3.5–5.1)
PROT SERPL-MCNC: 7.6 G/DL (ref 6–8.4)
PROT UR QL STRIP: NEGATIVE
PROTHROMBIN TIME: 12.3 SEC (ref 9–12.5)
RBC # BLD AUTO: 3.22 M/UL (ref 4.6–6.2)
SODIUM SERPL-SCNC: 141 MMOL/L (ref 136–145)
SP GR UR STRIP: 1.01 (ref 1–1.03)
T4 FREE SERPL-MCNC: 0.93 NG/DL (ref 0.71–1.51)
TROPONIN I SERPL DL<=0.01 NG/ML-MCNC: <0.006 NG/ML (ref 0–0.03)
TSH SERPL DL<=0.005 MIU/L-ACNC: 0.37 UIU/ML (ref 0.4–4)
URN SPEC COLLECT METH UR: NORMAL
UROBILINOGEN UR STRIP-ACNC: NEGATIVE EU/DL
WBC # BLD AUTO: 7.32 K/UL (ref 3.9–12.7)

## 2020-03-06 PROCEDURE — 83735 ASSAY OF MAGNESIUM: CPT

## 2020-03-06 PROCEDURE — 84439 ASSAY OF FREE THYROXINE: CPT

## 2020-03-06 PROCEDURE — 93005 ELECTROCARDIOGRAM TRACING: CPT

## 2020-03-06 PROCEDURE — 36415 COLL VENOUS BLD VENIPUNCTURE: CPT

## 2020-03-06 PROCEDURE — 85025 COMPLETE CBC W/AUTO DIFF WBC: CPT

## 2020-03-06 PROCEDURE — 93306 ECHO (CUPID ONLY): ICD-10-PCS | Mod: 26,,, | Performed by: INTERNAL MEDICINE

## 2020-03-06 PROCEDURE — 93010 EKG 12-LEAD: ICD-10-PCS | Mod: ,,, | Performed by: INTERNAL MEDICINE

## 2020-03-06 PROCEDURE — 93010 ELECTROCARDIOGRAM REPORT: CPT | Mod: ,,, | Performed by: INTERNAL MEDICINE

## 2020-03-06 PROCEDURE — 84484 ASSAY OF TROPONIN QUANT: CPT

## 2020-03-06 PROCEDURE — 93306 TTE W/DOPPLER COMPLETE: CPT | Mod: 26,,, | Performed by: INTERNAL MEDICINE

## 2020-03-06 PROCEDURE — 85730 THROMBOPLASTIN TIME PARTIAL: CPT

## 2020-03-06 PROCEDURE — 80053 COMPREHEN METABOLIC PANEL: CPT

## 2020-03-06 PROCEDURE — 85610 PROTHROMBIN TIME: CPT

## 2020-03-06 PROCEDURE — 99284 EMERGENCY DEPT VISIT MOD MDM: CPT | Mod: 25

## 2020-03-06 PROCEDURE — 81003 URINALYSIS AUTO W/O SCOPE: CPT

## 2020-03-06 PROCEDURE — 83880 ASSAY OF NATRIURETIC PEPTIDE: CPT

## 2020-03-06 PROCEDURE — 93306 TTE W/DOPPLER COMPLETE: CPT

## 2020-03-06 PROCEDURE — 84443 ASSAY THYROID STIM HORMONE: CPT

## 2020-03-06 NOTE — ED PROVIDER NOTES
SCRIBE #1 NOTE: I, Traci Jones, am scribing for, and in the presence of, Janeth Valladares MD. I have scribed the entire note.       History     Chief Complaint   Patient presents with    Dizziness     c/o dizziness, weakness and blurred vision      Review of patient's allergies indicates:   Allergen Reactions    Penicillins Hives and Swelling    Protonix [pantoprazole] Rash    Sulfa (sulfonamide antibiotics) Hives         History of Present Illness     HPI    3/6/2020, 3:20 PM  History obtained from the patient      History of Present Illness: Dominic Cohen is a 84 y.o. male patient with a PMHx of HTN, CHF, presdiabetes, venous insufficiency, and atherosclerosis of abdominal aorta who presents to the Emergency Department for evaluation of lightheadedness which onset gradually over the last 5 days, but worsened today after starting metoprolol. While the chief complaint by nursing assessment was documented as dizziness, the patient indicates their chief complaint during my interview as lightheadedness. Pt reports that he was recently started on Lasix 5 days ago following fluid accumulation after a procedure (preicardial window creation) on 2/27/20. Pt's home health nurse checked his BP today and noticed it was still slightly elevated at 148/90. Pt then took a Metoprolol tablet at approximately 10:30 AM and developed worsening lightheadedness and blurry vision. Pt reports that he was taken off Metoprolol, but then was restarted back on the medication with first dose today. Pt's last dose of Lasix was at 8:30 AM. Symptoms are constant and moderate in severity. No mitigating or exacerbating factors reported. Associated sxs include generalized weakness and blurred vision. Patient denies any fever/ chills, SOB, cough, CP, palpations, numbness, HA, dizziness, rash, wound, abdominal pain, n/v/d, back/ neck pain, sore throat, congestion, dysuria, hematuria, localized weakness, easily bruising, syncope, and all  other sxs at this time. No prior tx reported. No further complaints or concerns at this time.     Arrival mode: Personal vehicle      PCP: Sudhakar Liu MD        Past Medical History:  Past Medical History:   Diagnosis Date    Arthritis     Atherosclerosis of abdominal aorta     Cataract     CHF (congestive heart failure)     Chronic constipation     Glaucoma     History of anal fissures     Hypertension     Polyneuropathy in other diseases classified elsewhere     due to folate deficiency    Prediabetes     Venous insufficiency     Venous insufficiency        Past Surgical History:  Past Surgical History:   Procedure Laterality Date    BACK SURGERY      CATARACT EXTRACTION BILATERAL W/ ANTERIOR VITRECTOMY      COLONOSCOPY N/A 5/13/2016    Procedure: COLONOSCOPY;  Surgeon: Presley Phillips MD;  [facility]  Service: Endoscopy;  Laterality: N/A;  EGD with Dr. Jeffery prior to Colonoscopy. EC    ESOPHAGOGASTRODUODENOSCOPY N/A 7/5/2018    Procedure: ESOPHAGOGASTRODUODENOSCOPY (EGD);  Surgeon: Khanh Asencio III, MD;  Location: [facility];  Service: Endoscopy;  Laterality: N/A;    JOINT REPLACEMENT Left     x 2    KNEE SURGERY Left     x2. revision 06/27/17    PERICARDIAL WINDOW N/A 2/14/2020    Procedure: CREATION, PERICARDIAL WINDOW;  Surgeon: Ankush Graham MD;  Location: [facility] [place];  Service: Cardiothoracic;  Laterality: N/A;    PROSTATE SURGERY      TONSILLECTOMY, ADENOIDECTOMY      TUBE THORACOTOMY Left 2/14/2020    Procedure: INSERTION, CATHETER, INTERCOSTAL, FOR DRAINAGE;  Surgeon: Ankush Graham MD;  Location: [facility] [place];  Service: Cardiothoracic;  Laterality: Left;  LEFT PLEURA CHEST TUBE PLACEMENT         Family History:  Family History   Problem Relation Age of Onset    No Known Problems Son     No Known Problems Daughter     No Known Problems Son     Diabetes Neg Hx     Heart disease Neg Hx     Cancer Neg Hx     Celiac disease Neg Hx     Cirrhosis  Neg Hx     Colon cancer Neg Hx     Colon polyps Neg Hx     Crohn's disease Neg Hx     Cystic fibrosis Neg Hx     Esophageal cancer Neg Hx     Hemochromatosis Neg Hx     Inflammatory bowel disease Neg Hx     Irritable bowel syndrome Neg Hx     Liver cancer Neg Hx     Liver disease Neg Hx     Rectal cancer Neg Hx     Stomach cancer Neg Hx     Ulcerative colitis Neg Hx     Zak's disease Neg Hx     Amblyopia Neg Hx     Blindness Neg Hx     Glaucoma Neg Hx     Hypertension Neg Hx     Macular degeneration Neg Hx     Retinal detachment Neg Hx     Strabismus Neg Hx        Social History:  Social History     Tobacco Use    Smoking status: Former Smoker     Packs/day: 0.30     Years: 52.00     Pack years: 15.60     Types: Cigarettes     Last attempt to quit: 2000     Years since quittin.3    Smokeless tobacco: Never Used   Substance and Sexual Activity    Alcohol use: No    Drug use: No    Sexual activity: Yes        Review of Systems     Review of Systems   Constitutional: Negative for chills and fever.        + Generalized weakness   HENT: Negative for congestion and sore throat.    Eyes: Positive for visual disturbance (blurry). Negative for photophobia.   Respiratory: Negative for cough and shortness of breath.    Cardiovascular: Negative for chest pain and palpitations.   Gastrointestinal: Negative for abdominal pain, diarrhea, nausea and vomiting.   Genitourinary: Negative for dysuria and hematuria.   Musculoskeletal: Negative for back pain and neck pain.   Skin: Negative for rash and wound.   Neurological: Positive for light-headedness. Negative for dizziness, syncope, weakness, numbness and headaches.   Hematological: Does not bruise/bleed easily.   All other systems reviewed and are negative.     Physical Exam     Initial Vitals [20 1507]   BP Pulse Resp Temp SpO2   122/79 65 18 98.3 °F (36.8 °C) 97 %      MAP       --          Physical Exam  Nursing Notes and Vital Signs  Reviewed.   Constitutional: Patient is in no acute distress. Well-developed and well-nourished.  Head: Atraumatic. Normocephalic.  Eyes: PERRL. EOM intact. Conjunctivae are not pale. No scleral icterus.  ENT: Mucous membranes are moist. Oropharynx is clear and symmetric.    Neck: Supple. Full ROM. No lymphadenopathy. No JVD noted.   Cardiovascular: Regular rate. Regular rhythm. No murmurs, rubs, or gallops. Distal pulses are 2+ and symmetric.  Pulmonary/Chest: No respiratory distress. Clear to auscultation bilaterally. No wheezing or rales.  Abdominal: Soft and non-distended.  There is no tenderness.  No rebound, guarding, or rigidity. Good bowel sounds.  Genitourinary: No CVA tenderness  Musculoskeletal: Moves all extremities. No obvious deformities. No calf tenderness. 2+ pitting edema of BLE.   Skin: Warm and dry.  Neurological:  Alert, awake, and appropriate.  Normal speech.  No acute focal neurological deficits are appreciated.  Psychiatric: Normal affect. Good eye contact. Appropriate in content.     ED Course   Procedures  ED Vital Signs:  Vitals:    03/06/20 1602 03/06/20 1631 03/06/20 1642 03/06/20 1702   BP: (!) 159/85 133/81  124/80   Pulse: 64 62 61 63   Resp: 18 (!) 24     Temp:       TempSrc:       SpO2: 96% 96%  96%    03/06/20 1731 03/06/20 1742 03/06/20 1802 03/06/20 1842   BP: (!) 160/87  (!) 165/90    Pulse: 62 60 63 64   Resp: (!) 21  18    Temp:       TempSrc:       SpO2: 96%  97%     03/06/20 1843 03/06/20 1845 03/06/20 1847 03/06/20 1911   BP: 126/69 (!) 144/75 (!) 121/57 (!) 142/73   Pulse: 66 67 75 67   Resp:       Temp:       TempSrc:       SpO2: 95% 95% 95%     03/06/20 1912 03/06/20 1913 03/06/20 1931   BP: 129/69 129/67 129/76   Pulse: 73 75 70   Resp:   20   Temp:      TempSrc:      SpO2:   96%       Abnormal Lab Results:  Labs Reviewed   COMPREHENSIVE METABOLIC PANEL - Abnormal; Notable for the following components:       Result Value    Albumin 2.8 (*)     All other components within  normal limits   CBC W/ AUTO DIFFERENTIAL - Abnormal; Notable for the following components:    RBC 3.22 (*)     Hemoglobin 10.1 (*)     Hematocrit 32.9 (*)     Mean Corpuscular Volume 102 (*)     Mean Corpuscular Hemoglobin 31.4 (*)     Mean Corpuscular Hemoglobin Conc 30.7 (*)     RDW 14.9 (*)     Immature Granulocytes 1.4 (*)     Immature Grans (Abs) 0.10 (*)     All other components within normal limits   TSH - Abnormal; Notable for the following components:    TSH 0.368 (*)     All other components within normal limits   B-TYPE NATRIURETIC PEPTIDE - Abnormal; Notable for the following components:     (*)     All other components within normal limits   MAGNESIUM   APTT   PROTIME-INR   TROPONIN I   URINALYSIS, REFLEX TO URINE CULTURE    Narrative:     Preferred Collection Type->Urine, Clean Catch   B-TYPE NATRIURETIC PEPTIDE   T4, FREE        All Lab Results:  Results for orders placed or performed during the hospital encounter of 03/06/20   Comprehensive metabolic panel   Result Value Ref Range    Sodium 141 136 - 145 mmol/L    Potassium 3.5 3.5 - 5.1 mmol/L    Chloride 103 95 - 110 mmol/L    CO2 28 23 - 29 mmol/L    Glucose 109 70 - 110 mg/dL    BUN, Bld 12 8 - 23 mg/dL    Creatinine 0.9 0.5 - 1.4 mg/dL    Calcium 8.7 8.7 - 10.5 mg/dL    Total Protein 7.6 6.0 - 8.4 g/dL    Albumin 2.8 (L) 3.5 - 5.2 g/dL    Total Bilirubin 0.5 0.1 - 1.0 mg/dL    Alkaline Phosphatase 58 55 - 135 U/L    AST 18 10 - 40 U/L    ALT 17 10 - 44 U/L    Anion Gap 10 8 - 16 mmol/L    eGFR if African American >60 >60 mL/min/1.73 m^2    eGFR if non African American >60 >60 mL/min/1.73 m^2   CBC auto differential   Result Value Ref Range    WBC 7.32 3.90 - 12.70 K/uL    RBC 3.22 (L) 4.60 - 6.20 M/uL    Hemoglobin 10.1 (L) 14.0 - 18.0 g/dL    Hematocrit 32.9 (L) 40.0 - 54.0 %    Mean Corpuscular Volume 102 (H) 82 - 98 fL    Mean Corpuscular Hemoglobin 31.4 (H) 27.0 - 31.0 pg    Mean Corpuscular Hemoglobin Conc 30.7 (L) 32.0 - 36.0 g/dL     RDW 14.9 (H) 11.5 - 14.5 %    Platelets 233 150 - 350 K/uL    MPV 12.2 9.2 - 12.9 fL    Immature Granulocytes 1.4 (H) 0.0 - 0.5 %    Gran # (ANC) 4.8 1.8 - 7.7 K/uL    Immature Grans (Abs) 0.10 (H) 0.00 - 0.04 K/uL    Lymph # 1.4 1.0 - 4.8 K/uL    Mono # 0.7 0.3 - 1.0 K/uL    Eos # 0.3 0.0 - 0.5 K/uL    Baso # 0.06 0.00 - 0.20 K/uL    nRBC 0 0 /100 WBC    Gran% 65.8 38.0 - 73.0 %    Lymph% 19.0 18.0 - 48.0 %    Mono% 8.9 4.0 - 15.0 %    Eosinophil% 4.1 0.0 - 8.0 %    Basophil% 0.8 0.0 - 1.9 %    Differential Method Automated    Magnesium   Result Value Ref Range    Magnesium 1.7 1.6 - 2.6 mg/dL   APTT   Result Value Ref Range    aPTT 30.9 21.0 - 32.0 sec   Protime-INR   Result Value Ref Range    Prothrombin Time 12.3 9.0 - 12.5 sec    INR 1.2 0.8 - 1.2   Troponin I   Result Value Ref Range    Troponin I <0.006 0.000 - 0.026 ng/mL   TSH   Result Value Ref Range    TSH 0.368 (L) 0.400 - 4.000 uIU/mL   Urinalysis, Reflex to Urine Culture Urine, Clean Catch   Result Value Ref Range    Specimen UA Urine, Clean Catch     Color, UA Yellow Yellow, Straw, Bonny    Appearance, UA Clear Clear    pH, UA 6.0 5.0 - 8.0    Specific Gravity, UA 1.015 1.005 - 1.030    Protein, UA Negative Negative    Glucose, UA Negative Negative    Ketones, UA Negative Negative    Bilirubin (UA) Negative Negative    Occult Blood UA Negative Negative    Nitrite, UA Negative Negative    Urobilinogen, UA Negative <2.0 EU/dL    Leukocytes, UA Negative Negative   Brain natriuretic peptide   Result Value Ref Range     (H) 0 - 99 pg/mL   T4, free   Result Value Ref Range    Free T4 0.93 0.71 - 1.51 ng/dL         Imaging Results:  Imaging Results    None          The EKG was ordered, reviewed, and independently interpreted by the ED provider.  Interpretation time: 15:18  Rate: 66 BPM  Rhythm: normal sinus rhythm  Interpretation: Possible left atrial enlargement. Incomplete RBBB. Left anterior fascicular block. LVH. T wave abnormality, consider  inferior ischemia. Abnormal ECG. No STEMI.           The Emergency Provider reviewed the vital signs and test results, which are outlined above.     ED Discussion     7:33 PM: Reassessed pt at this time. Pt is feeling better and has no blurry vision. Orthostatics were negative. Pt states his condition has improved at this time. Pt reports that he will come back to the ED if he feels similar sx again and will follow-up with his cardiologist.  At this time I believe that patient's symptoms are most likely caused by hypovolemia secondary to increasing Lasix dosing as well as the introduction or reintroduction of metoprolol that started today for which he states his symptoms had worsened today more than the last few days.  I discussed with the patient that he needs to follow-up with Cardiology for further medication management and discussed fall precautions.  Patient himself states that he is not going to stop the metoprolol or the Lasix because he wants to speak to his cardiologist 1st.  Discussed with pt all pertinent ED information and results. Discussed pt dx and plan of tx. Gave pt all f/u and return to the ED instructions. All questions and concerns were addressed at this time. Pt expresses understanding of information and instructions, and is comfortable with plan to discharge. Pt is stable for discharge.    I discussed with patient and/or family/caretaker that evaluation in the ED does not suggest any emergent or life threatening medical conditions requiring immediate intervention beyond what was provided in the ED, and I believe patient is safe for discharge.  Regardless, an unremarkable evaluation in the ED does not preclude the development or presence of a serious of life threatening condition. As such, patient was instructed to return immediately for any worsening or change in current symptoms.         Medical Decision Making:   Clinical Tests:   Lab Tests: Ordered and Reviewed  Medical Tests: Ordered and  "Reviewed           ED Medication(s):  Medications - No data to display    Discharge Medication List as of 3/6/2020  7:31 PM          Follow-up Information     Sudhakar Liu MD. Call in 1 day.    Specialty:  Internal Medicine  Contact information:  [address] [place] [address]  [phone]                       Scribe Attestation:   Scribe #1: I performed the above scribed service and the documentation accurately describes the services I performed. I attest to the accuracy of the note.     Attending:   Physician Attestation Statement for Scribe #1: I, Janeth Valladares MD, personally performed the services described in this documentation, as scribed by Traci Jones, in my presence, and it is both accurate and complete.           Clinical Impression       ICD-10-CM ICD-9-CM   1. Lightheadedness R42 780.4   2. Dizziness R42 780.4   3. Adverse effect of drug, initial encounter T50.905A E947.9       Disposition:   Disposition: Discharged  Condition: Stable    Portions of this note may have been created with voice recognition software. Occasional "wrong-word" or "sound-a-like" substitutions may have occurred due to the inherent limitations of voice recognition software. Please, read the note carefully and recognize, using context, where substitutions have occurred.       Discharge Instructions that were discussed with patient:  You are on Lasix to get fluid off to help with her heart and I know you have this for the next few days.  However if you feel any lightheadedness, loss of consciousness, chest pain, shortness of breath please do not hesitate to come back to the emergency department immediately. Also I believe your metoprolol that was restarted today also attributed to this. Continue monitoring your BP and if you have low BP again Call your cardiologist and your primary care tomorrow or come to ED     Janeth Valladares MD  03/09/20 9000    "

## 2020-03-07 NOTE — DISCHARGE INSTRUCTIONS
Please continue med your medications as prescribed.  You are on Lasix to get fluid off to help with her heart and I know you have this for the next few days.  However if you feel any lightheadedness, loss of consciousness, chest pain, shortness of breath please do not hesitate to come back to the emergency department immediately. Also I believe your metoprolol that was restarted today also attributed to this. Continue monitoring your BP and if you have low BP again Call your cardiologist and your primary care tomorrow or come to ED

## 2020-03-08 LAB
AORTIC ROOT ANNULUS: 4.04 CM
AV INDEX (PROSTH): 0.72
AV MEAN GRADIENT: 7 MMHG
AV PEAK GRADIENT: 12 MMHG
AV VALVE AREA: 2.53 CM2
AV VELOCITY RATIO: 0.74
BSA FOR ECHO PROCEDURE: 2.57 M2
CV ECHO LV RWT: 0.69 CM
DOP CALC AO PEAK VEL: 1.75 M/S
DOP CALC AO VTI: 42.73 CM
DOP CALC LVOT AREA: 3.5 CM2
DOP CALC LVOT DIAMETER: 2.12 CM
DOP CALC LVOT PEAK VEL: 1.3 M/S
DOP CALC LVOT STROKE VOLUME: 108 CM3
DOP CALCLVOT PEAK VEL VTI: 30.61 CM
E WAVE DECELERATION TIME: 309.36 MSEC
E/A RATIO: 0.84
E/E' RATIO: 10.44 M/S
ECHO LV POSTERIOR WALL: 1.51 CM (ref 0.6–1.1)
FRACTIONAL SHORTENING: 26 % (ref 28–44)
INTERVENTRICULAR SEPTUM: 1.81 CM (ref 0.6–1.1)
LA MAJOR: 5.31 CM
LA MINOR: 5.66 CM
LA WIDTH: 3.62 CM
LEFT ATRIUM SIZE: 4.24 CM
LEFT ATRIUM VOLUME INDEX: 28.3 ML/M2
LEFT ATRIUM VOLUME: 71.49 CM3
LEFT INTERNAL DIMENSION IN SYSTOLE: 3.25 CM (ref 2.1–4)
LEFT VENTRICLE DIASTOLIC VOLUME INDEX: 34.75 ML/M2
LEFT VENTRICLE DIASTOLIC VOLUME: 87.73 ML
LEFT VENTRICLE MASS INDEX: 124 G/M2
LEFT VENTRICLE SYSTOLIC VOLUME INDEX: 16.8 ML/M2
LEFT VENTRICLE SYSTOLIC VOLUME: 42.41 ML
LEFT VENTRICULAR INTERNAL DIMENSION IN DIASTOLE: 4.4 CM (ref 3.5–6)
LEFT VENTRICULAR MASS: 312.53 G
LV LATERAL E/E' RATIO: 9.4 M/S
LV SEPTAL E/E' RATIO: 11.75 M/S
MV PEAK A VEL: 1.12 M/S
MV PEAK E VEL: 0.94 M/S
PISA TR MAX VEL: 2.41 M/S
PV PEAK VELOCITY: 1.44 CM/S
RA MAJOR: 5.31 CM
RA WIDTH: 2.93 CM
RIGHT VENTRICULAR END-DIASTOLIC DIMENSION: 3.49 CM
SINUS: 3.65 CM
STJ: 3.48 CM
TDI LATERAL: 0.1 M/S
TDI SEPTAL: 0.08 M/S
TDI: 0.09 M/S
TR MAX PG: 23 MMHG
TRICUSPID ANNULAR PLANE SYSTOLIC EXCURSION: 2.21 CM

## 2020-03-11 ENCOUNTER — INFUSION (OUTPATIENT)
Dept: INFUSION THERAPY | Facility: HOSPITAL | Age: 85
End: 2020-03-11
Attending: INTERNAL MEDICINE
Payer: MEDICARE

## 2020-03-11 ENCOUNTER — TELEPHONE (OUTPATIENT)
Dept: CARDIOLOGY | Facility: CLINIC | Age: 85
End: 2020-03-11

## 2020-03-11 VITALS
HEART RATE: 66 BPM | OXYGEN SATURATION: 97 % | SYSTOLIC BLOOD PRESSURE: 142 MMHG | RESPIRATION RATE: 17 BRPM | DIASTOLIC BLOOD PRESSURE: 87 MMHG | TEMPERATURE: 98 F

## 2020-03-11 DIAGNOSIS — D50.0 IRON DEFICIENCY ANEMIA DUE TO CHRONIC BLOOD LOSS: Primary | ICD-10-CM

## 2020-03-11 PROCEDURE — 96365 THER/PROPH/DIAG IV INF INIT: CPT

## 2020-03-11 PROCEDURE — 96374 THER/PROPH/DIAG INJ IV PUSH: CPT

## 2020-03-11 PROCEDURE — 63600175 PHARM REV CODE 636 W HCPCS: Performed by: INTERNAL MEDICINE

## 2020-03-11 PROCEDURE — 63600175 PHARM REV CODE 636 W HCPCS: Mod: JG | Performed by: INTERNAL MEDICINE

## 2020-03-11 PROCEDURE — 96375 TX/PRO/DX INJ NEW DRUG ADDON: CPT

## 2020-03-11 RX ORDER — METHYLPREDNISOLONE SOD SUCC 125 MG
125 VIAL (EA) INJECTION
Status: COMPLETED | OUTPATIENT
Start: 2020-03-11 | End: 2020-03-11

## 2020-03-11 RX ADMIN — METHYLPREDNISOLONE SODIUM SUCCINATE 125 MG: 125 INJECTION, POWDER, FOR SOLUTION INTRAMUSCULAR; INTRAVENOUS at 01:03

## 2020-03-11 RX ADMIN — FERRIC CARBOXYMALTOSE INJECTION 750 MG: 50 INJECTION, SOLUTION INTRAVENOUS at 01:03

## 2020-03-11 NOTE — DISCHARGE INSTRUCTIONS
[facility] [place]  [address] [address]  [address]  [phone] phone     [phone] fax  Hours of Operation: Monday- Friday 8:00am- 5:00pm  After hours phone  [phone]  Hematology / Oncology Physicians on call      TO[address] Dr., Dr., Dr., NP Cheree Bodden, NP Sydney Prescott, NP      Please call with any concerns regarding your appointment today.

## 2020-03-11 NOTE — TELEPHONE ENCOUNTER
Pt contacted about results, pt verbalized understanding.    Pt would also like to know if he should take both metoprolol and the lasix or either or?          ----- Message from Jose Alejandro Marshall MD sent at 3/10/2020  9:34 PM CDT -----  ECHO IS NORMAL

## 2020-03-12 ENCOUNTER — OFFICE VISIT (OUTPATIENT)
Dept: FAMILY MEDICINE | Facility: CLINIC | Age: 85
End: 2020-03-12
Payer: MEDICARE

## 2020-03-12 VITALS
DIASTOLIC BLOOD PRESSURE: 78 MMHG | HEIGHT: 76 IN | RESPIRATION RATE: 16 BRPM | SYSTOLIC BLOOD PRESSURE: 126 MMHG | HEART RATE: 82 BPM | OXYGEN SATURATION: 94 % | TEMPERATURE: 99 F | WEIGHT: 265.63 LBS | BODY MASS INDEX: 32.35 KG/M2

## 2020-03-12 DIAGNOSIS — R60.0 LOCALIZED EDEMA: Primary | ICD-10-CM

## 2020-03-12 DIAGNOSIS — I48.19 OTHER PERSISTENT ATRIAL FIBRILLATION: ICD-10-CM

## 2020-03-12 PROCEDURE — 99213 OFFICE O/P EST LOW 20 MIN: CPT | Mod: S$PBB,,, | Performed by: INTERNAL MEDICINE

## 2020-03-12 PROCEDURE — 99214 OFFICE O/P EST MOD 30 MIN: CPT | Mod: PBBFAC,PO | Performed by: INTERNAL MEDICINE

## 2020-03-12 PROCEDURE — 99999 PR PBB SHADOW E&M-EST. PATIENT-LVL IV: ICD-10-PCS | Mod: PBBFAC,,, | Performed by: INTERNAL MEDICINE

## 2020-03-12 PROCEDURE — 99213 PR OFFICE/OUTPT VISIT, EST, LEVL III, 20-29 MIN: ICD-10-PCS | Mod: S$PBB,,, | Performed by: INTERNAL MEDICINE

## 2020-03-12 PROCEDURE — 99999 PR PBB SHADOW E&M-EST. PATIENT-LVL IV: CPT | Mod: PBBFAC,,, | Performed by: INTERNAL MEDICINE

## 2020-03-12 NOTE — PROGRESS NOTES
Subjective:       Patient ID: Dominic Cohen is a 84 y.o. male.    Chief Complaint: Follow-up; Hypertension; and Atrial Fibrillation    Follow-up   Associated symptoms include arthralgias and myalgias. Pertinent negatives include no abdominal pain, chest pain, chills, coughing, diaphoresis, fatigue, fever, headaches, joint swelling, nausea, neck pain, numbness, rash, sore throat, vomiting or weakness.   Hypertension   Pertinent negatives include no chest pain, headaches, neck pain, palpitations or shortness of breath.   Atrial Fibrillation   Symptoms are negative for chest pain, dizziness, palpitations, shortness of breath and weakness. Past medical history includes atrial fibrillation.     Past Medical History:   Diagnosis Date    Arthritis     Atherosclerosis of abdominal aorta     Cataract     CHF (congestive heart failure)     Chronic constipation     Glaucoma     History of anal fissures     Hypertension     Polyneuropathy in other diseases classified elsewhere     due to folate deficiency    Prediabetes     Venous insufficiency     Venous insufficiency      Past Surgical History:   Procedure Laterality Date    BACK SURGERY      CATARACT EXTRACTION BILATERAL W/ ANTERIOR VITRECTOMY      COLONOSCOPY N/A 5/13/2016    Procedure: COLONOSCOPY;  Surgeon: Presley Phillips MD;  Location: [address];  Service: Endoscopy;  Laterality: N/A;  EGD with Dr. Jeffery prior to Colonoscopy. EC    ESOPHAGOGASTRODUODENOSCOPY N/A 7/5/2018    Procedure: ESOPHAGOGASTRODUODENOSCOPY (EGD);  Surgeon: Khanh Asencio III, MD;  Location: [facility];  Service: Endoscopy;  Laterality: N/A;    JOINT REPLACEMENT Left     x 2    KNEE SURGERY Left     x2. revision 06/27/17    PERICARDIAL WINDOW N/A 2/14/2020    Procedure: CREATION, PERICARDIAL WINDOW;  Surgeon: Ankush Graham MD;  Location: [facility];  Service: Cardiothoracic;  Laterality: N/A;    PROSTATE SURGERY      TONSILLECTOMY, ADENOIDECTOMY      TUBE  THORACOTOMY Left 2020    Procedure: INSERTION, CATHETER, INTERCOSTAL, FOR DRAINAGE;  Surgeon: Ankush Graham MD;  Location: [facility];  Service: Cardiothoracic;  Laterality: Left;  LEFT PLEURA CHEST TUBE PLACEMENT     Family History   Problem Relation Age of Onset    No Known Problems Son     No Known Problems Daughter     No Known Problems Son     Diabetes Neg Hx     Heart disease Neg Hx     Cancer Neg Hx     Celiac disease Neg Hx     Cirrhosis Neg Hx     Colon cancer Neg Hx     Colon polyps Neg Hx     Crohn's disease Neg Hx     Cystic fibrosis Neg Hx     Esophageal cancer Neg Hx     Hemochromatosis Neg Hx     Inflammatory bowel disease Neg Hx     Irritable bowel syndrome Neg Hx     Liver cancer Neg Hx     Liver disease Neg Hx     Rectal cancer Neg Hx     Stomach cancer Neg Hx     Ulcerative colitis Neg Hx     Zak's disease Neg Hx     Amblyopia Neg Hx     Blindness Neg Hx     Glaucoma Neg Hx     Hypertension Neg Hx     Macular degeneration Neg Hx     Retinal detachment Neg Hx     Strabismus Neg Hx      Social History     Socioeconomic History    Marital status:      Spouse name: Not on file    Number of children: 3    Years of education: Not on file    Highest education level: Not on file   Occupational History    Occupation: Retired     Comment: [place], [place]   Social Needs    Financial resource strain: Not on file    Food insecurity:     Worry: Not on file     Inability: Not on file    Transportation needs:     Medical: Not on file     Non-medical: Not on file   Tobacco Use    Smoking status: Former Smoker     Packs/day: 0.30     Years: 52.00     Pack years: 15.60     Types: Cigarettes     Last attempt to quit: 2000     Years since quittin.3    Smokeless tobacco: Never Used   Substance and Sexual Activity    Alcohol use: No    Drug use: No    Sexual activity: Yes   Lifestyle    Physical activity:     Days per week: Not on file     Minutes per  session: Not on file    Stress: Not on file   Relationships    Social connections:     Talks on phone: Not on file     Gets together: Not on file     Attends Jew service: Not on file     Active member of club or organization: Not on file     Attends meetings of clubs or organizations: Not on file     Relationship status: Not on file   Other Topics Concern    Not on file   Social History Narrative    Not on file     Review of Systems   Constitutional: Negative for activity change, appetite change, chills, diaphoresis, fatigue, fever and unexpected weight change.   HENT: Negative for drooling, ear discharge, ear pain, facial swelling, hearing loss, mouth sores, nosebleeds, postnasal drip, rhinorrhea, sinus pressure, sneezing, sore throat, tinnitus, trouble swallowing and voice change.    Eyes: Negative for photophobia, redness and visual disturbance.   Respiratory: Negative for apnea, cough, choking, chest tightness, shortness of breath and wheezing.    Cardiovascular: Negative for chest pain, palpitations and leg swelling.   Gastrointestinal: Negative for abdominal distention, abdominal pain, anal bleeding, blood in stool, constipation, diarrhea, nausea and vomiting.   Endocrine: Negative for cold intolerance, heat intolerance, polydipsia, polyphagia and polyuria.   Genitourinary: Negative for difficulty urinating, dysuria, enuresis, flank pain, frequency, genital sores and hematuria.   Musculoskeletal: Positive for arthralgias, gait problem and myalgias. Negative for back pain, joint swelling, neck pain and neck stiffness.   Skin: Negative for color change, pallor, rash and wound.   Allergic/Immunologic: Negative for food allergies and immunocompromised state.   Neurological: Negative for dizziness, tremors, seizures, syncope, facial asymmetry, speech difficulty, weakness, light-headedness, numbness and headaches.   Hematological: Negative for adenopathy. Does not bruise/bleed easily.    Psychiatric/Behavioral: Negative for agitation, behavioral problems, confusion, decreased concentration, dysphoric mood, hallucinations, self-injury, sleep disturbance and suicidal ideas. The patient is not nervous/anxious and is not hyperactive.        Objective:      Physical Exam   Constitutional: He is oriented to person, place, and time. He appears well-developed and well-nourished. No distress.   HENT:   Head: Normocephalic and atraumatic.   Eyes: Pupils are equal, round, and reactive to light.   Neck: Normal range of motion. Neck supple. No JVD present. Carotid bruit is not present. No tracheal deviation present. No thyromegaly present.   Cardiovascular: Normal rate, regular rhythm, normal heart sounds and intact distal pulses.   Pulmonary/Chest: Effort normal and breath sounds normal. No respiratory distress. He has no wheezes. He has no rales. He exhibits no tenderness.   Abdominal: Soft. Bowel sounds are normal. He exhibits no distension. There is no tenderness. There is no rebound and no guarding.   Musculoskeletal: Normal range of motion. He exhibits no edema or tenderness.   Lymphadenopathy:     He has no cervical adenopathy.   Neurological: He is alert and oriented to person, place, and time.   Skin: Skin is warm and dry. No rash noted. He is not diaphoretic. No erythema. No pallor.   Psychiatric: He has a normal mood and affect. His behavior is normal. Judgment and thought content normal.   Nursing note and vitals reviewed.      CMP  Sodium   Date Value Ref Range Status   03/06/2020 141 136 - 145 mmol/L Final     Potassium   Date Value Ref Range Status   03/06/2020 3.5 3.5 - 5.1 mmol/L Final     Chloride   Date Value Ref Range Status   03/06/2020 103 95 - 110 mmol/L Final     CO2   Date Value Ref Range Status   03/06/2020 28 23 - 29 mmol/L Final     Glucose   Date Value Ref Range Status   03/06/2020 109 70 - 110 mg/dL Final     BUN, Bld   Date Value Ref Range Status   03/06/2020 12 8 - 23 mg/dL  Final     Creatinine   Date Value Ref Range Status   03/06/2020 0.9 0.5 - 1.4 mg/dL Final     Calcium   Date Value Ref Range Status   03/06/2020 8.7 8.7 - 10.5 mg/dL Final     Total Protein   Date Value Ref Range Status   03/06/2020 7.6 6.0 - 8.4 g/dL Final     Albumin   Date Value Ref Range Status   03/06/2020 2.8 (L) 3.5 - 5.2 g/dL Final     Total Bilirubin   Date Value Ref Range Status   03/06/2020 0.5 0.1 - 1.0 mg/dL Final     Comment:     For infants and newborns, interpretation of results should be based  on gestational age, weight and in agreement with clinical  observations.  Premature Infant recommended reference ranges:  Up to 24 hours.............<8.0 mg/dL  Up to 48 hours............<12.0 mg/dL  3-5 days..................<15.0 mg/dL  6-29 days.................<15.0 mg/dL       Alkaline Phosphatase   Date Value Ref Range Status   03/06/2020 58 55 - 135 U/L Final     AST   Date Value Ref Range Status   03/06/2020 18 10 - 40 U/L Final     ALT   Date Value Ref Range Status   03/06/2020 17 10 - 44 U/L Final     Anion Gap   Date Value Ref Range Status   03/06/2020 10 8 - 16 mmol/L Final     eGFR if    Date Value Ref Range Status   03/06/2020 >60 >60 mL/min/1.73 m^2 Final     eGFR if non    Date Value Ref Range Status   03/06/2020 >60 >60 mL/min/1.73 m^2 Final     Comment:     Calculation used to obtain the estimated glomerular filtration  rate (eGFR) is the CKD-EPI equation.        Lab Results   Component Value Date    WBC 7.32 03/06/2020    HGB 10.1 (L) 03/06/2020    HCT 32.9 (L) 03/06/2020     (H) 03/06/2020     03/06/2020     Lab Results   Component Value Date    CHOL 115 (L) 02/03/2020     Lab Results   Component Value Date    HDL 43 02/03/2020     Lab Results   Component Value Date    LDLCALC 59.6 (L) 02/03/2020     Lab Results   Component Value Date    TRIG 62 02/03/2020     Lab Results   Component Value Date    CHOLHDL 37.4 02/03/2020     Lab Results    Component Value Date    TSH 0.368 (L) 03/06/2020     Lab Results   Component Value Date    HGBA1C 5.5 06/07/2018     Assessment:       1. Localized edema    2. Other persistent atrial fibrillation        Plan:   Localized edema    Other persistent atrial fibrillation----------one episode when in hospital for pericardial effusion--------f/u cards-    Htn                                                                                                                                         Metoprolol 25 mg a day makes him dizzy--------try 1/2 q day       Lasix prn leg swelling.           F/u 3 months----------------

## 2020-03-13 ENCOUNTER — CARE AT HOME (OUTPATIENT)
Dept: HOME HEALTH SERVICES | Facility: CLINIC | Age: 85
End: 2020-03-13
Payer: MEDICARE

## 2020-03-13 VITALS
SYSTOLIC BLOOD PRESSURE: 132 MMHG | DIASTOLIC BLOOD PRESSURE: 73 MMHG | OXYGEN SATURATION: 95 % | TEMPERATURE: 98 F | RESPIRATION RATE: 16 BRPM | HEART RATE: 63 BPM

## 2020-03-13 DIAGNOSIS — Z98.890 STATUS POST CREATION OF PERICARDIAL WINDOW: ICD-10-CM

## 2020-03-13 DIAGNOSIS — Z09 FOLLOW UP: Primary | ICD-10-CM

## 2020-03-13 PROCEDURE — 99348 HOME/RES VST EST LOW MDM 30: CPT | Mod: ,,, | Performed by: NURSE PRACTITIONER

## 2020-03-13 PROCEDURE — 99348 PR HOME VISIT,ESTAB PATIENT,LEVEL II: ICD-10-PCS | Mod: ,,, | Performed by: NURSE PRACTITIONER

## 2020-03-13 NOTE — PATIENT INSTRUCTIONS
- [facility] Nurse Practitioner to schedule home follow-up visit with patient in 4 weeks or as needed  - Continue all medications, treatments and therapies as ordered.   - Follow all instructions, recommendations as discussed.  - Maintain Safety Precautions at all times.  - Attend all medical appointments as scheduled.  - For worsening symptoms: call Primary Care Physician or Nurse Practitioner.  - For emergencies, call 911 or immediately report to the nearest emergency room.    Congestive Heart Failure  It is important to adhere to the diet and fluid restrictions recommended in order to avoid future complications and worsening of your heart condition. Your body cannot tolerate excess fluid, so it is important to restrict the amount you drink and get through your food. Salt prevents your body from getting rid of fluid. You must also limit the amount of salt you put into your body through food and drink.    Fluid Restriction:  2000 mL per day.  This includes all fluids, drinks, and soup. Recommend to use a measuring cup and record volume.  Low Salt Diet: No added salt.  Avoid canned and processed foods.  Avoid most chips due to high sodium content.    To monitor the amount of fluid in your body, follow these instructions:    - Daily Weight: notify MD/NP if weight greater than 5 lbs in 2 days or 10lbs/week, or if you develop increased swelling in your legs or shortness of breath.  - Regular activity only within your limitations. Group activities to minimize distress.  - Vital Signs and oxygen saturation level every morning, maintain log for next visit  - Oxygen as needed for oxygen level under 90%  - Report abnormal breath sounds, shortness of breath or chest pain to MD/NP  - No smoking  - Annual influenza shot

## 2020-03-13 NOTE — PROGRESS NOTES
[facility]  Medical Home Visit    Visit Date: 3/13/2020  Encounter Provider: Corby Rojo NP  PCP:  Sudhakar Liu MD    Subjective:      Patient ID: Dominic Cohen is a 84 y.o. male.    Consult Requested By:  Dr. Sudhakar Albright*  Reason for Consult: Medical Follow-Up and Medication Review    The patient is being seen at home due to physical debility that presents a taxing effort to leave the home, to mitigate high risk of hospital readmission or due to the limited availability of reliable or safe options for transportation to the point of access to the provider. Prior to treatment on this visit the chart was reviewed and patient consent was obtained.    Chief Complaint: Follow-up for chronic medical conditions and medication revie    Today:  Mr. Dominic Cohen is a 84 y.o. male is being seen today for follow-up for chronic medical conditions and medication review. Dominic presents at baseline state of health as reported by patient. VSS. Denies any acute issues, concerns or complaints to address on today's visit. Reports taking all medications as prescribed. Denies syncope/lightheadedness, chest pain or palpitations. Reports no dyspnea on exertion and no episodes of weakness. Reports morning blood glucose level of 116 mg/dL. Ambulates in home without need of assistive devices. Midline upper abdominal and both puncture sites to Left UQ open to air without signs of infection. Patient reports understanding of fluid retriction and surveillance of level of edema/fluctuation of weight to report to providers. Lasix dose confirmed per medical record.No other needs identified at this time. [facility] NP to follow up in 4 weeks or PRN.        ROS:   Review of Systems   Constitutional: Negative.  Negative for chills and fever.   HENT: Negative.    Eyes: Negative.    Respiratory: Negative.  Negative for chest tightness and shortness of breath.    Cardiovascular: Positive  for leg swelling. Negative for chest pain and palpitations.   Gastrointestinal: Negative.    Endocrine: Negative.    Genitourinary: Negative.    Musculoskeletal: Negative.    Skin: Positive for color change and wound.        Hemosiderin staining to BLE  Midline upper abd and LUQ puncture sites non tender   Allergic/Immunologic: Negative.    Neurological: Negative.    Hematological: Negative.    Psychiatric/Behavioral: Negative.    All other systems reviewed and are negative.    Assessments:  · Environmental: single story home, no steps to enter, adequate lighting and temeprature control  · Functional Status: Independent with ADL's/IADL's, ambulates independently, continent of bowel and bladder, drives himself to appointments  · Safety: Fall Precautions,   · Nutritional: Adequate  · Home Health: [facility]   · DME/Supplies: walker (as needed),      Objective:     Vitals:    03/13/20 1000   BP: 132/73   Pulse: 63   Resp: 16   Temp: 97.9 °F (36.6 °C)   TempSrc: Temporal   SpO2: 95%   PainSc: 0-No pain     There is no height or weight on file to calculate BMI.    Physical Exam   Constitutional: He is oriented to person, place, and time. He appears well-developed and well-nourished.   HENT:   Head: Normocephalic and atraumatic.   Eyes: Pupils are equal, round, and reactive to light. Conjunctivae and EOM are normal.   Neck: Normal range of motion. Neck supple. No JVD present.   Cardiovascular: Normal rate, regular rhythm and intact distal pulses.   No murmur heard.  +2 edema to BLE   Pulmonary/Chest: Effort normal and breath sounds normal. No respiratory distress. He has no rales.   Abdominal: Soft. Bowel sounds are normal.   Musculoskeletal: Normal range of motion. He exhibits edema.   Neurological: He is alert and oriented to person, place, and time.   Skin: Skin is warm and dry. Capillary refill takes less than 2 seconds.   Hemosiderin staining to BLE   Psychiatric: He has a normal mood and affect. His behavior is  normal. Judgment and thought content normal.   Vitals reviewed.      Assessment:     1. Follow up    2. Status post creation of pericardial window    3.      Congestive heart failure    Plan:     Ethical / Legal: Advance Care Planning   Capacity to make medical decisions:  yes, Conflict no  · Surrogate decision maker:  Chilango Trevino, Relationship: son  · Advance Directives:  none  · HCPOA: none  · [place]:  none  · Code Status:  full    Advanced Care Directives, HCPOA and [place] forms left in the home for family review, discussion and signing with instructions to return upon their next provider encounter for inclusion to the medical record.     Dominic was seen today for transitional care.    Diagnoses and all orders for this visit:     Encounter for Medical Follow-Up and Medication Review   - [facility] Nurse Practitioner to schedule home visit with patient in 4 weeks or PRN    Status post creation of pericardial window  CHF   - CHF Instructions given to monitor for weight increase, increase in edema, fluid restrictions   - monitor status of incision/puncture sites for signs of infection   - follow up with cardiology    Were controlled substances prescribed?  No    Follow Up Appointments:   Future Appointments   Date Time Provider Department Center   3/23/2020 10:00 AM Flaquito Camp MD [facility] CARDIO [facility]   5/7/2020  2:30 PM Sudhakar Rivero MD [facility] OPHTHAL [facility]   5/12/2020 10:35 AM LABORATORY, [facility] LAB [facility]   5/19/2020 11:00 AM Tahir Barrios MD [facility] HEM ONC [facility]   6/12/2020  8:30 AM Sudhakar Liu MD [facility]         Signature:    Corby Rojo, MSN, APRN, FNP-C  [facility]

## 2020-03-16 LAB
FUNGUS SPEC CULT: NORMAL
FUNGUS SPEC CULT: NORMAL

## 2020-03-21 ENCOUNTER — PATIENT OUTREACH (OUTPATIENT)
Dept: ADMINISTRATIVE | Facility: OTHER | Age: 85
End: 2020-03-21

## 2020-03-22 NOTE — PROGRESS NOTES
Chart reviewed.   Immunizations: Triggered Imm Registry     Orders placed: n/a  Upcoming appts to satisfy [place] topics: n/a

## 2020-03-23 ENCOUNTER — OFFICE VISIT (OUTPATIENT)
Dept: CARDIOLOGY | Facility: CLINIC | Age: 85
End: 2020-03-23
Payer: MEDICARE

## 2020-03-23 VITALS
WEIGHT: 267.44 LBS | HEIGHT: 76 IN | DIASTOLIC BLOOD PRESSURE: 76 MMHG | OXYGEN SATURATION: 95 % | BODY MASS INDEX: 32.57 KG/M2 | HEART RATE: 69 BPM | SYSTOLIC BLOOD PRESSURE: 130 MMHG

## 2020-03-23 DIAGNOSIS — I48.0 PAROXYSMAL ATRIAL FIBRILLATION: ICD-10-CM

## 2020-03-23 DIAGNOSIS — I31.39 PERICARDIAL EFFUSION: ICD-10-CM

## 2020-03-23 DIAGNOSIS — E78.5 DYSLIPIDEMIA: ICD-10-CM

## 2020-03-23 DIAGNOSIS — I44.4 LAFB (LEFT ANTERIOR FASCICULAR BLOCK): ICD-10-CM

## 2020-03-23 DIAGNOSIS — I73.9 PVD (PERIPHERAL VASCULAR DISEASE): ICD-10-CM

## 2020-03-23 DIAGNOSIS — R73.03 PREDIABETES: ICD-10-CM

## 2020-03-23 DIAGNOSIS — N18.2 STAGE 2 CHRONIC KIDNEY DISEASE: ICD-10-CM

## 2020-03-23 DIAGNOSIS — Z98.890 STATUS POST CREATION OF PERICARDIAL WINDOW: ICD-10-CM

## 2020-03-23 DIAGNOSIS — I50.32 DIASTOLIC DYSFUNCTION WITH CHRONIC HEART FAILURE: ICD-10-CM

## 2020-03-23 DIAGNOSIS — I50.32 CHRONIC DIASTOLIC HEART FAILURE: ICD-10-CM

## 2020-03-23 DIAGNOSIS — R94.31 ABNORMAL ECG: Primary | ICD-10-CM

## 2020-03-23 DIAGNOSIS — I87.2 VENOUS INSUFFICIENCY: ICD-10-CM

## 2020-03-23 DIAGNOSIS — I10 ESSENTIAL HYPERTENSION: ICD-10-CM

## 2020-03-23 PROCEDURE — 99214 PR OFFICE/OUTPT VISIT, EST, LEVL IV, 30-39 MIN: ICD-10-PCS | Mod: S$PBB,,, | Performed by: INTERNAL MEDICINE

## 2020-03-23 PROCEDURE — 99999 PR PBB SHADOW E&M-EST. PATIENT-LVL III: CPT | Mod: PBBFAC,,, | Performed by: INTERNAL MEDICINE

## 2020-03-23 PROCEDURE — 99999 PR PBB SHADOW E&M-EST. PATIENT-LVL III: ICD-10-PCS | Mod: PBBFAC,,, | Performed by: INTERNAL MEDICINE

## 2020-03-23 PROCEDURE — 99213 OFFICE O/P EST LOW 20 MIN: CPT | Mod: PBBFAC | Performed by: INTERNAL MEDICINE

## 2020-03-23 PROCEDURE — 99214 OFFICE O/P EST MOD 30 MIN: CPT | Mod: S$PBB,,, | Performed by: INTERNAL MEDICINE

## 2020-03-23 RX ORDER — ASPIRIN 81 MG/1
81 TABLET ORAL DAILY
Qty: 30 TABLET | Refills: 12
Start: 2020-03-23 | End: 2021-09-21

## 2020-03-23 NOTE — PROGRESS NOTES
"Subjective:    Patient ID:  Dominic Cohen is a 84 y.o. male who presents for evaluation of Consult; Atrial Fibrillation; Congestive Heart Failure; Hypertension; and Hyperlipidemia      HPI  Pt presents for eval.  I last saw pt 2017.  He has HTN, diastolic CHF, dyslipidemia, CRI, anemia, pre-DM.  Nonsmoker.  S/p pericardial window 2/20 when admitted with URTI and noted to have a fib with RVR and large pericardial effusion.  EF reported 30% at time.  Chart reviewed.  He saw Dr. Marshall, [place], post hospital stay.  Put on Lasix 3 days/week and metoprolol bid.  Then seen in ER for dizziness.  He has been off Metoprolol bid since being in ER.  States Metoprolol made him dizzy.  ECG 3/6/20 NSR, LAFB, LVH, nonspecific st-t abnl.   BNP mildly elevated in ER.  He states he "feels great".    Denies cp or dyspnea.  States he has no dizziness right now.  BP looks good.  HGAIC well controlled.  Lipids controlled.  Not on asa, or NOAC.  CRI stable.    Minimal leg edema issues.  F/u echo 3/20 by report indicates normal LVEF, LVH, no effusion.      Current Outpatient Medications:     cyanocobalamin, vitamin B-12, (VITAMIN B-12) 50 mcg tablet, Take 50 mcg by mouth once daily., Disp: , Rfl:     ergocalciferol, vitamin D2, 400 unit Tab, Take 1 tablet by mouth., Disp: , Rfl:     ferrous sulfate (FEOSOL) 325 mg (65 mg iron) Tab tablet, Take 325 mg by mouth., Disp: , Rfl:     furosemide (LASIX) 40 MG tablet, Take 1 tablet (40 mg total) by mouth every Mon, Wed, Fri., Disp: 13 tablet, Rfl: 11    latanoprost 0.005 % ophthalmic solution, Place 1 drop into both eyes every evening., Disp: 1 Bottle, Rfl: 12    multivitamin with folic acid 400 mcg Tab, Take 1 tablet by mouth., Disp: , Rfl:     timolol maleate 0.5% (TIMOPTIC) 0.5 % Drop, Place 1 drop into both eyes 2 (two) times daily., Disp: 10 mL, Rfl: 12    aspirin (ECOTRIN) 81 MG EC tablet, Take 1 tablet (81 mg total) by mouth once daily., Disp: 30 tablet, Rfl: 12      Past " "Medical History:   Diagnosis Date    Arthritis     Atherosclerosis of abdominal aorta     Cataract     CHF (congestive heart failure)     Chronic constipation     Glaucoma     History of anal fissures     Hypertension     Polyneuropathy in other diseases classified elsewhere     due to folate deficiency    Prediabetes     Venous insufficiency     Venous insufficiency          Review of Systems   Constitution: Negative.   HENT: Negative.    Eyes: Negative.    Cardiovascular: Positive for leg swelling.   Respiratory: Negative.    Endocrine: Negative.    Hematologic/Lymphatic: Negative.    Skin: Negative.    Musculoskeletal: Positive for arthritis and joint swelling.   Gastrointestinal: Negative.    Genitourinary: Negative.    Neurological: Negative.    Psychiatric/Behavioral: Negative.    Allergic/Immunologic: Negative.        /76 (BP Location: Left arm, Patient Position: Sitting, BP Method: Large (Manual))   Pulse 69   Ht 6' 4" (1.93 m)   Wt 121.3 kg (267 lb 6.7 oz)   SpO2 95%   BMI 32.55 kg/m²     Wt Readings from Last 3 Encounters:   03/23/20 121.3 kg (267 lb 6.7 oz)   03/12/20 120.5 kg (265 lb 10.5 oz)   03/06/20 123.4 kg (272 lb)     Temp Readings from Last 3 Encounters:   03/13/20 97.9 °F (36.6 °C) (Temporal)   03/12/20 98.5 °F (36.9 °C) (Oral)   03/11/20 97.7 °F (36.5 °C) (Oral)     BP Readings from Last 3 Encounters:   03/23/20 130/76   03/13/20 132/73   03/12/20 126/78     Pulse Readings from Last 3 Encounters:   03/23/20 69   03/13/20 63   03/12/20 82          Objective:    Physical Exam   Constitutional: He is oriented to person, place, and time. He appears well-developed and well-nourished.   HENT:   Head: Normocephalic.   Neck: Normal range of motion. Neck supple. Normal carotid pulses, no hepatojugular reflux and no JVD present. Carotid bruit is not present. No thyromegaly present.   Cardiovascular: Normal rate, regular rhythm, S1 normal and S2 normal. PMI is not displaced. Exam " reveals no S3, no S4, no distant heart sounds, no friction rub, no midsystolic click and no opening snap.   No murmur heard.  Pulses:       Radial pulses are 2+ on the right side, and 2+ on the left side.   Pulmonary/Chest: Effort normal and breath sounds normal. He has no wheezes. He has no rales.   Abdominal: Soft. Bowel sounds are normal. He exhibits no distension, no abdominal bruit, no ascites and no mass. There is no tenderness.   Musculoskeletal: He exhibits edema.   Neurological: He is alert and oriented to person, place, and time.   Skin: Skin is warm.   Psychiatric: He has a normal mood and affect. His behavior is normal.   Nursing note and vitals reviewed.      I have reviewed all pertinent labs and cardiac studies.      Component      Latest Ref Rng & Units 3/6/2020   BNP      0 - 99 pg/mL 126 (H)         Chemistry        Component Value Date/Time     03/06/2020 1546    K 3.5 03/06/2020 1546     03/06/2020 1546    CO2 28 03/06/2020 1546    BUN 12 03/06/2020 1546    CREATININE 0.9 03/06/2020 1546     03/06/2020 1546        Component Value Date/Time    CALCIUM 8.7 03/06/2020 1546    ALKPHOS 58 03/06/2020 1546    AST 18 03/06/2020 1546    ALT 17 03/06/2020 1546    BILITOT 0.5 03/06/2020 1546    ESTGFRAFRICA >60 03/06/2020 1546    EGFRNONAA >60 03/06/2020 1546        Lab Results   Component Value Date    WBC 7.32 03/06/2020    HGB 10.1 (L) 03/06/2020    HCT 32.9 (L) 03/06/2020     (H) 03/06/2020     03/06/2020       Lab Results   Component Value Date    HGBA1C 5.5 06/07/2018     Lab Results   Component Value Date    CHOL 115 (L) 02/03/2020    CHOL 169 10/11/2018    CHOL 158 11/09/2017     Lab Results   Component Value Date    HDL 43 02/03/2020    HDL 45 10/11/2018    HDL 48 11/09/2017     Lab Results   Component Value Date    LDLCALC 59.6 (L) 02/03/2020    LDLCALC 101.2 10/11/2018    LDLCALC 86.4 11/09/2017     Lab Results   Component Value Date    TRIG 62 02/03/2020    TRIG  114 10/11/2018    TRIG 118 11/09/2017     Lab Results   Component Value Date    CHOLHDL 37.4 02/03/2020    CHOLHDL 26.6 10/11/2018    CHOLHDL 30.4 11/09/2017     Conclusion     · Normal right ventricular systolic function.  · Normal left ventricular systolic function. The estimated ejection fraction is 55%.  · Normal LV diastolic function.  · No wall motion abnormalities.      Study Details     A complete echo was performed using complete 2D, color flow Doppler and spectral Doppler. During the study the apical and parasternal views were captured.   Echocardiography Findings     Left Ventricle Normal ejection fraction at 55%. Normal left ventricular cavity size. Normal wall thickness observed. Normal left ventricular diastolic function. No wall motion abnormalities.   Right Ventricle Normal cavity size, wall thickness and systolic function. Wall motion normal.   Left Atrium The left atrium is normal. Left atrial volume index is 28.3 mL/m2.   Aortic Valve The aortic valve appears structurally normal. There is normal leaflet mobility.The LVOT diameter is 2.12 cm.   Mitral Valve The mitral valve appears structurally normal. There is normal leaflet mobility.   Tricuspid Valve The tricuspid valve appears structurally normal.           Assessment:       1. Abnormal ECG    2. Chronic diastolic heart failure    3. Status post creation of pericardial window    4. PVD (peripheral vascular disease)    5. Prediabetes    6. Venous insufficiency    7. Stage 2 chronic kidney disease    8. Pericardial effusion    9. LAFB (left anterior fascicular block)    10. Essential hypertension    11. Dyslipidemia    12. Diastolic dysfunction with chronic heart failure    13. Paroxysmal atrial fibrillation         Plan:             Stable cardiovascular conditions at present time on current medical treatment.  Reviewed all tests and above medical conditions with patient in detail and formulated treatment plan.  Continue optimal medical  treatment for cardiovascular conditions.  Cardiac low salt diet discussed.  Daily exercise encouraged, goal 30 +  minutes aerobic exercise as tolerated.  Maintaining healthy weight and weight loss goals (if needed) were discussed in clinic.  PAF resolved for now.  Was not on ASA or NOAC during hospital stay or at time of discharge.  Add 81 mg ASA qd.   EP consult for PAF.  May stay off Metoprolol for now.  F/u in 6 months with echo.

## 2020-03-23 NOTE — LETTER
March 23, 2020      Sudhakar Liu MD  [address] [place] [address]           [facility]  [address] [facility]  [place] [address]  Phone: [phone]  Fax: [phone]          Patient: Dominic Cohen   MR Number: [id number]   YOB: 1935   Date of Visit: 3/23/2020       Dear Dr. Sudhakar Liu:    Thank you for referring Dominic Cohen to me for evaluation. Attached you will find relevant portions of my assessment and plan of care.    If you have questions, please do not hesitate to call me. I look forward to following Dominic Cohen along with you.    Sincerely,    Flaquito Camp MD    Enclosure  CC:  No Recipients    If you would like to receive this communication electronically, please contact [email or web address] or [phone] to request more information on [facility] access.    For providers and/or their staff who would like to refer a patient to [facility], please contact us through our one-stop-shop provider referral line, [facility], at [phone].    If you feel you have received this communication in error or would no longer like to receive these types of communications, please e-mail [email or web address]

## 2020-04-02 ENCOUNTER — TELEPHONE (OUTPATIENT)
Dept: ENDOSCOPY | Facility: HOSPITAL | Age: 85
End: 2020-04-02

## 2020-04-02 NOTE — TELEPHONE ENCOUNTER
Pt has referral for procedure. At this time we are not  scheduling any new procedures at this time due to Covid -19. Left message on pt's voicemail to call with any questions. long

## 2020-04-13 ENCOUNTER — CARE AT HOME (OUTPATIENT)
Dept: HOME HEALTH SERVICES | Facility: CLINIC | Age: 85
End: 2020-04-13
Payer: MEDICARE

## 2020-04-13 VITALS
RESPIRATION RATE: 18 BRPM | SYSTOLIC BLOOD PRESSURE: 156 MMHG | HEIGHT: 76 IN | OXYGEN SATURATION: 96 % | HEART RATE: 73 BPM | TEMPERATURE: 98 F | BODY MASS INDEX: 32.63 KG/M2 | DIASTOLIC BLOOD PRESSURE: 89 MMHG | WEIGHT: 268 LBS

## 2020-04-13 DIAGNOSIS — Z09 FOLLOW UP: Primary | ICD-10-CM

## 2020-04-13 PROCEDURE — 99348 HOME/RES VST EST LOW MDM 30: CPT | Mod: ,,, | Performed by: NURSE PRACTITIONER

## 2020-04-13 PROCEDURE — 99348 PR HOME VISIT,ESTAB PATIENT,LEVEL II: ICD-10-PCS | Mod: ,,, | Performed by: NURSE PRACTITIONER

## 2020-04-13 NOTE — PATIENT INSTRUCTIONS
- [facility] Nurse Practitioner to schedule home follow-up visit with patient in 4 weeks.  - Continue all medications, treatments and therapies as ordered.   - Follow all instructions, recommendations as discussed.  - Maintain Safety Precautions at all times.  - Attend all medical appointments as scheduled.  - For worsening symptoms: call Primary Care Physician or Nurse Practitioner.  - For emergencies, call 911 or immediately report to the nearest emergency room.  - Limit Risks of environmental exposure to coronavirus as discussed including: social distancing, hand hygiene, and limiting departures from the home for necessities only.

## 2020-04-13 NOTE — PROGRESS NOTES
[facility]  Medical Home Visit    Visit Date: 4/13/2020  Encounter Provider: Corby Cannon NP  PCP:  Sudhakar Liu MD    Subjective:      Patient ID: Dominic Cohen is a 84 y.o. male.    Consult Requested By:  Corby Cannon  Reason for Consult: Medical Follow-Up and Medication Review    The patient is being seen at home due to physical debility that presents a taxing effort to leave the home, to mitigate high risk of hospital readmission or due to the limited availability of reliable or safe options for transportation to the point of access to the provider. Prior to treatment on this visit the chart was reviewed and patient consent was obtained.    Chief Complaint: Follow-up for chronic medical conditions and medication review    Today:  Mr. Dominic Cohen is a 84 y.o. male is being seen today for follow-up for chronic medical conditions and medication review. Dominic presents at baseline state of health as reported by patient. VSS. Denies any acute issues, concerns or complaints to address on today's visit. Reports taking all medications as prescribed. Denies syncope/lightheadedness, chest pain or palpitations. Reports no dyspnea on exertion and no episodes of weakness. Reports morning blood glucose level of 123 mg/dL. Ambulates in home without need of assistive devices. Patient reports understanding of fluid retriction and surveillance of level of edema/fluctuation of weight to report to providers. Today's weight 268#. No other needs identified at this time. [facility] NP to follow up in 4 weeks or PRN.        ROS:   Review of Systems   Constitutional: Negative.  Negative for chills and fever.   HENT: Negative.    Eyes: Negative.    Respiratory: Negative.  Negative for chest tightness and shortness of breath.    Cardiovascular: Positive for leg swelling. Negative for chest pain and palpitations.   Gastrointestinal: Negative.    Endocrine: Negative.   "  Genitourinary: Negative.    Musculoskeletal: Negative.    Skin: Positive for color change and wound.        Hemosiderin staining to BLE  Midline upper abd and LUQ healed   Allergic/Immunologic: Negative.    Neurological: Negative.    Hematological: Negative.    Psychiatric/Behavioral: Negative.    All other systems reviewed and are negative.    Assessments:  · Environmental: single story home, no steps to enter, adequate lighting and temeprature control  · Functional Status: Independent with ADL's/IADL's, ambulates independently, continent of bowel and bladder, drives himself to appointments  · Safety: Fall Precautions,   · Nutritional: Adequate  · Home Health: [facility]   · DME/Supplies: walker (as needed),      Objective:     Vitals:    04/13/20 1138   BP: (!) 156/89   Pulse: 73   Resp: 18   Temp: 98.3 °F (36.8 °C)   TempSrc: Temporal   SpO2: 96%   Weight: 121.6 kg (268 lb)   Height: 6' 4" (1.93 m)   PainSc: 0-No pain     Body mass index is 32.62 kg/m².    Physical Exam   Constitutional: He is oriented to person, place, and time. He appears well-developed and well-nourished.   HENT:   Head: Normocephalic and atraumatic.   Eyes: Pupils are equal, round, and reactive to light. Conjunctivae and EOM are normal.   Neck: Normal range of motion. Neck supple. No JVD present.   Cardiovascular: Normal rate, regular rhythm and intact distal pulses.   No murmur heard.  +2 edema to BLE   Pulmonary/Chest: Effort normal and breath sounds normal. No respiratory distress. He has no rales.   Abdominal: Soft. Bowel sounds are normal.   Musculoskeletal: Normal range of motion. He exhibits edema.   Neurological: He is alert and oriented to person, place, and time.   Skin: Skin is warm and dry. Capillary refill takes less than 2 seconds.   Hemosiderin staining to BLE   Psychiatric: He has a normal mood and affect. His behavior is normal. Judgment and thought content normal.   Vitals reviewed.      Assessment:     1. Follow up    3.  "     Congestive heart failure    Plan:     Ethical / Legal: Advance Care Planning   Capacity to make medical decisions:  yes, Conflict no  · Surrogate decision maker:  Name Bill Trevino, Relationship: son  · Advance Directives:  none  · HCPOA: none  · [place]:  none  · Code Status:  full    Advanced Care Directives, HCPOA and [place] forms left in the home for family review, discussion and signing with instructions to return upon their next provider encounter for inclusion to the medical record.     Dominic was seen today for transitional care.    Diagnoses and all orders for this visit:     Encounter for Medical Follow-Up and Medication Review   - [facility] Nurse Practitioner to schedule home visit with patient in 4 weeks or PRN    Status post creation of pericardial window  CHF   - CHF Instructions given to monitor for weight increase, increase in edema, fluid restrictions   - monitor status of incision/puncture sites for signs of infection   - follow up with cardiology    Were controlled substances prescribed?  No    Follow Up Appointments:   Future Appointments   Date Time Provider Department Center   5/7/2020  2:30 PM Sudhakar Rivero MD [facility]   5/11/2020 11:00 AM Corby Cannon, TO[facility]   5/12/2020 10:35 AM LABORATORY, [facility]   5/19/2020 11:00 AM Tahir Barrios MD [facility] HEM ONC [facility]   6/12/2020  8:30 AM Sudhakar Liu MD [facility]       Signature:    Corby Cannon, MSN, APRN, FNP-C  [facility]      Attestation: Screening criteria to assess the level of the patient's risk for infection with COVID-19 as recommended by the CDC at the time of the above documented home visit concluded appropriateness to proceed. Universal precautions were maintained at all times, including provider use of >60% alcohol gel hand  immediately prior to entry and upon departing the patient's home as well as  cleaning of equipment used in home visit with antibacterial/germicidal disposable wipes.

## 2020-04-17 LAB
ACID FAST MOD KINY STN SPEC: NORMAL
ACID FAST MOD KINY STN SPEC: NORMAL
MYCOBACTERIUM SPEC QL CULT: NORMAL
MYCOBACTERIUM SPEC QL CULT: NORMAL

## 2020-04-21 ENCOUNTER — DOCUMENT SCAN (OUTPATIENT)
Dept: HOME HEALTH SERVICES | Facility: HOSPITAL | Age: 85
End: 2020-04-21
Payer: MEDICARE

## 2020-05-06 ENCOUNTER — PATIENT OUTREACH (OUTPATIENT)
Dept: ADMINISTRATIVE | Facility: OTHER | Age: 85
End: 2020-05-06

## 2020-05-08 ENCOUNTER — OFFICE VISIT (OUTPATIENT)
Dept: OPHTHALMOLOGY | Facility: CLINIC | Age: 85
End: 2020-05-08
Payer: MEDICARE

## 2020-05-08 DIAGNOSIS — H25.13 NUCLEAR SCLEROSIS, BILATERAL: ICD-10-CM

## 2020-05-08 DIAGNOSIS — H52.7 REFRACTIVE ERROR: ICD-10-CM

## 2020-05-08 DIAGNOSIS — H40.1133 PRIMARY OPEN ANGLE GLAUCOMA OF BOTH EYES, SEVERE STAGE: Primary | ICD-10-CM

## 2020-05-08 PROCEDURE — 92015 PR REFRACTION: ICD-10-PCS | Mod: ,,, | Performed by: OPHTHALMOLOGY

## 2020-05-08 PROCEDURE — 92250 COLOR FUNDUS PHOTOGRAPHY - OU - BOTH EYES: ICD-10-PCS | Mod: 26,S$PBB,, | Performed by: OPHTHALMOLOGY

## 2020-05-08 PROCEDURE — 99211 OFF/OP EST MAY X REQ PHY/QHP: CPT | Mod: PBBFAC | Performed by: OPHTHALMOLOGY

## 2020-05-08 PROCEDURE — 92014 PR EYE EXAM, EST PATIENT,COMPREHESV: ICD-10-PCS | Mod: S$PBB,,, | Performed by: OPHTHALMOLOGY

## 2020-05-08 PROCEDURE — 92015 DETERMINE REFRACTIVE STATE: CPT | Mod: ,,, | Performed by: OPHTHALMOLOGY

## 2020-05-08 PROCEDURE — 92250 FUNDUS PHOTOGRAPHY W/I&R: CPT | Mod: PBBFAC | Performed by: OPHTHALMOLOGY

## 2020-05-08 PROCEDURE — 99999 PR PBB SHADOW E&M-EST. PATIENT-LVL I: CPT | Mod: PBBFAC,,, | Performed by: OPHTHALMOLOGY

## 2020-05-08 PROCEDURE — 92014 COMPRE OPH EXAM EST PT 1/>: CPT | Mod: S$PBB,,, | Performed by: OPHTHALMOLOGY

## 2020-05-08 PROCEDURE — 99999 PR PBB SHADOW E&M-EST. PATIENT-LVL I: ICD-10-PCS | Mod: PBBFAC,,, | Performed by: OPHTHALMOLOGY

## 2020-05-08 NOTE — PROGRESS NOTES
SUBJECTIVE  Dominic [place] is 84 y.o. male  Corrected distance visual acuity was 20/40 in the right eye and CF at 3' in the left eye. Comments: VA checked through [facility]oropter.   Chief Complaint   Patient presents with    Glaucoma     3 month IOP check with DFE and SDP          HPI     Glaucoma      Additional comments: 3 month IOP check with DFE and SDP              Comments     Patient feels OU is doing well, no changes in VA but lost his glasses   while fishing and would like to get an updated RX. Patient states  using   drops as directed.      Pt previously saw Dr. Carvalho  Saw Dr. Jean-Baptiste 7/22/19    1. Severe COAG OS>OD tmax=26/32 Goal=16-17  Combigan=dizzy   SLT OD 5/15 (19-13)  SLT OS 4/15 (19-13)  2. NSC OU    Timolol qam OU   Latanoprost qhs OU          Last edited by Barbara Murrieta, Patient Care Assistant on 5/8/2020 10:49   AM. (History)         Assessment /Plan :  1. Primary open angle glaucoma of both eyes, severe stage Doing well, IOP within acceptable range relative to target IOP and no evidence of progression. Continue current treatment. Reviewed importance of continued compliance with treatment and follow up.     2. Nuclear sclerosis, bilateral Patient does reports mild visual decline from incipient cataractous changes, but not sufficient to affect activities of daily living. I recommend monitoring visual status and follow up when visual symptoms worsen.      Wants to have CE next year   3. Refractive error bifocal rx     Return to clinic in 3 months  or as needed.  With IOP Check and GOCT

## 2020-05-11 ENCOUNTER — CARE AT HOME (OUTPATIENT)
Dept: HOME HEALTH SERVICES | Facility: CLINIC | Age: 85
End: 2020-05-11
Payer: MEDICARE

## 2020-05-11 ENCOUNTER — TELEPHONE (OUTPATIENT)
Dept: ENDOSCOPY | Facility: HOSPITAL | Age: 85
End: 2020-05-11

## 2020-05-11 VITALS
DIASTOLIC BLOOD PRESSURE: 57 MMHG | OXYGEN SATURATION: 96 % | TEMPERATURE: 98 F | HEART RATE: 65 BPM | RESPIRATION RATE: 18 BRPM | SYSTOLIC BLOOD PRESSURE: 144 MMHG

## 2020-05-11 DIAGNOSIS — Z09 FOLLOW UP: Primary | ICD-10-CM

## 2020-05-11 PROCEDURE — 99348 PR HOME VISIT,ESTAB PATIENT,LEVEL II: ICD-10-PCS | Mod: ,,, | Performed by: NURSE PRACTITIONER

## 2020-05-11 PROCEDURE — 99348 HOME/RES VST EST LOW MDM 30: CPT | Mod: ,,, | Performed by: NURSE PRACTITIONER

## 2020-05-11 NOTE — PATIENT INSTRUCTIONS
- [facility] Nurse Practitioner to schedule home follow-up visit with patient in 4 weeks or as needed.  - Continue all medications, treatments and therapies as ordered.   - Follow all instructions, recommendations as discussed.  - Maintain Safety Precautions at all times.  - Attend all medical appointments as scheduled.  - For worsening symptoms: call Primary Care Physician or Nurse Practitioner.  - For emergencies, call 911 or immediately report to the nearest emergency room.  - Limit Risks of environmental exposure to coronavirus as discussed including: social distancing, hand hygiene, and limiting departures from the home for necessities only.       Fluid and Salt Restrictions    I have discussed with the patient/family about fluid and salt restrictions.  Patient has a condition that leads to salt and fluid retention.  It is important to adhere to the diet and fluid restrictions recommended in order to avoid future complications and worsening of the patient's diseases.    Fluid Restriction:  2000 mL per day.  This includes all fluids, drinks, and soup. Recommend to use a measuring cup and record volume.    Low Salt Diet: No added salt.  Avoid canned and processed foods.  Avoid most chips due to high sodium content.

## 2020-05-11 NOTE — PROGRESS NOTES
[facility]  Medical Home Visit    Visit Date: 5/11/2020  Encounter Provider: Corby Cannon NP  PCP:  Sudhakar Liu MD    Subjective:      Patient ID: Dominic Cohen is a 84 y.o. male.    Consult Requested By:  Corby Cannon  Reason for Consult: Medical Follow-Up and Medication Review    The patient is being seen at home due to physical debility that presents a taxing effort to leave the home, to mitigate high risk of hospital readmission or due to the limited availability of reliable or safe options for transportation to the point of access to the provider. Prior to treatment on this visit the chart was reviewed and patient consent was obtained.    Chief Complaint: Follow-up for chronic medical conditions and medication review    Today:  Mr. Dominic Cohen is a 84 y.o. male is being seen today for follow-up for chronic medical conditions and medication review. Dominic presents at baseline state of health as reported by patient. VSS. Denies any acute issues, concerns or complaints to address on today's visit. Reports taking all medications as prescribed. Denies syncope/lightheadedness, chest pain or palpitations. Reports no dyspnea on exertion and no episodes of weakness. Reports morning blood glucose level of 107 mg/dL. Ambulates in home without need of assistive devices. Patient reports understanding of fluid retriction and surveillance of level of edema/fluctuation of weight to report to providers. Today's weight 267#. No other needs identified at this time. [facility] NP to follow up in 4 weeks or PRN.        ROS:   Review of Systems   Constitutional: Negative.  Negative for chills and fever.   HENT: Negative for congestion and sneezing.    Eyes: Negative.    Respiratory: Negative.  Negative for chest tightness and shortness of breath.    Cardiovascular: Positive for leg swelling. Negative for chest pain and palpitations.   Gastrointestinal: Negative.     Endocrine: Negative.    Genitourinary: Negative.    Musculoskeletal: Negative.    Skin:        Hemosiderin staining to BLE  Midline upper abd and LUQ healed   Allergic/Immunologic: Negative.    Neurological: Negative.    Hematological: Negative.    Psychiatric/Behavioral: Negative.    All other systems reviewed and are negative.    Assessments:  · Environmental: single story home, no steps to enter, adequate lighting and temeprature control  · Functional Status: Independent with ADL's/IADL's, ambulates independently, continent of bowel and bladder, drives himself to appointments  · Safety: Fall Precautions,   · Nutritional: Adequate  · Home Health: [facility]   · DME/Supplies: walker (as needed),      Objective:     Vitals:    05/11/20 0915   BP: (!) 144/57   Pulse: 65   Resp: 18   Temp: 97.6 °F (36.4 °C)   TempSrc: Temporal   SpO2: 96%   PainSc: 0-No pain     There is no height or weight on file to calculate BMI.    Physical Exam   Constitutional: He is oriented to person, place, and time. He appears well-developed and well-nourished.   HENT:   Head: Normocephalic and atraumatic.   Eyes: Pupils are equal, round, and reactive to light. Conjunctivae and EOM are normal.   Neck: Normal range of motion. Neck supple. No JVD present.   Cardiovascular: Normal rate, regular rhythm and intact distal pulses.   No murmur heard.  +2 edema to BLE   Pulmonary/Chest: Effort normal and breath sounds normal. No respiratory distress. He has no rales.   Abdominal: Soft. Bowel sounds are normal.   Musculoskeletal: Normal range of motion. He exhibits edema.   Neurological: He is alert and oriented to person, place, and time.   Skin: Skin is warm and dry. Capillary refill takes less than 2 seconds.   Hemosiderin staining to BLE   Psychiatric: He has a normal mood and affect. His behavior is normal. Judgment and thought content normal.   Vitals reviewed.      Assessment:     1. Follow up    2.      CHF    Plan:     Ethical / Legal:  Advance Care Planning   Capacity to make medical decisions:  yes, Conflict no  · Surrogate decision maker:  Name Bill Trevino, Relationship: son  · Advance Directives:  none  · HCPOA: none  · [place]:  none  · Code Status:  full    Advanced Care Directives, HCPOA and LaPost forms left in the home for family review, discussion and signing with instructions to return upon their next provider encounter for inclusion to the medical record.     Dominic was seen today for transitional care.  Diagnoses and all orders for this visit:     Encounter for Medical Follow-Up and Medication Review   - [facility] Nurse Practitioner to schedule home visit with patient in 4 weeks or PRN    CHF, chronic   - CHF Instructions given to monitor for weight increase, increase in edema, fluid restrictions   - monitor status of incision/puncture sites for signs of infection   - follow up with cardiology    Were controlled substances prescribed?  No    Follow Up Appointments:   Future Appointments   Date Time Provider Department Center   5/12/2020 10:35 AM LABORATORY, [facility]   5/19/2020 11:00 AM Tahir Barrios MD [facility] HEM ONC [facility]   6/12/2020  8:30 AM [facility]   8/13/2020 10:30 AM Sudhakar Rivero MD [facility] OPHTHAL [facility]       Signature:    Corby Rojo, MSN, APRN, FNP-C  [facility]      Attestation: Screening criteria to assess the level of the patient's risk for infection with COVID-19 as recommended by the CDC at the time of the above documented home visit concluded appropriateness to proceed. Universal precautions were maintained at all times, including provider use of >60% alcohol gel hand  immediately prior to entry and upon departing the patient's home as well as cleaning of equipment used in home visit with antibacterial/germicidal disposable wipes.

## 2020-05-11 NOTE — TELEPHONE ENCOUNTER
----- Message from oJsue Thompson sent at 5/11/2020  7:54 AM CDT -----  Pt is requesting a call from nurse to r/s colon to next year.          Please call pt back at [phone]

## 2020-05-11 NOTE — TELEPHONE ENCOUNTER
Spoke with patient regarding procedure date for next year.  Patient encouraged to call office later to reschedule for next year, verbalized understanding.

## 2020-05-29 ENCOUNTER — PATIENT OUTREACH (OUTPATIENT)
Dept: ADMINISTRATIVE | Facility: HOSPITAL | Age: 85
End: 2020-05-29

## 2020-05-29 NOTE — PROGRESS NOTES
PreVisit Chart Audit Perfomed    Endo Called pt 05/11/2020 to schedule colon, Pt would like to schedule next yr per notes       Ladi SCHULTE LPN Care Coordinator  Care Coordination Department  [facility]  [phone]

## 2020-06-12 ENCOUNTER — OFFICE VISIT (OUTPATIENT)
Dept: FAMILY MEDICINE | Facility: CLINIC | Age: 85
End: 2020-06-12
Payer: MEDICARE

## 2020-06-12 VITALS
HEART RATE: 75 BPM | RESPIRATION RATE: 16 BRPM | BODY MASS INDEX: 32.47 KG/M2 | SYSTOLIC BLOOD PRESSURE: 128 MMHG | OXYGEN SATURATION: 98 % | DIASTOLIC BLOOD PRESSURE: 72 MMHG | TEMPERATURE: 98 F | WEIGHT: 266.75 LBS

## 2020-06-12 DIAGNOSIS — E78.5 DYSLIPIDEMIA: ICD-10-CM

## 2020-06-12 DIAGNOSIS — I10 ESSENTIAL HYPERTENSION: Primary | ICD-10-CM

## 2020-06-12 DIAGNOSIS — D64.9 ANEMIA, UNSPECIFIED TYPE: ICD-10-CM

## 2020-06-12 DIAGNOSIS — M54.2 NECK PAIN: ICD-10-CM

## 2020-06-12 PROCEDURE — 99213 OFFICE O/P EST LOW 20 MIN: CPT | Mod: PBBFAC,PO | Performed by: INTERNAL MEDICINE

## 2020-06-12 PROCEDURE — 99214 PR OFFICE/OUTPT VISIT, EST, LEVL IV, 30-39 MIN: ICD-10-PCS | Mod: S$PBB,,, | Performed by: INTERNAL MEDICINE

## 2020-06-12 PROCEDURE — 99214 OFFICE O/P EST MOD 30 MIN: CPT | Mod: S$PBB,,, | Performed by: INTERNAL MEDICINE

## 2020-06-12 PROCEDURE — 99999 PR PBB SHADOW E&M-EST. PATIENT-LVL III: CPT | Mod: PBBFAC,,, | Performed by: INTERNAL MEDICINE

## 2020-06-12 PROCEDURE — 99999 PR PBB SHADOW E&M-EST. PATIENT-LVL III: ICD-10-PCS | Mod: PBBFAC,,, | Performed by: INTERNAL MEDICINE

## 2020-06-12 RX ORDER — MELOXICAM 15 MG/1
15 TABLET ORAL DAILY PRN
Qty: 30 TABLET | Refills: 0 | Status: SHIPPED | OUTPATIENT
Start: 2020-06-12 | End: 2020-11-30

## 2020-06-12 NOTE — PROGRESS NOTES
Subjective:       Patient ID: Dominic Cohen is a 84 y.o. male.    Chief Complaint: Follow-up; Hypertension; Hyperlipidemia; Neck Pain; and Anemia    Follow-up   Associated symptoms include arthralgias, fatigue, myalgias and neck pain. Pertinent negatives include no abdominal pain, chest pain, chills, coughing, diaphoresis, fever, headaches, joint swelling, nausea, numbness, rash, sore throat, vomiting or weakness.   Hypertension   Associated symptoms include neck pain. Pertinent negatives include no chest pain, headaches, palpitations or shortness of breath.   Hyperlipidemia   Associated symptoms include myalgias. Pertinent negatives include no chest pain or shortness of breath.   Neck Pain    Pertinent negatives include no chest pain, fever, headaches, numbness, photophobia, trouble swallowing or weakness.   Anemia   There has been no abdominal pain, bruising/bleeding easily, confusion, fever, light-headedness, pallor or palpitations.     Past Medical History:   Diagnosis Date    Arthritis     Atherosclerosis of abdominal aorta     Cataract     CHF (congestive heart failure)     Chronic constipation     Glaucoma     History of anal fissures     Hypertension     Polyneuropathy in other diseases classified elsewhere     due to folate deficiency    Prediabetes     Venous insufficiency     Venous insufficiency      Past Surgical History:   Procedure Laterality Date    BACK SURGERY      CATARACT EXTRACTION BILATERAL W/ ANTERIOR VITRECTOMY      COLONOSCOPY N/A 5/13/2016    Procedure: COLONOSCOPY;  Surgeon: Presley Phillips MD;  Location: [address];  Service: Endoscopy;  Laterality: N/A;  EGD with Dr. Jeffery prior to Colonoscopy. EC    ESOPHAGOGASTRODUODENOSCOPY N/A 7/5/2018    Procedure: ESOPHAGOGASTRODUODENOSCOPY (EGD);  Surgeon: Khanh Asencio III, MD;  Location: [facility];  Service: Endoscopy;  Laterality: N/A;    JOINT REPLACEMENT Left     x 2    KNEE SURGERY Left     x2.  revision 06/27/17    PERICARDIAL WINDOW N/A 2/14/2020    Procedure: CREATION, PERICARDIAL WINDOW;  Surgeon: Ankush Graham MD;  Location: [facility] OR;  Service: Cardiothoracic;  Laterality: N/A;    PROSTATE SURGERY      TONSILLECTOMY, ADENOIDECTOMY      TUBE THORACOTOMY Left 2/14/2020    Procedure: INSERTION, CATHETER, INTERCOSTAL, FOR DRAINAGE;  Surgeon: Ankush Graham MD;  Location: [facility] OR;  Service: Cardiothoracic;  Laterality: Left;  LEFT PLEURA CHEST TUBE PLACEMENT     Family History   Problem Relation Age of Onset    No Known Problems Son     No Known Problems Daughter     No Known Problems Son     Diabetes Neg Hx     Heart disease Neg Hx     Cancer Neg Hx     Celiac disease Neg Hx     Cirrhosis Neg Hx     Colon cancer Neg Hx     Colon polyps Neg Hx     Crohn's disease Neg Hx     Cystic fibrosis Neg Hx     Esophageal cancer Neg Hx     Hemochromatosis Neg Hx     Inflammatory bowel disease Neg Hx     Irritable bowel syndrome Neg Hx     Liver cancer Neg Hx     Liver disease Neg Hx     Rectal cancer Neg Hx     Stomach cancer Neg Hx     Ulcerative colitis Neg Hx     Zak's disease Neg Hx     Amblyopia Neg Hx     Blindness Neg Hx     Glaucoma Neg Hx     Hypertension Neg Hx     Macular degeneration Neg Hx     Retinal detachment Neg Hx     Strabismus Neg Hx      Social History     Socioeconomic History    Marital status:      Spouse name: Not on file    Number of children: 3    Years of education: Not on file    Highest education level: Not on file   Occupational History    Occupation: Retired     Comment: [place], [place]   Social Needs    Financial resource strain: Not on file    Food insecurity:     Worry: Not on file     Inability: Not on file    Transportation needs:     Medical: Not on file     Non-medical: Not on file   Tobacco Use    Smoking status: Former Smoker     Packs/day: 0.30     Years: 52.00     Pack years: 15.60     Types: Cigarettes     Last attempt  to quit: 2000     Years since quittin.5    Smokeless tobacco: Never Used   Substance and Sexual Activity    Alcohol use: No    Drug use: No    Sexual activity: Yes   Lifestyle    Physical activity:     Days per week: Not on file     Minutes per session: Not on file    Stress: Not on file   Relationships    Social connections:     Talks on phone: Not on file     Gets together: Not on file     Attends Denominational service: Not on file     Active member of club or organization: Not on file     Attends meetings of clubs or organizations: Not on file     Relationship status: Not on file   Other Topics Concern    Not on file   Social History Narrative    Not on file     Review of Systems   Constitutional: Positive for fatigue. Negative for activity change, appetite change, chills, diaphoresis, fever and unexpected weight change.   HENT: Negative for drooling, ear discharge, ear pain, facial swelling, hearing loss, mouth sores, nosebleeds, postnasal drip, rhinorrhea, sinus pressure, sneezing, sore throat, tinnitus, trouble swallowing and voice change.    Eyes: Negative for photophobia, redness and visual disturbance.   Respiratory: Negative for apnea, cough, choking, chest tightness, shortness of breath and wheezing.    Cardiovascular: Negative for chest pain, palpitations and leg swelling.   Gastrointestinal: Negative for abdominal distention, abdominal pain, anal bleeding, blood in stool, constipation, diarrhea, nausea and vomiting.   Endocrine: Negative for cold intolerance, heat intolerance, polydipsia, polyphagia and polyuria.   Genitourinary: Negative for difficulty urinating, dysuria, enuresis, flank pain, frequency, genital sores, hematuria and urgency.   Musculoskeletal: Positive for arthralgias, myalgias and neck pain. Negative for back pain, gait problem, joint swelling and neck stiffness.   Skin: Negative for color change, pallor, rash and wound.   Allergic/Immunologic: Negative for food  allergies and immunocompromised state.   Neurological: Negative for dizziness, tremors, seizures, syncope, facial asymmetry, speech difficulty, weakness, light-headedness, numbness and headaches.   Hematological: Negative for adenopathy. Does not bruise/bleed easily.   Psychiatric/Behavioral: Negative for agitation, behavioral problems, confusion, decreased concentration, dysphoric mood, hallucinations, self-injury, sleep disturbance and suicidal ideas. The patient is not nervous/anxious and is not hyperactive.        Objective:      Physical Exam   Constitutional: He is oriented to person, place, and time. He appears well-developed and well-nourished. No distress.   HENT:   Head: Normocephalic and atraumatic.   Eyes: Pupils are equal, round, and reactive to light.   Neck: Normal range of motion. Neck supple. No JVD present. Carotid bruit is not present. No tracheal deviation present. No thyromegaly present.   Cardiovascular: Normal rate, regular rhythm, normal heart sounds and intact distal pulses.   Pulmonary/Chest: Effort normal and breath sounds normal. No respiratory distress. He has no wheezes. He has no rales. He exhibits no tenderness.   Abdominal: Soft. Bowel sounds are normal. He exhibits no distension. There is no tenderness. There is no rebound and no guarding.   Musculoskeletal: Normal range of motion. He exhibits no edema or tenderness.   Lymphadenopathy:     He has no cervical adenopathy.   Neurological: He is alert and oriented to person, place, and time.   Skin: Skin is warm and dry. No rash noted. He is not diaphoretic. No erythema. No pallor.   Psychiatric: He has a normal mood and affect. His behavior is normal. Judgment and thought content normal.   Nursing note and vitals reviewed.      CMP  Sodium   Date Value Ref Range Status   03/06/2020 141 136 - 145 mmol/L Final     Potassium   Date Value Ref Range Status   03/06/2020 3.5 3.5 - 5.1 mmol/L Final     Chloride   Date Value Ref Range Status    03/06/2020 103 95 - 110 mmol/L Final     CO2   Date Value Ref Range Status   03/06/2020 28 23 - 29 mmol/L Final     Glucose   Date Value Ref Range Status   03/06/2020 109 70 - 110 mg/dL Final     BUN, Bld   Date Value Ref Range Status   03/06/2020 12 8 - 23 mg/dL Final     Creatinine   Date Value Ref Range Status   03/06/2020 0.9 0.5 - 1.4 mg/dL Final     Calcium   Date Value Ref Range Status   03/06/2020 8.7 8.7 - 10.5 mg/dL Final     Total Protein   Date Value Ref Range Status   03/06/2020 7.6 6.0 - 8.4 g/dL Final     Albumin   Date Value Ref Range Status   03/06/2020 2.8 (L) 3.5 - 5.2 g/dL Final     Total Bilirubin   Date Value Ref Range Status   03/06/2020 0.5 0.1 - 1.0 mg/dL Final     Comment:     For infants and newborns, interpretation of results should be based  on gestational age, weight and in agreement with clinical  observations.  Premature Infant recommended reference ranges:  Up to 24 hours.............<8.0 mg/dL  Up to 48 hours............<12.0 mg/dL  3-5 days..................<15.0 mg/dL  6-29 days.................<15.0 mg/dL       Alkaline Phosphatase   Date Value Ref Range Status   03/06/2020 58 55 - 135 U/L Final     AST   Date Value Ref Range Status   03/06/2020 18 10 - 40 U/L Final     ALT   Date Value Ref Range Status   03/06/2020 17 10 - 44 U/L Final     Anion Gap   Date Value Ref Range Status   03/06/2020 10 8 - 16 mmol/L Final     eGFR if    Date Value Ref Range Status   03/06/2020 >60 >60 mL/min/1.73 m^2 Final     eGFR if non    Date Value Ref Range Status   03/06/2020 >60 >60 mL/min/1.73 m^2 Final     Comment:     Calculation used to obtain the estimated glomerular filtration  rate (eGFR) is the CKD-EPI equation.        Lab Results   Component Value Date    WBC 7.32 03/06/2020    HGB 10.1 (L) 03/06/2020    HCT 32.9 (L) 03/06/2020     (H) 03/06/2020     03/06/2020     Lab Results   Component Value Date    CHOL 115 (L) 02/03/2020     Lab  Results   Component Value Date    HDL 43 02/03/2020     Lab Results   Component Value Date    LDLCALC 59.6 (L) 02/03/2020     Lab Results   Component Value Date    TRIG 62 02/03/2020     Lab Results   Component Value Date    CHOLHDL 37.4 02/03/2020     Lab Results   Component Value Date    TSH 0.368 (L) 03/06/2020     Lab Results   Component Value Date    HGBA1C 5.5 06/07/2018     Assessment:       1. Essential hypertension    2. Dyslipidemia    3. Anemia, unspecified type    4. Neck pain        Plan:   Essential hypertension    Dyslipidemia    Anemia, unspecified type    Neck pain-------call if persists-  -     meloxicam (MOBIC) 15 MG tablet; Take 1 tablet (15 mg total) by mouth daily as needed.  Dispense: 30 tablet; Refill: 0    Stable-------continue meds.        F/u 6 months----------

## 2020-06-15 ENCOUNTER — CARE AT HOME (OUTPATIENT)
Dept: HOME HEALTH SERVICES | Facility: CLINIC | Age: 85
End: 2020-06-15
Payer: MEDICARE

## 2020-06-15 ENCOUNTER — LAB VISIT (OUTPATIENT)
Dept: PRIMARY CARE CLINIC | Facility: OTHER | Age: 85
End: 2020-06-15
Payer: MEDICARE

## 2020-06-15 ENCOUNTER — TELEPHONE (OUTPATIENT)
Dept: FAMILY MEDICINE | Facility: CLINIC | Age: 85
End: 2020-06-15

## 2020-06-15 ENCOUNTER — LAB VISIT (OUTPATIENT)
Dept: LAB | Facility: HOSPITAL | Age: 85
End: 2020-06-15
Attending: INTERNAL MEDICINE
Payer: MEDICARE

## 2020-06-15 VITALS
OXYGEN SATURATION: 97 % | DIASTOLIC BLOOD PRESSURE: 77 MMHG | HEART RATE: 59 BPM | SYSTOLIC BLOOD PRESSURE: 136 MMHG | WEIGHT: 266 LBS | RESPIRATION RATE: 16 BRPM | HEIGHT: 76 IN | TEMPERATURE: 98 F | BODY MASS INDEX: 32.39 KG/M2

## 2020-06-15 DIAGNOSIS — Z09 FOLLOW UP: Primary | ICD-10-CM

## 2020-06-15 DIAGNOSIS — Z72.89 OTHER PROBLEMS RELATED TO LIFESTYLE: ICD-10-CM

## 2020-06-15 DIAGNOSIS — E64.0 SEQUELAE OF PROTEIN-CALORIE MALNUTRITION: ICD-10-CM

## 2020-06-15 DIAGNOSIS — D50.9 NORMOCYTIC HYPOCHROMIC ANEMIA: ICD-10-CM

## 2020-06-15 DIAGNOSIS — Z11.59 SCREENING FOR VIRAL DISEASE: Primary | ICD-10-CM

## 2020-06-15 DIAGNOSIS — Z11.4 ENCOUNTER FOR SCREENING FOR HUMAN IMMUNODEFICIENCY VIRUS (HIV): ICD-10-CM

## 2020-06-15 DIAGNOSIS — D50.0 IRON DEFICIENCY ANEMIA DUE TO CHRONIC BLOOD LOSS: ICD-10-CM

## 2020-06-15 DIAGNOSIS — R94.5 ABNORMAL RESULTS OF LIVER FUNCTION STUDIES: ICD-10-CM

## 2020-06-15 LAB
BASOPHILS # BLD AUTO: 0.03 K/UL (ref 0–0.2)
BASOPHILS NFR BLD: 0.6 % (ref 0–1.9)
DIFFERENTIAL METHOD: ABNORMAL
EOSINOPHIL # BLD AUTO: 0.4 K/UL (ref 0–0.5)
EOSINOPHIL NFR BLD: 7.7 % (ref 0–8)
ERYTHROCYTE [DISTWIDTH] IN BLOOD BY AUTOMATED COUNT: 13.2 % (ref 11.5–14.5)
FERRITIN SERPL-MCNC: 819 NG/ML (ref 20–300)
FERRITIN SERPL-MCNC: 819 NG/ML (ref 20–300)
HAPTOGLOB SERPL-MCNC: 164 MG/DL (ref 30–250)
HCT VFR BLD AUTO: 36.2 % (ref 40–54)
HGB BLD-MCNC: 11.7 G/DL (ref 14–18)
IMM GRANULOCYTES # BLD AUTO: 0.01 K/UL (ref 0–0.04)
IMM GRANULOCYTES NFR BLD AUTO: 0.2 % (ref 0–0.5)
IRON SERPL-MCNC: 83 UG/DL (ref 45–160)
IRON SERPL-MCNC: 83 UG/DL (ref 45–160)
LDH SERPL L TO P-CCNC: 196 U/L (ref 110–260)
LYMPHOCYTES # BLD AUTO: 1.3 K/UL (ref 1–4.8)
LYMPHOCYTES NFR BLD: 26.9 % (ref 18–48)
MCH RBC QN AUTO: 33.2 PG (ref 27–31)
MCHC RBC AUTO-ENTMCNC: 32.3 G/DL (ref 32–36)
MCV RBC AUTO: 103 FL (ref 82–98)
MONOCYTES # BLD AUTO: 0.6 K/UL (ref 0.3–1)
MONOCYTES NFR BLD: 11.7 % (ref 4–15)
NEUTROPHILS # BLD AUTO: 2.6 K/UL (ref 1.8–7.7)
NEUTROPHILS NFR BLD: 52.9 % (ref 38–73)
NRBC BLD-RTO: 0 /100 WBC
PLATELET # BLD AUTO: ABNORMAL K/UL (ref 150–350)
PMV BLD AUTO: 11.5 FL (ref 9.2–12.9)
RBC # BLD AUTO: 3.52 M/UL (ref 4.6–6.2)
RETICS/RBC NFR AUTO: 1.7 % (ref 0.4–2)
SATURATED IRON: 31 % (ref 20–50)
SATURATED IRON: 31 % (ref 20–50)
TOTAL IRON BINDING CAPACITY: 268 UG/DL (ref 250–450)
TOTAL IRON BINDING CAPACITY: 271 UG/DL (ref 250–450)
TRANSFERRIN SERPL-MCNC: 181 MG/DL (ref 200–375)
TRANSFERRIN SERPL-MCNC: 183 MG/DL (ref 200–375)
VIT B12 SERPL-MCNC: 562 PG/ML (ref 210–950)
WBC # BLD AUTO: 4.95 K/UL (ref 3.9–12.7)

## 2020-06-15 PROCEDURE — 84165 PROTEIN E-PHORESIS SERUM: CPT | Mod: 26,,, | Performed by: PATHOLOGY

## 2020-06-15 PROCEDURE — 82728 ASSAY OF FERRITIN: CPT

## 2020-06-15 PROCEDURE — 82607 VITAMIN B-12: CPT

## 2020-06-15 PROCEDURE — 80074 ACUTE HEPATITIS PANEL: CPT

## 2020-06-15 PROCEDURE — 85025 COMPLETE CBC W/AUTO DIFF WBC: CPT

## 2020-06-15 PROCEDURE — U0003 INFECTIOUS AGENT DETECTION BY NUCLEIC ACID (DNA OR RNA); SEVERE ACUTE RESPIRATORY SYNDROME CORONAVIRUS 2 (SARS-COV-2) (CORONAVIRUS DISEASE [COVID-19]), AMPLIFIED PROBE TECHNIQUE, MAKING USE OF HIGH THROUGHPUT TECHNOLOGIES AS DESCRIBED BY CMS-2020-01-R: HCPCS

## 2020-06-15 PROCEDURE — 84165 PROTEIN E-PHORESIS SERUM: CPT

## 2020-06-15 PROCEDURE — 86334 IMMUNOFIX E-PHORESIS SERUM: CPT

## 2020-06-15 PROCEDURE — 83615 LACTATE (LD) (LDH) ENZYME: CPT

## 2020-06-15 PROCEDURE — 83520 IMMUNOASSAY QUANT NOS NONAB: CPT | Mod: 59

## 2020-06-15 PROCEDURE — 99349 PR HOME VISIT,ESTAB PATIENT,LEVEL III: ICD-10-PCS | Mod: S$GLB,,, | Performed by: NURSE PRACTITIONER

## 2020-06-15 PROCEDURE — 86334 IMMUNOFIX E-PHORESIS SERUM: CPT | Mod: 26,,, | Performed by: PATHOLOGY

## 2020-06-15 PROCEDURE — 83540 ASSAY OF IRON: CPT | Mod: 91

## 2020-06-15 PROCEDURE — 84165 PATHOLOGIST INTERPRETATION SPE: ICD-10-PCS | Mod: 26,,, | Performed by: PATHOLOGY

## 2020-06-15 PROCEDURE — 99349 HOME/RES VST EST MOD MDM 40: CPT | Mod: S$GLB,,, | Performed by: NURSE PRACTITIONER

## 2020-06-15 PROCEDURE — 86334 PATHOLOGIST INTERPRETATION IFE: ICD-10-PCS | Mod: 26,,, | Performed by: PATHOLOGY

## 2020-06-15 PROCEDURE — 86703 HIV-1/HIV-2 1 RESULT ANTBDY: CPT

## 2020-06-15 PROCEDURE — 36415 COLL VENOUS BLD VENIPUNCTURE: CPT

## 2020-06-15 PROCEDURE — 83010 ASSAY OF HAPTOGLOBIN QUANT: CPT

## 2020-06-15 PROCEDURE — 85045 AUTOMATED RETICULOCYTE COUNT: CPT

## 2020-06-15 NOTE — TELEPHONE ENCOUNTER
Pt is at [facility] location for lab work this morning  Please contact pt   He is confused about this appt for 9 this morning

## 2020-06-15 NOTE — LETTER
June 16, 2020    Dominic Cohen  [address]  [facility] [address]             [facility]  Primary Care  [address] [facility] [address]   Dear Mr. Dominic Cohen:    Below are the results from your recent visit:    Your test was NEGATIVE for COVID-19 (coronavirus).  If you still have symptoms, treat with rest, fluids, and over-the-counter medications.  Continue to follow local guidelines and practice proper handwashing.     If your symptoms worsen or if you have any other concerns, please contact [facility] at [phone].

## 2020-06-16 LAB
ALBUMIN SERPL ELPH-MCNC: 3.76 G/DL (ref 3.35–5.55)
ALPHA1 GLOB SERPL ELPH-MCNC: 0.29 G/DL (ref 0.17–0.41)
ALPHA2 GLOB SERPL ELPH-MCNC: 0.82 G/DL (ref 0.43–0.99)
B-GLOBULIN SERPL ELPH-MCNC: 0.6 G/DL (ref 0.5–1.1)
GAMMA GLOB SERPL ELPH-MCNC: 1.73 G/DL (ref 0.67–1.58)
HAV IGM SERPL QL IA: NEGATIVE
HBV CORE IGM SERPL QL IA: NEGATIVE
HBV SURFACE AG SERPL QL IA: NEGATIVE
HCV AB SERPL QL IA: NEGATIVE
HIV 1+2 AB+HIV1 P24 AG SERPL QL IA: NEGATIVE
KAPPA LC SER QL IA: 5.05 MG/DL (ref 0.33–1.94)
KAPPA LC/LAMBDA SER IA: 2.39 (ref 0.26–1.65)
LAMBDA LC SER QL IA: 2.11 MG/DL (ref 0.57–2.63)
PATHOLOGIST INTERPRETATION SPE: NORMAL
PROT SERPL-MCNC: 7.2 G/DL (ref 6–8.4)
SARS-COV-2 RNA RESP QL NAA+PROBE: NOT DETECTED

## 2020-06-16 NOTE — PROGRESS NOTES
[facility]  Medical Home Visit    Visit Date: 6/15/2020  Encounter Provider: Corby Cannon, TOÑO  PCP:  Sudhakar Liu MD    Subjective:      Patient ID: Dominic Cohen is a 84 y.o. male.    Consult Requested By:  Corby Cannon  Reason for Consult: Medical Follow-Up and Medication Review    The patient is being seen at home due to physical debility that presents a taxing effort to leave the home, to mitigate high risk of hospital readmission or due to the limited availability of reliable or safe options for transportation to the point of access to the provider. Prior to treatment on this visit the chart was reviewed and patient consent was obtained.    Chief Complaint: Follow-up for chronic medical conditions and medication review    Today:  Mr. Dominic Cohen is a 84 y.o. male is being seen today for follow-up for chronic medical conditions and medication review. Dominic presents at baseline state of health as reported by patient. VSS. Denies any acute issues, concerns or complaints to address on today's visit. Reports taking all medications as prescribed. Denies syncope/lightheadedness, chest pain or palpitations. Reports no dyspnea on exertion and no episodes of weakness. Reports morning blood glucose level of 122 mg/dL. Ambulates in home without need of assistive devices. Patient reports understanding of fluid retriction and surveillance of level of edema/fluctuation of weight to report to providers. Today's weight 268#. No other needs identified at this time. [facility] NP to follow up in one month or PRN.        ROS:  Review of Systems   Constitutional: Negative.  Negative for chills and fever.   HENT: Negative for congestion and sneezing.    Eyes: Negative.    Respiratory: Negative.  Negative for chest tightness and shortness of breath.    Cardiovascular: Positive for leg swelling (improved). Negative for chest pain and palpitations.  "  Gastrointestinal: Negative.    Endocrine: Negative.    Genitourinary: Negative.    Musculoskeletal: Negative.    Skin:        Hemosiderin staining to BLE  Midline upper abd and LUQ healed   Allergic/Immunologic: Negative.    Neurological: Negative.    Hematological: Negative.    Psychiatric/Behavioral: Negative.    All other systems reviewed and are negative.    Assessments:  · Environmental: single story home, no steps to enter, adequate lighting and temeprature control  · Functional Status: Independent with ADL's/IADL's, ambulates independently, continent of bowel and bladder, drives himself to appointments  · Safety: Fall Precautions,   · Nutritional: Adequate  · Home Health: [facility]   · DME/Supplies: walker (as needed),      Objective:     Vitals:    06/15/20 0925   BP: 136/77   Pulse: (!) 59   Resp: 16   Temp: 98.4 °F (36.9 °C)   TempSrc: Temporal   SpO2: 97%   Weight: 120.7 kg (266 lb)   Height: 6' 4" (1.93 m)   PainSc:   1     Body mass index is 32.38 kg/m².    Physical Exam  Vitals signs reviewed.   Constitutional:       Appearance: He is well-developed.   HENT:      Head: Normocephalic and atraumatic.   Eyes:      Conjunctiva/sclera: Conjunctivae normal.      Pupils: Pupils are equal, round, and reactive to light.   Neck:      Musculoskeletal: Normal range of motion and neck supple.      Vascular: No JVD.   Cardiovascular:      Rate and Rhythm: Normal rate and regular rhythm.      Heart sounds: No murmur.      Comments: +2 edema to BLE  Pulmonary:      Effort: Pulmonary effort is normal. No respiratory distress.      Breath sounds: Normal breath sounds. No rales.   Abdominal:      General: Bowel sounds are normal.      Palpations: Abdomen is soft.   Musculoskeletal: Normal range of motion.   Skin:     General: Skin is warm and dry.      Capillary Refill: Capillary refill takes less than 2 seconds.      Comments: Hemosiderin staining to BLE   Neurological:      Mental Status: He is alert and oriented to " person, place, and time.   Psychiatric:         Behavior: Behavior normal.         Thought Content: Thought content normal.         Judgment: Judgment normal.       Assessment:     1. Follow up    2.      CHF    Plan:     Ethical / Legal: Advance Care Planning   Capacity to make medical decisions:  yes, Conflict no  · Surrogate decision maker:  Chilango Trevino, Relationship: son  · Advance Directives:  none  · HCPOA: none  · LaPOST:  none  · Code Status:  full    Advanced Care Directives, HCPOA and LaPost forms left in the home for family review, discussion and signing with instructions to return upon their next provider encounter for inclusion to the medical record.     Dominic was seen today for transitional care.  Diagnoses and all orders for this visit:     Encounter for Medical Follow-Up and Medication Review   - [facility] Nurse Practitioner to schedule home visit with patient in one month or PRN    CHF, chronic   - CHF Instructions given to monitor for weight increase, increase in edema, fluid restrictions   - follow up with cardiology    Were controlled substances prescribed?  No    Follow Up Appointments:   Future Appointments   Date Time Provider Department Center   6/18/2020  4:00 PM Tahir Barrios MD [facility] HEM ONC [facility]   8/13/2020 10:30 AM Sudhakar Rivero MD [facility] OPHTHAL [facility]   12/14/2020  9:00 AM Sudhakar Liu MD [facility]       Signature:    Corby Rojo, MSN, APRN, FNP-C  [facility]      Attestation: Screening criteria to assess the level of the patient's risk for infection with COVID-19 as recommended by the CDC at the time of the above documented home visit concluded appropriateness to proceed. Universal precautions were maintained at all times, including provider use of >60% alcohol gel hand  immediately prior to entry and upon departing the patient's home as well as cleaning of equipment used in home visit with  antibacterial/germicidal disposable wipes.

## 2020-06-16 NOTE — PATIENT INSTRUCTIONS
- [facility] Nurse Practitioner to schedule home follow-up visit with patient in one month or as needed.  - Continue all medications, treatments and therapies as ordered.   - Follow all instructions, recommendations as discussed.  - Maintain Safety Precautions at all times.  - Attend all medical appointments as scheduled.  - For worsening symptoms: call Primary Care Physician or Nurse Practitioner.  - For emergencies, call 911 or immediately report to the nearest emergency room.  - Limit Risks of environmental exposure to coronavirus as discussed including: social distancing, hand hygiene, and limiting departures from the home for necessities only.

## 2020-06-17 LAB
INTERPRETATION SERPL IFE-IMP: NORMAL
PATHOLOGIST INTERPRETATION IFE: NORMAL

## 2020-06-18 ENCOUNTER — TELEPHONE (OUTPATIENT)
Dept: EMERGENCY MEDICINE | Facility: HOSPITAL | Age: 85
End: 2020-06-18

## 2020-06-18 ENCOUNTER — OFFICE VISIT (OUTPATIENT)
Dept: HEMATOLOGY/ONCOLOGY | Facility: CLINIC | Age: 85
End: 2020-06-18
Payer: MEDICARE

## 2020-06-18 VITALS
BODY MASS INDEX: 32.19 KG/M2 | WEIGHT: 264.31 LBS | TEMPERATURE: 98 F | SYSTOLIC BLOOD PRESSURE: 136 MMHG | HEART RATE: 69 BPM | DIASTOLIC BLOOD PRESSURE: 68 MMHG | HEIGHT: 76 IN | OXYGEN SATURATION: 96 %

## 2020-06-18 DIAGNOSIS — D50.0 IRON DEFICIENCY ANEMIA DUE TO CHRONIC BLOOD LOSS: Primary | ICD-10-CM

## 2020-06-18 DIAGNOSIS — D47.2 MGUS (MONOCLONAL GAMMOPATHY OF UNKNOWN SIGNIFICANCE): ICD-10-CM

## 2020-06-18 PROCEDURE — 99214 OFFICE O/P EST MOD 30 MIN: CPT | Mod: S$PBB,,, | Performed by: INTERNAL MEDICINE

## 2020-06-18 PROCEDURE — 99999 PR PBB SHADOW E&M-EST. PATIENT-LVL V: ICD-10-PCS | Mod: PBBFAC,,, | Performed by: INTERNAL MEDICINE

## 2020-06-18 PROCEDURE — 99214 PR OFFICE/OUTPT VISIT, EST, LEVL IV, 30-39 MIN: ICD-10-PCS | Mod: S$PBB,,, | Performed by: INTERNAL MEDICINE

## 2020-06-18 PROCEDURE — 99215 OFFICE O/P EST HI 40 MIN: CPT | Mod: PBBFAC | Performed by: INTERNAL MEDICINE

## 2020-06-18 PROCEDURE — 99999 PR PBB SHADOW E&M-EST. PATIENT-LVL V: CPT | Mod: PBBFAC,,, | Performed by: INTERNAL MEDICINE

## 2020-06-18 NOTE — PROGRESS NOTES
Subjective:       Patient ID: Dominic Cohen is a 84 y.o. male.    Chief Complaint: Results and Anemia    HPI 84-year-old male history of iron deficiency anemia responsive to intravenous iron patient returns for review patient had an EGD with esophagitis in 2018 last colonoscopy in 2016.  Patient returns for review of slight elevation of free light chain    Past Medical History:   Diagnosis Date    Arthritis     Atherosclerosis of abdominal aorta     Cataract     CHF (congestive heart failure)     Chronic constipation     Glaucoma     History of anal fissures     Hypertension     Polyneuropathy in other diseases classified elsewhere     due to folate deficiency    Prediabetes     Venous insufficiency     Venous insufficiency      Family History   Problem Relation Age of Onset    No Known Problems Son     No Known Problems Daughter     No Known Problems Son     Diabetes Neg Hx     Heart disease Neg Hx     Cancer Neg Hx     Celiac disease Neg Hx     Cirrhosis Neg Hx     Colon cancer Neg Hx     Colon polyps Neg Hx     Crohn's disease Neg Hx     Cystic fibrosis Neg Hx     Esophageal cancer Neg Hx     Hemochromatosis Neg Hx     Inflammatory bowel disease Neg Hx     Irritable bowel syndrome Neg Hx     Liver cancer Neg Hx     Liver disease Neg Hx     Rectal cancer Neg Hx     Stomach cancer Neg Hx     Ulcerative colitis Neg Hx     Zak's disease Neg Hx     Amblyopia Neg Hx     Blindness Neg Hx     Glaucoma Neg Hx     Hypertension Neg Hx     Macular degeneration Neg Hx     Retinal detachment Neg Hx     Strabismus Neg Hx      Social History     Socioeconomic History    Marital status:      Spouse name: Not on file    Number of children: 3    Years of education: Not on file    Highest education level: Not on file   Occupational History    Occupation: Retired     Comment: [place], [place]   Social Needs    Financial resource strain: Not on file    Food insecurity      Worry: Not on file     Inability: Not on file    Transportation needs     Medical: Not on file     Non-medical: Not on file   Tobacco Use    Smoking status: Former Smoker     Packs/day: 0.30     Years: 52.00     Pack years: 15.60     Types: Cigarettes     Quit date: 2000     Years since quittin.6    Smokeless tobacco: Never Used   Substance and Sexual Activity    Alcohol use: No    Drug use: No    Sexual activity: Yes   Lifestyle    Physical activity     Days per week: Not on file     Minutes per session: Not on file    Stress: Not on file   Relationships    Social connections     Talks on phone: Not on file     Gets together: Not on file     Attends Orthodox service: Not on file     Active member of club or organization: Not on file     Attends meetings of clubs or organizations: Not on file     Relationship status: Not on file   Other Topics Concern    Not on file   Social History Narrative    Not on file     Past Surgical History:   Procedure Laterality Date    BACK SURGERY      CATARACT EXTRACTION BILATERAL W/ ANTERIOR VITRECTOMY      COLONOSCOPY N/A 2016    Procedure: COLONOSCOPY;  Surgeon: Presley Phillips MD;  Location: [address];  Service: Endoscopy;  Laterality: N/A;  EGD with Dr. Jeffery prior to Colonoscopy. EC    ESOPHAGOGASTRODUODENOSCOPY N/A 2018    Procedure: ESOPHAGOGASTRODUODENOSCOPY (EGD);  Surgeon: Khanh Asencio III, MD;  Location[facility];  Service: Endoscopy;  Laterality: N/A;    JOINT REPLACEMENT Left     x 2    KNEE SURGERY Left     x2. revision 17    PERICARDIAL WINDOW N/A 2020    Procedure: CREATION, PERICARDIAL WINDOW;  Surgeon: Ankush Graham MD;  Location: [facility];  Service: Cardiothoracic;  Laterality: N/A;    PROSTATE SURGERY      TONSILLECTOMY, ADENOIDECTOMY      TUBE THORACOTOMY Left 2020    Procedure: INSERTION, CATHETER, INTERCOSTAL, FOR DRAINAGE;  Surgeon: Ankush Graham MD;  Location: [facility];  Service:  Cardiothoracic;  Laterality: Left;  LEFT PLEURA CHEST TUBE PLACEMENT       Labs:  Lab Results   Component Value Date    WBC 4.95 06/15/2020    HGB 11.7 (L) 06/15/2020    HCT 36.2 (L) 06/15/2020     (H) 06/15/2020    PLT SEE COMMENT 06/15/2020     BMP  Lab Results   Component Value Date     03/06/2020    K 3.5 03/06/2020     03/06/2020    CO2 28 03/06/2020    BUN 12 03/06/2020    CREATININE 0.9 03/06/2020    CALCIUM 8.7 03/06/2020    ANIONGAP 10 03/06/2020    ESTGFRAFRICA >60 03/06/2020    EGFRNONAA >60 03/06/2020     Lab Results   Component Value Date    ALT 17 03/06/2020    AST 18 03/06/2020    ALKPHOS 58 03/06/2020    BILITOT 0.5 03/06/2020       Lab Results   Component Value Date    IRON 83 06/15/2020    IRON 83 06/15/2020    TIBC 268 06/15/2020    TIBC 271 06/15/2020    FERRITIN 819 (H) 06/15/2020    FERRITIN 819 (H) 06/15/2020     Lab Results   Component Value Date    TYNDZQDW41 562 06/15/2020     Lab Results   Component Value Date    FOLATE 5.8 02/09/2018     Lab Results   Component Value Date    TSH 0.368 (L) 03/06/2020         Review of Systems   Constitutional: Positive for fatigue. Negative for activity change, appetite change, chills, diaphoresis, fever and unexpected weight change.   HENT: Negative for congestion, dental problem, drooling, ear discharge, ear pain, facial swelling, hearing loss, mouth sores, nosebleeds, postnasal drip, rhinorrhea, sinus pressure, sneezing, sore throat, tinnitus, trouble swallowing and voice change.    Eyes: Negative for photophobia, pain, discharge, redness, itching and visual disturbance.   Respiratory: Negative for apnea, cough, choking, chest tightness, shortness of breath, wheezing and stridor.    Cardiovascular: Negative for chest pain, palpitations and leg swelling.   Gastrointestinal: Negative for abdominal distention, abdominal pain, anal bleeding, blood in stool, constipation, diarrhea, nausea, rectal pain and vomiting.   Endocrine: Negative  for cold intolerance, heat intolerance, polydipsia, polyphagia and polyuria.   Genitourinary: Negative for decreased urine volume, difficulty urinating, discharge, dysuria, enuresis, flank pain, frequency, genital sores, hematuria, penile pain, penile swelling, scrotal swelling, testicular pain and urgency.   Musculoskeletal: Negative for arthralgias, back pain, gait problem, joint swelling, myalgias, neck pain and neck stiffness.   Skin: Negative for color change, pallor, rash and wound.   Allergic/Immunologic: Negative for environmental allergies, food allergies and immunocompromised state.   Neurological: Positive for weakness. Negative for dizziness, tremors, seizures, syncope, facial asymmetry, speech difficulty, light-headedness, numbness and headaches.   Hematological: Negative for adenopathy. Does not bruise/bleed easily.   Psychiatric/Behavioral: Positive for dysphoric mood. Negative for agitation, behavioral problems, confusion, decreased concentration, hallucinations, self-injury, sleep disturbance and suicidal ideas. The patient is nervous/anxious. The patient is not hyperactive.        Objective:      Physical Exam  Vitals signs reviewed.   Constitutional:       General: He is not in acute distress.     Appearance: He is well-developed. He is not diaphoretic.   HENT:      Head: Normocephalic.      Right Ear: External ear normal.      Left Ear: External ear normal.      Nose: Nose normal.      Right Sinus: No maxillary sinus tenderness or frontal sinus tenderness.      Left Sinus: No maxillary sinus tenderness or frontal sinus tenderness.      Mouth/Throat:      Pharynx: No oropharyngeal exudate.   Eyes:      General: Lids are normal. No scleral icterus.        Right eye: No discharge.         Left eye: No discharge.      Extraocular Movements:      Right eye: Normal extraocular motion.      Left eye: Normal extraocular motion.      Conjunctiva/sclera:      Right eye: Right conjunctiva is not injected.  No hemorrhage.     Left eye: Left conjunctiva is not injected. No hemorrhage.     Pupils: Pupils are equal, round, and reactive to light.   Neck:      Musculoskeletal: Normal range of motion and neck supple.      Thyroid: No thyromegaly.      Vascular: No JVD.      Trachea: No tracheal deviation.   Cardiovascular:      Rate and Rhythm: Normal rate.   Pulmonary:      Effort: Pulmonary effort is normal. No respiratory distress.      Breath sounds: No stridor.   Abdominal:      General: Bowel sounds are normal.      Palpations: Abdomen is soft. There is no hepatomegaly, splenomegaly or mass.      Tenderness: There is no abdominal tenderness.   Musculoskeletal: Normal range of motion.         General: No tenderness.   Lymphadenopathy:      Head:      Right side of head: No posterior auricular or occipital adenopathy.      Left side of head: No posterior auricular or occipital adenopathy.      Cervical: No cervical adenopathy.      Right cervical: No superficial, deep or posterior cervical adenopathy.     Left cervical: No superficial, deep or posterior cervical adenopathy.      Upper Body:      Right upper body: No supraclavicular adenopathy.      Left upper body: No supraclavicular adenopathy.   Skin:     General: Skin is dry.      Findings: No erythema or rash.      Nails: There is no clubbing.     Neurological:      Mental Status: He is alert and oriented to person, place, and time.      Cranial Nerves: No cranial nerve deficit.      Coordination: Coordination normal.   Psychiatric:         Behavior: Behavior normal.         Thought Content: Thought content normal.         Judgment: Judgment normal.             Assessment:      1. Iron deficiency anemia due to chronic blood loss    2. MGUS (monoclonal gammopathy of unknown significance)           Plan:     Documented iron deficiency responsive to intravenous iron.  I sent also has free light chain ratio at this point would recommend repeat EGD and colon sense new  onset of iron deficiency diagnosed since previous a endoscopies in previous esophagitis noted.  Orders placed for EGD and colon follow-up in 4 months with CBC iron status and free light chain ratio prior nurse practitioner to review        Tahir Barrios Jr, MD FACP

## 2020-06-18 NOTE — TELEPHONE ENCOUNTER
Patient returned call. Results of negative COVID-19 testing relayed to patient.     Lesa Bell PA-C

## 2020-07-19 RX ORDER — TRIAMCINOLONE ACETONIDE 1 MG/G
CREAM TOPICAL 2 TIMES DAILY PRN
Qty: 454 G | Refills: 1 | Status: SHIPPED | OUTPATIENT
Start: 2020-07-19 | End: 2021-11-09

## 2020-07-27 ENCOUNTER — CARE AT HOME (OUTPATIENT)
Dept: HOME HEALTH SERVICES | Facility: CLINIC | Age: 85
End: 2020-07-27
Payer: MEDICARE

## 2020-07-27 VITALS
OXYGEN SATURATION: 97 % | BODY MASS INDEX: 32.26 KG/M2 | TEMPERATURE: 98 F | DIASTOLIC BLOOD PRESSURE: 66 MMHG | RESPIRATION RATE: 18 BRPM | WEIGHT: 265 LBS | HEART RATE: 65 BPM | SYSTOLIC BLOOD PRESSURE: 109 MMHG

## 2020-07-27 DIAGNOSIS — L98.9 SKIN LESIONS: ICD-10-CM

## 2020-07-27 DIAGNOSIS — Z09 FOLLOW UP: Primary | ICD-10-CM

## 2020-07-27 PROCEDURE — 99497 PR ADVNCD CARE PLAN 30 MIN: ICD-10-PCS | Mod: 25,S$GLB,, | Performed by: NURSE PRACTITIONER

## 2020-07-27 PROCEDURE — 99349 PR HOME VISIT,ESTAB PATIENT,LEVEL III: ICD-10-PCS | Mod: S$GLB,,, | Performed by: NURSE PRACTITIONER

## 2020-07-27 PROCEDURE — 99497 ADVNCD CARE PLAN 30 MIN: CPT | Mod: 25,S$GLB,, | Performed by: NURSE PRACTITIONER

## 2020-07-27 PROCEDURE — 99349 HOME/RES VST EST MOD MDM 40: CPT | Mod: S$GLB,,, | Performed by: NURSE PRACTITIONER

## 2020-07-27 NOTE — PROGRESS NOTES
[facility]  Medical Home Visit    Visit Date: 7/27/2020  Encounter Provider: Corby Rojo NP  PCP:  Sudhakar Liu MD    Subjective:      Patient ID: Dominic Cohen is a 84 y.o. male.    Consult Requested By:  No ref. provider found  Reason for Consult: Medical Follow-Up and Medication Review    The patient is being seen at home due to physical debility that presents a taxing effort to leave the home, to mitigate high risk of hospital readmission or due to the limited availability of reliable or safe options for transportation to the point of access to the provider. Prior to treatment on this visit the chart was reviewed and patient consent was obtained.    Chief Complaint: Follow-up for chronic medical conditions and medication review, skin lesions to lower mid-back     Today:  Mr. Dominic Cohen is a 84 y.o. male is being seen today for follow-up for chronic medical conditions and medication review and skin lesions to lower mid-back. Dominic presents at baseline state of health as reported by patient. VSS. Reports one episode of light-headedness when he was cutting the grass during this past week. His syptoms resolved with rest in the air conditioning. Denies any acute issues, concerns or complaints to address on today's visit except for the emergence of several (5-6_ lower mid back skin lesions of approximately 6-8 mm each. He reports that they itch and that he has been seen by someone at [facility] about them before. He was prescribed an ointment would have cost him $500 and he did not buy due to financial limitations.He requests a consult to revisit these with an alternate provider. Referral placed in the system. Reports taking all medications as prescribed. Denies syncope or current lightheadedness, chest pain or palpitations. Reports no dyspnea on exertion and no further episodes of  weakness. Reports morning blood glucose level of 108 mg/dL. Ambulates in home without  "need of assistive devices. Patient reports understanding of fluid retriction and surveillance of level of edema/fluctuation of weight to report to providers. Today's weight 265#. Edema to his left lower extremity is greater thn the right. He tales furosemide on MWF as prescribed by cardiology. Fluid restriion and low salt diet reinforced.  No other needs identified at this time. [facility] NP to follow up in 4-6 weeks or PRN.        ROS:  Review of Systems   Constitutional: Negative.  Negative for chills and fever.   HENT: Negative for congestion and sneezing.    Eyes: Negative.    Respiratory: Negative.  Negative for chest tightness and shortness of breath.    Cardiovascular: Positive for leg swelling (improved). Negative for chest pain and palpitations.   Gastrointestinal: Negative.    Endocrine: Negative.    Genitourinary: Negative.    Musculoskeletal: Negative.    Skin:        Hemosiderin staining to BLE  Mid-back skin "bumps"   Allergic/Immunologic: Negative.    Neurological: Negative.    Hematological: Negative.    Psychiatric/Behavioral: Negative.    All other systems reviewed and are negative.    Assessments:  · Environmental: single story home, no steps to enter, adequate lighting and temeprature control  · Functional Status: Independent with ADL's/IADL's, ambulates independently, continent of bowel and bladder, drives himself to appointments  · Safety: Fall Precautions, COVID Precuations  · Nutritional: Adequate  · Home Health: N/A  · DME/Supplies: walker (as needed),      Objective:     Vitals:    07/27/20 1154   BP: 109/66   Pulse: 65   Resp: 18   Temp: 98.1 °F (36.7 °C)   TempSrc: Temporal   SpO2: 97%   Weight: 120.2 kg (265 lb)   PainSc: 0-No pain     Body mass index is 32.26 kg/m².    Physical Exam  Vitals signs reviewed.   Constitutional:       Appearance: He is well-developed. He is not ill-appearing.   HENT:      Head: Normocephalic and atraumatic.   Eyes:      Conjunctiva/sclera: " Conjunctivae normal.      Pupils: Pupils are equal, round, and reactive to light.   Neck:      Musculoskeletal: Normal range of motion and neck supple.      Vascular: No JVD.   Cardiovascular:      Rate and Rhythm: Normal rate. Rhythm irregular.      Heart sounds: No murmur.      Comments: +2 edema to BLE  Pulmonary:      Effort: Pulmonary effort is normal. No respiratory distress.      Breath sounds: Normal breath sounds. No rales.   Abdominal:      General: Bowel sounds are normal.      Palpations: Abdomen is soft.   Musculoskeletal: Normal range of motion.      Right lower leg: Edema (2+) present.      Left lower leg: Edema (3+) present.   Skin:     General: Skin is warm and dry.      Capillary Refill: Capillary refill takes less than 2 seconds.      Findings: Lesion (mid back (5-6), dark brown, 6-8 mm) present.      Comments: Hemosiderin staining to BLE   Neurological:      Mental Status: He is alert and oriented to person, place, and time.   Psychiatric:         Behavior: Behavior normal.         Thought Content: Thought content normal.         Judgment: Judgment normal.       Assessment:     1. Follow up    2. Skin lesions    2.      CHF    Plan:     Ethical / Legal: Advance Care Planning   Capacity to make medical decisions:  yes, Conflict no  · Surrogate decision maker:  Name Bill Trevino, Relationship: son  · Advance Directives:  none  · HCPOA: none  · [place]:  none  · Code Status:  full    Advanced Care Directives, HCPOA and [place] forms left in the home for family review, discussion and signing with instructions to return upon their next provider encounter for inclusion to the medical record.     Dominic was seen today for transitional care.  Diagnoses and all orders for this visit:     Encounter for Medical Follow-Up and Medication Review   - [facility] Nurse Practitioner to schedule home visit with patient in 4-6 weeks or PRN    CHF, chronic   - CHF Instructions given to monitor for weight  increase, increase in edema, fluid restrictions   - follow up with cardiology    Skin Lesions  - referral to dermatology     Were controlled substances prescribed?  No    Follow Up Appointments:   Future Appointments   Date Time Provider Department Center   9/17/2020 11:00 AM Sudhakar Rivero MD [facility]   12/14/2020  9:00 AM Sudhakar Liu MD [facility]     Signature:    Corby Rojo, MSN, APRN, FNP-C  [facility]    Attestation: Screening criteria to assess the level of the patient's risk for infection with COVID-19 as recommended by the CDC at the time of the above documented home visit concluded appropriateness to proceed. Universal precautions were maintained at all times, including provider use of >60% alcohol gel hand  immediately prior to entry and upon departing the patient's home as well as cleaning of equipment used in home visit with antibacterial/germicidal disposable wipes.    Total face-to-face time was 40 min, >50% of this was spent on counseling and coordination of care. The following issues were discussed: primary and secondary diagnoses, co-morbidities, prescribed medications, treatment modalities, importance of compliance with medical advice and directives for follow-up care    With the consent of the patient and/or caregiver(s), an additional 20 minutes included a comprehensive discussion regarding Advanced Care Planning. Sources of emotional and spiritual support were identified and encouraged for involvement to assist in finalization of decisions regarding the patient's goals of care (Living Will). Topics discussed include statutory guidelines of the [facility] to determine/delineate the legal surrogate medical decision maker should the patient be deemed incompetent to self-direct medical care (next of kin vs.POA) and the required documentation to ensure legal validity thereof ([place] Directives/[place]).

## 2020-07-28 ENCOUNTER — TELEPHONE (OUTPATIENT)
Dept: DERMATOLOGY | Facility: CLINIC | Age: 85
End: 2020-07-28

## 2020-07-28 NOTE — TELEPHONE ENCOUNTER
----- Message from Trish Srivastava sent at 7/28/2020  2:42 PM CDT -----  Regarding: return call  Type:  Patient Returning Call    Who Called:PT  Who Left Message for Patient:AYO  Does the patient know what this is regarding?:YES  Would the patient rather a call back or a response via [facility]? CALL BACK  Best Call Back Number:[phone]  Additional Information:

## 2020-09-01 ENCOUNTER — HOSPITAL ENCOUNTER (OUTPATIENT)
Dept: RADIOLOGY | Facility: HOSPITAL | Age: 85
Discharge: HOME | End: 2020-09-01
Attending: PODIATRIST
Payer: MEDICARE

## 2020-09-01 ENCOUNTER — CARE AT HOME (OUTPATIENT)
Dept: HOME HEALTH SERVICES | Facility: CLINIC | Age: 85
End: 2020-09-01
Payer: MEDICARE

## 2020-09-01 ENCOUNTER — OFFICE VISIT (OUTPATIENT)
Dept: PODIATRY | Facility: CLINIC | Age: 85
End: 2020-09-01
Payer: MEDICARE

## 2020-09-01 VITALS
TEMPERATURE: 98 F | OXYGEN SATURATION: 97 % | HEART RATE: 64 BPM | SYSTOLIC BLOOD PRESSURE: 135 MMHG | DIASTOLIC BLOOD PRESSURE: 72 MMHG | RESPIRATION RATE: 18 BRPM | BODY MASS INDEX: 32.14 KG/M2 | WEIGHT: 264 LBS

## 2020-09-01 VITALS
HEIGHT: 76 IN | DIASTOLIC BLOOD PRESSURE: 75 MMHG | WEIGHT: 274.94 LBS | HEART RATE: 70 BPM | BODY MASS INDEX: 33.48 KG/M2 | SYSTOLIC BLOOD PRESSURE: 130 MMHG

## 2020-09-01 DIAGNOSIS — M79.671 BILATERAL FOOT PAIN: ICD-10-CM

## 2020-09-01 DIAGNOSIS — M79.672 BILATERAL FOOT PAIN: ICD-10-CM

## 2020-09-01 DIAGNOSIS — M79.672 CHRONIC PAIN OF BOTH FEET: Primary | ICD-10-CM

## 2020-09-01 DIAGNOSIS — M19.072 PRIMARY OSTEOARTHRITIS OF LEFT FOOT: ICD-10-CM

## 2020-09-01 DIAGNOSIS — M79.675 PAIN IN TOES OF BOTH FEET: ICD-10-CM

## 2020-09-01 DIAGNOSIS — M79.671 CHRONIC PAIN OF BOTH FEET: Primary | ICD-10-CM

## 2020-09-01 DIAGNOSIS — M79.674 PAIN IN TOES OF BOTH FEET: ICD-10-CM

## 2020-09-01 DIAGNOSIS — R60.0 LOWER LEG EDEMA: ICD-10-CM

## 2020-09-01 DIAGNOSIS — M19.071 PRIMARY OSTEOARTHRITIS OF RIGHT FOOT: ICD-10-CM

## 2020-09-01 DIAGNOSIS — M21.41 PES PLANUS OF BOTH FEET: ICD-10-CM

## 2020-09-01 DIAGNOSIS — M21.42 PES PLANUS OF BOTH FEET: ICD-10-CM

## 2020-09-01 DIAGNOSIS — M79.672 BILATERAL FOOT PAIN: Primary | ICD-10-CM

## 2020-09-01 DIAGNOSIS — N18.2 STAGE 2 CHRONIC KIDNEY DISEASE: ICD-10-CM

## 2020-09-01 DIAGNOSIS — Z09 FOLLOW UP: ICD-10-CM

## 2020-09-01 DIAGNOSIS — G89.29 CHRONIC PAIN OF BOTH FEET: Primary | ICD-10-CM

## 2020-09-01 DIAGNOSIS — B35.1 TINEA UNGUIUM: ICD-10-CM

## 2020-09-01 DIAGNOSIS — M79.671 BILATERAL FOOT PAIN: Primary | ICD-10-CM

## 2020-09-01 PROCEDURE — 11720 DEBRIDE NAIL 1-5: CPT | Mod: S$PBB,,, | Performed by: PODIATRIST

## 2020-09-01 PROCEDURE — 73630 XR FOOT COMPLETE 3 VIEW BILATERAL: ICD-10-PCS | Mod: 26,50,, | Performed by: RADIOLOGY

## 2020-09-01 PROCEDURE — 73630 X-RAY EXAM OF FOOT: CPT | Mod: TC,50

## 2020-09-01 PROCEDURE — 99349 HOME/RES VST EST MOD MDM 40: CPT | Mod: S$GLB,,, | Performed by: NURSE PRACTITIONER

## 2020-09-01 PROCEDURE — 99999 PR PBB SHADOW E&M-EST. PATIENT-LVL III: ICD-10-PCS | Mod: PBBFAC,,, | Performed by: PODIATRIST

## 2020-09-01 PROCEDURE — 73630 X-RAY EXAM OF FOOT: CPT | Mod: 26,50,, | Performed by: RADIOLOGY

## 2020-09-01 PROCEDURE — 99214 PR OFFICE/OUTPT VISIT, EST, LEVL IV, 30-39 MIN: ICD-10-PCS | Mod: 25,S$PBB,, | Performed by: PODIATRIST

## 2020-09-01 PROCEDURE — 11720 DEBRIDE NAIL 1-5: CPT | Mod: PBBFAC | Performed by: PODIATRIST

## 2020-09-01 PROCEDURE — 11720 PR DEBRIDEMENT OF NAIL(S), 1-5: ICD-10-PCS | Mod: S$PBB,,, | Performed by: PODIATRIST

## 2020-09-01 PROCEDURE — 99999 PR PBB SHADOW E&M-EST. PATIENT-LVL III: CPT | Mod: PBBFAC,,, | Performed by: PODIATRIST

## 2020-09-01 PROCEDURE — 99214 OFFICE O/P EST MOD 30 MIN: CPT | Mod: 25,S$PBB,, | Performed by: PODIATRIST

## 2020-09-01 PROCEDURE — 99213 OFFICE O/P EST LOW 20 MIN: CPT | Mod: PBBFAC,25 | Performed by: PODIATRIST

## 2020-09-01 PROCEDURE — 99349 PR HOME VISIT,ESTAB PATIENT,LEVEL III: ICD-10-PCS | Mod: S$GLB,,, | Performed by: NURSE PRACTITIONER

## 2020-09-01 NOTE — PROGRESS NOTES
Subjective:       Patient ID: Dominic Cohen is a 84 y.o. male.    Chief Complaint: Foot Pain (Pt c/o bilateral foot pain and swelling. Pt reports throbbing and aching pain rating 8/10 at present. Non daibetic pt. Wears casula shoes w/ socks. PCP Dr Liu.)    HPI: Dominic Cohen presents to the office today, with complaints of bilateral foot and ankle pain.  Relates throbbing achy pains which he rates as 8/10.  Reports that he has a a history of gout.  Denies pain with light touch.  Denies nocturnal pain.  Relates that pain has been ongoing for several weeks.  Reports that he has not utilized anti-inflammatories.  The is ambulating with nighttime slippers today.  Patient also complains of thickened discolored, elongated toenails which she states causes a decrease in limitation and ambulation.     Review of patient's allergies indicates:   Allergen Reactions    Penicillins Hives and Swelling    Protonix [pantoprazole] Rash    Sulfa (sulfonamide antibiotics) Hives       Past Medical History:   Diagnosis Date    Arthritis     Atherosclerosis of abdominal aorta     Cataract     CHF (congestive heart failure)     Chronic constipation     Glaucoma     History of anal fissures     Hypertension     Polyneuropathy in other diseases classified elsewhere     due to folate deficiency    Prediabetes     Venous insufficiency     Venous insufficiency        Family History   Problem Relation Age of Onset    No Known Problems Son     No Known Problems Daughter     No Known Problems Son     Diabetes Neg Hx     Heart disease Neg Hx     Cancer Neg Hx     Celiac disease Neg Hx     Cirrhosis Neg Hx     Colon cancer Neg Hx     Colon polyps Neg Hx     Crohn's disease Neg Hx     Cystic fibrosis Neg Hx     Esophageal cancer Neg Hx     Hemochromatosis Neg Hx     Inflammatory bowel disease Neg Hx     Irritable bowel syndrome Neg Hx     Liver cancer Neg Hx     Liver disease Neg Hx     Rectal  cancer Neg Hx     Stomach cancer Neg Hx     Ulcerative colitis Neg Hx     Zak's disease Neg Hx     Amblyopia Neg Hx     Blindness Neg Hx     Glaucoma Neg Hx     Hypertension Neg Hx     Macular degeneration Neg Hx     Retinal detachment Neg Hx     Strabismus Neg Hx        Social History     Socioeconomic History    Marital status:      Spouse name: Not on file    Number of children: 3    Years of education: Not on file    Highest education level: Not on file   Occupational History    Occupation: Retired     Comment: [place], [place]   Social Needs    Financial resource strain: Not on file    Food insecurity     Worry: Not on file     Inability: Not on file    Transportation needs     Medical: Not on file     Non-medical: Not on file   Tobacco Use    Smoking status: Former Smoker     Packs/day: 0.30     Years: 52.00     Pack years: 15.60     Types: Cigarettes     Quit date: 2000     Years since quittin.8    Smokeless tobacco: Never Used   Substance and Sexual Activity    Alcohol use: No    Drug use: No    Sexual activity: Yes   Lifestyle    Physical activity     Days per week: Not on file     Minutes per session: Not on file    Stress: Not on file   Relationships    Social connections     Talks on phone: Not on file     Gets together: Not on file     Attends [employer] service: Not on file     Active member of club or organization: Not on file     Attends meetings of clubs or organizations: Not on file     Relationship status: Not on file   Other Topics Concern    Not on file   Social History Narrative    Not on file       Past Surgical History:   Procedure Laterality Date    BACK SURGERY      CATARACT EXTRACTION BILATERAL W/ ANTERIOR VITRECTOMY      COLONOSCOPY N/A 2016    Procedure: COLONOSCOPY;  Surgeon: Presley Phillips MD;  Location: [address]);  Service: Endoscopy;  Laterality: N/A;  EGD with Dr. Jeffery prior to Colonoscopy. EC     "ESOPHAGOGASTRODUODENOSCOPY N/A 7/5/2018    Procedure: ESOPHAGOGASTRODUODENOSCOPY (EGD);  Surgeon: Khanh Asencio III, MD;  Location: [facility] ENDO;  Service: Endoscopy;  Laterality: N/A;    JOINT REPLACEMENT Left     x 2    KNEE SURGERY Left     x2. revision 06/27/17    PERICARDIAL WINDOW N/A 2/14/2020    Procedure: CREATION, PERICARDIAL WINDOW;  Surgeon: Ankush Graham MD;  Location: [facility] OR;  Service: Cardiothoracic;  Laterality: N/A;    PROSTATE SURGERY      TONSILLECTOMY, ADENOIDECTOMY      TUBE THORACOTOMY Left 2/14/2020    Procedure: INSERTION, CATHETER, INTERCOSTAL, FOR DRAINAGE;  Surgeon: Ankush Graham MD;  Location: [facility] OR;  Service: Cardiothoracic;  Laterality: Left;  LEFT PLEURA CHEST TUBE PLACEMENT       Review of Systems   Constitutional: Negative for activity change, appetite change, chills and fever.   HENT: Negative for sinus pain, sore throat and voice change.    Eyes: Negative for pain, redness and visual disturbance.   Respiratory: Negative for cough and shortness of breath.    Cardiovascular: Positive for leg swelling. Negative for chest pain and palpitations.   Gastrointestinal: Negative for diarrhea, nausea and vomiting.   Musculoskeletal: Positive for arthralgias, gait problem and joint swelling. Negative for back pain.   Skin: Negative for color change and wound.   Neurological: Negative for dizziness, weakness and numbness.   Psychiatric/Behavioral: The patient is not nervous/anxious.           Objective:   /75   Pulse 70   Ht 6' 4" (1.93 m)   Wt 124.7 kg (274 lb 14.6 oz)   BMI 33.46 kg/m²     X-Ray Foot Complete 3 view Bilateral  Narrative: EXAMINATION:  XR FOOT COMPLETE 3 VIEW BILATERAL    CLINICAL HISTORY:  Foot pain; Pain in right foot    TECHNIQUE:  AP, lateral, and oblique views of both feet were performed.    COMPARISON:  02/27/2020    FINDINGS:  No fracture or dislocation.  No soft tissue swelling.  Smooth corticated erosions at the 1st MTP joint similar to prior " exam.  Bilateral bony prominence at the posterior calcaneus.  Bilateral plantar calcaneal spurs.  Stable scattered multi articular degenerative changes.  Mild widening at the PIP joint left 5th digit, not significantly changed.  Bilateral hallux valgus deformities, stable.  Impression: As above    Electronically signed by: Kai Carpenter MD  Date:    09/01/2020  Time:    15:20       Physical Exam   LOWER EXTREMITY PHYSICAL EXAMINATION    Vascular: Capillary refill time is prolonged. Spider veins are noted to the bilateral lower extremity. Edema is nonpitting and moderate in nature. Proximal to distal temperature cooling. Palpation of pulses to the lower extremity is diminished on the left and right. The left dorsalis pedis pulse is 2/4 and on the right is 2/4. The left posterior tibial pulse is 1/4 on the left and is 1/4 on the right. Hair growth is diminished to absent on the bilateral dorsal foot and at the digits.      Neurological: Sensation to light touch is intact. Proprioception is intact, bilateral. Sensation to pin prick is intact.  Vibratory sensation is intact.      Musculoskeletal: Manual Muscle Testing is 5/5 with dorsiflexion, plantar flexion, abduction, and adduction.  Limited range of motion dorsiflexion plantar flexion of the 1st metatarsophalangeal joint with slight crepitus.  Decrease in limited range of motion of the subtalar joint and hindfoot.  Ankle range of motion within normal limits.  No crepitus is noted within the ankle joint.  No pain on range of motion with or without resistance.  Gait pattern is mildly antalgic.  Patient ambulates with a cane for assistance    Dermatological:  Skin is thin, shiny, and atrophic.  There are no ulcerations, calluses, or lesions noted to the dorsal or plantar aspect of the bilateral feet.  No increase in warmth.  There is bilateral lower extremity edema.  Hair growth is decreased.    Imaging:      Results for orders placed during the hospital encounter of  02/27/20   X-Ray Foot Complete 3 view Bilateral    Narrative EXAMINATION:  XR FOOT COMPLETE 3 VIEW BILATERAL    CLINICAL HISTORY:  Pain in right ankle and joints of right foot    TECHNIQUE:  AP, lateral, and oblique views of both feet were performed.    COMPARISON:  None    FINDINGS:  Bilateral hallux valgus deformities are noted.  There appear to be some chronic appearing well corticated marginal erosions associated with the metatarsal head on the left.  The possibility of chronic gout is not excluded.  No acute fracture or dislocation.  There are moderate degenerative changes noted in the region of either midfoot.  Prominent bilateral plantar calcaneal enthesophytes are noted.  There is prominent bony enlargement along the posterior aspect of either calcaneus suspicious for bilateral Tenzin's deformities.      Impression 1.  As above      Electronically signed by: Titi Childress DO  Date:    02/27/2020  Time:    13:01       No results found for this or any previous visit.       Results for orders placed during the hospital encounter of 09/11/17   X-Ray Foot Complete Right    Narrative Technique: AP, lateral, and oblique views were obtained of the right foot    Comparison: none    Findings: There is calcified this deformity with evidence of prior bunionectomy.  No acute fractures or dislocations demonstrated. Scattered degenerative findings noted throughout the forefoot and midfoot.  No erosive osseous changes demonstrated.There is a prominent plantar surface calcaneal enthesophyte present.    Impression As above. No acute findings.            Electronically signed by: MARIO DAY MD  Date:     09/11/17  Time:    12:29            Assessment:     1. Chronic pain of both feet    2. Primary osteoarthritis of left foot    3. Primary osteoarthritis of right foot    4. Pes planus of both feet    5. Stage 2 chronic kidney disease    6. Lower leg edema    7. Tinea unguium    8. Pain in toes of both feet        Plan:      Chronic pain of both feet  -     Sedimentation rate; Future; Expected date: 09/01/2020  -     C-reactive protein; Future; Expected date: 09/01/2020    Primary osteoarthritis of left foot  Primary osteoarthritis of right foot  Pes planus of both feet  Thorough discussion is had with the patient this afternoon, concerning the diagnosis, its etiology, and the treatment algorithm at present.  X-rays taken today and reviewed by myself with the patient the room.  There appears show multi articular degenerative joint disease of the right left foot.  Patient also shows a severe pes planus foot deformity as well.  Possible Buddy's signs with history of gout.  Patient also has bilateral lower extremity edema.    Given patient's history of stage 2 chronic kidney disease, will review updated labs prior to initiating anti-inflammatory therapy.  Will also take sed rate and CRP for overall inflammation.  Does not appear to be classically associated with gout with a normal temperature and no pain with range of motion of the joints.  Would suggest significant pain with patient ambulation is associated with osteoarthritis and significant degenerative joint disease of bilateral lower extremity.    Stage 2 chronic kidney disease  -     Basic metabolic panel; Future; Expected date: 09/01/2020  -     Sedimentation rate; Future; Expected date: 09/01/2020  -     C-reactive protein; Future; Expected date: 09/01/2020  Will await results of labs prior to initiating anti-inflammatory therapy.    Lower leg edema  -     Sedimentation rate; Future; Expected date: 09/01/2020  -     C-reactive protein; Future; Expected date: 09/01/2020  Would recommend bilateral lower extremity elevation to decreased lower extremity edema.    Tinea unguium  Pain in toes of both feet  We discussed medical options in regarding to onychomycosis.  We did discuss utilizing oral medications, topical medications, fungal creams, and Vicks vapor rub.  We also discussed  performing an avulsion/matricectomy to remove the offending nail.  Patient states that they would consider these options put would like to manage these conservatively with debridements.    The onychomycotic nail plates, as outlined above (R# 1  ; L# 1), are sharply debrided with double action nail nipper, and/or with the assistance of a mechanical rotary padmini, with removal of all offending nail and nail border(s), for reduction of pains. Nails are reduced in terms of length, width and girth with removal of subungual debris to facilitate pain free weight bearing and ambulation.     Will call patient with results of labs.  Patient follow-up in 1-2 months for bilateral degenerative joint disease    Future Appointments   Date Time Provider Department Center   9/17/2020 11:00 AM Sudhakar Rivero MD [facility]   10/5/2020 11:00 AM Corby Cannon NP [address]   11/30/2020 11:00 AM Emma Garcia DPM [facility] POD  Medical C   12/14/2020  9:00 AM Sudhakar Liu MD [facility]

## 2020-09-02 NOTE — PATIENT INSTRUCTIONS
- [facility] to schedule follow-up visit with patient in 4-6 weeks or as needed.  - Continue all medications, treatments and therapies as ordered.   - Follow all instructions, recommendations as discussed.  - Maintain Safety Precautions at all times.  - Attend all medical appointments as scheduled.  - For worsening symptoms: call Primary Care Physician or Nurse Practitioner.  - For emergencies, call 911 or immediately report to the nearest emergency room.  - Limit Risks of environmental exposure to coronavirus as discussed including: social distancing, hand hygiene, and limiting departures from the home for necessities only.     Your care is important to us. If your provider recommended a follow-up appointment or test, we are happy to help you coordinate your recommended care. It is important that you complete your recommended follow-up. If you need help scheduling, please call [phone]. Appointments can also be made online through the patient portal.  While scheduling and attending your appointments is your responsibility, our goal is to support and empower you throughout that process.    [facility] Nurse Care Line - 24/7 Assistance  Unless otherwise directed by your provider, please contact [facility], our nurse care line that is available for 24/7 assistance.  Registered nurses in the [facility] provide: appointment scheduling, clinical advisement, health education, and other advisory services.  Call: [phone] (toll free)    COVID-19 Prevention   Avoid close contact with people and stay home if youre sick, except to get medical care.   Cover coughs and sneezes with a tissue, or use the inside of your elbow. Immediately wash your hands or use hand .  For more information, see CDC link below:  https://www.cdc.gov/coronavirus/2019-ncov/hcp/guidance-prevent-spread.html#precautions     The following information is provided to all patients.  This information is  to help you find resources for any of the problems found today that may be affecting your health:            Living healthy guide: www.[facility].[place].[facility]    Understanding Diabetes: www.diabetes.org   Eating healthy: www.cdc.gov/healthyweight   CDC home safety checklist: www.cdc.gov/steadi/patient.html  Agency on Aging: www.goea.[place].[facility]    Alcoholics anonymous (AA): www.aa.org   Physical Activity: www.stacey.nih.gov/la1szku    Tobacco use: www.quitwithusla.org

## 2020-09-02 NOTE — PROGRESS NOTES
[facility]  Medical Home Visit    Visit Date: 9/1/2020  Encounter Provider: Corby Cannon NP  PCP:  Sudhakar Liu MD    Subjective:      Patient ID: Dominic Cohen is a 84 y.o. male.    Consult Requested By:  Corby Cannon  Reason for Consult: Medical Follow-Up and Medication Review    The patient is being seen at home due to physical debility that presents a taxing effort to leave the home, to mitigate high risk of hospital readmission or due to the limited availability of reliable or safe options for transportation to the point of access to the provider. Prior to treatment on this visit the chart was reviewed and patient consent was obtained.    Chief Complaint: Follow-up for chronic medical conditions and medication review, skin lesions to lower mid-back     Today:  Mr. Dominic Cohen is a 84 y.o. male is being seen today for follow-up for chronic medical conditions and medication review and skin lesions to lower mid-back. Dominic presents at baseline state of health as reported by patient. VSS. Reports one episode of light-headedness when he was cutting the grass during this past week. His syptoms resolved with rest in the air conditioning. Denies any acute issues, concerns or complaints to address on today's visit except for the emergence of several (5-6_ lower mid back skin lesions of approximately 6-8 mm each. He reports that they itch and that he has been seen by someone at [facility] about them before. He was prescribed an ointment would have cost him $500 and he did not buy due to financial limitations.He requests a consult to revisit these with an alternate provider. Referral placed in the system. Reports taking all medications as prescribed. Denies syncope or current lightheadedness, chest pain or palpitations. Reports no dyspnea on exertion and no further episodes of  weakness. Reports morning blood glucose level of 108 mg/dL. Ambulates in home without  "need of assistive devices. Patient reports understanding of fluid retriction and surveillance of level of edema/fluctuation of weight to report to providers. Today's weight 265#. Edema to his left lower extremity is greater thn the right. He tales furosemide on MWF as prescribed by cardiology. Fluid restriion and low salt diet reinforced.  No other needs identified at this time. [facility] to follow up in 4-6 weeks or PRN.        ROS:  Review of Systems   Constitutional: Negative.  Negative for chills and fever.   HENT: Negative for congestion and sneezing.    Eyes: Negative.    Respiratory: Negative.  Negative for chest tightness and shortness of breath.    Cardiovascular: Positive for leg swelling (improved). Negative for chest pain and palpitations.   Gastrointestinal: Negative.    Endocrine: Negative.    Genitourinary: Negative.    Musculoskeletal: Negative.    Skin:        Hemosiderin staining to BLE  Mid-back skin "bumps"   Allergic/Immunologic: Negative.    Neurological: Negative.    Hematological: Negative.    Psychiatric/Behavioral: Negative.    All other systems reviewed and are negative.    Assessments:  · Environmental: single story home, no steps to enter, adequate lighting and temeprature control  · Functional Status: Independent with ADL's/IADL's, ambulates independently, continent of bowel and bladder, drives himself to appointments  · Safety: Fall Precautions, COVID Precuations  · Nutritional: Adequate  · Home Health: N/A  · DME/Supplies: walker (as needed),      Objective:     Vitals:    09/01/20 1132   BP: 135/72   Pulse: 64   Resp: 18   Temp: 97.5 °F (36.4 °C)   TempSrc: Temporal   SpO2: 97%   Weight: 119.7 kg (264 lb)   PainSc: 0-No pain     Body mass index is 32.14 kg/m².    Physical Exam  Vitals signs reviewed.   Constitutional:       Appearance: He is well-developed. He is not ill-appearing.   HENT:      Head: Normocephalic and atraumatic.   Eyes:      Conjunctiva/sclera: " Conjunctivae normal.      Pupils: Pupils are equal, round, and reactive to light.   Neck:      Musculoskeletal: Normal range of motion and neck supple.      Vascular: No JVD.   Cardiovascular:      Rate and Rhythm: Normal rate. Rhythm irregular.      Heart sounds: No murmur.      Comments: +2 edema to BLE  Pulmonary:      Effort: Pulmonary effort is normal. No respiratory distress.      Breath sounds: Normal breath sounds. No rales.   Abdominal:      General: Bowel sounds are normal.      Palpations: Abdomen is soft.   Musculoskeletal: Normal range of motion.         General: Swelling and tenderness (B feet) present.      Right lower leg: Edema (2+) present.      Left lower leg: Edema (3+) present.   Skin:     General: Skin is warm and dry.      Capillary Refill: Capillary refill takes less than 2 seconds.      Comments: Hemosiderin staining to BLE   Neurological:      Mental Status: He is alert and oriented to person, place, and time.   Psychiatric:         Behavior: Behavior normal.         Thought Content: Thought content normal.         Judgment: Judgment normal.       Assessment:     1. Follow up    2.      CHF    Plan:     Ethical / Legal: Advance Care Planning   Capacity to make medical decisions:  yes, Conflict no  · Surrogate decision maker:  Name Bill Trevino, Relationship: son  · Advance Directives:  none  · HCPOA: none  · [place]:  none  · Code Status:  full    Advanced Care Directives, HCPOA and [place] forms left in the home for family review, discussion and signing with instructions to return upon their next provider encounter for inclusion to the medical record.     Dominic was seen today for transitional care.  Diagnoses and all orders for this visit:     Encounter for Medical Follow-Up and Medication Review   - [facility] Nurse Practitioner to schedule home visit with patient in 4-6 weeks or PRN    Were controlled substances prescribed?  No    Follow Up Appointments:   Future Appointments    Date Time Provider Department Center   9/17/2020 11:00 AM Sudhakar Rivero MD [facility]   10/5/2020 11:00 AM Corby Cannon NP [facility]   11/30/2020 11:00 AM Emma Garcia DPM [facility] POD  Medical C   12/14/2020  9:00 AM [facility]     Attestation: Screening criteria to assess the level of the patient's risk for infection with COVID-19 as recommended by the CDC at the time of the above documented home visit concluded appropriateness to proceed. Universal precautions were maintained at all times, including provider use of >60% alcohol gel hand  immediately prior to entry and upon departing the patient's home as well as cleaning of equipment used in home visit with antibacterial/germicidal disposable wipes.    Signature:    Corby Cannon, MSN, APRN, FNP-C  [facility]        Total face-to-face time was 40 min, >50% of this was spent on counseling and coordination of care. The following issues were discussed: primary and secondary diagnoses, co-morbidities, prescribed medications, treatment modalities, importance of compliance with medical advice and directives for follow-up care

## 2020-09-17 ENCOUNTER — OFFICE VISIT (OUTPATIENT)
Dept: OPHTHALMOLOGY | Facility: CLINIC | Age: 85
End: 2020-09-17
Payer: MEDICARE

## 2020-09-17 ENCOUNTER — PATIENT OUTREACH (OUTPATIENT)
Dept: ADMINISTRATIVE | Facility: OTHER | Age: 85
End: 2020-09-17

## 2020-09-17 DIAGNOSIS — H40.1133 PRIMARY OPEN ANGLE GLAUCOMA OF BOTH EYES, SEVERE STAGE: Primary | ICD-10-CM

## 2020-09-17 DIAGNOSIS — H25.13 NUCLEAR SCLEROSIS, BILATERAL: ICD-10-CM

## 2020-09-17 PROCEDURE — 92133 CPTRZD OPH DX IMG PST SGM ON: CPT | Mod: PBBFAC | Performed by: OPHTHALMOLOGY

## 2020-09-17 PROCEDURE — 99999 PR PBB SHADOW E&M-EST. PATIENT-LVL III: CPT | Mod: PBBFAC,,, | Performed by: OPHTHALMOLOGY

## 2020-09-17 PROCEDURE — 92020 GONIOSCOPY: CPT | Mod: PBBFAC | Performed by: OPHTHALMOLOGY

## 2020-09-17 PROCEDURE — 92012 INTRM OPH EXAM EST PATIENT: CPT | Mod: S$PBB,,, | Performed by: OPHTHALMOLOGY

## 2020-09-17 PROCEDURE — 92012 PR EYE EXAM, EST PATIENT,INTERMED: ICD-10-PCS | Mod: S$PBB,,, | Performed by: OPHTHALMOLOGY

## 2020-09-17 PROCEDURE — 92133 POSTERIOR SEGMENT OCT OPTIC NERVE(OCULAR COHERENCE TOMOGRAPHY) - OU - BOTH EYES: ICD-10-PCS | Mod: 26,S$PBB,, | Performed by: OPHTHALMOLOGY

## 2020-09-17 PROCEDURE — 92020 GONIOSCOPY: CPT | Mod: S$PBB,,, | Performed by: OPHTHALMOLOGY

## 2020-09-17 PROCEDURE — 99213 OFFICE O/P EST LOW 20 MIN: CPT | Mod: PBBFAC | Performed by: OPHTHALMOLOGY

## 2020-09-17 PROCEDURE — 92020 PR SPECIAL EYE EVAL,GONIOSCOPY: ICD-10-PCS | Mod: S$PBB,,, | Performed by: OPHTHALMOLOGY

## 2020-09-17 PROCEDURE — 99999 PR PBB SHADOW E&M-EST. PATIENT-LVL III: ICD-10-PCS | Mod: PBBFAC,,, | Performed by: OPHTHALMOLOGY

## 2020-09-17 NOTE — PROGRESS NOTES
SUBJECTIVE  [place] is 84 y.o. male  Uncorrected distance visual acuity was 20/40 in the right eye and CF at 3' in the left eye.   Chief Complaint   Patient presents with    Glaucoma     3M GOCT and IOP check          HPI     Glaucoma      Additional comments: 3M GOCT and IOP check              Comments     The patient states his eyes are doing ok but he is having some sinus   issues from above his eyes all the way to the back of his neck.    Pt previously saw Dr. Carvalho  Saw Dr. Jean-Baptiste 7/22/19    1. Severe COAG OS>OD tmax=26/32 Goal=16-17  Combigan=dizzy   SLT OD 5/15 (19-13)  SLT OS 4/15 (19-13)  2. NSC OU    Timolol qam OU   Latanoprost qhs OU          Last edited by Yoanna Mcclellan on 9/17/2020 11:10 AM. (History)         Assessment /Plan :  1. Primary open angle glaucoma of both eyes, severe stage Doing well, IOP within acceptable range relative to target IOP and no evidence of progression. Continue current treatment. Reviewed importance of continued compliance with treatment and follow up.       2. Nuclear sclerosis, bilateral Patient does reports mild visual decline from incipient cataractous changes, but not sufficient to affect activities of daily living. I recommend monitoring visual status and follow up when visual symptoms worsen.     3, Recent HAs possibly neuromuscular but rec pt call his PCP and discuss possibility of neuroimaging      Return to clinic in 3 months  or as needed.  With IOP Check and HVF 24-2

## 2020-09-17 NOTE — PROGRESS NOTES
Health Maintenance Due   Topic Date Due    TETANUS VACCINE  12/29/1953    Shingles Vaccine (1 of 2) 12/29/1987    Pneumococcal Vaccine (65+ Low/Medium Risk) (1 of 2 - PCV13) 12/29/2002    Influenza Vaccine (1) 08/01/2020     Updates were requested from care everywhere.  Chart was reviewed for overdue Proactive [facility] ([facility]) topics (CRS, Breast Cancer Screening, Eye exam)  Health Maintenance has been updated.  LINKS immunization registry triggered.  Immunizations were reconciled.

## 2020-09-29 DIAGNOSIS — H40.1133 PRIMARY OPEN ANGLE GLAUCOMA OF BOTH EYES, SEVERE STAGE: ICD-10-CM

## 2020-09-29 RX ORDER — LATANOPROST 50 UG/ML
1 SOLUTION/ DROPS OPHTHALMIC NIGHTLY
Qty: 1 BOTTLE | Refills: 12 | Status: SHIPPED | OUTPATIENT
Start: 2020-09-29 | End: 2020-11-30

## 2020-09-29 RX ORDER — TIMOLOL MALEATE 5 MG/ML
1 SOLUTION/ DROPS OPHTHALMIC 2 TIMES DAILY
Qty: 10 ML | Refills: 12 | Status: SHIPPED | OUTPATIENT
Start: 2020-09-29 | End: 2020-11-30

## 2020-10-13 ENCOUNTER — CARE AT HOME (OUTPATIENT)
Dept: HOME HEALTH SERVICES | Facility: CLINIC | Age: 85
End: 2020-10-13
Payer: MEDICARE

## 2020-10-13 VITALS
OXYGEN SATURATION: 98 % | RESPIRATION RATE: 16 BRPM | SYSTOLIC BLOOD PRESSURE: 158 MMHG | DIASTOLIC BLOOD PRESSURE: 72 MMHG | TEMPERATURE: 98 F | HEART RATE: 65 BPM

## 2020-10-13 DIAGNOSIS — Z09 FOLLOW UP: Primary | ICD-10-CM

## 2020-10-13 DIAGNOSIS — G47.00 INSOMNIA, UNSPECIFIED TYPE: ICD-10-CM

## 2020-10-13 PROCEDURE — 99349 HOME/RES VST EST MOD MDM 40: CPT | Mod: S$GLB,,, | Performed by: NURSE PRACTITIONER

## 2020-10-13 PROCEDURE — 99349 PR HOME VISIT,ESTAB PATIENT,LEVEL III: ICD-10-PCS | Mod: S$GLB,,, | Performed by: NURSE PRACTITIONER

## 2020-10-14 RX ORDER — TRAZODONE HYDROCHLORIDE 50 MG/1
50 TABLET ORAL NIGHTLY
Qty: 30 TABLET | Refills: 11 | Status: SHIPPED | OUTPATIENT
Start: 2020-10-14 | End: 2020-11-30

## 2020-10-14 NOTE — PROGRESS NOTES
[facility]  Medical Home Visit    Visit Date: 10/14/2020  Encounter Provider: Corby Rojo NP  PCP:  Sudhakar Liu MD    Subjective:      Patient ID: Dominic Cohen is a 84 y.o. male.    Consult Requested By:  No ref. provider found  Reason for Consult: Medical Follow-Up and Medication Review    The patient is being seen at home due to physical debility that presents a taxing effort to leave the home, to mitigate high risk of hospital readmission or due to the limited availability of reliable or safe options for transportation to the point of access to the provider. Prior to treatment on this visit the chart was reviewed and patient consent was obtained.    Chief Complaint: Follow-up for chronic medical conditions and medication review, insomnia    Today:  Mr. Dominic Cohen is a 84 y.o. male is being seen today for follow-up for chronic medical conditions and medication review and insomnia. He had been recently admitted in [facility] for an episode of high blood pressure with dizziness. His blood pressure at the time of my visit was slightly elevated, however the patient denied any symptoms. He refused any adjustments in his diuretics or additional blood pressure medications at this time. He favors tp record his BP twice daily for the next few weeks until my next visit to determine if he would like to start a new medication.He does endorse feeling sleepy in the early afternoons and taking a nap, only to be unable to rest fully during the night. He does accept my offer of a small dose of trazodone as a trial.  Denies any acute issues, concerns or complaints to address on today's visit. Reports taking all medications as prescribed. Denies syncope or current lightheadedness, chest pain or palpitations. Reports no dyspnea on exertion and no further episodes of  Weakness. Patient reports understanding of fluid retriction and surveillance of level of edema/fluctuation of  weight to report to providers. Fluid restriion and low salt diet reinforced.  No other needs identified at this time. [facility] to follow up in 3-4 weeks or PRN.        ROS:  Review of Systems   Constitutional: Negative.  Negative for chills and fever.   HENT: Negative for congestion and sneezing.    Eyes: Negative.    Respiratory: Negative.  Negative for chest tightness and shortness of breath.    Cardiovascular: Positive for leg swelling (improved). Negative for chest pain and palpitations.   Gastrointestinal: Negative.    Endocrine: Negative.    Genitourinary: Negative.    Musculoskeletal: Negative.    Skin:        Hemosiderin staining to BLE     Allergic/Immunologic: Negative.    Neurological: Negative.    Hematological: Negative.    Psychiatric/Behavioral: Negative.    All other systems reviewed and are negative.    Assessments:  · Environmental: single story home, no steps to enter, adequate lighting and temeprature control  · Functional Status: Independent with ADL's/IADL's, ambulates independently, continent of bowel and bladder, drives himself to appointments  · Safety: Fall Precautions, COVID Precuations  · Nutritional: Adequate  · Home Health: N/A  · DME/Supplies: walker (as needed),      Objective:     Vitals:    10/13/20 1121   BP: (!) 158/72   Pulse: 65   Resp: 16   Temp: 98.1 °F (36.7 °C)   TempSrc: Temporal   SpO2: 98%   PainSc: 0-No pain     There is no height or weight on file to calculate BMI.    Physical Exam  Vitals signs reviewed.   Constitutional:       Appearance: He is well-developed. He is not ill-appearing.   HENT:      Head: Normocephalic and atraumatic.   Eyes:      Conjunctiva/sclera: Conjunctivae normal.      Pupils: Pupils are equal, round, and reactive to light.   Neck:      Musculoskeletal: Normal range of motion and neck supple.      Vascular: No JVD.   Cardiovascular:      Rate and Rhythm: Normal rate. Rhythm irregular.      Heart sounds: No murmur.      Comments: +2  edema to BLE  Pulmonary:      Effort: Pulmonary effort is normal. No respiratory distress.      Breath sounds: Normal breath sounds. No rales.   Abdominal:      General: Bowel sounds are normal.      Palpations: Abdomen is soft.   Musculoskeletal: Normal range of motion.         General: Swelling and tenderness (B feet) present.      Right lower leg: Edema (2+) present.      Left lower leg: Edema (3+) present.   Skin:     General: Skin is warm and dry.      Capillary Refill: Capillary refill takes less than 2 seconds.      Comments: Hemosiderin staining to BLE   Neurological:      Mental Status: He is alert and oriented to person, place, and time.   Psychiatric:         Behavior: Behavior normal.         Thought Content: Thought content normal.         Judgment: Judgment normal.       Assessment:     1. Follow up    2. Insomnia, unspecified type        Plan:     Ethical / Legal: Advance Care Planning   Capacity to make medical decisions:  yes, Conflict no  · Surrogate decision maker:  Name Bill Trevino, Relationship: son  · Advance Directives:  none  · HCPOA: none  · [place]:  On file  · Code Status:  full    Advanced Care Directives, HCPOA and LaPost forms left in the home for family review, discussion and signing with instructions to return upon their next provider encounter for inclusion to the medical record.     Dominic was seen today for transitional care.  Diagnoses and all orders for this visit:     Encounter for Medical Follow-Up and Medication Review   - [facility] Nurse Practitioner to schedule home visit with patient in 4-6 weeks or PRN    Insomnia, new  - Trazodone 50 mg po qHS prn insomnia    Were controlled substances prescribed?  No    Follow Up Appointments:   Future Appointments   Date Time Provider Department Center   11/30/2020 11:00 AM Emma Garcia DPM [facility]   12/14/2020  9:00 AM Sudhakar Liu MD [facility] MED [address]   12/31/2020  9:30 AM [place]  VISUAL-ONE [facility] [place]   12/31/2020 10:00 AM Sudhakar Rivero MD [facility]     Attestation: Screening criteria to assess the level of the patient's risk for infection with COVID-19 as recommended by the CDC at the time of the above documented home visit concluded appropriateness to proceed. Universal precautions were maintained at all times, including provider use of >60% alcohol gel hand  immediately prior to entry and upon departing the patient's home as well as cleaning of equipment used in home visit with antibacterial/germicidal disposable wipes.    Signature:    Corby Rojo, MSN, APRN, FNP-C  [facility]    Total face-to-face time was 40 min, >50% of this was spent on counseling and coordination of care. The following issues were discussed: primary and secondary diagnoses, co-morbidities, prescribed medications, treatment modalities, importance of compliance with medical advice and directives for follow-up care

## 2020-10-14 NOTE — PATIENT INSTRUCTIONS
- [facility] to schedule follow-up visit with patient in 4-6 weeks or as needed.  - Continue all medications, treatments and therapies as ordered.   - Follow all instructions, recommendations as discussed.  - Maintain Safety Precautions at all times.  - Attend all medical appointments as scheduled.  - For worsening symptoms: call Primary Care Physician or Nurse Practitioner.  - For emergencies, call 911 or immediately report to the nearest emergency room.  - Limit Risks of environmental exposure to coronavirus as discussed including: social distancing, hand hygiene, and limiting departures from the home for necessities only.     Your care is important to us. If your provider recommended a follow-up appointment or test, we are happy to help you coordinate your recommended care. It is important that you complete your recommended follow-up. If you need help scheduling, please call [phone]. Appointments can also be made online through the patient portal.  While scheduling and attending your appointments is your responsibility, our goal is to support and empower you throughout that process.    [facility] Care Line - 24/7 Assistance  Unless otherwise directed by your provider, please contact [facility], our nurse care line that is available for 24/7 assistance.  Registered nurses in the [facility] provide: appointment scheduling, clinical advisement, health education, and other advisory services.  Call: [phone] (toll free)    COVID-19 Prevention   Avoid close contact with people and stay home if youre sick, except to get medical care.   Cover coughs and sneezes with a tissue, or use the inside of your elbow. Immediately wash your hands or use hand .  For more information, see CDC link below:  https://www.cdc.gov/coronavirus/2019-ncov/hcp/guidance-prevent-spread.html#precautions     The following information is provided to all patients.  This information is  to help you find resources for any of the problems found today that may be affecting your health:            Living healthy guide: www.[facility].[place].[facility]    Understanding Diabetes: www.diabetes.org   Eating healthy: www.cdc.gov/healthyweight   CDC home safety checklist: www.cdc.gov/steadi/patient.html  Agency on Aging: www.goea.[place].[facility]    Alcoholics anonymous (AA): www.aa.org   Physical Activity: www.stacey.nih.gov/dv2zjwo    Tobacco use: www.quitwithusla.org

## 2020-10-26 PROBLEM — Z09 FOLLOW UP: Status: RESOLVED | Noted: 2017-06-13 | Resolved: 2020-10-26

## 2020-11-09 ENCOUNTER — LAB VISIT (OUTPATIENT)
Dept: LAB | Facility: HOSPITAL | Age: 85
End: 2020-11-09
Attending: INTERNAL MEDICINE
Payer: MEDICARE

## 2020-11-09 ENCOUNTER — CARE AT HOME (OUTPATIENT)
Dept: HOME HEALTH SERVICES | Facility: CLINIC | Age: 85
End: 2020-11-09
Payer: MEDICARE

## 2020-11-09 DIAGNOSIS — D47.2 MGUS (MONOCLONAL GAMMOPATHY OF UNKNOWN SIGNIFICANCE): ICD-10-CM

## 2020-11-09 DIAGNOSIS — D50.0 IRON DEFICIENCY ANEMIA DUE TO CHRONIC BLOOD LOSS: ICD-10-CM

## 2020-11-09 DIAGNOSIS — Z09 FOLLOW UP: Primary | ICD-10-CM

## 2020-11-09 LAB
BASOPHILS # BLD AUTO: 0.05 K/UL (ref 0–0.2)
BASOPHILS NFR BLD: 0.8 % (ref 0–1.9)
DIFFERENTIAL METHOD: ABNORMAL
EOSINOPHIL # BLD AUTO: 0.1 K/UL (ref 0–0.5)
EOSINOPHIL NFR BLD: 2.4 % (ref 0–8)
ERYTHROCYTE [DISTWIDTH] IN BLOOD BY AUTOMATED COUNT: 12.8 % (ref 11.5–14.5)
HCT VFR BLD AUTO: 39.5 % (ref 40–54)
HGB BLD-MCNC: 12.8 G/DL (ref 14–18)
IMM GRANULOCYTES # BLD AUTO: 0.01 K/UL (ref 0–0.04)
IMM GRANULOCYTES NFR BLD AUTO: 0.2 % (ref 0–0.5)
IRON SERPL-MCNC: 100 UG/DL (ref 45–160)
LYMPHOCYTES # BLD AUTO: 1.1 K/UL (ref 1–4.8)
LYMPHOCYTES NFR BLD: 19.2 % (ref 18–48)
MCH RBC QN AUTO: 34.1 PG (ref 27–31)
MCHC RBC AUTO-ENTMCNC: 32.4 G/DL (ref 32–36)
MCV RBC AUTO: 105 FL (ref 82–98)
MONOCYTES # BLD AUTO: 0.6 K/UL (ref 0.3–1)
MONOCYTES NFR BLD: 10.8 % (ref 4–15)
NEUTROPHILS # BLD AUTO: 4 K/UL (ref 1.8–7.7)
NEUTROPHILS NFR BLD: 66.6 % (ref 38–73)
NRBC BLD-RTO: 0 /100 WBC
PLATELET # BLD AUTO: 223 K/UL (ref 150–350)
PMV BLD AUTO: 11.4 FL (ref 9.2–12.9)
RBC # BLD AUTO: 3.75 M/UL (ref 4.6–6.2)
SATURATED IRON: 35 % (ref 20–50)
TOTAL IRON BINDING CAPACITY: 287 UG/DL (ref 250–450)
TRANSFERRIN SERPL-MCNC: 194 MG/DL (ref 200–375)
WBC # BLD AUTO: 5.93 K/UL (ref 3.9–12.7)

## 2020-11-09 PROCEDURE — 83520 IMMUNOASSAY QUANT NOS NONAB: CPT

## 2020-11-09 PROCEDURE — 36415 COLL VENOUS BLD VENIPUNCTURE: CPT

## 2020-11-09 PROCEDURE — 85025 COMPLETE CBC W/AUTO DIFF WBC: CPT

## 2020-11-09 PROCEDURE — 99349 PR HOME VISIT,ESTAB PATIENT,LEVEL III: ICD-10-PCS | Mod: S$GLB,,, | Performed by: NURSE PRACTITIONER

## 2020-11-09 PROCEDURE — 99349 HOME/RES VST EST MOD MDM 40: CPT | Mod: S$GLB,,, | Performed by: NURSE PRACTITIONER

## 2020-11-09 PROCEDURE — 83540 ASSAY OF IRON: CPT

## 2020-11-09 PROCEDURE — 82728 ASSAY OF FERRITIN: CPT

## 2020-11-09 NOTE — PROGRESS NOTES
[facility]  Medical Home Visit    Visit Date: 11/9/2020  Encounter Provider: Corby Rojo NP  PCP:  Sudhakar Liu MD    Subjective:      Patient ID: Dominic Cohen is a 84 y.o. male.    Consult Requested By:  No ref. provider found  Reason for Consult: Medical Follow-Up and Medication Review    The patient is being seen at home due to physical debility that presents a taxing effort to leave the home, to mitigate high risk of hospital readmission or due to the limited availability of reliable or safe options for transportation to the point of access to the provider. Prior to treatment on this visit the chart was reviewed and patient consent was obtained.    Chief Complaint: Follow-up for chronic medical conditions and medication review, insomnia    Today:  Mr. Dominic Cohen is a 84 y.o. male is being seen today for follow-up for chronic medical conditions and medication review.Since my last visit the patient reports no episodes of syncope or symptomatic fluctuations of blood pressure.He is to continue his current regimen of blood pressure medications and diuretic as prescribed. Denies any acute issues, concerns or complaints to address on today's visit. Reports taking all medications as prescribed. Denies syncope or current lightheadedness, chest pain or palpitations. Reports no dyspnea on exertion and no further episodes of  Weakness. Patient reports understanding of fluid retriction and surveillance of level of edema/fluctuation of weight to report to providers. Fluid restriion and low salt diet reinforced.  No other needs identified at this time. [facility] NP to follow up in 4-6 weeks or PRN.        ROS:  Review of Systems   Constitutional: Positive for activity change. Negative for chills and fever.   HENT: Negative for congestion and sneezing.    Eyes: Negative.    Respiratory: Negative.  Negative for chest tightness and shortness of breath.     Cardiovascular: Positive for leg swelling (improved). Negative for chest pain and palpitations.   Gastrointestinal: Negative.    Endocrine: Negative.    Genitourinary: Negative.    Musculoskeletal: Negative.    Skin:        Hemosiderin staining to BLE     Allergic/Immunologic: Negative.    Neurological: Negative.    Hematological: Negative.    Psychiatric/Behavioral: Negative.    All other systems reviewed and are negative.    Assessments:  · Environmental: single story home, no steps to enter, adequate lighting and temeprature control  · Functional Status: Independent with ADL's/IADL's, ambulates independently, continent of bowel and bladder, drives himself to appointments  · Safety: Fall Precautions, COVID Precuations  · Nutritional: Adequat  · [facility]: [facility]   · DME/Supplies: walker (as needed),      Objective:     Vitals:    11/09/20 0909   BP: (!) (P) 149/59   Pulse: (P) 60   Resp: (P) 18   Temp: (P) 97.8 °F (36.6 °C)   TempSrc: (P) Temporal   SpO2: (P) 98%   PainSc: (P) 0-No pain     There is no height or weight on file to calculate BMI.    Physical Exam  Vitals signs reviewed.   Constitutional:       Appearance: He is well-developed. He is not ill-appearing.   HENT:      Head: Normocephalic and atraumatic.   Eyes:      Conjunctiva/sclera: Conjunctivae normal.      Pupils: Pupils are equal, round, and reactive to light.   Neck:      Musculoskeletal: Normal range of motion and neck supple.      Vascular: No JVD.   Cardiovascular:      Rate and Rhythm: Normal rate. Rhythm irregular.      Heart sounds: No murmur.      Comments: +2 edema to BLE  Pulmonary:      Effort: Pulmonary effort is normal. No respiratory distress.      Breath sounds: Normal breath sounds. No rales.   Abdominal:      General: Bowel sounds are normal.      Palpations: Abdomen is soft.   Musculoskeletal: Normal range of motion.         General: Swelling and tenderness (B feet) present.      Right lower leg: Edema (2+) present.       Left lower leg: Edema (3+) present.   Skin:     General: Skin is warm and dry.      Capillary Refill: Capillary refill takes less than 2 seconds.      Comments: Hemosiderin staining to BLE   Neurological:      Mental Status: He is alert and oriented to person, place, and time.   Psychiatric:         Behavior: Behavior normal.         Thought Content: Thought content normal.         Judgment: Judgment normal.       Assessment:     1. Follow up        Plan:     Ethical / Legal: Advance Care Planning   Capacity to make medical decisions:  yes, Conflict no  · Surrogate decision maker:  Name Bill Trevino, Relationship: son  · Advance Directives:  none  · HCPOA: none  · [place]:  On file  · Code Status:  full    Advanced Care Directives, HCPOA and [place] forms left in the home for family review, discussion and signing with instructions to return upon their next provider encounter for inclusion to the medical record.     Dominic was seen today for transitional care.  Diagnoses and all orders for this visit:     Encounter for Medical Follow-Up and Medication Review   - [facility] Nurse Practitioner to schedule home visit with patient in 4-6 weeks or PRN    Were controlled substances prescribed?  No    Follow Up Appointments:   Future Appointments   Date Time Provider Department Center   11/13/2020 10:30 AM Mariam Aguayo NP [facility]EM [facility]   11/30/2020 11:00 AM Emma Garcia DPM [facility]   12/14/2020  9:00 AM Sudhakar Liu MD [facility]   12/14/2020  9:30 AM Corby Cannon NP [facility] [place]   12/14/2020 11:00 AM Corby Cannon NP [facility] [place]   12/31/2020  9:30 AM FIELDS, VISUAL-ONE [facility]   12/31/2020 10:00 AM Sudhakar Rivero MD [facility]     Attestation: Screening criteria to assess the level of the patient's risk for infection with COVID-19 as recommended by the CDC at the time of the above  documented home visit concluded appropriateness to proceed. Universal precautions were maintained at all times, including provider use of >60% alcohol gel hand  immediately prior to entry and upon departing the patient's home as well as cleaning of equipment used in home visit with antibacterial/germicidal disposable wipes.    Signature:    Corby Rojo, MSN, APRN, FNP-C  [facility]    Total face-to-face time was 40 min, >50% of this was spent on counseling and coordination of care. The following issues were discussed: primary and secondary diagnoses, co-morbidities, prescribed medications, treatment modalities, importance of compliance with medical advice and directives for follow-up care

## 2020-11-10 LAB
FERRITIN SERPL-MCNC: 718 NG/ML (ref 20–300)
KAPPA LC SER QL IA: 4.01 MG/DL (ref 0.33–1.94)
KAPPA LC/LAMBDA SER IA: 1.76 (ref 0.26–1.65)
LAMBDA LC SER QL IA: 2.28 MG/DL (ref 0.57–2.63)

## 2020-11-11 ENCOUNTER — PES CALL (OUTPATIENT)
Dept: ADMINISTRATIVE | Facility: CLINIC | Age: 85
End: 2020-11-11

## 2020-11-13 ENCOUNTER — OFFICE VISIT (OUTPATIENT)
Dept: HEMATOLOGY/ONCOLOGY | Facility: CLINIC | Age: 85
End: 2020-11-13
Payer: MEDICARE

## 2020-11-13 VITALS
WEIGHT: 281.75 LBS | BODY MASS INDEX: 34.31 KG/M2 | HEART RATE: 68 BPM | DIASTOLIC BLOOD PRESSURE: 65 MMHG | OXYGEN SATURATION: 97 % | TEMPERATURE: 98 F | HEIGHT: 76 IN | SYSTOLIC BLOOD PRESSURE: 128 MMHG

## 2020-11-13 DIAGNOSIS — D47.2 MGUS (MONOCLONAL GAMMOPATHY OF UNKNOWN SIGNIFICANCE): Primary | ICD-10-CM

## 2020-11-13 DIAGNOSIS — D51.8 MACROCYTIC ANEMIA WITH VITAMIN B12 DEFICIENCY: ICD-10-CM

## 2020-11-13 DIAGNOSIS — Z86.39 HISTORY OF IRON DEFICIENCY: ICD-10-CM

## 2020-11-13 PROCEDURE — 99214 OFFICE O/P EST MOD 30 MIN: CPT | Mod: S$PBB,,, | Performed by: NURSE PRACTITIONER

## 2020-11-13 PROCEDURE — 99999 PR PBB SHADOW E&M-EST. PATIENT-LVL III: CPT | Mod: PBBFAC,,, | Performed by: NURSE PRACTITIONER

## 2020-11-13 PROCEDURE — 99213 OFFICE O/P EST LOW 20 MIN: CPT | Mod: PBBFAC | Performed by: NURSE PRACTITIONER

## 2020-11-13 PROCEDURE — 99999 PR PBB SHADOW E&M-EST. PATIENT-LVL III: ICD-10-PCS | Mod: PBBFAC,,, | Performed by: NURSE PRACTITIONER

## 2020-11-13 PROCEDURE — 99214 PR OFFICE/OUTPT VISIT, EST, LEVL IV, 30-39 MIN: ICD-10-PCS | Mod: S$PBB,,, | Performed by: NURSE PRACTITIONER

## 2020-11-13 NOTE — PROGRESS NOTES
Subjective:      Patient ID: Dominic Cohen is a 84 y.o. male.    Chief Complaint: lab discussion    HPI:  Patient is an 84 year old male with history of iron deficiency anemia s/p IV iron x 2 in 3/2020.  B12 deficiency currently taking oral B12 daily.  EGD and colonoscopy done  and .  Elevated FLC.  He is not currently iron deficient - with macrocytic anemia hgb 12.8 (105).  Anemia present in Epic since .  Latest B12 and folate levels normal.  No history of elevated liver enzymes.  Denies ETOH use.  History of elevated FLC.  Last labs show slight decrease in kidney function.  Will need repeat CMP.      States take 3 iron tablets at a time once or twice a month.  Denies hematuria, melena, hematochezia, epistaxis, gingival bleeding, or unusual bruising.       Social History     Socioeconomic History    Marital status:      Spouse name: Not on file    Number of children: 3    Years of education: Not on file    Highest education level: Not on file   Occupational History    Occupation: Retired     Comment: [place], [place]   Social Needs    Financial resource strain: Not on file    Food insecurity     Worry: Not on file     Inability: Not on file    Transportation needs     Medical: Not on file     Non-medical: Not on file   Tobacco Use    Smoking status: Former Smoker     Packs/day: 0.30     Years: 52.00     Pack years: 15.60     Types: Cigarettes     Quit date: 2000     Years since quittin.0    Smokeless tobacco: Never Used   Substance and Sexual Activity    Alcohol use: No    Drug use: No    Sexual activity: Yes   Lifestyle    Physical activity     Days per week: Not on file     Minutes per session: Not on file    Stress: Not on file   Relationships    Social connections     Talks on phone: Not on file     Gets together: Not on file     Attends [employer] service: Not on file     Active member of club or organization: Not on file     Attends meetings of clubs or  organizations: Not on file     Relationship status: Not on file   Other Topics Concern    Not on file   Social History Narrative    Not on file       Family History   Problem Relation Age of Onset    No Known Problems Son     No Known Problems Daughter     No Known Problems Son     Diabetes Neg Hx     Heart disease Neg Hx     Cancer Neg Hx     Celiac disease Neg Hx     Cirrhosis Neg Hx     Colon cancer Neg Hx     Colon polyps Neg Hx     Crohn's disease Neg Hx     Cystic fibrosis Neg Hx     Esophageal cancer Neg Hx     Hemochromatosis Neg Hx     Inflammatory bowel disease Neg Hx     Irritable bowel syndrome Neg Hx     Liver cancer Neg Hx     Liver disease Neg Hx     Rectal cancer Neg Hx     Stomach cancer Neg Hx     Ulcerative colitis Neg Hx     Zak's disease Neg Hx     Amblyopia Neg Hx     Blindness Neg Hx     Glaucoma Neg Hx     Hypertension Neg Hx     Macular degeneration Neg Hx     Retinal detachment Neg Hx     Strabismus Neg Hx        Past Surgical History:   Procedure Laterality Date    BACK SURGERY      CATARACT EXTRACTION BILATERAL W/ ANTERIOR VITRECTOMY      COLONOSCOPY N/A 5/13/2016    Procedure: COLONOSCOPY;  Surgeon: Presley Phillips MD;  Location: [address]);  Service: Endoscopy;  Laterality: N/A;  EGD with Dr. Jeffery prior to Colonoscopy. EC    ESOPHAGOGASTRODUODENOSCOPY N/A 7/5/2018    Procedure: ESOPHAGOGASTRODUODENOSCOPY (EGD);  Surgeon: Khanh Asencio III, MD;  Location[facility];  Service: Endoscopy;  Laterality: N/A;    JOINT REPLACEMENT Left     x 2    KNEE SURGERY Left     x2. revision 06/27/17    PERICARDIAL WINDOW N/A 2/14/2020    Procedure: CREATION, PERICARDIAL WINDOW;  Surgeon: Ankush Graham MD;  Location: [facility];  Service: Cardiothoracic;  Laterality: N/A;    PROSTATE SURGERY      TONSILLECTOMY, ADENOIDECTOMY      TUBE THORACOTOMY Left 2/14/2020    Procedure: INSERTION, CATHETER, INTERCOSTAL, FOR DRAINAGE;  Surgeon: Ankush LYMAN  MD Gladys;  Location: [facility] OR;  Service: Cardiothoracic;  Laterality: Left;  LEFT PLEURA CHEST TUBE PLACEMENT       Past Medical History:   Diagnosis Date    Arthritis     Atherosclerosis of abdominal aorta     Cataract     CHF (congestive heart failure)     Chronic constipation     Glaucoma     History of anal fissures     Hypertension     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    HENT: Negative.    Eyes: Negative.    Respiratory: Positive for shortness of breath (with exertion).    Cardiovascular: Positive for leg swelling.   Gastrointestinal: Negative.    Endocrine: Negative.    Genitourinary: Negative.    Musculoskeletal: Positive for arthralgias.   Skin: Negative.    Allergic/Immunologic: Negative.    Neurological: Negative.    Hematological: Negative.    Psychiatric/Behavioral: Negative.           Medication List with Changes/Refills   Current Medications    ASPIRIN (ECOTRIN) 81 MG EC TABLET    Take 1 tablet (81 mg total) by mouth once daily.    CYANOCOBALAMIN, VITAMIN B-12, (VITAMIN B-12) 50 MCG TABLET    Take 50 mcg by mouth once daily.    ERGOCALCIFEROL, VITAMIN D2, 400 UNIT TAB    Take 1 tablet by mouth.    FERROUS SULFATE (FEOSOL) 325 MG (65 MG IRON) TAB TABLET    Take 325 mg by mouth.    FUROSEMIDE (LASIX) 40 MG TABLET    Take 1 tablet (40 mg total) by mouth every Mon, Wed, Fri.    LATANOPROST 0.005 % OPHTHALMIC SOLUTION    Place 1 drop into both eyes every evening.    MELOXICAM (MOBIC) 15 MG TABLET    Take 1 tablet (15 mg total) by mouth daily as needed.    MULTIVITAMIN WITH FOLIC ACID 400 MCG TAB    Take 1 tablet by mouth.    TIMOLOL MALEATE 0.5% (TIMOPTIC) 0.5 % DROP    Place 1 drop into both eyes 2 (two) times daily.    TRAZODONE (DESYREL) 50 MG TABLET    Take 1 tablet (50 mg total) by mouth every evening.    TRIAMCINOLONE  ACETONIDE 0.1% (KENALOG) 0.1 % CREAM    Apply topically 2 (two) times daily as needed.    TRUE METRIX GLUCOSE TEST STRIP STRP    TEST THREE TIMES A WEEK AS DIRECTED        Objective:     Vitals:    11/13/20 1005   BP: 128/65   Pulse: 68   Temp: 98.3 °F (36.8 °C)       Physical Exam  Vitals signs reviewed.   Constitutional:       Appearance: Normal appearance.   HENT:      Head: Normocephalic and atraumatic.   Eyes:      Extraocular Movements: Extraocular movements intact.   Cardiovascular:      Rate and Rhythm: Normal rate and regular rhythm.      Heart sounds: Normal heart sounds, S1 normal and S2 normal.   Pulmonary:      Effort: Pulmonary effort is normal.      Breath sounds: Normal breath sounds.   Abdominal:      Palpations: Abdomen is soft.   Musculoskeletal:      Right lower leg: Edema present.      Left lower leg: Edema present.   Skin:     General: Skin is warm and dry.   Neurological:      General: No focal deficit present.      Mental Status: He is alert and oriented to person, place, and time.   Psychiatric:         Attention and Perception: Attention normal.         Mood and Affect: Mood normal.         Speech: Speech is rapid and pressured.         Behavior: Behavior normal.         Thought Content: Thought content normal.         Cognition and Memory: Cognition and memory normal.         Judgment: Judgment normal.         Assessment:     Problem List Items Addressed This Visit        Oncology    MGUS (monoclonal gammopathy of unknown significance) - Primary    Relevant Orders    CBC Auto Differential    Comprehensive Metabolic Panel    Immunofixation Electrophoresis    Pathologist Interpretation Differential    Protein Electrophoresis, Serum    Immunoglobulin Free LT Chains Blood      Other Visit Diagnoses     History of iron deficiency        Relevant Orders    CBC Auto Differential    Comprehensive Metabolic Panel    Iron and TIBC    Ferritin    Macrocytic anemia with vitamin B12 deficiency         Relevant Orders    CBC Auto Differential    Folate    Immunofixation Electrophoresis    Pathologist Interpretation Differential    Protein Electrophoresis, Serum    Immunoglobulin Free LT Chains Blood    Lactate Dehydrogenase        Lab Results   Component Value Date    WBC 5.93 11/09/2020    HGB 12.8 (L) 11/09/2020    HCT 39.5 (L) 11/09/2020     (H) 11/09/2020     11/09/2020       BMP  Lab Results   Component Value Date     09/01/2020    K 4.1 09/01/2020     09/01/2020    CO2 31 (H) 09/01/2020    BUN 16 09/01/2020    CREATININE 1.4 09/01/2020    CALCIUM 9.2 09/01/2020    ANIONGAP 6 (L) 09/01/2020    ESTGFRAFRICA 53.7 (A) 09/01/2020    EGFRNONAA 46.5 (A) 09/01/2020     Lab Results   Component Value Date    ALT 17 03/06/2020    AST 18 03/06/2020    ALKPHOS 58 03/06/2020    BILITOT 0.5 03/06/2020       Plan:   MGUS (monoclonal gammopathy of unknown significance)  -     CBC Auto Differential; Future; Expected date: 11/13/2020  -     Comprehensive Metabolic Panel; Future; Expected date: 11/13/2020  -     Immunofixation Electrophoresis; Future; Expected date: 11/13/2020  -     Pathologist Interpretation Differential; Future; Expected date: 11/13/2020  -     Protein Electrophoresis, Serum; Future; Expected date: 11/13/2020  -     Immunoglobulin Free LT Chains Blood; Future; Expected date: 11/13/2020    History of iron deficiency  -     CBC Auto Differential; Future; Expected date: 11/13/2020  -     Comprehensive Metabolic Panel; Future; Expected date: 11/13/2020  -     Iron and TIBC; Future; Expected date: 11/13/2020  -     Ferritin; Future; Expected date: 11/13/2020    Macrocytic anemia with vitamin B12 deficiency  -     CBC Auto Differential; Future; Expected date: 11/13/2020  -     Folate; Future; Expected date: 11/13/2020  -     Immunofixation Electrophoresis; Future; Expected date: 11/13/2020  -     Pathologist Interpretation Differential; Future; Expected date: 11/13/2020  -     Protein  Electrophoresis, Serum; Future; Expected date: 11/13/2020  -     Immunoglobulin Free LT Chains Blood; Future; Expected date: 11/13/2020  -     Lactate Dehydrogenase; Future; Expected date: 11/13/2020    Discussed taking iron tablet inappropriately.  He will stop oral iron tablets.  Discussed possible underlying bone marrow dysfunction with possible need for BMB in the future; however hgb has irmproved.  Macrocytosis present.  Will f/u in 6 month with labs 1 week prior as above.      Collaborating Provider:  Dr. Tahir Barrios    Thank You,  Benedict Aguayo, JANNY-C

## 2020-11-16 PROBLEM — D62 ACUTE BLOOD LOSS ANEMIA: Status: RESOLVED | Noted: 2017-06-28 | Resolved: 2020-11-16

## 2020-11-16 PROBLEM — L98.9 SKIN LESIONS: Status: RESOLVED | Noted: 2019-09-18 | Resolved: 2020-11-16

## 2020-11-16 PROBLEM — R19.5 CHANGE IN STOOL: Status: RESOLVED | Noted: 2017-03-14 | Resolved: 2020-11-16

## 2020-11-16 PROBLEM — Z09 FOLLOW UP: Status: RESOLVED | Noted: 2017-06-13 | Resolved: 2020-11-16

## 2020-11-16 PROBLEM — R49.0 HOARSENESS: Status: RESOLVED | Noted: 2017-03-14 | Resolved: 2020-11-16

## 2020-11-16 PROBLEM — Z98.890 POSTOPERATIVE STATE: Status: RESOLVED | Noted: 2017-07-11 | Resolved: 2020-11-16

## 2020-11-17 ENCOUNTER — OFFICE VISIT (OUTPATIENT)
Dept: FAMILY MEDICINE | Facility: CLINIC | Age: 85
End: 2020-11-17
Payer: MEDICARE

## 2020-11-17 VITALS
HEIGHT: 76 IN | SYSTOLIC BLOOD PRESSURE: 134 MMHG | HEART RATE: 64 BPM | DIASTOLIC BLOOD PRESSURE: 82 MMHG | BODY MASS INDEX: 34.17 KG/M2 | WEIGHT: 280.63 LBS | TEMPERATURE: 98 F

## 2020-11-17 DIAGNOSIS — I10 ESSENTIAL HYPERTENSION: ICD-10-CM

## 2020-11-17 DIAGNOSIS — E66.9 OBESITY, CLASS I, BMI 30-34.9: ICD-10-CM

## 2020-11-17 DIAGNOSIS — Z00.00 ENCOUNTER FOR PREVENTIVE HEALTH EXAMINATION: Primary | ICD-10-CM

## 2020-11-17 DIAGNOSIS — I70.0 ATHEROSCLEROSIS OF ABDOMINAL AORTA: Chronic | ICD-10-CM

## 2020-11-17 DIAGNOSIS — I50.32 CHRONIC DIASTOLIC HEART FAILURE: ICD-10-CM

## 2020-11-17 DIAGNOSIS — I73.9 PVD (PERIPHERAL VASCULAR DISEASE): ICD-10-CM

## 2020-11-17 DIAGNOSIS — I48.19 OTHER PERSISTENT ATRIAL FIBRILLATION: ICD-10-CM

## 2020-11-17 DIAGNOSIS — I48.91 ATRIAL FIBRILLATION WITH RAPID VENTRICULAR RESPONSE: ICD-10-CM

## 2020-11-17 DIAGNOSIS — I48.0 PAROXYSMAL ATRIAL FIBRILLATION: ICD-10-CM

## 2020-11-17 PROBLEM — R60.0 LOCALIZED EDEMA: Status: RESOLVED | Noted: 2017-07-25 | Resolved: 2020-11-17

## 2020-11-17 PROBLEM — I50.21 ACUTE SYSTOLIC CHF (CONGESTIVE HEART FAILURE): Status: RESOLVED | Noted: 2020-02-15 | Resolved: 2020-11-17

## 2020-11-17 PROBLEM — T84.093A FAILED TOTAL LEFT KNEE REPLACEMENT: Status: RESOLVED | Noted: 2017-04-20 | Resolved: 2020-11-17

## 2020-11-17 PROBLEM — M54.2 NECK PAIN: Status: RESOLVED | Noted: 2019-09-18 | Resolved: 2020-11-17

## 2020-11-17 PROBLEM — M79.89 LEG SWELLING: Status: RESOLVED | Noted: 2018-11-27 | Resolved: 2020-11-17

## 2020-11-17 PROBLEM — M25.562 LEFT KNEE PAIN: Status: RESOLVED | Noted: 2017-06-27 | Resolved: 2020-11-17

## 2020-11-17 PROBLEM — R49.0 HOARSENESS OF VOICE: Status: RESOLVED | Noted: 2018-07-05 | Resolved: 2020-11-17

## 2020-11-17 PROBLEM — J90 BILATERAL PLEURAL EFFUSION: Status: RESOLVED | Noted: 2020-02-13 | Resolved: 2020-11-17

## 2020-11-17 PROCEDURE — 99213 OFFICE O/P EST LOW 20 MIN: CPT | Mod: PBBFAC,PO | Performed by: REGISTERED NURSE

## 2020-11-17 PROCEDURE — G0008 ADMIN INFLUENZA VIRUS VAC: HCPCS | Mod: PBBFAC,PO

## 2020-11-17 PROCEDURE — 90694 VACC AIIV4 NO PRSRV 0.5ML IM: CPT | Mod: PBBFAC,PO

## 2020-11-17 PROCEDURE — 99999 PR PBB SHADOW E&M-EST. PATIENT-LVL III: CPT | Mod: PBBFAC,,, | Performed by: REGISTERED NURSE

## 2020-11-17 PROCEDURE — G0439 PR MEDICARE ANNUAL WELLNESS SUBSEQUENT VISIT: ICD-10-PCS | Mod: ,,, | Performed by: REGISTERED NURSE

## 2020-11-17 PROCEDURE — 99999 PR PBB SHADOW E&M-EST. PATIENT-LVL III: ICD-10-PCS | Mod: PBBFAC,,, | Performed by: REGISTERED NURSE

## 2020-11-17 PROCEDURE — G0439 PPPS, SUBSEQ VISIT: HCPCS | Mod: ,,, | Performed by: REGISTERED NURSE

## 2020-11-25 ENCOUNTER — PATIENT OUTREACH (OUTPATIENT)
Dept: ADMINISTRATIVE | Facility: OTHER | Age: 85
End: 2020-11-25

## 2020-11-26 NOTE — PROGRESS NOTES
Health Maintenance Due   Topic Date Due    TETANUS VACCINE  12/29/1953    Shingles Vaccine (1 of 2) 12/29/1987    Pneumococcal Vaccine (65+ Low/Medium Risk) (1 of 2 - PCV13) 12/29/2002     Updates were requested from care everywhere.  Chart was reviewed for overdue Proactive [facility] Encounters ([place]) topics (CRS, Breast Cancer Screening, Eye exam)  Health Maintenance has been updated.  LINKS immunization registry triggered.  Immunizations were reconciled.

## 2020-11-30 ENCOUNTER — OFFICE VISIT (OUTPATIENT)
Dept: PODIATRY | Facility: CLINIC | Age: 85
End: 2020-11-30
Payer: MEDICARE

## 2020-11-30 VITALS — WEIGHT: 282.63 LBS | HEIGHT: 76 IN | BODY MASS INDEX: 34.42 KG/M2

## 2020-11-30 DIAGNOSIS — G89.29 CHRONIC PAIN OF BOTH FEET: ICD-10-CM

## 2020-11-30 DIAGNOSIS — M79.672 CHRONIC PAIN OF BOTH FEET: ICD-10-CM

## 2020-11-30 DIAGNOSIS — M19.072 PRIMARY OSTEOARTHRITIS OF LEFT FOOT: ICD-10-CM

## 2020-11-30 DIAGNOSIS — Z09 FOLLOW UP: Primary | ICD-10-CM

## 2020-11-30 DIAGNOSIS — R60.0 EDEMA, LOWER EXTREMITY: ICD-10-CM

## 2020-11-30 DIAGNOSIS — M79.671 CHRONIC PAIN OF BOTH FEET: ICD-10-CM

## 2020-11-30 DIAGNOSIS — M19.071 PRIMARY OSTEOARTHRITIS OF RIGHT FOOT: ICD-10-CM

## 2020-11-30 PROBLEM — E66.01 CLASS 2 SEVERE OBESITY WITH SERIOUS COMORBIDITY IN ADULT: Status: RESOLVED | Noted: 2018-10-11 | Resolved: 2020-11-30

## 2020-11-30 PROCEDURE — 99213 OFFICE O/P EST LOW 20 MIN: CPT | Mod: S$PBB,,, | Performed by: PODIATRIST

## 2020-11-30 PROCEDURE — 99213 PR OFFICE/OUTPT VISIT, EST, LEVL III, 20-29 MIN: ICD-10-PCS | Mod: S$PBB,,, | Performed by: PODIATRIST

## 2020-11-30 PROCEDURE — 99213 OFFICE O/P EST LOW 20 MIN: CPT | Mod: PBBFAC | Performed by: PODIATRIST

## 2020-11-30 PROCEDURE — 99999 PR PBB SHADOW E&M-EST. PATIENT-LVL III: CPT | Mod: PBBFAC,,, | Performed by: PODIATRIST

## 2020-11-30 PROCEDURE — 99999 PR PBB SHADOW E&M-EST. PATIENT-LVL III: ICD-10-PCS | Mod: PBBFAC,,, | Performed by: PODIATRIST

## 2020-11-30 NOTE — PROGRESS NOTES
"  Dominic Cohen presented for a follow-up Medicare AWV today. The following components were reviewed and updated:    · Medical history  · Family History  · Social history  · Allergies and Current Medications  · Health Risk Assessment  · Health Maintenance  · Care Team    **See Completed Assessments for Annual Wellness visit with in the encounter summary    The following assessments were completed:  · Depression Screening  · Cognitive function Screening  · Timed Get Up Test  · Whisper Test    Vitals:    11/17/20 1353   BP: 134/82   Pulse: 64   Temp: 97.7 °F (36.5 °C)   TempSrc: Oral   Weight: 127.3 kg (280 lb 10.3 oz)   Height: 6' 4" (1.93 m)     Body mass index is 34.16 kg/m².   ]    Physical Exam  Vitals signs reviewed.   HENT:      Head: Normocephalic and atraumatic.   Eyes:      Pupils: Pupils are equal, round, and reactive to light.   Cardiovascular:      Rate and Rhythm: Normal rate and regular rhythm.   Pulmonary:      Effort: Pulmonary effort is normal.      Breath sounds: Normal breath sounds.   Neurological:      Mental Status: He is alert and oriented to person, place, and time.   Psychiatric:         Mood and Affect: Mood normal.         Behavior: Behavior normal.         Thought Content: Thought content normal.         Judgment: Judgment normal.           Diagnoses and health risks identified today and associated recommendations/orders:  1. Encounter for preventive health examination  HRA completed today.      2. Essential hypertension  Stable, well-controlled.  Taking med as ordered.  Denies cp, sob, edema, dizziness or syncope.  Followed by PCP and Cardiology.      3. Chronic diastolic heart failure  Stable, on medication as ordered.  No current complaints.  Seen by Dr. Marshall, chart note dated 2/27/2020:  Repeat echo in 2 months for pericardial effusion.  Add Lasix 40 mg on MWF prn for leg swelling.  Counseled DASH, Recommend heart-healthy diet, weight control and regular exercise.  Gordon. Risk " modification.  RTC in 1 yr sooner if indicated.  Seen by Dr. Graham, chart note dated 3/4/2020:  The patient is a well appearing 84 year old male status post pericardial window and left chest tube placement on 02/14/2020. The patient presents to clinic for his first post-operative follow-up appointment for suture removal. The patient has no complaints. Excellent exercise tolerance. Steady gait and balance. Incisions CDI healing well. Regular bowel movements. Adequate appetite. Reports recent appointment with Dr. Marshall, placed on lasix for JOSLYN LE edema. Planned for future f/u echocardiogram. Denies chest pain, SOB, palpitations, fever, malaise, weakness, fatigue, syncope, dizziness, lightheadedness, tremors, falls, abdominal pain, constipation, and diarrhea.  Dr. Flaquito Camp, chart note dated 3/23/2020:  Stable cardiovascular conditions at present time on current medical treatment.  Reviewed all tests and above medical conditions with patient in detail and formulated treatment plan.  Continue optimal medical treatment for cardiovascular conditions.  Cardiac low salt diet discussed.  Daily exercise encouraged, goal 30 +  minutes aerobic exercise as tolerated.  Maintaining healthy weight and weight loss goals (if needed) were discussed in clinic.  PAF resolved for now.  Was not on ASA or NOAC during hospital stay or at time of discharge.  Add 81 mg ASA qd.  EP consult for PAF.  May stay off Metoprolol for now.  F/u in 6 months with echo.      4. Atrial fibrillation with rapid ventricular response  Stable, takes ASA 81 mg daily.  Per Cardiology.  See # 3.      5. Paroxysmal atrial fibrillation  See # 3.      6. Other persistent atrial fibrillation  See # 3.      7. PVD (peripheral vascular disease)  Stable, no complaints currently.  Followed by Cardiology, see # 4.      8. Atherosclerosis of abdominal aorta  Stable, no complaints.  Not currently on statin.  Per Cardiology.      9. Obesity, Class I, BMI 30-34.9  Healthy  diet, regular exercise as tolerated.  Weight reduction as needed.        Provided [place] with a 5-10 year written screening schedule and personal prevention plan. Recommendations were developed using the USPSTF age appropriate recommendations. Education, counseling, and referrals were provided as needed.  After Visit Summary printed and given to patient which includes a list of additional screenings\tests needed.      I offered to discuss end of life issues, including information on how to make advance directives that the patient could use to name someone who would make medical decisions on their behalf if they became too ill to make themselves.    ___Patient declined  _X_Patient is interested, I provided paper work and offered to discuss.      Patient Care Team:  Sudhakar Liu MD as PCP - General (Internal Medicine)  Donte Junior LPN as Care Coordinator (Internal Medicine)  Tahir Barrios MD as Consulting Physician (Hematology and Oncology)  Mariam Aguayo NP as Nurse Practitioner (Hematology and Oncology)  Emma Garcia DPM as Consulting Physician (Podiatry)  Flaquito Camp MD as Consulting Physician (Cardiology)  Jose Alejandro Marshall MD as Consulting Physician (Cardiology)  Ankush Graham MD as Consulting Physician (Cardiothoracic Surgery)        Mike Salvador, TOÑO

## 2020-11-30 NOTE — PATIENT INSTRUCTIONS
Counseling and Referral of Other Preventative  (Italic type indicates deductible and co-insurance are waived)    Patient Name: Dominic Cohen  Today's Date: 11/30/2020    Health Maintenance       Date Due Completion Date    TETANUS VACCINE 12/29/1955 ---    Shingles Vaccine (1 of 2) 12/29/1987 ---    Pneumococcal Vaccine (65+ Low/Medium Risk) (1 of 2 - PCV13) 12/29/2002 ---    PROSTATE-SPECIFIC ANTIGEN 02/03/2021 2/3/2020    Lipid Panel 02/03/2021 2/3/2020    Override on 12/6/2013: (N/S)        Orders Placed This Encounter   Procedures    Influenza - Quadrivalent (Adjuvanted)     The following information is provided to all patients.  This information is to help you find resources for any of the problems found today that may be affecting your health:                Living healthy guide: www.[facility].[place].gov      Understanding Diabetes: www.diabetes.org      Eating healthy: www.cdc.gov/healthyweight      CDC home safety checklist: www.cdc.gov/steadi/patient.html      Agency on Aging: www.goea.[place].[facility]      Alcoholics anonymous (AA): www.aa.org      Physical Activity: www.stacey.nih.gov/ud8xkgc      Tobacco use: www.quitwithusla.org

## 2020-11-30 NOTE — LETTER
November 30, 2020      Corby Cannon, TOÑO  [address]  [facility] [address]           [facility]  [address] [place] [id number]  Phone: [phone]  Fax: [phone]          Patient: Dominic Cohen   MR Number: [id number]   YOB: 1935   Date of Visit: 11/30/2020       Dear Corby Cannon:    Thank you for referring Dominic Cohen to me for evaluation. Attached you will find relevant portions of my assessment and plan of care.    If you have questions, please do not hesitate to call me. I look forward to following Dominic Cohen along with you.    Sincerely,    Emma Garcia, KENYA    Enclosure  CC:  No Recipients    If you would like to receive this communication electronically, please contact [email or web address] or [phone] to request more information on [employer] access.    For providers and/or their staff who would like to refer a patient to [facility], please contact us through our one-stop-shop provider referral line, [facility], at [phone].    If you feel you have received this communication in error or would no longer like to receive these types of communications, please e-mail [email or web address]

## 2020-11-30 NOTE — PROGRESS NOTES
Subjective:       Patient ID: Dominic Cohen is a 84 y.o. male.    Chief Complaint: Follow-up (Bilateral foot pain. Pt c/o sharp, throbbing pain every night rating 10/10. No pain at present. Wears tennis shoes w/ compression socks.)      HPI:  Dominic Cohen presents to the office to follow up for pain and swelling to the bilateral foot and ankle.  States that at night, he has 10/10 pain and throbbing.  Currently he is in 0/10 pain at present.  Reports that at night, he is not wearing his compression socks.  Does state that he previously presented to [facility] ED several months ago to have fluid pulled out of his ankle.  He is agitated as nothing has been done to help deal with his pain and swelling to the bilateral foot and ankles.  States that at night, he does utilize warm soaks which he reports helps. Patient's PMD is Sudhakar Liu MD.  Has been dealing with pain for some time with minimal changes.  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    Glaucoma     History of anal fissures     Hypertension     Polyneuropathy in other diseases classified elsewhere     due to folate deficiency    Prediabetes     Venous insufficiency     Venous insufficiency        Family History   Problem Relation Age of Onset    No Known Problems Son     No Known Problems Daughter     No Known Problems Son     Diabetes Neg Hx     Heart disease Neg Hx     Cancer Neg Hx      Celiac disease Neg Hx     Cirrhosis Neg Hx     Colon cancer Neg Hx     Colon polyps Neg Hx     Crohn's disease Neg Hx     Cystic fibrosis Neg Hx     Esophageal cancer Neg Hx     Hemochromatosis Neg Hx     Inflammatory bowel disease Neg Hx     Irritable bowel syndrome Neg Hx     Liver cancer Neg Hx     Liver disease Neg Hx     Rectal cancer Neg Hx     Stomach cancer Neg Hx     Ulcerative colitis Neg Hx     Zak's disease Neg Hx     Amblyopia Neg Hx     Blindness Neg Hx     Glaucoma Neg Hx     Hypertension Neg Hx     Macular degeneration Neg Hx     Retinal detachment Neg Hx     Strabismus Neg Hx        Social History     Socioeconomic History    Marital status:      Spouse name: Not on file    Number of children: 3    Years of education: Not on file    Highest education level: Not on file   Occupational History    Occupation: Retired     Comment: [place], [place]   Social Needs    Financial resource strain: Not on file    Food insecurity     Worry: Not on file     Inability: Not on file    Transportation needs     Medical: Not on file     Non-medical: Not on file   Tobacco Use    Smoking status: Former Smoker     Packs/day: 0.30     Years: 52.00     Pack years: 15.60     Types: Cigarettes     Quit date: 2000     Years since quittin.0    Smokeless tobacco: Never Used   Substance and Sexual Activity    Alcohol use: No    Drug use: No    Sexual activity: Yes   Lifestyle    Physical activity     Days per week: Not on file     Minutes per session: Not on file    Stress: Not on file   Relationships    Social connections     Talks on phone: Not on file     Gets together: Not on file     Attends Sabianist service: Not on file     Active member of club or organization: Not on file     Attends meetings of clubs or organizations: Not on file     Relationship status: Not on file   Other Topics Concern    Not on file   Social History Narrative    Not on file       Past  "Surgical History:   Procedure Laterality Date    BACK SURGERY      CATARACT EXTRACTION BILATERAL W/ ANTERIOR VITRECTOMY      COLONOSCOPY N/A 5/13/2016    Procedure: COLONOSCOPY;  Surgeon: Presley Phillips MD;  Location: [facility] ([address] [place] [address]);  Service: Endoscopy;  Laterality: N/A;  EGD with Dr. Jeffery prior to Colonoscopy. EC    ESOPHAGOGASTRODUODENOSCOPY N/A 7/5/2018    Procedure: ESOPHAGOGASTRODUODENOSCOPY (EGD);  Surgeon: Khanh Asencio III, MD;  Location: [facility];  Service: Endoscopy;  Laterality: N/A;    JOINT REPLACEMENT Left     x 2    KNEE SURGERY Left     x2. revision 06/27/17    PERICARDIAL WINDOW N/A 2/14/2020    Procedure: CREATION, PERICARDIAL WINDOW;  Surgeon: Ankush Graham MD;  Location: [facility];  Service: Cardiothoracic;  Laterality: N/A;    PROSTATE SURGERY      TONSILLECTOMY, ADENOIDECTOMY      TUBE THORACOTOMY Left 2/14/2020    Procedure: INSERTION, CATHETER, INTERCOSTAL, FOR DRAINAGE;  Surgeon: Ankush Graham MD;  Location: [facility];  Service: Cardiothoracic;  Laterality: Left;  LEFT PLEURA CHEST TUBE PLACEMENT       Review of Systems   Constitutional: Negative for chills and fever.   HENT: Negative for voice change.    Eyes: Negative for visual disturbance.   Respiratory: Negative for shortness of breath.    Cardiovascular: Negative for chest pain and palpitations.   Gastrointestinal: Negative for diarrhea, nausea and vomiting.   Musculoskeletal: Positive for joint swelling. Negative for back pain.   Skin: Negative for color change and wound.   Neurological: Negative for dizziness, weakness and numbness.   Psychiatric/Behavioral: Positive for agitation.          Objective:   Ht 6' 4" (1.93 m)   Wt 128.2 kg (282 lb 10.1 oz)   BMI 34.40 kg/m²     Posterior Segment OCT Optic Nerve- Both eyes  Findings  Right Eye  Progression has been stable.     Left Eye  Progression has been stable.     Notes  RNFL Thinning OU but no significant progression of either RNFL compared   with " previous studies.       Physical Exam  LOWER EXTREMITY PHYSICAL EXAMINATION    VASCULAR: The right DP pulse is 2/4 and the left DP is 2/4. The right PT pulse is 2/4 and the left PT pulse is 2/4. Proximal to distal, warm to warm. No dependent rubor or elevation palor is noted. Capillary refill time is less than 3 seconds. Hair growth is decreased to the dorsal foot and digits.    NEUROLOGY: Proprioception is intact. Sensation to light touch is intact. Protective sensation is intact.  Negative Tinel sign.  No pain with medial lateral compression of the metatarsal heads.  Negative Roberth's click    DERMATOLOGY: Skin is supple, dry and intact.  Plus two pitting edema noted to the bilateral lower extremities and feet.  There is flaking tissue to the pretibial regions bilaterally.  No ecchymosis is noted. No hypertrophic skin formation. No erythema or cellulitis is noted.     ORTHOPEDIC:  No reproducible pain on palpation of the right or left foot.  No pain with range of motion of the ankle joints bilaterally.  Pes planus foot deformity is noted bilaterally which is slightly worse on the right than the left.     Assessment:     1. Follow up    2. Chronic pain of both feet    3. Primary osteoarthritis of left foot    4. Primary osteoarthritis of right foot    5. Edema, lower extremity        Plan:     Follow up  -     Ambulatory referral/consult to Podiatry    Chronic pain of both feet    Primary osteoarthritis of left foot    Primary osteoarthritis of right foot    Edema, lower extremity        X-rays were once again reviewed by myself with the patient the room.  There is significant degenerative joint disease of the foot and ankle region bilaterally.  As patient is having 0/10 pain currently, however pain at night, this would be difficult to understand where the pain discomfort is.  There is no reproducible pain on physical exam.  Does have pes planus foot deformity with significant bilateral degenerative joint  disease.    +2 pitting edema noted bilaterally and currently being treated on Lasix on Monday, Wednesday, and Friday being managed by PCP.  May need changes to his medication to help with bilateral lower leg edema which is not limited to the foot bilaterally.    Discussed treatment options with the patient in regards to degenerative joint disease of the right left foot.  Discussed with him that there are limited options conservatively for the foot due to the significant degenerative joint disease in the hindfoot and medial column bilaterally.  Discussed with him that surgical intervention will help fuse eliminate range of motion of these joints and place them in the appropriate position, , however due to his age and increased time of being nonweightbearing following the major joints surgery, would not recommend this at this time.     Would recommend conservative options associated with topical pain creams and follow-up for gout to consider initiation of anti-inflammatory versus steroid vs allopurinol    Of note, did review previous admission, Hospital/ED encounters with the patient and last documented visit at which point fluid was removed from the ankle joint was over 2 years ago on February 6, 2018 due to a a gout flare.  No additional hospital encounters pertaining to the foot or swelling discovered.  Patient reported this was inaccurate and that this was several months ago.    Patient became aggressive.      Recommend a 2nd opinion        Future Appointments   Date Time Provider Department Center   12/14/2020  9:00 AM Sudhakar Liu MD [facility]   12/14/2020  9:30 AM Corby Cannon NP [address]   12/14/2020 11:00 AM Corby Cannon NP [address]   12/31/2020  9:30 AM FIELDS, [facility]   12/31/2020 10:00 AM Sudhakar Rivero MD [facility]

## 2020-12-05 PROCEDURE — G0179 PR HOME HEALTH MD RECERTIFICATION: ICD-10-PCS | Mod: ,,, | Performed by: INTERNAL MEDICINE

## 2020-12-05 PROCEDURE — G0179 MD RECERTIFICATION HHA PT: HCPCS | Mod: ,,, | Performed by: INTERNAL MEDICINE

## 2020-12-08 ENCOUNTER — TELEPHONE (OUTPATIENT)
Dept: ENDOSCOPY | Facility: HOSPITAL | Age: 85
End: 2020-12-08

## 2020-12-08 DIAGNOSIS — D50.0 IRON DEFICIENCY ANEMIA DUE TO CHRONIC BLOOD LOSS: Primary | ICD-10-CM

## 2020-12-08 RX ORDER — POLYETHYLENE GLYCOL 3350, SODIUM SULFATE ANHYDROUS, SODIUM BICARBONATE, SODIUM CHLORIDE, POTASSIUM CHLORIDE 236; 22.74; 6.74; 5.86; 2.97 G/4L; G/4L; G/4L; G/4L; G/4L
4 POWDER, FOR SOLUTION ORAL ONCE
Qty: 4000 ML | Refills: 0 | Status: SHIPPED | OUTPATIENT
Start: 2020-12-08 | End: 2020-12-08

## 2020-12-08 NOTE — TELEPHONE ENCOUNTER
Location Screening:    If answers yes to any of the following, schedule at [facility] ONLY. If No, OK for either location.    1. Is there a diagnosis of heart failure, severe heart valve disease (aortic stenosis) or mechanical valve? no  a. Is the Left Ventricle Ejection Fraction <30% ? no    2. Does the pt have pulmonary hypertension? no   a. Is pulmonary arterial pressure gradient >50mmHg? no   b. Is there evidence of right ventricular dysfunction? no    3. Does the pt have achalasia? no    4. Any history of negative reaction to anesthesia? no   a. Myasthenia gravis? no   b. Malignant hyperthermia? no   c. Other? not applicable    5. Is procedure for esophageal banding? no      COVID Screening    1. Have you had a fever in the last 7 days or have you used fever reducing medicines for a fever in the last 7 days?  no    2. Are you experiencing shortness of breath, cough, muscle aches, loss of taste or loss of smell?  no    If answered yes to questions 1 and 2, the patient must seek medical attention with their PCP.  Do not schedule their procedure.     3. Are you residing with anyone who has tested positive for Covid?  no    If answered yes to question 3, recommend 14 day self-quarantine from the date of relative's positive test and place special needs note in the depot.  Wait to schedule.     ENDO screening    1. Have you been admitted for cardiac, kidney or pulmonary causes to the hospital in the past 3 months? no   If yes, schedule an appointment with PCP before scheduling endoscopic procedure.     2. Have you had a stent placed in the last 12 months? no   If yes, for a screening visit, cancel and message the ordering provider.  The patient will need a new order when the time is appropriate.     3. Have you had a stroke or heart attack in the past 6 months? no   If yes, cancel and refer patient to ordering provider for clearance, also message ordering provider to inform.     4. Have you had any chest pain in the past  "3 months? no   If yes, Have you been evaluated by your PCP and/or cardiologist and it was determined to not be heart related? not applicable   If No, Pt needs to be seen by PCP or Cardiologist .  Pt can be scheduled once clearance obtained by either of those providers.     5. Do you take prescription weight loss medications?  no   If yes, must stop for 2 weeks prior to procedure.     6. Have you been diagnosed with diverticulitis within the past 3 months? no   If yes, must have been seen by GI within the last 3 months, if not schedule with GI [facility].    If pt has been seen by GI, schedule procedure 8-12 weeks post antibiotic treatment.     7. Are you on Dialysis? no  If yes, schedule procedure for the day AFTER dialysis.  Appt time should be 9am or later, patient arrival time is 2 hours prior.  Nulytely or miralax prep for all patients with kidney disease.     8. Are you diabetic?  no   If yes, schedule morning appt. Advise pt to hold all diabetic meds day of procedure.     9. If pt is older than 80 years of age and HAS NOT been seen by GI or PCP within the last 6 months, needs appt with GI [facility].   If pt has been seen by the GI provider or PCP within the past 6  months AND meets criteria, schedule procedure AND send message to the endoscopist.     10. Is patient on a "high risk" medication (blood thinner/antiplatelet agent)?  no   If yes, has cardiac clearance been obtained within the last 60 days? N/A   If no, a new clearance needs to be obtained.     Final Questions:    1.I have reviewed the last colonoscopy for recommendations regarding next procedure bowel prep.  yes  2. I have reviewed medications and allergies.  yes  3. I have verified the pharmacy information and appropriate prep sent if needed. yes  4. Prep instructions have been mailed or sent to portal per patient request. yes        All schedulers will have ability to reach out to the ordering GI provider to clarify any issues.     "

## 2020-12-09 ENCOUNTER — PATIENT OUTREACH (OUTPATIENT)
Dept: HOME HEALTH SERVICES | Facility: HOSPITAL | Age: 85
End: 2020-12-09

## 2020-12-09 DIAGNOSIS — I50.32 DIASTOLIC DYSFUNCTION WITH CHRONIC HEART FAILURE: Primary | ICD-10-CM

## 2020-12-10 ENCOUNTER — OUTPATIENT CASE MANAGEMENT (OUTPATIENT)
Dept: ADMINISTRATIVE | Facility: OTHER | Age: 85
End: 2020-12-10

## 2020-12-10 ENCOUNTER — EXTERNAL HOME HEALTH (OUTPATIENT)
Dept: HOME HEALTH SERVICES | Facility: HOSPITAL | Age: 85
End: 2020-12-10
Payer: MEDICARE

## 2020-12-10 NOTE — LETTER
December 15, 2020    [facility]  [address]  [place] [address]             [facility] [address]  [place] [address] Welcome to [facility]s Complex Care Management Program.  It was a pleasure talking with you today.  My name is Tamika Magana. I look forward to working with you as your Care Manager.  My goal is to help you function at the healthiest and highest level possible.  You can contact me directly at [phone].    As an [facility] patient, some of the services we may be able to provide include:      Development of an individualized care plan with a Registered Nurse    Connection with a    Connection with available resources and services     Coordinate communication among your care team members    Provide coaching and education    Help you understand your doctors treatment plan   Help you obtain information about your insurance coverage.     All services provided by [facility]s Complex Care Managers and other care team members are coordinated with and communicated to your primary care team.    As part of your enrollment, you will be receiving education materials and more information about these services in your [facility] account, by phone or through the mail.  If you do not wish to participate or receive information, please contact our office at [phone].      Sincerely,        Tamika Magana RN, CCM [facility]   Out-patient RN Complex Care Manager

## 2020-12-10 NOTE — LETTER
December 11, 2020    [facility]  [address]  [place] [address]             [facility] [address]  [facility] [address] Dear Mr Cohen,    I work with [facility]'s Outpatient Case Management Department. We received a referral to call you to discuss your medical history.These services are free of charge and are offered to [facility] patients who have recently been discharged from any of our facilities or who have complex medical conditions that may require the skill of a nurse to assist with management.             I am a Registered Nurse who specializes in connecting patients with available medical and financial resources as well as addressing any educational needs that may be indicated.      I attempted to reach you by telephone, but I was unsuccessful. Please call our department so that we can go over some questions with you regarding your health.    The Outpatient Case Management Department can be reached at [phone] from 8:00AM to 4:30 PM on Monday thru Friday. [facility] also has a program where a nurse is available 24/7 to answer questions or provide medical advice. Their number is [phone].      Thank you,      Tamika Narvaez RN  Outpatient Case Management  [phone]

## 2020-12-14 ENCOUNTER — OFFICE VISIT (OUTPATIENT)
Dept: FAMILY MEDICINE | Facility: CLINIC | Age: 85
End: 2020-12-14
Payer: MEDICARE

## 2020-12-14 ENCOUNTER — LAB VISIT (OUTPATIENT)
Dept: LAB | Facility: HOSPITAL | Age: 85
End: 2020-12-14
Attending: INTERNAL MEDICINE
Payer: MEDICARE

## 2020-12-14 VITALS
OXYGEN SATURATION: 96 % | DIASTOLIC BLOOD PRESSURE: 80 MMHG | HEART RATE: 80 BPM | SYSTOLIC BLOOD PRESSURE: 132 MMHG | BODY MASS INDEX: 34.39 KG/M2 | WEIGHT: 282.5 LBS | TEMPERATURE: 98 F | RESPIRATION RATE: 16 BRPM

## 2020-12-14 DIAGNOSIS — R73.9 HYPERGLYCEMIA: ICD-10-CM

## 2020-12-14 DIAGNOSIS — E78.5 DYSLIPIDEMIA: ICD-10-CM

## 2020-12-14 DIAGNOSIS — I10 ESSENTIAL HYPERTENSION: ICD-10-CM

## 2020-12-14 DIAGNOSIS — I10 ESSENTIAL HYPERTENSION: Primary | ICD-10-CM

## 2020-12-14 LAB
ALBUMIN SERPL BCP-MCNC: 3.5 G/DL (ref 3.5–5.2)
ALP SERPL-CCNC: 65 U/L (ref 55–135)
ALT SERPL W/O P-5'-P-CCNC: 25 U/L (ref 10–44)
ANION GAP SERPL CALC-SCNC: 10 MMOL/L (ref 8–16)
AST SERPL-CCNC: 22 U/L (ref 10–40)
BILIRUB SERPL-MCNC: 0.5 MG/DL (ref 0.1–1)
BUN SERPL-MCNC: 19 MG/DL (ref 8–23)
CALCIUM SERPL-MCNC: 9.3 MG/DL (ref 8.7–10.5)
CHLORIDE SERPL-SCNC: 106 MMOL/L (ref 95–110)
CHOLEST SERPL-MCNC: 158 MG/DL (ref 120–199)
CHOLEST/HDLC SERPL: 3.2 {RATIO} (ref 2–5)
CO2 SERPL-SCNC: 27 MMOL/L (ref 23–29)
CREAT SERPL-MCNC: 1.2 MG/DL (ref 0.5–1.4)
EST. GFR  (AFRICAN AMERICAN): >60 ML/MIN/1.73 M^2
EST. GFR  (NON AFRICAN AMERICAN): 56 ML/MIN/1.73 M^2
GLUCOSE SERPL-MCNC: 118 MG/DL (ref 70–110)
HDLC SERPL-MCNC: 49 MG/DL (ref 40–75)
HDLC SERPL: 31 % (ref 20–50)
LDLC SERPL CALC-MCNC: 87 MG/DL (ref 63–159)
NONHDLC SERPL-MCNC: 109 MG/DL
POTASSIUM SERPL-SCNC: 3.9 MMOL/L (ref 3.5–5.1)
PROT SERPL-MCNC: 7.8 G/DL (ref 6–8.4)
SODIUM SERPL-SCNC: 143 MMOL/L (ref 136–145)
TRIGL SERPL-MCNC: 110 MG/DL (ref 30–150)

## 2020-12-14 PROCEDURE — 99213 OFFICE O/P EST LOW 20 MIN: CPT | Mod: S$PBB,,, | Performed by: INTERNAL MEDICINE

## 2020-12-14 PROCEDURE — 99213 PR OFFICE/OUTPT VISIT, EST, LEVL III, 20-29 MIN: ICD-10-PCS | Mod: S$PBB,,, | Performed by: INTERNAL MEDICINE

## 2020-12-14 PROCEDURE — 80053 COMPREHEN METABOLIC PANEL: CPT

## 2020-12-14 PROCEDURE — 80061 LIPID PANEL: CPT

## 2020-12-14 PROCEDURE — 83036 HEMOGLOBIN GLYCOSYLATED A1C: CPT

## 2020-12-14 PROCEDURE — 99999 PR PBB SHADOW E&M-EST. PATIENT-LVL III: CPT | Mod: PBBFAC,,, | Performed by: INTERNAL MEDICINE

## 2020-12-14 PROCEDURE — 99213 OFFICE O/P EST LOW 20 MIN: CPT | Mod: PBBFAC,PO | Performed by: INTERNAL MEDICINE

## 2020-12-14 PROCEDURE — 36415 COLL VENOUS BLD VENIPUNCTURE: CPT | Mod: PO

## 2020-12-14 PROCEDURE — 99999 PR PBB SHADOW E&M-EST. PATIENT-LVL III: ICD-10-PCS | Mod: PBBFAC,,, | Performed by: INTERNAL MEDICINE

## 2020-12-14 NOTE — PROGRESS NOTES
Subjective:       Patient ID: Dominic Cohen is a 84 y.o. male.    Chief Complaint: Follow-up (6 month f/u, neck shoulder pain), Hypertension, and Hyperlipidemia    Follow-up  Associated symptoms include arthralgias and myalgias. Pertinent negatives include no abdominal pain, chest pain, chills, coughing, diaphoresis, fatigue, fever, headaches, joint swelling, nausea, neck pain, numbness, rash, sore throat, vomiting or weakness.   Hypertension  Pertinent negatives include no chest pain, headaches, neck pain, palpitations or shortness of breath.   Hyperlipidemia  Associated symptoms include myalgias. Pertinent negatives include no chest pain or shortness of breath.     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 Jeffery prior to Colonoscopy. EC    ESOPHAGOGASTRODUODENOSCOPY N/A 7/5/2018    Procedure: ESOPHAGOGASTRODUODENOSCOPY (EGD);  Surgeon: Khanh Asencio III, MD;  Location: [facility];  Service: Endoscopy;  Laterality: N/A;    JOINT REPLACEMENT Left     x 2    KNEE SURGERY Left     x2. revision 06/27/17    PERICARDIAL WINDOW N/A 2/14/2020    Procedure: CREATION, PERICARDIAL WINDOW;  Surgeon: Ankush Graham MD;  Location: [facility];  Service: Cardiothoracic;  Laterality: N/A;     PROSTATE SURGERY      TONSILLECTOMY, ADENOIDECTOMY      TUBE THORACOTOMY Left 2020    Procedure: INSERTION, CATHETER, INTERCOSTAL, FOR DRAINAGE;  Surgeon: Ankush Graham MD;  Location: [facility];  Service: Cardiothoracic;  Laterality: Left;  LEFT PLEURA CHEST TUBE PLACEMENT     Family History   Problem Relation Age of Onset    No Known Problems Son     No Known Problems Daughter     No Known Problems Son     Diabetes Neg Hx     Heart disease Neg Hx     Cancer Neg Hx     Celiac disease Neg Hx     Cirrhosis Neg Hx     Colon cancer Neg Hx     Colon polyps Neg Hx     Crohn's disease Neg Hx     Cystic fibrosis Neg Hx     Esophageal cancer Neg Hx     Hemochromatosis Neg Hx     Inflammatory bowel disease Neg Hx     Irritable bowel syndrome Neg Hx     Liver cancer Neg Hx     Liver disease Neg Hx     Rectal cancer Neg Hx     Stomach cancer Neg Hx     Ulcerative colitis Neg Hx     Zak's disease Neg Hx     Amblyopia Neg Hx     Blindness Neg Hx     Glaucoma Neg Hx     Hypertension Neg Hx     Macular degeneration Neg Hx     Retinal detachment Neg Hx     Strabismus Neg Hx      Social History     Socioeconomic History    Marital status:      Spouse name: Not on file    Number of children: 3    Years of education: Not on file    Highest education level: Not on file   Occupational History    Occupation: Retired     Comment: [place]   Social Needs    Financial resource strain: Not on file    Food insecurity     Worry: Not on file     Inability: Not on file    Transportation needs     Medical: Not on file     Non-medical: Not on file   Tobacco Use    Smoking status: Former Smoker     Packs/day: 0.30     Years: 52.00     Pack years: 15.60     Types: Cigarettes     Quit date: 2000     Years since quittin.0    Smokeless tobacco: Never Used   Substance and Sexual Activity    Alcohol use: No    Drug use: No    Sexual activity: Yes   Lifestyle    Physical  activity     Days per week: Not on file     Minutes per session: Not on file    Stress: Not on file   Relationships    Social connections     Talks on phone: Not on file     Gets together: Not on file     Attends Synagogue service: Not on file     Active member of club or organization: Not on file     Attends meetings of clubs or organizations: Not on file     Relationship status: Not on file   Other Topics Concern    Not on file   Social History Narrative    Not on file     Review of Systems   Constitutional: Negative for activity change, appetite change, chills, diaphoresis, fatigue, fever and unexpected weight change.   HENT: Negative for drooling, ear discharge, ear pain, facial swelling, hearing loss, mouth sores, nosebleeds, postnasal drip, rhinorrhea, sinus pressure, sneezing, sore throat, tinnitus, trouble swallowing and voice change.    Eyes: Negative for photophobia, redness and visual disturbance.   Respiratory: Negative for apnea, cough, choking, chest tightness, shortness of breath and wheezing.    Cardiovascular: Negative for chest pain and palpitations.   Gastrointestinal: Negative for abdominal distention, abdominal pain, anal bleeding, blood in stool, constipation, diarrhea, nausea and vomiting.   Endocrine: Negative for cold intolerance, heat intolerance, polydipsia, polyphagia and polyuria.   Genitourinary: Negative for difficulty urinating, dysuria, enuresis, flank pain, frequency, genital sores, hematuria and urgency.   Musculoskeletal: Positive for arthralgias, gait problem and myalgias. Negative for back pain, joint swelling, neck pain and neck stiffness.   Skin: Negative for color change, pallor, rash and wound.   Allergic/Immunologic: Negative for food allergies and immunocompromised state.   Neurological: Negative for dizziness, tremors, seizures, syncope, facial asymmetry, speech difficulty, weakness, light-headedness, numbness and headaches.   Hematological: Negative for adenopathy.  Does not bruise/bleed easily.   Psychiatric/Behavioral: Negative for agitation, behavioral problems, confusion, decreased concentration, dysphoric mood, hallucinations, self-injury, sleep disturbance and suicidal ideas. The patient is not nervous/anxious and is not hyperactive.        Objective:      Physical Exam  Vitals signs and nursing note reviewed.   Constitutional:       General: He is not in acute distress.     Appearance: He is well-developed. He is not diaphoretic.   HENT:      Head: Normocephalic and atraumatic.      Mouth/Throat:      Pharynx: No oropharyngeal exudate.   Eyes:      General: No scleral icterus.     Pupils: Pupils are equal, round, and reactive to light.   Neck:      Musculoskeletal: Normal range of motion and neck supple.      Thyroid: No thyromegaly.      Vascular: No carotid bruit or JVD.      Trachea: No tracheal deviation.   Cardiovascular:      Rate and Rhythm: Normal rate and regular rhythm.      Heart sounds: Normal heart sounds.   Pulmonary:      Effort: Pulmonary effort is normal. No respiratory distress.      Breath sounds: Normal breath sounds. No wheezing or rales.   Chest:      Chest wall: No tenderness.   Abdominal:      General: Bowel sounds are normal. There is no distension.      Palpations: Abdomen is soft.      Tenderness: There is no abdominal tenderness. There is no guarding or rebound.   Musculoskeletal: Normal range of motion.         General: No tenderness.   Lymphadenopathy:      Cervical: No cervical adenopathy.   Skin:     General: Skin is warm and dry.      Coloration: Skin is not pale.      Findings: No erythema or rash.   Neurological:      Mental Status: He is alert and oriented to person, place, and time.      Coordination: Coordination normal.   Psychiatric:         Behavior: Behavior normal.         Thought Content: Thought content normal.         Judgment: Judgment normal.         [facility]  Sodium   Date Value Ref Range Status   09/01/2020 145 136 - 145  mmol/L Final     Potassium   Date Value Ref Range Status   09/01/2020 4.1 3.5 - 5.1 mmol/L Final     Chloride   Date Value Ref Range Status   09/01/2020 108 95 - 110 mmol/L Final     CO2   Date Value Ref Range Status   09/01/2020 31 (H) 23 - 29 mmol/L Final     Glucose   Date Value Ref Range Status   09/01/2020 107 70 - 110 mg/dL Final     BUN   Date Value Ref Range Status   09/01/2020 16 8 - 23 mg/dL Final     Creatinine   Date Value Ref Range Status   09/01/2020 1.4 0.5 - 1.4 mg/dL Final     Calcium   Date Value Ref Range Status   09/01/2020 9.2 8.7 - 10.5 mg/dL Final     Total Protein   Date Value Ref Range Status   03/06/2020 7.6 6.0 - 8.4 g/dL Final     Albumin   Date Value Ref Range Status   03/06/2020 2.8 (L) 3.5 - 5.2 g/dL Final     Total Bilirubin   Date Value Ref Range Status   03/06/2020 0.5 0.1 - 1.0 mg/dL Final     Comment:     For infants and newborns, interpretation of results should be based  on gestational age, weight and in agreement with clinical  observations.  Premature Infant recommended reference ranges:  Up to 24 hours.............<8.0 mg/dL  Up to 48 hours............<12.0 mg/dL  3-5 days..................<15.0 mg/dL  6-29 days.................<15.0 mg/dL       Alkaline Phosphatase   Date Value Ref Range Status   03/06/2020 58 55 - 135 U/L Final     AST   Date Value Ref Range Status   03/06/2020 18 10 - 40 U/L Final     ALT   Date Value Ref Range Status   03/06/2020 17 10 - 44 U/L Final     Anion Gap   Date Value Ref Range Status   09/01/2020 6 (L) 8 - 16 mmol/L Final     eGFR if    Date Value Ref Range Status   09/01/2020 53.7 (A) >60 mL/min/1.73 m^2 Final     eGFR if non    Date Value Ref Range Status   09/01/2020 46.5 (A) >60 mL/min/1.73 m^2 Final     Comment:     Calculation used to obtain the estimated glomerular filtration  rate (eGFR) is the CKD-EPI equation.        Lab Results   Component Value Date    WBC 5.93 11/09/2020    HGB 12.8 (L) 11/09/2020     HCT 39.5 (L) 11/09/2020     (H) 11/09/2020     11/09/2020     Lab Results   Component Value Date    CHOL 115 (L) 02/03/2020     Lab Results   Component Value Date    HDL 43 02/03/2020     Lab Results   Component Value Date    LDLCALC 59.6 (L) 02/03/2020     Lab Results   Component Value Date    TRIG 62 02/03/2020     Lab Results   Component Value Date    CHOLHDL 37.4 02/03/2020     Lab Results   Component Value Date    TSH 0.368 (L) 03/06/2020     Lab Results   Component Value Date    HGBA1C 5.5 06/07/2018     Assessment:       1. Essential hypertension    2. Dyslipidemia    3. Hyperglycemia        Plan:   Essential hypertension  -     Comprehensive Metabolic Panel; Future; Expected date: 12/14/2020    Dyslipidemia  -     Lipid Panel; Future; Expected date: 12/14/2020    Hyperglycemia  -     Hemoglobin A1C; Future; Expected date: 12/14/2020    Stable--------continue meds---------f/u 6 months=--

## 2020-12-15 LAB
ESTIMATED AVG GLUCOSE: 100 MG/DL (ref 68–131)
HBA1C MFR BLD HPLC: 5.1 % (ref 4–5.6)

## 2020-12-15 NOTE — PATIENT INSTRUCTIONS
Diet for Chronic Kidney Disease  Following a special diet when you have kidney disease can help you stay as healthy as possible. Your healthcare provider or dietitian should make a special diet plan just for you.    Eating right  Here are some good eating rules to follow:  · Protein. Eating protein is important for your body. But too much protein can put a strain on your kidneys. Eating less protein may slow the progression of chronic kidney disease. Foods high in protein include meat, fish, eggs, cheese, and other dairy products. A registered dietitian can help you plan a diet that has the right amount of protein for you.  · Sodium. Having too much salt in your diet can make your body hold onto (retain) water. Ask your provider or dietitian how much sodium per day you are allowed. This will help you avoid fluid buildup in your body (fluid retention). It can also help control high blood pressure. Learn to read food labels to know how much sodium is in one serving. Foods high in salt include processed meats, canned and boxed foods, sauces, salted chips and snacks, pickled foods, frozen dinners, and restaurant and fast food.  · Fluids. If you have advanced kidney disease, you will need to limit the water and fluids you drink. If you dont, then too much water will build up in your body. The exact amount of fluid you can drink depends on how well your kidneys are working. Ask your provider how much water you can safely drink each day.  · Potassium. In advanced kidney disease, your potassium level can go dangerously high. This affects your heart. It can cause an irregular heartbeat (arrhythmia). Ask your provider or dietitian if you should limit potassium in your diet. Foods high in potassium include dairy products (milk, yogurt, cheese), dried beans, bananas, oranges, potatoes, tomatoes, spinach, cantaloupe, honeydew melon, dried fruits, and nuts.   · Calcium. Calcium is important to build strong bones. But foods  high in calcium are also high in phosphorus, which can take calcium from your bones. Limiting foods high in phosphorus will help keep calcium in your bones. Ask your provider how much calcium you should get each day.  · Phosphorus. In advanced kidney disease, your phosphorus level can go dangerously high. This affects many systems in the body and can damage your heart. Limit your intake of phosphorus-rich foods. These include dried beans and peas, nuts, peanut butter, cocoa, beer, cola drinks, and dairy products.  Date Last Reviewed: 8/1/2016  © 6109-5410 [employer]. [address] [place] [address], [place] [address]. All rights reserved. This information is not intended as a substitute for professional medical care. Always follow your healthcare professional's instructions.

## 2020-12-15 NOTE — PROGRESS NOTES
Outpatient Care Management  Initial Patient Assessment    Patient: Dominic Cohen  MRN: [id number]  Date of Service: 12/10/2020  Completed by: Tamika Magana RN  Referral Date: 12/09/2020  Program: Case Management (High Risk)    Reason for Visit   Patient presents with    OPCM Enrollment Call     Medication Reconciliation     Initial Assessment    PHQ-9    Plan Of Care       Brief Summary: Mr Cohen was referred to OPC by his PCP related to CHF. Other pertinent diagnoses include CKD2. This CM contacted pt and explained OPC and he agreed to participate in program and complete initial screen at this time. He reports he lives alone in his own home and is  independent with all daily activities, including driving himself to appts and doing his own grocery shopping. He uses a cane when he leaves home and has had no falls in the past 12 months. He has ASA 81 mg ordered daily, but reports he only takes it 2-3 times a week. He tries to follow a healthy diet, but does describe eating beef hot dogs frequently for breakfast. He reports he uses sea salt and feels this is a healthier version of Na and is OK for dietary use. Discussed Na and 2000 mg daily recommendation. Advised this CM will send him some literature about CHF and CKD as he has diagnosis of CKD 2, but does not know what that means or what he should do to help control it. Discussed advanced directives and he advised he would like to think about what he wants to do before he completes one. Advised this CM will follow up with him next week to see if he has rec'd mailed literature and has any questions about it. He voices understanding and agreement with this CM's plan for follow up.    Assessment Documentation     OPCM Initial Assessment    Involvement of Care  Do I have permission to speak with other family members about your care?: No  Assessment completed by: Patient  Identified Areas of Need  Advanced Care Planning: No (Comment: Does not want to  address Advance Directive at this time. )  Housing: no  Medication Adherence: No  *Active medication list was reviewed and reconciled with patient and/or caregiver:   Nutrition: no  Lab Adherence: no  Depression: No  Cognitive/Behavioral Health: no  Communication: no  Health Literacy: no  Fall risk?: No  Equipment/Supplies/Services: no          Problem List and History     Problems Addressed This Visit    Atrial Fibrillation: Not identified by patient as current problem  Heart Failure: Identified as current problem  Hypertension: Not identified by patient as current problem  Anemia: Not identified by patient as current problem  Chronic Kidney Disease: Identified as current problem  Hyperlipidemia: Not identified by patient as current problem  Glaucoma: Not identified by patient as current problem  Heart Disease: Not identified by patient as current problem         Reviewed medical and social history with patient and/or caregiver. A complex care plan was discussed and completed today, with input from patient and/or caregiver.    Patient Instructions     Instructions were provided via the [employer] patient resources and are available for the patient to view on the patient portal, if active.    Next steps: Answer any questions he may have about mailed literature and begin to review care plan tasks. Tamika Magana RN    Follow up in about 5 days (around 12/15/2020).    Todays OPCM Self-Management Care Plan was developed with the patients/caregivers input and was based on identified barriers from todays assessment.  Goals were written today with the patient/caregiver and the patient has agreed to work towards these goals to improve his/her overall well-being. Patient verbalized understanding of the care plan, goals, and all of today's instructions. Encouraged patient/caregiver to communicate with his/her physician and health care team about health conditions and the treatment plan.  Provided my contact information today  and encouraged patient/caregiver to call me with any questions as needed.

## 2020-12-23 ENCOUNTER — OUTPATIENT CASE MANAGEMENT (OUTPATIENT)
Dept: ADMINISTRATIVE | Facility: OTHER | Age: 85
End: 2020-12-23

## 2020-12-23 NOTE — PROGRESS NOTES
Outpatient Care Management  Plan of Care Follow Up Visit    Patient: Dominic Cohen  MRN: [id number]  Date of Service: 12/23/2020  Completed by: Tamika Magana RN  Referral Date: 12/09/2020  Program: Case Management (High Risk)    No chief complaint on file.      Brief Summary: Phone contact today for follow up with OPCM. He has rec'd mailed literature and has reviewed some of it and will continue to read it. Long discussion about CHF and CKD as pt repeatedly states he has never been told he has either of these. Discussed importance of compliance with daily weights and he reports he is already performing this. Also discussed low Na diet at length. Pt has not adhered to this in the past, but recognizes the need and will begin to modify to reduce Na intake. He intends to go to the store today to get some Mrs Dash. Reviewed upcoming appts and advised this CM will follow up in 2 weeks after the holiday season. He voiced understanding and agreement with this plan for follow up.     Patient Summary     Involvement of Care:  Do I have permission to speak with other family members about your care?   No    Patient Reported Labs & Vitals:  1.  Any Patient Reported Labs & Vitals?    No  2.  Patient Reported Blood Pressure:     3.  Patient Reported Pulse:     4.  Patient Reported Weight (Kg):     5.  Patient Reported Blood Glucose (mg/dl):       Medical and social history was reviewed with patient and/or caregiver.     Clinical Assessment     Reviewed and provided basic information on available community resources for mental health, transportation, wellness resources, and palliative care programs with patient and/or caregiver.     Complex Care Plan     Care plan was discussed and completed today with input from patient and/or caregiver.    Patient Instructions     Instructions were provided via the [employer] patient resources and are available for the patient to view on the patient portal.    Next Steps: Continue to review  care plan tasks. Tamika Magana RN    No follow-ups on file.    Todays OPCM Self-Management Care Plan was developed with the patients/caregivers input and was based on identified barriers from todays assessment.  Goals were written today with the patient/caregiver and the patient has agreed to work towards these goals to improve his/her overall well-being. Patient verbalized understanding of the care plan, goals, and all of today's instructions. Encouraged patient/caregiver to communicate with his/her physician and health care team about health conditions and the treatment plan.  Provided my contact information today and encouraged patient/caregiver to call me with any questions as needed.

## 2020-12-30 ENCOUNTER — PATIENT OUTREACH (OUTPATIENT)
Dept: ADMINISTRATIVE | Facility: OTHER | Age: 85
End: 2020-12-30

## 2020-12-31 ENCOUNTER — OFFICE VISIT (OUTPATIENT)
Dept: OPHTHALMOLOGY | Facility: CLINIC | Age: 85
End: 2020-12-31
Payer: MEDICARE

## 2020-12-31 ENCOUNTER — APPOINTMENT (OUTPATIENT)
Dept: OPHTHALMOLOGY | Facility: CLINIC | Age: 85
End: 2020-12-31
Payer: MEDICARE

## 2020-12-31 DIAGNOSIS — H40.1133 PRIMARY OPEN ANGLE GLAUCOMA OF BOTH EYES, SEVERE STAGE: Primary | ICD-10-CM

## 2020-12-31 DIAGNOSIS — H25.13 NUCLEAR SCLEROSIS, BILATERAL: ICD-10-CM

## 2020-12-31 PROCEDURE — 92012 PR EYE EXAM, EST PATIENT,INTERMED: ICD-10-PCS | Mod: S$PBB,,, | Performed by: OPHTHALMOLOGY

## 2020-12-31 PROCEDURE — 99212 OFFICE O/P EST SF 10 MIN: CPT | Mod: PBBFAC | Performed by: OPHTHALMOLOGY

## 2020-12-31 PROCEDURE — 99999 PR PBB SHADOW E&M-EST. PATIENT-LVL II: CPT | Mod: PBBFAC,,, | Performed by: OPHTHALMOLOGY

## 2020-12-31 PROCEDURE — 92012 INTRM OPH EXAM EST PATIENT: CPT | Mod: S$PBB,,, | Performed by: OPHTHALMOLOGY

## 2020-12-31 PROCEDURE — 92083 HUMPHREY VISUAL FIELD - OU - BOTH EYES: ICD-10-PCS | Mod: 26,S$PBB,, | Performed by: OPHTHALMOLOGY

## 2020-12-31 PROCEDURE — 99999 PR PBB SHADOW E&M-EST. PATIENT-LVL II: ICD-10-PCS | Mod: PBBFAC,,, | Performed by: OPHTHALMOLOGY

## 2020-12-31 PROCEDURE — 92083 EXTENDED VISUAL FIELD XM: CPT | Mod: PBBFAC | Performed by: OPHTHALMOLOGY

## 2020-12-31 NOTE — PROGRESS NOTES
SUBJECTIVE  Dominic Washington is 85 y.o. male  Uncorrected distance visual acuity was 20/40 -2 in the right eye and CF at 3' in the left eye.   Chief Complaint   Patient presents with    Glaucoma     3 months IOP/ HVF          HPI     Glaucoma      Additional comments: 3 months IOP/ HVF              Comments     Pt states that OD va is good, OS va fluctuates some. OU seem to dry out   when outside. Pt states he is compliant with Timolol and Latanoprost as   directed. Using lubricant gtts for dryness    Pt previously saw Dr. Carvalho  Saw Dr. Jean-Baptiste 7/22/19    1. Severe COAG OS>OD tmax=26/32 Goal=16-17  Combigan=dizzy   SLT OD 5/15 (19-13)  SLT OS 4/15 (19-13)  2. Mod NSC OU    Timolol qam OU   Latanoprost qhs OU  OTC Tears OU Prn          Last edited by Maribel Linton MA on 12/31/2020  9:39 AM. (History)         Assessment /Plan :  1. Primary open angle glaucoma of both eyes, severe stage Doing well, IOP within acceptable range relative to target IOP and no evidence of progression. Continue current treatment. Reviewed importance of continued compliance with treatment and follow up.     2. Nuclear sclerosis, bilateral monitor for now     Return to clinic in 3 months  or as needed.  With IOP Check and GOCT

## 2021-01-06 ENCOUNTER — OUTPATIENT CASE MANAGEMENT (OUTPATIENT)
Dept: ADMINISTRATIVE | Facility: OTHER | Age: 86
End: 2021-01-06

## 2021-01-20 ENCOUNTER — OUTPATIENT CASE MANAGEMENT (OUTPATIENT)
Dept: ADMINISTRATIVE | Facility: OTHER | Age: 86
End: 2021-01-20

## 2021-01-25 ENCOUNTER — CARE AT HOME (OUTPATIENT)
Dept: HOME HEALTH SERVICES | Facility: CLINIC | Age: 86
End: 2021-01-25
Payer: MEDICARE

## 2021-01-25 VITALS
HEART RATE: 67 BPM | TEMPERATURE: 97 F | SYSTOLIC BLOOD PRESSURE: 146 MMHG | RESPIRATION RATE: 16 BRPM | DIASTOLIC BLOOD PRESSURE: 66 MMHG | OXYGEN SATURATION: 98 %

## 2021-01-25 DIAGNOSIS — Z09 FOLLOW UP: Primary | ICD-10-CM

## 2021-01-25 DIAGNOSIS — M54.2 CERVICALGIA: ICD-10-CM

## 2021-01-25 PROCEDURE — 99350 HOME/RES VST EST HIGH MDM 60: CPT | Mod: ,,, | Performed by: NURSE PRACTITIONER

## 2021-01-25 PROCEDURE — 99350 PR HOME VISIT,ESTAB PATIENT,LEVEL IV: ICD-10-PCS | Mod: ,,, | Performed by: NURSE PRACTITIONER

## 2021-01-25 RX ORDER — TIZANIDINE 2 MG/1
4 TABLET ORAL EVERY 8 HOURS PRN
Qty: 30 TABLET | Refills: 0 | Status: SHIPPED | OUTPATIENT
Start: 2021-01-25 | End: 2021-02-04

## 2021-02-03 ENCOUNTER — OUTPATIENT CASE MANAGEMENT (OUTPATIENT)
Dept: ADMINISTRATIVE | Facility: OTHER | Age: 86
End: 2021-02-03

## 2021-02-08 ENCOUNTER — LAB VISIT (OUTPATIENT)
Dept: OTOLARYNGOLOGY | Facility: CLINIC | Age: 86
End: 2021-02-08
Payer: MEDICARE

## 2021-02-08 DIAGNOSIS — D50.0 IRON DEFICIENCY ANEMIA DUE TO CHRONIC BLOOD LOSS: ICD-10-CM

## 2021-02-08 PROCEDURE — U0005 INFEC AGEN DETEC AMPLI PROBE: HCPCS

## 2021-02-08 PROCEDURE — U0003 INFECTIOUS AGENT DETECTION BY NUCLEIC ACID (DNA OR RNA); SEVERE ACUTE RESPIRATORY SYNDROME CORONAVIRUS 2 (SARS-COV-2) (CORONAVIRUS DISEASE [COVID-19]), AMPLIFIED PROBE TECHNIQUE, MAKING USE OF HIGH THROUGHPUT TECHNOLOGIES AS DESCRIBED BY CMS-2020-01-R: HCPCS

## 2021-02-08 RX ORDER — SODIUM, POTASSIUM,MAG SULFATES 17.5-3.13G
1 SOLUTION, RECONSTITUTED, ORAL ORAL DAILY
Qty: 1 KIT | Refills: 0 | Status: SHIPPED | OUTPATIENT
Start: 2021-02-08 | End: 2021-02-10

## 2021-02-09 ENCOUNTER — TELEPHONE (OUTPATIENT)
Dept: ENDOSCOPY | Facility: HOSPITAL | Age: 86
End: 2021-02-09

## 2021-02-09 LAB — SARS-COV-2 RNA RESP QL NAA+PROBE: NOT DETECTED

## 2021-02-10 ENCOUNTER — TELEPHONE (OUTPATIENT)
Dept: ENDOSCOPY | Facility: HOSPITAL | Age: 86
End: 2021-02-10

## 2021-02-11 ENCOUNTER — ANESTHESIA (OUTPATIENT)
Dept: ENDOSCOPY | Facility: HOSPITAL | Age: 86
End: 2021-02-11
Payer: MEDICARE

## 2021-02-11 ENCOUNTER — ANESTHESIA EVENT (OUTPATIENT)
Dept: ENDOSCOPY | Facility: HOSPITAL | Age: 86
End: 2021-02-11
Payer: MEDICARE

## 2021-02-11 ENCOUNTER — HOSPITAL ENCOUNTER (OUTPATIENT)
Facility: HOSPITAL | Age: 86
Discharge: HOME | End: 2021-02-11
Attending: INTERNAL MEDICINE | Admitting: INTERNAL MEDICINE
Payer: MEDICARE

## 2021-02-11 VITALS
SYSTOLIC BLOOD PRESSURE: 134 MMHG | BODY MASS INDEX: 34.2 KG/M2 | HEART RATE: 50 BPM | OXYGEN SATURATION: 98 % | HEIGHT: 76 IN | RESPIRATION RATE: 18 BRPM | WEIGHT: 280.88 LBS | DIASTOLIC BLOOD PRESSURE: 72 MMHG | TEMPERATURE: 32 F

## 2021-02-11 DIAGNOSIS — D50.0 IRON DEFICIENCY ANEMIA DUE TO CHRONIC BLOOD LOSS: ICD-10-CM

## 2021-02-11 DIAGNOSIS — D50.9 IRON DEFICIENCY ANEMIA, UNSPECIFIED IRON DEFICIENCY ANEMIA TYPE: Primary | ICD-10-CM

## 2021-02-11 DIAGNOSIS — K25.9 GASTRIC ULCER, UNSPECIFIED CHRONICITY, UNSPECIFIED WHETHER GASTRIC ULCER HEMORRHAGE OR PERFORATION PRESENT: Primary | ICD-10-CM

## 2021-02-11 DIAGNOSIS — D50.9 IDA (IRON DEFICIENCY ANEMIA): ICD-10-CM

## 2021-02-11 PROCEDURE — 43239 EGD BIOPSY SINGLE/MULTIPLE: CPT | Performed by: INTERNAL MEDICINE

## 2021-02-11 PROCEDURE — 43239 PR EGD, FLEX, W/BIOPSY, SGL/MULTI: ICD-10-PCS | Mod: 51,,, | Performed by: INTERNAL MEDICINE

## 2021-02-11 PROCEDURE — 45380 PR COLONOSCOPY,BIOPSY: ICD-10-PCS | Mod: ,,, | Performed by: INTERNAL MEDICINE

## 2021-02-11 PROCEDURE — 88305 TISSUE EXAM BY PATHOLOGIST: CPT | Performed by: PATHOLOGY

## 2021-02-11 PROCEDURE — 25000003 PHARM REV CODE 250: Performed by: NURSE ANESTHETIST, CERTIFIED REGISTERED

## 2021-02-11 PROCEDURE — 45380 COLONOSCOPY AND BIOPSY: CPT | Performed by: INTERNAL MEDICINE

## 2021-02-11 PROCEDURE — 37000009 HC ANESTHESIA EA ADD 15 MINS: Performed by: INTERNAL MEDICINE

## 2021-02-11 PROCEDURE — 45380 COLONOSCOPY AND BIOPSY: CPT | Mod: ,,, | Performed by: INTERNAL MEDICINE

## 2021-02-11 PROCEDURE — 63600175 PHARM REV CODE 636 W HCPCS: Performed by: NURSE ANESTHETIST, CERTIFIED REGISTERED

## 2021-02-11 PROCEDURE — 43239 EGD BIOPSY SINGLE/MULTIPLE: CPT | Mod: 51,,, | Performed by: INTERNAL MEDICINE

## 2021-02-11 PROCEDURE — 37000008 HC ANESTHESIA 1ST 15 MINUTES: Performed by: INTERNAL MEDICINE

## 2021-02-11 PROCEDURE — 88305 TISSUE EXAM BY PATHOLOGIST: CPT | Mod: 26,,, | Performed by: PATHOLOGY

## 2021-02-11 PROCEDURE — 88305 TISSUE EXAM BY PATHOLOGIST: ICD-10-PCS | Mod: 26,,, | Performed by: PATHOLOGY

## 2021-02-11 PROCEDURE — 27201012 HC FORCEPS, HOT/COLD, DISP: Performed by: INTERNAL MEDICINE

## 2021-02-11 RX ORDER — SODIUM CHLORIDE, SODIUM LACTATE, POTASSIUM CHLORIDE, CALCIUM CHLORIDE 600; 310; 30; 20 MG/100ML; MG/100ML; MG/100ML; MG/100ML
INJECTION, SOLUTION INTRAVENOUS CONTINUOUS
Status: DISCONTINUED | OUTPATIENT
Start: 2021-02-11 | End: 2021-02-11 | Stop reason: HOSPADM

## 2021-02-11 RX ORDER — PROPOFOL 10 MG/ML
VIAL (ML) INTRAVENOUS
Status: DISCONTINUED | OUTPATIENT
Start: 2021-02-11 | End: 2021-02-11

## 2021-02-11 RX ORDER — ESOMEPRAZOLE MAGNESIUM 40 MG/1
40 CAPSULE, DELAYED RELEASE ORAL 2 TIMES DAILY
Qty: 60 CAPSULE | Refills: 2 | Status: SHIPPED | OUTPATIENT
Start: 2021-02-11 | End: 2022-02-11

## 2021-02-11 RX ORDER — LIDOCAINE HYDROCHLORIDE 10 MG/ML
INJECTION, SOLUTION EPIDURAL; INFILTRATION; INTRACAUDAL; PERINEURAL
Status: DISCONTINUED | OUTPATIENT
Start: 2021-02-11 | End: 2021-02-11

## 2021-02-11 RX ADMIN — PROPOFOL 30 MG: 10 INJECTION, EMULSION INTRAVENOUS at 10:02

## 2021-02-11 RX ADMIN — LIDOCAINE HYDROCHLORIDE 50 MG: 10 INJECTION, SOLUTION EPIDURAL; INFILTRATION; INTRACAUDAL; PERINEURAL at 10:02

## 2021-02-11 RX ADMIN — PROPOFOL 70 MG: 10 INJECTION, EMULSION INTRAVENOUS at 10:02

## 2021-02-11 RX ADMIN — GLYCOPYRROLATE 0.2 MG: 0.2 INJECTION, SOLUTION INTRAMUSCULAR; INTRAVITREAL at 10:02

## 2021-02-12 LAB
FINAL PATHOLOGIC DIAGNOSIS: NORMAL
GROSS: NORMAL
Lab: NORMAL

## 2021-02-17 ENCOUNTER — OUTPATIENT CASE MANAGEMENT (OUTPATIENT)
Dept: ADMINISTRATIVE | Facility: OTHER | Age: 86
End: 2021-02-17

## 2021-02-22 RX ORDER — FUROSEMIDE 40 MG/1
40 TABLET ORAL
Qty: 90 TABLET | Refills: 3 | Status: SHIPPED | OUTPATIENT
Start: 2021-02-22 | End: 2021-03-19 | Stop reason: SDUPTHER

## 2021-03-14 ENCOUNTER — IMMUNIZATION (OUTPATIENT)
Dept: INTERNAL MEDICINE | Facility: CLINIC | Age: 86
End: 2021-03-14
Payer: MEDICARE

## 2021-03-14 DIAGNOSIS — Z23 NEED FOR VACCINATION: Primary | ICD-10-CM

## 2021-03-14 PROCEDURE — 0031A COVID-19,VECTOR-NR,RS-AD26,PF,0.5 ML DOSE VACCINE (JANSSEN): CPT | Mod: PBBFAC | Performed by: FAMILY MEDICINE

## 2021-03-17 ENCOUNTER — OUTPATIENT CASE MANAGEMENT (OUTPATIENT)
Dept: ADMINISTRATIVE | Facility: OTHER | Age: 86
End: 2021-03-17

## 2021-03-18 ENCOUNTER — PATIENT OUTREACH (OUTPATIENT)
Dept: ADMINISTRATIVE | Facility: OTHER | Age: 86
End: 2021-03-18

## 2021-03-18 DIAGNOSIS — I50.32 CHRONIC DIASTOLIC HEART FAILURE: ICD-10-CM

## 2021-03-18 DIAGNOSIS — I73.9 PVD (PERIPHERAL VASCULAR DISEASE): Primary | ICD-10-CM

## 2021-03-18 DIAGNOSIS — I31.39 PERICARDIAL EFFUSION: ICD-10-CM

## 2021-03-19 ENCOUNTER — HOSPITAL ENCOUNTER (OUTPATIENT)
Dept: CARDIOLOGY | Facility: HOSPITAL | Age: 86
Discharge: HOME | End: 2021-03-19
Attending: INTERNAL MEDICINE
Payer: MEDICARE

## 2021-03-19 ENCOUNTER — OFFICE VISIT (OUTPATIENT)
Dept: CARDIOLOGY | Facility: CLINIC | Age: 86
End: 2021-03-19
Payer: MEDICARE

## 2021-03-19 VITALS
BODY MASS INDEX: 33.97 KG/M2 | WEIGHT: 279.13 LBS | SYSTOLIC BLOOD PRESSURE: 108 MMHG | DIASTOLIC BLOOD PRESSURE: 64 MMHG

## 2021-03-19 DIAGNOSIS — I50.32 CHRONIC DIASTOLIC HEART FAILURE: ICD-10-CM

## 2021-03-19 DIAGNOSIS — I73.9 PVD (PERIPHERAL VASCULAR DISEASE): ICD-10-CM

## 2021-03-19 DIAGNOSIS — I70.0 ATHEROSCLEROSIS OF ABDOMINAL AORTA: Chronic | ICD-10-CM

## 2021-03-19 DIAGNOSIS — I48.0 PAROXYSMAL ATRIAL FIBRILLATION: ICD-10-CM

## 2021-03-19 DIAGNOSIS — R06.02 SOB (SHORTNESS OF BREATH): Primary | ICD-10-CM

## 2021-03-19 DIAGNOSIS — I31.39 PERICARDIAL EFFUSION: ICD-10-CM

## 2021-03-19 PROCEDURE — 93005 ELECTROCARDIOGRAM TRACING: CPT

## 2021-03-19 PROCEDURE — 99999 PR PBB SHADOW E&M-EST. PATIENT-LVL III: ICD-10-PCS | Mod: PBBFAC,,, | Performed by: INTERNAL MEDICINE

## 2021-03-19 PROCEDURE — 93010 ELECTROCARDIOGRAM REPORT: CPT | Mod: ,,, | Performed by: INTERNAL MEDICINE

## 2021-03-19 PROCEDURE — 99213 OFFICE O/P EST LOW 20 MIN: CPT | Mod: PBBFAC,25 | Performed by: INTERNAL MEDICINE

## 2021-03-19 PROCEDURE — 99215 PR OFFICE/OUTPT VISIT, EST, LEVL V, 40-54 MIN: ICD-10-PCS | Mod: S$PBB,,, | Performed by: INTERNAL MEDICINE

## 2021-03-19 PROCEDURE — 93010 EKG 12-LEAD: ICD-10-PCS | Mod: ,,, | Performed by: INTERNAL MEDICINE

## 2021-03-19 PROCEDURE — 99215 OFFICE O/P EST HI 40 MIN: CPT | Mod: S$PBB,,, | Performed by: INTERNAL MEDICINE

## 2021-03-19 PROCEDURE — 99999 PR PBB SHADOW E&M-EST. PATIENT-LVL III: CPT | Mod: PBBFAC,,, | Performed by: INTERNAL MEDICINE

## 2021-03-19 RX ORDER — FUROSEMIDE 40 MG/1
40 TABLET ORAL
Qty: 90 TABLET | Refills: 3 | Status: SHIPPED | OUTPATIENT
Start: 2021-03-19 | End: 2021-03-21 | Stop reason: SDUPTHER

## 2021-03-21 ENCOUNTER — TELEPHONE (OUTPATIENT)
Dept: CARDIOLOGY | Facility: CLINIC | Age: 86
End: 2021-03-21

## 2021-03-21 RX ORDER — FUROSEMIDE 40 MG/1
40 TABLET ORAL
Qty: 90 TABLET | Refills: 3 | Status: SHIPPED | OUTPATIENT
Start: 2021-03-21 | End: 2022-01-07

## 2021-03-22 ENCOUNTER — CARE AT HOME (OUTPATIENT)
Dept: HOME HEALTH SERVICES | Facility: CLINIC | Age: 86
End: 2021-03-22
Payer: MEDICARE

## 2021-03-22 ENCOUNTER — TELEPHONE (OUTPATIENT)
Dept: CARDIOLOGY | Facility: CLINIC | Age: 86
End: 2021-03-22

## 2021-03-22 VITALS
DIASTOLIC BLOOD PRESSURE: 63 MMHG | OXYGEN SATURATION: 98 % | SYSTOLIC BLOOD PRESSURE: 123 MMHG | HEART RATE: 68 BPM | TEMPERATURE: 98 F | RESPIRATION RATE: 18 BRPM

## 2021-03-22 DIAGNOSIS — Z09 FOLLOW UP: Primary | ICD-10-CM

## 2021-03-22 DIAGNOSIS — J30.2 SEASONAL ALLERGIES: ICD-10-CM

## 2021-03-22 PROCEDURE — 99349 PR HOME VISIT,ESTAB PATIENT,LEVEL III: ICD-10-PCS | Mod: S$GLB,,, | Performed by: NURSE PRACTITIONER

## 2021-03-22 PROCEDURE — 99349 HOME/RES VST EST MOD MDM 40: CPT | Mod: S$GLB,,, | Performed by: NURSE PRACTITIONER

## 2021-03-22 RX ORDER — LORATADINE 10 MG/1
10 TABLET ORAL DAILY
COMMUNITY
End: 2021-12-01

## 2021-04-05 ENCOUNTER — OFFICE VISIT (OUTPATIENT)
Dept: OPHTHALMOLOGY | Facility: CLINIC | Age: 86
End: 2021-04-05
Payer: MEDICARE

## 2021-04-05 ENCOUNTER — HOSPITAL ENCOUNTER (EMERGENCY)
Facility: HOSPITAL | Age: 86
Discharge: HOME | End: 2021-04-05
Attending: EMERGENCY MEDICINE
Payer: MEDICARE

## 2021-04-05 VITALS
HEIGHT: 76 IN | BODY MASS INDEX: 34.55 KG/M2 | OXYGEN SATURATION: 99 % | HEART RATE: 60 BPM | WEIGHT: 283.75 LBS | SYSTOLIC BLOOD PRESSURE: 140 MMHG | TEMPERATURE: 98 F | DIASTOLIC BLOOD PRESSURE: 76 MMHG | RESPIRATION RATE: 18 BRPM

## 2021-04-05 DIAGNOSIS — H40.1133 PRIMARY OPEN ANGLE GLAUCOMA OF BOTH EYES, SEVERE STAGE: Primary | ICD-10-CM

## 2021-04-05 DIAGNOSIS — R53.1 WEAKNESS: Primary | ICD-10-CM

## 2021-04-05 DIAGNOSIS — H04.129 DRY EYE: ICD-10-CM

## 2021-04-05 DIAGNOSIS — H25.13 NUCLEAR SCLEROSIS, BILATERAL: ICD-10-CM

## 2021-04-05 DIAGNOSIS — L02.11 ABSCESS, NECK: ICD-10-CM

## 2021-04-05 LAB
ALBUMIN SERPL BCP-MCNC: 3.8 G/DL (ref 3.5–5.2)
ALP SERPL-CCNC: 63 U/L (ref 55–135)
ALT SERPL W/O P-5'-P-CCNC: 23 U/L (ref 10–44)
ANION GAP SERPL CALC-SCNC: 8 MMOL/L (ref 8–16)
AST SERPL-CCNC: 23 U/L (ref 10–40)
BASOPHILS # BLD AUTO: 0.04 K/UL (ref 0–0.2)
BASOPHILS NFR BLD: 0.8 % (ref 0–1.9)
BILIRUB SERPL-MCNC: 0.8 MG/DL (ref 0.1–1)
BILIRUB UR QL STRIP: NEGATIVE
BUN SERPL-MCNC: 20 MG/DL (ref 8–23)
CALCIUM SERPL-MCNC: 9.3 MG/DL (ref 8.7–10.5)
CHLORIDE SERPL-SCNC: 107 MMOL/L (ref 95–110)
CLARITY UR: CLEAR
CO2 SERPL-SCNC: 27 MMOL/L (ref 23–29)
COLOR UR: YELLOW
CREAT SERPL-MCNC: 1.1 MG/DL (ref 0.5–1.4)
DIFFERENTIAL METHOD: ABNORMAL
EOSINOPHIL # BLD AUTO: 0.2 K/UL (ref 0–0.5)
EOSINOPHIL NFR BLD: 3.8 % (ref 0–8)
ERYTHROCYTE [DISTWIDTH] IN BLOOD BY AUTOMATED COUNT: 12.8 % (ref 11.5–14.5)
EST. GFR  (AFRICAN AMERICAN): >60 ML/MIN/1.73 M^2
EST. GFR  (NON AFRICAN AMERICAN): >60 ML/MIN/1.73 M^2
GLUCOSE SERPL-MCNC: 109 MG/DL (ref 70–110)
GLUCOSE UR QL STRIP: NEGATIVE
HCT VFR BLD AUTO: 39.1 % (ref 40–54)
HGB BLD-MCNC: 12.7 G/DL (ref 14–18)
HGB UR QL STRIP: NEGATIVE
IMM GRANULOCYTES # BLD AUTO: 0.01 K/UL (ref 0–0.04)
IMM GRANULOCYTES NFR BLD AUTO: 0.2 % (ref 0–0.5)
KETONES UR QL STRIP: NEGATIVE
LEUKOCYTE ESTERASE UR QL STRIP: NEGATIVE
LYMPHOCYTES # BLD AUTO: 1.4 K/UL (ref 1–4.8)
LYMPHOCYTES NFR BLD: 26.8 % (ref 18–48)
MCH RBC QN AUTO: 33.4 PG (ref 27–31)
MCHC RBC AUTO-ENTMCNC: 32.5 G/DL (ref 32–36)
MCV RBC AUTO: 103 FL (ref 82–98)
MONOCYTES # BLD AUTO: 0.7 K/UL (ref 0.3–1)
MONOCYTES NFR BLD: 12.3 % (ref 4–15)
NEUTROPHILS # BLD AUTO: 3 K/UL (ref 1.8–7.7)
NEUTROPHILS NFR BLD: 56.1 % (ref 38–73)
NITRITE UR QL STRIP: NEGATIVE
NRBC BLD-RTO: 0 /100 WBC
PH UR STRIP: 6 [PH] (ref 5–8)
PLATELET # BLD AUTO: 129 K/UL (ref 150–450)
PMV BLD AUTO: 12.4 FL (ref 9.2–12.9)
POCT GLUCOSE: 101 MG/DL (ref 70–110)
POTASSIUM SERPL-SCNC: 4.3 MMOL/L (ref 3.5–5.1)
PROT SERPL-MCNC: 7.8 G/DL (ref 6–8.4)
PROT UR QL STRIP: NEGATIVE
RBC # BLD AUTO: 3.8 M/UL (ref 4.6–6.2)
SARS-COV-2 RDRP RESP QL NAA+PROBE: NEGATIVE
SODIUM SERPL-SCNC: 142 MMOL/L (ref 136–145)
SP GR UR STRIP: >=1.03 (ref 1–1.03)
URN SPEC COLLECT METH UR: ABNORMAL
UROBILINOGEN UR STRIP-ACNC: NEGATIVE EU/DL
WBC # BLD AUTO: 5.29 K/UL (ref 3.9–12.7)

## 2021-04-05 PROCEDURE — 99999 PR PBB SHADOW E&M-EST. PATIENT-LVL II: ICD-10-PCS | Mod: PBBFAC,,, | Performed by: OPHTHALMOLOGY

## 2021-04-05 PROCEDURE — 99214 PR OFFICE/OUTPT VISIT, EST, LEVL IV, 30-39 MIN: ICD-10-PCS | Mod: S$PBB,,, | Performed by: OPHTHALMOLOGY

## 2021-04-05 PROCEDURE — U0002 COVID-19 LAB TEST NON-CDC: HCPCS | Performed by: NURSE PRACTITIONER

## 2021-04-05 PROCEDURE — 25000003 PHARM REV CODE 250: Performed by: EMERGENCY MEDICINE

## 2021-04-05 PROCEDURE — 92083 EXTENDED VISUAL FIELD XM: CPT | Mod: PBBFAC | Performed by: OPHTHALMOLOGY

## 2021-04-05 PROCEDURE — 99285 EMERGENCY DEPT VISIT HI MDM: CPT | Mod: 25,27

## 2021-04-05 PROCEDURE — 99212 OFFICE O/P EST SF 10 MIN: CPT | Mod: PBBFAC,25 | Performed by: OPHTHALMOLOGY

## 2021-04-05 PROCEDURE — 10060 I&D ABSCESS SIMPLE/SINGLE: CPT

## 2021-04-05 PROCEDURE — 93010 ELECTROCARDIOGRAM REPORT: CPT | Mod: ,,, | Performed by: INTERNAL MEDICINE

## 2021-04-05 PROCEDURE — 36415 COLL VENOUS BLD VENIPUNCTURE: CPT | Performed by: NURSE PRACTITIONER

## 2021-04-05 PROCEDURE — 92083 HUMPHREY VISUAL FIELD - OU - BOTH EYES: ICD-10-PCS | Mod: 26,S$PBB,, | Performed by: OPHTHALMOLOGY

## 2021-04-05 PROCEDURE — 99214 OFFICE O/P EST MOD 30 MIN: CPT | Mod: S$PBB,,, | Performed by: OPHTHALMOLOGY

## 2021-04-05 PROCEDURE — 80053 COMPREHEN METABOLIC PANEL: CPT | Performed by: NURSE PRACTITIONER

## 2021-04-05 PROCEDURE — 81003 URINALYSIS AUTO W/O SCOPE: CPT | Performed by: NURSE PRACTITIONER

## 2021-04-05 PROCEDURE — 82962 GLUCOSE BLOOD TEST: CPT

## 2021-04-05 PROCEDURE — 85025 COMPLETE CBC W/AUTO DIFF WBC: CPT | Performed by: NURSE PRACTITIONER

## 2021-04-05 PROCEDURE — 25000003 PHARM REV CODE 250: Performed by: NURSE PRACTITIONER

## 2021-04-05 PROCEDURE — 93005 ELECTROCARDIOGRAM TRACING: CPT | Mod: 59

## 2021-04-05 PROCEDURE — 99999 PR PBB SHADOW E&M-EST. PATIENT-LVL II: CPT | Mod: PBBFAC,,, | Performed by: OPHTHALMOLOGY

## 2021-04-05 PROCEDURE — 93010 EKG 12-LEAD: ICD-10-PCS | Mod: ,,, | Performed by: INTERNAL MEDICINE

## 2021-04-05 RX ORDER — LATANOPROST 50 UG/ML
1 SOLUTION/ DROPS OPHTHALMIC NIGHTLY
Qty: 2.5 ML | Refills: 12 | Status: SHIPPED | OUTPATIENT
Start: 2021-04-05 | End: 2021-06-23 | Stop reason: SDUPTHER

## 2021-04-05 RX ORDER — CLINDAMYCIN HYDROCHLORIDE 150 MG/1
300 CAPSULE ORAL
Status: COMPLETED | OUTPATIENT
Start: 2021-04-05 | End: 2021-04-05

## 2021-04-05 RX ORDER — TIMOLOL MALEATE 5 MG/ML
SOLUTION/ DROPS OPHTHALMIC
COMMUNITY
Start: 2021-02-05 | End: 2021-06-23 | Stop reason: SDUPTHER

## 2021-04-05 RX ORDER — LIDOCAINE HYDROCHLORIDE 10 MG/ML
5 INJECTION, SOLUTION EPIDURAL; INFILTRATION; INTRACAUDAL; PERINEURAL
Status: COMPLETED | OUTPATIENT
Start: 2021-04-05 | End: 2021-04-05

## 2021-04-05 RX ORDER — LATANOPROST 50 UG/ML
1 SOLUTION/ DROPS OPHTHALMIC NIGHTLY
COMMUNITY
Start: 2021-03-04 | End: 2021-04-05 | Stop reason: SDUPTHER

## 2021-04-05 RX ORDER — CLINDAMYCIN HYDROCHLORIDE 300 MG/1
300 CAPSULE ORAL 4 TIMES DAILY
Qty: 40 CAPSULE | Refills: 0 | Status: SHIPPED | OUTPATIENT
Start: 2021-04-05 | End: 2021-04-15

## 2021-04-05 RX ADMIN — CLINDAMYCIN HYDROCHLORIDE 300 MG: 150 CAPSULE ORAL at 07:04

## 2021-04-05 RX ADMIN — LIDOCAINE HYDROCHLORIDE 50 MG: 10 INJECTION, SOLUTION EPIDURAL; INFILTRATION; INTRACAUDAL at 03:04

## 2021-04-12 ENCOUNTER — LAB VISIT (OUTPATIENT)
Dept: OTOLARYNGOLOGY | Facility: CLINIC | Age: 86
End: 2021-04-12
Payer: MEDICARE

## 2021-04-12 DIAGNOSIS — Z01.818 PRE-OP TESTING: ICD-10-CM

## 2021-04-12 PROCEDURE — U0005 INFEC AGEN DETEC AMPLI PROBE: HCPCS | Performed by: INTERNAL MEDICINE

## 2021-04-12 PROCEDURE — U0003 INFECTIOUS AGENT DETECTION BY NUCLEIC ACID (DNA OR RNA); SEVERE ACUTE RESPIRATORY SYNDROME CORONAVIRUS 2 (SARS-COV-2) (CORONAVIRUS DISEASE [COVID-19]), AMPLIFIED PROBE TECHNIQUE, MAKING USE OF HIGH THROUGHPUT TECHNOLOGIES AS DESCRIBED BY CMS-2020-01-R: HCPCS | Performed by: INTERNAL MEDICINE

## 2021-04-13 LAB — SARS-COV-2 RNA RESP QL NAA+PROBE: NOT DETECTED

## 2021-04-15 ENCOUNTER — ANESTHESIA (OUTPATIENT)
Dept: ENDOSCOPY | Facility: HOSPITAL | Age: 86
End: 2021-04-15
Payer: MEDICARE

## 2021-04-15 ENCOUNTER — HOSPITAL ENCOUNTER (OUTPATIENT)
Facility: HOSPITAL | Age: 86
Discharge: HOME | End: 2021-04-15
Attending: INTERNAL MEDICINE | Admitting: INTERNAL MEDICINE
Payer: MEDICARE

## 2021-04-15 ENCOUNTER — ANESTHESIA EVENT (OUTPATIENT)
Dept: ENDOSCOPY | Facility: HOSPITAL | Age: 86
End: 2021-04-15
Payer: MEDICARE

## 2021-04-15 DIAGNOSIS — K29.70 GASTRITIS, PRESENCE OF BLEEDING UNSPECIFIED, UNSPECIFIED CHRONICITY, UNSPECIFIED GASTRITIS TYPE: Primary | ICD-10-CM

## 2021-04-15 PROCEDURE — 27201012 HC FORCEPS, HOT/COLD, DISP: Performed by: INTERNAL MEDICINE

## 2021-04-15 PROCEDURE — 43239 PR EGD, FLEX, W/BIOPSY, SGL/MULTI: ICD-10-PCS | Mod: ,,, | Performed by: INTERNAL MEDICINE

## 2021-04-15 PROCEDURE — 43239 EGD BIOPSY SINGLE/MULTIPLE: CPT | Mod: ,,, | Performed by: INTERNAL MEDICINE

## 2021-04-15 PROCEDURE — 43239 EGD BIOPSY SINGLE/MULTIPLE: CPT | Performed by: INTERNAL MEDICINE

## 2021-04-15 PROCEDURE — 37000008 HC ANESTHESIA 1ST 15 MINUTES: Performed by: INTERNAL MEDICINE

## 2021-04-15 PROCEDURE — 25000003 PHARM REV CODE 250: Performed by: NURSE ANESTHETIST, CERTIFIED REGISTERED

## 2021-04-15 PROCEDURE — 88305 TISSUE EXAM BY PATHOLOGIST: CPT | Performed by: PATHOLOGY

## 2021-04-15 PROCEDURE — 63600175 PHARM REV CODE 636 W HCPCS: Performed by: NURSE ANESTHETIST, CERTIFIED REGISTERED

## 2021-04-15 PROCEDURE — 88305 TISSUE EXAM BY PATHOLOGIST: CPT | Mod: 26,,, | Performed by: PATHOLOGY

## 2021-04-15 PROCEDURE — 88305 TISSUE EXAM BY PATHOLOGIST: ICD-10-PCS | Mod: 26,,, | Performed by: PATHOLOGY

## 2021-04-15 RX ORDER — PROPOFOL 10 MG/ML
VIAL (ML) INTRAVENOUS
Status: DISCONTINUED | OUTPATIENT
Start: 2021-04-15 | End: 2021-04-15

## 2021-04-15 RX ORDER — LIDOCAINE HYDROCHLORIDE 10 MG/ML
INJECTION, SOLUTION EPIDURAL; INFILTRATION; INTRACAUDAL; PERINEURAL
Status: DISCONTINUED | OUTPATIENT
Start: 2021-04-15 | End: 2021-04-15

## 2021-04-15 RX ADMIN — LIDOCAINE HYDROCHLORIDE 50 MG: 10 INJECTION, SOLUTION EPIDURAL; INFILTRATION; INTRACAUDAL; PERINEURAL at 08:04

## 2021-04-15 RX ADMIN — PROPOFOL 30 MG: 10 INJECTION, EMULSION INTRAVENOUS at 08:04

## 2021-04-15 RX ADMIN — SODIUM CHLORIDE, SODIUM LACTATE, POTASSIUM CHLORIDE, AND CALCIUM CHLORIDE: .6; .31; .03; .02 INJECTION, SOLUTION INTRAVENOUS at 08:04

## 2021-04-15 RX ADMIN — PROPOFOL 60 MG: 10 INJECTION, EMULSION INTRAVENOUS at 08:04

## 2021-04-16 VITALS
BODY MASS INDEX: 34.79 KG/M2 | SYSTOLIC BLOOD PRESSURE: 132 MMHG | WEIGHT: 285.69 LBS | OXYGEN SATURATION: 96 % | HEIGHT: 76 IN | HEART RATE: 62 BPM | DIASTOLIC BLOOD PRESSURE: 81 MMHG | RESPIRATION RATE: 18 BRPM | TEMPERATURE: 98 F

## 2021-04-20 LAB
FINAL PATHOLOGIC DIAGNOSIS: NORMAL
GROSS: NORMAL
Lab: NORMAL

## 2021-04-26 ENCOUNTER — HOSPITAL ENCOUNTER (OUTPATIENT)
Dept: RADIOLOGY | Facility: HOSPITAL | Age: 86
Discharge: HOME | End: 2021-04-26
Attending: INTERNAL MEDICINE
Payer: MEDICARE

## 2021-04-26 ENCOUNTER — HOSPITAL ENCOUNTER (OUTPATIENT)
Dept: CARDIOLOGY | Facility: HOSPITAL | Age: 86
Discharge: HOME | End: 2021-04-26
Attending: INTERNAL MEDICINE
Payer: MEDICARE

## 2021-04-26 VITALS — WEIGHT: 285 LBS | BODY MASS INDEX: 34.7 KG/M2 | HEIGHT: 76 IN

## 2021-04-26 DIAGNOSIS — R06.02 SOB (SHORTNESS OF BREATH): ICD-10-CM

## 2021-04-26 DIAGNOSIS — I31.39 PERICARDIAL EFFUSION: ICD-10-CM

## 2021-04-26 LAB
AORTIC ROOT ANNULUS: 3.7 CM
AV INDEX (PROSTH): 0.71
AV MEAN GRADIENT: 4 MMHG
AV PEAK GRADIENT: 8 MMHG
AV VALVE AREA: 2.16 CM2
AV VELOCITY RATIO: 0.73
BSA FOR ECHO PROCEDURE: 2.63 M2
CV ECHO LV RWT: 0.7 CM
CV STRESS BASE HR: 64 BPM
DIASTOLIC BLOOD PRESSURE: 76 MMHG
DOP CALC AO PEAK VEL: 1.39 M/S
DOP CALC AO VTI: 31.86 CM
DOP CALC LVOT AREA: 3 CM2
DOP CALC LVOT DIAMETER: 1.97 CM
DOP CALC LVOT PEAK VEL: 1.01 M/S
DOP CALC LVOT STROKE VOLUME: 68.67 CM3
DOP CALC RVOT PEAK VEL: 1 M/S
DOP CALC RVOT VTI: 25.12 CM
DOP CALCLVOT PEAK VEL VTI: 22.54 CM
E WAVE DECELERATION TIME: 371.11 MSEC
E/A RATIO: 0.75
E/E' RATIO: 10.22 M/S
ECHO LV POSTERIOR WALL: 1.45 CM (ref 0.6–1.1)
EJECTION FRACTION: 55 %
FRACTIONAL SHORTENING: 33 % (ref 28–44)
INTERVENTRICULAR SEPTUM: 1.89 CM (ref 0.6–1.1)
IVRT: 97.05 MSEC
LA MAJOR: 5.85 CM
LA MINOR: 4.99 CM
LA WIDTH: 3.66 CM
LEFT ATRIUM SIZE: 3.73 CM
LEFT ATRIUM VOLUME INDEX: 24.2 ML/M2
LEFT ATRIUM VOLUME: 62.5 CM3
LEFT INTERNAL DIMENSION IN SYSTOLE: 2.78 CM (ref 2.1–4)
LEFT VENTRICLE DIASTOLIC VOLUME INDEX: 29.96 ML/M2
LEFT VENTRICLE DIASTOLIC VOLUME: 77.3 ML
LEFT VENTRICLE MASS INDEX: 113 G/M2
LEFT VENTRICLE SYSTOLIC VOLUME INDEX: 11.3 ML/M2
LEFT VENTRICLE SYSTOLIC VOLUME: 29.16 ML
LEFT VENTRICULAR INTERNAL DIMENSION IN DIASTOLE: 4.17 CM (ref 3.5–6)
LEFT VENTRICULAR MASS: 292.68 G
LV LATERAL E/E' RATIO: 9.2 M/S
LV SEPTAL E/E' RATIO: 11.5 M/S
MV A" WAVE DURATION": 9.99 MSEC
MV PEAK A VEL: 1.22 M/S
MV PEAK E VEL: 0.92 M/S
NUC REST EJECTION FRACTION: 65
NUC STRESS EJECTION FRACTION: 63 %
OHS CV CPX 85 PERCENT MAX PREDICTED HEART RATE MALE: 116
OHS CV CPX ESTIMATED METS: 1
OHS CV CPX MAX PREDICTED HEART RATE: 137
OHS CV CPX PATIENT IS FEMALE: 0
OHS CV CPX PATIENT IS MALE: 1
OHS CV CPX PEAK DIASTOLIC BLOOD PRESSURE: 73 MMHG
OHS CV CPX PEAK HEAR RATE: 78 BPM
OHS CV CPX PEAK RATE PRESSURE PRODUCT: 9204
OHS CV CPX PEAK SYSTOLIC BLOOD PRESSURE: 118 MMHG
OHS CV CPX PERCENT MAX PREDICTED HEART RATE ACHIEVED: 57
OHS CV CPX RATE PRESSURE PRODUCT PRESENTING: 7680
PISA TR MAX VEL: 2.6 M/S
PULM VEIN S/D RATIO: 1.59
PV MEAN GRADIENT: 2 MMHG
PV PEAK D VEL: 0.61 M/S
PV PEAK S VEL: 0.97 M/S
PV PEAK VELOCITY: 1.51 CM/S
RA MAJOR: 5.25 CM
RA PRESSURE: 3 MMHG
RA WIDTH: 2.85 CM
RIGHT VENTRICULAR END-DIASTOLIC DIMENSION: 3.41 CM
SINUS: 3.5 CM
STJ: 3.13 CM
STRESS ECHO POST EXERCISE DUR SEC: 51 SECONDS
SYSTOLIC BLOOD PRESSURE: 120 MMHG
TDI LATERAL: 0.1 M/S
TDI SEPTAL: 0.08 M/S
TDI: 0.09 M/S
TR MAX PG: 27 MMHG
TRICUSPID ANNULAR PLANE SYSTOLIC EXCURSION: 1.8 CM
TV REST PULMONARY ARTERY PRESSURE: 30 MMHG

## 2021-04-26 PROCEDURE — 93018 STRESS TEST WITH MYOCARDIAL PERFUSION (CUPID ONLY): ICD-10-PCS | Mod: ,,, | Performed by: INTERNAL MEDICINE

## 2021-04-26 PROCEDURE — 93017 CV STRESS TEST TRACING ONLY: CPT

## 2021-04-26 PROCEDURE — 93306 TTE W/DOPPLER COMPLETE: CPT

## 2021-04-26 PROCEDURE — 93016 CV STRESS TEST SUPVJ ONLY: CPT | Mod: ,,, | Performed by: INTERNAL MEDICINE

## 2021-04-26 PROCEDURE — 93306 ECHO (CUPID ONLY): ICD-10-PCS | Mod: 26,,, | Performed by: INTERNAL MEDICINE

## 2021-04-26 PROCEDURE — 63600175 PHARM REV CODE 636 W HCPCS: Performed by: INTERNAL MEDICINE

## 2021-04-26 PROCEDURE — 78452 STRESS TEST WITH MYOCARDIAL PERFUSION (CUPID ONLY): ICD-10-PCS | Mod: 26,,, | Performed by: INTERNAL MEDICINE

## 2021-04-26 PROCEDURE — 93016 STRESS TEST WITH MYOCARDIAL PERFUSION (CUPID ONLY): ICD-10-PCS | Mod: ,,, | Performed by: INTERNAL MEDICINE

## 2021-04-26 PROCEDURE — 78452 HT MUSCLE IMAGE SPECT MULT: CPT | Mod: 26,,, | Performed by: INTERNAL MEDICINE

## 2021-04-26 PROCEDURE — A9502 TC99M TETROFOSMIN: HCPCS

## 2021-04-26 PROCEDURE — 93306 TTE W/DOPPLER COMPLETE: CPT | Mod: 26,,, | Performed by: INTERNAL MEDICINE

## 2021-04-26 PROCEDURE — 93018 CV STRESS TEST I&R ONLY: CPT | Mod: ,,, | Performed by: INTERNAL MEDICINE

## 2021-04-26 PROCEDURE — 78452 HT MUSCLE IMAGE SPECT MULT: CPT

## 2021-04-26 RX ORDER — REGADENOSON 0.08 MG/ML
0.4 INJECTION, SOLUTION INTRAVENOUS ONCE
Status: COMPLETED | OUTPATIENT
Start: 2021-04-26 | End: 2021-04-26

## 2021-04-26 RX ORDER — AMINOPHYLLINE 25 MG/ML
75 INJECTION, SOLUTION INTRAVENOUS
Status: COMPLETED | OUTPATIENT
Start: 2021-04-26 | End: 2021-04-26

## 2021-04-26 RX ADMIN — REGADENOSON 0.4 MG: 0.08 INJECTION, SOLUTION INTRAVENOUS at 09:04

## 2021-04-26 RX ADMIN — AMINOPHYLLINE 75 MG: 25 INJECTION, SOLUTION INTRAVENOUS at 09:04

## 2021-04-27 ENCOUNTER — CARE AT HOME (OUTPATIENT)
Dept: HOME HEALTH SERVICES | Facility: CLINIC | Age: 86
End: 2021-04-27
Payer: MEDICARE

## 2021-04-27 ENCOUNTER — TELEPHONE (OUTPATIENT)
Dept: CARDIOLOGY | Facility: CLINIC | Age: 86
End: 2021-04-27

## 2021-04-27 VITALS
HEART RATE: 68 BPM | TEMPERATURE: 98 F | DIASTOLIC BLOOD PRESSURE: 79 MMHG | OXYGEN SATURATION: 98 % | SYSTOLIC BLOOD PRESSURE: 129 MMHG | RESPIRATION RATE: 18 BRPM

## 2021-04-27 DIAGNOSIS — L03.90 CELLULITIS, UNSPECIFIED CELLULITIS SITE: ICD-10-CM

## 2021-04-27 DIAGNOSIS — I50.9 HEART FAILURE, UNSPECIFIED HF CHRONICITY, UNSPECIFIED HEART FAILURE TYPE: ICD-10-CM

## 2021-04-27 DIAGNOSIS — Z09 FOLLOW UP: Primary | ICD-10-CM

## 2021-04-27 DIAGNOSIS — D69.6 THROMBOCYTOPENIA: ICD-10-CM

## 2021-04-27 PROCEDURE — 99349 PR HOME VISIT,ESTAB PATIENT,LEVEL III: ICD-10-PCS | Mod: S$GLB,,, | Performed by: NURSE PRACTITIONER

## 2021-04-27 PROCEDURE — 99349 HOME/RES VST EST MOD MDM 40: CPT | Mod: S$GLB,,, | Performed by: NURSE PRACTITIONER

## 2021-04-27 RX ORDER — MUPIROCIN 20 MG/G
OINTMENT TOPICAL 2 TIMES DAILY
Qty: 1 TUBE | Refills: 1 | Status: SHIPPED | OUTPATIENT
Start: 2021-04-27 | End: 2021-11-09 | Stop reason: SDUPTHER

## 2021-04-27 RX ORDER — CLINDAMYCIN HYDROCHLORIDE 300 MG/1
300 CAPSULE ORAL 3 TIMES DAILY
Qty: 30 CAPSULE | Refills: 0 | Status: SHIPPED | OUTPATIENT
Start: 2021-04-27 | End: 2021-05-07

## 2021-04-28 ENCOUNTER — TELEPHONE (OUTPATIENT)
Dept: CARDIOLOGY | Facility: CLINIC | Age: 86
End: 2021-04-28

## 2021-05-13 ENCOUNTER — LAB VISIT (OUTPATIENT)
Dept: LAB | Facility: HOSPITAL | Age: 86
End: 2021-05-13
Attending: NURSE PRACTITIONER
Payer: MEDICARE

## 2021-05-13 DIAGNOSIS — D47.2 MGUS (MONOCLONAL GAMMOPATHY OF UNKNOWN SIGNIFICANCE): ICD-10-CM

## 2021-05-13 DIAGNOSIS — Z86.39 HISTORY OF IRON DEFICIENCY: ICD-10-CM

## 2021-05-13 DIAGNOSIS — D51.8 MACROCYTIC ANEMIA WITH VITAMIN B12 DEFICIENCY: ICD-10-CM

## 2021-05-13 LAB
ALBUMIN SERPL BCP-MCNC: 3.5 G/DL (ref 3.5–5.2)
ALP SERPL-CCNC: 71 U/L (ref 55–135)
ALT SERPL W/O P-5'-P-CCNC: 23 U/L (ref 10–44)
ANION GAP SERPL CALC-SCNC: 9 MMOL/L (ref 8–16)
AST SERPL-CCNC: 20 U/L (ref 10–40)
BASOPHILS # BLD AUTO: 0.05 K/UL (ref 0–0.2)
BASOPHILS NFR BLD: 1 % (ref 0–1.9)
BILIRUB SERPL-MCNC: 0.5 MG/DL (ref 0.1–1)
BUN SERPL-MCNC: 17 MG/DL (ref 8–23)
CALCIUM SERPL-MCNC: 9.2 MG/DL (ref 8.7–10.5)
CHLORIDE SERPL-SCNC: 108 MMOL/L (ref 95–110)
CO2 SERPL-SCNC: 26 MMOL/L (ref 23–29)
CREAT SERPL-MCNC: 1.3 MG/DL (ref 0.5–1.4)
DIFFERENTIAL METHOD: ABNORMAL
EOSINOPHIL # BLD AUTO: 0.2 K/UL (ref 0–0.5)
EOSINOPHIL NFR BLD: 3.2 % (ref 0–8)
ERYTHROCYTE [DISTWIDTH] IN BLOOD BY AUTOMATED COUNT: 12.9 % (ref 11.5–14.5)
EST. GFR  (AFRICAN AMERICAN): 58 ML/MIN/1.73 M^2
EST. GFR  (NON AFRICAN AMERICAN): 50 ML/MIN/1.73 M^2
FERRITIN SERPL-MCNC: 648 NG/ML (ref 20–300)
FOLATE SERPL-MCNC: 11.7 NG/ML (ref 4–24)
GLUCOSE SERPL-MCNC: 98 MG/DL (ref 70–110)
HCT VFR BLD AUTO: 37.4 % (ref 40–54)
HGB BLD-MCNC: 12.6 G/DL (ref 14–18)
IMM GRANULOCYTES # BLD AUTO: 0.02 K/UL (ref 0–0.04)
IMM GRANULOCYTES NFR BLD AUTO: 0.4 % (ref 0–0.5)
IRON SERPL-MCNC: 81 UG/DL (ref 45–160)
LDH SERPL L TO P-CCNC: 185 U/L (ref 110–260)
LYMPHOCYTES # BLD AUTO: 1.5 K/UL (ref 1–4.8)
LYMPHOCYTES NFR BLD: 27.8 % (ref 18–48)
MCH RBC QN AUTO: 34.4 PG (ref 27–31)
MCHC RBC AUTO-ENTMCNC: 33.7 G/DL (ref 32–36)
MCV RBC AUTO: 102 FL (ref 82–98)
MONOCYTES # BLD AUTO: 0.8 K/UL (ref 0.3–1)
MONOCYTES NFR BLD: 14.9 % (ref 4–15)
NEUTROPHILS # BLD AUTO: 2.8 K/UL (ref 1.8–7.7)
NEUTROPHILS NFR BLD: 52.7 % (ref 38–73)
NRBC BLD-RTO: 0 /100 WBC
PATH REV BLD -IMP: NORMAL
PATH REV BLD -IMP: NORMAL
PLATELET # BLD AUTO: 176 K/UL (ref 150–450)
PMV BLD AUTO: 10.7 FL (ref 9.2–12.9)
POTASSIUM SERPL-SCNC: 4 MMOL/L (ref 3.5–5.1)
PROT SERPL-MCNC: 7.8 G/DL (ref 6–8.4)
RBC # BLD AUTO: 3.66 M/UL (ref 4.6–6.2)
SATURATED IRON: 29 % (ref 20–50)
SODIUM SERPL-SCNC: 143 MMOL/L (ref 136–145)
TOTAL IRON BINDING CAPACITY: 278 UG/DL (ref 250–450)
TRANSFERRIN SERPL-MCNC: 188 MG/DL (ref 200–375)
WBC # BLD AUTO: 5.25 K/UL (ref 3.9–12.7)

## 2021-05-13 PROCEDURE — 83540 ASSAY OF IRON: CPT | Performed by: NURSE PRACTITIONER

## 2021-05-13 PROCEDURE — 86334 PATHOLOGIST INTERPRETATION IFE: ICD-10-PCS | Mod: 26,,, | Performed by: PATHOLOGY

## 2021-05-13 PROCEDURE — 84165 PROTEIN E-PHORESIS SERUM: CPT | Mod: 26,,, | Performed by: PATHOLOGY

## 2021-05-13 PROCEDURE — 82728 ASSAY OF FERRITIN: CPT | Performed by: NURSE PRACTITIONER

## 2021-05-13 PROCEDURE — 85025 COMPLETE CBC W/AUTO DIFF WBC: CPT | Performed by: NURSE PRACTITIONER

## 2021-05-13 PROCEDURE — 84165 PATHOLOGIST INTERPRETATION SPE: ICD-10-PCS | Mod: 26,,, | Performed by: PATHOLOGY

## 2021-05-13 PROCEDURE — 83615 LACTATE (LD) (LDH) ENZYME: CPT | Performed by: NURSE PRACTITIONER

## 2021-05-13 PROCEDURE — 83520 IMMUNOASSAY QUANT NOS NONAB: CPT | Mod: 59 | Performed by: NURSE PRACTITIONER

## 2021-05-13 PROCEDURE — 85060 PATHOLOGIST REVIEW: ICD-10-PCS | Mod: ,,, | Performed by: PATHOLOGY

## 2021-05-13 PROCEDURE — 85060 BLOOD SMEAR INTERPRETATION: CPT | Mod: ,,, | Performed by: PATHOLOGY

## 2021-05-13 PROCEDURE — 84165 PROTEIN E-PHORESIS SERUM: CPT | Performed by: NURSE PRACTITIONER

## 2021-05-13 PROCEDURE — 82746 ASSAY OF FOLIC ACID SERUM: CPT | Performed by: NURSE PRACTITIONER

## 2021-05-13 PROCEDURE — 36415 COLL VENOUS BLD VENIPUNCTURE: CPT | Performed by: NURSE PRACTITIONER

## 2021-05-13 PROCEDURE — 80053 COMPREHEN METABOLIC PANEL: CPT | Performed by: NURSE PRACTITIONER

## 2021-05-13 PROCEDURE — 86334 IMMUNOFIX E-PHORESIS SERUM: CPT | Mod: 26,,, | Performed by: PATHOLOGY

## 2021-05-13 PROCEDURE — 86334 IMMUNOFIX E-PHORESIS SERUM: CPT | Performed by: NURSE PRACTITIONER

## 2021-05-14 LAB
ALBUMIN SERPL ELPH-MCNC: 3.88 G/DL (ref 3.35–5.55)
ALPHA1 GLOB SERPL ELPH-MCNC: 0.29 G/DL (ref 0.17–0.41)
ALPHA2 GLOB SERPL ELPH-MCNC: 0.84 G/DL (ref 0.43–0.99)
B-GLOBULIN SERPL ELPH-MCNC: 0.72 G/DL (ref 0.5–1.1)
GAMMA GLOB SERPL ELPH-MCNC: 1.77 G/DL (ref 0.67–1.58)
INTERPRETATION SERPL IFE-IMP: NORMAL
KAPPA LC SER QL IA: 5.33 MG/DL (ref 0.33–1.94)
KAPPA LC/LAMBDA SER IA: 1.98 (ref 0.26–1.65)
LAMBDA LC SER QL IA: 2.69 MG/DL (ref 0.57–2.63)
PROT SERPL-MCNC: 7.5 G/DL (ref 6–8.4)

## 2021-05-17 LAB
PATHOLOGIST INTERPRETATION IFE: NORMAL
PATHOLOGIST INTERPRETATION SPE: NORMAL

## 2021-05-18 ENCOUNTER — OFFICE VISIT (OUTPATIENT)
Dept: HEMATOLOGY/ONCOLOGY | Facility: CLINIC | Age: 86
End: 2021-05-18
Payer: MEDICARE

## 2021-05-18 ENCOUNTER — TELEPHONE (OUTPATIENT)
Dept: HEMATOLOGY/ONCOLOGY | Facility: CLINIC | Age: 86
End: 2021-05-18

## 2021-05-18 VITALS
OXYGEN SATURATION: 95 % | SYSTOLIC BLOOD PRESSURE: 138 MMHG | BODY MASS INDEX: 35.3 KG/M2 | TEMPERATURE: 98 F | RESPIRATION RATE: 20 BRPM | DIASTOLIC BLOOD PRESSURE: 78 MMHG | HEART RATE: 74 BPM | HEIGHT: 76 IN | WEIGHT: 289.88 LBS

## 2021-05-18 DIAGNOSIS — D47.2 MGUS (MONOCLONAL GAMMOPATHY OF UNKNOWN SIGNIFICANCE): ICD-10-CM

## 2021-05-18 DIAGNOSIS — D53.9 MACROCYTIC ANEMIA: Primary | ICD-10-CM

## 2021-05-18 DIAGNOSIS — Z86.2 HISTORY OF IRON DEFICIENCY ANEMIA: ICD-10-CM

## 2021-05-18 DIAGNOSIS — E53.8 B12 DEFICIENCY: ICD-10-CM

## 2021-05-18 DIAGNOSIS — D47.2 MGUS (MONOCLONAL GAMMOPATHY OF UNKNOWN SIGNIFICANCE): Primary | ICD-10-CM

## 2021-05-18 PROCEDURE — 99999 PR PBB SHADOW E&M-EST. PATIENT-LVL III: CPT | Mod: PBBFAC,,, | Performed by: NURSE PRACTITIONER

## 2021-05-18 PROCEDURE — 99999 PR PBB SHADOW E&M-EST. PATIENT-LVL III: ICD-10-PCS | Mod: PBBFAC,,, | Performed by: NURSE PRACTITIONER

## 2021-05-18 PROCEDURE — 99213 OFFICE O/P EST LOW 20 MIN: CPT | Mod: PBBFAC | Performed by: NURSE PRACTITIONER

## 2021-05-18 PROCEDURE — 99214 OFFICE O/P EST MOD 30 MIN: CPT | Mod: S$PBB,,, | Performed by: NURSE PRACTITIONER

## 2021-05-18 PROCEDURE — 99214 PR OFFICE/OUTPT VISIT, EST, LEVL IV, 30-39 MIN: ICD-10-PCS | Mod: S$PBB,,, | Performed by: NURSE PRACTITIONER

## 2021-05-28 ENCOUNTER — TELEPHONE (OUTPATIENT)
Dept: INTERNAL MEDICINE | Facility: CLINIC | Age: 86
End: 2021-05-28

## 2021-06-01 ENCOUNTER — CARE AT HOME (OUTPATIENT)
Dept: HOME HEALTH SERVICES | Facility: CLINIC | Age: 86
End: 2021-06-01
Payer: MEDICARE

## 2021-06-01 VITALS
SYSTOLIC BLOOD PRESSURE: 145 MMHG | RESPIRATION RATE: 16 BRPM | DIASTOLIC BLOOD PRESSURE: 79 MMHG | HEART RATE: 78 BPM | OXYGEN SATURATION: 98 % | TEMPERATURE: 98 F

## 2021-06-01 DIAGNOSIS — Z09 FOLLOW UP: Primary | ICD-10-CM

## 2021-06-01 DIAGNOSIS — Z71.89 ADVANCED DIRECTIVES, COUNSELING/DISCUSSION: ICD-10-CM

## 2021-06-01 DIAGNOSIS — L03.90 CELLULITIS, UNSPECIFIED CELLULITIS SITE: ICD-10-CM

## 2021-06-01 PROCEDURE — 99350 HOME/RES VST EST HIGH MDM 60: CPT | Mod: ,,, | Performed by: NURSE PRACTITIONER

## 2021-06-01 PROCEDURE — 99497 PR ADVNCD CARE PLAN 30 MIN: ICD-10-PCS | Mod: S$GLB,,, | Performed by: NURSE PRACTITIONER

## 2021-06-01 PROCEDURE — 99350 PR HOME VISIT,ESTAB PATIENT,LEVEL IV: ICD-10-PCS | Mod: ,,, | Performed by: NURSE PRACTITIONER

## 2021-06-01 PROCEDURE — 99497 ADVNCD CARE PLAN 30 MIN: CPT | Mod: S$GLB,,, | Performed by: NURSE PRACTITIONER

## 2021-06-01 RX ORDER — CLINDAMYCIN HYDROCHLORIDE 300 MG/1
300 CAPSULE ORAL 3 TIMES DAILY
Qty: 30 CAPSULE | Refills: 0 | Status: SHIPPED | OUTPATIENT
Start: 2021-06-01 | End: 2021-06-11

## 2021-06-14 ENCOUNTER — OFFICE VISIT (OUTPATIENT)
Dept: FAMILY MEDICINE | Facility: CLINIC | Age: 86
End: 2021-06-14
Payer: MEDICARE

## 2021-06-14 VITALS
WEIGHT: 287.69 LBS | BODY MASS INDEX: 35.02 KG/M2 | SYSTOLIC BLOOD PRESSURE: 116 MMHG | TEMPERATURE: 99 F | DIASTOLIC BLOOD PRESSURE: 72 MMHG | HEART RATE: 76 BPM | OXYGEN SATURATION: 97 % | RESPIRATION RATE: 16 BRPM

## 2021-06-14 DIAGNOSIS — E78.5 DYSLIPIDEMIA: Primary | ICD-10-CM

## 2021-06-14 DIAGNOSIS — I10 ESSENTIAL HYPERTENSION: ICD-10-CM

## 2021-06-14 DIAGNOSIS — D64.9 ANEMIA, UNSPECIFIED TYPE: ICD-10-CM

## 2021-06-14 DIAGNOSIS — M79.673 PAIN OF FOOT, UNSPECIFIED LATERALITY: ICD-10-CM

## 2021-06-14 PROCEDURE — 99999 PR PBB SHADOW E&M-EST. PATIENT-LVL IV: CPT | Mod: PBBFAC,,, | Performed by: INTERNAL MEDICINE

## 2021-06-14 PROCEDURE — 99214 OFFICE O/P EST MOD 30 MIN: CPT | Mod: PBBFAC,PO | Performed by: INTERNAL MEDICINE

## 2021-06-14 PROCEDURE — 99214 PR OFFICE/OUTPT VISIT, EST, LEVL IV, 30-39 MIN: ICD-10-PCS | Mod: S$PBB,,, | Performed by: INTERNAL MEDICINE

## 2021-06-14 PROCEDURE — 99214 OFFICE O/P EST MOD 30 MIN: CPT | Mod: S$PBB,,, | Performed by: INTERNAL MEDICINE

## 2021-06-14 PROCEDURE — 99999 PR PBB SHADOW E&M-EST. PATIENT-LVL IV: ICD-10-PCS | Mod: PBBFAC,,, | Performed by: INTERNAL MEDICINE

## 2021-06-15 ENCOUNTER — PATIENT OUTREACH (OUTPATIENT)
Dept: ADMINISTRATIVE | Facility: HOSPITAL | Age: 86
End: 2021-06-15

## 2021-06-22 ENCOUNTER — PATIENT OUTREACH (OUTPATIENT)
Dept: ADMINISTRATIVE | Facility: OTHER | Age: 86
End: 2021-06-22

## 2021-06-23 ENCOUNTER — OFFICE VISIT (OUTPATIENT)
Dept: PODIATRY | Facility: CLINIC | Age: 86
End: 2021-06-23
Payer: MEDICARE

## 2021-06-23 VITALS
BODY MASS INDEX: 34.95 KG/M2 | WEIGHT: 287 LBS | SYSTOLIC BLOOD PRESSURE: 145 MMHG | HEIGHT: 76 IN | DIASTOLIC BLOOD PRESSURE: 71 MMHG | HEART RATE: 66 BPM

## 2021-06-23 DIAGNOSIS — M19.071 PRIMARY OSTEOARTHRITIS OF RIGHT FOOT: ICD-10-CM

## 2021-06-23 DIAGNOSIS — M79.671 CHRONIC PAIN OF BOTH FEET: ICD-10-CM

## 2021-06-23 DIAGNOSIS — M79.672 CHRONIC PAIN OF BOTH FEET: ICD-10-CM

## 2021-06-23 DIAGNOSIS — M79.673 PAIN OF FOOT, UNSPECIFIED LATERALITY: ICD-10-CM

## 2021-06-23 DIAGNOSIS — E53.8 B12 DEFICIENCY: Primary | ICD-10-CM

## 2021-06-23 DIAGNOSIS — G89.29 CHRONIC PAIN OF BOTH FEET: ICD-10-CM

## 2021-06-23 DIAGNOSIS — H40.1133 PRIMARY OPEN ANGLE GLAUCOMA OF BOTH EYES, SEVERE STAGE: Primary | ICD-10-CM

## 2021-06-23 DIAGNOSIS — M19.072 PRIMARY OSTEOARTHRITIS OF LEFT FOOT: ICD-10-CM

## 2021-06-23 PROCEDURE — 99999 PR PBB SHADOW E&M-EST. PATIENT-LVL IV: CPT | Mod: PBBFAC,,, | Performed by: PODIATRIST

## 2021-06-23 PROCEDURE — 99214 OFFICE O/P EST MOD 30 MIN: CPT | Mod: PBBFAC | Performed by: PODIATRIST

## 2021-06-23 PROCEDURE — 99213 PR OFFICE/OUTPT VISIT, EST, LEVL III, 20-29 MIN: ICD-10-PCS | Mod: S$PBB,,, | Performed by: PODIATRIST

## 2021-06-23 PROCEDURE — 99213 OFFICE O/P EST LOW 20 MIN: CPT | Mod: S$PBB,,, | Performed by: PODIATRIST

## 2021-06-23 PROCEDURE — 99999 PR PBB SHADOW E&M-EST. PATIENT-LVL IV: ICD-10-PCS | Mod: PBBFAC,,, | Performed by: PODIATRIST

## 2021-06-23 RX ORDER — TIMOLOL MALEATE 5 MG/ML
1 SOLUTION/ DROPS OPHTHALMIC DAILY
Qty: 15 ML | Refills: 6 | Status: SHIPPED | OUTPATIENT
Start: 2021-06-23 | End: 2025-10-15

## 2021-06-23 RX ORDER — LATANOPROST 50 UG/ML
1 SOLUTION/ DROPS OPHTHALMIC NIGHTLY
Qty: 2.5 ML | Refills: 12 | Status: SHIPPED | OUTPATIENT
Start: 2021-06-23 | End: 2021-07-19 | Stop reason: SDUPTHER

## 2021-07-07 ENCOUNTER — CARE AT HOME (OUTPATIENT)
Dept: HOME HEALTH SERVICES | Facility: CLINIC | Age: 86
End: 2021-07-07
Payer: MEDICARE

## 2021-07-07 DIAGNOSIS — Z09 FOLLOW UP: Primary | ICD-10-CM

## 2021-07-07 PROCEDURE — 99443 PR PHYSICIAN TELEPHONE EVALUATION 21-30 MIN: CPT | Mod: 95,,, | Performed by: NURSE PRACTITIONER

## 2021-07-07 PROCEDURE — 99443 PR PHYSICIAN TELEPHONE EVALUATION 21-30 MIN: ICD-10-PCS | Mod: 95,,, | Performed by: NURSE PRACTITIONER

## 2021-07-19 ENCOUNTER — OFFICE VISIT (OUTPATIENT)
Dept: OPHTHALMOLOGY | Facility: CLINIC | Age: 86
End: 2021-07-19
Payer: MEDICARE

## 2021-07-19 DIAGNOSIS — H40.1133 PRIMARY OPEN ANGLE GLAUCOMA OF BOTH EYES, SEVERE STAGE: Primary | ICD-10-CM

## 2021-07-19 DIAGNOSIS — H25.13 NUCLEAR SCLEROSIS, BILATERAL: ICD-10-CM

## 2021-07-19 PROCEDURE — 92014 PR EYE EXAM, EST PATIENT,COMPREHESV: ICD-10-PCS | Mod: S$PBB,,, | Performed by: OPHTHALMOLOGY

## 2021-07-19 PROCEDURE — 99999 PR PBB SHADOW E&M-EST. PATIENT-LVL III: ICD-10-PCS | Mod: PBBFAC,,, | Performed by: OPHTHALMOLOGY

## 2021-07-19 PROCEDURE — 99999 PR PBB SHADOW E&M-EST. PATIENT-LVL III: CPT | Mod: PBBFAC,,, | Performed by: OPHTHALMOLOGY

## 2021-07-19 PROCEDURE — 92133 CPTRZD OPH DX IMG PST SGM ON: CPT | Mod: PBBFAC | Performed by: OPHTHALMOLOGY

## 2021-07-19 PROCEDURE — 92136 OPHTHALMIC BIOMETRY: CPT | Mod: PBBFAC,LT | Performed by: OPHTHALMOLOGY

## 2021-07-19 PROCEDURE — 92136 IOL MASTER - OS - LEFT EYE: ICD-10-PCS | Mod: 26,S$PBB,LT, | Performed by: OPHTHALMOLOGY

## 2021-07-19 PROCEDURE — 92014 COMPRE OPH EXAM EST PT 1/>: CPT | Mod: S$PBB,,, | Performed by: OPHTHALMOLOGY

## 2021-07-19 PROCEDURE — 92133 POSTERIOR SEGMENT OCT OPTIC NERVE(OCULAR COHERENCE TOMOGRAPHY) - OU - BOTH EYES: ICD-10-PCS | Mod: 26,S$PBB,, | Performed by: OPHTHALMOLOGY

## 2021-07-19 PROCEDURE — 99213 OFFICE O/P EST LOW 20 MIN: CPT | Mod: PBBFAC | Performed by: OPHTHALMOLOGY

## 2021-07-19 RX ORDER — MOXIFLOXACIN 5 MG/ML
1 SOLUTION/ DROPS OPHTHALMIC EVERY 12 HOURS
Qty: 5 ML | Refills: 1 | Status: SHIPPED | OUTPATIENT
Start: 2021-07-19 | End: 2021-11-09

## 2021-07-19 RX ORDER — LATANOPROST 50 UG/ML
1 SOLUTION/ DROPS OPHTHALMIC NIGHTLY
Qty: 7.5 ML | Refills: 4 | Status: SHIPPED | OUTPATIENT
Start: 2021-07-19

## 2021-07-19 RX ORDER — PREDNISOLONE ACETATE 10 MG/ML
1 SUSPENSION/ DROPS OPHTHALMIC 4 TIMES DAILY
Qty: 1 BOTTLE | Refills: 2 | Status: SHIPPED | OUTPATIENT
Start: 2021-07-19 | End: 2021-11-09

## 2021-07-19 RX ORDER — KETOROLAC TROMETHAMINE 5 MG/ML
1 SOLUTION OPHTHALMIC 4 TIMES DAILY
Qty: 1 BOTTLE | Refills: 2 | Status: SHIPPED | OUTPATIENT
Start: 2021-07-19 | End: 2021-12-01

## 2021-07-26 ENCOUNTER — OFFICE VISIT (OUTPATIENT)
Dept: FAMILY MEDICINE | Facility: CLINIC | Age: 86
End: 2021-07-26
Payer: MEDICARE

## 2021-07-26 VITALS
OXYGEN SATURATION: 96 % | WEIGHT: 284.63 LBS | DIASTOLIC BLOOD PRESSURE: 82 MMHG | BODY MASS INDEX: 34.64 KG/M2 | SYSTOLIC BLOOD PRESSURE: 142 MMHG | RESPIRATION RATE: 18 BRPM | TEMPERATURE: 98 F | HEART RATE: 57 BPM

## 2021-07-26 DIAGNOSIS — I10 ESSENTIAL HYPERTENSION: Primary | ICD-10-CM

## 2021-07-26 DIAGNOSIS — M54.2 NECK PAIN: ICD-10-CM

## 2021-07-26 PROCEDURE — 99214 OFFICE O/P EST MOD 30 MIN: CPT | Mod: PBBFAC,PO | Performed by: INTERNAL MEDICINE

## 2021-07-26 PROCEDURE — 99213 OFFICE O/P EST LOW 20 MIN: CPT | Mod: S$PBB,,, | Performed by: INTERNAL MEDICINE

## 2021-07-26 PROCEDURE — 99999 PR PBB SHADOW E&M-EST. PATIENT-LVL IV: ICD-10-PCS | Mod: PBBFAC,,, | Performed by: INTERNAL MEDICINE

## 2021-07-26 PROCEDURE — 99999 PR PBB SHADOW E&M-EST. PATIENT-LVL IV: CPT | Mod: PBBFAC,,, | Performed by: INTERNAL MEDICINE

## 2021-07-26 PROCEDURE — 99213 PR OFFICE/OUTPT VISIT, EST, LEVL III, 20-29 MIN: ICD-10-PCS | Mod: S$PBB,,, | Performed by: INTERNAL MEDICINE

## 2021-07-26 RX ORDER — ORPHENADRINE CITRATE 100 MG/1
100 TABLET, EXTENDED RELEASE ORAL 2 TIMES DAILY PRN
COMMUNITY
Start: 2021-07-19 | End: 2021-07-26

## 2021-07-26 RX ORDER — BACLOFEN 10 MG/1
10 TABLET ORAL NIGHTLY PRN
Qty: 20 TABLET | Refills: 0 | Status: SHIPPED | OUTPATIENT
Start: 2021-07-26 | End: 2022-07-26

## 2021-07-26 RX ORDER — LISINOPRIL 2.5 MG/1
2.5 TABLET ORAL DAILY
Qty: 90 TABLET | Refills: 3 | Status: SHIPPED | OUTPATIENT
Start: 2021-07-26 | End: 2022-01-11

## 2021-07-26 RX ORDER — ORPHENADRINE CITRATE 100 MG/1
100 TABLET, EXTENDED RELEASE ORAL 2 TIMES DAILY PRN
Qty: 20 TABLET | Refills: 0 | Status: SHIPPED | OUTPATIENT
Start: 2021-07-26 | End: 2021-07-26

## 2021-07-28 ENCOUNTER — TELEPHONE (OUTPATIENT)
Dept: PAIN MEDICINE | Facility: CLINIC | Age: 86
End: 2021-07-28

## 2021-08-11 ENCOUNTER — OUTSIDE PLACE OF SERVICE (OUTPATIENT)
Dept: OPHTHALMOLOGY | Facility: CLINIC | Age: 86
End: 2021-08-11
Payer: MEDICARE

## 2021-08-11 PROCEDURE — 65820 PR RELIEVE INNER EYE PRESSURE: ICD-10-PCS | Mod: LT,,, | Performed by: OPHTHALMOLOGY

## 2021-08-11 PROCEDURE — 66984 PR REMOVAL, CATARACT, W/INSRT INTRAOC LENS, W/O ENDO CYCLO: ICD-10-PCS | Mod: 51,LT,, | Performed by: OPHTHALMOLOGY

## 2021-08-11 PROCEDURE — 65820 GONIOTOMY: CPT | Mod: LT,,, | Performed by: OPHTHALMOLOGY

## 2021-08-11 PROCEDURE — 66984 XCAPSL CTRC RMVL W/O ECP: CPT | Mod: 51,LT,, | Performed by: OPHTHALMOLOGY

## 2021-08-12 ENCOUNTER — OFFICE VISIT (OUTPATIENT)
Dept: OPHTHALMOLOGY | Facility: CLINIC | Age: 86
End: 2021-08-12
Payer: MEDICARE

## 2021-08-12 DIAGNOSIS — Z98.890 POST-OPERATIVE STATE: Primary | ICD-10-CM

## 2021-08-12 PROCEDURE — 99213 OFFICE O/P EST LOW 20 MIN: CPT | Mod: PBBFAC | Performed by: OPHTHALMOLOGY

## 2021-08-12 PROCEDURE — 99024 PR POST-OP FOLLOW-UP VISIT: ICD-10-PCS | Mod: POP,,, | Performed by: OPHTHALMOLOGY

## 2021-08-12 PROCEDURE — 99024 POSTOP FOLLOW-UP VISIT: CPT | Mod: POP,,, | Performed by: OPHTHALMOLOGY

## 2021-08-12 PROCEDURE — 99999 PR PBB SHADOW E&M-EST. PATIENT-LVL III: CPT | Mod: PBBFAC,,, | Performed by: OPHTHALMOLOGY

## 2021-08-12 PROCEDURE — 99999 PR PBB SHADOW E&M-EST. PATIENT-LVL III: ICD-10-PCS | Mod: PBBFAC,,, | Performed by: OPHTHALMOLOGY

## 2021-08-13 ENCOUNTER — HOSPITAL ENCOUNTER (EMERGENCY)
Facility: HOSPITAL | Age: 86
Discharge: HOME | End: 2021-08-13
Attending: EMERGENCY MEDICINE
Payer: MEDICARE

## 2021-08-13 VITALS
TEMPERATURE: 98 F | HEART RATE: 67 BPM | RESPIRATION RATE: 18 BRPM | DIASTOLIC BLOOD PRESSURE: 68 MMHG | SYSTOLIC BLOOD PRESSURE: 146 MMHG | WEIGHT: 285.06 LBS | HEIGHT: 76 IN | BODY MASS INDEX: 34.71 KG/M2 | OXYGEN SATURATION: 94 %

## 2021-08-13 DIAGNOSIS — K59.00 CONSTIPATION, UNSPECIFIED CONSTIPATION TYPE: Primary | ICD-10-CM

## 2021-08-13 DIAGNOSIS — K59.00 CONSTIPATION: ICD-10-CM

## 2021-08-13 PROCEDURE — 99283 EMERGENCY DEPT VISIT LOW MDM: CPT | Mod: 25

## 2021-08-13 RX ORDER — POLYETHYLENE GLYCOL 3350 17 G/17G
17 POWDER, FOR SOLUTION ORAL DAILY PRN
Qty: 289 G | Refills: 0 | Status: SHIPPED | OUTPATIENT
Start: 2021-08-13 | End: 2021-11-09

## 2021-08-19 ENCOUNTER — OFFICE VISIT (OUTPATIENT)
Dept: OPHTHALMOLOGY | Facility: CLINIC | Age: 86
End: 2021-08-19
Payer: MEDICARE

## 2021-08-19 DIAGNOSIS — H40.1133 PRIMARY OPEN ANGLE GLAUCOMA OF BOTH EYES, SEVERE STAGE: ICD-10-CM

## 2021-08-19 DIAGNOSIS — Z98.890 POST-OPERATIVE STATE: Primary | ICD-10-CM

## 2021-08-19 DIAGNOSIS — H25.11 NUCLEAR SCLEROSIS OF RIGHT EYE: ICD-10-CM

## 2021-08-19 PROCEDURE — 99024 PR POST-OP FOLLOW-UP VISIT: ICD-10-PCS | Mod: POP,,, | Performed by: OPHTHALMOLOGY

## 2021-08-19 PROCEDURE — 99213 OFFICE O/P EST LOW 20 MIN: CPT | Mod: PBBFAC | Performed by: OPHTHALMOLOGY

## 2021-08-19 PROCEDURE — 99999 PR PBB SHADOW E&M-EST. PATIENT-LVL III: CPT | Mod: PBBFAC,,, | Performed by: OPHTHALMOLOGY

## 2021-08-19 PROCEDURE — 99999 PR PBB SHADOW E&M-EST. PATIENT-LVL III: ICD-10-PCS | Mod: PBBFAC,,, | Performed by: OPHTHALMOLOGY

## 2021-08-19 PROCEDURE — 99024 POSTOP FOLLOW-UP VISIT: CPT | Mod: POP,,, | Performed by: OPHTHALMOLOGY

## 2021-08-26 ENCOUNTER — OFFICE VISIT (OUTPATIENT)
Dept: FAMILY MEDICINE | Facility: CLINIC | Age: 86
End: 2021-08-26
Payer: MEDICARE

## 2021-08-26 ENCOUNTER — LAB VISIT (OUTPATIENT)
Dept: LAB | Facility: HOSPITAL | Age: 86
End: 2021-08-26
Attending: INTERNAL MEDICINE
Payer: MEDICARE

## 2021-08-26 VITALS
SYSTOLIC BLOOD PRESSURE: 130 MMHG | WEIGHT: 284.38 LBS | OXYGEN SATURATION: 94 % | DIASTOLIC BLOOD PRESSURE: 70 MMHG | TEMPERATURE: 98 F | BODY MASS INDEX: 34.62 KG/M2 | HEART RATE: 62 BPM | RESPIRATION RATE: 18 BRPM

## 2021-08-26 DIAGNOSIS — I10 ESSENTIAL HYPERTENSION: Primary | ICD-10-CM

## 2021-08-26 DIAGNOSIS — I10 ESSENTIAL HYPERTENSION: ICD-10-CM

## 2021-08-26 LAB
ANION GAP SERPL CALC-SCNC: 7 MMOL/L (ref 8–16)
BUN SERPL-MCNC: 18 MG/DL (ref 8–23)
CALCIUM SERPL-MCNC: 9.6 MG/DL (ref 8.7–10.5)
CHLORIDE SERPL-SCNC: 104 MMOL/L (ref 95–110)
CO2 SERPL-SCNC: 30 MMOL/L (ref 23–29)
CREAT SERPL-MCNC: 1.2 MG/DL (ref 0.5–1.4)
EST. GFR  (AFRICAN AMERICAN): >60 ML/MIN/1.73 M^2
EST. GFR  (NON AFRICAN AMERICAN): 55.6 ML/MIN/1.73 M^2
GLUCOSE SERPL-MCNC: 93 MG/DL (ref 70–110)
POTASSIUM SERPL-SCNC: 4.3 MMOL/L (ref 3.5–5.1)
SODIUM SERPL-SCNC: 141 MMOL/L (ref 136–145)

## 2021-08-26 PROCEDURE — 80048 BASIC METABOLIC PNL TOTAL CA: CPT | Performed by: INTERNAL MEDICINE

## 2021-08-26 PROCEDURE — 99214 OFFICE O/P EST MOD 30 MIN: CPT | Mod: PBBFAC,PO | Performed by: INTERNAL MEDICINE

## 2021-08-26 PROCEDURE — 99999 PR PBB SHADOW E&M-EST. PATIENT-LVL IV: ICD-10-PCS | Mod: PBBFAC,,, | Performed by: INTERNAL MEDICINE

## 2021-08-26 PROCEDURE — 36415 COLL VENOUS BLD VENIPUNCTURE: CPT | Mod: PO | Performed by: INTERNAL MEDICINE

## 2021-08-26 PROCEDURE — 99213 PR OFFICE/OUTPT VISIT, EST, LEVL III, 20-29 MIN: ICD-10-PCS | Mod: S$PBB,,, | Performed by: INTERNAL MEDICINE

## 2021-08-26 PROCEDURE — 99999 PR PBB SHADOW E&M-EST. PATIENT-LVL IV: CPT | Mod: PBBFAC,,, | Performed by: INTERNAL MEDICINE

## 2021-08-26 PROCEDURE — 99213 OFFICE O/P EST LOW 20 MIN: CPT | Mod: S$PBB,,, | Performed by: INTERNAL MEDICINE

## 2021-09-13 ENCOUNTER — TELEPHONE (OUTPATIENT)
Dept: ADMINISTRATIVE | Facility: HOSPITAL | Age: 86
End: 2021-09-13

## 2021-09-20 ENCOUNTER — PATIENT OUTREACH (OUTPATIENT)
Dept: ADMINISTRATIVE | Facility: OTHER | Age: 86
End: 2021-09-20

## 2021-09-21 ENCOUNTER — OFFICE VISIT (OUTPATIENT)
Dept: CARDIOLOGY | Facility: CLINIC | Age: 86
End: 2021-09-21
Payer: MEDICARE

## 2021-09-21 VITALS
DIASTOLIC BLOOD PRESSURE: 64 MMHG | RESPIRATION RATE: 16 BRPM | HEIGHT: 76 IN | WEIGHT: 283.5 LBS | SYSTOLIC BLOOD PRESSURE: 130 MMHG | BODY MASS INDEX: 34.52 KG/M2 | OXYGEN SATURATION: 97 % | HEART RATE: 64 BPM

## 2021-09-21 DIAGNOSIS — I50.32 DIASTOLIC DYSFUNCTION WITH CHRONIC HEART FAILURE: ICD-10-CM

## 2021-09-21 DIAGNOSIS — E78.5 DYSLIPIDEMIA: ICD-10-CM

## 2021-09-21 DIAGNOSIS — I48.0 PAROXYSMAL ATRIAL FIBRILLATION: ICD-10-CM

## 2021-09-21 DIAGNOSIS — I10 ESSENTIAL HYPERTENSION: ICD-10-CM

## 2021-09-21 DIAGNOSIS — I70.0 ATHEROSCLEROSIS OF ABDOMINAL AORTA: Chronic | ICD-10-CM

## 2021-09-21 DIAGNOSIS — I87.2 VENOUS INSUFFICIENCY: Primary | ICD-10-CM

## 2021-09-21 PROCEDURE — 99214 OFFICE O/P EST MOD 30 MIN: CPT | Mod: S$PBB,,, | Performed by: INTERNAL MEDICINE

## 2021-09-21 PROCEDURE — 99999 PR PBB SHADOW E&M-EST. PATIENT-LVL IV: CPT | Mod: PBBFAC,,, | Performed by: INTERNAL MEDICINE

## 2021-09-21 PROCEDURE — 99214 OFFICE O/P EST MOD 30 MIN: CPT | Mod: PBBFAC | Performed by: INTERNAL MEDICINE

## 2021-09-21 PROCEDURE — 99214 PR OFFICE/OUTPT VISIT, EST, LEVL IV, 30-39 MIN: ICD-10-PCS | Mod: S$PBB,,, | Performed by: INTERNAL MEDICINE

## 2021-09-21 PROCEDURE — 99999 PR PBB SHADOW E&M-EST. PATIENT-LVL IV: ICD-10-PCS | Mod: PBBFAC,,, | Performed by: INTERNAL MEDICINE

## 2021-09-23 ENCOUNTER — TELEPHONE (OUTPATIENT)
Dept: OPHTHALMOLOGY | Facility: CLINIC | Age: 86
End: 2021-09-23

## 2021-09-27 ENCOUNTER — OFFICE VISIT (OUTPATIENT)
Dept: OPHTHALMOLOGY | Facility: CLINIC | Age: 86
End: 2021-09-27
Payer: MEDICARE

## 2021-09-27 DIAGNOSIS — Z98.890 POST-OPERATIVE STATE: Primary | ICD-10-CM

## 2021-09-27 DIAGNOSIS — H40.1133 PRIMARY OPEN ANGLE GLAUCOMA OF BOTH EYES, SEVERE STAGE: ICD-10-CM

## 2021-09-27 DIAGNOSIS — H25.11 NUCLEAR SCLEROSIS OF RIGHT EYE: ICD-10-CM

## 2021-09-27 PROCEDURE — 99024 POSTOP FOLLOW-UP VISIT: CPT | Mod: POP,,, | Performed by: OPHTHALMOLOGY

## 2021-09-27 PROCEDURE — 99211 OFF/OP EST MAY X REQ PHY/QHP: CPT | Mod: PBBFAC | Performed by: OPHTHALMOLOGY

## 2021-09-27 PROCEDURE — 99024 PR POST-OP FOLLOW-UP VISIT: ICD-10-PCS | Mod: POP,,, | Performed by: OPHTHALMOLOGY

## 2021-09-27 PROCEDURE — 99999 PR PBB SHADOW E&M-EST. PATIENT-LVL I: CPT | Mod: PBBFAC,,, | Performed by: OPHTHALMOLOGY

## 2021-09-27 PROCEDURE — 99999 PR PBB SHADOW E&M-EST. PATIENT-LVL I: ICD-10-PCS | Mod: PBBFAC,,, | Performed by: OPHTHALMOLOGY

## 2021-09-30 ENCOUNTER — OFFICE VISIT (OUTPATIENT)
Dept: FAMILY MEDICINE | Facility: CLINIC | Age: 86
End: 2021-09-30
Payer: MEDICARE

## 2021-09-30 VITALS
TEMPERATURE: 98 F | HEART RATE: 57 BPM | SYSTOLIC BLOOD PRESSURE: 132 MMHG | RESPIRATION RATE: 16 BRPM | OXYGEN SATURATION: 96 % | WEIGHT: 281.06 LBS | DIASTOLIC BLOOD PRESSURE: 70 MMHG | BODY MASS INDEX: 34.21 KG/M2

## 2021-09-30 DIAGNOSIS — K59.09 CHRONIC CONSTIPATION: Primary | ICD-10-CM

## 2021-09-30 PROCEDURE — 99213 OFFICE O/P EST LOW 20 MIN: CPT | Mod: S$PBB,,, | Performed by: INTERNAL MEDICINE

## 2021-09-30 PROCEDURE — 99999 PR PBB SHADOW E&M-EST. PATIENT-LVL V: CPT | Mod: PBBFAC,,, | Performed by: INTERNAL MEDICINE

## 2021-09-30 PROCEDURE — 99213 PR OFFICE/OUTPT VISIT, EST, LEVL III, 20-29 MIN: ICD-10-PCS | Mod: S$PBB,,, | Performed by: INTERNAL MEDICINE

## 2021-09-30 PROCEDURE — 99999 PR PBB SHADOW E&M-EST. PATIENT-LVL V: ICD-10-PCS | Mod: PBBFAC,,, | Performed by: INTERNAL MEDICINE

## 2021-09-30 PROCEDURE — 99215 OFFICE O/P EST HI 40 MIN: CPT | Mod: PBBFAC,PO | Performed by: INTERNAL MEDICINE

## 2021-09-30 RX ORDER — ACETAMINOPHEN 500 MG
1 TABLET ORAL DAILY PRN
Qty: 1 EACH | Refills: 2 | Status: SHIPPED | OUTPATIENT
Start: 2021-09-30

## 2021-10-01 ENCOUNTER — OFFICE VISIT (OUTPATIENT)
Dept: GASTROENTEROLOGY | Facility: CLINIC | Age: 86
End: 2021-10-01
Payer: MEDICARE

## 2021-10-01 VITALS
DIASTOLIC BLOOD PRESSURE: 70 MMHG | SYSTOLIC BLOOD PRESSURE: 140 MMHG | HEART RATE: 61 BPM | WEIGHT: 281.88 LBS | OXYGEN SATURATION: 94 % | HEIGHT: 76 IN | BODY MASS INDEX: 34.33 KG/M2

## 2021-10-01 DIAGNOSIS — K59.09 CHRONIC CONSTIPATION: ICD-10-CM

## 2021-10-01 PROCEDURE — 99999 PR PBB SHADOW E&M-EST. PATIENT-LVL IV: CPT | Mod: PBBFAC,,, | Performed by: INTERNAL MEDICINE

## 2021-10-01 PROCEDURE — 99999 PR PBB SHADOW E&M-EST. PATIENT-LVL IV: ICD-10-PCS | Mod: PBBFAC,,, | Performed by: INTERNAL MEDICINE

## 2021-10-01 PROCEDURE — 99214 OFFICE O/P EST MOD 30 MIN: CPT | Mod: PBBFAC | Performed by: INTERNAL MEDICINE

## 2021-10-01 PROCEDURE — 99213 OFFICE O/P EST LOW 20 MIN: CPT | Mod: S$PBB,,, | Performed by: INTERNAL MEDICINE

## 2021-10-01 PROCEDURE — 99213 PR OFFICE/OUTPT VISIT, EST, LEVL III, 20-29 MIN: ICD-10-PCS | Mod: S$PBB,,, | Performed by: INTERNAL MEDICINE

## 2021-11-01 ENCOUNTER — HOSPITAL ENCOUNTER (OUTPATIENT)
Dept: CARDIOLOGY | Facility: HOSPITAL | Age: 86
Discharge: HOME | End: 2021-11-01
Attending: INTERNAL MEDICINE
Payer: MEDICARE

## 2021-11-01 DIAGNOSIS — I48.0 PAROXYSMAL ATRIAL FIBRILLATION: ICD-10-CM

## 2021-11-01 PROCEDURE — 93248 CV CARDIAC MONITOR - 3-15 DAY ADULT (CUPID ONLY): ICD-10-PCS | Mod: ,,, | Performed by: STUDENT IN AN ORGANIZED HEALTH CARE EDUCATION/TRAINING PROGRAM

## 2021-11-01 PROCEDURE — 93246 EXT ECG>7D<15D RECORDING: CPT

## 2021-11-01 PROCEDURE — 93247 EXT ECG>7D<15D SCAN A/R: CPT

## 2021-11-01 PROCEDURE — 93248 EXT ECG>7D<15D REV&INTERPJ: CPT | Mod: ,,, | Performed by: STUDENT IN AN ORGANIZED HEALTH CARE EDUCATION/TRAINING PROGRAM

## 2021-11-09 ENCOUNTER — OFFICE VISIT (OUTPATIENT)
Dept: INTERNAL MEDICINE | Facility: CLINIC | Age: 86
End: 2021-11-09
Payer: MEDICARE

## 2021-11-09 ENCOUNTER — LAB VISIT (OUTPATIENT)
Dept: LAB | Facility: HOSPITAL | Age: 86
End: 2021-11-09
Attending: NURSE PRACTITIONER
Payer: MEDICARE

## 2021-11-09 VITALS
DIASTOLIC BLOOD PRESSURE: 72 MMHG | TEMPERATURE: 98 F | HEART RATE: 55 BPM | OXYGEN SATURATION: 96 % | WEIGHT: 288.56 LBS | HEIGHT: 76 IN | BODY MASS INDEX: 35.14 KG/M2 | SYSTOLIC BLOOD PRESSURE: 124 MMHG

## 2021-11-09 DIAGNOSIS — D53.9 MACROCYTIC ANEMIA: ICD-10-CM

## 2021-11-09 DIAGNOSIS — L30.9 DERMATITIS: Primary | ICD-10-CM

## 2021-11-09 DIAGNOSIS — Z86.2 HISTORY OF IRON DEFICIENCY ANEMIA: ICD-10-CM

## 2021-11-09 DIAGNOSIS — E53.8 B12 DEFICIENCY: ICD-10-CM

## 2021-11-09 LAB
ALBUMIN SERPL BCP-MCNC: 3.5 G/DL (ref 3.5–5.2)
ALP SERPL-CCNC: 64 U/L (ref 55–135)
ALT SERPL W/O P-5'-P-CCNC: 25 U/L (ref 10–44)
ANION GAP SERPL CALC-SCNC: 9 MMOL/L (ref 8–16)
AST SERPL-CCNC: 32 U/L (ref 10–40)
BASOPHILS # BLD AUTO: 0.05 K/UL (ref 0–0.2)
BASOPHILS NFR BLD: 1 % (ref 0–1.9)
BILIRUB SERPL-MCNC: 0.8 MG/DL (ref 0.1–1)
BUN SERPL-MCNC: 16 MG/DL (ref 8–23)
CALCIUM SERPL-MCNC: 9.4 MG/DL (ref 8.7–10.5)
CHLORIDE SERPL-SCNC: 103 MMOL/L (ref 95–110)
CO2 SERPL-SCNC: 29 MMOL/L (ref 23–29)
CREAT SERPL-MCNC: 1.4 MG/DL (ref 0.5–1.4)
DIFFERENTIAL METHOD: ABNORMAL
EOSINOPHIL # BLD AUTO: 0.2 K/UL (ref 0–0.5)
EOSINOPHIL NFR BLD: 4.5 % (ref 0–8)
ERYTHROCYTE [DISTWIDTH] IN BLOOD BY AUTOMATED COUNT: 12.4 % (ref 11.5–14.5)
EST. GFR  (AFRICAN AMERICAN): 53 ML/MIN/1.73 M^2
EST. GFR  (NON AFRICAN AMERICAN): 46 ML/MIN/1.73 M^2
FERRITIN SERPL-MCNC: 660 NG/ML (ref 20–300)
GLUCOSE SERPL-MCNC: 126 MG/DL (ref 70–110)
HCT VFR BLD AUTO: 40.2 % (ref 40–54)
HGB BLD-MCNC: 12.8 G/DL (ref 14–18)
IMM GRANULOCYTES # BLD AUTO: 0.01 K/UL (ref 0–0.04)
IMM GRANULOCYTES NFR BLD AUTO: 0.2 % (ref 0–0.5)
IRON SERPL-MCNC: 66 UG/DL (ref 45–160)
LYMPHOCYTES # BLD AUTO: 1.2 K/UL (ref 1–4.8)
LYMPHOCYTES NFR BLD: 23.7 % (ref 18–48)
MCH RBC QN AUTO: 33.6 PG (ref 27–31)
MCHC RBC AUTO-ENTMCNC: 31.8 G/DL (ref 32–36)
MCV RBC AUTO: 106 FL (ref 82–98)
MONOCYTES # BLD AUTO: 0.6 K/UL (ref 0.3–1)
MONOCYTES NFR BLD: 11.3 % (ref 4–15)
NEUTROPHILS # BLD AUTO: 2.9 K/UL (ref 1.8–7.7)
NEUTROPHILS NFR BLD: 59.3 % (ref 38–73)
NRBC BLD-RTO: 0 /100 WBC
PLATELET # BLD AUTO: 134 K/UL (ref 150–450)
PMV BLD AUTO: 12.5 FL (ref 9.2–12.9)
POTASSIUM SERPL-SCNC: 3.9 MMOL/L (ref 3.5–5.1)
PROT SERPL-MCNC: 8 G/DL (ref 6–8.4)
RBC # BLD AUTO: 3.81 M/UL (ref 4.6–6.2)
SATURATED IRON: 24 % (ref 20–50)
SODIUM SERPL-SCNC: 141 MMOL/L (ref 136–145)
TOTAL IRON BINDING CAPACITY: 277 UG/DL (ref 250–450)
TRANSFERRIN SERPL-MCNC: 187 MG/DL (ref 200–375)
WBC # BLD AUTO: 4.94 K/UL (ref 3.9–12.7)

## 2021-11-09 PROCEDURE — 99213 PR OFFICE/OUTPT VISIT, EST, LEVL III, 20-29 MIN: ICD-10-PCS | Mod: S$PBB,,, | Performed by: NURSE PRACTITIONER

## 2021-11-09 PROCEDURE — 99213 OFFICE O/P EST LOW 20 MIN: CPT | Mod: S$PBB,,, | Performed by: NURSE PRACTITIONER

## 2021-11-09 PROCEDURE — 90694 VACC AIIV4 NO PRSRV 0.5ML IM: CPT | Mod: PBBFAC,PN

## 2021-11-09 PROCEDURE — 85025 COMPLETE CBC W/AUTO DIFF WBC: CPT | Performed by: NURSE PRACTITIONER

## 2021-11-09 PROCEDURE — G0008 ADMIN INFLUENZA VIRUS VAC: HCPCS | Mod: PBBFAC,PN

## 2021-11-09 PROCEDURE — 86334 IMMUNOFIX E-PHORESIS SERUM: CPT | Mod: 26,,, | Performed by: PATHOLOGY

## 2021-11-09 PROCEDURE — 84165 PROTEIN E-PHORESIS SERUM: CPT | Mod: 26,,, | Performed by: PATHOLOGY

## 2021-11-09 PROCEDURE — 99214 OFFICE O/P EST MOD 30 MIN: CPT | Mod: PBBFAC,PN | Performed by: NURSE PRACTITIONER

## 2021-11-09 PROCEDURE — 84165 PROTEIN E-PHORESIS SERUM: CPT | Performed by: NURSE PRACTITIONER

## 2021-11-09 PROCEDURE — 84466 ASSAY OF TRANSFERRIN: CPT | Performed by: NURSE PRACTITIONER

## 2021-11-09 PROCEDURE — 86334 PATHOLOGIST INTERPRETATION IFE: ICD-10-PCS | Mod: 26,,, | Performed by: PATHOLOGY

## 2021-11-09 PROCEDURE — 99999 PR PBB SHADOW E&M-EST. PATIENT-LVL IV: ICD-10-PCS | Mod: PBBFAC,,, | Performed by: NURSE PRACTITIONER

## 2021-11-09 PROCEDURE — 84165 PATHOLOGIST INTERPRETATION SPE: ICD-10-PCS | Mod: 26,,, | Performed by: PATHOLOGY

## 2021-11-09 PROCEDURE — 80053 COMPREHEN METABOLIC PANEL: CPT | Performed by: NURSE PRACTITIONER

## 2021-11-09 PROCEDURE — 86334 IMMUNOFIX E-PHORESIS SERUM: CPT | Performed by: NURSE PRACTITIONER

## 2021-11-09 PROCEDURE — 36415 COLL VENOUS BLD VENIPUNCTURE: CPT | Mod: PO | Performed by: NURSE PRACTITIONER

## 2021-11-09 PROCEDURE — 83520 IMMUNOASSAY QUANT NOS NONAB: CPT | Mod: 59 | Performed by: NURSE PRACTITIONER

## 2021-11-09 PROCEDURE — 82728 ASSAY OF FERRITIN: CPT | Performed by: NURSE PRACTITIONER

## 2021-11-09 PROCEDURE — 99999 PR PBB SHADOW E&M-EST. PATIENT-LVL IV: CPT | Mod: PBBFAC,,, | Performed by: NURSE PRACTITIONER

## 2021-11-09 RX ORDER — MUPIROCIN 20 MG/G
OINTMENT TOPICAL 2 TIMES DAILY
Qty: 22 G | Refills: 1 | Status: SHIPPED | OUTPATIENT
Start: 2021-11-09 | End: 2022-05-09 | Stop reason: SDUPTHER

## 2021-11-10 LAB
ALBUMIN SERPL ELPH-MCNC: 3.68 G/DL (ref 3.35–5.55)
ALPHA1 GLOB SERPL ELPH-MCNC: 0.3 G/DL (ref 0.17–0.41)
ALPHA2 GLOB SERPL ELPH-MCNC: 0.91 G/DL (ref 0.43–0.99)
B-GLOBULIN SERPL ELPH-MCNC: 0.66 G/DL (ref 0.5–1.1)
GAMMA GLOB SERPL ELPH-MCNC: 1.66 G/DL (ref 0.67–1.58)
INTERPRETATION SERPL IFE-IMP: NORMAL
KAPPA LC SER QL IA: 5.39 MG/DL (ref 0.33–1.94)
KAPPA LC/LAMBDA SER IA: 2.72 (ref 0.26–1.65)
LAMBDA LC SER QL IA: 1.98 MG/DL (ref 0.57–2.63)
PATHOLOGIST INTERPRETATION IFE: NORMAL
PROT SERPL-MCNC: 7.2 G/DL (ref 6–8.4)

## 2021-11-11 ENCOUNTER — HOSPITAL ENCOUNTER (EMERGENCY)
Facility: HOSPITAL | Age: 86
Discharge: HOME | End: 2021-11-11
Attending: EMERGENCY MEDICINE
Payer: MEDICARE

## 2021-11-11 VITALS
BODY MASS INDEX: 34.81 KG/M2 | SYSTOLIC BLOOD PRESSURE: 123 MMHG | OXYGEN SATURATION: 95 % | TEMPERATURE: 98 F | WEIGHT: 286 LBS | DIASTOLIC BLOOD PRESSURE: 72 MMHG | HEART RATE: 67 BPM | RESPIRATION RATE: 20 BRPM

## 2021-11-11 DIAGNOSIS — R51.9 NONINTRACTABLE HEADACHE, UNSPECIFIED CHRONICITY PATTERN, UNSPECIFIED HEADACHE TYPE: Primary | ICD-10-CM

## 2021-11-11 LAB
PATHOLOGIST INTERPRETATION SPE: NORMAL
POCT GLUCOSE: 110 MG/DL (ref 70–110)

## 2021-11-11 PROCEDURE — 99284 EMERGENCY DEPT VISIT MOD MDM: CPT | Mod: 25

## 2021-11-11 PROCEDURE — 82962 GLUCOSE BLOOD TEST: CPT

## 2021-11-16 ENCOUNTER — OFFICE VISIT (OUTPATIENT)
Dept: HEMATOLOGY/ONCOLOGY | Facility: CLINIC | Age: 86
End: 2021-11-16
Payer: MEDICARE

## 2021-11-16 VITALS
WEIGHT: 283.31 LBS | DIASTOLIC BLOOD PRESSURE: 65 MMHG | HEART RATE: 68 BPM | OXYGEN SATURATION: 93 % | SYSTOLIC BLOOD PRESSURE: 108 MMHG | TEMPERATURE: 98 F | HEIGHT: 76 IN | BODY MASS INDEX: 34.5 KG/M2

## 2021-11-16 DIAGNOSIS — D69.6 THROMBOCYTOPENIA: ICD-10-CM

## 2021-11-16 DIAGNOSIS — N18.31 CHRONIC KIDNEY DISEASE, STAGE 3A: ICD-10-CM

## 2021-11-16 DIAGNOSIS — D47.2 MGUS (MONOCLONAL GAMMOPATHY OF UNKNOWN SIGNIFICANCE): Primary | ICD-10-CM

## 2021-11-16 DIAGNOSIS — Z86.2 HISTORY OF IRON DEFICIENCY ANEMIA: ICD-10-CM

## 2021-11-16 DIAGNOSIS — D51.8 MACROCYTIC ANEMIA WITH VITAMIN B12 DEFICIENCY: ICD-10-CM

## 2021-11-16 DIAGNOSIS — N18.2 STAGE 2 CHRONIC KIDNEY DISEASE: ICD-10-CM

## 2021-11-16 DIAGNOSIS — D50.0 IRON DEFICIENCY ANEMIA DUE TO CHRONIC BLOOD LOSS: ICD-10-CM

## 2021-11-16 DIAGNOSIS — E66.9 OBESITY (BMI 30-39.9): ICD-10-CM

## 2021-11-16 PROBLEM — D64.9 ANEMIA: Status: RESOLVED | Noted: 2021-06-14 | Resolved: 2021-11-16

## 2021-11-16 PROBLEM — D50.9 IDA (IRON DEFICIENCY ANEMIA): Status: RESOLVED | Noted: 2021-02-11 | Resolved: 2021-11-16

## 2021-11-16 PROCEDURE — 99999 PR PBB SHADOW E&M-EST. PATIENT-LVL IV: ICD-10-PCS | Mod: PBBFAC,,, | Performed by: INTERNAL MEDICINE

## 2021-11-16 PROCEDURE — 99214 OFFICE O/P EST MOD 30 MIN: CPT | Mod: PBBFAC | Performed by: INTERNAL MEDICINE

## 2021-11-16 PROCEDURE — 99999 PR PBB SHADOW E&M-EST. PATIENT-LVL IV: CPT | Mod: PBBFAC,,, | Performed by: INTERNAL MEDICINE

## 2021-11-16 PROCEDURE — 99214 OFFICE O/P EST MOD 30 MIN: CPT | Mod: S$PBB,,, | Performed by: INTERNAL MEDICINE

## 2021-11-16 PROCEDURE — 99214 PR OFFICE/OUTPT VISIT, EST, LEVL IV, 30-39 MIN: ICD-10-PCS | Mod: S$PBB,,, | Performed by: INTERNAL MEDICINE

## 2021-11-17 ENCOUNTER — PATIENT OUTREACH (OUTPATIENT)
Dept: ADMINISTRATIVE | Facility: OTHER | Age: 86
End: 2021-11-17
Payer: MEDICARE

## 2021-11-18 ENCOUNTER — OFFICE VISIT (OUTPATIENT)
Dept: OPHTHALMOLOGY | Facility: CLINIC | Age: 86
End: 2021-11-18
Payer: MEDICARE

## 2021-11-18 ENCOUNTER — IMMUNIZATION (OUTPATIENT)
Dept: PRIMARY CARE CLINIC | Facility: CLINIC | Age: 86
End: 2021-11-18
Payer: MEDICARE

## 2021-11-18 DIAGNOSIS — Z23 NEED FOR VACCINATION: Primary | ICD-10-CM

## 2021-11-18 DIAGNOSIS — H25.11 NUCLEAR SCLEROSIS OF RIGHT EYE: ICD-10-CM

## 2021-11-18 DIAGNOSIS — H40.1133 PRIMARY OPEN ANGLE GLAUCOMA OF BOTH EYES, SEVERE STAGE: Primary | ICD-10-CM

## 2021-11-18 DIAGNOSIS — H04.129 DRY EYE: ICD-10-CM

## 2021-11-18 PROCEDURE — 91303 COVID-19,VECTOR-NR,RS-AD26,PF,0.5 ML DOSE VACCINE (JANSSEN): CPT | Mod: PBBFAC | Performed by: FAMILY MEDICINE

## 2021-11-18 PROCEDURE — 99214 OFFICE O/P EST MOD 30 MIN: CPT | Mod: S$PBB,,, | Performed by: OPHTHALMOLOGY

## 2021-11-18 PROCEDURE — 0034A COVID-19,VECTOR-NR,RS-AD26,PF,0.5 ML DOSE VACCINE (JANSSEN): CPT | Mod: CV19,PBBFAC | Performed by: FAMILY MEDICINE

## 2021-11-18 PROCEDURE — 99214 PR OFFICE/OUTPT VISIT, EST, LEVL IV, 30-39 MIN: ICD-10-PCS | Mod: S$PBB,,, | Performed by: OPHTHALMOLOGY

## 2021-11-18 PROCEDURE — 99999 PR PBB SHADOW E&M-EST. PATIENT-LVL III: CPT | Mod: PBBFAC,,, | Performed by: OPHTHALMOLOGY

## 2021-11-18 PROCEDURE — 99999 PR PBB SHADOW E&M-EST. PATIENT-LVL III: ICD-10-PCS | Mod: PBBFAC,,, | Performed by: OPHTHALMOLOGY

## 2021-11-18 PROCEDURE — 99213 OFFICE O/P EST LOW 20 MIN: CPT | Mod: PBBFAC | Performed by: OPHTHALMOLOGY

## 2021-11-22 ENCOUNTER — TELEPHONE (OUTPATIENT)
Dept: CARDIOLOGY | Facility: CLINIC | Age: 86
End: 2021-11-22
Payer: MEDICARE

## 2021-11-22 ENCOUNTER — OFFICE VISIT (OUTPATIENT)
Dept: GASTROENTEROLOGY | Facility: CLINIC | Age: 86
End: 2021-11-22
Payer: MEDICARE

## 2021-11-22 ENCOUNTER — PES CALL (OUTPATIENT)
Dept: ADMINISTRATIVE | Facility: CLINIC | Age: 86
End: 2021-11-22
Payer: MEDICARE

## 2021-11-22 VITALS
HEART RATE: 51 BPM | DIASTOLIC BLOOD PRESSURE: 84 MMHG | WEIGHT: 280.88 LBS | SYSTOLIC BLOOD PRESSURE: 132 MMHG | BODY MASS INDEX: 34.2 KG/M2 | HEIGHT: 76 IN

## 2021-11-22 DIAGNOSIS — K59.09 CHRONIC CONSTIPATION: Primary | ICD-10-CM

## 2021-11-22 PROCEDURE — 99213 PR OFFICE/OUTPT VISIT, EST, LEVL III, 20-29 MIN: ICD-10-PCS | Mod: S$PBB,,, | Performed by: INTERNAL MEDICINE

## 2021-11-22 PROCEDURE — 99213 OFFICE O/P EST LOW 20 MIN: CPT | Mod: PBBFAC | Performed by: INTERNAL MEDICINE

## 2021-11-22 PROCEDURE — 99999 PR PBB SHADOW E&M-EST. PATIENT-LVL III: CPT | Mod: PBBFAC,,, | Performed by: INTERNAL MEDICINE

## 2021-11-22 PROCEDURE — 99213 OFFICE O/P EST LOW 20 MIN: CPT | Mod: S$PBB,,, | Performed by: INTERNAL MEDICINE

## 2021-11-22 PROCEDURE — 99999 PR PBB SHADOW E&M-EST. PATIENT-LVL III: ICD-10-PCS | Mod: PBBFAC,,, | Performed by: INTERNAL MEDICINE

## 2021-12-01 ENCOUNTER — TELEPHONE (OUTPATIENT)
Dept: GASTROENTEROLOGY | Facility: CLINIC | Age: 86
End: 2021-12-01
Payer: MEDICARE

## 2021-12-01 ENCOUNTER — TELEPHONE (OUTPATIENT)
Dept: INTERNAL MEDICINE | Facility: CLINIC | Age: 86
End: 2021-12-01
Payer: MEDICARE

## 2021-12-01 ENCOUNTER — OFFICE VISIT (OUTPATIENT)
Dept: INTERNAL MEDICINE | Facility: CLINIC | Age: 86
End: 2021-12-01
Payer: MEDICARE

## 2021-12-01 VITALS
BODY MASS INDEX: 35.06 KG/M2 | WEIGHT: 287.94 LBS | DIASTOLIC BLOOD PRESSURE: 66 MMHG | HEIGHT: 76 IN | SYSTOLIC BLOOD PRESSURE: 110 MMHG

## 2021-12-01 DIAGNOSIS — I10 PRIMARY HYPERTENSION: ICD-10-CM

## 2021-12-01 DIAGNOSIS — M10.9 GOUT, UNSPECIFIED CAUSE, UNSPECIFIED CHRONICITY, UNSPECIFIED SITE: ICD-10-CM

## 2021-12-01 DIAGNOSIS — M19.90 ARTHRITIS: ICD-10-CM

## 2021-12-01 DIAGNOSIS — I48.91 ATRIAL FIBRILLATION WITH RAPID VENTRICULAR RESPONSE: ICD-10-CM

## 2021-12-01 DIAGNOSIS — I48.0 PAROXYSMAL ATRIAL FIBRILLATION: ICD-10-CM

## 2021-12-01 DIAGNOSIS — I67.2 CEREBRAL ATHEROSCLEROSIS: ICD-10-CM

## 2021-12-01 DIAGNOSIS — Z99.89 DEPENDENCE ON OTHER ENABLING MACHINES AND DEVICES: ICD-10-CM

## 2021-12-01 DIAGNOSIS — E66.9 OBESITY (BMI 30-39.9): ICD-10-CM

## 2021-12-01 DIAGNOSIS — E78.5 DYSLIPIDEMIA: ICD-10-CM

## 2021-12-01 DIAGNOSIS — K59.09 CHRONIC CONSTIPATION: ICD-10-CM

## 2021-12-01 DIAGNOSIS — I63.81 LACUNAR INFARCTION: ICD-10-CM

## 2021-12-01 DIAGNOSIS — D69.6 THROMBOCYTOPENIA: ICD-10-CM

## 2021-12-01 DIAGNOSIS — N18.31 CHRONIC KIDNEY DISEASE, STAGE 3A: ICD-10-CM

## 2021-12-01 DIAGNOSIS — D47.2 MGUS (MONOCLONAL GAMMOPATHY OF UNKNOWN SIGNIFICANCE): ICD-10-CM

## 2021-12-01 DIAGNOSIS — H40.1133 PRIMARY OPEN ANGLE GLAUCOMA OF BOTH EYES, SEVERE STAGE: ICD-10-CM

## 2021-12-01 DIAGNOSIS — I87.2 VENOUS INSUFFICIENCY: ICD-10-CM

## 2021-12-01 DIAGNOSIS — I50.32 CHRONIC DIASTOLIC HEART FAILURE: ICD-10-CM

## 2021-12-01 DIAGNOSIS — I48.19 OTHER PERSISTENT ATRIAL FIBRILLATION: ICD-10-CM

## 2021-12-01 DIAGNOSIS — D50.0 IRON DEFICIENCY ANEMIA DUE TO CHRONIC BLOOD LOSS: ICD-10-CM

## 2021-12-01 DIAGNOSIS — I73.9 PVD (PERIPHERAL VASCULAR DISEASE): ICD-10-CM

## 2021-12-01 DIAGNOSIS — I31.39 PERICARDIAL EFFUSION: ICD-10-CM

## 2021-12-01 DIAGNOSIS — I50.32 DIASTOLIC DYSFUNCTION WITH CHRONIC HEART FAILURE: ICD-10-CM

## 2021-12-01 DIAGNOSIS — I65.23 ATHEROSCLEROSIS OF BOTH CAROTID ARTERIES: ICD-10-CM

## 2021-12-01 DIAGNOSIS — Z00.00 ENCOUNTER FOR PREVENTIVE HEALTH EXAMINATION: Primary | ICD-10-CM

## 2021-12-01 DIAGNOSIS — E53.8 B12 DEFICIENCY: ICD-10-CM

## 2021-12-01 DIAGNOSIS — I70.0 AORTIC ATHEROSCLEROSIS: ICD-10-CM

## 2021-12-01 DIAGNOSIS — R00.1 BRADYCARDIA, SEVERE SINUS: ICD-10-CM

## 2021-12-01 PROBLEM — N18.2 STAGE 2 CHRONIC KIDNEY DISEASE: Status: RESOLVED | Noted: 2018-02-09 | Resolved: 2021-12-01

## 2021-12-01 PROBLEM — I65.29 CAROTID ARTERY PLAQUE: Status: ACTIVE | Noted: 2021-12-01

## 2021-12-01 PROCEDURE — 99999 PR PBB SHADOW E&M-EST. PATIENT-LVL III: ICD-10-PCS | Mod: PBBFAC,,, | Performed by: PHYSICIAN ASSISTANT

## 2021-12-01 PROCEDURE — 99213 OFFICE O/P EST LOW 20 MIN: CPT | Mod: PBBFAC | Performed by: PHYSICIAN ASSISTANT

## 2021-12-01 PROCEDURE — G0439 PR MEDICARE ANNUAL WELLNESS SUBSEQUENT VISIT: ICD-10-PCS | Mod: ,,, | Performed by: PHYSICIAN ASSISTANT

## 2021-12-01 PROCEDURE — G0439 PPPS, SUBSEQ VISIT: HCPCS | Mod: ,,, | Performed by: PHYSICIAN ASSISTANT

## 2021-12-01 PROCEDURE — 99999 PR PBB SHADOW E&M-EST. PATIENT-LVL III: CPT | Mod: PBBFAC,,, | Performed by: PHYSICIAN ASSISTANT

## 2021-12-02 ENCOUNTER — TELEPHONE (OUTPATIENT)
Dept: FAMILY MEDICINE | Facility: CLINIC | Age: 86
End: 2021-12-02
Payer: MEDICARE

## 2021-12-02 ENCOUNTER — TELEPHONE (OUTPATIENT)
Dept: GASTROENTEROLOGY | Facility: CLINIC | Age: 86
End: 2021-12-02
Payer: MEDICARE

## 2021-12-09 ENCOUNTER — OFFICE VISIT (OUTPATIENT)
Dept: FAMILY MEDICINE | Facility: CLINIC | Age: 86
End: 2021-12-09
Payer: MEDICARE

## 2021-12-09 DIAGNOSIS — I65.23 ATHEROSCLEROSIS OF BOTH CAROTID ARTERIES: Primary | ICD-10-CM

## 2021-12-09 DIAGNOSIS — I73.9 SMALL VESSEL DISEASE: ICD-10-CM

## 2021-12-09 DIAGNOSIS — R09.89 CAROTID BRUIT, UNSPECIFIED LATERALITY: ICD-10-CM

## 2021-12-09 DIAGNOSIS — I70.0 AORTIC ATHEROSCLEROSIS: ICD-10-CM

## 2021-12-09 DIAGNOSIS — E78.5 DYSLIPIDEMIA: ICD-10-CM

## 2021-12-09 DIAGNOSIS — I10 PRIMARY HYPERTENSION: ICD-10-CM

## 2021-12-09 PROCEDURE — 99441 PR PHYSICIAN TELEPHONE EVALUATION 5-10 MIN: CPT | Mod: 95,,, | Performed by: INTERNAL MEDICINE

## 2021-12-09 PROCEDURE — 99441 PR PHYSICIAN TELEPHONE EVALUATION 5-10 MIN: ICD-10-PCS | Mod: 95,,, | Performed by: INTERNAL MEDICINE

## 2021-12-09 RX ORDER — ATORVASTATIN CALCIUM 10 MG/1
10 TABLET, FILM COATED ORAL DAILY
Qty: 90 TABLET | Refills: 3 | Status: SHIPPED | OUTPATIENT
Start: 2021-12-09 | End: 2022-01-11

## 2021-12-09 RX ORDER — ACETAMINOPHEN 500 MG
1 TABLET ORAL DAILY PRN
Qty: 1 EACH | Refills: 0 | Status: SHIPPED | OUTPATIENT
Start: 2021-12-09

## 2021-12-13 ENCOUNTER — HOSPITAL ENCOUNTER (OUTPATIENT)
Dept: RADIOLOGY | Facility: HOSPITAL | Age: 86
Discharge: HOME | End: 2021-12-13
Attending: INTERNAL MEDICINE
Payer: MEDICARE

## 2021-12-13 DIAGNOSIS — R09.89 CAROTID BRUIT, UNSPECIFIED LATERALITY: ICD-10-CM

## 2021-12-13 PROCEDURE — 93880 US CAROTID BILATERAL: ICD-10-PCS | Mod: 26,,, | Performed by: RADIOLOGY

## 2021-12-13 PROCEDURE — 93880 EXTRACRANIAL BILAT STUDY: CPT | Mod: 26,,, | Performed by: RADIOLOGY

## 2021-12-13 PROCEDURE — 93880 EXTRACRANIAL BILAT STUDY: CPT | Mod: TC

## 2022-01-07 ENCOUNTER — OFFICE VISIT (OUTPATIENT)
Dept: INTERNAL MEDICINE | Facility: CLINIC | Age: 87
End: 2022-01-07
Payer: MEDICARE

## 2022-01-07 ENCOUNTER — LAB VISIT (OUTPATIENT)
Dept: LAB | Facility: HOSPITAL | Age: 87
End: 2022-01-07
Attending: INTERNAL MEDICINE
Payer: MEDICARE

## 2022-01-07 VITALS
HEIGHT: 76 IN | BODY MASS INDEX: 34.99 KG/M2 | OXYGEN SATURATION: 97 % | WEIGHT: 287.38 LBS | RESPIRATION RATE: 18 BRPM | DIASTOLIC BLOOD PRESSURE: 74 MMHG | HEART RATE: 72 BPM | SYSTOLIC BLOOD PRESSURE: 132 MMHG | TEMPERATURE: 98 F

## 2022-01-07 DIAGNOSIS — J01.90 ACUTE SINUSITIS, RECURRENCE NOT SPECIFIED, UNSPECIFIED LOCATION: Primary | ICD-10-CM

## 2022-01-07 DIAGNOSIS — E78.5 DYSLIPIDEMIA: ICD-10-CM

## 2022-01-07 DIAGNOSIS — R42 DIZZINESS: ICD-10-CM

## 2022-01-07 LAB
ALBUMIN SERPL BCP-MCNC: 3.9 G/DL (ref 3.5–5.2)
ALP SERPL-CCNC: 67 U/L (ref 55–135)
ALT SERPL W/O P-5'-P-CCNC: 29 U/L (ref 10–44)
ANION GAP SERPL CALC-SCNC: 11 MMOL/L (ref 8–16)
AST SERPL-CCNC: 29 U/L (ref 10–40)
BILIRUB SERPL-MCNC: 0.9 MG/DL (ref 0.1–1)
BUN SERPL-MCNC: 24 MG/DL (ref 8–23)
CALCIUM SERPL-MCNC: 9.5 MG/DL (ref 8.7–10.5)
CHLORIDE SERPL-SCNC: 103 MMOL/L (ref 95–110)
CHOLEST SERPL-MCNC: 179 MG/DL (ref 120–199)
CHOLEST/HDLC SERPL: 3.7 {RATIO} (ref 2–5)
CO2 SERPL-SCNC: 27 MMOL/L (ref 23–29)
CREAT SERPL-MCNC: 1.4 MG/DL (ref 0.5–1.4)
CTP QC/QA: YES
EST. GFR  (AFRICAN AMERICAN): 53 ML/MIN/1.73 M^2
EST. GFR  (NON AFRICAN AMERICAN): 45.8 ML/MIN/1.73 M^2
GLUCOSE SERPL-MCNC: 88 MG/DL (ref 70–110)
HDLC SERPL-MCNC: 48 MG/DL (ref 40–75)
HDLC SERPL: 26.8 % (ref 20–50)
LDLC SERPL CALC-MCNC: 112.2 MG/DL (ref 63–159)
NONHDLC SERPL-MCNC: 131 MG/DL
POTASSIUM SERPL-SCNC: 5.1 MMOL/L (ref 3.5–5.1)
PROT SERPL-MCNC: 7.6 G/DL (ref 6–8.4)
SARS-COV-2 RDRP RESP QL NAA+PROBE: NEGATIVE
SODIUM SERPL-SCNC: 141 MMOL/L (ref 136–145)
TRIGL SERPL-MCNC: 94 MG/DL (ref 30–150)

## 2022-01-07 PROCEDURE — 99213 PR OFFICE/OUTPT VISIT, EST, LEVL III, 20-29 MIN: ICD-10-PCS | Mod: S$PBB,,, | Performed by: NURSE PRACTITIONER

## 2022-01-07 PROCEDURE — U0002 COVID-19 LAB TEST NON-CDC: HCPCS | Mod: PBBFAC,PN | Performed by: NURSE PRACTITIONER

## 2022-01-07 PROCEDURE — 99213 OFFICE O/P EST LOW 20 MIN: CPT | Mod: S$PBB,,, | Performed by: NURSE PRACTITIONER

## 2022-01-07 PROCEDURE — 99999 PR PBB SHADOW E&M-EST. PATIENT-LVL IV: ICD-10-PCS | Mod: PBBFAC,,, | Performed by: NURSE PRACTITIONER

## 2022-01-07 PROCEDURE — 80053 COMPREHEN METABOLIC PANEL: CPT | Performed by: INTERNAL MEDICINE

## 2022-01-07 PROCEDURE — 99999 PR PBB SHADOW E&M-EST. PATIENT-LVL IV: CPT | Mod: PBBFAC,,, | Performed by: NURSE PRACTITIONER

## 2022-01-07 PROCEDURE — 80061 LIPID PANEL: CPT | Performed by: INTERNAL MEDICINE

## 2022-01-07 PROCEDURE — 36415 COLL VENOUS BLD VENIPUNCTURE: CPT | Mod: PO | Performed by: INTERNAL MEDICINE

## 2022-01-07 PROCEDURE — 99214 OFFICE O/P EST MOD 30 MIN: CPT | Mod: PBBFAC,PN | Performed by: NURSE PRACTITIONER

## 2022-01-07 RX ORDER — MONTELUKAST SODIUM 10 MG/1
10 TABLET ORAL NIGHTLY
Qty: 30 TABLET | Refills: 0 | Status: SHIPPED | OUTPATIENT
Start: 2022-01-07 | End: 2022-02-06

## 2022-01-07 RX ORDER — AZITHROMYCIN 250 MG/1
250 TABLET, FILM COATED ORAL DAILY
Qty: 6 TABLET | Refills: 0 | Status: SHIPPED | OUTPATIENT
Start: 2022-01-07 | End: 2022-03-06 | Stop reason: SDUPTHER

## 2022-01-07 NOTE — PROGRESS NOTES
Subjective:       Patient ID: Dominic Cohen is a 86 y.o. male.    Chief Complaint: Dizziness (W/ facial pain)    Patient presents with dizziness.  Reports chronic symptoms over the past 3-4 months.  Has had CT head-negative and Carotid US-negative.      No falls or injury.  Some sinus/sneezing.  Reports pressure around eyes.      Reports symptoms are intermittent.  Happens when he's siting down.      Review of Systems   Constitutional: Negative for chills, fatigue and fever.   HENT: Positive for sinus pressure/congestion and sneezing.    Respiratory: Negative for cough and shortness of breath.    Cardiovascular: Negative for chest pain, leg swelling and claudication.   Musculoskeletal: Negative for arthralgias and gait problem.   Neurological: Positive for dizziness. Negative for seizures and headaches.   Psychiatric/Behavioral: Negative for agitation and confusion.         Objective:      Physical Exam  Vitals reviewed.   Constitutional:       Appearance: Normal appearance.   HENT:      Right Ear: Ear canal normal.      Left Ear: Ear canal normal.   Eyes:      Extraocular Movements: Extraocular movements intact.      Pupils: Pupils are equal, round, and reactive to light.   Cardiovascular:      Rate and Rhythm: Normal rate and regular rhythm.   Pulmonary:      Effort: Pulmonary effort is normal.      Breath sounds: Normal breath sounds.   Musculoskeletal:         General: Normal range of motion.   Skin:     General: Skin is warm.   Neurological:      General: No focal deficit present.      Mental Status: He is alert and oriented to person, place, and time.   Psychiatric:         Mood and Affect: Mood normal.         Behavior: Behavior is cooperative.         Assessment:       Problem List Items Addressed This Visit    None     Visit Diagnoses     Acute sinusitis, recurrence not specified, unspecified location    -  Primary    Relevant Medications    azithromycin (Z-DENNISE) 250 MG tablet    Other Relevant Orders     POCT COVID-19 Rapid Screening (Completed)    Dizziness        Relevant Orders    POCT COVID-19 Rapid Screening (Completed)          Plan:         Acute sinusitis, recurrence not specified, unspecified location  -     azithromycin (Z-DENNISE) 250 MG tablet; Take 1 tablet (250 mg total) by mouth once daily. Take 2 tablets by mouth on day 1, then one tablet daily on days 2-5. (Patient not taking: Reported on 1/26/2022)  Dispense: 6 tablet; Refill: 0  -     montelukast (SINGULAIR) 10 mg tablet; Take 1 tablet (10 mg total) by mouth every evening. For allergies/sinus  Dispense: 30 tablet; Refill: 0  -     POCT COVID-19 Rapid Screening    Dizziness  -     POCT COVID-19 Rapid Screening    Schedule f/u with PCP next week for dizziness/sinus

## 2022-01-11 ENCOUNTER — OFFICE VISIT (OUTPATIENT)
Dept: CARDIOLOGY | Facility: CLINIC | Age: 87
End: 2022-01-11
Payer: MEDICARE

## 2022-01-11 VITALS
OXYGEN SATURATION: 95 % | SYSTOLIC BLOOD PRESSURE: 126 MMHG | BODY MASS INDEX: 35.32 KG/M2 | WEIGHT: 290.13 LBS | DIASTOLIC BLOOD PRESSURE: 72 MMHG | HEART RATE: 68 BPM

## 2022-01-11 DIAGNOSIS — I65.23 ATHEROSCLEROSIS OF BOTH CAROTID ARTERIES: ICD-10-CM

## 2022-01-11 DIAGNOSIS — I48.0 PAROXYSMAL ATRIAL FIBRILLATION: ICD-10-CM

## 2022-01-11 DIAGNOSIS — I31.39 PERICARDIAL EFFUSION: ICD-10-CM

## 2022-01-11 DIAGNOSIS — I70.0 AORTIC ATHEROSCLEROSIS: Primary | ICD-10-CM

## 2022-01-11 DIAGNOSIS — Z98.890 STATUS POST CREATION OF PERICARDIAL WINDOW: ICD-10-CM

## 2022-01-11 PROCEDURE — 99213 OFFICE O/P EST LOW 20 MIN: CPT | Mod: PBBFAC | Performed by: INTERNAL MEDICINE

## 2022-01-11 PROCEDURE — 99214 PR OFFICE/OUTPT VISIT, EST, LEVL IV, 30-39 MIN: ICD-10-PCS | Mod: S$PBB,,, | Performed by: INTERNAL MEDICINE

## 2022-01-11 PROCEDURE — 99999 PR PBB SHADOW E&M-EST. PATIENT-LVL III: ICD-10-PCS | Mod: PBBFAC,,, | Performed by: INTERNAL MEDICINE

## 2022-01-11 PROCEDURE — 99214 OFFICE O/P EST MOD 30 MIN: CPT | Mod: S$PBB,,, | Performed by: INTERNAL MEDICINE

## 2022-01-11 PROCEDURE — 99999 PR PBB SHADOW E&M-EST. PATIENT-LVL III: CPT | Mod: PBBFAC,,, | Performed by: INTERNAL MEDICINE

## 2022-01-11 RX ORDER — ATORVASTATIN CALCIUM 10 MG/1
10 TABLET, FILM COATED ORAL DAILY
Qty: 90 TABLET | Refills: 3 | Status: SHIPPED | OUTPATIENT
Start: 2022-01-11 | End: 2023-01-11

## 2022-01-11 RX ORDER — METOPROLOL SUCCINATE 25 MG/1
25 TABLET, EXTENDED RELEASE ORAL DAILY
Qty: 30 TABLET | Refills: 5 | Status: SHIPPED | OUTPATIENT
Start: 2022-01-11 | End: 2022-01-11 | Stop reason: SDUPTHER

## 2022-01-11 RX ORDER — METOPROLOL SUCCINATE 25 MG/1
25 TABLET, EXTENDED RELEASE ORAL DAILY
Qty: 30 TABLET | Refills: 5 | Status: SHIPPED | OUTPATIENT
Start: 2022-01-11

## 2022-01-11 NOTE — PROGRESS NOTES
Subjective:   Patient ID:  Dominic Cohen is a 86 y.o. male who presents for follow up of No chief complaint on file.      86 yo male, 6 months f/u  PMH large pericardial effusion resolved per echo in , PAF after efusion, pleural effusion with ateleiosis, HTN, PVD and OA s/p knee repalcement. No smoking/drinking. No h/o heart attack, stroke and DM.  EKG on 11/13 sinus and HR at 57 bpm.  ECHO in 2015 normal EF and DD.  HGB and BNP wnl     admitted after cough, sputum and SOB.  EKG showed afib with RVR. CTA of chest showed large pericardial effusion and bilateral pleural effusions.  Echo showed EF 55% (reviewed by myself) and moderate to large pericardial effusion. Had pericardial window with serous and later clear liquid. BNP and troponin negative, d/c after improved symptoms.    He had exercise stress echo done in  at [facility] showing METS 4 and normal EF. No stress induced ischemia  Today, c/o some dizziness. No faint and syncope.  SOb stable, walking from the parking to the .   Has chronic leg swelling    echo showed normal EF and no pericardial effusion     visit SOB for 4 months, progressively worsening. Sleeps on 2 pillows. Talking lasix every other day  Occasional mild chest pain  Leg swelling minimal.   EKG NSR LVH    09/2021 visit   BNP 34; ECHO normal EF no pericardial effusion and MPI no ischemia.  Today BP controlled. Occasional yard work  On Lasix PRN for PVD    Interval history  BARDY 2 weeks monitor showed < 0.5% PVCs and PACs.  C/o palpitation recently and felt dizziness. No SOB chest pain and syncope.  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    Glaucoma     History of anal fissures     Hyperlipidemia     Hypertension     Obesity     Peripheral vascular disease     Polyneuropathy in other diseases classified elsewhere     due to folate deficiency    Prediabetes     Stroke     Trouble in sleeping     Venous insufficiency     Venous insufficiency        Past Surgical History:   Procedure Laterality Date    BACK SURGERY      lumbar fusion per pt    BUNIONECTOMY Bilateral     CATARACT EXTRACTION BILATERAL W/ ANTERIOR VITRECTOMY      COLONOSCOPY N/A 5/13/2016    Procedure: COLONOSCOPY;  Surgeon: Presley Phillips MD;  Location: [facility] ([address] [place] [address]);  Service: Endoscopy;  Laterality: N/A;  EGD with Dr. Jeffery prior to Colonoscopy. EC    COLONOSCOPY N/A 2/11/2021    Procedure: COLONOSCOPY;  Surgeon: July Patel MD;  Location: [facility];  Service: Endoscopy;  Laterality: N/A;    ESOPHAGOGASTRODUODENOSCOPY N/A 7/5/2018    Procedure: ESOPHAGOGASTRODUODENOSCOPY (EGD);  Surgeon: Khanh Asencio III, MD;  Location: [facility];  Service: Endoscopy;  Laterality: N/A;    ESOPHAGOGASTRODUODENOSCOPY N/A 2/11/2021    Procedure: ESOPHAGOGASTRODUODENOSCOPY (EGD);  Surgeon: July Patel MD;  Location: [facility];  Service: Endoscopy;  Laterality: N/A;    ESOPHAGOGASTRODUODENOSCOPY N/A 4/15/2021    Procedure: EGD (ESOPHAGOGASTRODUODENOSCOPY);  Surgeon: July Patel MD;  Location: [facility];  Service: Endoscopy;  Laterality: N/A;    JOINT REPLACEMENT Left     x 2    KNEE SURGERY Left     x2. revision 06/27/17    PERICARDIAL WINDOW N/A 2/14/2020    Procedure: CREATION, PERICARDIAL WINDOW;  Surgeon: Ankush Graham MD;  Location: [facility];  Service: Cardiothoracic;  Laterality: N/A;    PROSTATE SURGERY      TONSILLECTOMY, ADENOIDECTOMY      TUBE THORACOTOMY Left 2/14/2020    Procedure: INSERTION, CATHETER, INTERCOSTAL, FOR DRAINAGE;  Surgeon: Ankush Graham MD;  Location: [facility];  Service: Cardiothoracic;  Laterality:  Left;  LEFT PLEURA CHEST TUBE PLACEMENT       Social History     Tobacco Use    Smoking status: Former Smoker     Packs/day: 0.30     Years: 52.00     Pack years: 15.60     Types: Cigarettes     Quit date: 2000     Years since quittin.1    Smokeless tobacco: Never Used   Substance Use Topics    Alcohol use: No    Drug use: No       Family History   Problem Relation Age of Onset    No Known Problems Son     No Known Problems Daughter     No Known Problems Son     Diabetes Neg Hx     Heart disease Neg Hx     Cancer Neg Hx     Celiac disease Neg Hx     Cirrhosis Neg Hx     Colon cancer Neg Hx     Colon polyps Neg Hx     Crohn's disease Neg Hx     Cystic fibrosis Neg Hx     Esophageal cancer Neg Hx     Hemochromatosis Neg Hx     Inflammatory bowel disease Neg Hx     Irritable bowel syndrome Neg Hx     Liver cancer Neg Hx     Liver disease Neg Hx     Rectal cancer Neg Hx     Stomach cancer Neg Hx     Ulcerative colitis Neg Hx     Zak's disease Neg Hx     Amblyopia Neg Hx     Blindness Neg Hx     Glaucoma Neg Hx     Hypertension Neg Hx     Macular degeneration Neg Hx     Retinal detachment Neg Hx     Strabismus Neg Hx          Review of Systems   Constitutional: Negative for decreased appetite, diaphoresis, fever, malaise/fatigue and night sweats.   HENT: Negative for nosebleeds.    Eyes: Negative for blurred vision and double vision.   Cardiovascular: Positive for dyspnea on exertion and leg swelling. Negative for chest pain, claudication, irregular heartbeat, near-syncope, orthopnea, palpitations, paroxysmal nocturnal dyspnea and syncope.   Respiratory: Negative for cough, shortness of breath, sleep disturbances due to breathing, snoring, sputum production and wheezing.    Endocrine: Negative for cold intolerance and polyuria.   Hematologic/Lymphatic: Does not bruise/bleed easily.   Skin: Negative for rash.   Musculoskeletal: Negative for back pain, falls, joint pain, joint  swelling and neck pain.   Gastrointestinal: Negative for abdominal pain, heartburn, nausea and vomiting.   Genitourinary: Negative for dysuria, frequency and hematuria.   Neurological: Negative for difficulty with concentration, dizziness, focal weakness, headaches, light-headedness, numbness, seizures and weakness.   Psychiatric/Behavioral: Negative for depression, memory loss and substance abuse. The patient does not have insomnia.    Allergic/Immunologic: Negative for HIV exposure and hives.       Objective:   Physical Exam  HENT:      Head: Normocephalic.   Eyes:      Pupils: Pupils are equal, round, and reactive to light.   Neck:      Thyroid: No thyromegaly.      Vascular: Normal carotid pulses. No carotid bruit or JVD.   Cardiovascular:      Rate and Rhythm: Normal rate and regular rhythm.  No extrasystoles are present.     Chest Wall: PMI is not displaced.      Pulses: Normal pulses.      Heart sounds: Normal heart sounds. No murmur heard.  No gallop. No S3 sounds.    Pulmonary:      Effort: No respiratory distress.      Breath sounds: Normal breath sounds. No stridor.   Abdominal:      General: Bowel sounds are normal.      Palpations: Abdomen is soft.      Tenderness: There is no abdominal tenderness. There is no rebound.   Musculoskeletal:         General: Normal range of motion.      Comments: 1+ BLE   Skin:     Findings: No rash.   Neurological:      Mental Status: He is alert and oriented to person, place, and time.   Psychiatric:         Behavior: Behavior normal.         Lab Results   Component Value Date    CHOL 179 01/07/2022    CHOL 158 12/14/2020    CHOL 115 (L) 02/03/2020     Lab Results   Component Value Date    HDL 48 01/07/2022    HDL 49 12/14/2020    HDL 43 02/03/2020     Lab Results   Component Value Date    LDLCALC 112.2 01/07/2022    LDLCALC 87.0 12/14/2020    LDLCALC 59.6 (L) 02/03/2020     Lab Results   Component Value Date    TRIG 94 01/07/2022    TRIG 110 12/14/2020    TRIG 62  02/03/2020     Lab Results   Component Value Date    CHOLHDL 26.8 01/07/2022    CHOLHDL 31.0 12/14/2020    CHOLHDL 37.4 02/03/2020       Chemistry        Component Value Date/Time     01/07/2022 1010    K 5.1 01/07/2022 1010     01/07/2022 1010    CO2 27 01/07/2022 1010    BUN 24 (H) 01/07/2022 1010    CREATININE 1.4 01/07/2022 1010    GLU 88 01/07/2022 1010        Component Value Date/Time    CALCIUM 9.5 01/07/2022 1010    ALKPHOS 67 01/07/2022 1010    AST 29 01/07/2022 1010    ALT 29 01/07/2022 1010    BILITOT 0.9 01/07/2022 1010    ESTGFRAFRICA 53.0 (A) 01/07/2022 1010    EGFRNONAA 45.8 (A) 01/07/2022 1010          Lab Results   Component Value Date    HGBA1C 5.1 12/14/2020     Lab Results   Component Value Date    TSH 0.368 (L) 03/06/2020     Lab Results   Component Value Date    INR 1.2 03/06/2020    INR 1.3 (H) 02/15/2020    INR 1.3 (H) 02/14/2020     Lab Results   Component Value Date    WBC 4.94 11/09/2021    HGB 12.8 (L) 11/09/2021    HCT 40.2 11/09/2021     (H) 11/09/2021     (L) 11/09/2021     BMP  Sodium   Date Value Ref Range Status   01/07/2022 141 136 - 145 mmol/L Final     Potassium   Date Value Ref Range Status   01/07/2022 5.1 3.5 - 5.1 mmol/L Final     Chloride   Date Value Ref Range Status   01/07/2022 103 95 - 110 mmol/L Final     CO2   Date Value Ref Range Status   01/07/2022 27 23 - 29 mmol/L Final     BUN   Date Value Ref Range Status   01/07/2022 24 (H) 8 - 23 mg/dL Final     Creatinine   Date Value Ref Range Status   01/07/2022 1.4 0.5 - 1.4 mg/dL Final     Calcium   Date Value Ref Range Status   01/07/2022 9.5 8.7 - 10.5 mg/dL Final     Anion Gap   Date Value Ref Range Status   01/07/2022 11 8 - 16 mmol/L Final     eGFR if    Date Value Ref Range Status   01/07/2022 53.0 (A) >60 mL/min/1.73 m^2 Final     eGFR if non    Date Value Ref Range Status   01/07/2022 45.8 (A) >60 mL/min/1.73 m^2 Final     Comment:     Calculation used to  obtain the estimated glomerular filtration  rate (eGFR) is the CKD-EPI equation.        BNP  @LABRCNTIP(BNP,BNPTRIAGEBLO)@  @LABRCNTIP(troponini)@  Estimated Creatinine Clearance: 58.2 mL/min (based on SCr of 1.4 mg/dL).  No results found in the last 24 hours.  No results found in the last 24 hours.  No results found in the last 24 hours.    Assessment:      1. Aortic atherosclerosis    2. Pericardial effusion    3. Paroxysmal atrial fibrillation    4. Atherosclerosis of both carotid arteries    5. Status post creation of pericardial window      Palpitation    Plan:   Echo for h/o pleural effusion and worsening palpitation now  D/c Lisinopril and Add ToprolXl 25 mg daily for HTN and palpitation  Advise to continue Lipitor 10 mg due ot aortic and aretial atherosclerosis per chest ct and brain MRI  Continue ASA    Counseled DASH  Check Lipid profile in 6 months  Recommend heart-healthy diet, weight control and regular exercise.  Gordon. Risk modification.   I have reviewed all pertinent labs and cardiac studies independently. Plans and recommendations have been formulated under my direct supervision. All questions answered and patient voiced understanding.   If symptoms persist go to the ED  RTC in 3 months

## 2022-01-26 ENCOUNTER — OFFICE VISIT (OUTPATIENT)
Dept: FAMILY MEDICINE | Facility: CLINIC | Age: 87
End: 2022-01-26
Payer: MEDICARE

## 2022-01-26 VITALS
OXYGEN SATURATION: 96 % | SYSTOLIC BLOOD PRESSURE: 130 MMHG | WEIGHT: 289.44 LBS | DIASTOLIC BLOOD PRESSURE: 70 MMHG | TEMPERATURE: 98 F | RESPIRATION RATE: 16 BRPM | HEART RATE: 60 BPM | BODY MASS INDEX: 35.23 KG/M2

## 2022-01-26 DIAGNOSIS — I10 PRIMARY HYPERTENSION: ICD-10-CM

## 2022-01-26 DIAGNOSIS — E78.5 DYSLIPIDEMIA: Primary | ICD-10-CM

## 2022-01-26 PROCEDURE — 99999 PR PBB SHADOW E&M-EST. PATIENT-LVL IV: CPT | Mod: PBBFAC,,, | Performed by: INTERNAL MEDICINE

## 2022-01-26 PROCEDURE — 99999 PR PBB SHADOW E&M-EST. PATIENT-LVL IV: ICD-10-PCS | Mod: PBBFAC,,, | Performed by: INTERNAL MEDICINE

## 2022-01-26 PROCEDURE — 99213 PR OFFICE/OUTPT VISIT, EST, LEVL III, 20-29 MIN: ICD-10-PCS | Mod: S$PBB,,, | Performed by: INTERNAL MEDICINE

## 2022-01-26 PROCEDURE — 99213 OFFICE O/P EST LOW 20 MIN: CPT | Mod: S$PBB,,, | Performed by: INTERNAL MEDICINE

## 2022-01-26 PROCEDURE — 99214 OFFICE O/P EST MOD 30 MIN: CPT | Mod: PBBFAC,PO | Performed by: INTERNAL MEDICINE

## 2022-01-26 NOTE — PROGRESS NOTES
Subjective:       Patient ID: Dominic Cohen is a 86 y.o. male.    Chief Complaint: Follow-up (5m), Hypertension, and Hyperlipidemia    Follow-up  Associated symptoms include arthralgias. Pertinent negatives include no abdominal pain, chest pain, chills, coughing, diaphoresis, fatigue, fever, headaches, joint swelling, myalgias, nausea, neck pain, numbness, rash, sore throat, vomiting or weakness.   Hypertension  Pertinent negatives include no chest pain, headaches, neck pain, palpitations or shortness of breath.   Hyperlipidemia  Pertinent negatives include no chest pain, myalgias or shortness of breath.     Past Medical History:   Diagnosis Date    Anemia     Arthritis     Atherosclerosis of abdominal aorta     Atrial fibrillation     Bilateral pleural effusion 2/13/2020    Cataract     CHF (congestive heart failure)     Chronic constipation     Disorder of kidney and ureter     Failed total left knee replacement 4/20/2017    Glaucoma     History of anal fissures     Hyperlipidemia     Hypertension     Obesity     Peripheral vascular disease     Polyneuropathy in other diseases classified elsewhere     due to folate deficiency    Prediabetes     Stroke     Trouble in sleeping     Venous insufficiency     Venous insufficiency      Past Surgical History:   Procedure Laterality Date    BACK SURGERY      lumbar fusion per pt    BUNIONECTOMY Bilateral     CATARACT EXTRACTION BILATERAL W/ ANTERIOR VITRECTOMY      COLONOSCOPY N/A 5/13/2016    Procedure: COLONOSCOPY;  Surgeon: Presley Phillips MD;  Location: [address]);  Service: Endoscopy;  Laterality: N/A;  EGD with Dr. Jeffery prior to Colonoscopy. EC    COLONOSCOPY N/A 2/11/2021    Procedure: COLONOSCOPY;  Surgeon: July Patel MD;  Location: [facility];  Service: Endoscopy;  Laterality: N/A;    ESOPHAGOGASTRODUODENOSCOPY N/A 7/5/2018    Procedure: ESOPHAGOGASTRODUODENOSCOPY (EGD);  Surgeon: Khanh Asencio III, MD;   Location: [facility] ENDO;  Service: Endoscopy;  Laterality: N/A;    ESOPHAGOGASTRODUODENOSCOPY N/A 2/11/2021    Procedure: ESOPHAGOGASTRODUODENOSCOPY (EGD);  Surgeon: July Patel MD;  Location: [facility];  Service: Endoscopy;  Laterality: N/A;    ESOPHAGOGASTRODUODENOSCOPY N/A 4/15/2021    Procedure: EGD (ESOPHAGOGASTRODUODENOSCOPY);  Surgeon: July Patel MD;  Location: [facility];  Service: Endoscopy;  Laterality: N/A;    JOINT REPLACEMENT Left     x 2    KNEE SURGERY Left     x2. revision 06/27/17    PERICARDIAL WINDOW N/A 2/14/2020    Procedure: CREATION, PERICARDIAL WINDOW;  Surgeon: Ankush Graham MD;  Location: [facility] OR;  Service: Cardiothoracic;  Laterality: N/A;    PROSTATE SURGERY      TONSILLECTOMY, ADENOIDECTOMY      TUBE THORACOTOMY Left 2/14/2020    Procedure: INSERTION, CATHETER, INTERCOSTAL, FOR DRAINAGE;  Surgeon: Ankush Graham MD;  Location: [facility] OR;  Service: Cardiothoracic;  Laterality: Left;  LEFT PLEURA CHEST TUBE PLACEMENT     Family History   Problem Relation Age of Onset    No Known Problems Son     No Known Problems Daughter     No Known Problems Son     Diabetes Neg Hx     Heart disease Neg Hx     Cancer Neg Hx     Celiac disease Neg Hx     Cirrhosis Neg Hx     Colon cancer Neg Hx     Colon polyps Neg Hx     Crohn's disease Neg Hx     Cystic fibrosis Neg Hx     Esophageal cancer Neg Hx     Hemochromatosis Neg Hx     Inflammatory bowel disease Neg Hx     Irritable bowel syndrome Neg Hx     Liver cancer Neg Hx     Liver disease Neg Hx     Rectal cancer Neg Hx     Stomach cancer Neg Hx     Ulcerative colitis Neg Hx     Zak's disease Neg Hx     Amblyopia Neg Hx     Blindness Neg Hx     Glaucoma Neg Hx     Hypertension Neg Hx     Macular degeneration Neg Hx     Retinal detachment Neg Hx     Strabismus Neg Hx      Social History     Socioeconomic History    Marital status:     Number of children: 3   Occupational History    Occupation:  Retired     Comment: [place], [place]   Tobacco Use    Smoking status: Former Smoker     Packs/day: 0.30     Years: 52.00     Pack years: 15.60     Types: Cigarettes     Quit date: 2000     Years since quittin.2    Smokeless tobacco: Never Used   Substance and Sexual Activity    Alcohol use: No    Drug use: No    Sexual activity: Yes     Review of Systems   Constitutional: Negative for activity change, appetite change, chills, diaphoresis, fatigue, fever and unexpected weight change.   HENT: Negative for drooling, ear discharge, ear pain, facial swelling, hearing loss, mouth sores, nosebleeds, postnasal drip, rhinorrhea, sinus pressure, sneezing, sore throat, tinnitus, trouble swallowing and voice change.    Eyes: Negative for photophobia, redness and visual disturbance.   Respiratory: Negative for apnea, cough, choking, chest tightness, shortness of breath and wheezing.    Cardiovascular: Negative for chest pain, palpitations and leg swelling.   Gastrointestinal: Negative for abdominal distention, abdominal pain, anal bleeding, blood in stool, constipation, diarrhea, nausea and vomiting.   Endocrine: Negative for cold intolerance, heat intolerance, polydipsia, polyphagia and polyuria.   Genitourinary: Negative for difficulty urinating, dysuria, enuresis, flank pain, frequency, genital sores, hematuria and urgency.   Musculoskeletal: Positive for arthralgias. Negative for back pain, gait problem, joint swelling, myalgias, neck pain and neck stiffness.   Skin: Negative for color change, pallor, rash and wound.   Allergic/Immunologic: Negative for food allergies and immunocompromised state.   Neurological: Negative for dizziness, tremors, seizures, syncope, facial asymmetry, speech difficulty, weakness, light-headedness, numbness and headaches.   Hematological: Negative for adenopathy. Does not bruise/bleed easily.   Psychiatric/Behavioral: Negative for agitation, behavioral problems, confusion,  decreased concentration, dysphoric mood, hallucinations, self-injury, sleep disturbance and suicidal ideas. The patient is not nervous/anxious and is not hyperactive.        Objective:      Physical Exam  Vitals and nursing note reviewed.   Constitutional:       General: He is not in acute distress.     Appearance: Normal appearance. He is well-developed and well-nourished. He is not diaphoretic.   HENT:      Head: Normocephalic and atraumatic.      Mouth/Throat:      Pharynx: No oropharyngeal exudate.   Eyes:      General: No scleral icterus.     Pupils: Pupils are equal, round, and reactive to light.   Neck:      Thyroid: No thyromegaly.      Vascular: No carotid bruit or JVD.      Trachea: No tracheal deviation.   Cardiovascular:      Rate and Rhythm: Normal rate and regular rhythm.      Pulses: Intact distal pulses.      Heart sounds: Normal heart sounds.   Pulmonary:      Effort: Pulmonary effort is normal. No respiratory distress.      Breath sounds: Normal breath sounds. No wheezing or rales.   Chest:      Chest wall: No tenderness.   Abdominal:      General: Bowel sounds are normal. There is no distension.      Palpations: Abdomen is soft.      Tenderness: There is no abdominal tenderness. There is no guarding or rebound.   Musculoskeletal:         General: No tenderness or edema. Normal range of motion.      Cervical back: Normal range of motion and neck supple.   Lymphadenopathy:      Cervical: No cervical adenopathy.   Skin:     General: Skin is warm and dry.      Coloration: Skin is not pale.      Findings: No erythema or rash.   Neurological:      Mental Status: He is alert and oriented to person, place, and time.   Psychiatric:         Mood and Affect: Mood and affect normal.         Behavior: Behavior normal.         Thought Content: Thought content normal.         Judgment: Judgment normal.         [facility]  Sodium   Date Value Ref Range Status   01/07/2022 141 136 - 145 mmol/L Final     Potassium   Date  Value Ref Range Status   01/07/2022 5.1 3.5 - 5.1 mmol/L Final     Chloride   Date Value Ref Range Status   01/07/2022 103 95 - 110 mmol/L Final     CO2   Date Value Ref Range Status   01/07/2022 27 23 - 29 mmol/L Final     Glucose   Date Value Ref Range Status   01/07/2022 88 70 - 110 mg/dL Final     BUN   Date Value Ref Range Status   01/07/2022 24 (H) 8 - 23 mg/dL Final     Creatinine   Date Value Ref Range Status   01/07/2022 1.4 0.5 - 1.4 mg/dL Final     Calcium   Date Value Ref Range Status   01/07/2022 9.5 8.7 - 10.5 mg/dL Final     Total Protein   Date Value Ref Range Status   01/07/2022 7.6 6.0 - 8.4 g/dL Final     Albumin   Date Value Ref Range Status   01/07/2022 3.9 3.5 - 5.2 g/dL Final     Total Bilirubin   Date Value Ref Range Status   01/07/2022 0.9 0.1 - 1.0 mg/dL Final     Comment:     For infants and newborns, interpretation of results should be based  on gestational age, weight and in agreement with clinical  observations.    Premature Infant recommended reference ranges:  Up to 24 hours.............<8.0 mg/dL  Up to 48 hours............<12.0 mg/dL  3-5 days..................<15.0 mg/dL  6-29 days.................<15.0 mg/dL       Alkaline Phosphatase   Date Value Ref Range Status   01/07/2022 67 55 - 135 U/L Final     AST   Date Value Ref Range Status   01/07/2022 29 10 - 40 U/L Final     ALT   Date Value Ref Range Status   01/07/2022 29 10 - 44 U/L Final     Anion Gap   Date Value Ref Range Status   01/07/2022 11 8 - 16 mmol/L Final     eGFR if    Date Value Ref Range Status   01/07/2022 53.0 (A) >60 mL/min/1.73 m^2 Final     eGFR if non    Date Value Ref Range Status   01/07/2022 45.8 (A) >60 mL/min/1.73 m^2 Final     Comment:     Calculation used to obtain the estimated glomerular filtration  rate (eGFR) is the CKD-EPI equation.        Lab Results   Component Value Date    WBC 4.94 11/09/2021    HGB 12.8 (L) 11/09/2021    HCT 40.2 11/09/2021     (H)  11/09/2021     (L) 11/09/2021     Lab Results   Component Value Date    CHOL 179 01/07/2022     Lab Results   Component Value Date    HDL 48 01/07/2022     Lab Results   Component Value Date    LDLCALC 112.2 01/07/2022     Lab Results   Component Value Date    TRIG 94 01/07/2022     Lab Results   Component Value Date    CHOLHDL 26.8 01/07/2022     Lab Results   Component Value Date    TSH 0.368 (L) 03/06/2020     Lab Results   Component Value Date    HGBA1C 5.1 12/14/2020     Assessment:       1. Dyslipidemia    2. Primary hypertension        Plan:   Dyslipidemia    Primary hypertension    Stable------continue meds.      F/u 6 months--------

## 2022-02-02 ENCOUNTER — TELEPHONE (OUTPATIENT)
Dept: CARDIOLOGY | Facility: CLINIC | Age: 87
End: 2022-02-02
Payer: MEDICARE

## 2022-02-02 ENCOUNTER — HOSPITAL ENCOUNTER (OUTPATIENT)
Dept: CARDIOLOGY | Facility: HOSPITAL | Age: 87
Discharge: HOME | End: 2022-02-02
Attending: INTERNAL MEDICINE
Payer: MEDICARE

## 2022-02-02 VITALS
DIASTOLIC BLOOD PRESSURE: 70 MMHG | HEIGHT: 76 IN | BODY MASS INDEX: 35.19 KG/M2 | SYSTOLIC BLOOD PRESSURE: 130 MMHG | WEIGHT: 289 LBS

## 2022-02-02 DIAGNOSIS — I31.39 PERICARDIAL EFFUSION: ICD-10-CM

## 2022-02-02 LAB
AORTIC ROOT ANNULUS: 3.92 CM
AV INDEX (PROSTH): 0.75
AV MEAN GRADIENT: 5 MMHG
AV PEAK GRADIENT: 9 MMHG
AV REGURGITATION PRESSURE HALF TIME: 1003.84 MS
AV VALVE AREA: 2.39 CM2
AV VELOCITY RATIO: 0.8
BSA FOR ECHO PROCEDURE: 2.65 M2
CV ECHO LV RWT: 0.52 CM
DOP CALC AO PEAK VEL: 1.51 M/S
DOP CALC AO VTI: 41.1 CM
DOP CALC LVOT AREA: 3.2 CM2
DOP CALC LVOT DIAMETER: 2.01 CM
DOP CALC LVOT PEAK VEL: 1.21 M/S
DOP CALC LVOT STROKE VOLUME: 98.32 CM3
DOP CALC MV VTI: 43.8 CM
DOP CALC RVOT PEAK VEL: 0.85 M/S
DOP CALC RVOT VTI: 21 CM
DOP CALCLVOT PEAK VEL VTI: 31 CM
E WAVE DECELERATION TIME: 320.36 MSEC
E/A RATIO: 0.88
E/E' RATIO: 13.33 M/S
ECHO EF ESTIMATED: 59 %
ECHO LV POSTERIOR WALL: 1.2 CM (ref 0.6–1.1)
EJECTION FRACTION: 60 %
FRACTIONAL SHORTENING: 31 % (ref 28–44)
INTERVENTRICULAR SEPTUM: 1.4 CM (ref 0.6–1.1)
IVRT: 102.76 MSEC
LA MAJOR: 5.71 CM
LA MINOR: 5.2 CM
LA WIDTH: 3.66 CM
LEFT ATRIUM SIZE: 3.62 CM
LEFT ATRIUM VOLUME INDEX MOD: 28.2 ML/M2
LEFT ATRIUM VOLUME INDEX: 23.7 ML/M2
LEFT ATRIUM VOLUME MOD: 73.07 CM3
LEFT ATRIUM VOLUME: 61.3 CM3
LEFT INTERNAL DIMENSION IN SYSTOLE: 3.16 CM (ref 2.1–4)
LEFT VENTRICLE DIASTOLIC VOLUME INDEX: 37.36 ML/M2
LEFT VENTRICLE DIASTOLIC VOLUME: 96.77 ML
LEFT VENTRICLE MASS INDEX: 89 G/M2
LEFT VENTRICLE SYSTOLIC VOLUME INDEX: 15.4 ML/M2
LEFT VENTRICLE SYSTOLIC VOLUME: 39.85 ML
LEFT VENTRICULAR INTERNAL DIMENSION IN DIASTOLE: 4.59 CM (ref 3.5–6)
LEFT VENTRICULAR MASS: 229.39 G
LV LATERAL E/E' RATIO: 10.91 M/S
LV SEPTAL E/E' RATIO: 17.14 M/S
LVOT MG: 3.53 MMHG
LVOT MV: 0.9 CM/S
MV MEAN GRADIENT: 2 MMHG
MV PEAK A VEL: 1.36 M/S
MV PEAK E VEL: 1.2 M/S
MV PEAK GRADIENT: 5 MMHG
MV VALVE AREA BY CONTINUITY EQUATION: 2.24 CM2
PISA AR MAX VEL: 2.84 M/S
PISA TR MAX VEL: 2.67 M/S
PULM VEIN S/D RATIO: 1.32
PV MEAN GRADIENT: 1.6 MMHG
PV MV: 0.95 M/S
PV PEAK D VEL: 0.49 M/S
PV PEAK S VEL: 0.64 M/S
PV PEAK VELOCITY: 1.36 CM/S
RA MAJOR: 5.47 CM
RA PRESSURE: 3 MMHG
RA WIDTH: 3.36 CM
RIGHT VENTRICULAR END-DIASTOLIC DIMENSION: 2.93 CM
SINUS: 3.3 CM
STJ: 3.3 CM
TDI LATERAL: 0.11 M/S
TDI SEPTAL: 0.07 M/S
TDI: 0.09 M/S
TR MAX PG: 29 MMHG
TRICUSPID ANNULAR PLANE SYSTOLIC EXCURSION: 1.98 CM
TV REST PULMONARY ARTERY PRESSURE: 32 MMHG

## 2022-02-02 PROCEDURE — 93306 ECHO (CUPID ONLY): ICD-10-PCS | Mod: 26,,, | Performed by: INTERNAL MEDICINE

## 2022-02-02 PROCEDURE — 93306 TTE W/DOPPLER COMPLETE: CPT | Mod: 26,,, | Performed by: INTERNAL MEDICINE

## 2022-02-02 PROCEDURE — 93306 TTE W/DOPPLER COMPLETE: CPT

## 2022-02-02 NOTE — TELEPHONE ENCOUNTER
Patient contacted and was advised that his   Echo showed normal function and valvular structures  The patient stated understanding with no questions or concerns

## 2022-02-02 NOTE — TELEPHONE ENCOUNTER
LVM to return the call to the clinic to get the results-----   Echo showed normal function and valvular structures

## 2022-02-03 ENCOUNTER — TELEPHONE (OUTPATIENT)
Dept: CARDIOLOGY | Facility: CLINIC | Age: 87
End: 2022-02-03
Payer: MEDICARE

## 2022-02-03 NOTE — TELEPHONE ENCOUNTER
Pt contacted about results, pt verbalized understanding.            ----- Message from Sandra Hughes sent at 2/3/2022 11:59 AM CST -----  .Type:  Patient Returning Call    Who Called:CANDELARIA CASILLAS [[id number]]  Who Left Message for Patient:nurse  Does the patient know what this is regarding?:  Would the patient rather a call back or a response via [facility]? call  Best Call Back Number:[phone] (home)   Additional Information:     Echo showed normal function and valvular structures

## 2022-03-03 ENCOUNTER — OFFICE VISIT (OUTPATIENT)
Dept: OPHTHALMOLOGY | Facility: CLINIC | Age: 87
End: 2022-03-03
Payer: MEDICARE

## 2022-03-03 ENCOUNTER — APPOINTMENT (OUTPATIENT)
Dept: OPHTHALMOLOGY | Facility: CLINIC | Age: 87
End: 2022-03-03
Payer: MEDICARE

## 2022-03-03 DIAGNOSIS — H40.1133 PRIMARY OPEN ANGLE GLAUCOMA OF BOTH EYES, SEVERE STAGE: Primary | ICD-10-CM

## 2022-03-03 DIAGNOSIS — H04.129 DRY EYE: ICD-10-CM

## 2022-03-03 DIAGNOSIS — H25.11 NUCLEAR SCLEROSIS OF RIGHT EYE: ICD-10-CM

## 2022-03-03 PROCEDURE — 92133 CPTRZD OPH DX IMG PST SGM ON: CPT | Mod: PBBFAC | Performed by: OPHTHALMOLOGY

## 2022-03-03 PROCEDURE — 99214 PR OFFICE/OUTPT VISIT, EST, LEVL IV, 30-39 MIN: ICD-10-PCS | Mod: S$PBB,,, | Performed by: OPHTHALMOLOGY

## 2022-03-03 PROCEDURE — 99999 PR PBB SHADOW E&M-EST. PATIENT-LVL III: ICD-10-PCS | Mod: PBBFAC,,, | Performed by: OPHTHALMOLOGY

## 2022-03-03 PROCEDURE — 92133 POSTERIOR SEGMENT OCT OPTIC NERVE(OCULAR COHERENCE TOMOGRAPHY) - OU - BOTH EYES: ICD-10-PCS | Mod: 26,S$PBB,, | Performed by: OPHTHALMOLOGY

## 2022-03-03 PROCEDURE — 92083 EXTENDED VISUAL FIELD XM: CPT | Mod: PBBFAC | Performed by: OPHTHALMOLOGY

## 2022-03-03 PROCEDURE — 99214 OFFICE O/P EST MOD 30 MIN: CPT | Mod: S$PBB,,, | Performed by: OPHTHALMOLOGY

## 2022-03-03 PROCEDURE — 92083 HUMPHREY VISUAL FIELD - OU - BOTH EYES: ICD-10-PCS | Mod: 26,S$PBB,, | Performed by: OPHTHALMOLOGY

## 2022-03-03 PROCEDURE — 99999 PR PBB SHADOW E&M-EST. PATIENT-LVL III: CPT | Mod: PBBFAC,,, | Performed by: OPHTHALMOLOGY

## 2022-03-03 PROCEDURE — 99213 OFFICE O/P EST LOW 20 MIN: CPT | Mod: PBBFAC | Performed by: OPHTHALMOLOGY

## 2022-03-03 NOTE — PROGRESS NOTES
SUBJECTIVE  Largo Washington is 86 y.o. male  Corrected distance visual acuity was 20/30 -2 in the right eye and CF at 3' in the left eye.   Chief Complaint   Patient presents with    Glaucoma     Pt here for 4m HVF GOCT chk. Pt says he has noticed that his vision has declined when he goes outside. No pain or discomfort. 100% compliant with gtts.           HPI     Glaucoma      Additional comments: Pt here for 4m HVF GOCT chk. Pt says he has noticed   that his vision has declined when he goes outside. No pain or discomfort.   100% compliant with gtts.               Comments     1. Severe COAG OS>OD tmax=26/32 Goal=16-17   Combigan=dizzy   SLT OD 5/15 (19-13)   SLT OS 4/15 (19-13)   2. Mod NSC OD   PCIOL OS with goniotomy 8/11/21 (+21.0 SN60WF)       Timolol BID OU   Latanoprost qhs OU   OTC Tears OU Prn            Last edited by Milind Weber MA on 3/3/2022  2:31 PM. (History)         Assessment /Plan :  1. Primary open angle glaucoma of both eyes, severe stage Doing well, IOP within acceptable range relative to target IOP and no evidence of progression. Continue current treatment. Reviewed importance of continued compliance with treatment and follow up.      Patient instructed to continue using the following glaucoma medication as follows:  Latanoprost one drop in each eye nightly and Timolol one drop in each eye every 12 hours    Return to clinic in 3 months  or as needed.  With IOP Check     2. Nuclear sclerosis of right eye    3. Dry eye

## 2022-03-06 ENCOUNTER — HOSPITAL ENCOUNTER (EMERGENCY)
Facility: HOSPITAL | Age: 87
Discharge: HOME | End: 2022-03-06
Attending: EMERGENCY MEDICINE
Payer: MEDICARE

## 2022-03-06 VITALS
WEIGHT: 281.75 LBS | SYSTOLIC BLOOD PRESSURE: 134 MMHG | HEART RATE: 65 BPM | RESPIRATION RATE: 18 BRPM | BODY MASS INDEX: 34.3 KG/M2 | DIASTOLIC BLOOD PRESSURE: 82 MMHG | OXYGEN SATURATION: 95 % | TEMPERATURE: 98 F

## 2022-03-06 DIAGNOSIS — R09.81 SINUS CONGESTION: ICD-10-CM

## 2022-03-06 DIAGNOSIS — H61.21 IMPACTED CERUMEN OF RIGHT EAR: Primary | ICD-10-CM

## 2022-03-06 DIAGNOSIS — J01.90 ACUTE SINUSITIS, RECURRENCE NOT SPECIFIED, UNSPECIFIED LOCATION: ICD-10-CM

## 2022-03-06 LAB — POCT GLUCOSE: 100 MG/DL (ref 70–110)

## 2022-03-06 PROCEDURE — 99283 EMERGENCY DEPT VISIT LOW MDM: CPT

## 2022-03-06 PROCEDURE — 82962 GLUCOSE BLOOD TEST: CPT

## 2022-03-06 RX ORDER — AZITHROMYCIN 250 MG/1
250 TABLET, FILM COATED ORAL DAILY
Qty: 6 TABLET | Refills: 0 | Status: SHIPPED | OUTPATIENT
Start: 2022-03-06

## 2022-03-07 NOTE — FIRST PROVIDER EVALUATION
Medical screening exam completed.  I have conducted a focused provider triage encounter, findings are as follows:    Brief history of present illness:  Intermittent blurred vision and headache onset today    Vitals:    03/06/22 1815   BP: 134/82   BP Location: Right arm   Patient Position: Sitting   Pulse: 65   Resp: 18   Temp: 98.3 °F (36.8 °C)   TempSrc: Oral   SpO2: 95%   Weight: 127.8 kg (281 lb 12 oz)       Pertinent physical exam:  nad      Preliminary workup initiated; this workup will be continued and followed by the physician or advanced practice provider that is assigned to the patient when roomed.

## 2022-03-07 NOTE — FIRST PROVIDER EVALUATION
Medical screening exam completed.  I have conducted a focused provider triage encounter, findings are as follows:    Brief history of present illness:  Blurred vision and fullness to head onset today    Vitals:    03/06/22 1815   BP: 134/82   BP Location: Right arm   Patient Position: Sitting   Pulse: 65   Resp: 18   Temp: 98.3 °F (36.8 °C)   TempSrc: Oral   SpO2: 95%   Weight: 127.8 kg (281 lb 12 oz)       Pertinent physical exam:  nad    Preliminary workup initiated; this workup will be continued and followed by the physician or advanced practice provider that is assigned to the patient when roomed.

## 2022-03-07 NOTE — ED PROVIDER NOTES
SCRIBE #1 NOTE: I, Lisa Reynoso, am scribing for, and in the presence of, Bere Haynes MD. I have scribed the entire note.       History     Chief Complaint   Patient presents with    General Illness     Pt complains of headache, sinus pressure, neck pain; feels like he has fluid in his ears.  Pt was seen at urgent care in January and was given medication that helped, but he has run out.     Review of patient's allergies indicates:   Allergen Reactions    Penicillins Hives and Swelling    Protonix [pantoprazole] Rash    Sulfa (sulfonamide antibiotics) Hives         History of Present Illness     HPI    3/6/2022, 7:21 PM  History obtained from the patient      History of Present Illness: Dominic Cohen is a 86 y.o. male patient with a PMHx of a-fib, CHF, HLD, glaucoma and HTN who presents to the Emergency Department for evaluation of a general illness which onset gradually a year ago. Pt reports that he has been dealing with sinus pressure and a feeling of fluid in his ears off and on for over a year. He was seen at an urgent care in January and was given medication, but he has run out. Symptoms are intermittent and moderate in severity. No mitigating or exacerbating factors reported. Associated sxs include neck pain, HA, and blurry vision. Patient denies any fever, chills, N/V/D, and all other sxs at this time. No further complaints or concerns at this time.       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    Glaucoma     History of anal fissures     Hyperlipidemia     Hypertension     Obesity     Peripheral vascular  disease     Polyneuropathy in other diseases classified elsewhere     due to folate deficiency    Prediabetes     Stroke     Trouble in sleeping     Venous insufficiency     Venous insufficiency        Past Surgical History:  Past Surgical History:   Procedure Laterality Date    BACK SURGERY      lumbar fusion per pt    BUNIONECTOMY Bilateral     CATARACT EXTRACTION BILATERAL W/ ANTERIOR VITRECTOMY      COLONOSCOPY N/A 5/13/2016    Procedure: COLONOSCOPY;  Surgeon: Presley Phillips MD;  Location: [address]);  Service: Endoscopy;  Laterality: N/A;  EGD with Dr. Jeffery prior to Colonoscopy. EC    COLONOSCOPY N/A 2/11/2021    Procedure: COLONOSCOPY;  Surgeon: July Patel MD;  Location: [facility];  Service: Endoscopy;  Laterality: N/A;    ESOPHAGOGASTRODUODENOSCOPY N/A 7/5/2018    Procedure: ESOPHAGOGASTRODUODENOSCOPY (EGD);  Surgeon: Khanh Asencio III, MD;  Location: [facility];  Service: Endoscopy;  Laterality: N/A;    ESOPHAGOGASTRODUODENOSCOPY N/A 2/11/2021    Procedure: ESOPHAGOGASTRODUODENOSCOPY (EGD);  Surgeon: July Patel MD;  Location: [facility];  Service: Endoscopy;  Laterality: N/A;    ESOPHAGOGASTRODUODENOSCOPY N/A 4/15/2021    Procedure: EGD (ESOPHAGOGASTRODUODENOSCOPY);  Surgeon: July Patel MD;  Location: [facility];  Service: Endoscopy;  Laterality: N/A;    JOINT REPLACEMENT Left     x 2    KNEE SURGERY Left     x2. revision 06/27/17    PERICARDIAL WINDOW N/A 2/14/2020    Procedure: CREATION, PERICARDIAL WINDOW;  Surgeon: Ankush Graham MD;  Location: [facility];  Service: Cardiothoracic;  Laterality: N/A;    PROSTATE SURGERY      TONSILLECTOMY, ADENOIDECTOMY      TUBE THORACOTOMY Left 2/14/2020    Procedure: INSERTION, CATHETER, INTERCOSTAL, FOR DRAINAGE;  Surgeon: Ankush Graham MD;  Location: [facility];  Service: Cardiothoracic;  Laterality: Left;  LEFT PLEURA CHEST TUBE PLACEMENT         Family History:  Family History   Problem Relation Age of Onset     No Known Problems Son     No Known Problems Daughter     No Known Problems Son     Diabetes Neg Hx     Heart disease Neg Hx     Cancer Neg Hx     Celiac disease Neg Hx     Cirrhosis Neg Hx     Colon cancer Neg Hx     Colon polyps Neg Hx     Crohn's disease Neg Hx     Cystic fibrosis Neg Hx     Esophageal cancer Neg Hx     Hemochromatosis Neg Hx     Inflammatory bowel disease Neg Hx     Irritable bowel syndrome Neg Hx     Liver cancer Neg Hx     Liver disease Neg Hx     Rectal cancer Neg Hx     Stomach cancer Neg Hx     Ulcerative colitis Neg Hx     Zak's disease Neg Hx     Amblyopia Neg Hx     Blindness Neg Hx     Glaucoma Neg Hx     Hypertension Neg Hx     Macular degeneration Neg Hx     Retinal detachment Neg Hx     Strabismus Neg Hx        Social History:  Social History     Tobacco Use    Smoking status: Former Smoker     Packs/day: 0.30     Years: 52.00     Pack years: 15.60     Types: Cigarettes     Quit date: 2000     Years since quittin.3    Smokeless tobacco: Never Used   Substance and Sexual Activity    Alcohol use: No    Drug use: No    Sexual activity: Yes        Review of Systems     Review of Systems   Constitutional: Negative for chills and fever.   HENT: Positive for ear pain and sinus pressure. Negative for sore throat.    Eyes: Positive for visual disturbance (Blurry vision).   Respiratory: Negative for shortness of breath.    Cardiovascular: Negative for chest pain.   Gastrointestinal: Negative for diarrhea, nausea and vomiting.   Genitourinary: Negative for dysuria.   Musculoskeletal: Positive for neck pain. Negative for back pain.   Skin: Negative for rash.   Neurological: Positive for headaches. Negative for weakness.   Hematological: Does not bruise/bleed easily.   All other systems reviewed and are negative.       Physical Exam     Initial Vitals [22 1815]   BP Pulse Resp Temp SpO2   134/82 65 18 98.3 °F (36.8 °C) 95 %      MAP       --           Physical Exam  Nursing Notes and Vital Signs Reviewed.  Constitutional: Patient is in no acute distress. Well-developed and well-nourished.  Head: Atraumatic. Normocephalic.  Eyes: EOM intact. Conjunctivae are not pale. No scleral icterus.  ENT: Mucous membranes are moist. Oropharynx is clear and symmetric. Cerumen impaction in right ear.  Neck: Supple. Full ROM.   Cardiovascular: Regular rate. Regular rhythm. No murmurs, rubs, or gallops. Distal pulses are 2+ and symmetric.  Pulmonary/Chest: No respiratory distress. Clear to auscultation bilaterally. No wheezing or rales.  Abdominal: Soft and non-distended.  There is no tenderness.  No rebound, guarding, or rigidity.   Musculoskeletal: Moves all extremities. No obvious deformities. No edema. No calf tenderness.  Skin: Warm and dry.  Neurological:  Alert, awake, and appropriate.  Normal speech.  No acute focal neurological deficits are appreciated.  Psychiatric: Normal affect. Good eye contact. Appropriate in content.     ED Course   Procedures  ED Vital Signs:  Vitals:    03/06/22 1815   BP: 134/82   Pulse: 65   Resp: 18   Temp: 98.3 °F (36.8 °C)   TempSrc: Oral   SpO2: 95%   Weight: 127.8 kg (281 lb 12 oz)     Results for orders placed or performed during the hospital encounter of 03/06/22   POCT glucose   Result Value Ref Range    POCT Glucose 100 70 - 110 mg/dL                  The Emergency Provider reviewed the vital signs and test results, which are outlined above.     ED Discussion     7:26 PM: Reassessed pt at this time. Discussed with pt all pertinent ED information and results. Discussed pt dx and plan of tx. Gave pt all f/u and return to the ED instructions. All questions and concerns were addressed at this time. Pt expresses understanding of information and instructions, and is comfortable with plan to discharge. Pt is stable for discharge.    I discussed with patient and/or family/caretaker that evaluation in the ED does not suggest any emergent  or life threatening medical conditions requiring immediate intervention beyond what was provided in the ED, and I believe patient is safe for discharge.  Regardless, an unremarkable evaluation in the ED does not preclude the development or presence of a serious of life threatening condition. As such, patient was instructed to return immediately for any worsening or change in current symptoms.         Medical Decision Making:   Clinical Tests:   Lab Tests: Ordered and Reviewed           ED Medication(s):  Medications - No data to display    Current Discharge Medication List           Follow-up Information     Otolaryngology. Schedule an appointment as soon as possible for a visit in 2 days.    Specialty: Otolaryngology  Why: Return to the Emergency Room, If symptoms worsen  Contact information:  [address] [facility] [id number] [phone]           Sudhakar Liu MD. Schedule an appointment as soon as possible for a visit in 2 days.    Specialty: Internal Medicine  Contact information:  [address] [facility] [address]  [phone]                             Scribe Attestation:   Scribe #1: I performed the above scribed service and the documentation accurately describes the services I performed. I attest to the accuracy of the note.     Attending:   Physician Attestation Statement for Scribe #1: I, Bere Haynes MD, personally performed the services described in this documentation, as scribed by Lisa Reynoso, in my presence, and it is both accurate and complete.           Clinical Impression       ICD-10-CM ICD-9-CM   1. Impacted cerumen of right ear  H61.21 380.4   2. Sinus congestion  R09.81 478.19   3. Acute sinusitis, recurrence not specified, unspecified location  J01.90 461.9       Disposition:   Disposition: Discharged  Condition: Stable         Bere Haynes MD  03/06/22 1955

## 2022-03-08 ENCOUNTER — PES CALL (OUTPATIENT)
Dept: ADMINISTRATIVE | Facility: CLINIC | Age: 87
End: 2022-03-08
Payer: MEDICARE

## 2022-03-11 DIAGNOSIS — I48.0 PAROXYSMAL ATRIAL FIBRILLATION: Primary | ICD-10-CM

## 2022-03-21 ENCOUNTER — PATIENT OUTREACH (OUTPATIENT)
Dept: ADMINISTRATIVE | Facility: OTHER | Age: 87
End: 2022-03-21
Payer: MEDICARE

## 2022-03-22 ENCOUNTER — OFFICE VISIT (OUTPATIENT)
Dept: CARDIOLOGY | Facility: CLINIC | Age: 87
End: 2022-03-22
Payer: MEDICARE

## 2022-03-22 ENCOUNTER — HOSPITAL ENCOUNTER (OUTPATIENT)
Dept: CARDIOLOGY | Facility: HOSPITAL | Age: 87
Discharge: HOME | End: 2022-03-22
Attending: INTERNAL MEDICINE
Payer: MEDICARE

## 2022-03-22 VITALS
BODY MASS INDEX: 34.57 KG/M2 | OXYGEN SATURATION: 95 % | WEIGHT: 284 LBS | SYSTOLIC BLOOD PRESSURE: 80 MMHG | DIASTOLIC BLOOD PRESSURE: 54 MMHG | HEART RATE: 63 BPM

## 2022-03-22 DIAGNOSIS — I70.0 AORTIC ATHEROSCLEROSIS: ICD-10-CM

## 2022-03-22 DIAGNOSIS — I48.0 PAROXYSMAL ATRIAL FIBRILLATION: ICD-10-CM

## 2022-03-22 DIAGNOSIS — E78.5 DYSLIPIDEMIA: ICD-10-CM

## 2022-03-22 DIAGNOSIS — I99.8 ASYMMETRIC BLOOD PRESSURES: ICD-10-CM

## 2022-03-22 DIAGNOSIS — I50.32 CHRONIC DIASTOLIC HEART FAILURE: ICD-10-CM

## 2022-03-22 DIAGNOSIS — I10 PRIMARY HYPERTENSION: Primary | ICD-10-CM

## 2022-03-22 DIAGNOSIS — I73.9 PVD (PERIPHERAL VASCULAR DISEASE): ICD-10-CM

## 2022-03-22 PROCEDURE — 93005 ELECTROCARDIOGRAM TRACING: CPT

## 2022-03-22 PROCEDURE — 93010 EKG 12-LEAD: ICD-10-PCS | Mod: ,,, | Performed by: INTERNAL MEDICINE

## 2022-03-22 PROCEDURE — 93010 ELECTROCARDIOGRAM REPORT: CPT | Mod: ,,, | Performed by: INTERNAL MEDICINE

## 2022-03-22 PROCEDURE — 99999 PR PBB SHADOW E&M-EST. PATIENT-LVL III: CPT | Mod: PBBFAC,,, | Performed by: INTERNAL MEDICINE

## 2022-03-22 PROCEDURE — 99213 OFFICE O/P EST LOW 20 MIN: CPT | Mod: PBBFAC | Performed by: INTERNAL MEDICINE

## 2022-03-22 PROCEDURE — 99214 PR OFFICE/OUTPT VISIT, EST, LEVL IV, 30-39 MIN: ICD-10-PCS | Mod: S$PBB,,, | Performed by: INTERNAL MEDICINE

## 2022-03-22 PROCEDURE — 99214 OFFICE O/P EST MOD 30 MIN: CPT | Mod: S$PBB,,, | Performed by: INTERNAL MEDICINE

## 2022-03-22 PROCEDURE — 99999 PR PBB SHADOW E&M-EST. PATIENT-LVL III: ICD-10-PCS | Mod: PBBFAC,,, | Performed by: INTERNAL MEDICINE

## 2022-03-22 RX ORDER — FUROSEMIDE 40 MG/1
40 TABLET ORAL DAILY PRN
Qty: 30 TABLET | Refills: 11 | Status: SHIPPED | OUTPATIENT
Start: 2022-03-22 | End: 2022-03-22 | Stop reason: SDUPTHER

## 2022-03-22 RX ORDER — FUROSEMIDE 40 MG/1
40 TABLET ORAL DAILY PRN
Qty: 30 TABLET | Refills: 11 | Status: SHIPPED | OUTPATIENT
Start: 2022-03-22

## 2022-03-22 NOTE — PROGRESS NOTES
Subjective:   Patient ID:  Dominic Cohen is a 86 y.o. male who presents for follow up of No chief complaint on file.      86 yo male, 6 months f/u  PMH large pericardial effusion resolved per echo in , PAF after efusion, pleural effusion with ateleiosis, HTN, PVD and OA s/p knee repalcement. No smoking/drinking. No h/o heart attack, stroke and DM.  EKG on 11/13 sinus and HR at 57 bpm.  ECHO in 2015 normal EF and DD.  HGB and BNP wnl     admitted after cough, sputum and SOB.  EKG showed afib with RVR. CTA of chest showed large pericardial effusion and bilateral pleural effusions.  Echo showed EF 55% (reviewed by myself) and moderate to large pericardial effusion. Had pericardial window with serous and later clear liquid. BNP and troponin negative, d/c after improved symptoms.    He had exercise stress echo done in  at [facility] showing METS 4 and normal EF. No stress induced ischemia  Today, c/o some dizziness. No faint and syncope.  SOb stable, walking from the parking to the .   Has chronic leg swelling    echo showed normal EF and no pericardial effusion     visit SOB for 4 months, progressively worsening. Sleeps on 2 pillows. Talking lasix every other day  Occasional mild chest pain  Leg swelling minimal.   EKG NSR LVH    09/2021 visit   BNP 34; ECHO normal EF no pericardial effusion and MPI no ischemia.  Today BP controlled. Occasional yard work  On Lasix PRN for PVD    Interval history  BARD 2 weeks monitor showed < 0.5% PVCs and PACs.  C/o palpitation recently and felt dizziness. No chest pain and syncope.  Some ADAMS.   01/07 COVID 19 negative  L arm BP 80/54 mmHG and R arm /64. 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    Glaucoma     History of anal fissures     Hyperlipidemia     Hypertension     Obesity     Peripheral vascular disease     Polyneuropathy in other diseases classified elsewhere     due to folate deficiency    Prediabetes     Stroke     Trouble in sleeping     Venous insufficiency     Venous insufficiency        Past Surgical History:   Procedure Laterality Date    BACK SURGERY      lumbar fusion per pt    BUNIONECTOMY Bilateral     CATARACT EXTRACTION BILATERAL W/ ANTERIOR VITRECTOMY      COLONOSCOPY N/A 5/13/2016    Procedure: COLONOSCOPY;  Surgeon: Presley Phillips MD;  [facility]  Service: Endoscopy;  Laterality: N/A;  EGD with Dr. Jeffery prior to Colonoscopy. EC    COLONOSCOPY N/A 2/11/2021    Procedure: COLONOSCOPY;  Surgeon: July Patel MD;  Location: [facility];  Service: Endoscopy;  Laterality: N/A;    ESOPHAGOGASTRODUODENOSCOPY N/A 7/5/2018    Procedure: ESOPHAGOGASTRODUODENOSCOPY (EGD);  Surgeon: Khanh Asencio III, MD;  Location: [facility];  Service: Endoscopy;  Laterality: N/A;    ESOPHAGOGASTRODUODENOSCOPY N/A 2/11/2021    Procedure: ESOPHAGOGASTRODUODENOSCOPY (EGD);  Surgeon: July Patel MD;  Location: [facility];  Service: Endoscopy;  Laterality: N/A;    ESOPHAGOGASTRODUODENOSCOPY N/A 4/15/2021    Procedure: EGD (ESOPHAGOGASTRODUODENOSCOPY);  Surgeon: July Patel MD;  Location: [facility];  Service: Endoscopy;  Laterality: N/A;    JOINT REPLACEMENT Left     x 2    KNEE SURGERY Left     x2. revision 06/27/17    PERICARDIAL WINDOW N/A 2/14/2020    Procedure: CREATION, PERICARDIAL WINDOW;  Surgeon: Ankush Graham MD;  Location: [facility];  Service: Cardiothoracic;  Laterality: N/A;    PROSTATE SURGERY      TONSILLECTOMY, ADENOIDECTOMY      TUBE THORACOTOMY Left  2020    Procedure: INSERTION, CATHETER, INTERCOSTAL, FOR DRAINAGE;  Surgeon: Ankush Graham MD;  Location: [facility];  Service: Cardiothoracic;  Laterality: Left;  LEFT PLEURA CHEST TUBE PLACEMENT       Social History     Tobacco Use    Smoking status: Former Smoker     Packs/day: 0.30     Years: 52.00     Pack years: 15.60     Types: Cigarettes     Quit date: 2000     Years since quittin.3    Smokeless tobacco: Never Used   Substance Use Topics    Alcohol use: No    Drug use: No       Family History   Problem Relation Age of Onset    No Known Problems Son     No Known Problems Daughter     No Known Problems Son     Diabetes Neg Hx     Heart disease Neg Hx     Cancer Neg Hx     Celiac disease Neg Hx     Cirrhosis Neg Hx     Colon cancer Neg Hx     Colon polyps Neg Hx     Crohn's disease Neg Hx     Cystic fibrosis Neg Hx     Esophageal cancer Neg Hx     Hemochromatosis Neg Hx     Inflammatory bowel disease Neg Hx     Irritable bowel syndrome Neg Hx     Liver cancer Neg Hx     Liver disease Neg Hx     Rectal cancer Neg Hx     Stomach cancer Neg Hx     Ulcerative colitis Neg Hx     Zak's disease Neg Hx     Amblyopia Neg Hx     Blindness Neg Hx     Glaucoma Neg Hx     Hypertension Neg Hx     Macular degeneration Neg Hx     Retinal detachment Neg Hx     Strabismus Neg Hx          Review of Systems   Constitutional: Negative for decreased appetite, diaphoresis, fever, malaise/fatigue and night sweats.   HENT: Negative for nosebleeds.    Eyes: Negative for blurred vision and double vision.   Cardiovascular: Positive for dyspnea on exertion and leg swelling. Negative for chest pain, claudication, irregular heartbeat, near-syncope, orthopnea, palpitations, paroxysmal nocturnal dyspnea and syncope.   Respiratory: Negative for cough, shortness of breath, sleep disturbances due to breathing, snoring, sputum production and wheezing.    Endocrine: Negative for cold intolerance  and polyuria.   Hematologic/Lymphatic: Does not bruise/bleed easily.   Skin: Negative for rash.   Musculoskeletal: Negative for back pain, falls, joint pain, joint swelling and neck pain.   Gastrointestinal: Negative for abdominal pain, heartburn, nausea and vomiting.   Genitourinary: Negative for dysuria, frequency and hematuria.   Neurological: Negative for difficulty with concentration, dizziness, focal weakness, headaches, light-headedness, numbness, seizures and weakness.   Psychiatric/Behavioral: Negative for depression, memory loss and substance abuse. The patient does not have insomnia.    Allergic/Immunologic: Negative for HIV exposure and hives.       Objective:   Physical Exam  HENT:      Head: Normocephalic.   Eyes:      Pupils: Pupils are equal, round, and reactive to light.   Neck:      Thyroid: No thyromegaly.      Vascular: Normal carotid pulses. No carotid bruit or JVD.   Cardiovascular:      Rate and Rhythm: Normal rate and regular rhythm.  No extrasystoles are present.     Chest Wall: PMI is not displaced.      Pulses: Normal pulses.      Heart sounds: Normal heart sounds. No murmur heard.    No gallop. No S3 sounds.   Pulmonary:      Effort: No respiratory distress.      Breath sounds: Normal breath sounds. No stridor.   Abdominal:      General: Bowel sounds are normal.      Palpations: Abdomen is soft.      Tenderness: There is no abdominal tenderness. There is no rebound.   Musculoskeletal:         General: Normal range of motion.      Comments: 1+ BLE   Skin:     Findings: No rash.   Neurological:      Mental Status: He is alert and oriented to person, place, and time.   Psychiatric:         Behavior: Behavior normal.         Lab Results   Component Value Date    CHOL 179 01/07/2022    CHOL 158 12/14/2020    CHOL 115 (L) 02/03/2020     Lab Results   Component Value Date    HDL 48 01/07/2022    HDL 49 12/14/2020    HDL 43 02/03/2020     Lab Results   Component Value Date    LDLCALC 112.2  01/07/2022    LDLCALC 87.0 12/14/2020    LDLCALC 59.6 (L) 02/03/2020     Lab Results   Component Value Date    TRIG 94 01/07/2022    TRIG 110 12/14/2020    TRIG 62 02/03/2020     Lab Results   Component Value Date    CHOLHDL 26.8 01/07/2022    CHOLHDL 31.0 12/14/2020    CHOLHDL 37.4 02/03/2020       Chemistry        Component Value Date/Time     01/07/2022 1010    K 5.1 01/07/2022 1010     01/07/2022 1010    CO2 27 01/07/2022 1010    BUN 24 (H) 01/07/2022 1010    CREATININE 1.4 01/07/2022 1010    GLU 88 01/07/2022 1010        Component Value Date/Time    CALCIUM 9.5 01/07/2022 1010    ALKPHOS 67 01/07/2022 1010    AST 29 01/07/2022 1010    ALT 29 01/07/2022 1010    BILITOT 0.9 01/07/2022 1010    ESTGFRAFRICA 53.0 (A) 01/07/2022 1010    EGFRNONAA 45.8 (A) 01/07/2022 1010          Lab Results   Component Value Date    HGBA1C 5.1 12/14/2020     Lab Results   Component Value Date    TSH 0.368 (L) 03/06/2020     Lab Results   Component Value Date    INR 1.2 03/06/2020    INR 1.3 (H) 02/15/2020    INR 1.3 (H) 02/14/2020     Lab Results   Component Value Date    WBC 4.94 11/09/2021    HGB 12.8 (L) 11/09/2021    HCT 40.2 11/09/2021     (H) 11/09/2021     (L) 11/09/2021     BMP  Sodium   Date Value Ref Range Status   01/07/2022 141 136 - 145 mmol/L Final     Potassium   Date Value Ref Range Status   01/07/2022 5.1 3.5 - 5.1 mmol/L Final     Chloride   Date Value Ref Range Status   01/07/2022 103 95 - 110 mmol/L Final     CO2   Date Value Ref Range Status   01/07/2022 27 23 - 29 mmol/L Final     BUN   Date Value Ref Range Status   01/07/2022 24 (H) 8 - 23 mg/dL Final     Creatinine   Date Value Ref Range Status   01/07/2022 1.4 0.5 - 1.4 mg/dL Final     Calcium   Date Value Ref Range Status   01/07/2022 9.5 8.7 - 10.5 mg/dL Final     Anion Gap   Date Value Ref Range Status   01/07/2022 11 8 - 16 mmol/L Final     eGFR if    Date Value Ref Range Status   01/07/2022 53.0 (A) >60  mL/min/1.73 m^2 Final     eGFR if non    Date Value Ref Range Status   01/07/2022 45.8 (A) >60 mL/min/1.73 m^2 Final     Comment:     Calculation used to obtain the estimated glomerular filtration  rate (eGFR) is the CKD-EPI equation.        BNP  @LABRCNTIP(BNP,BNPTRIAGEBLO)@  @LABRCNTIP(troponini)@  CrCl cannot be calculated (Patient's most recent lab result is older than the maximum 7 days allowed.).  No results found in the last 24 hours.  No results found in the last 24 hours.  No results found in the last 24 hours.    Assessment:      1. Primary hypertension    2. Aortic atherosclerosis    3. Dyslipidemia    4. Paroxysmal atrial fibrillation    5. Chronic diastolic heart failure    6. Asymmetric blood pressures      left arm BP low  PVD leg edema  PAF when he had large pericardial effusion, resolved  CHF pEF compensated    Plan:   Left arm arterial US for low BP on left arm  Add Lasix 40 mg dialy as needed for PVD. Continue KCL taking  Continue ASA Lipitor and metoprolol   Check BP at home    Counseled DASH  Check Lipid profile in 6 months  Recommend heart-healthy diet, weight control and regular exercise.  Gordon. Risk modification.   I have reviewed all pertinent labs and cardiac studies independently. Plans and recommendations have been formulated under my direct supervision. All questions answered and patient voiced understanding.   If symptoms persist go to the ED  RTC in 6 months

## 2022-03-31 ENCOUNTER — HOSPITAL ENCOUNTER (OUTPATIENT)
Dept: CARDIOLOGY | Facility: HOSPITAL | Age: 87
Discharge: HOME | End: 2022-03-31
Attending: INTERNAL MEDICINE
Payer: MEDICARE

## 2022-03-31 VITALS
HEIGHT: 76 IN | DIASTOLIC BLOOD PRESSURE: 70 MMHG | SYSTOLIC BLOOD PRESSURE: 162 MMHG | BODY MASS INDEX: 34.58 KG/M2 | WEIGHT: 284 LBS

## 2022-03-31 DIAGNOSIS — I99.8 ASYMMETRIC BLOOD PRESSURES: ICD-10-CM

## 2022-03-31 LAB
LEFT WRIST BRACHIAL INDEX: 0.93 WBI
UPPER ARTERIAL LEFT ARM AXILLARY SYS MAX: 138 CM/S
UPPER ARTERIAL LEFT ARM BRACHIAL SYS MAX: 113 CM/S
UPPER ARTERIAL LEFT ARM RADIAL SYS MAX: 90 CM/S
UPPER ARTERIAL LEFT ARM SUBCLAVIAN SYS MAX: 179 CM/S
UPPER ARTERIAL LEFT ARM ULNAR SYS MAX: 65 CM/S

## 2022-03-31 PROCEDURE — 93931 UPPER EXTREMITY STUDY: CPT | Mod: LT

## 2022-03-31 PROCEDURE — 93931 CV US DOPPLER ARTERIAL ARM LEFT (CUPID ONLY): ICD-10-PCS | Mod: 26,LT,, | Performed by: INTERNAL MEDICINE

## 2022-03-31 PROCEDURE — 93931 UPPER EXTREMITY STUDY: CPT | Mod: 26,LT,, | Performed by: INTERNAL MEDICINE

## 2022-04-01 ENCOUNTER — TELEPHONE (OUTPATIENT)
Dept: CARDIOLOGY | Facility: CLINIC | Age: 87
End: 2022-04-01
Payer: MEDICARE

## 2022-04-01 NOTE — TELEPHONE ENCOUNTER
Pt contacted about results, pt verbalized understanding.        ----- Message from Jose Alejandro Marshall MD sent at 4/1/2022  2:59 PM CDT -----  Left arm arterial uS normal

## 2022-04-11 ENCOUNTER — TELEPHONE (OUTPATIENT)
Dept: OTOLARYNGOLOGY | Facility: CLINIC | Age: 87
End: 2022-04-11
Payer: MEDICARE

## 2022-04-11 NOTE — TELEPHONE ENCOUNTER
----- Message from Nereida Dow sent at 4/11/2022 12:23 PM CDT -----  Regarding: fu er possibe wax build up  Mr washinghton call went to the er recent possible wax build up , pt wanted to see someone at [facility] please ,ear not any better  patient ask to call him after 400pm today at [phone] or [phone]

## 2022-04-12 ENCOUNTER — OFFICE VISIT (OUTPATIENT)
Dept: OTOLARYNGOLOGY | Facility: CLINIC | Age: 87
End: 2022-04-12
Payer: MEDICARE

## 2022-04-12 VITALS
BODY MASS INDEX: 35.37 KG/M2 | TEMPERATURE: 98 F | SYSTOLIC BLOOD PRESSURE: 136 MMHG | WEIGHT: 290.56 LBS | DIASTOLIC BLOOD PRESSURE: 72 MMHG | HEART RATE: 59 BPM

## 2022-04-12 DIAGNOSIS — H61.23 IMPACTED CERUMEN, BILATERAL: ICD-10-CM

## 2022-04-12 PROCEDURE — 69210 REMOVE IMPACTED EAR WAX UNI: CPT | Mod: 50,PBBFAC | Performed by: OTOLARYNGOLOGY

## 2022-04-12 PROCEDURE — 69210 REMOVE IMPACTED EAR WAX UNI: CPT | Mod: S$PBB,,, | Performed by: OTOLARYNGOLOGY

## 2022-04-12 PROCEDURE — 99999 PR PBB SHADOW E&M-EST. PATIENT-LVL III: ICD-10-PCS | Mod: PBBFAC,,, | Performed by: OTOLARYNGOLOGY

## 2022-04-12 PROCEDURE — 99213 OFFICE O/P EST LOW 20 MIN: CPT | Mod: PBBFAC | Performed by: OTOLARYNGOLOGY

## 2022-04-12 PROCEDURE — 99499 NO LOS: ICD-10-PCS | Mod: S$PBB,,, | Performed by: OTOLARYNGOLOGY

## 2022-04-12 PROCEDURE — 69210 PR REMOVAL IMPACTED CERUMEN REQUIRING INSTRUMENTATION, UNILATERAL: ICD-10-PCS | Mod: S$PBB,,, | Performed by: OTOLARYNGOLOGY

## 2022-04-12 PROCEDURE — 99499 UNLISTED E&M SERVICE: CPT | Mod: S$PBB,,, | Performed by: OTOLARYNGOLOGY

## 2022-04-12 PROCEDURE — 99999 PR PBB SHADOW E&M-EST. PATIENT-LVL III: CPT | Mod: PBBFAC,,, | Performed by: OTOLARYNGOLOGY

## 2022-04-12 NOTE — PROGRESS NOTES
REFERRING PROVIDER  No referring provider defined for this encounter.  Subjective:   Patient: Dominic Cohen [id number], :1935   Visit date:2022 12:37 PM    Chief Complaint:  Other (Left is is worse)        HPI:     Dominic Cohen is a 86 y.o. male whom I am asked to see for evaluation of otalgia or hearing loss in both ears for the past 1-4 weeks.   Dominic rates the severity as moderate.  No exacerbating or relieving factors.  There is no drainage from the ears.      His meds, allergies, medical, surgical, social & family histories were reviewed & updated:  -     He has a current medication list which includes the following prescription(s): atorvastatin, azithromycin, baclofen, blood pressure monitor, blood pressure monitor, vitamin b-12, furosemide, latanoprost, linaclotide, metoprolol succinate, mupirocin, timolol maleate 0.5%, true metrix glucose test strip, aspirin, and esomeprazole.  -     He  has a past medical history of Anemia, Arthritis, Atherosclerosis of abdominal aorta, Atrial fibrillation, Bilateral pleural effusion (2020), Cataract, CHF (congestive heart failure), Chronic constipation, Disorder of kidney and ureter, Failed total left knee replacement (2017), Glaucoma, History of anal fissures, Hyperlipidemia, Hypertension, Obesity, Peripheral vascular disease, Polyneuropathy in other diseases classified elsewhere, Prediabetes, Stroke, Trouble in sleeping, Venous insufficiency, and Venous insufficiency.   -     He does not have any pertinent problems on file.   -     He  has a past surgical history that includes Back surgery; Prostate surgery; TONSILLECTOMY, ADENOIDECTOMY; Cataract extraction bilateral w/ anterior vitrectomy; Colonoscopy (N/A, 2016); Knee surgery (Left); Joint replacement (Left); Esophagogastroduodenoscopy (N/A, 2018); Pericardial window (N/A, 2020); Tube thoracotomy (Left, 2020); Esophagogastroduodenoscopy (N/A, 2021);  Colonoscopy (N/A, 2/11/2021); Esophagogastroduodenoscopy (N/A, 4/15/2021); and Bunionectomy (Bilateral).  -     He  reports that he quit smoking about 21 years ago. His smoking use included cigarettes. He has a 15.60 pack-year smoking history. He has never used smokeless tobacco. He reports that he does not drink alcohol and does not use drugs.  -     His family history includes No Known Problems in his daughter, son, and son.  -     He is allergic to penicillins, protonix [pantoprazole], and sulfa (sulfonamide antibiotics).      Review of Systems:  -     Allergic/Immunologic: is allergic to penicillins, protonix [pantoprazole], and sulfa (sulfonamide antibiotics)..  -     Constitutional: Current temp: 98.1 °F (36.7 °C) (Temporal)        Objective:     Physical Exam:  Vitals:  /72   Pulse (!) 59   Temp 98.1 °F (36.7 °C) (Temporal)   Wt 131.8 kg (290 lb 9.1 oz)   BMI 35.37 kg/m²   Communication:  Able to communicate, no hoarseness.  Head & Face:  Normocephalic, atraumatic, no sinus tenderness.  Eyes:  Extraocular motions intact.  Ears:  Otoscopy of external auditory canals reveals impaction of bilateral ear canals.  With the patient in the supine position, we used the operating microscope to examine both ears with the appropriate sized ear speculum.  A variety of sterile, micro-instruments were utilized to remove the cerumen atraumatically from the impacted ear(s).   After removal, the ears were reexamined-  Right Ear:  No mass/lesion of auricle. The external auditory canals is without erythema or discharge. Pneumatic otoscopy of the tympanic membrane revealed no perforation and good mobility, with no fluid in middle ear. Clinical speech reception thresholds grossly normal.  Left Ear:  No mass/lesion of auricle. The external auditory canals is without erythema or discharge. Pneumatic otoscopy of the tympanic membrane revealed no perforation and good mobility, with no fluid in middle ear. Clinical speech  reception thresholds grossly normal  Nose:  No masses/lesions of external nose, nasal mucosa, septum, and turbinates were within normal limits.  Mouth:  No mass/lesion of lips, teeth, gums, hard/soft palate, tongue, tonsils, or oropharynx.  Neck & Lymphatics:  No cervical lymphadenopathy, no neck mass/crepitus/ asymmetry, trachea is midline, no thyroid enlargement/tenderness/mass.  Neuro/Psych: Alert with normal mood and affect.   Respiration/Chest:  Symmetric expansion during respiration, normal respiratory effort.  Skin:  Warm and intact.    Assessment & Plan:       -     Cerumen Impaction - Dominic has cerumen impaction.  We discussed preventative measures and treatment options.  Q-tips must be avoided, instead the ears can be cleaned with OTC ear rinses (or a mixture of alcohol & vinegar in equal parts).   For hard wax, Dominic may place mineral oil/baby oil in the ear with a cotton ball at night and remove in the shower.  This will assist in softening the wax and allow it to drain out on its own. If the cerumen impacts the ear canal and causes hearing loss or infection he needs to follow-up in the clinic for treatment and cleaning.

## 2022-04-25 ENCOUNTER — OFFICE VISIT (OUTPATIENT)
Dept: URGENT CARE | Facility: CLINIC | Age: 87
End: 2022-04-25
Payer: MEDICARE

## 2022-04-25 VITALS
BODY MASS INDEX: 34.22 KG/M2 | DIASTOLIC BLOOD PRESSURE: 68 MMHG | HEIGHT: 76 IN | RESPIRATION RATE: 14 BRPM | TEMPERATURE: 97 F | OXYGEN SATURATION: 96 % | SYSTOLIC BLOOD PRESSURE: 116 MMHG | WEIGHT: 281 LBS | HEART RATE: 63 BPM

## 2022-04-25 DIAGNOSIS — K21.9 GASTROESOPHAGEAL REFLUX DISEASE, UNSPECIFIED WHETHER ESOPHAGITIS PRESENT: Primary | ICD-10-CM

## 2022-04-25 DIAGNOSIS — K21.9 GASTROESOPHAGEAL REFLUX DISEASE WITHOUT ESOPHAGITIS: ICD-10-CM

## 2022-04-25 PROCEDURE — 93010 ELECTROCARDIOGRAM REPORT: CPT | Mod: S$PBB,,, | Performed by: INTERNAL MEDICINE

## 2022-04-25 PROCEDURE — 93010 EKG 12-LEAD: ICD-10-PCS | Mod: S$PBB,,, | Performed by: INTERNAL MEDICINE

## 2022-04-25 PROCEDURE — 99213 PR OFFICE/OUTPT VISIT, EST, LEVL III, 20-29 MIN: ICD-10-PCS | Mod: S$GLB,,, | Performed by: PHYSICIAN ASSISTANT

## 2022-04-25 PROCEDURE — 93005 ELECTROCARDIOGRAM TRACING: CPT | Mod: S$GLB,,, | Performed by: PHYSICIAN ASSISTANT

## 2022-04-25 PROCEDURE — 93005 EKG 12-LEAD: ICD-10-PCS | Mod: S$GLB,,, | Performed by: PHYSICIAN ASSISTANT

## 2022-04-25 PROCEDURE — 99213 OFFICE O/P EST LOW 20 MIN: CPT | Mod: S$GLB,,, | Performed by: PHYSICIAN ASSISTANT

## 2022-04-25 RX ORDER — DICYCLOMINE HYDROCHLORIDE 20 MG/1
20 TABLET ORAL
Status: COMPLETED | OUTPATIENT
Start: 2022-04-25 | End: 2022-04-25

## 2022-04-25 RX ORDER — MAG HYDROX/ALUMINUM HYD/SIMETH 200-200-20
30 SUSPENSION, ORAL (FINAL DOSE FORM) ORAL
Status: COMPLETED | OUTPATIENT
Start: 2022-04-25 | End: 2022-04-25

## 2022-04-25 RX ORDER — LIDOCAINE HYDROCHLORIDE 20 MG/ML
10 SOLUTION OROPHARYNGEAL
Status: COMPLETED | OUTPATIENT
Start: 2022-04-25 | End: 2022-04-25

## 2022-04-25 RX ADMIN — DICYCLOMINE HYDROCHLORIDE 20 MG: 20 TABLET ORAL at 02:04

## 2022-04-25 RX ADMIN — LIDOCAINE HYDROCHLORIDE 10 ML: 20 SOLUTION OROPHARYNGEAL at 02:04

## 2022-04-25 RX ADMIN — Medication 30 ML: at 02:04

## 2022-04-25 NOTE — PROGRESS NOTES
"Subjective:       Patient ID: Dominic Cohen is a 86 y.o. male.    Vitals:  height is 6' 4" (1.93 m) and weight is 127.5 kg (281 lb). His tympanic temperature is 97 °F (36.1 °C). His blood pressure is 116/68 and his pulse is 63. His respiration is 14 and oxygen saturation is 96%.     Chief Complaint: Chest Pain    Patient states that he is having gas pain around his ribcage.  He states that he can't burp or pass gas.  Patient states that it started this morning.  He states that he is having chest tightness radiating up from his stomach. He did not take his nexium today and has not eaten anything.     Chest Pain   This is a new problem. The current episode started today. The onset quality is sudden. The problem has been unchanged. The pain is at a severity of 9/10. The quality of the pain is described as tightness. The pain does not radiate. Associated symptoms include abdominal pain and back pain. Pertinent negatives include no claudication, cough, diaphoresis, dizziness, exertional chest pressure, fever, headaches, hemoptysis, irregular heartbeat, leg pain, lower extremity edema, malaise/fatigue, nausea, near-syncope, orthopnea, palpitations, PND, shortness of breath, sputum production, syncope, vomiting or weakness. Numbness: end of fingers.       Constitution: Negative for sweating and fever.   Cardiovascular: Positive for chest pain. Negative for palpitations and passing out.   Respiratory: Negative for cough, sputum production, bloody sputum and shortness of breath.    Gastrointestinal: Positive for abdominal pain. Negative for nausea and vomiting.   Musculoskeletal: Positive for back pain.   Neurological: Negative for dizziness and headaches. Numbness: end of fingers.       Objective:      Physical Exam   Constitutional: He is oriented to person, place, and time. He appears well-developed. He is cooperative.  Non-toxic appearance. He does not appear ill. No distress.   HENT:   Head: Normocephalic and " atraumatic.   Ears:   Right Ear: Hearing and external ear normal.   Left Ear: Hearing and external ear normal.   Nose: Nose normal. No mucosal edema, rhinorrhea or nasal deformity. No epistaxis. Right sinus exhibits no maxillary sinus tenderness and no frontal sinus tenderness. Left sinus exhibits no maxillary sinus tenderness and no frontal sinus tenderness.   Mouth/Throat: Uvula is midline, oropharynx is clear and moist and mucous membranes are normal. No trismus in the jaw. Normal dentition. No uvula swelling. No oropharyngeal exudate, posterior oropharyngeal edema or posterior oropharyngeal erythema.   Eyes: Conjunctivae and lids are normal. No scleral icterus.   Neck: Trachea normal and phonation normal. Neck supple. No edema present. No erythema present. No neck rigidity present.   Cardiovascular: Normal rate, regular rhythm, normal heart sounds and normal pulses.   Pulmonary/Chest: Effort normal and breath sounds normal. No respiratory distress. He has no decreased breath sounds. He has no rhonchi.   Abdominal: Normal appearance and bowel sounds are normal. He exhibits no distension and no mass. Soft. There is no abdominal tenderness. There is no rebound and no guarding. No hernia.   Musculoskeletal: Normal range of motion.         General: No deformity. Normal range of motion.   Neurological: He is alert and oriented to person, place, and time. He exhibits normal muscle tone. Coordination normal.   Skin: Skin is warm, dry, intact, not diaphoretic and not pale.   Psychiatric: His speech is normal and behavior is normal. Judgment and thought content normal.   Nursing note and vitals reviewed.        Assessment:       1. Gastroesophageal reflux disease, unspecified whether esophagitis present    2. Chest pain, unspecified type        Here with abdominal bloating and discomfort that radiates up into chest. He denies chest pain or shortness of breath. EKG was done and shows no changes on one done on 3/22/22. He  reports just recently seeing cardiologist with no issues. He was given bentyl and GI cocktail in clinic. He takes nexium twice a day at home. He did not take that medication today. He takes it before he eats and has not eaten anything today. He is feeling better after GI cocktail. I recommended he go home and take nexium and eat. I also gave him ED precautions that if he felt a tightness in chest, shortness of breath or dizziness he needed to go to the emergency room.     I recommend he follow low acid diet and follow up with PCP or return if new or worsening symptoms occur.     Plan:         Gastroesophageal reflux disease, unspecified whether esophagitis present  -     LIDOcaine HCl 2% oral solution 10 mL  -     aluminum-magnesium hydroxide-simethicone 200-200-20 mg/5 mL suspension 30 mL  -     dicyclomine tablet 20 mg    Chest pain, unspecified type  -     IN OFFICE EKG 12-LEAD (to [facility])

## 2022-04-28 ENCOUNTER — TELEPHONE (OUTPATIENT)
Dept: URGENT CARE | Facility: CLINIC | Age: 87
End: 2022-04-28
Payer: MEDICARE

## 2022-05-09 NOTE — TELEPHONE ENCOUNTER
Lv was on 01/07/2022. Pt informed that he will be contacted once Tiffanie LUNDBERG returns to clinic . //kah

## 2022-05-10 RX ORDER — MUPIROCIN 20 MG/G
OINTMENT TOPICAL 2 TIMES DAILY
Qty: 22 G | Refills: 1 | Status: SHIPPED | OUTPATIENT
Start: 2022-05-10

## 2022-06-06 ENCOUNTER — OFFICE VISIT (OUTPATIENT)
Dept: OPHTHALMOLOGY | Facility: CLINIC | Age: 87
End: 2022-06-06
Payer: MEDICARE

## 2022-06-06 DIAGNOSIS — H04.129 DRY EYE: ICD-10-CM

## 2022-06-06 DIAGNOSIS — H40.1133 PRIMARY OPEN ANGLE GLAUCOMA OF BOTH EYES, SEVERE STAGE: Primary | ICD-10-CM

## 2022-06-06 DIAGNOSIS — H25.11 NUCLEAR SCLEROSIS OF RIGHT EYE: ICD-10-CM

## 2022-06-06 PROCEDURE — 99213 OFFICE O/P EST LOW 20 MIN: CPT | Mod: PBBFAC | Performed by: OPHTHALMOLOGY

## 2022-06-06 PROCEDURE — 99999 PR PBB SHADOW E&M-EST. PATIENT-LVL III: CPT | Mod: PBBFAC,,, | Performed by: OPHTHALMOLOGY

## 2022-06-06 PROCEDURE — 99214 PR OFFICE/OUTPT VISIT, EST, LEVL IV, 30-39 MIN: ICD-10-PCS | Mod: S$PBB,,, | Performed by: OPHTHALMOLOGY

## 2022-06-06 PROCEDURE — 99999 PR PBB SHADOW E&M-EST. PATIENT-LVL III: ICD-10-PCS | Mod: PBBFAC,,, | Performed by: OPHTHALMOLOGY

## 2022-06-06 PROCEDURE — 99214 OFFICE O/P EST MOD 30 MIN: CPT | Mod: S$PBB,,, | Performed by: OPHTHALMOLOGY

## 2022-06-06 NOTE — PROGRESS NOTES
SUBJECTIVE  Dominic Washington is 86 y.o. male  Corrected distance visual acuity was 20/40 in the right eye and CF at 5' in the left eye.   Chief Complaint   Patient presents with    Glaucoma     Patient reports for 3 month IOP check. Using gtts as advised. Denies pain or irritation at this time. Patient reports of visual stability since previous visit.             HPI     Glaucoma      Additional comments: Patient reports for 3 month IOP check. Using gtts   as advised. Denies pain or irritation at this time. Patient reports of   visual stability since previous visit.                 Comments     Pt previously saw Dr. Carvalho   Saw Dr. Jean-Baptiste 7/22/19     1. Severe COAG OS>OD tmax=26/32 Goal=16-17   Combigan=dizzy   SLT OD 5/15 (19-13)   SLT OS 4/15 (19-13)   2. Mod NSC OD   PCIOL OS with goniotomy 8/11/21 (+21.0 SN60WF)       Timolol BID OU   Latanoprost qhs OU   OTC Tears OU Prn            Last edited by Shawn Olea on 6/6/2022  1:18 PM. (History)         Assessment /Plan :  1. Primary open angle glaucoma of both eyes, severe stage Doing well, intraocular pressure (IOP) within acceptable range relative to target IOP and no evidence of progression. Continue current treatment. Reviewed importance of continued compliance with treatment and follow up.      Patient instructed to continue using the following glaucoma medication as follows:  Latanoprost one drop in each eye nightly and Timolol one drop in each eye every 12 hours    Return to clinic in 3 months  or as needed.  With IOP Check and GOCT       2. Nuclear sclerosis of right eye - monitor for now   3. Dry eye  -- Condition stable, no therapeutic change required. Monitoring routinely.

## 2022-08-29 NOTE — PATIENT INSTRUCTIONS
Getting Ready for Knee Replacement: Preparing for Your Recovery     Aerobic exercise, such as riding a stationary bike, can help improve your general fitness and ease your recovery.     Preparing for your knee replacement helps make your recovery faster and smoother. You can even prepare for your rehabilitation program that youll follow after surgery. It will help you strengthen and use your new knee.  Why preparing for recovery helps  The more in shape you are before surgery, the sooner youll be able to get back to activities you enjoy.  Help recovery faster by:  · Strengthening and stretching your leg muscles. This helps to support the knee as it heals. It also gives you a head start on rehabilitation.  · Preparing to use a walker or crutches. Learn to safely use walking aids before surgery. This will help you get up and around sooner. Strengthening your upper body can also make it easier to use walking aids.  · Preparing your home. Make some simple arrangements at home. These can prevent falls. They can also make daily tasks easier as you recover. This includes moving objects youll need within reach and asking in advance for help with certain chores. You should also remove objects on the floor that could cause a fall, such as loose rugs and cords.   You and your team  Your healthcare team may include:  · A physical therapist (PT). He or she will design an exercise program to build strength and aid recovery.  · An occupational therapist (OT). He or she will help you make daily activities safer and easier during recovery.  · An orthopaedic surgeon. He or she will perform the surgery and oversee your care.  · A nurse or . He or she will coordinate your care.  Understanding your role  When it comes to preparing for recovery, much of the work is up to you. So make time each day for the exercises youve been given. Always follow the instructions that your healthcare team gives you.  Date Last Reviewed:  10/1/2015  © 0836-0523 [employer]. [address] [place] [address], [place] [address]. All rights reserved. This information is not intended as a substitute for professional medical care. Always follow your healthcare professional's instructions.         PAST SURGICAL HISTORY:  No significant past surgical history

## 2022-09-12 ENCOUNTER — OFFICE VISIT (OUTPATIENT)
Dept: OPHTHALMOLOGY | Facility: CLINIC | Age: 87
End: 2022-09-12
Payer: MEDICARE

## 2022-09-12 DIAGNOSIS — Z96.1 PSEUDOPHAKIA OF LEFT EYE: ICD-10-CM

## 2022-09-12 DIAGNOSIS — H25.11 NUCLEAR SCLEROSIS OF RIGHT EYE: ICD-10-CM

## 2022-09-12 DIAGNOSIS — H04.129 DRY EYE: ICD-10-CM

## 2022-09-12 DIAGNOSIS — H40.1133 PRIMARY OPEN ANGLE GLAUCOMA OF BOTH EYES, SEVERE STAGE: Primary | ICD-10-CM

## 2022-09-12 PROCEDURE — 92133 CPTRZD OPH DX IMG PST SGM ON: CPT | Mod: PBBFAC | Performed by: OPHTHALMOLOGY

## 2022-09-12 PROCEDURE — 99214 PR OFFICE/OUTPT VISIT, EST, LEVL IV, 30-39 MIN: ICD-10-PCS | Mod: S$PBB,,, | Performed by: OPHTHALMOLOGY

## 2022-09-12 PROCEDURE — 99999 PR PBB SHADOW E&M-EST. PATIENT-LVL III: ICD-10-PCS | Mod: PBBFAC,,, | Performed by: OPHTHALMOLOGY

## 2022-09-12 PROCEDURE — 99213 OFFICE O/P EST LOW 20 MIN: CPT | Mod: PBBFAC | Performed by: OPHTHALMOLOGY

## 2022-09-12 PROCEDURE — 99214 OFFICE O/P EST MOD 30 MIN: CPT | Mod: S$PBB,,, | Performed by: OPHTHALMOLOGY

## 2022-09-12 PROCEDURE — 99999 PR PBB SHADOW E&M-EST. PATIENT-LVL III: CPT | Mod: PBBFAC,,, | Performed by: OPHTHALMOLOGY

## 2022-09-12 PROCEDURE — 92133 POSTERIOR SEGMENT OCT OPTIC NERVE(OCULAR COHERENCE TOMOGRAPHY) - OU - BOTH EYES: ICD-10-PCS | Mod: 26,S$PBB,, | Performed by: OPHTHALMOLOGY

## 2022-09-12 NOTE — PROGRESS NOTES
"SUBJECTIVE  Sumtermalika Cohen is 86 y.o. male  Uncorrected distance visual acuity was 20/50 in the right eye and CF at 5' in the left eye.   Chief Complaint   Patient presents with    Glaucoma     Patient reports for 3 month IOP check. Using gtts as advised. Denies pain or irritation at this time. States his vision is blurred, believes the blurred vision is "coming from his feet", feels he has a "fever in his feet that goes up his back to his neck and causes his vision to blur". Does not use At's for relief.             HPI     Glaucoma     Additional comments: Patient reports for 3 month IOP check. Using gtts as   advised. Denies pain or irritation at this time. States his vision is   blurred, believes the blurred vision is "coming from his feet", feels he   has a "fever in his feet that goes up his back to his neck and causes his   vision to blur". Does not use At's for relief.              Comments    Pt previously saw Dr. Carvalho   Saw Dr. Jean-Baptiste 7/22/19     1. Severe COAG OS>OD tmax=26/32 Goal=16-17   Combigan=dizzy   SLT OD 5/15 (19-13)   SLT OS 4/15 (19-13)   2. Mod NSC OD   PCIOL OS with goniotomy 8/11/21 (+21.0 SN60WF)       Timolol BID OU   Latanoprost qhs OU   OTC Tears OU Prn            Last edited by Shawn Olea on 9/12/2022  1:01 PM.         Assessment /Plan :  1. Primary open angle glaucoma of both eyes, severe stage Intraocular pressure (IOP) not within acceptable range relative to target IOP with risk of irreversible visual loss. Better IOP control is recommended. Treatment options may include change or additional medications, Selective Laser Trabeculoplasty (SLT laser), and/or incisional glaucoma surgery. Reviewed importance of continued compliance with treatment and follow up.     Patient chooses defer and recheck IOP next visit  Continue Timolol one drop in each eye every 12 hours and Latanoprost one drop in each every night  Return to clinic in 3 months  or as needed.  With IOP Check   "   2. Nuclear sclerosis of right eye - monitor for now   3. Pseudophakia of left eye  -- Condition stable, no therapeutic change required. Monitoring routinely.     4. Dry eye  -- Condition stable, no therapeutic change required. Monitoring routinely.

## 2022-11-08 ENCOUNTER — PES CALL (OUTPATIENT)
Dept: ADMINISTRATIVE | Facility: CLINIC | Age: 87
End: 2022-11-08
Payer: MEDICARE

## 2022-11-23 ENCOUNTER — TELEPHONE (OUTPATIENT)
Dept: HEMATOLOGY/ONCOLOGY | Facility: CLINIC | Age: 87
End: 2022-11-23
Payer: MEDICARE

## 2022-11-23 NOTE — TELEPHONE ENCOUNTER
Attempted to reach pt, was unsuccessful. Lvm advised pt to call our office his appointment for today will be cancelled but if pt did come in today he can have his labs done due to him needing labs prior to his appt.

## 2022-12-05 ENCOUNTER — TELEPHONE (OUTPATIENT)
Dept: ADMINISTRATIVE | Facility: HOSPITAL | Age: 87
End: 2022-12-05
Payer: MEDICARE

## 2023-01-31 ENCOUNTER — PATIENT OUTREACH (OUTPATIENT)
Dept: ADMINISTRATIVE | Facility: HOSPITAL | Age: 88
End: 2023-01-31
Payer: MEDICARE

## 2023-03-14 ENCOUNTER — TELEPHONE (OUTPATIENT)
Dept: ADMINISTRATIVE | Facility: HOSPITAL | Age: 88
End: 2023-03-14
Payer: MEDICARE

## 2023-04-03 ENCOUNTER — PATIENT OUTREACH (OUTPATIENT)
Dept: ADMINISTRATIVE | Facility: HOSPITAL | Age: 88
End: 2023-04-03
Payer: MEDICARE

## 2023-04-03 NOTE — PROGRESS NOTES
Working HTN report:     Called to discuss scheduling appointment and to get updated BP reading.  No answer, unable to leave a message.

## 2023-04-18 ENCOUNTER — PATIENT OUTREACH (OUTPATIENT)
Dept: ADMINISTRATIVE | Facility: HOSPITAL | Age: 88
End: 2023-04-18
Payer: MEDICARE

## 2023-04-18 NOTE — PROGRESS NOTES
Working Continuity Report.    [facility]-no results found.  [facility]-no results found.  [facility] Every[facility]-no recent results found.    LM to confirm pcp and offering to schedule annual exam.  LM on [place] number requesting assistance contacting pt.

## 2023-04-21 ENCOUNTER — TELEPHONE (OUTPATIENT)
Dept: ADMINISTRATIVE | Facility: HOSPITAL | Age: 88
End: 2023-04-21
Payer: MEDICARE

## 2023-05-16 ENCOUNTER — TELEPHONE (OUTPATIENT)
Dept: ADMINISTRATIVE | Facility: HOSPITAL | Age: 88
End: 2023-05-16
Payer: MEDICARE

## 2023-07-12 ENCOUNTER — PES CALL (OUTPATIENT)
Dept: ADMINISTRATIVE | Facility: CLINIC | Age: 88
End: 2023-07-12
Payer: MEDICARE

## 2023-07-19 ENCOUNTER — PATIENT OUTREACH (OUTPATIENT)
Dept: ADMINISTRATIVE | Facility: HOSPITAL | Age: 88
End: 2023-07-19
Payer: MEDICARE

## 2023-07-19 NOTE — PROGRESS NOTES
Continuity Report-Overdue Annual Exam.    [facility]-no results found.  [facility]-no results found.  [facility] Every[facility]-no recent results found.  Chart reviewed. Per Care Everywhere pt was [place] hospital in [place], possibly moved there ?.    Attempted to contact pt on home and mobile numbers, both have been disconnected.  LM on emergency contact phone Ms Conrad requesting assistance contacting pt or to confirm if pt has moved out of state.

## 2023-08-18 ENCOUNTER — PES CALL (OUTPATIENT)
Dept: ADMINISTRATIVE | Facility: CLINIC | Age: 88
End: 2023-08-18
Payer: MEDICARE

## 2023-11-01 ENCOUNTER — PATIENT OUTREACH (OUTPATIENT)
Dept: ADMINISTRATIVE | Facility: HOSPITAL | Age: 88
End: 2023-11-01
Payer: MEDICARE

## 2023-11-01 NOTE — PROGRESS NOTES
BR Continuity Report, Due Annual Exam.  Pt last seen Jan 2022.    Attempted to contact pt to schedule appt. Numbers are not in service. LM on Ms Anamaria-emergency contact, requesting assistance contacting pt.    Chart review shows pt has been being seen in clinic and hospital in [place] and [place].

## 2023-12-11 ENCOUNTER — PATIENT OUTREACH (OUTPATIENT)
Dept: ADMINISTRATIVE | Facility: HOSPITAL | Age: 88
End: 2023-12-11
Payer: MEDICARE

## 2023-12-11 NOTE — PROGRESS NOTES
OHN HTN REPORT: per chart review pt last documented bp was over a yr ago, it appears per chart pt has moved out of state, he has had several visits with a [facility] MD.  Unable to verify if pt has moved, both numbers on file are disconnected.  Last visit with Dr Liu 01/26/22.

## 2024-01-31 ENCOUNTER — PATIENT OUTREACH (OUTPATIENT)
Dept: ADMINISTRATIVE | Facility: HOSPITAL | Age: 89
End: 2024-01-31
Payer: MEDICARE

## 2024-01-31 NOTE — PROGRESS NOTES
Working not seen in 12 months.  Pt last seen Jan 2022.    Attempted to contact pt to schedule annual exam. Pt home/mobile number not in service. LM on emergency contact requesting assistance contacting pt.  Have tried multiple times to contact pt since April 2023. Pt's numbers are not in service and have left mess on emergency contact requesting assistance contacting pt.

## 2024-02-01 NOTE — PROGRESS NOTES
All encounters since Dec 2022 have been out of state, [place]. No numbers available to contact pt.  Chart updated.

## 2025-04-03 NOTE — HOSPITAL COURSE
2/14:  S/p pericardial window. Cardiothoracic surgery primary on case. Will remain on case for medical management. Currently in ICU. Ok to step down from medicine standpoint. Will defer decision to transfer to primary team.     2/15- POD #1, s/p pericardial window and B Chest Tubes for B pleural effusions. About 1 L of clear serous fluid was then suction out of the left pleura. The pericardium was then opened and about 320 mL of serous fluid was then suctioned out.The pericardial space was filled with fibrinous exudate. The pericardium itself was thickened and fibrotic. A 3 x 4 cm piece of pericardium was then excised. Since then about 705 cc fluid has drained from both the tubes.   Looks better, lee d/jackie this am, since then urine output has decreased with low volume bladder scan. Dr. Graham just gave a 500 cc fluid bolus after which he put out a little urine.   2/16- looks better, urine output improved, LFTs improving with IVF. Minimal discharge from the Pericardial and Pleural drain. Dr. Graham ordered CT chest which showed small L PTX plus small L pleural effusion and atelectasis. Getting PT/OT as well. Overall looks better. Will continue PT/OT and await removal of CT by Dr. Graham.     1.69

## (undated) DEVICE — SEE MEDLINE ITEM 152622

## (undated) DEVICE — CLOSURE SKIN STERI STRIP 1/2X4

## (undated) DEVICE — BANDAGE ACE DOUBLE STER 6IN

## (undated) DEVICE — GLOVE SURG BIOGEL LATEX SZ 7.5

## (undated) DEVICE — COVER OVERHEAD SURG LT BLUE

## (undated) DEVICE — SEE MEDLINE ITEM 152522

## (undated) DEVICE — SEE MEDLINE ITEM 152739

## (undated) DEVICE — BLADE SURG CARBON STEEL #10

## (undated) DEVICE — ELECTRODE REM PLYHSV RETURN 9

## (undated) DEVICE — SEE MEDLINE ITEM 157173

## (undated) DEVICE — SYR 50ML CATH TIP

## (undated) DEVICE — DRAPE INCISE IOBAN 2 23X33IN

## (undated) DEVICE — APPLICATOR CHLORAPREP ORN 26ML

## (undated) DEVICE — NDL SAFETY 22G X 1.5 ECLIPSE

## (undated) DEVICE — SEE MEDLINE ITEM 157027

## (undated) DEVICE — DRESSING MEPORE ISLAND 31/2X4

## (undated) DEVICE — LINER GLOVE POWDERFREE 8

## (undated) DEVICE — SEE MEDLINE ITEM 146308

## (undated) DEVICE — SYRINGE 0.9% NACL 10MIL PREFIL

## (undated) DEVICE — SUPPORT ULNA NERVE PROTECTOR

## (undated) DEVICE — SOL 9P NACL IRR PIC IL

## (undated) DEVICE — KIT IRR SUCTION HND PIECE

## (undated) DEVICE — MANIFOLD 4 PORT

## (undated) DEVICE — BLADE SYSTEM 6 25MMX90MMX1.27M

## (undated) DEVICE — DRAPE STERI INSTRUMENT 1018

## (undated) DEVICE — DRAIN CHEST DRY SUCTION

## (undated) DEVICE — TUBE LUKI ASP COLL 6 1/4

## (undated) DEVICE — GLOVE LINER CUT RESISTANT LG

## (undated) DEVICE — DRAPE EXTREMITY W/ABC NON-SLIP

## (undated) DEVICE — GOWN SURG 2XL DISP TIE BACK

## (undated) DEVICE — SCRUB 10% POVIDONE IODINE 4OZ

## (undated) DEVICE — SUT MONOCRYL 4-0 PS-1 UND

## (undated) DEVICE — SEE MEDLINE ITEM 157117

## (undated) DEVICE — NDL HYPODERMIC BLUNT 18G 1.5IN

## (undated) DEVICE — BLADE SAW SAGITTAL 11X.89X90MM

## (undated) DEVICE — SEE MEDLINE ITEM 157148

## (undated) DEVICE — BLADE 4IN EDGE INSULATED

## (undated) DEVICE — ALCOHOL 70% ISOP RUBBING 4OZ

## (undated) DEVICE — SEE L#153236

## (undated) DEVICE — POSITIONER HEAD DONUT 9IN FOAM

## (undated) DEVICE — INSTRUMENT FRAZIER 10FR W/VENT

## (undated) DEVICE — SEE MEDLINE ITEM 157216

## (undated) DEVICE — GAUZE SPONGE 4X4 12PLY

## (undated) DEVICE — SUT VICRYL BR 1 GEN 27 CT-1

## (undated) DEVICE — Device

## (undated) DEVICE — SYR ONLY LUER LOCK 20CC

## (undated) DEVICE — SKIN MARKER DEVON 160

## (undated) DEVICE — CONTAINER SPECIMEN STRL 4OZ

## (undated) DEVICE — UNDERGLOVES BIOGEL PI SIZE 8.5

## (undated) DEVICE — SOL IRR NACL .9% 3000ML

## (undated) DEVICE — DECANTER VIAL ASEPTIC TRANSFER

## (undated) DEVICE — DRAPE PLASTIC U 60X72

## (undated) DEVICE — HOOD T-5 TEAR AWAY STERILE

## (undated) DEVICE — SEE MEDLINE ITEM 152529

## (undated) DEVICE — SEE MEDLINE ITEM 157131

## (undated) DEVICE — SPONGE LAP 18X18 PREWASHED

## (undated) DEVICE — PAD CAST SPECIALIST STRL 6

## (undated) DEVICE — SEE MEDLINE ITEM 146231

## (undated) DEVICE — SEE MEDLINE ITEM 157125

## (undated) DEVICE — TOURNIQUET SB QC DP 44X4IN

## (undated) DEVICE — TAPE SURGICAL MICROFOAM 3IN

## (undated) DEVICE — SUT VICRYL 1 OB 36 CTX

## (undated) DEVICE — STAPLER SKIN PROXIMATE WIDE

## (undated) DEVICE — TIP SUCTION YANKAUER

## (undated) DEVICE — APPLIER LIGACLIP MED 11IN

## (undated) DEVICE — SUT TICRON #5

## (undated) DEVICE — SEE MEDLINE ITEM 152523

## (undated) DEVICE — CONTAINER 32OZ PATHOLOGY

## (undated) DEVICE — SEE MEDLINE ITEM 152487

## (undated) DEVICE — SYR 10CC LUER LOCK

## (undated) DEVICE — SUT PERMA HAND SILK BLK 0-0

## (undated) DEVICE — GLOVE SURGICAL LATEX SZ 8

## (undated) DEVICE — KIT EVACUATOR 3-SPRING 1/8 DRN

## (undated) DEVICE — NDL SAFETY 25G X 1.5 ECLIPSE